# Patient Record
Sex: MALE | Race: WHITE | NOT HISPANIC OR LATINO | Employment: OTHER | URBAN - METROPOLITAN AREA
[De-identification: names, ages, dates, MRNs, and addresses within clinical notes are randomized per-mention and may not be internally consistent; named-entity substitution may affect disease eponyms.]

---

## 2017-01-23 ENCOUNTER — APPOINTMENT (OUTPATIENT)
Dept: LAB | Facility: HOSPITAL | Age: 77
End: 2017-01-23

## 2017-01-23 ENCOUNTER — APPOINTMENT (OUTPATIENT)
Dept: ONCOLOGY | Facility: CLINIC | Age: 77
End: 2017-01-23

## 2017-02-27 ENCOUNTER — LAB (OUTPATIENT)
Dept: LAB | Facility: HOSPITAL | Age: 77
End: 2017-02-27

## 2017-02-27 ENCOUNTER — OFFICE VISIT (OUTPATIENT)
Dept: ONCOLOGY | Facility: CLINIC | Age: 77
End: 2017-02-27

## 2017-02-27 VITALS
DIASTOLIC BLOOD PRESSURE: 86 MMHG | RESPIRATION RATE: 16 BRPM | WEIGHT: 209.6 LBS | BODY MASS INDEX: 26.06 KG/M2 | HEART RATE: 67 BPM | TEMPERATURE: 98 F | HEIGHT: 75 IN | OXYGEN SATURATION: 95 % | SYSTOLIC BLOOD PRESSURE: 132 MMHG

## 2017-02-27 DIAGNOSIS — D72.819 LEUKOPENIA: ICD-10-CM

## 2017-02-27 DIAGNOSIS — G62.9 NEUROPATHY: ICD-10-CM

## 2017-02-27 DIAGNOSIS — D72.818 OTHER DECREASED WHITE BLOOD CELL (WBC) COUNT: Primary | ICD-10-CM

## 2017-02-27 DIAGNOSIS — D69.6 THROMBOCYTOPENIA (HCC): ICD-10-CM

## 2017-02-27 DIAGNOSIS — D47.2 MGUS (MONOCLONAL GAMMOPATHY OF UNKNOWN SIGNIFICANCE): ICD-10-CM

## 2017-02-27 LAB
BASOPHILS # BLD AUTO: 0.03 10*3/MM3 (ref 0–0.1)
BASOPHILS NFR BLD AUTO: 0.8 % (ref 0–1.1)
DEPRECATED RDW RBC AUTO: 42.9 FL (ref 37–49)
EOSINOPHIL # BLD AUTO: 0.04 10*3/MM3 (ref 0–0.36)
EOSINOPHIL NFR BLD AUTO: 1.1 % (ref 1–5)
ERYTHROCYTE [DISTWIDTH] IN BLOOD BY AUTOMATED COUNT: 13.1 % (ref 11.7–14.5)
HCT VFR BLD AUTO: 42.1 % (ref 40–49)
HGB BLD-MCNC: 13.5 G/DL (ref 13.5–16.5)
HOLD SPECIMEN: NORMAL
HOLD SPECIMEN: NORMAL
IMM GRANULOCYTES # BLD: 0.01 10*3/MM3 (ref 0–0.03)
IMM GRANULOCYTES NFR BLD: 0.3 % (ref 0–0.5)
LYMPHOCYTES # BLD AUTO: 1.24 10*3/MM3 (ref 1–3.5)
LYMPHOCYTES NFR BLD AUTO: 32.7 % (ref 20–49)
MCH RBC QN AUTO: 28.8 PG (ref 27–33)
MCHC RBC AUTO-ENTMCNC: 32.1 G/DL (ref 32–35)
MCV RBC AUTO: 89.8 FL (ref 83–97)
MONOCYTES # BLD AUTO: 0.57 10*3/MM3 (ref 0.25–0.8)
MONOCYTES NFR BLD AUTO: 15 % (ref 4–12)
NEUTROPHILS # BLD AUTO: 1.9 10*3/MM3 (ref 1.5–7)
NEUTROPHILS NFR BLD AUTO: 50.1 % (ref 39–75)
NRBC BLD MANUAL-RTO: 0 /100 WBC (ref 0–0)
PLATELET # BLD AUTO: 151 10*3/MM3 (ref 150–375)
PMV BLD AUTO: 10.1 FL (ref 8.9–12.1)
RBC # BLD AUTO: 4.69 10*6/MM3 (ref 4.3–5.5)
WBC NRBC COR # BLD: 3.79 10*3/MM3 (ref 4–10)

## 2017-02-27 PROCEDURE — 36415 COLL VENOUS BLD VENIPUNCTURE: CPT | Performed by: INTERNAL MEDICINE

## 2017-02-27 PROCEDURE — 85025 COMPLETE CBC W/AUTO DIFF WBC: CPT | Performed by: INTERNAL MEDICINE

## 2017-02-27 PROCEDURE — 99213 OFFICE O/P EST LOW 20 MIN: CPT | Performed by: INTERNAL MEDICINE

## 2017-02-27 NOTE — PROGRESS NOTES
REASON FOR FOLLOWUP:  Minimal leukopenia with thrombocytopenia in the background of minimal IgG monoclonal gammopathy. The patient has no splenomegaly, no peripheral adenopathy, and no symptoms that suggest lupus or collagen vascular disease. Bone marrow   a  spirate documented no evidence of myeloma, lymphoma, leukemia, myelodysplasia, or any other abnormality. Bone marrow chromosomal analysis as well as flow cytometry were negative.     HISTORY OF PRESENT ILLNESS: Mr. Feliz returns today to the office in company of his wife.  He has not had any progression of neuropathy in the feet, neither into his hands. He has not developed any other sites of bone pain with exception of occasional discomfort in the left shoulder. His back per se is not painful.     He has not had any fevers, chills, sweats or pruritus. He has not had any peripheral adenopathy, abnormal bleeding, infections, respiratory, cardiovascular, gastrointestinal, or genitourinary problems, otherwise. He has not had any rashes in the skin and   he has not had any other neurological issues.  Since the previous visit and after I called him with the report of his x-ray of the hip he was seen by Dr. Burns and his hip has been replaced. This has produced a positive impact in his quality of life, resolution of the pain and probably to function and resolution of his back pain. He had no difficulties with his hip replacement.             PAST MEDICAL HISTORY:    1.;  Hypertension for which he takes medication.      2.; History of hypothyroidism for the last ten years and has undergone thyroid replacement for a long time.     3.; History of colonoscopy in February 2013 that was normal.  He has internal hemorrhoids.     4.; Component of reflux esophagitis.     5.;   History of prostate cancer.  He was treated with prostatectomy and he has had radiation treatment as well.  He has had a PSA that is undetectable for the last ten years.       6.; Cholecystectomy.    7.; Vasectomy.      8.; Decompression laminectomies at L3-4 and L4-5.      9.; Multiple sebaceous cysts removed.        The patient has no history of asthma, tuberculosis, or COPD.  No history of coronary artery disease or cardiac arrhythmia.          ONCOLOGIC/HEMATOLOGIC HISTORY: History from previous dates can be found in the separate document.        On 2015, given the development of pain in the left hip and tenderness in the greater trochanter, plain x-rays of the pelvis and left hip were performed  . His white count, hemoglobin, and platelets remain stable and the rest of his physical examination disclosed no abnormalities with the exception of tenderness in the greater trochanter on the left side. He had obvious ABSENCE of vibratory sensation in rajan  th feet. He had minimal reflexes in his lower extremities if any at all.           MEDICATIONS:  The current medication list was reviewed with the patient and updated in EMR this date per medical assistant.  Medication dosages and frequencies were confirmed to be accurate.         ALLERGIES:   No known drug allergies.         SOCIAL HISTORY:  The patient is a retired .  He lives with his second wife.  He travels a lot.  He is physically active and he has not been a smoker for more than 30 years or has drunk [employer]  l for more than 33 years.   He use to drink alcohol heavily then.          FAMILY HISTORY:  Patient’s father  with COPD at age 73.  Mother  with complications of breast cancer at age 86.  A brother had cancer of unknown primary and  as a consequence   of this at age 60.  He has no sisters.  His second wife is in good health.  He has two children in good health.  No family history of hematological disorders.         REVIEW OF SYSTEMS:   General: no fever, chills, fatigue, weight changes, or lack of appetite.  Eyes: no epiphora, xerophthalmia,conjunctivitis, pain, glaucoma, blurred vision,  "blindness, secretion, photophobia, proptosis, diplopia.  Ears: no otorrhea, tinnitus, otorrhagia, deafness, pain, vertigo.  Nose: no rhinorrhea, epistaxis, alteration in perception of odors, sinuses pressure.  Mouth: no alteration in gums or teeth,  ulcers, no difficulty with mastication or deglut ion, no odynophagia.  Neck: no masses or pain, no thyroid alterations, no pain in muscles or arteries, no carotid odynia, no crepitation.  Respiratory: no cough, sputum production, dyspnea, trepopnea, pleuritic pain, hemoptysis.  Heart: no syncope, irregularity, palpitations, angina, orthopnea, paroxysmal nocturnal dyspnea.  Vascular Venous: no tenderness,edema, palpable cords, postphlebitic syndrome, skin changes or ulcerations.  Vascular Arterial: no distal ischemia, claudication, gangrene, neuropathic ischemic pain, skin ulcers, paleness or cyanosis.  GI: no dysphagia, odynophagia, no regurgitation, no heartburn,no indigestion,no nausea,no vomiting,no hematemesis ,no melena,no jaundice,no distention, no obstipation,no enterorrhagia,no proctalgia,no anal  lesions, nochanges in bowel habits.  : no frequency, hesitancy, hematuria, discharge, pain.  Musculoskeletal: no muscle or tendon pain or inflammation,some  joint pain,no edema, functional limitation, fasciculations, mass.  Neurologic: no headache, seizures, alterations on Craneal nerves, no motor or senssory deficit, normal coordination, no alteration in memory, orientation, calculation,writting, verbal or written language.Sensory peripheral nuropathy BOTH FEET  Skin: no rashes, pruritus or localized lesions.  Psychiatric: no anxiety, depression, agitation, delusions, proper insight.         VITAL SIGNS:    Vitals:    02/27/17 1113   BP: 132/86   Pulse: 67   Resp: 16   Temp: 98 °F (36.7 °C)   TempSrc: Oral   SpO2: 95%   Weight: 209 lb 9.6 oz (95.1 kg)   Height: 75\" (190.5 cm)              PHYSICAL EXAMINATION:    GENERAL:  White male in no acute distress.     SKIN:  " No lesions in the skin on any level.     HEAD:  Normocephalic.    EYES:  Pupils equal, reactive to light and accommodation.      EARS:  Hearing intact.    NOSE:  Septum midline.  No excoriations or nasal discharge.    MOUTH:  Oral mucosa was normal.     THROAT:  Oropharynx without lesions or exudates.    NECK:  Supple with good range of motion; no thyromegaly or masses, no JVD or bruits.    LYMPHATICS:  No palpable adenopathy in the neck or axillae.     CHEST:  Lungs were clear with normal breath sounds bilaterally.     CARDIAC:  Regular with no gallops, murmurs, or rubs.    ABDOMEN:  Soft, nontender. No splenomegaly. No liver enlargement. No ascites. No masses. No pain. No tenderness in the iliac spine or pubis. He had no inguinal adenopathy.     EXTREMITIES:  oa in hands and knees , new hip great walking.    NEUROLOGICAL:  No focal neurological deficits, besides peripheral neuropathy.        LABORATORY DATA:    mm3 3.79 (L)    RBC 4.30 - 5.50 10*6/mm3 4.69   Hemoglobin 13.5 - 16.5 g/dL 13.5   Hematocrit 40.0 - 49.0 % 42.1   MCV 83.0 - 97.0 fL 89.8   MCH 27.0 - 33.0 pg 28.8   MCHC 32.0 - 35.0 g/dL 32.1   RDW 11.7 - 14.5 % 13.1   RDW-SD 37.0 - 49.0 fl 42.9   MPV 8.9 - 12.1 fL 10.1   Platelets 150 - 375 10*3/mm3 151   Neutrophil % 39.0 - 75.0 % 50.1   Lymphocyte % 20.0 - 49.0 % 32.7   Monocyte % 4.0 - 12.0 % 15.0 (H)   Eosinophil % 1.0 - 5.0 % 1.1   Basophil % 0.0 - 1.1 % 0.8   Immature Grans % 0.0 - 0.5 % 0.3   Neutrophils, Absolute 1.50 - 7.00 10*3/mm3 1.90   Lymphocytes, Absolute 1.00 - 3.50 10*3/mm3 1.24   Monocytes, Absolute 0.25 - 0.80 10*3/mm3 0.57   Eosinophils, Absolute 0.00 - 0.36 10*3/mm3 0.04   Basophils, Absolute 0.00 - 0.10 10*3/mm3 0.03   Immature Grans, Absolute 0.00 - 0.03 10*3/mm3 0.01   nRBC 0.0 - 0.0 /100 WBC 0.0   Resulting Agency   [facility]      Specimen Collected: 02/27/17 11:01 AM Last Resulted: 02/27/17 11:10 AM        KARLEE PENDING             ASSESSMENT:  This patient has had minor  leukopenia and thrombocytopenia with a minimal IgG monoclonal protein present in the blood in absence of any collagen vascular disease as far as we can   tell and in absence of any inflammatory process. 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 His bone marrow in the past was negative normal.            RECOMMENDATIONS:  I will see him back in 6 months. REPEAT CBC CMP KARLEE AT THAT TIME. We are glad to see him doing so well after his hip was replaced.  His functionality and quality of life have dramatically improved.

## 2017-08-14 ENCOUNTER — OFFICE VISIT (OUTPATIENT)
Dept: ONCOLOGY | Facility: CLINIC | Age: 77
End: 2017-08-14

## 2017-08-14 ENCOUNTER — LAB (OUTPATIENT)
Dept: LAB | Facility: HOSPITAL | Age: 77
End: 2017-08-14

## 2017-08-14 VITALS
DIASTOLIC BLOOD PRESSURE: 90 MMHG | OXYGEN SATURATION: 98 % | HEIGHT: 76 IN | TEMPERATURE: 98.2 F | BODY MASS INDEX: 25.47 KG/M2 | WEIGHT: 209.2 LBS | RESPIRATION RATE: 16 BRPM | HEART RATE: 64 BPM | SYSTOLIC BLOOD PRESSURE: 150 MMHG

## 2017-08-14 DIAGNOSIS — D69.6 THROMBOCYTOPENIA (HCC): ICD-10-CM

## 2017-08-14 DIAGNOSIS — D72.818 OTHER DECREASED WHITE BLOOD CELL (WBC) COUNT: Primary | ICD-10-CM

## 2017-08-14 DIAGNOSIS — D72.818 OTHER DECREASED WHITE BLOOD CELL (WBC) COUNT: ICD-10-CM

## 2017-08-14 DIAGNOSIS — D47.2 MGUS (MONOCLONAL GAMMOPATHY OF UNKNOWN SIGNIFICANCE): ICD-10-CM

## 2017-08-14 DIAGNOSIS — G62.9 NEUROPATHY: ICD-10-CM

## 2017-08-14 LAB
ALBUMIN SERPL-MCNC: 4.4 G/DL (ref 3.5–5.2)
ALBUMIN/GLOB SERPL: 1.3 G/DL (ref 1.1–2.4)
ALP SERPL-CCNC: 55 U/L (ref 38–116)
ALT SERPL W P-5'-P-CCNC: 16 U/L (ref 0–41)
ANION GAP SERPL CALCULATED.3IONS-SCNC: 14.9 MMOL/L
AST SERPL-CCNC: 28 U/L (ref 0–40)
BASOPHILS # BLD AUTO: 0.03 10*3/MM3 (ref 0–0.1)
BASOPHILS NFR BLD AUTO: 0.8 % (ref 0–1.1)
BILIRUB SERPL-MCNC: 0.3 MG/DL (ref 0.1–1.2)
BUN BLD-MCNC: 23 MG/DL (ref 6–20)
BUN/CREAT SERPL: 19 (ref 7.3–30)
CALCIUM SPEC-SCNC: 9.6 MG/DL (ref 8.5–10.2)
CHLORIDE SERPL-SCNC: 103 MMOL/L (ref 98–107)
CO2 SERPL-SCNC: 23.1 MMOL/L (ref 22–29)
CREAT BLD-MCNC: 1.21 MG/DL (ref 0.7–1.3)
DEPRECATED RDW RBC AUTO: 42.7 FL (ref 37–49)
EOSINOPHIL # BLD AUTO: 0.04 10*3/MM3 (ref 0–0.36)
EOSINOPHIL NFR BLD AUTO: 1.1 % (ref 1–5)
ERYTHROCYTE [DISTWIDTH] IN BLOOD BY AUTOMATED COUNT: 13.1 % (ref 11.7–14.5)
GFR SERPL CREATININE-BSD FRML MDRD: 58 ML/MIN/1.73
GLOBULIN UR ELPH-MCNC: 3.4 GM/DL (ref 1.8–3.5)
GLUCOSE BLD-MCNC: 86 MG/DL (ref 74–124)
HCT VFR BLD AUTO: 44.4 % (ref 40–49)
HGB BLD-MCNC: 14.4 G/DL (ref 13.5–16.5)
IMM GRANULOCYTES # BLD: 0.01 10*3/MM3 (ref 0–0.03)
IMM GRANULOCYTES NFR BLD: 0.3 % (ref 0–0.5)
LYMPHOCYTES # BLD AUTO: 1.39 10*3/MM3 (ref 1–3.5)
LYMPHOCYTES NFR BLD AUTO: 39.3 % (ref 20–49)
MCH RBC QN AUTO: 29.1 PG (ref 27–33)
MCHC RBC AUTO-ENTMCNC: 32.4 G/DL (ref 32–35)
MCV RBC AUTO: 89.7 FL (ref 83–97)
MONOCYTES # BLD AUTO: 0.59 10*3/MM3 (ref 0.25–0.8)
MONOCYTES NFR BLD AUTO: 16.7 % (ref 4–12)
NEUTROPHILS # BLD AUTO: 1.48 10*3/MM3 (ref 1.5–7)
NEUTROPHILS NFR BLD AUTO: 41.8 % (ref 39–75)
NRBC BLD MANUAL-RTO: 0 /100 WBC (ref 0–0)
PLATELET # BLD AUTO: 146 10*3/MM3 (ref 150–375)
PMV BLD AUTO: 10.3 FL (ref 8.9–12.1)
POTASSIUM BLD-SCNC: 4.8 MMOL/L (ref 3.5–4.7)
PROT SERPL-MCNC: 7.8 G/DL (ref 6.3–8)
RBC # BLD AUTO: 4.95 10*6/MM3 (ref 4.3–5.5)
SODIUM BLD-SCNC: 141 MMOL/L (ref 134–145)
WBC NRBC COR # BLD: 3.54 10*3/MM3 (ref 4–10)

## 2017-08-14 PROCEDURE — 36415 COLL VENOUS BLD VENIPUNCTURE: CPT | Performed by: INTERNAL MEDICINE

## 2017-08-14 PROCEDURE — 99213 OFFICE O/P EST LOW 20 MIN: CPT | Performed by: INTERNAL MEDICINE

## 2017-08-14 PROCEDURE — 85025 COMPLETE CBC W/AUTO DIFF WBC: CPT | Performed by: INTERNAL MEDICINE

## 2017-08-14 PROCEDURE — 80053 COMPREHEN METABOLIC PANEL: CPT | Performed by: INTERNAL MEDICINE

## 2017-08-14 NOTE — PROGRESS NOTES
REASON FOR FOLLOWUP:  Minimal leukopenia with thrombocytopenia in the background of minimal IgG monoclonal gammopathy. The patient has no splenomegaly, no peripheral adenopathy, and no symptoms that suggest lupus or collagen vascular disease. Bone marrow   a  spirate documented no evidence of myeloma, lymphoma, leukemia, myelodysplasia, or any other abnormality. Bone marrow chromosomal analysis as well as flow cytometry were negative.     HISTORY OF PRESENT ILLNESS: Mr. Feliz returns today to the office in company of his wife.  He has not had any progression of neuropathy in the feet, neither into his hands. He has not developed any other sites of bone pain with exception of occasional discomfort in the left shoulder. His back per se is not painful.     He has not had any fevers, chills, sweats or pruritus. He has not had any peripheral adenopathy, abnormal bleeding, infections, respiratory, cardiovascular, gastrointestinal, or genitourinary problems, otherwise. He has not had any rashes in the skin and   he has not had any other neurological issues.  Since the previous visit and after I called him with the report of his x-ray of the hip he was seen by Dr. Burns and his hip has been replaced. This has produced a positive impact in his quality of life, resolution of the pain and probably to function and resolution of his back pain. He had no difficulties with his hip replacement.             PAST MEDICAL HISTORY:    1.;  Hypertension for which he takes medication.      2.; History of hypothyroidism for the last ten years and has undergone thyroid replacement for a long time.     3.; History of colonoscopy in February 2013 that was normal.  He has internal hemorrhoids.     4.; Component of reflux esophagitis.     5.;   History of prostate cancer.  He was treated with prostatectomy and he has had radiation treatment as well.  He has had a PSA that is undetectable for the last ten years.       6.; Cholecystectomy.    7.; Vasectomy.      8.; Decompression laminectomies at L3-4 and L4-5.      9.; Multiple sebaceous cysts removed.        The patient has no history of asthma, tuberculosis, or COPD.  No history of coronary artery disease or cardiac arrhythmia.          ONCOLOGIC/HEMATOLOGIC HISTORY: History from previous dates can be found in the separate document.        On 2015, given the development of pain in the left hip and tenderness in the greater trochanter, plain x-rays of the pelvis and left hip were performed  . His white count, hemoglobin, and platelets remain stable and the rest of his physical examination disclosed no abnormalities with the exception of tenderness in the greater trochanter on the left side. He had obvious ABSENCE of vibratory sensation in rajan  th feet. He had minimal reflexes in his lower extremities if any at all.           MEDICATIONS:  The current medication list was reviewed with the patient and updated in EMR this date per medical assistant.  Medication dosages and frequencies were confirmed to be accurate.         ALLERGIES:   No known drug allergies.         SOCIAL HISTORY:  The patient is a retired .  He lives with his second wife.  He travels a lot.  He is physically active and he has not been a smoker for more than 30 years or has drunk [employer]  l for more than 33 years.   He use to drink alcohol heavily then.          FAMILY HISTORY:  Patient’s father  with COPD at age 73.  Mother  with complications of breast cancer at age 86.  A brother had cancer of unknown primary and  as a consequence   of this at age 60.  He has no sisters.  His second wife is in good health.  He has two children in good health.  No family history of hematological disorders.         REVIEW OF SYSTEMS:   General: no fever, chills, fatigue, weight changes, or lack of appetite.  Eyes: no epiphora, xerophthalmia,conjunctivitis, pain, glaucoma, blurred vision,  "blindness, secretion, photophobia, proptosis, diplopia.  Ears: no otorrhea, tinnitus, otorrhagia, deafness, pain, vertigo.  Nose: no rhinorrhea, epistaxis, alteration in perception of odors, sinuses pressure.  Mouth: no alteration in gums or teeth,  ulcers, no difficulty with mastication or deglut ion, no odynophagia.  Neck: no masses or pain, no thyroid alterations, no pain in muscles or arteries, no carotid odynia, no crepitation.  Respiratory: no cough, sputum production, dyspnea, trepopnea, pleuritic pain, hemoptysis.  Heart: no syncope, irregularity, palpitations, angina, orthopnea, paroxysmal nocturnal dyspnea.  Vascular Venous: no tenderness,edema, palpable cords, postphlebitic syndrome, skin changes or ulcerations.  Vascular Arterial: no distal ischemia, claudication, gangrene, neuropathic ischemic pain, skin ulcers, paleness or cyanosis.  GI: no dysphagia, odynophagia, no regurgitation, no heartburn,no indigestion,no nausea,no vomiting,no hematemesis ,no melena,no jaundice,no distention, no obstipation,no enterorrhagia,no proctalgia,no anal  lesions, nochanges in bowel habits.  : no frequency, hesitancy, hematuria, discharge, pain.  Musculoskeletal: no muscle or tendon pain or inflammation,some  joint pain,no edema, functional limitation, fasciculations, mass.  Neurologic: no headache, seizures, alterations on Craneal nerves, no motor or senssory deficit, normal coordination, no alteration in memory, orientation, calculation,writting, verbal or written language.Sensory peripheral nuropathy BOTH FEET  Skin: no rashes, pruritus or localized lesions.  Psychiatric: no anxiety, depression, agitation, delusions, proper insight.         VITAL SIGNS:    Vitals:    08/14/17 1139   BP: 150/90   Pulse: 64   Resp: 16   Temp: 98.2 °F (36.8 °C)   TempSrc: Oral   SpO2: 98%   Weight: 209 lb 3.2 oz (94.9 kg)   Height: 75.59\" (192 cm)              PHYSICAL EXAMINATION:    GENERAL:  White male in no acute distress.   "   SKIN:  No lesions in the skin on any level.     HEAD:  Normocephalic.    EYES:  Pupils equal, reactive to light and accommodation.      EARS:  Hearing intact.    NOSE:  Septum midline.  No excoriations or nasal discharge.    MOUTH:  Oral mucosa was normal.     THROAT:  Oropharynx without lesions or exudates.    NECK:  Supple with good range of motion; no thyromegaly or masses, no JVD or bruits.    LYMPHATICS:  No palpable adenopathy in the neck or axillae.     CHEST:  Lungs were clear with normal breath sounds bilaterally.     CARDIAC:  Regular with no gallops, murmurs, or rubs.    ABDOMEN:  Soft, nontender. No splenomegaly. No liver enlargement. No ascites. No masses. No pain. No tenderness in the iliac spine or pubis. He had no inguinal adenopathy.     EXTREMITIES:  oa in hands and knees , new hip great walking.    NEUROLOGICAL:  No focal neurological deficits, besides peripheral neuropathy.        LABORATORY DATA   Component      Latest Ref Rng & Units 2/27/2017 8/14/2017          11:01 AM 11:30 AM   WBC      4.00 - 10.00 10*3/mm3 3.79 (L) 3.54 (L)   RBC      4.30 - 5.50 10*6/mm3 4.69 4.95   Hemoglobin      13.5 - 16.5 g/dL 13.5 14.4   Hematocrit      40.0 - 49.0 % 42.1 44.4   MCV      83.0 - 97.0 fL 89.8 89.7   MCH      27.0 - 33.0 pg 28.8 29.1   MCHC      32.0 - 35.0 g/dL 32.1 32.4   RDW      11.7 - 14.5 % 13.1 13.1   RDW-SD      37.0 - 49.0 fl 42.9 42.7   MPV      8.9 - 12.1 fL 10.1 10.3   Platelets      150 - 375 10*3/mm3 151 146 (L)   Neutrophil %      39.0 - 75.0 % 50.1 41.8   Lymphocyte %      20.0 - 49.0 % 32.7 39.3   Monocyte %      4.0 - 12.0 % 15.0 (H) 16.7 (H)   Eosinophil %      1.0 - 5.0 % 1.1 1.1   Basophil %      0.0 - 1.1 % 0.8 0.8   Immature Grans %      0.0 - 0.5 % 0.3 0.3   Neutrophils, Absolute      1.50 - 7.00 10*3/mm3 1.90 1.48 (L)          ASSESSMENT:  This patient has had minor leukopenia and thrombocytopenia with a minimal IgG monoclonal protein present in the blood in absence of any  collagen vascular disease as far as we can   tell and in absence of any inflammatory process. 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 His bone marrow in the past was negative normal.            RECOMMENDATIONS:  I will see him back in 6 months. REPEAT CBC CMP KARLEE AT THAT TIME. We are glad to see him doing so well after his hip was replaced.  His functionality and quality of life have dramatically improved.

## 2017-08-15 LAB
ALBUMIN SERPL-MCNC: 3.8 G/DL (ref 2.9–4.4)
ALBUMIN/GLOB SERPL: 1.2 {RATIO} (ref 0.7–1.7)
ALPHA1 GLOB FLD ELPH-MCNC: 0.3 G/DL (ref 0–0.4)
ALPHA2 GLOB SERPL ELPH-MCNC: 0.8 G/DL (ref 0.4–1)
B-GLOBULIN SERPL ELPH-MCNC: 1.4 G/DL (ref 0.7–1.3)
GAMMA GLOB SERPL ELPH-MCNC: 0.8 G/DL (ref 0.4–1.8)
GLOBULIN SER CALC-MCNC: 3.2 G/DL (ref 2.2–3.9)
IGA SERPL-MCNC: 356 MG/DL (ref 61–437)
IGG SERPL-MCNC: 949 MG/DL (ref 700–1600)
IGM SERPL-MCNC: 23 MG/DL (ref 15–143)
INTERPRETATION SERPL IEP-IMP: ABNORMAL
KAPPA LC SERPL-MCNC: 27.4 MG/L (ref 3.3–19.4)
KAPPA LC/LAMBDA SER: 1.68 {RATIO} (ref 0.26–1.65)
LAMBDA LC FREE SERPL-MCNC: 16.3 MG/L (ref 5.7–26.3)
Lab: ABNORMAL
M-SPIKE: 0.3 G/DL
PROT SERPL-MCNC: 7 G/DL (ref 6–8.5)

## 2017-08-16 ENCOUNTER — TELEPHONE (OUTPATIENT)
Dept: ONCOLOGY | Facility: CLINIC | Age: 77
End: 2017-08-16

## 2018-01-24 ENCOUNTER — LAB (OUTPATIENT)
Dept: LAB | Facility: HOSPITAL | Age: 78
End: 2018-01-24

## 2018-01-24 ENCOUNTER — OFFICE VISIT (OUTPATIENT)
Dept: ONCOLOGY | Facility: CLINIC | Age: 78
End: 2018-01-24

## 2018-01-24 VITALS
RESPIRATION RATE: 18 BRPM | HEART RATE: 66 BPM | BODY MASS INDEX: 25.38 KG/M2 | WEIGHT: 208.4 LBS | OXYGEN SATURATION: 96 % | TEMPERATURE: 98.9 F | HEIGHT: 76 IN | DIASTOLIC BLOOD PRESSURE: 68 MMHG | SYSTOLIC BLOOD PRESSURE: 108 MMHG

## 2018-01-24 DIAGNOSIS — D47.2 MGUS (MONOCLONAL GAMMOPATHY OF UNKNOWN SIGNIFICANCE): ICD-10-CM

## 2018-01-24 DIAGNOSIS — D72.818 OTHER DECREASED WHITE BLOOD CELL (WBC) COUNT: ICD-10-CM

## 2018-01-24 DIAGNOSIS — D69.6 THROMBOCYTOPENIA (HCC): ICD-10-CM

## 2018-01-24 DIAGNOSIS — D47.2 MGUS (MONOCLONAL GAMMOPATHY OF UNKNOWN SIGNIFICANCE): Primary | ICD-10-CM

## 2018-01-24 DIAGNOSIS — G62.9 NEUROPATHY: ICD-10-CM

## 2018-01-24 LAB
ALBUMIN SERPL-MCNC: 4.4 G/DL (ref 3.5–5.2)
ALBUMIN/GLOB SERPL: 1.4 G/DL (ref 1.1–2.4)
ALP SERPL-CCNC: 56 U/L (ref 38–116)
ALT SERPL W P-5'-P-CCNC: 11 U/L (ref 0–41)
ANION GAP SERPL CALCULATED.3IONS-SCNC: 11.7 MMOL/L
AST SERPL-CCNC: 25 U/L (ref 0–40)
BASOPHILS # BLD AUTO: 0.03 10*3/MM3 (ref 0–0.1)
BASOPHILS NFR BLD AUTO: 0.8 % (ref 0–1.1)
BILIRUB SERPL-MCNC: 0.4 MG/DL (ref 0.1–1.2)
BUN BLD-MCNC: 23 MG/DL (ref 6–20)
BUN/CREAT SERPL: 18.7 (ref 7.3–30)
CALCIUM SPEC-SCNC: 9.4 MG/DL (ref 8.5–10.2)
CHLORIDE SERPL-SCNC: 105 MMOL/L (ref 98–107)
CO2 SERPL-SCNC: 25.3 MMOL/L (ref 22–29)
CREAT BLD-MCNC: 1.23 MG/DL (ref 0.7–1.3)
DEPRECATED RDW RBC AUTO: 43.8 FL (ref 37–49)
EOSINOPHIL # BLD AUTO: 0.04 10*3/MM3 (ref 0–0.36)
EOSINOPHIL NFR BLD AUTO: 1.1 % (ref 1–5)
ERYTHROCYTE [DISTWIDTH] IN BLOOD BY AUTOMATED COUNT: 13.2 % (ref 11.7–14.5)
GFR SERPL CREATININE-BSD FRML MDRD: 57 ML/MIN/1.73
GLOBULIN UR ELPH-MCNC: 3.1 GM/DL (ref 1.8–3.5)
GLUCOSE BLD-MCNC: 94 MG/DL (ref 74–124)
HCT VFR BLD AUTO: 44.4 % (ref 40–49)
HGB BLD-MCNC: 14.4 G/DL (ref 13.5–16.5)
IMM GRANULOCYTES # BLD: 0.01 10*3/MM3 (ref 0–0.03)
IMM GRANULOCYTES NFR BLD: 0.3 % (ref 0–0.5)
LYMPHOCYTES # BLD AUTO: 1.3 10*3/MM3 (ref 1–3.5)
LYMPHOCYTES NFR BLD AUTO: 34.3 % (ref 20–49)
MCH RBC QN AUTO: 29.3 PG (ref 27–33)
MCHC RBC AUTO-ENTMCNC: 32.4 G/DL (ref 32–35)
MCV RBC AUTO: 90.2 FL (ref 83–97)
MONOCYTES # BLD AUTO: 0.61 10*3/MM3 (ref 0.25–0.8)
MONOCYTES NFR BLD AUTO: 16.1 % (ref 4–12)
NEUTROPHILS # BLD AUTO: 1.8 10*3/MM3 (ref 1.5–7)
NEUTROPHILS NFR BLD AUTO: 47.4 % (ref 39–75)
NRBC BLD MANUAL-RTO: 0 /100 WBC (ref 0–0)
PLATELET # BLD AUTO: 149 10*3/MM3 (ref 150–375)
PMV BLD AUTO: 10.6 FL (ref 8.9–12.1)
POTASSIUM BLD-SCNC: 4.8 MMOL/L (ref 3.5–4.7)
PROT SERPL-MCNC: 7.5 G/DL (ref 6.3–8)
RBC # BLD AUTO: 4.92 10*6/MM3 (ref 4.3–5.5)
SODIUM BLD-SCNC: 142 MMOL/L (ref 134–145)
WBC NRBC COR # BLD: 3.79 10*3/MM3 (ref 4–10)

## 2018-01-24 PROCEDURE — 80053 COMPREHEN METABOLIC PANEL: CPT | Performed by: INTERNAL MEDICINE

## 2018-01-24 PROCEDURE — 36415 COLL VENOUS BLD VENIPUNCTURE: CPT | Performed by: INTERNAL MEDICINE

## 2018-01-24 PROCEDURE — 99213 OFFICE O/P EST LOW 20 MIN: CPT | Performed by: INTERNAL MEDICINE

## 2018-01-24 PROCEDURE — 85025 COMPLETE CBC W/AUTO DIFF WBC: CPT | Performed by: INTERNAL MEDICINE

## 2018-01-24 NOTE — PROGRESS NOTES
REASON FOR FOLLOWUP:  Minimal leukopenia with thrombocytopenia in the background of minimal IgG monoclonal gammopathy. The patient has no splenomegaly, no peripheral adenopathy, and no symptoms that suggest lupus or collagen vascular disease. Bone marrow   a  spirate documented no evidence of myeloma, lymphoma, leukemia, myelodysplasia, or any other abnormality. Bone marrow chromosomal analysis as well as flow cytometry were negative.     HISTORY OF PRESENT ILLNESS: Mr. Feliz returns today to the office in company of his wife.  He has not had any progression of neuropathy in the feet, neither into his hands. He has not developed any other sites of bone pain with exception of occasional discomfort in the left shoulder. His back per se is not painful.     He has not had any fevers, chills, sweats or pruritus. He has not had any peripheral adenopathy, abnormal bleeding, infections, respiratory, cardiovascular, gastrointestinal, or genitourinary problems, otherwise. He has not had any rashes in the skin and   he has not had any other neurological issues.           PAST MEDICAL HISTORY:    1.;  Hypertension for which he takes medication.      2.; History of hypothyroidism for the last ten years and has undergone thyroid replacement for a long time.     3.; History of colonoscopy in February 2013 that was normal.  He has internal hemorrhoids.     4.; Component of reflux esophagitis.     5.;   History of prostate cancer.  He was treated with prostatectomy and he has had radiation treatment as well.  He has had a PSA that is undetectable for the last ten years.      6.; Cholecystectomy.    7.; Vasectomy.      8.; Decompression laminectomies at L3-4 and L4-5.      9.; Multiple sebaceous cysts removed.        The patient has no history of asthma, tuberculosis, or COPD.  No history of coronary artery disease or cardiac arrhythmia.          ONCOLOGIC/HEMATOLOGIC HISTORY: History from previous dates can be  found in the separate document.        On 2015, given the development of pain in the left hip and tenderness in the greater trochanter, plain x-rays of the pelvis and left hip were performed  . His white count, hemoglobin, and platelets remain stable and the rest of his physical examination disclosed no abnormalities with the exception of tenderness in the greater trochanter on the left side. He had obvious ABSENCE of vibratory sensation in rajan  th feet. He had minimal reflexes in his lower extremities if any at all.           MEDICATIONS:  The current medication list was reviewed with the patient and updated in EMR this date per medical assistant.  Medication dosages and frequencies were confirmed to be accurate.         ALLERGIES:   No known drug allergies.         SOCIAL HISTORY:  The patient is a retired .  He lives with his second wife.  He travels a lot.  He is physically active and he has not been a smoker for more than 30 years or has drunk alcoho  l for more than 33 years.   He use to drink alcohol heavily then.          FAMILY HISTORY:  Patient’s father  with COPD at age 73.  Mother  with complications of breast cancer at age 86.  A brother had cancer of unknown primary and  as a consequence   of this at age 60.  He has no sisters.  His second wife is in good health.  He has two children in good health.  No family history of hematological disorders.         REVIEW OF SYSTEMS:   General: no fever, chills, fatigue, weight changes, or lack of appetite.  Eyes: no epiphora, xerophthalmia,conjunctivitis, pain, glaucoma, blurred vision, blindness, secretion, photophobia, proptosis, diplopia.  Ears: no otorrhea, tinnitus, otorrhagia, deafness, pain, vertigo.  Nose: no rhinorrhea, epistaxis, alteration in perception of odors, sinuses pressure.  Mouth: no alteration in gums or teeth,  ulcers, no difficulty with mastication or deglut ion, no odynophagia.  Neck: no masses or pain, no  "thyroid alterations, no pain in muscles or arteries, no carotid odynia, no crepitation.  Respiratory: no cough, sputum production, dyspnea, trepopnea, pleuritic pain, hemoptysis.  Heart: no syncope, irregularity, palpitations, angina, orthopnea, paroxysmal nocturnal dyspnea.  Vascular Venous: no tenderness,edema, palpable cords, postphlebitic syndrome, skin changes or ulcerations.  Vascular Arterial: no distal ischemia, claudication, gangrene, neuropathic ischemic pain, skin ulcers, paleness or cyanosis.  GI: no dysphagia, odynophagia, no regurgitation, no heartburn,no indigestion,no nausea,no vomiting,no hematemesis ,no melena,no jaundice,no distention, no obstipation,no enterorrhagia,no proctalgia,no anal  lesions, nochanges in bowel habits.  : no frequency, hesitancy, hematuria, discharge, pain.  Musculoskeletal: no muscle or tendon pain or inflammation,some  joint pain,no edema, functional limitation, fasciculations, mass.  Neurologic: no headache, seizures, alterations on Craneal nerves, no motor or senssory deficit, normal coordination, no alteration in memory, orientation, calculation,writting, verbal or written language.Sensory peripheral nuropathy BOTH FEET  Skin: no rashes, pruritus or localized lesions.  Psychiatric: no anxiety, depression, agitation, delusions, proper insight.         VITAL SIGNS:    Vitals:    01/24/18 1207   BP: 108/68   Pulse: 66   Resp: 18   Temp: 98.9 °F (37.2 °C)   TempSrc: Oral   SpO2: 96%   Weight: 94.5 kg (208 lb 6.4 oz)   Height: 192 cm (75.59\")              PHYSICAL EXAMINATION:    GENERAL:  White male in no acute distress.     SKIN:  No lesions in the skin on any level.     HEAD:  Normocephalic.    EYES:  Pupils equal, reactive to light and accommodation.      EARS:  Hearing intact.    NOSE:  Septum midline.  No excoriations or nasal discharge.    MOUTH:  Oral mucosa was normal.     THROAT:  Oropharynx without lesions or exudates.    NECK:  Supple with good range of motion; " no thyromegaly or masses, no JVD or bruits.    LYMPHATICS:  No palpable adenopathy in the neck or axillae.     CHEST:  Lungs were clear with normal breath sounds bilaterally.     CARDIAC:  Regular with no gallops, murmurs, or rubs.    ABDOMEN:  Soft, nontender. No splenomegaly. No liver enlargement. No ascites. No masses. No pain. No tenderness in the iliac spine or pubis. He had no inguinal adenopathy.     EXTREMITIES:  oa in hands and knees , new hip great walking.    NEUROLOGICAL:  No focal neurological deficits, besides peripheral neuropathy.        LABORATORY DATA   Component      Latest Ref Rng & Units 8/14/2017 1/24/2018          11:30 AM 11:58 AM   WBC      4.00 - 10.00 10*3/mm3 3.54 (L) 3.79 (L)   RBC      4.30 - 5.50 10*6/mm3 4.95 4.92   Hemoglobin      13.5 - 16.5 g/dL 14.4 14.4   Hematocrit      40.0 - 49.0 % 44.4 44.4   MCV      83.0 - 97.0 fL 89.7 90.2   MCH      27.0 - 33.0 pg 29.1 29.3   MCHC      32.0 - 35.0 g/dL 32.4 32.4   RDW      11.7 - 14.5 % 13.1 13.2   RDW-SD      37.0 - 49.0 fl 42.7 43.8   MPV      8.9 - 12.1 fL 10.3 10.6   Platelets      150 - 375 10*3/mm3 146 (L) 149 (L)   Neutrophil %      39.0 - 75.0 % 41.8 47.4   Lymphocyte %      20.0 - 49.0 % 39.3 34.3   Monocyte %      4.0 - 12.0 % 16.7 (H) 16.1 (H)   Eosinophil %      1.0 - 5.0 % 1.1 1.1   Basophil %      0.0 - 1.1 % 0.8 0.8   Immature Grans %      0.0 - 0.5 % 0.3 0.3   Neutrophils, Absolute      1.50 - 7.00 10*3/mm3 1.48 (L) 1.80       ASSESSMENT:  This patient has had minor leukopenia and thrombocytopenia with a minimal IgG monoclonal protein present in the blood in absence of any collagen vascular disease as far as we can   tell and in absence of any inflammatory process. 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 His bone marrow in the past was negative normal.   As far as I can tell the patient has no other new issues and his white count remains low, his hemoglobin and platelets remain excellent and he has  pending today a serum protein immunoelectrophoresis.      From the point of view of his hearing deficit I advised him to proceed with assessment for this and testing.  He needs to have a hearing aid.  He is now living in a cocoon isolated because of the lack of hearing.    Otherwise I will review him back in 6 months with a CBC, CMP and a protein electrophoresis at that time.     We briefly spoke about medicines for neuropathy.  He has taken Neurontin before with no benefit and he is now taking medicine for depression that has been of some benefit.  I advised him to remain on what he is taking right now that is called Lexapro.

## 2018-01-25 LAB
ALBUMIN SERPL-MCNC: 3.7 G/DL (ref 2.9–4.4)
ALBUMIN/GLOB SERPL: 1.1 {RATIO} (ref 0.7–1.7)
ALPHA1 GLOB FLD ELPH-MCNC: 0.2 G/DL (ref 0–0.4)
ALPHA2 GLOB SERPL ELPH-MCNC: 0.8 G/DL (ref 0.4–1)
B-GLOBULIN SERPL ELPH-MCNC: 1.4 G/DL (ref 0.7–1.3)
GAMMA GLOB SERPL ELPH-MCNC: 0.9 G/DL (ref 0.4–1.8)
GLOBULIN SER CALC-MCNC: 3.4 G/DL (ref 2.2–3.9)
IGA SERPL-MCNC: 366 MG/DL (ref 61–437)
IGG SERPL-MCNC: 951 MG/DL (ref 700–1600)
IGM SERPL-MCNC: 21 MG/DL (ref 15–143)
INTERPRETATION SERPL IEP-IMP: ABNORMAL
KAPPA LC SERPL-MCNC: 24.9 MG/L (ref 3.3–19.4)
KAPPA LC/LAMBDA SER: 1.98 {RATIO} (ref 0.26–1.65)
LAMBDA LC FREE SERPL-MCNC: 12.6 MG/L (ref 5.7–26.3)
Lab: ABNORMAL
M-SPIKE: 0.3 G/DL
PROT SERPL-MCNC: 7.1 G/DL (ref 6–8.5)

## 2018-01-26 ENCOUNTER — TELEPHONE (OUTPATIENT)
Dept: ONCOLOGY | Facility: CLINIC | Age: 78
End: 2018-01-26

## 2018-02-07 ENCOUNTER — TELEPHONE (OUTPATIENT)
Dept: ONCOLOGY | Facility: HOSPITAL | Age: 78
End: 2018-02-07

## 2018-02-07 RX ORDER — GABAPENTIN 300 MG/1
300 CAPSULE ORAL NIGHTLY
Qty: 30 CAPSULE | Refills: 1 | OUTPATIENT
Start: 2018-02-07 | End: 2018-03-09 | Stop reason: SDUPTHER

## 2018-02-07 NOTE — TELEPHONE ENCOUNTER
Pt called stating he has neuropathy in his feet and at his last appt he discussed with Dr. Olvera the option of try neurontin. Pt has decided he would like to try it. Message sent to Dr. Olvera. Await response.    MD Nelida Prather RN                     300 mg at bedtime       Called pt and let him know. Called script into [facility] pharmacy.

## 2018-03-12 RX ORDER — GABAPENTIN 300 MG/1
CAPSULE ORAL
Qty: 30 CAPSULE | Refills: 4 | OUTPATIENT
Start: 2018-03-12 | End: 2018-08-23 | Stop reason: SDUPTHER

## 2018-07-20 ENCOUNTER — LAB (OUTPATIENT)
Dept: LAB | Facility: HOSPITAL | Age: 78
End: 2018-07-20

## 2018-07-20 ENCOUNTER — OFFICE VISIT (OUTPATIENT)
Dept: ONCOLOGY | Facility: CLINIC | Age: 78
End: 2018-07-20

## 2018-07-20 VITALS
BODY MASS INDEX: 25.61 KG/M2 | RESPIRATION RATE: 16 BRPM | HEIGHT: 75 IN | WEIGHT: 206 LBS | DIASTOLIC BLOOD PRESSURE: 70 MMHG | HEART RATE: 65 BPM | SYSTOLIC BLOOD PRESSURE: 110 MMHG | OXYGEN SATURATION: 97 % | TEMPERATURE: 99.4 F

## 2018-07-20 DIAGNOSIS — G62.9 NEUROPATHY: ICD-10-CM

## 2018-07-20 DIAGNOSIS — D47.2 MGUS (MONOCLONAL GAMMOPATHY OF UNKNOWN SIGNIFICANCE): ICD-10-CM

## 2018-07-20 DIAGNOSIS — D72.818 OTHER DECREASED WHITE BLOOD CELL (WBC) COUNT: ICD-10-CM

## 2018-07-20 DIAGNOSIS — D69.6 THROMBOCYTOPENIA (HCC): ICD-10-CM

## 2018-07-20 DIAGNOSIS — D47.2 MGUS (MONOCLONAL GAMMOPATHY OF UNKNOWN SIGNIFICANCE): Primary | ICD-10-CM

## 2018-07-20 LAB
ALBUMIN SERPL-MCNC: 4.2 G/DL (ref 3.5–5.2)
ALBUMIN/GLOB SERPL: 1.3 G/DL (ref 1.1–2.4)
ALP SERPL-CCNC: 59 U/L (ref 38–116)
ALT SERPL W P-5'-P-CCNC: 14 U/L (ref 0–41)
ANION GAP SERPL CALCULATED.3IONS-SCNC: 9.2 MMOL/L
AST SERPL-CCNC: 26 U/L (ref 0–40)
BASOPHILS # BLD AUTO: 0.02 10*3/MM3 (ref 0–0.1)
BASOPHILS NFR BLD AUTO: 0.6 % (ref 0–1.1)
BILIRUB SERPL-MCNC: 0.3 MG/DL (ref 0.1–1.2)
BUN BLD-MCNC: 21 MG/DL (ref 6–20)
BUN/CREAT SERPL: 18.1 (ref 7.3–30)
CALCIUM SPEC-SCNC: 9.5 MG/DL (ref 8.5–10.2)
CHLORIDE SERPL-SCNC: 105 MMOL/L (ref 98–107)
CO2 SERPL-SCNC: 26.8 MMOL/L (ref 22–29)
CREAT BLD-MCNC: 1.16 MG/DL (ref 0.7–1.3)
DEPRECATED RDW RBC AUTO: 45.4 FL (ref 37–49)
EOSINOPHIL # BLD AUTO: 0.03 10*3/MM3 (ref 0–0.36)
EOSINOPHIL NFR BLD AUTO: 0.9 % (ref 1–5)
ERYTHROCYTE [DISTWIDTH] IN BLOOD BY AUTOMATED COUNT: 13.4 % (ref 11.7–14.5)
GFR SERPL CREATININE-BSD FRML MDRD: 61 ML/MIN/1.73
GLOBULIN UR ELPH-MCNC: 3.3 GM/DL (ref 1.8–3.5)
GLUCOSE BLD-MCNC: 91 MG/DL (ref 74–124)
HCT VFR BLD AUTO: 44.7 % (ref 40–49)
HGB BLD-MCNC: 14.3 G/DL (ref 13.5–16.5)
IMM GRANULOCYTES # BLD: 0.01 10*3/MM3 (ref 0–0.03)
IMM GRANULOCYTES NFR BLD: 0.3 % (ref 0–0.5)
LYMPHOCYTES # BLD AUTO: 1.15 10*3/MM3 (ref 1–3.5)
LYMPHOCYTES NFR BLD AUTO: 36.1 % (ref 20–49)
MCH RBC QN AUTO: 29.4 PG (ref 27–33)
MCHC RBC AUTO-ENTMCNC: 32 G/DL (ref 32–35)
MCV RBC AUTO: 91.8 FL (ref 83–97)
MONOCYTES # BLD AUTO: 0.55 10*3/MM3 (ref 0.25–0.8)
MONOCYTES NFR BLD AUTO: 17.2 % (ref 4–12)
NEUTROPHILS # BLD AUTO: 1.43 10*3/MM3 (ref 1.5–7)
NEUTROPHILS NFR BLD AUTO: 44.9 % (ref 39–75)
NRBC BLD MANUAL-RTO: 0 /100 WBC (ref 0–0)
PLATELET # BLD AUTO: 145 10*3/MM3 (ref 150–375)
PMV BLD AUTO: 10.6 FL (ref 8.9–12.1)
POTASSIUM BLD-SCNC: 4.5 MMOL/L (ref 3.5–4.7)
PROT SERPL-MCNC: 7.5 G/DL (ref 6.3–8)
RBC # BLD AUTO: 4.87 10*6/MM3 (ref 4.3–5.5)
SODIUM BLD-SCNC: 141 MMOL/L (ref 134–145)
WBC NRBC COR # BLD: 3.19 10*3/MM3 (ref 4–10)

## 2018-07-20 PROCEDURE — 85025 COMPLETE CBC W/AUTO DIFF WBC: CPT

## 2018-07-20 PROCEDURE — 80053 COMPREHEN METABOLIC PANEL: CPT

## 2018-07-20 PROCEDURE — 36415 COLL VENOUS BLD VENIPUNCTURE: CPT | Performed by: INTERNAL MEDICINE

## 2018-07-20 PROCEDURE — 99215 OFFICE O/P EST HI 40 MIN: CPT | Performed by: INTERNAL MEDICINE

## 2018-07-20 RX ORDER — HEPATITIS A VACCINE 1440 [IU]/ML
INJECTION, SUSPENSION INTRAMUSCULAR
COMMUNITY
Start: 2018-04-25 | End: 2019-07-01

## 2018-07-20 NOTE — PROGRESS NOTES
REASON FOR FOLLOWUP:  Minimal leukopenia with thrombocytopenia in the background of minimal IgG monoclonal gammopathy. The patient has no splenomegaly, no peripheral adenopathy, and no symptoms that suggest lupus or collagen vascular disease. Bone marrow   a  spirate documented no evidence of myeloma, lymphoma, leukemia, myelodysplasia, or any other abnormality. Bone marrow chromosomal analysis as well as flow cytometry were negative.     HISTORY OF PRESENT ILLNESS: This patient returns today to the office in company of his wife stating that for the last several weeks he has had sensation of chills and coldness. He has an element of fatigue. Physically he continues going to the gym and able to do anything that he pleases but he believes that maybe his neuropathy in his lower extremities is becoming more symptomatic because he does not feel his feet as good as before. He does not believe that the level of neuropathy is going above or close to the knee and he has not developed any neuropathy in his hands. He has not had any pain but he has discomfort at nighttime almost with restless legs. He has not had any dysfunction of his bladder or his bowel. He has not had any modification in his mild degree of chronic back pain. He has not had any other new issues. His appetite is very good. His weight is stable. His bowel function as stated was normal. He has not had any cardiorespiratory symptomatology. He denies any joint pain besides his chronic back pain. He denies any rashes in the skin. His hearing remains a problem and he is extremely hardheaded to get hearing aids.        PAST MEDICAL HISTORY:    1.;  Hypertension for which he takes medication.      2.; History of hypothyroidism for the last ten years and has undergone thyroid replacement for a long time.     3.; History of colonoscopy in February 2013 that was normal.  He has internal hemorrhoids.     4.; Component of reflux esophagitis.     5.;    History of prostate cancer.  He was treated with prostatectomy and he has had radiation treatment as well.  He has had a PSA that is undetectable for the last ten years.      6.; Cholecystectomy.    7.; Vasectomy.      8.; Decompression laminectomies at L3-4 and L4-5.      9.; Multiple sebaceous cysts removed.        The patient has no history of asthma, tuberculosis, or COPD.  No history of coronary artery disease or cardiac arrhythmia.          ONCOLOGIC/HEMATOLOGIC HISTORY: History from previous dates can be found in the separate document.        On 2015, given the development of pain in the left hip and tenderness in the greater trochanter, plain x-rays of the pelvis and left hip were performed  . His white count, hemoglobin, and platelets remain stable and the rest of his physical examination disclosed no abnormalities with the exception of tenderness in the greater trochanter on the left side. He had obvious ABSENCE of vibratory sensation in rajan  th feet. He had minimal reflexes in his lower extremities if any at all.           MEDICATIONS:  The current medication list was reviewed with the patient and updated in EMR this date per medical assistant.  Medication dosages and frequencies were confirmed to be accurate.         ALLERGIES:   No known drug allergies.         SOCIAL HISTORY:  The patient is a retired .  He lives with his second wife.  He travels a lot.  He is physically active and he has not been a smoker for more than 30 years or has drunk alcoho  l for more than 33 years.   He use to drink alcohol heavily then.          FAMILY HISTORY:  Patient’s father  with COPD at age 73.  Mother  with complications of breast cancer at age 86.  A brother had cancer of unknown primary and  as a consequence   of this at age 60.  He has no sisters.  His second wife is in good health.  He has two children in good health.  No family history of hematological disorders.         REVIEW OF  SYSTEMS:   General: low grade  fever, stated chills,and  fatigue, no weight changes, or lack of appetite.  Eyes: no epiphora, xerophthalmia,conjunctivitis, pain, glaucoma, blurred vision, blindness, secretion, photophobia, proptosis, diplopia.  Ears: no otorrhea, tinnitus, otorrhagia, no changes in this level of deafness, no pain, vertigo.  Nose: no rhinorrhea, epistaxis, alteration in perception of odors, sinuses pressure.  Mouth: no alteration in gums or teeth,  ulcers, no difficulty with mastication or deglut ion, no odynophagia.  Neck: no masses or pain, no thyroid alterations, no pain in muscles or arteries, no carotid odynia, no crepitation.  Respiratory: no cough, sputum production, dyspnea, trepopnea, pleuritic pain, hemoptysis.  Heart: no syncope, irregularity, palpitations, angina, orthopnea, paroxysmal nocturnal dyspnea.  Vascular Venous: no tenderness,edema, palpable cords, postphlebitic syndrome, skin changes or ulcerations.  Vascular Arterial: no distal ischemia, claudication, gangrene, neuropathic ischemic pain, skin ulcers, paleness or cyanosis.  GI: no dysphagia, odynophagia, no regurgitation, no heartburn,no indigestion,no nausea,no vomiting,no hematemesis ,no melena,no jaundice,no distention, no obstipation,no enterorrhagia,no proctalgia,no anal  lesions, nochanges in bowel habits.  : no frequency, hesitancy, hematuria, discharge, pain.  Musculoskeletal: no changes in muscle or tendon pain or inflammation, joint pain, edema, functional limitation, fasciculations, mass.  Neurologic: no headache, seizures, alterations on Craneal nerves, maybe worse senssory deficit, poor coordination, no alteration in memory, orientation, calculation,writting, verbal or written language.  Skin: no rashes, pruritus or localized lesions.  Psychiatric: no anxiety, depression, agitation, delusions, proper insight.        VITAL SIGNS:    Vitals:    07/20/18 1043   BP: 110/70   Pulse: 65   Resp: 16   Temp: 99.4 °F (37.4  "°C)   SpO2: 97%   Weight: 93.4 kg (206 lb)   Height: 190 cm (74.8\")              PHYSICAL EXAMINATION:    GENERAL:  Well-developed, well-nourished  Patient  in no acute distress.   SKIN:  Warm, dry without rashes, purpura or petechiae.  HEENT:  Pupils were equal and reactive to light and accomodation, conjunctivas non injected, no pterigion, normal extraocular movements, normal visual acuity.   Mouth mucosa was moist, no exudates in oropharynx, normal gum line, normal roof of the mouth and pillars, normal papillations of the tongue.  NECK:  Supple with good range of motion; no thyromegaly or masses, no JVD or bruits, no cervical adenopathies.No carotid arteries pain, no carotid abnormal pulsation or arterial dance.  LYMPHATICS:  No cervical, supraclavicular, axillary, epitrochlear or inguinal adenopathy.  CHEST:  Normal excursion of both serge thoraces, normal voice fremitus, no subcutaneous emphysema, normal axillas, no rashes or acanthosis nigricans. Lungs clear to percussion and auscultation, normal breath sounds bilaterally, no wheezing, crackles or ronchi, no stridor, no rubs.  CARDIAC AND VASCULAR: PMI not displaced,no thrills, normal rate and regular rhythm, without murmurs, rubs or S3 or S4 right or left sided gallops. Normal femoral, popliteal, pedis, brachial and carotid pulses.  ABDOMEN:  Soft, nontender with no organomegaly or masses, no ascites, no collateral circulation,no distention,no Cazenovia sign, no abdominal pain, no inguinal hernias,no umbilical hernias, no abdominal bruits. Normal bowel sounds.  GENITAL: Not  Performed.  EXTREMITIES  AND SPINE:  No clubbing, cyanosis or edema, no deformities or pain .lumbar spine kyphosis, no scoliosis, deformities or pain in spine, ribs or pelvic bone.  NEUROLOGICAL:  Patient was awake, alert, oriented to time, person and place,normal gait and poor coordination, marginal  Romberg; cranial nerves were normal, motor strength in upper and lower extremities was 5/5 " proximally and distally, reflexes were symmetric 1 le, toes were down going,ab normal sensory modalities to touch, pinprick, temperature discrimination, no le vibratory sensation, poor propioception, no meningeal signs with supple neck.             LABORATORY DATAAuto Differential   Order: [id number] - Part of Panel Order [id number]   Status:  Final result   Visible to patient:  No (Not Released) Dx:  Neuropathy; Thrombocytopenia (CMS/HCC...    Ref Range & Units 10:20   WBC 4.00 - 10.00 10*3/mm3 3.19     RBC 4.30 - 5.50 10*6/mm3 4.87    Hemoglobin 13.5 - 16.5 g/dL 14.3    Hematocrit 40.0 - 49.0 % 44.7    MCV 83.0 - 97.0 fL 91.8    MCH 27.0 - 33.0 pg 29.4    MCHC 32.0 - 35.0 g/dL 32.0    RDW 11.7 - 14.5 % 13.4    RDW-SD 37.0 - 49.0 fl 45.4    MPV 8.9 - 12.1 fL 10.6    Platelets 150 - 375 10*3/mm3 145     Neutrophil % 39.0 - 75.0 % 44.9    Lymphocyte % 20.0 - 49.0 % 36.1    Monocyte % 4.0 - 12.0 % 17.2     Eosinophil % 1.0 - 5.0 % 0.9           Comprehensive Metabolic Panel   Order: [id number]   Status:  Final result   Visible to patient:  No (Not Released) Dx:  Neuropathy; Thrombocytopenia (CMS/HCC...    Ref Range & Units 10:20   Glucose 74 - 124 mg/dL 91    BUN 6 - 20 mg/dL 21     Creatinine 0.70 - 1.30 mg/dL 1.16    Sodium 134 - 145 mmol/L 141    Potassium 3.5 - 4.7 mmol/L 4.5    Chloride 98 - 107 mmol/L 105    CO2 22.0 - 29.0 mmol/L 26.8    Calcium 8.5 - 10.2 mg/dL 9.5    Total Protein 6.3 - 8.0 g/dL 7.5    Albumin 3.50 - 5.20 g/dL 4.20    ALT (SGPT) 0 - 41 U/L 14    AST (SGOT) 0 - 40 U/L 26    Alkaline Phosphatase 38 - 116 U/L 59    Total Bilirubin 0.1 - 1.2 mg/dL 0.3    eGFR Non African Amer >60 mL/min/1.73 61    Globulin 1.8 - 3.5 gm/dL 3.3    A/G Ratio 1.1 - 2.4 g/dL 1.3                     ASSESSMENT:  This patient has had minor leukopenia and thrombocytopenia with a minimal IgG monoclonal protein present in the blood in absence of any collagen vascular disease as far as we can   tell and in absence of any  inflammatory process. 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 His bone marrow in the past was negative normal.  Today, the patient complains of a little bit more fatigue, occasional low-grade temperature. No adenopathies. His appetite and weight have remained the same and maybe his neuropathy is a little bit worse. His coordination is not as good as before. It caught my attention upon checking his patellar reflexes that actually the patient has almost even tendency for clonus; that is extremely unusual in somebody who has peripheral neuropathy. His Achilles reflexes are abolished. His vibratory sensation is abolished in his feet. He has neuropathy in his hands. Therefore, under these premises and waiting to review his monoclonal protein studies, I want to pursue a CT scan of the chest, abdomen and pelvis on him with no contrast to be reviewed with him in a couple of weeks. The other issue that I would like to address at this time is minimal peripheral monocytosis and I would like for him to have peripheral blood flow cytometry. This will be sent to [place] and the report will be available in a few days. If any abnormalities are found either in the monoclonal protein studies or in the flow cytometry, I do believe that the patient will require reassessment with bone marrow testing.     Given the fact that we have a new neurologist in the [facility] that specializes in peripheral neuropathy, I am going to refer him to see Dr. Garcia. Again, it caught my attention his patellar abnormalities in the reflexes that in my opinion are very peculiar and contradictory in regard to his clinical issues. Raises the question if the patient could have an element of cervical spinal stenosis. This was addressed also before by me and we never found anything but maybe we need to do retesting in this arena. Again, we will review the patient back in a couple of weeks. I discussed all these facts with the  patient and his wife and he was ready to proceed. Appointment was made for him to be seen by Neurology for reassessment of his neuropathy.

## 2018-07-24 ENCOUNTER — TELEPHONE (OUTPATIENT)
Dept: ONCOLOGY | Facility: CLINIC | Age: 78
End: 2018-07-24

## 2018-07-24 LAB — REF LAB TEST METHOD: NORMAL

## 2018-07-24 NOTE — TELEPHONE ENCOUNTER
Phone with pt: m protein same , flow shows b cell excess in peripheral blood , he needs bone marrow next week, stop aspirin as per today, he agrees

## 2018-07-25 LAB
ALBUMIN SERPL-MCNC: 4.1 G/DL (ref 2.9–4.4)
ALBUMIN/GLOB SERPL: 1.3 {RATIO} (ref 0.7–1.7)
ALPHA1 GLOB FLD ELPH-MCNC: 0.3 G/DL (ref 0–0.4)
ALPHA2 GLOB SERPL ELPH-MCNC: 0.8 G/DL (ref 0.4–1)
B-GLOBULIN SERPL ELPH-MCNC: 1.3 G/DL (ref 0.7–1.3)
GAMMA GLOB SERPL ELPH-MCNC: 1 G/DL (ref 0.4–1.8)
GLOBULIN SER CALC-MCNC: 3.3 G/DL (ref 2.2–3.9)
IGA SERPL-MCNC: 352 MG/DL (ref 61–437)
IGG SERPL-MCNC: 902 MG/DL (ref 700–1600)
IGM SERPL-MCNC: 23 MG/DL (ref 15–143)
INTERPRETATION SERPL IEP-IMP: ABNORMAL
KAPPA LC SERPL-MCNC: 25 MG/L (ref 3.3–19.4)
KAPPA LC/LAMBDA SER: 1.7 {RATIO} (ref 0.26–1.65)
LAMBDA LC FREE SERPL-MCNC: 14.7 MG/L (ref 5.7–26.3)
Lab: ABNORMAL
M-SPIKE: 0.2 G/DL
PROT SERPL-MCNC: 7.4 G/DL (ref 6–8.5)

## 2018-08-02 ENCOUNTER — HOSPITAL ENCOUNTER (OUTPATIENT)
Dept: PET IMAGING | Facility: HOSPITAL | Age: 78
Discharge: HOME | End: 2018-08-02
Attending: INTERNAL MEDICINE | Admitting: INTERNAL MEDICINE

## 2018-08-02 DIAGNOSIS — D69.6 THROMBOCYTOPENIA (HCC): ICD-10-CM

## 2018-08-02 DIAGNOSIS — G62.9 NEUROPATHY: ICD-10-CM

## 2018-08-02 DIAGNOSIS — D72.818 OTHER DECREASED WHITE BLOOD CELL (WBC) COUNT: ICD-10-CM

## 2018-08-02 DIAGNOSIS — D47.2 MGUS (MONOCLONAL GAMMOPATHY OF UNKNOWN SIGNIFICANCE): ICD-10-CM

## 2018-08-02 PROCEDURE — 71250 CT THORAX DX C-: CPT

## 2018-08-02 PROCEDURE — 0 DIATRIZOATE MEGLUMINE & SODIUM PER 1 ML: Performed by: INTERNAL MEDICINE

## 2018-08-02 PROCEDURE — 74176 CT ABD & PELVIS W/O CONTRAST: CPT

## 2018-08-02 PROCEDURE — 70490 CT SOFT TISSUE NECK W/O DYE: CPT

## 2018-08-02 RX ADMIN — DIATRIZOATE MEGLUMINE AND DIATRIZOATE SODIUM 30 ML: 660; 100 LIQUID ORAL; RECTAL at 12:40

## 2018-08-03 ENCOUNTER — APPOINTMENT (OUTPATIENT)
Dept: LAB | Facility: HOSPITAL | Age: 78
End: 2018-08-03

## 2018-08-03 ENCOUNTER — OFFICE VISIT (OUTPATIENT)
Dept: ONCOLOGY | Facility: CLINIC | Age: 78
End: 2018-08-03

## 2018-08-03 VITALS
TEMPERATURE: 97.9 F | DIASTOLIC BLOOD PRESSURE: 82 MMHG | OXYGEN SATURATION: 97 % | HEART RATE: 69 BPM | WEIGHT: 207 LBS | HEIGHT: 75 IN | SYSTOLIC BLOOD PRESSURE: 130 MMHG | BODY MASS INDEX: 25.74 KG/M2

## 2018-08-03 DIAGNOSIS — D47.2 MGUS (MONOCLONAL GAMMOPATHY OF UNKNOWN SIGNIFICANCE): Primary | ICD-10-CM

## 2018-08-03 DIAGNOSIS — G62.9 NEUROPATHY: ICD-10-CM

## 2018-08-03 DIAGNOSIS — D72.818 OTHER DECREASED WHITE BLOOD CELL (WBC) COUNT: Primary | ICD-10-CM

## 2018-08-03 DIAGNOSIS — D72.818 OTHER DECREASED WHITE BLOOD CELL (WBC) COUNT: ICD-10-CM

## 2018-08-03 DIAGNOSIS — D69.6 THROMBOCYTOPENIA (HCC): ICD-10-CM

## 2018-08-03 LAB
BASOPHILS # BLD AUTO: 0.03 10*3/MM3 (ref 0–0.1)
BASOPHILS NFR BLD AUTO: 0.7 % (ref 0–1.1)
DEPRECATED RDW RBC AUTO: 43.5 FL (ref 37–49)
EOSINOPHIL # BLD AUTO: 0.04 10*3/MM3 (ref 0–0.36)
EOSINOPHIL NFR BLD AUTO: 0.9 % (ref 1–5)
ERYTHROCYTE [DISTWIDTH] IN BLOOD BY AUTOMATED COUNT: 13.3 % (ref 11.7–14.5)
HCT VFR BLD AUTO: 44 % (ref 40–49)
HGB BLD-MCNC: 14.5 G/DL (ref 13.5–16.5)
IMM GRANULOCYTES # BLD: 0.02 10*3/MM3 (ref 0–0.03)
IMM GRANULOCYTES NFR BLD: 0.5 % (ref 0–0.5)
LYMPHOCYTES # BLD AUTO: 1.48 10*3/MM3 (ref 1–3.5)
LYMPHOCYTES NFR BLD AUTO: 33.5 % (ref 20–49)
MCH RBC QN AUTO: 29.5 PG (ref 27–33)
MCHC RBC AUTO-ENTMCNC: 33 G/DL (ref 32–35)
MCV RBC AUTO: 89.4 FL (ref 83–97)
MONOCYTES # BLD AUTO: 0.69 10*3/MM3 (ref 0.25–0.8)
MONOCYTES NFR BLD AUTO: 15.6 % (ref 4–12)
NEUTROPHILS # BLD AUTO: 2.16 10*3/MM3 (ref 1.5–7)
NEUTROPHILS NFR BLD AUTO: 48.8 % (ref 39–75)
NRBC BLD MANUAL-RTO: 0 /100 WBC (ref 0–0)
PLATELET # BLD AUTO: 160 10*3/MM3 (ref 150–375)
PMV BLD AUTO: 10.9 FL (ref 8.9–12.1)
RBC # BLD AUTO: 4.92 10*6/MM3 (ref 4.3–5.5)
WBC NRBC COR # BLD: 4.42 10*3/MM3 (ref 4–10)

## 2018-08-03 PROCEDURE — 36416 COLLJ CAPILLARY BLOOD SPEC: CPT | Performed by: INTERNAL MEDICINE

## 2018-08-03 PROCEDURE — 38220 DX BONE MARROW ASPIRATIONS: CPT | Performed by: INTERNAL MEDICINE

## 2018-08-03 PROCEDURE — 99212 OFFICE O/P EST SF 10 MIN: CPT | Performed by: INTERNAL MEDICINE

## 2018-08-03 PROCEDURE — 85025 COMPLETE CBC W/AUTO DIFF WBC: CPT | Performed by: INTERNAL MEDICINE

## 2018-08-03 RX ORDER — ASCORBIC ACID 500 MG
500 TABLET ORAL DAILY
COMMUNITY
End: 2020-07-02

## 2018-08-03 NOTE — PROGRESS NOTES
"    ============    BONE MARROW PROCEDURE      =========      LOCATION:  [facility] OFFICE     INDICATIONS:  MGUS, NEUROPATHY LEUKOPENIA THROMBOCYTOPENIA.    PROCEDURE:  After informed consent was obtained, the skin overlying the right/left buttock was prepped and draped in sterile fashion.  A \"time-out\" was called at 1\"06  p.m.    Patient identity was confirmed by:  CHALO PINEDA confirmed name and  on ID band  Correct site confirmed as:RIGHT  posterior iliac crest  Procedure confirmed as:RIGHT BONE MARROW ASPIRATION     At  1:10p.m., 1% plain Xylocaine was infiltrated into the skin, subcutaneous tissue and periosteum overlying the right/left posterior iliac spine.  A total of  10 ccs was administered. A #11 Jamshidi needle was introduced into the marrow cavity and suction was applied with a 20 cc syringe.  Aspirate material obtained.  The patient tolerated the procedure well.      Total duration of the procedure 10 minutes. The patient had no complications including no pain and no clinical bleeding.  The area was dressed in the usual fashion cleaning the area with alcohol and Betadine.  The patient was advised to take some Tylenol when he gets home 650 mg p.o. and it will be perfectly fine for him to go back on his aspirin as early as tonight. Band-aids were provided to the patient’s wife to change this tomorrow and .  The patient could resume activity as soon as tonight.     An appointment was made for him to return back to the office in a week to go over the report of this.     Material was sent for flow cytometry, chromosomes and Congo red stain.  Biopsy was extremely difficult to obtain and this was overpassed. The bones in this patient were dramatically hard that are not consistent with the diagnosis of myeloma or something of this nature.            Chalo Pineda MD       "

## 2018-08-06 ENCOUNTER — APPOINTMENT (OUTPATIENT)
Dept: ONCOLOGY | Facility: CLINIC | Age: 78
End: 2018-08-06

## 2018-08-06 ENCOUNTER — APPOINTMENT (OUTPATIENT)
Dept: LAB | Facility: HOSPITAL | Age: 78
End: 2018-08-06

## 2018-08-10 ENCOUNTER — LAB (OUTPATIENT)
Dept: LAB | Facility: HOSPITAL | Age: 78
End: 2018-08-10

## 2018-08-10 ENCOUNTER — OFFICE VISIT (OUTPATIENT)
Dept: ONCOLOGY | Facility: CLINIC | Age: 78
End: 2018-08-10

## 2018-08-10 VITALS
DIASTOLIC BLOOD PRESSURE: 70 MMHG | RESPIRATION RATE: 16 BRPM | SYSTOLIC BLOOD PRESSURE: 126 MMHG | OXYGEN SATURATION: 98 % | WEIGHT: 207 LBS | BODY MASS INDEX: 25.74 KG/M2 | HEART RATE: 68 BPM | HEIGHT: 75 IN | TEMPERATURE: 98.3 F

## 2018-08-10 DIAGNOSIS — D69.6 THROMBOCYTOPENIA (HCC): ICD-10-CM

## 2018-08-10 DIAGNOSIS — D47.2 MGUS (MONOCLONAL GAMMOPATHY OF UNKNOWN SIGNIFICANCE): ICD-10-CM

## 2018-08-10 DIAGNOSIS — G62.9 NEUROPATHY: ICD-10-CM

## 2018-08-10 DIAGNOSIS — D72.818 OTHER DECREASED WHITE BLOOD CELL (WBC) COUNT: ICD-10-CM

## 2018-08-10 DIAGNOSIS — D47.2 MGUS (MONOCLONAL GAMMOPATHY OF UNKNOWN SIGNIFICANCE): Primary | ICD-10-CM

## 2018-08-10 LAB
BASOPHILS # BLD AUTO: 0.02 10*3/MM3 (ref 0–0.1)
BASOPHILS NFR BLD AUTO: 0.4 % (ref 0–1.1)
DEPRECATED RDW RBC AUTO: 43.7 FL (ref 37–49)
EOSINOPHIL # BLD AUTO: 0.03 10*3/MM3 (ref 0–0.36)
EOSINOPHIL NFR BLD AUTO: 0.6 % (ref 1–5)
ERYTHROCYTE [DISTWIDTH] IN BLOOD BY AUTOMATED COUNT: 13.2 % (ref 11.7–14.5)
HCT VFR BLD AUTO: 42.1 % (ref 40–49)
HGB BLD-MCNC: 14 G/DL (ref 13.5–16.5)
IMM GRANULOCYTES # BLD: 0.02 10*3/MM3 (ref 0–0.03)
IMM GRANULOCYTES NFR BLD: 0.4 % (ref 0–0.5)
LYMPHOCYTES # BLD AUTO: 1.28 10*3/MM3 (ref 1–3.5)
LYMPHOCYTES NFR BLD AUTO: 27.6 % (ref 20–49)
MCH RBC QN AUTO: 29.6 PG (ref 27–33)
MCHC RBC AUTO-ENTMCNC: 33.3 G/DL (ref 32–35)
MCV RBC AUTO: 89 FL (ref 83–97)
MONOCYTES # BLD AUTO: 0.76 10*3/MM3 (ref 0.25–0.8)
MONOCYTES NFR BLD AUTO: 16.4 % (ref 4–12)
NEUTROPHILS # BLD AUTO: 2.53 10*3/MM3 (ref 1.5–7)
NEUTROPHILS NFR BLD AUTO: 54.6 % (ref 39–75)
NRBC BLD MANUAL-RTO: 0 /100 WBC (ref 0–0)
PLATELET # BLD AUTO: 147 10*3/MM3 (ref 150–375)
PMV BLD AUTO: 10.5 FL (ref 8.9–12.1)
RBC # BLD AUTO: 4.73 10*6/MM3 (ref 4.3–5.5)
WBC NRBC COR # BLD: 4.64 10*3/MM3 (ref 4–10)

## 2018-08-10 PROCEDURE — 85025 COMPLETE CBC W/AUTO DIFF WBC: CPT | Performed by: NURSE PRACTITIONER

## 2018-08-10 PROCEDURE — 36415 COLL VENOUS BLD VENIPUNCTURE: CPT | Performed by: NURSE PRACTITIONER

## 2018-08-10 PROCEDURE — 99214 OFFICE O/P EST MOD 30 MIN: CPT | Performed by: INTERNAL MEDICINE

## 2018-08-10 NOTE — PROGRESS NOTES
REASON FOR FOLLOWUP:  Minimal leukopenia with thrombocytopenia in the background of minimal IgG monoclonal gammopathy. The patient has no splenomegaly, no peripheral adenopathy, and no symptoms that suggest lupus or collagen vascular disease. Bone marrow   a  spirate documented no evidence of myeloma, lymphoma, leukemia, myelodysplasia, or any other abnormality. Bone marrow chromosomal analysis as well as flow cytometry were negative.     HISTORY OF PRESENT ILLNESS: This patient returns today to the office in company of his wife stating that for the last several weeks he has had sensation of chills and coldness. He has an element of fatigue. Physically he continues going to the gym and able to do anything that he pleases but he believes that maybe his neuropathy in his lower extremities is becoming more symptomatic because he does not feel his feet as good as before. He does not believe that the level of neuropathy is going above or close to the knee and he has not developed any neuropathy in his hands. He has not had any pain but he has discomfort at nighttime almost with restless legs. He has not had any dysfunction of his bladder or his bowel. He has not had any modification in his mild degree of chronic back pain. He has not had any other new issues. His appetite is very good. His weight is stable. His bowel function as stated was normal. He has not had any cardiorespiratory symptomatology. He denies any joint pain besides his chronic back pain. He denies any rashes in the skin. His hearing remains a problem and he is extremely hardheaded to get hearing aids.   The patient underwent bone marrow testing on 08/03/2018 and seen on 08/10/2018 for review. The good news is that the bone marrow has not documented any evidence of leukemia, lymphoma, myeloma or amyloidosis. The final analysis of the bone marrow chromosomes is still pending.  The patient is not having any other new symptomatology similar  to the one that he was experiencing and we have advised him to remain in observation.  Please review below.          PAST MEDICAL HISTORY:    1.;  Hypertension for which he takes medication.      2.; History of hypothyroidism for the last ten years and has undergone thyroid replacement for a long time.     3.; History of colonoscopy in February 2013 that was normal.  He has internal hemorrhoids.     4.; Component of reflux esophagitis.     5.;   History of prostate cancer.  He was treated with prostatectomy and he has had radiation treatment as well.  He has had a PSA that is undetectable for the last ten years.      6.; Cholecystectomy.    7.; Vasectomy.      8.; Decompression laminectomies at L3-4 and L4-5.      9.; Multiple sebaceous cysts removed.        The patient has no history of asthma, tuberculosis, or COPD.  No history of coronary artery disease or cardiac arrhythmia.          ONCOLOGIC/HEMATOLOGIC HISTORY: History from previous dates can be found in the separate document.        On 12/28/2015, given the development of pain in the left hip and tenderness in the greater trochanter, plain x-rays of the pelvis and left hip were performed  . His white count, hemoglobin, and platelets remain stable and the rest of his physical examination disclosed no abnormalities with the exception of tenderness in the greater trochanter on the left side. He had obvious ABSENCE of vibratory sensation in rajan  th feet. He had minimal reflexes in his lower extremities if any at all.           MEDICATIONS:  The current medication list was reviewed with the patient and updated in EMR this date per medical assistant.  Medication dosages and frequencies were confirmed to be accurate.         ALLERGIES:   No known drug allergies.         SOCIAL HISTORY:  The patient is a retired .  He lives with his second wife.  He travels a lot.  He is physically active and he has not been a smoker for more than 30 years or has drunk  geoo  l for more than 33 years.   He use to drink alcohol heavily then.          FAMILY HISTORY:  Patient’s father  with COPD at age 73.  Mother  with complications of breast cancer at age 86.  A brother had cancer of unknown primary and  as a consequence   of this at age 60.  He has no sisters.  His second wife is in good health.  He has two children in good health.  No family history of hematological disorders.         REVIEW OF SYSTEMS:   General: no fever, chills, fatigue, weight changes, or lack of appetite.  Eyes: no epiphora, xerophthalmia,conjunctivitis, pain, glaucoma, blurred vision, blindness, secretion, photophobia, proptosis, diplopia.  Ears: no otorrhea, tinnitus, otorrhagia, deafness, pain, vertigo.  Nose: no rhinorrhea, epistaxis, alteration in perception of odors, sinuses pressure.  Mouth: no alteration in gums or teeth,  ulcers, no difficulty with mastication or deglut ion, no odynophagia.  Neck: no masses or pain, no thyroid alterations, no pain in muscles or arteries, no carotid odynia, no crepitation.  Respiratory: no cough, sputum production, dyspnea, trepopnea, pleuritic pain, hemoptysis.  Heart: no syncope, irregularity, palpitations, angina, orthopnea, paroxysmal nocturnal dyspnea.  Vascular Venous: no tenderness,edema, palpable cords, postphlebitic syndrome, skin changes or ulcerations.  Vascular Arterial: no distal ischemia, claudication, gangrene, neuropathic ischemic pain, skin ulcers, paleness or cyanosis.  GI: no dysphagia, odynophagia, no regurgitation, no heartburn,no indigestion,no nausea,no vomiting,no hematemesis ,no melena,no jaundice,no distention, no obstipation,no enterorrhagia,no proctalgia,no anal  lesions, nochanges in bowel habits.  : no frequency, hesitancy, hematuria, discharge, pain.  Musculoskeletal: no muscle or tendon pain or inflammation, joint pain, edema, functional limitation, fasciculations, mass.  Neurologic: no headache, seizures, alterations  "on Craneal nerves, uncahnged senssory deficit, normal coordination, no alteration in memory, orientation, calculation,writting, verbal or written language.  Skin: no rashes, pruritus or localized lesions.  Psychiatric: no anxiety, depression, agitation, delusions, proper insight.      VITAL SIGNS:    Vitals:    08/10/18 1354   BP: 126/70   Pulse: 68   Resp: 16   Temp: 98.3 °F (36.8 °C)   SpO2: 98%   Weight: 93.9 kg (207 lb)   Height: 190 cm (74.8\")              PHYSICAL EXAMINATION:     GENERAL:  Well-developed, well-nourished  Patient  in no acute distress.   SKIN:  Warm, dry without rashes, purpura or petechiae.  HEENT:  Pupils were equal and reactive to light and accomodation, conjunctivas non injected, no pterigion, normal extraocular movements, normal visual acuity.   Mouth mucosa was moist, no exudates in oropharynx, normal gum line, normal roof of the mouth and pillars, normal papillations of the tongue.  NECK:  Supple with good range of motion; no thyromegaly or masses, no JVD or bruits, no cervical adenopathies.No carotid arteries pain, no carotid abnormal pulsation or arterial dance.  LYMPHATICS:  No cervical, supraclavicular, axillary, epitrochlear or inguinal adenopathy.  CHEST:  Normal excursion of both serge thoraces, normal voice fremitus, no subcutaneous emphysema, normal axillas, no rashes or acanthosis nigricans. Lungs clear to percussion and auscultation, normal breath sounds bilaterally, no wheezing, crackles or ronchi, no stridor, no rubs.  CARDIAC AND VASCULAR: normal rate and regular rhythm, without murmurs, rubs or S3 or S4 right or left sided gallops. Normal femoral, popliteal, pedis, brachial and carotid pulses.  ABDOMEN:  Soft, nontender with no organomegaly or masses, no ascites, no collateral circulation,no distention,no [place] sign, no abdominal pain, no inguinal hernias,no umbilical hernias, no abdominal bruits. Normal bowel sounds.  GENITAL: Not  Performed.  EXTREMITIES  AND SPINE:  No " clubbing, cyanosis or edema, no deformities or pain .No kyphosis, scoliosis, deformities or pain in spine, ribs or pelvic bone.  NEUROLOGICAL:  Patient was awake, alert, oriented to time, person and place neuropathy both feet                 LABORATORY DATAVisible to patient:  No (Not Released) Dx:  Neuropathy; Thrombocytopenia (CMS/HCC...    Ref Range & Units 13:44   WBC 4.00 - 10.00 10*3/mm3 4.64    RBC 4.30 - 5.50 10*6/mm3 4.73    Hemoglobin 13.5 - 16.5 g/dL 14.0    Hematocrit 40.0 - 49.0 % 42.1    MCV 83.0 - 97.0 fL 89.0    MCH 27.0 - 33.0 pg 29.6    MCHC 32.0 - 35.0 g/dL 33.3    RDW 11.7 - 14.5 % 13.2    RDW-SD 37.0 - 49.0 fl 43.7    MPV 8.9 - 12.1 fL 10.5    Platelets 150 - 375 10*3/mm3 147     Neutrophil % 39.0 - 75.0 % 54.6    Lymphocyte % 20.0 - 49.0 % 27.6    Monocyte % 4.0 - 12.0 % 16.4     Eosinophil % 1.0 - 5.0 % 0.6                ASSESSMENT:  This patient has had minor leukopenia and thrombocytopenia with a minimal IgG monoclonal protein present in the blood in absence of any collagen vascular disease as far as we can   tell and in absence of any inflammatory process. 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 His bone marrow in the past was negative normal.  Today, the patient complains of a little bit more fatigue, occasional low-grade temperature. No adenopathies. His appetite and weight have remained the same and maybe his neuropathy is a little bit worse. His coordination is not as good as before. It caught my attention upon checking his patellar reflexes that actually the patient has almost even tendency for clonus; that is extremely unusual in somebody who has peripheral neuropathy. His Achilles reflexes are abolished. His vibratory sensation is abolished in his feet. He has neuropathy in his hands.        The patient proceeded with bone marrow testing on 08/03/2018 and we reviewed the report on 08/10/2018.  The bone marrow failed to document any myeloma, lymphoma, leukemia,  myelodysplasia or amyloidosis. The bone marrow chromosomes were pending at the time of this dictation; I will guess they are normal.  In any event, given the fact of the findings in the bone marrow and given the fact that the patient otherwise has been feeling relatively well we advised him to go back into normal schedule of surveillance with a CBC, CMP and monoclonal protein studies in 23 weeks and review him back in 24 weeks.     I have not advised any other issues to the patient besides the question of that he had in regard to the beta-blocker that he has been taking for a long time for blood pressure control.  It makes him feel cold and I raised the question if he could discuss this with is primary physician in regard to modifying or getting rid of the beta-blocker and moving to lisinopril for example.  Other than that there are no other issues.     I discussed all these facts with the patient and his wife and they were ready to proceed.  They were gratified that nothing else was found.

## 2018-08-15 LAB — REF LAB TEST METHOD: NORMAL

## 2018-08-23 RX ORDER — GABAPENTIN 300 MG/1
CAPSULE ORAL
Qty: 30 CAPSULE | Refills: 3 | Status: SHIPPED | OUTPATIENT
Start: 2018-08-23 | End: 2019-09-25 | Stop reason: SDUPTHER

## 2019-01-18 ENCOUNTER — LAB (OUTPATIENT)
Dept: LAB | Facility: HOSPITAL | Age: 79
End: 2019-01-18

## 2019-01-18 DIAGNOSIS — D72.818 OTHER DECREASED WHITE BLOOD CELL (WBC) COUNT: ICD-10-CM

## 2019-01-18 DIAGNOSIS — D69.6 THROMBOCYTOPENIA (HCC): ICD-10-CM

## 2019-01-18 DIAGNOSIS — D47.2 MGUS (MONOCLONAL GAMMOPATHY OF UNKNOWN SIGNIFICANCE): ICD-10-CM

## 2019-01-18 DIAGNOSIS — G62.9 NEUROPATHY: ICD-10-CM

## 2019-01-18 LAB
ALBUMIN SERPL-MCNC: 4.2 G/DL (ref 3.5–5.2)
ALBUMIN/GLOB SERPL: 1.4 G/DL (ref 1.1–2.4)
ALP SERPL-CCNC: 61 U/L (ref 38–116)
ALT SERPL W P-5'-P-CCNC: 15 U/L (ref 0–41)
ANION GAP SERPL CALCULATED.3IONS-SCNC: 11 MMOL/L
AST SERPL-CCNC: 27 U/L (ref 0–40)
BASOPHILS # BLD AUTO: 0.01 10*3/MM3 (ref 0–0.1)
BASOPHILS NFR BLD AUTO: 0.2 % (ref 0–1.1)
BILIRUB SERPL-MCNC: 0.4 MG/DL (ref 0.1–1.2)
BUN BLD-MCNC: 16 MG/DL (ref 6–20)
BUN/CREAT SERPL: 13.6 (ref 7.3–30)
CALCIUM SPEC-SCNC: 9.6 MG/DL (ref 8.5–10.2)
CHLORIDE SERPL-SCNC: 105 MMOL/L (ref 98–107)
CO2 SERPL-SCNC: 26 MMOL/L (ref 22–29)
CREAT BLD-MCNC: 1.18 MG/DL (ref 0.7–1.3)
DEPRECATED RDW RBC AUTO: 44.6 FL (ref 37–49)
EOSINOPHIL # BLD AUTO: 0.05 10*3/MM3 (ref 0–0.36)
EOSINOPHIL NFR BLD AUTO: 1.1 % (ref 1–5)
ERYTHROCYTE [DISTWIDTH] IN BLOOD BY AUTOMATED COUNT: 13.2 % (ref 11.7–14.5)
GFR SERPL CREATININE-BSD FRML MDRD: 60 ML/MIN/1.73
GLOBULIN UR ELPH-MCNC: 3 GM/DL (ref 1.8–3.5)
GLUCOSE BLD-MCNC: 88 MG/DL (ref 74–124)
HCT VFR BLD AUTO: 44.5 % (ref 40–49)
HGB BLD-MCNC: 14.3 G/DL (ref 13.5–16.5)
IMM GRANULOCYTES # BLD AUTO: 0.01 10*3/MM3 (ref 0–0.03)
IMM GRANULOCYTES NFR BLD AUTO: 0.2 % (ref 0–0.5)
LYMPHOCYTES # BLD AUTO: 1.37 10*3/MM3 (ref 1–3.5)
LYMPHOCYTES NFR BLD AUTO: 31.4 % (ref 20–49)
MCH RBC QN AUTO: 29.5 PG (ref 27–33)
MCHC RBC AUTO-ENTMCNC: 32.1 G/DL (ref 32–35)
MCV RBC AUTO: 91.9 FL (ref 83–97)
MONOCYTES # BLD AUTO: 0.73 10*3/MM3 (ref 0.25–0.8)
MONOCYTES NFR BLD AUTO: 16.7 % (ref 4–12)
NEUTROPHILS # BLD AUTO: 2.19 10*3/MM3 (ref 1.5–7)
NEUTROPHILS NFR BLD AUTO: 50.4 % (ref 39–75)
NRBC BLD AUTO-RTO: 0 /100 WBC (ref 0–0)
PLATELET # BLD AUTO: 148 10*3/MM3 (ref 150–375)
PMV BLD AUTO: 10.4 FL (ref 8.9–12.1)
POTASSIUM BLD-SCNC: 4.8 MMOL/L (ref 3.5–4.7)
PROT SERPL-MCNC: 7.2 G/DL (ref 6.3–8)
RBC # BLD AUTO: 4.84 10*6/MM3 (ref 4.3–5.5)
SODIUM BLD-SCNC: 142 MMOL/L (ref 134–145)
WBC NRBC COR # BLD: 4.36 10*3/MM3 (ref 4–10)

## 2019-01-18 PROCEDURE — 85025 COMPLETE CBC W/AUTO DIFF WBC: CPT | Performed by: INTERNAL MEDICINE

## 2019-01-18 PROCEDURE — 36415 COLL VENOUS BLD VENIPUNCTURE: CPT | Performed by: INTERNAL MEDICINE

## 2019-01-18 PROCEDURE — 80053 COMPREHEN METABOLIC PANEL: CPT | Performed by: INTERNAL MEDICINE

## 2019-01-21 LAB
ALBUMIN SERPL-MCNC: 3.8 G/DL (ref 2.9–4.4)
ALBUMIN/GLOB SERPL: 1.3 {RATIO} (ref 0.7–1.7)
ALPHA1 GLOB FLD ELPH-MCNC: 0.2 G/DL (ref 0–0.4)
ALPHA2 GLOB SERPL ELPH-MCNC: 0.7 G/DL (ref 0.4–1)
B-GLOBULIN SERPL ELPH-MCNC: 1.3 G/DL (ref 0.7–1.3)
GAMMA GLOB SERPL ELPH-MCNC: 0.9 G/DL (ref 0.4–1.8)
GLOBULIN SER CALC-MCNC: 3.1 G/DL (ref 2.2–3.9)
IGA SERPL-MCNC: 344 MG/DL (ref 61–437)
IGG SERPL-MCNC: 902 MG/DL (ref 700–1600)
IGM SERPL-MCNC: 19 MG/DL (ref 15–143)
INTERPRETATION SERPL IEP-IMP: ABNORMAL
KAPPA LC SERPL-MCNC: 24.7 MG/L (ref 3.3–19.4)
KAPPA LC/LAMBDA SER: 1.94 {RATIO} (ref 0.26–1.65)
LAMBDA LC FREE SERPL-MCNC: 12.7 MG/L (ref 5.7–26.3)
Lab: ABNORMAL
M-SPIKE: 0.2 G/DL
PROT SERPL-MCNC: 6.9 G/DL (ref 6–8.5)

## 2019-01-25 ENCOUNTER — APPOINTMENT (OUTPATIENT)
Dept: LAB | Facility: HOSPITAL | Age: 79
End: 2019-01-25

## 2019-01-25 ENCOUNTER — OFFICE VISIT (OUTPATIENT)
Dept: ONCOLOGY | Facility: CLINIC | Age: 79
End: 2019-01-25

## 2019-01-25 VITALS
RESPIRATION RATE: 14 BRPM | WEIGHT: 209.2 LBS | DIASTOLIC BLOOD PRESSURE: 81 MMHG | TEMPERATURE: 98.2 F | HEIGHT: 75 IN | SYSTOLIC BLOOD PRESSURE: 126 MMHG | OXYGEN SATURATION: 98 % | HEART RATE: 65 BPM | BODY MASS INDEX: 26.01 KG/M2

## 2019-01-25 DIAGNOSIS — D47.2 NEUROPATHY ASSOCIATED WITH MONOCLONAL GAMMOPATHY OF UNDETERMINED SIGNIFICANCE (HCC): ICD-10-CM

## 2019-01-25 DIAGNOSIS — D69.6 THROMBOCYTOPENIA (HCC): Primary | ICD-10-CM

## 2019-01-25 DIAGNOSIS — D72.818 OTHER DECREASED WHITE BLOOD CELL (WBC) COUNT: ICD-10-CM

## 2019-01-25 DIAGNOSIS — G63 NEUROPATHY ASSOCIATED WITH MONOCLONAL GAMMOPATHY OF UNDETERMINED SIGNIFICANCE (HCC): ICD-10-CM

## 2019-01-25 DIAGNOSIS — D47.2 MGUS (MONOCLONAL GAMMOPATHY OF UNKNOWN SIGNIFICANCE): ICD-10-CM

## 2019-01-25 PROCEDURE — G0463 HOSPITAL OUTPT CLINIC VISIT: HCPCS | Performed by: INTERNAL MEDICINE

## 2019-01-25 PROCEDURE — 99214 OFFICE O/P EST MOD 30 MIN: CPT | Performed by: INTERNAL MEDICINE

## 2019-01-25 NOTE — PROGRESS NOTES
REASON FOR FOLLOWUP:  Minimal leukopenia with thrombocytopenia in the background of minimal IgG monoclonal gammopathy. The patient has no splenomegaly, no peripheral adenopathy, and no symptoms that suggest lupus or collagen vascular disease. Bone marrow   a  spirate documented no evidence of myeloma, lymphoma, leukemia, myelodysplasia, or any other abnormality. Bone marrow chromosomal analysis as well as flow cytometry were negative.     HISTORY OF PRESENT ILLNESS: This patient had returned today to the office for followup along with his wife. In 12/2018, he had an episode of fatigue, weakness, sleepiness that lasted for a few days in absence of fever.  He thought he was going to develop a cold or flu, but never had fever, runny nose, sore throat or sputum production.  No nausea, vomiting, diarrhea or rash.  He developed vertigo at that time. In any event, the symptoms improved and he is now back to his baseline.  His main problem remains sensory neuropathy in his feet.  He has not had any motor deficit.  He believes the neuropathy is worse and the Neurontin he takes does not do a thing to control symptomatology.  He has not developed any motor deficit.  He has not had any worsening neuropathy in his hands. His appetite is acceptable, his bowel function and urination are normal, he has no peripheral adenopathy.  He had skin cancer removed from the left neck area recently.  He has not had any other new problems.              PAST MEDICAL HISTORY:    1.;  Hypertension for which he takes medication.      2.; History of hypothyroidism for the last ten years and has undergone thyroid replacement for a long time.     3.; History of colonoscopy in February 2013 that was normal.  He has internal hemorrhoids.     4.; Component of reflux esophagitis.     5.;   History of prostate cancer.  He was treated with prostatectomy and he has had radiation treatment as well.  He has had a PSA that is undetectable for  the last ten years.      6.; Cholecystectomy.    7.; Vasectomy.      8.; Decompression laminectomies at L3-4 and L4-5.      9.; Multiple sebaceous cysts removed.        The patient has no history of asthma, tuberculosis, or COPD.  No history of coronary artery disease or cardiac arrhythmia.          ONCOLOGIC/HEMATOLOGIC HISTORY: History from previous dates can be found in the separate document.        On 2015, given the development of pain in the left hip and tenderness in the greater trochanter, plain x-rays of the pelvis and left hip were performed  . His white count, hemoglobin, and platelets remain stable and the rest of his physical examination disclosed no abnormalities with the exception of tenderness in the greater trochanter on the left side. He had obvious ABSENCE of vibratory sensation in rajan  th feet. He had minimal reflexes in his lower extremities if any at all.           MEDICATIONS:  The current medication list was reviewed with the patient and updated in EMR this date per medical assistant.  Medication dosages and frequencies were confirmed to be accurate.         ALLERGIES:   No known drug allergies.         SOCIAL HISTORY:  The patient is a retired .  He lives with his second wife.  He travels a lot.  He is physically active and he has not been a smoker for more than 30 years or has drunk Incujectoro  l for more than 33 years.   He use to drink alcohol heavily then.          FAMILY HISTORY:  Patient’s father  with COPD at age 73.  Mother  with complications of breast cancer at age 86.  A brother had cancer of unknown primary and  as a consequence   of this at age 60.  He has no sisters.  His second wife is in good health.  He has two children in good health.  No family history of hematological disorders.         REVIEW OF SYSTEMS:       General: no fever, no chills, no fatigue,no weight changes, no lack of appetite.  Eyes: no epiphora, xerophthalmia,conjunctivitis, pain,  glaucoma, blurred vision, blindness, secretion, photophobia, proptosis, diplopia.  Ears: no otorrhea, tinnitus, otorrhagia, deafness, pain, vertigo.  Nose: no rhinorrhea, no epistaxis, no alteration in perception of odors, no sinuses pressure.  Mouth: no alteration in gums or teeth,  No ulcers, no difficulty with mastication or deglut ion, no odynophagia.  Neck: no masses or pain, no thyroid alterations, no pain in muscles or arteries, no carotid odynia, no crepitation.  Respiratory: no cough, no sputum production,no dyspnea,no trepopnea, no pleuritic pain,no hemoptysis.  Heart: no syncope, no irregularity, no palpitations, no angina,no orthopnea,no paroxysmal nocturnal dyspnea.  Vascular Venous: no tenderness,no edema,no palpable cords,no postphlebitic syndrome, no skin changes no ulcerations.  Vascular Arterial: no distal ischemia, noclaudication, no gangrene, no neuropathic ischemic pain, no skin ulcers, no paleness no cyanosis.  GI: no dysphagia, no odynophagia, no regurgitation, no heartburn,no indigestion,no nausea,no vomiting,no hematemesis ,no melena,no jaundice,no distention, no obstipation,no enterorrhagia,no proctalgia,no anal  lesions, no changes in bowel habits.  : no frequency, no hesitancy, no hematuria, no discharge,no  pain.  Musculoskeletal: no muscle or tendon pain or inflammation,no  joint pain, no edema, no functional limitation,no fasciculations, no mass.  Neurologic: no headache, no seizures, noalterations on Craneal nerves, no motor deficit, STATED sensory deficit, normal coordination, no alteration in memory,normal orientation, calculation,normal writting, verbal and written language.  Skin: no rashes,no pruritus no localized lesions.  Psychiatric: no anxiety, no depression,no agitation, no delusions, proper insight.      VITAL SIGNS:    Vitals:    01/25/19 1255   BP: 126/81   Pulse: 65   Resp: 14   Temp: 98.2 °F (36.8 °C)   TempSrc: Oral   SpO2: 98%   Weight: 94.9 kg (209 lb 3.2 oz)  "  Height: 190 cm (74.8\")              PHYSICAL EXAMINATION:     GENERAL:  Well-developed, well-nourished  Patient  in no acute distress.   SKIN:  Warm, dry ,NO rashes,NO purpura ,NO petechiae.  HEENT:  Pupils were equal and reactive to light and accomodation, conjunctivas non injected, no pterigion, normal extraocular movements, normal visual acuity.   Mouth mucosa was moist, no exudates in oropharynx, normal gum line, normal roof of the mouth and pillars, normal papillations of the tongue.  NECK:  Supple with good range of motion; no thyromegaly or masses, no JVD or bruits, no cervical adenopathies.No carotid arteries pain, no carotid abnormal pulsation , NO arterial dance.  LYMPHATICS:  No cervical, NO supraclavicular, NO axillary,NO epitrochlear , NO inguinal adenopathy.  CHEST:  Normal excursion of both serge thoraces, normal voice fremitus, no subcutaneous emphysema, normal axillas, no rashes or acanthosis nigricans. Lungs clear to percussion and auscultation, normal breath sounds bilaterally, no wheezing,NO crackles NO ronchi, NO stridor, NO rubs.  CARDIAC AND VASCULAR:  normal rate and regular rhythm, without murmurs,NO rubs NO S3 NO S4 right or left . Normal femoral, popliteal, pedis, brachial and carotid pulses.  ABDOMEN:  Soft, nontender with no organomegaly or masses, no ascites, no collateral circulation,no distention,no [place] sign, no abdominal pain, no inguinal hernias,no umbilical hernia, no abdominal bruits. Normal bowel sounds.  GENITAL: Not  Performed.  EXTREMITIES  AND SPINE:  No clubbing, cyanosis or edema, no deformities or pain .No kyphosis, scoliosis, deformities or pain in spine, ribs or pelvic bone.  NEUROLOGICAL:  Patient was awake, alert, oriented to time, person and place.SENSORY NEUROPATHY IN FEET ABOLISHED VIBRATORY SENSATION, NORMAL STRENGTH UPPER AND LOWER EXTREMITIES PROXIMALLY AND DISTALLY, GAIT WAS NORMAL, BALANCE WAS NORMAL, ROMBERG WAS NEGATIVE                         LABORATORY " DATA  Component      Latest Ref Rng & Units 7/20/2018 1/18/2019 1/18/2019          10:20 AM 12:55 PM 12:55 PM   IgG      700 - 1600 mg/dL 902  902   IgA      61 - 437 mg/dL 352  344   IgM      15 - 143 mg/dL 23  19   Total Protein      6.3 - 8.0 g/dL 7.4 7.2 6.9   Albumin      3.50 - 5.20 g/dL 4.1 4.20 3.8   Alpha-1-Globulin      0.0 - 0.4 g/dL 0.3  0.2   Alpha-2-Globulin      0.4 - 1.0 g/dL 0.8  0.7   Beta Globulin      0.7 - 1.3 g/dL 1.3  1.3   Gamma Globulin      0.4 - 1.8 g/dL 1.0  0.9   M-Tony      Not Observed g/dL 0.2 (H)  0.2 (H)   Globulin      2.2 - 3.9 g/dL 3.3  3.1   A/G Ratio      1.1 - 2.4 g/dL 1.3 1.4 1.3   Immunofixation Reflex, Serum       Comment  Comment   Please Note       Comment  Comment   Free Light Chain, Kappa      3.3 - 19.4 mg/L 25.0 (H)  24.7 (H)   Free Lambda Light Chains      5.7 - 26.3 mg/L 14.7  12.7   Kappa/Lambda Ratio      0.26 - 1.65 1.70 (H)  1.94 (H)   HCC...    Ref Range & Units 7d ago   WBC 4.00 - 10.00 10*3/mm3 4.36    RBC 4.30 - 5.50 10*6/mm3 4.84    Hemoglobin 13.5 - 16.5 g/dL 14.3    Hematocrit 40.0 - 49.0 % 44.5    MCV 83.0 - 97.0 fL 91.9    MCH 27.0 - 33.0 pg 29.5    MCHC 32.0 - 35.0 g/dL 32.1    RDW 11.7 - 14.5 % 13.2    RDW-SD 37.0 - 49.0 fl 44.6    MPV 8.9 - 12.1 fL 10.4    Platelets 150 - 375 10*3/mm3 148 Abnormally low     Neutrophil % 39.0 - 75.0 % 50.4    Lymphocyte % 20.0 - 49.0 % 31.4    Monocyte % 4.0 - 12.0 % 16.7 Abnormally high     Eosinophil % 1.0 - 5.0 % 1.1    Basophil % 0.0 - 1.1 % 0.2    Immature Grans % 0.0 - 0.5 % 0.2    Neutrophils, Absolute 1.50 - 7.00 10*3/mm3 2.19    Lymphocytes, Absolute 1.00 - 3.50 10*3/mm3 1.37    Monocytes, Absolute 0.25 - 0.80 10*3/mm3 0.73              Bone Marrow Comprehensive (Int Onc) [[id number]] (Order [id number])   Order   Date: 8/3/2018 Department: [facility] ONCOLOGY CBC LAB Released By: Bre Mckeon Authorizing: Chalo Olvera MD   Reprint Order Requisition     BONE MARROW COMPREHENSIVE (INT ONC)  (Order #[id number]) on 8/3/18       Bone Marrow Comprehensive (Int Onc)   Order: [id number]   Status:  Final result   Visible to patient:  Yes (MyChart)   Dx:  Neuropathy; Thrombocytopenia (CMS/HCC...   Component 5mo ago   Reference Lab Report Bone Marrow Comp    Resulting Agency [facility]         Specimen Collected: 08/03/18 13:10 Last Resulted: 08/15/18 14:09        Lab Flowsheet      Order Details      View Encounter      Lab and Collection Details      Routing      Result History            Scans on Order [id number]     Scan on 8/15/2018  2:09 PM by Louise Beaulieu L: Bone Marrow CompScan on 8/15/2018  2:09 PM by Louise Beaulieu: Bone Marrow Comp         8/15/2018  2:09 PM     Component   Reference Lab Report   Bone Marrow Comp    Scans on Order [id number]     Scan on 8/15/2018  2:09 PM by Louise Beaulieu: Bone Marrow CompScan on 8/15/2018  2:09 PM by Louise Beaulieu L: Bone Marrow Comp   Lab and Collection     Bone Marrow Comprehensive (Int Onc) (Order: [id number]) - 8/3/2018   Other Results from 7/20/2018                ASSESSMENT:    This patient has had for a long time minor leukopenia and thrombocytopenia and actually the numbers pertinent to this today are perfectly fine.  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  We documented a minimal monoclonal protein a long time ago and this has not progressed biochemically with no evidence of high calcium, abnormal creatinine or any other radiological change. We even performed bone marrow testing back in the summer because the patient had more fatigue and this failed to document lymphoma, myeloma, amyloidosis, leukemia or myelofibrosis.  Chromosomal analysis was normal. This raises the question in regard to what we see regarding neuropathy is associated with this condition, MGUS, or he has a different modality.  I do believe that given the  persistency of the symptomatology and this is becoming more and more bothersome for him and not finding any benefit from Neurontin whatsoever it would be worth it for the patient to be seen by Dr. Garcia, Neurologist, who specializes in peripheral neuropathy.  To me that would be a good opportunity.  I pointed out to the patient that he probably will have neural conduction, EMG, further laboratory testing.    I want to review him back in a couple of months with a CBC and by then I would expect the blood work and all the analysis of his neuropathy would be completed.      I inquired if he is using any kind of chemicals, new medications, drinking alcohol or anything of this nature and he was using none of the above.  Therefore, I cannot blame this process on anything different than MGUS. There is no family history of neuropathy whatsoever. I discussed this with him and his wife in detail.                                    done

## 2019-01-28 DIAGNOSIS — G62.9 NEUROPATHY: Primary | ICD-10-CM

## 2019-01-28 DIAGNOSIS — D47.2 MGUS (MONOCLONAL GAMMOPATHY OF UNKNOWN SIGNIFICANCE): ICD-10-CM

## 2019-01-28 NOTE — PROGRESS NOTES
Referral placed for Dr Steve Ellison per VO Dr Olvera to evaluate neuropathy  message to appt desk to arrange

## 2019-02-04 ENCOUNTER — TELEPHONE (OUTPATIENT)
Dept: ONCOLOGY | Facility: CLINIC | Age: 79
End: 2019-02-04

## 2019-02-04 NOTE — TELEPHONE ENCOUNTER
----- Message from Marj Alegre sent at 2/1/2019 10:48 AM EST -----  Regarding: REFERRAL  Dr. Olvera -    Please see below - both Dr. Garcia and Dr. Ellison have declined seeing patient. Said he should return to Dr. Sears.    What would you like for me to do? Call Dr. Sears and set up return?  Please let me know when you get a moment.    Thank you!!    Amber    ----- Message -----  From: Tereza Horn  Sent: 2/1/2019  10:33 AM  To: Marj Alegre, they sent this referral back again  Declining apt Declined-Dr. Ellison says he agrees with Dr. Garcia, pt should return to Dr. Sears for his care. Dr. Sears is a neuromuscular specialist.

## 2019-03-22 ENCOUNTER — OFFICE VISIT (OUTPATIENT)
Dept: ONCOLOGY | Facility: CLINIC | Age: 79
End: 2019-03-22

## 2019-03-22 ENCOUNTER — LAB (OUTPATIENT)
Dept: LAB | Facility: HOSPITAL | Age: 79
End: 2019-03-22

## 2019-03-22 VITALS
TEMPERATURE: 98.4 F | BODY MASS INDEX: 25.89 KG/M2 | WEIGHT: 208.2 LBS | HEIGHT: 75 IN | DIASTOLIC BLOOD PRESSURE: 75 MMHG | HEART RATE: 66 BPM | RESPIRATION RATE: 16 BRPM | OXYGEN SATURATION: 95 % | SYSTOLIC BLOOD PRESSURE: 120 MMHG

## 2019-03-22 DIAGNOSIS — D47.2 MGUS (MONOCLONAL GAMMOPATHY OF UNKNOWN SIGNIFICANCE): ICD-10-CM

## 2019-03-22 DIAGNOSIS — D72.818 OTHER DECREASED WHITE BLOOD CELL (WBC) COUNT: ICD-10-CM

## 2019-03-22 DIAGNOSIS — D47.2 MGUS (MONOCLONAL GAMMOPATHY OF UNKNOWN SIGNIFICANCE): Primary | ICD-10-CM

## 2019-03-22 DIAGNOSIS — G62.9 NEUROPATHY: ICD-10-CM

## 2019-03-22 DIAGNOSIS — D69.6 THROMBOCYTOPENIA (HCC): ICD-10-CM

## 2019-03-22 LAB
BASOPHILS # BLD AUTO: 0.02 10*3/MM3 (ref 0–0.2)
BASOPHILS NFR BLD AUTO: 0.6 % (ref 0–1.5)
DEPRECATED RDW RBC AUTO: 43.6 FL (ref 37–54)
EOSINOPHIL # BLD AUTO: 0.04 10*3/MM3 (ref 0–0.4)
EOSINOPHIL NFR BLD AUTO: 1.3 % (ref 0.3–6.2)
ERYTHROCYTE [DISTWIDTH] IN BLOOD BY AUTOMATED COUNT: 13.2 % (ref 12.3–15.4)
HCT VFR BLD AUTO: 43 % (ref 37.5–51)
HGB BLD-MCNC: 14.3 G/DL (ref 13–17.7)
IMM GRANULOCYTES # BLD AUTO: 0.01 10*3/MM3 (ref 0–0.05)
IMM GRANULOCYTES NFR BLD AUTO: 0.3 % (ref 0–0.5)
LYMPHOCYTES # BLD AUTO: 1.27 10*3/MM3 (ref 0.7–3.1)
LYMPHOCYTES NFR BLD AUTO: 40.1 % (ref 19.6–45.3)
MCH RBC QN AUTO: 29.7 PG (ref 26.6–33)
MCHC RBC AUTO-ENTMCNC: 33.3 G/DL (ref 31.5–35.7)
MCV RBC AUTO: 89.4 FL (ref 79–97)
MONOCYTES # BLD AUTO: 0.49 10*3/MM3 (ref 0.1–0.9)
MONOCYTES NFR BLD AUTO: 15.5 % (ref 5–12)
NEUTROPHILS # BLD AUTO: 1.34 10*3/MM3 (ref 1.4–7)
NEUTROPHILS NFR BLD AUTO: 42.2 % (ref 42.7–76)
NRBC BLD AUTO-RTO: 0 /100 WBC (ref 0–0)
PLATELET # BLD AUTO: 145 10*3/MM3 (ref 140–450)
PMV BLD AUTO: 11 FL (ref 6–12)
RBC # BLD AUTO: 4.81 10*6/MM3 (ref 4.14–5.8)
WBC NRBC COR # BLD: 3.17 10*3/MM3 (ref 3.4–10.8)

## 2019-03-22 PROCEDURE — 99214 OFFICE O/P EST MOD 30 MIN: CPT | Performed by: INTERNAL MEDICINE

## 2019-03-22 PROCEDURE — 85025 COMPLETE CBC W/AUTO DIFF WBC: CPT | Performed by: INTERNAL MEDICINE

## 2019-03-22 PROCEDURE — 36415 COLL VENOUS BLD VENIPUNCTURE: CPT | Performed by: INTERNAL MEDICINE

## 2019-03-22 NOTE — PROGRESS NOTES
REASON FOR FOLLOWUP:  Minimal leukopenia with thrombocytopenia in the background of minimal IgG monoclonal gammopathy. The patient has no splenomegaly, no peripheral adenopathy, and no symptoms that suggest lupus or collagen vascular disease. Bone marrow   a  spirate documented no evidence of myeloma, lymphoma, leukemia, myelodysplasia, or any other abnormality. Bone marrow chromosomal analysis as well as flow cytometry were negative.     HISTORY OF PRESENT ILLNESS: This patient returns today to the office in company of his wife still complaining of fatigue but now we have found that he is taking probably copious amount of gabapentin to try to overcome his neuropathic pain in his lower extremities. His memory has not been as good as it used to be before and maybe part is a consequence of the above issue. Other than that he continues going to the gym 4 times a week. His appetite and weight are stable. He has no fevers, chills, no sweats, pruritus, rashes, new pains or adenopathies. He has normal bowel activity, normal urination, no cardiovascular or respiratory issues. He has been seeing a podiatrist. He has advised him to use a compounding ointment that contains gabapentin as well as ketamine and he has started to use this in the last couple of days to see what happens. He was refused by [facility] neurology to have EMG done and he really wants to have this done in some way. I agree with him completely.                 PAST MEDICAL HISTORY:    1.;  Hypertension for which he takes medication.      2.; History of hypothyroidism for the last ten years and has undergone thyroid replacement for a long time.     3.; History of colonoscopy in February 2013 that was normal.  He has internal hemorrhoids.     4.; Component of reflux esophagitis.     5.;   History of prostate cancer.  He was treated with prostatectomy and he has had radiation treatment as well.  He has had a PSA that is undetectable for the last  ten years.      6.; Cholecystectomy.    7.; Vasectomy.      8.; Decompression laminectomies at L3-4 and L4-5.      9.; Multiple sebaceous cysts removed.        The patient has no history of asthma, tuberculosis, or COPD.  No history of coronary artery disease or cardiac arrhythmia.          ONCOLOGIC/HEMATOLOGIC HISTORY: History from previous dates can be found in the separate document.        On 2015, given the development of pain in the left hip and tenderness in the greater trochanter, plain x-rays of the pelvis and left hip were performed  . His white count, hemoglobin, and platelets remain stable and the rest of his physical examination disclosed no abnormalities with the exception of tenderness in the greater trochanter on the left side. He had obvious ABSENCE of vibratory sensation in rajan  th feet. He had minimal reflexes in his lower extremities if any at all.           MEDICATIONS:  The current medication list was reviewed with the patient and updated in EMR this date per medical assistant.  Medication dosages and frequencies were confirmed to be accurate.         ALLERGIES:   No known drug allergies.         SOCIAL HISTORY:  The patient is a retired .  He lives with his second wife.  He travels a lot.  He is physically active and he has not been a smoker for more than 30 years or has drunk [employer]  l for more than 33 years.   He use to drink alcohol heavily then.          FAMILY HISTORY:  Patient’s father  with COPD at age 73.  Mother  with complications of breast cancer at age 86.  A brother had cancer of unknown primary and  as a consequence   of this at age 60.  He has no sisters.  His second wife is in good health.  He has two children in good health.  No family history of hematological disorders.         REVIEW OF SYSTEMS:         General: no fever, no chills, stated fatigue,no weight changes, no lack of appetite.  Eyes: no epiphora, xerophthalmia,conjunctivitis, pain,  glaucoma, blurred vision, blindness, secretion, photophobia, proptosis, diplopia.  Ears: no otorrhea, tinnitus, otorrhagia, deafness, pain, vertigo.  Nose: no rhinorrhea, no epistaxis, no alteration in perception of odors, no sinuses pressure.  Mouth: no alteration in gums or teeth,  No ulcers, no difficulty with mastication or deglut ion, no odynophagia.  Neck: no masses or pain, no thyroid alterations, no pain in muscles or arteries, no carotid odynia, no crepitation.  Respiratory: no cough, no sputum production,no dyspnea,no trepopnea, no pleuritic pain,no hemoptysis.  Heart: no syncope, no irregularity, no palpitations, no angina,no orthopnea,no paroxysmal nocturnal dyspnea.  Vascular Venous: no tenderness,no edema,no palpable cords,no postphlebitic syndrome, no skin changes no ulcerations.  Vascular Arterial: no distal ischemia, noclaudication, no gangrene, no neuropathic ischemic pain, no skin ulcers, no paleness no cyanosis.  GI: no dysphagia, no odynophagia, no regurgitation, no heartburn,no indigestion,no nausea,no vomiting,no hematemesis ,no melena,no jaundice,no distention, no obstipation,no enterorrhagia,no proctalgia,no anal  lesions, no changes in bowel habits.  : no frequency, no hesitancy, no hematuria, no discharge,no  pain.  Musculoskeletal: no muscle or tendon pain or inflammation,no  joint pain, no edema, no functional limitation,no fasciculations, no mass.  Neurologic: no headache, no seizures, noalterations on Craneal nerves, no motor deficit, stated sensory deficit, normal coordination, minimal alteration in memory,normal orientation, calculation,normal writting, verbal and written language.  Skin: no rashes,no pruritus no localized lesions.  Psychiatric: no anxiety, no depression,no agitation, no delusions, proper insight.        VITAL SIGNS:    Vitals:    03/22/19 1048   BP: 120/75   Pulse: 66   Resp: 16   Temp: 98.4 °F (36.9 °C)   TempSrc: Oral   SpO2: 95%   Weight: 94.4 kg (208 lb 3.2  "oz)   Height: 190 cm (74.8\")              PHYSICAL EXAMINATION:     GENERAL:  Well-developed, well-nourished  Patient  in no acute distress.   SKIN:  Warm, dry ,NO rashes,NO purpura ,NO petechiae.  HEENT:  Pupils were equal and reactive to light and accomodation, conjunctivas non injected, no pterigion, normal extraocular movements, normal visual acuity.   Mouth mucosa was moist, no exudates in oropharynx, normal gum line, normal roof of the mouth and pillars, normal papillations of the tongue.  NECK:  Supple with good range of motion; no thyromegaly or masses, no JVD or bruits, no cervical adenopathies.No carotid arteries pain, no carotid abnormal pulsation , NO arterial dance.  LYMPHATICS:  No cervical, NO supraclavicular, NO axillary,NO epitrochlear , NO inguinal adenopathy.  CHEST:  Normal excursion of both serge thoraces, normal voice fremitus, no subcutaneous emphysema, normal axillas, no rashes or acanthosis nigricans. Lungs clear to percussion and auscultation, normal breath sounds bilaterally, no wheezing,NO crackles NO ronchi, NO stridor, NO rubs.  CARDIAC AND VASCULAR:  normal rate and regular rhythm, without murmurs,NO rubs NO S3 NO S4 right or left . Normal femoral, popliteal, pedis, brachial and carotid pulses.  ABDOMEN:  Soft, nontender with no organomegaly or masses, no ascites, no collateral circulation,no distention,no Leland sign, no abdominal pain, no inguinal hernias,no umbilical hernia, no abdominal bruits. Normal bowel sounds.  GENITAL: Not  Performed.  EXTREMITIES  AND SPINE:  No clubbing, cyanosis or edema, no deformities or pain .No kyphosis, scoliosis, deformities or pain in spine, ribs or pelvic bone.  NEUROLOGICAL:  Patient was awake, alert, oriented to time, person and place.sensory neuropathy in feet                               LABORATORY DATAOrder: [id number] - Part of Panel Order [id number]   Status:  Final result   Visible to patient:  No (Not Released)   Dx:  Thrombocytopenia " (CMS/[facility]); MGUS (mon...    Ref Range & Units 10:26   WBC 3.40 - 10.80 10*3/mm3 3.17 Abnormally low     RBC 4.14 - 5.80 10*6/mm3 4.81    Hemoglobin 13.0 - 17.7 g/dL 14.3    Hematocrit 37.5 - 51.0 % 43.0    MCV 79.0 - 97.0 fL 89.4    MCH 26.6 - 33.0 pg 29.7    MCHC 31.5 - 35.7 g/dL 33.3    RDW 12.3 - 15.4 % 13.2    RDW-SD 37.0 - 54.0 fl 43.6    MPV 6.0 - 12.0 fL 11.0    Platelets 140 - 450 10*3/mm3 145    Neutrophil % 42.7 - 76.0 % 42.2 Abnormally low     Lymphocyte % 19.6 - 45.3 % 40.1    Monocyte % 5.0 - 12.0 % 15.5 Abnormally high     Eosinophil % 0.3 - 6.2 % 1.3    Basophil % 0.0 - 1.5 % 0.6    Immature Grans % 0.0 - 0.5 % 0.3    Neutrophils, Absolute 1.40 - 7.00 10*3/mm3 1.34 Abnormally low     Lymphocytes, Absolute 0.70 - 3.10 10*3/mm3 1.27    Monocytes, Absolute 0.10 - 0.90 10*3/mm3 0.49    Eosinophils, Absolute 0.00 - 0.40 10*3/mm3 0.04    Basophils, Absolute 0.00 - 0.20 10*3/mm3 0.02    Immature Grans, Absolute 0.00 - 0.05 10*3/mm3 0.01    nRBC 0.0 - 0.0 /100 WBC 0.0    Resulting Agency   [facility]         Specimen Collected: 03/22/19 10:26 Last Resulted: 03/22/19 10:42        Lab Flowsheet      Order Details      View Encounter      Lab and Collection Details      Routing      Result History                         ASSESSMENT:    This patient has had for a long time minor leukopenia and thrombocytopenia and actually the numbers pertinent to this today are perfectly fine.  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  We documented a minimal monoclonal protein a long time ago and this has not progressed biochemically with no evidence of high calcium, abnormal creatinine or any other radiological change. We even performed bone marrow testing back in the summer because the patient had more fatigue and this failed to document lymphoma, myeloma, amyloidosis, leukemia or myelofibrosis.  Chromosomal  analysis was normal. This raises the question in regard to what we see regarding neuropathy is associated with this condition, MGUS, or he has a different modality.   During the previous visit the patient was advised to be seen by [facility] to proceed with new neuroconductions and assessment. He was rejected by the team. He had his own neurologist before. Then he went to see a podiatrist who has ordered for him to use a compounding ointment that contains ketamine and gabapentin. He just started to use this yesterday. Too early to tell if this is going to beneficial.     In any event his laboratory parameters remain about the same as what they have been for the last many years. His physical exam is unrevealing besides the sensory neuropathy in his feet.    The patient wants to have EMG done and I am going to refer him to be seen by Dr. López in order to proceed with EMG and neuroconductions. I would like to review him back in a couple of months and on that occasion I am going to perform CBC, CMP, sedimentation rate and monoclonal protein studies.    In regard to gabapentin use I advised him to decrease the dose of gabapentin at bedtime to 300 and to maybe get rid of the dose at lunchtime of the gabapentin and he is going to use the ointment on his feet 3-4 times a day.    I have no other objections for him to have some CBD oil to increase sense of well-being. He is now having a cane to minimize issues in regard balance associated with his neuropathy.

## 2019-05-22 ENCOUNTER — OFFICE VISIT (OUTPATIENT)
Dept: ONCOLOGY | Facility: CLINIC | Age: 79
End: 2019-05-22

## 2019-05-22 ENCOUNTER — LAB (OUTPATIENT)
Dept: LAB | Facility: HOSPITAL | Age: 79
End: 2019-05-22

## 2019-05-22 VITALS
OXYGEN SATURATION: 95 % | HEIGHT: 75 IN | DIASTOLIC BLOOD PRESSURE: 78 MMHG | RESPIRATION RATE: 16 BRPM | WEIGHT: 209.6 LBS | HEART RATE: 72 BPM | TEMPERATURE: 98.2 F | SYSTOLIC BLOOD PRESSURE: 154 MMHG | BODY MASS INDEX: 26.06 KG/M2

## 2019-05-22 DIAGNOSIS — G62.9 NEUROPATHY: ICD-10-CM

## 2019-05-22 DIAGNOSIS — D72.818 OTHER DECREASED WHITE BLOOD CELL (WBC) COUNT: ICD-10-CM

## 2019-05-22 DIAGNOSIS — D69.6 THROMBOCYTOPENIA (HCC): ICD-10-CM

## 2019-05-22 DIAGNOSIS — D47.2 MGUS (MONOCLONAL GAMMOPATHY OF UNKNOWN SIGNIFICANCE): Primary | ICD-10-CM

## 2019-05-22 DIAGNOSIS — D47.2 MGUS (MONOCLONAL GAMMOPATHY OF UNKNOWN SIGNIFICANCE): ICD-10-CM

## 2019-05-22 LAB
ALBUMIN SERPL-MCNC: 4.4 G/DL (ref 3.5–5.2)
ALBUMIN/GLOB SERPL: 1.5 G/DL (ref 1.1–2.4)
ALP SERPL-CCNC: 61 U/L (ref 38–116)
ALT SERPL W P-5'-P-CCNC: 14 U/L (ref 0–41)
ANION GAP SERPL CALCULATED.3IONS-SCNC: 12.3 MMOL/L
AST SERPL-CCNC: 30 U/L (ref 0–40)
BASOPHILS # BLD AUTO: 0.02 10*3/MM3 (ref 0–0.2)
BASOPHILS NFR BLD AUTO: 0.5 % (ref 0–1.5)
BILIRUB SERPL-MCNC: 0.5 MG/DL (ref 0.2–1.2)
BUN BLD-MCNC: 20 MG/DL (ref 6–20)
BUN/CREAT SERPL: 16.3 (ref 7.3–30)
CALCIUM SPEC-SCNC: 9.6 MG/DL (ref 8.5–10.2)
CHLORIDE SERPL-SCNC: 104 MMOL/L (ref 98–107)
CO2 SERPL-SCNC: 24.7 MMOL/L (ref 22–29)
CREAT BLD-MCNC: 1.23 MG/DL (ref 0.7–1.3)
DEPRECATED RDW RBC AUTO: 43.6 FL (ref 37–54)
EOSINOPHIL # BLD AUTO: 0.02 10*3/MM3 (ref 0–0.4)
EOSINOPHIL NFR BLD AUTO: 0.5 % (ref 0.3–6.2)
ERYTHROCYTE [DISTWIDTH] IN BLOOD BY AUTOMATED COUNT: 13.2 % (ref 12.3–15.4)
ERYTHROCYTE [SEDIMENTATION RATE] IN BLOOD: 7 MM/HR (ref 0–20)
GFR SERPL CREATININE-BSD FRML MDRD: 57 ML/MIN/1.73
GLOBULIN UR ELPH-MCNC: 3 GM/DL (ref 1.8–3.5)
GLUCOSE BLD-MCNC: 117 MG/DL (ref 74–124)
HCT VFR BLD AUTO: 44 % (ref 37.5–51)
HGB BLD-MCNC: 14.2 G/DL (ref 13–17.7)
IMM GRANULOCYTES # BLD AUTO: 0.01 10*3/MM3 (ref 0–0.05)
IMM GRANULOCYTES NFR BLD AUTO: 0.2 % (ref 0–0.5)
LYMPHOCYTES # BLD AUTO: 1.46 10*3/MM3 (ref 0.7–3.1)
LYMPHOCYTES NFR BLD AUTO: 33.5 % (ref 19.6–45.3)
MCH RBC QN AUTO: 29.3 PG (ref 26.6–33)
MCHC RBC AUTO-ENTMCNC: 32.3 G/DL (ref 31.5–35.7)
MCV RBC AUTO: 90.9 FL (ref 79–97)
MONOCYTES # BLD AUTO: 0.6 10*3/MM3 (ref 0.1–0.9)
MONOCYTES NFR BLD AUTO: 13.8 % (ref 5–12)
NEUTROPHILS # BLD AUTO: 2.25 10*3/MM3 (ref 1.7–7)
NEUTROPHILS NFR BLD AUTO: 51.5 % (ref 42.7–76)
NRBC BLD AUTO-RTO: 0 /100 WBC (ref 0–0.2)
PLATELET # BLD AUTO: 149 10*3/MM3 (ref 140–450)
PMV BLD AUTO: 10.8 FL (ref 6–12)
POTASSIUM BLD-SCNC: 4.6 MMOL/L (ref 3.5–4.7)
PROT SERPL-MCNC: 7.4 G/DL (ref 6.3–8)
RBC # BLD AUTO: 4.84 10*6/MM3 (ref 4.14–5.8)
SODIUM BLD-SCNC: 141 MMOL/L (ref 134–145)
WBC NRBC COR # BLD: 4.36 10*3/MM3 (ref 3.4–10.8)

## 2019-05-22 PROCEDURE — 99215 OFFICE O/P EST HI 40 MIN: CPT | Performed by: INTERNAL MEDICINE

## 2019-05-22 PROCEDURE — 85025 COMPLETE CBC W/AUTO DIFF WBC: CPT

## 2019-05-22 PROCEDURE — 85652 RBC SED RATE AUTOMATED: CPT | Performed by: INTERNAL MEDICINE

## 2019-05-22 PROCEDURE — 36415 COLL VENOUS BLD VENIPUNCTURE: CPT

## 2019-05-22 PROCEDURE — 80053 COMPREHEN METABOLIC PANEL: CPT

## 2019-05-22 NOTE — PROGRESS NOTES
Orders placed for pt to start monthly IVIG 30gm per VO Dr Olvera  Message to appt desk and pre-cert to arrange

## 2019-05-22 NOTE — PROGRESS NOTES
REASON FOR FOLLOWUP:  Minimal leukopenia with thrombocytopenia in the background of minimal IgG monoclonal gammopathy. The patient has no splenomegaly, no peripheral adenopathy, and no symptoms that suggest lupus or collagen vascular disease. Bone marrow   a  spirate documented no evidence of myeloma, lymphoma, leukemia, myelodysplasia, or any other abnormality. Bone marrow chromosomal analysis as well as flow cytometry were negative.     HISTORY OF PRESENT ILLNESS: This patient returns today to the office in company of his wife stating that he continues having sensory neuropathy in his feet that is becoming more bothersome to him as the time goes by. He has been seeing his neurologist who has performed EMG and nerve conductions finding not surprising sensory neuropathy. His suggestion in regard to therapy for this includes monthly immunoglobulins at least for the next 4-6 months to see if the patient has a response. In the meantime, the patient has not had any new different symptomatology. His appetite is normal. Bowel activity and urination remain unchanged. His chronic back pain remains about the same. He has no cardiovascular or respiratory issues.                  PAST MEDICAL HISTORY:    1.;  Hypertension for which he takes medication.      2.; History of hypothyroidism for the last ten years and has undergone thyroid replacement for a long time.     3.; History of colonoscopy in February 2013 that was normal.  He has internal hemorrhoids.     4.; Component of reflux esophagitis.     5.;   History of prostate cancer.  He was treated with prostatectomy and he has had radiation treatment as well.  He has had a PSA that is undetectable for the last ten years.      6.; Cholecystectomy.    7.; Vasectomy.      8.; Decompression laminectomies at L3-4 and L4-5.      9.; Multiple sebaceous cysts removed.        The patient has no history of asthma, tuberculosis, or COPD.  No history of coronary artery  disease or cardiac arrhythmia.          ONCOLOGIC/HEMATOLOGIC HISTORY: History from previous dates can be found in the separate document.        On 2015, given the development of pain in the left hip and tenderness in the greater trochanter, plain x-rays of the pelvis and left hip were performed  . His white count, hemoglobin, and platelets remain stable and the rest of his physical examination disclosed no abnormalities with the exception of tenderness in the greater trochanter on the left side. He had obvious ABSENCE of vibratory sensation in rajan  th feet. He had minimal reflexes in his lower extremities if any at all.           MEDICATIONS:  The current medication list was reviewed with the patient and updated in EMR this date per medical assistant.  Medication dosages and frequencies were confirmed to be accurate.         ALLERGIES:   No known drug allergies.         SOCIAL HISTORY:  The patient is a retired .  He lives with his second wife.  He travels a lot.  He is physically active and he has not been a smoker for more than 30 years or has drunk alcoho  l for more than 33 years.   He use to drink alcohol heavily then.          FAMILY HISTORY:  Patient’s father  with COPD at age 73.  Mother  with complications of breast cancer at age 86.  A brother had cancer of unknown primary and  as a consequence   of this at age 60.  He has no sisters.  His second wife is in good health.  He has two children in good health.  No family history of hematological disorders.         REVIEW OF SYSTEMS:       General: no fever, no chills, no fatigue,no weight changes, no lack of appetite.  Eyes: no epiphora, xerophthalmia,conjunctivitis, pain, glaucoma, blurred vision, blindness, secretion, photophobia, proptosis, diplopia.  Ears: no otorrhea, tinnitus, otorrhagia, deafness, pain, vertigo.  Nose: no rhinorrhea, no epistaxis, no alteration in perception of odors, no sinuses pressure.  Mouth: no  "alteration in gums or teeth,  No ulcers, no difficulty with mastication or deglut ion, no odynophagia.  Neck: no masses or pain, no thyroid alterations, no pain in muscles or arteries, no carotid odynia, no crepitation.  Respiratory: no cough, no sputum production,no dyspnea,no trepopnea, no pleuritic pain,no hemoptysis.  Heart: no syncope, no irregularity, no palpitations, no angina,no orthopnea,no paroxysmal nocturnal dyspnea.  Vascular Venous: no tenderness,no edema,no palpable cords,no postphlebitic syndrome, no skin changes no ulcerations.  Vascular Arterial: no distal ischemia, noclaudication, no gangrene, no neuropathic ischemic pain, no skin ulcers, no paleness no cyanosis.  GI: no dysphagia, no odynophagia, no regurgitation, no heartburn,no indigestion,no nausea,no vomiting,no hematemesis ,no melena,no jaundice,no distention, no obstipation,no enterorrhagia,no proctalgia,no anal  lesions, no changes in bowel habits.  : no frequency, no hesitancy, no hematuria, no discharge,no  pain.  Musculoskeletal: no muscle or tendon pain or inflammation,no  joint pain, no edema, no functional limitation,no fasciculations, no mass.  Neurologic: no headache, no seizures, noalterations on Craneal nerves, no motor deficit, stated sensory deficit, normal coordination, no alteration in memory,normal orientation, calculation,normal writting, verbal and written language.  Skin: no rashes,no pruritus no localized lesions.  Psychiatric: no anxiety, no depression,no agitation, no delusions, proper insight.          VITAL SIGNS:    Vitals:    05/22/19 1357   BP: 154/78   Pulse: 72   Resp: 16   Temp: 98.2 °F (36.8 °C)   TempSrc: Oral   SpO2: 95%   Weight: 95.1 kg (209 lb 9.6 oz)   Height: 190 cm (74.8\")              PHYSICAL EXAMINATION:    GENERAL:  Well-developed, well-nourished  Patient  in no acute distress.   SKIN:  Warm, dry ,NO rashes,NO purpura ,NO petechiae.  HEENT:  Pupils were equal and reactive to light and " accomodation, conjunctivas non injected, no pterigion, normal extraocular movements, normal visual acuity.   Mouth mucosa was moist, no exudates in oropharynx, normal gum line, normal roof of the mouth and pillars, normal papillations of the tongue.  NECK:  Supple with good range of motion; no thyromegaly or masses, no JVD or bruits, no cervical adenopathies.No carotid arteries pain, no carotid abnormal pulsation , NO arterial dance.  LYMPHATICS:  No cervical, NO supraclavicular, NO axillary,NO epitrochlear , NO inguinal adenopathy.  CHEST:  Normal excursion of both serge thoraces, normal voice fremitus, no subcutaneous emphysema, normal axillas, no rashes or acanthosis nigricans. Lungs clear to percussion and auscultation, normal breath sounds bilaterally, no wheezing,NO crackles NO ronchi, NO stridor, NO rubs.  CARDIAC AND VASCULAR:  normal rate and regular rhythm, without murmurs,NO rubs NO S3 NO S4 right or left . Normal femoral, popliteal, pedis, brachial and carotid pulses.  ABDOMEN:  Soft, nontender with no organomegaly or masses, no ascites, no collateral circulation,no distention,no Merrill sign, no abdominal pain, no inguinal hernias,no umbilical hernia, no abdominal bruits. Normal bowel sounds.  GENITAL: Not  Performed.  EXTREMITIES  AND SPINE:  No clubbing, cyanosis or edema, no deformities or pain .No kyphosis, scoliosis, deformities or pain in spine, ribs or pelvic bone.  NEUROLOGICAL:  Patient was awake, alert, oriented to time, person and place.Patient has sensory neuropathy in both feet all the way down to the middle part of the leg into the toes. He has no vibratory sensation. His gait was appropriate. He had a negative Romberg and he has no reflexes in his lower extremities. His coordination was appropriate. His upper extremities were normal.                                          LABORATORY DATADifferential   Order: [id number] - Part of Panel Order [id number]   Status:  Final result   Visible to  patient:  No (Not Released) Dx:  Neuropathy; Thrombocytopenia (CMS/HCC...    Ref Range & Units 13:32   WBC 3.40 - 10.80 10*3/mm3 4.36    RBC 4.14 - 5.80 10*6/mm3 4.84    Hemoglobin 13.0 - 17.7 g/dL 14.2    Hematocrit 37.5 - 51.0 % 44.0    MCV 79.0 - 97.0 fL 90.9    MCH 26.6 - 33.0 pg 29.3    MCHC 31.5 - 35.7 g/dL 32.3    RDW 12.3 - 15.4 % 13.2    RDW-SD 37.0 - 54.0 fl 43.6    MPV 6.0 - 12.0 fL 10.8    Platelets 140 - 450 10*3/mm3 149    Neutrophil % 42.7 - 76.0 % 51.5    Lymphocyte % 19.6 - 45.3 % 33.5    Monocyte % 5.0 - 12.0 % 13.8 Abnormally high     Eosinophil % 0.3 - 6.2 % 0.5    Basophil % 0.0 - 1.5 % 0.5    Immature Grans % 0.0 - 0.5 % 0.2    Neutrophils, Absolute 1.70 - 7.00 10*3/mm3 2.25    Lymphocytes, Absolute 0.70 - 3.10 10*3/mm3 1.46    Monocytes, Absolute 0.10 - 0.90 10*3/mm3 0.60    Eosinophils, Absolute 0.00 - 0.40 10*3/mm3 0.02                          ASSESSMENT:    This patient has had for a long time minor leukopenia and thrombocytopenia and actually the numbers pertinent to this today are perfectly fine.  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  We documented a minimal monoclonal protein a long time ago and this has not progressed biochemically with no evidence of high calcium, abnormal creatinine or any other radiological change. We even performed bone marrow testing back in the summer because the patient had more fatigue and this failed to document lymphoma, myeloma, amyloidosis, leukemia or myelofibrosis.  Chromosomal analysis was normal. This raises the question in regard to what we see regarding neuropathy is associated with this condition, MGUS, or he has a different modality.   During the previous visit the patient was advised to be seen by [facility] to proceed with new neuroconductions and assessment. He was rejected by the team. He had his own neurologist  before. T  Since the previous visit, the patient has had new nerve conductions and new EMG done by his neurologist. Unequivocally, the patient has a sensory polyneuropathy in the lower extremities with a component of radiculopathy as well related to his previous back surgery. This last component is very minimal.     His neurologist suggested for the patient to receive monthly immunoglobulin to see if this makes any difference in regard to the evolution of the neuropathy. The symptoms are becoming almost incapacitating for him. Medications do not help him out in this regard to control symptomatology and I think it is time to act upon.     As stated, we have a monoclonal protein study pending today as well as a chemistry profile.     I expressed to the patient’s wife that I would for him to receive monthly immunoglobulin 30 g IV at least for the next 4 months to see if this makes any difference in regard to his sensory neuropathy.     Other than that, I will review him back in 6 weeks. Appointments were made for initiation of the immunoglobulin. Waiting to approve the medicine by the insurance company. Records from his neurologist were present in Epic and reviewed in detail including EMG and nerve conductions.

## 2019-05-23 LAB
ALBUMIN SERPL-MCNC: 3.7 G/DL (ref 2.9–4.4)
ALBUMIN/GLOB SERPL: 1.1 {RATIO} (ref 0.7–1.7)
ALPHA1 GLOB FLD ELPH-MCNC: 0.2 G/DL (ref 0–0.4)
ALPHA2 GLOB SERPL ELPH-MCNC: 0.8 G/DL (ref 0.4–1)
B-GLOBULIN SERPL ELPH-MCNC: 1.4 G/DL (ref 0.7–1.3)
GAMMA GLOB SERPL ELPH-MCNC: 0.9 G/DL (ref 0.4–1.8)
GLOBULIN SER CALC-MCNC: 3.4 G/DL (ref 2.2–3.9)
IGA SERPL-MCNC: 344 MG/DL (ref 61–437)
IGG SERPL-MCNC: 919 MG/DL (ref 700–1600)
IGM SERPL-MCNC: 20 MG/DL (ref 15–143)
INTERPRETATION SERPL IEP-IMP: ABNORMAL
KAPPA LC SERPL-MCNC: 27.2 MG/L (ref 3.3–19.4)
KAPPA LC/LAMBDA SER: 1.89 {RATIO} (ref 0.26–1.65)
LAMBDA LC FREE SERPL-MCNC: 14.4 MG/L (ref 5.7–26.3)
Lab: ABNORMAL
M-SPIKE: 0.3 G/DL
PROT SERPL-MCNC: 7.1 G/DL (ref 6–8.5)

## 2019-05-28 ENCOUNTER — TELEPHONE (OUTPATIENT)
Dept: GENERAL RADIOLOGY | Facility: HOSPITAL | Age: 79
End: 2019-05-28

## 2019-05-30 ENCOUNTER — APPOINTMENT (OUTPATIENT)
Dept: ONCOLOGY | Facility: HOSPITAL | Age: 79
End: 2019-05-30

## 2019-05-31 ENCOUNTER — DOCUMENTATION (OUTPATIENT)
Dept: ONCOLOGY | Facility: CLINIC | Age: 79
End: 2019-05-31

## 2019-06-04 ENCOUNTER — DOCUMENTATION (OUTPATIENT)
Dept: ONCOLOGY | Facility: CLINIC | Age: 79
End: 2019-06-04

## 2019-06-05 ENCOUNTER — TELEPHONE (OUTPATIENT)
Dept: ONCOLOGY | Facility: CLINIC | Age: 79
End: 2019-06-05

## 2019-06-05 NOTE — TELEPHONE ENCOUNTER
----- Message from Bree Rondon sent at 6/5/2019  9:04 AM EDT -----  Regarding: reschedule appt from tomorrow to 6-13-19  Please reschedule patient until 6-13-19.  They would like an appt around 1:00.  Please call patient's wife to confirm appt.  We are having trouble getting assistance for IVIG.  They are aware.    Thanks,  Bree

## 2019-06-06 ENCOUNTER — APPOINTMENT (OUTPATIENT)
Dept: ONCOLOGY | Facility: HOSPITAL | Age: 79
End: 2019-06-06

## 2019-06-07 ENCOUNTER — DOCUMENTATION (OUTPATIENT)
Dept: ONCOLOGY | Facility: CLINIC | Age: 79
End: 2019-06-07

## 2019-06-07 NOTE — PROGRESS NOTES
I have sent all pts information to Ashlie at [facility] (home health that can administer IVIG products). I did get authorization from Dr Olvera as all other efforts to get pt IVIG have not been unsuccessful.    *[facility] RN's cannot compliance pt  *[facility] cannot obtain the medication  *The copay through [facility] is almost $1000    Ashlie will review pts information and notify me.

## 2019-06-12 ENCOUNTER — DOCUMENTATION (OUTPATIENT)
Dept: ONCOLOGY | Facility: CLINIC | Age: 79
End: 2019-06-12

## 2019-06-12 NOTE — PROGRESS NOTES
Per Ashlie at [facility]-they are able to service pt. The  had to leave a message for pt to return their call. I did speak with pts wife late yesterday afternoon and she is aware to contact [facility].

## 2019-06-13 ENCOUNTER — APPOINTMENT (OUTPATIENT)
Dept: ONCOLOGY | Facility: HOSPITAL | Age: 79
End: 2019-06-13

## 2019-06-27 ENCOUNTER — APPOINTMENT (OUTPATIENT)
Dept: ONCOLOGY | Facility: HOSPITAL | Age: 79
End: 2019-06-27

## 2019-07-01 ENCOUNTER — TRANSCRIBE ORDERS (OUTPATIENT)
Dept: ADMINISTRATIVE | Facility: HOSPITAL | Age: 79
End: 2019-07-01

## 2019-07-01 ENCOUNTER — LAB (OUTPATIENT)
Dept: LAB | Facility: HOSPITAL | Age: 79
End: 2019-07-01

## 2019-07-01 ENCOUNTER — OFFICE VISIT (OUTPATIENT)
Dept: ONCOLOGY | Facility: CLINIC | Age: 79
End: 2019-07-01

## 2019-07-01 ENCOUNTER — APPOINTMENT (OUTPATIENT)
Dept: ONCOLOGY | Facility: CLINIC | Age: 79
End: 2019-07-01

## 2019-07-01 ENCOUNTER — APPOINTMENT (OUTPATIENT)
Dept: LAB | Facility: HOSPITAL | Age: 79
End: 2019-07-01

## 2019-07-01 VITALS
OXYGEN SATURATION: 98 % | HEART RATE: 73 BPM | WEIGHT: 204.3 LBS | TEMPERATURE: 98.7 F | BODY MASS INDEX: 25.4 KG/M2 | DIASTOLIC BLOOD PRESSURE: 87 MMHG | HEIGHT: 75 IN | SYSTOLIC BLOOD PRESSURE: 137 MMHG | RESPIRATION RATE: 12 BRPM

## 2019-07-01 DIAGNOSIS — G62.9 NEUROPATHY: ICD-10-CM

## 2019-07-01 DIAGNOSIS — D69.6 THROMBOCYTOPENIA (HCC): ICD-10-CM

## 2019-07-01 DIAGNOSIS — I70.213 ATHEROSCLEROSIS OF NATIVE ARTERY OF BOTH LOWER EXTREMITIES WITH INTERMITTENT CLAUDICATION (HCC): ICD-10-CM

## 2019-07-01 DIAGNOSIS — D47.2 MGUS (MONOCLONAL GAMMOPATHY OF UNKNOWN SIGNIFICANCE): ICD-10-CM

## 2019-07-01 DIAGNOSIS — G62.9 NEUROPATHY: Primary | ICD-10-CM

## 2019-07-01 DIAGNOSIS — M54.9 BACK PAIN, UNSPECIFIED BACK LOCATION, UNSPECIFIED BACK PAIN LATERALITY, UNSPECIFIED CHRONICITY: Primary | ICD-10-CM

## 2019-07-01 DIAGNOSIS — D69.6 THROMBOCYTOPENIA (HCC): Primary | ICD-10-CM

## 2019-07-01 DIAGNOSIS — D72.818 OTHER DECREASED WHITE BLOOD CELL (WBC) COUNT: ICD-10-CM

## 2019-07-01 DIAGNOSIS — D70.8 OTHER NEUTROPENIA (HCC): ICD-10-CM

## 2019-07-01 DIAGNOSIS — I70.90 ATHEROSCLEROSIS: ICD-10-CM

## 2019-07-01 DIAGNOSIS — M54.9 BACK PAIN, UNSPECIFIED BACK LOCATION, UNSPECIFIED BACK PAIN LATERALITY, UNSPECIFIED CHRONICITY: ICD-10-CM

## 2019-07-01 LAB
ALBUMIN SERPL-MCNC: 4.5 G/DL (ref 3.5–5.2)
ALBUMIN/GLOB SERPL: 1.5 G/DL (ref 1.1–2.4)
ALP SERPL-CCNC: 61 U/L (ref 38–116)
ALT SERPL W P-5'-P-CCNC: 16 U/L (ref 0–41)
ANION GAP SERPL CALCULATED.3IONS-SCNC: 13.7 MMOL/L (ref 5–15)
AST SERPL-CCNC: 28 U/L (ref 0–40)
BASOPHILS # BLD AUTO: 0.03 10*3/MM3 (ref 0–0.2)
BASOPHILS NFR BLD AUTO: 0.8 % (ref 0–1.5)
BILIRUB SERPL-MCNC: 0.3 MG/DL (ref 0.2–1.2)
BUN BLD-MCNC: 24 MG/DL (ref 6–20)
BUN/CREAT SERPL: 19 (ref 7.3–30)
CALCIUM SPEC-SCNC: 9.6 MG/DL (ref 8.5–10.2)
CHLORIDE SERPL-SCNC: 104 MMOL/L (ref 98–107)
CO2 SERPL-SCNC: 23.3 MMOL/L (ref 22–29)
CREAT BLD-MCNC: 1.26 MG/DL (ref 0.7–1.3)
DEPRECATED RDW RBC AUTO: 45.1 FL (ref 37–54)
EOSINOPHIL # BLD AUTO: 0.05 10*3/MM3 (ref 0–0.4)
EOSINOPHIL NFR BLD AUTO: 1.3 % (ref 0.3–6.2)
ERYTHROCYTE [DISTWIDTH] IN BLOOD BY AUTOMATED COUNT: 13.3 % (ref 12.3–15.4)
GFR SERPL CREATININE-BSD FRML MDRD: 55 ML/MIN/1.73
GLOBULIN UR ELPH-MCNC: 3.1 GM/DL (ref 1.8–3.5)
GLUCOSE BLD-MCNC: 98 MG/DL (ref 74–124)
HCT VFR BLD AUTO: 45.8 % (ref 37.5–51)
HGB BLD-MCNC: 14.7 G/DL (ref 13–17.7)
IMM GRANULOCYTES # BLD AUTO: 0.01 10*3/MM3 (ref 0–0.05)
IMM GRANULOCYTES NFR BLD AUTO: 0.3 % (ref 0–0.5)
LYMPHOCYTES # BLD AUTO: 1.57 10*3/MM3 (ref 0.7–3.1)
LYMPHOCYTES NFR BLD AUTO: 41.1 % (ref 19.6–45.3)
MCH RBC QN AUTO: 29.7 PG (ref 26.6–33)
MCHC RBC AUTO-ENTMCNC: 32.1 G/DL (ref 31.5–35.7)
MCV RBC AUTO: 92.5 FL (ref 79–97)
MONOCYTES # BLD AUTO: 0.6 10*3/MM3 (ref 0.1–0.9)
MONOCYTES NFR BLD AUTO: 15.7 % (ref 5–12)
NEUTROPHILS # BLD AUTO: 1.56 10*3/MM3 (ref 1.7–7)
NEUTROPHILS NFR BLD AUTO: 40.8 % (ref 42.7–76)
NRBC BLD AUTO-RTO: 0 /100 WBC (ref 0–0.2)
PLATELET # BLD AUTO: 136 10*3/MM3 (ref 140–450)
PMV BLD AUTO: 10.6 FL (ref 6–12)
POTASSIUM BLD-SCNC: 5 MMOL/L (ref 3.5–4.7)
PROT SERPL-MCNC: 7.6 G/DL (ref 6.3–8)
RBC # BLD AUTO: 4.95 10*6/MM3 (ref 4.14–5.8)
SODIUM BLD-SCNC: 141 MMOL/L (ref 134–145)
WBC NRBC COR # BLD: 3.82 10*3/MM3 (ref 3.4–10.8)

## 2019-07-01 PROCEDURE — 80053 COMPREHEN METABOLIC PANEL: CPT | Performed by: INTERNAL MEDICINE

## 2019-07-01 PROCEDURE — 36415 COLL VENOUS BLD VENIPUNCTURE: CPT | Performed by: INTERNAL MEDICINE

## 2019-07-01 PROCEDURE — 99215 OFFICE O/P EST HI 40 MIN: CPT | Performed by: INTERNAL MEDICINE

## 2019-07-01 PROCEDURE — 85025 COMPLETE CBC W/AUTO DIFF WBC: CPT | Performed by: INTERNAL MEDICINE

## 2019-07-01 NOTE — PROGRESS NOTES
REASON FOR FOLLOWUP:  Minimal leukopenia with thrombocytopenia in the background of minimal IgG monoclonal gammopathy. The patient has no splenomegaly, no peripheral adenopathy, and no symptoms that suggest lupus or collagen vascular disease. Bone marrow   a  spirate documented no evidence of myeloma, lymphoma, leukemia, myelodysplasia, or any other abnormality. Bone marrow chromosomal analysis as well as flow cytometry were negative.     HISTORY OF PRESENT ILLNESS: This patient returns today to the office in company of his wife stating that he continues have neuropathic symptomatology in his lower extremities, not typical of claudication though.  The neuropathy sensory is now becoming kind of motor with stumbling sometimes, but not falling fortunately.  He has continued having back pain that is the same that he has had for years, not in a different location, no specific propagation and no need for any pain medicine.  This is similar to the pain that he had before his extensive lumbar spine surgery many years ago.  The patient is not having any pain in the cervical spine or thoracic spine.  His appetite and weight remain about the same even though he has lost 4-pounds unintentionally.  His bowel activity and urination remain unchanged.  He has not had any fevers.  He complains of cold feet, but has not had any typical symptoms of claudication.  The neuropathy remains in the feet and is not going up into his legs or knees.  He has no neuropathy in his hands.                      PAST MEDICAL HISTORY:    1.;  Hypertension for which he takes medication.      2.; History of hypothyroidism for the last ten years and has undergone thyroid replacement for a long time.     3.; History of colonoscopy in February 2013 that was normal.  He has internal hemorrhoids.     4.; Component of reflux esophagitis.     5.;   History of prostate cancer.  He was treated with prostatectomy and he has had radiation  treatment as well.  He has had a PSA that is undetectable for the last ten years.      6.; Cholecystectomy.    7.; Vasectomy.      8.; Decompression laminectomies at L3-4 and L4-5.      9.; Multiple sebaceous cysts removed.        The patient has no history of asthma, tuberculosis, or COPD.  No history of coronary artery disease or cardiac arrhythmia.          ONCOLOGIC/HEMATOLOGIC HISTORY: History from previous dates can be found in the separate document.        On 2015, given the development of pain in the left hip and tenderness in the greater trochanter, plain x-rays of the pelvis and left hip were performed  . His white count, hemoglobin, and platelets remain stable and the rest of his physical examination disclosed no abnormalities with the exception of tenderness in the greater trochanter on the left side. He had obvious ABSENCE of vibratory sensation in rajan  th feet. He had minimal reflexes in his lower extremities if any at all.           MEDICATIONS:  The current medication list was reviewed with the patient and updated in EMR this date per medical assistant.  Medication dosages and frequencies were confirmed to be accurate.         ALLERGIES:   No known drug allergies.         SOCIAL HISTORY:  The patient is a retired .  He lives with his second wife.  He travels a lot.  He is physically active and he has not been a smoker for more than 30 years or has drunk allGreenupo  l for more than 33 years.   He use to drink alcohol heavily then.          FAMILY HISTORY:  Patient’s father  with COPD at age 73.  Mother  with complications of breast cancer at age 86.  A brother had cancer of unknown primary and  as a consequence   of this at age 60.  He has no sisters.  His second wife is in good health.  He has two children in good health.  No family history of hematological disorders.         REVIEW OF SYSTEMS:         General: no fever, no chills,  fatigue,no weight changes, no lack of  appetite.  Eyes: no epiphora, xerophthalmia,conjunctivitis, pain, glaucoma, blurred vision, blindness, secretion, photophobia, proptosis, diplopia.  Ears: no otorrhea, tinnitus, otorrhagia, deafness, pain, vertigo.  Nose: no rhinorrhea, no epistaxis, no alteration in perception of odors, no sinuses pressure.  Mouth: no alteration in gums or teeth,  No ulcers, no difficulty with mastication or deglut ion, no odynophagia.  Neck: no masses or pain, no thyroid alterations, no pain in muscles or arteries, no carotid odynia, no crepitation.  Respiratory: no cough, no sputum production,no dyspnea,no trepopnea, no pleuritic pain,no hemoptysis.  Heart: no syncope, no irregularity, no palpitations, no angina,no orthopnea,no paroxysmal nocturnal dyspnea.  Vascular Venous: no tenderness,no edema,no palpable cords,no postphlebitic syndrome, no skin changes no ulcerations.  Vascular Arterial: no distal ischemia, noclaudication, no gangrene, no neuropathic ischemic pain, no skin ulcers, no paleness no cyanosis.  GI: no dysphagia, no odynophagia, no regurgitation, no heartburn,no indigestion,no nausea,no vomiting,no hematemesis ,no melena,no jaundice,no distention, no obstipation,no enterorrhagia,no proctalgia,no anal  lesions, no changes in bowel habits.  : no frequency, no hesitancy, no hematuria, no discharge,no  pain.  Musculoskeletal: stated muscle or tendon pain or inflammation,no  joint pain, no edema, some functional limitation,no fasciculations, no mass.  Neurologic: no headache, no seizures, noalterations on Craneal nerves,  motor deficit, and sensory deficit, poor   coordination, no alteration in memory,normal orientation, calculation,normal writting, verbal and written language.  Skin: no rashes,no pruritus no localized lesions.  Psychiatric: no anxiety, no depression,no agitation, no delusions, proper insight.            VITAL SIGNS:    Vitals:    07/01/19 0831   BP: 137/87   Pulse: 73   Resp: 12   Temp: 98.7 °F  "(37.1 °C)   TempSrc: Oral   SpO2: 98%   Weight: 92.7 kg (204 lb 4.8 oz)   Height: 190 cm (74.8\")              PHYSICAL EXAMINATION:    GENERAL:  Well-developed, well-nourished  Patient  in no acute distress.   SKIN:  Warm, dry ,NO rashes,NO purpura ,NO petechiae.  HEENT:  Pupils were equal and reactive to light and accomodation, conjunctivas non injected, no pterigion, normal extraocular movements, normal visual acuity.   Mouth mucosa was moist, no exudates in oropharynx, normal gum line, normal roof of the mouth and pillars, normal papillations of the tongue.  NECK:  Supple with good range of motion; no thyromegaly or masses, no JVD or bruits, no cervical adenopathies.No carotid arteries pain, no carotid abnormal pulsation , NO arterial dance.  LYMPHATICS:  No cervical, NO supraclavicular, NO axillary,NO epitrochlear , NO inguinal adenopathy.  CHEST:  Normal excursion of both serge thoraces, normal voice fremitus, no subcutaneous emphysema, normal axillas, no rashes or acanthosis nigricans. Lungs clear to percussion and auscultation, normal breath sounds bilaterally, no wheezing,NO crackles NO ronchi, NO stridor, NO rubs.  CARDIAC AND VASCULAR:  normal rate and regular rhythm, without murmurs,NO rubs NO S3 NO S4 right or left . Normal femoral, popliteal, pedis, brachial and carotid pulses.  ABDOMEN:  Soft, nontender with no organomegaly or masses, no ascites, no collateral circulation,no distention,no Hanston sign, no abdominal pain, no inguinal hernias,no umbilical hernia, no abdominal bruits. Normal bowel sounds.  GENITAL: Not  Performed.no femoral pulses detected mild 1+ popliteal and pedis  EXTREMITIES  AND SPINE:  No clubbing, cyanosis or edema, no deformities or pain .No kyphosis, scoliosis, deformities or pain in spine, ribs or pelvic bone.  NEUROLOGICAL:  Patient was awake, alert, oriented to time, person and place.numbness in feet for romberg positive, no reflexes in le               "                                  LABORATORY DATACT NECK, CHEST, ABDOMEN, AND PELVIS WITHOUT IV CONTRAST.     HISTORY: 78-year-old male with monoclonal gammopathy of unknown  significance, leukopenia and thrombocytopenia. Cholecystectomy and  prostatectomy in the past.     TECHNIQUE: Radiation dose reduction techniques were utilized, including  automated exposure control and exposure modulation based on body size. 3  mm images were obtained through the neck, chest, abdomen, and pelvis  without the administration of IV contrast. Compared with previous CTs  from 04/03/2007.     FINDINGS:  NECK: There is no lymphadenopathy within the neck. Noncontrasted parotid  and submandibular glands appear unremarkable. The thyroid is either  diminutive or surgically absent. Visualized paranasal sinuses are clear.     CHEST: There are no pulmonary airspace consolidations or suspicious  nodules. There are no pleural or pericardial effusions. There is no  lymphadenopathy within the chest or at the axilla. There is dilatation  of the ascending thoracic aorta measuring 4.8 cm in AP diameter. The  caliber tapers to 3.3 cm transversely at the aortic arch and the distal  descending thoracic aorta measures 3 cm in AP diameter. There are also  prominent coronary artery calcifications.     ABDOMEN/PELVIS: Splenic size is normal and there is no lymphadenopathy.  Noncontrasted liver, pancreas, adrenals, and kidneys appear  unremarkable. There is formed stool throughout the colon. No bowel  thickening is seen and there is no significant colonic diverticulosis.  There are mild abdominal aortic atherosclerotic changes without  aneurysmal dilatation.     IMPRESSION:  1. Splenic size is normal and there is no lymphadenopathy.  2. There is 4.8 cm dilatation of the ascending thoracic aorta.     This report was finalized on 8/4/2018 11:56 AM by Dr. Layla Murray M.D.         Visible to patient:  No (Not Released)   Dx:  Neuropathy; Thrombocytopenia  (CMS/[facility]...    Ref Range & Units 08:20   WBC 3.40 - 10.80 10*3/mm3 3.82    RBC 4.14 - 5.80 10*6/mm3 4.95    Hemoglobin 13.0 - 17.7 g/dL 14.7    Hematocrit 37.5 - 51.0 % 45.8    MCV 79.0 - 97.0 fL 92.5    MCH 26.6 - 33.0 pg 29.7    MCHC 31.5 - 35.7 g/dL 32.1    RDW 12.3 - 15.4 % 13.3    RDW-SD 37.0 - 54.0 fl 45.1    MPV 6.0 - 12.0 fL 10.6    Platelets 140 - 450 10*3/mm3 136 Abnormally low     Neutrophil % 42.7 - 76.0 % 40.8 Abnormally low     Lymphocyte % 19.6 - 45.3 % 41.1    Monocyte % 5.0 - 12.0 % 15.7 Abnormally high     Eosinophil % 0.3 - 6.2 % 1.3    Basophil % 0.0 - 1.5 % 0.8    Immature Grans % 0.0 - 0.5 % 0.3    Neutrophils, Absolute 1.70 - 7.00 10*3/mm3 1.56                              ASSESSMENT:    This patient has had for a long time minor leukopenia and thrombocytopenia and actually the numbers pertinent to this today are perfectly fine.  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  We documented a minimal monoclonal protein a long time ago and this has not progressed biochemically with no evidence of high calcium, abnormal creatinine or any other radiological change. We even performed bone marrow testing back in the summer because the patient had more fatigue and this failed to document lymphoma, myeloma, amyloidosis, leukemia or myelofibrosis.  Chromosomal analysis was normal. This raises the question in regard to what we see regarding neuropathy is associated with this condition, MGUS, or he has a different modality.   During the previous visit the patient was advised to be seen by [facility] to proceed with new neuroconductions and assessment. He was rejected by the team. He had his own neurologist before. T  Since the previous visit, the patient has had new nerve conductions and new EMG done by his neurologist. 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 This last component is very minimal.     His neurologist suggested for the patient to receive monthly immunoglobulin to see if this makes any difference in regard to the evolution of the neuropathy. The symptoms are becoming almost incapacitating for him. Medications do not help him out in this regard to control symptomatology and I think it is time to act upon.       Today, the patient has more symptomatology and actually more physical findings.  The radiological analysis of the CT scan of the chest, abdomen and pelvis shows elongation of the thoracic aorta with no obvious aneurysmal formation.  The patient has normal abdominal aorta.  The spleen, liver, pancreas and kidneys disclose no abnormalities.  Pulmonary anatomy remains normal, cardiac anatomy remains normal.  What caught my attention today was the fact that the patient has no palpable femoral pulses.  The pedis and popliteal pulses are decreased to 1+, feet are cold and it makes me wonder if he has more arterial disease that is masked by the neuropathy regarding symptom of claudication.  The thoracic aorta elongation also caught my attention.  Therefore, under the present premises and with the present condition where he continues having back pain this is my advice to him:  1.  We hold off on any immune globulin administration.    2.  Proceed with MRI of the thoracic and lumbar spines.  3.  Proceed with arterial Doppler of the lower extremities with stress testing.  4.  Patient will proceed with vascular surgery consultation.   5.  Eventually patient will require cardiothoracic surgery consultation followup in the thoracic aorta.    6.  Will discuss with him the findings of radiological testing and vascular studies once they become available.    7.  Leukopenia remains about the same.   8.  Minor thrombocytopenia remains about the same. No need for further  intervention from this point of view.  We remind ourselves we have given him 2 bone marrow tests in the past with negative result for amyloidosis and also myeloma besides minimal plasma cell excess that has not changed over time.      I discussed all these facts with the patient and his wife.  I personally reviewed the CT scans in the [facility].

## 2019-07-05 ENCOUNTER — APPOINTMENT (OUTPATIENT)
Dept: CARDIOLOGY | Facility: HOSPITAL | Age: 79
End: 2019-07-05

## 2019-07-05 ENCOUNTER — HOSPITAL ENCOUNTER (OUTPATIENT)
Dept: MRI IMAGING | Facility: HOSPITAL | Age: 79
Discharge: HOME | End: 2019-07-05
Admitting: INTERNAL MEDICINE

## 2019-07-05 ENCOUNTER — HOSPITAL ENCOUNTER (OUTPATIENT)
Dept: MRI IMAGING | Facility: HOSPITAL | Age: 79
Discharge: HOME | End: 2019-07-05

## 2019-07-05 DIAGNOSIS — G62.9 NEUROPATHY: ICD-10-CM

## 2019-07-05 DIAGNOSIS — D69.6 THROMBOCYTOPENIA (HCC): ICD-10-CM

## 2019-07-05 DIAGNOSIS — M54.9 BACK PAIN, UNSPECIFIED BACK LOCATION, UNSPECIFIED BACK PAIN LATERALITY, UNSPECIFIED CHRONICITY: ICD-10-CM

## 2019-07-05 PROCEDURE — 72157 MRI CHEST SPINE W/O & W/DYE: CPT

## 2019-07-05 PROCEDURE — A9576 INJ PROHANCE MULTIPACK: HCPCS | Performed by: INTERNAL MEDICINE

## 2019-07-05 PROCEDURE — 25010000002 GADOTERIDOL PER 1 ML: Performed by: INTERNAL MEDICINE

## 2019-07-05 PROCEDURE — 72158 MRI LUMBAR SPINE W/O & W/DYE: CPT

## 2019-07-05 RX ADMIN — GADOTERIDOL 19 ML: 279.3 INJECTION, SOLUTION INTRAVENOUS at 12:15

## 2019-07-08 ENCOUNTER — TELEPHONE (OUTPATIENT)
Dept: ONCOLOGY | Facility: CLINIC | Age: 79
End: 2019-07-08

## 2019-07-08 NOTE — TELEPHONE ENCOUNTER
I had a lengthy conversation with the patient’s wife on the telephone today because the patient is hard of hearing and he is not able to listen to my conversation on the telephone. He needs to have lip reading before he is able to talk to me. Obviously, on the telephone it is impossible. In any event, I have reviewed the MRIs of his thoracic and lumbar spine that disclosed minimal degenerative disease in his bones. The vertebras are intact. There is no collapse of vertebras or osteoporosis. There are no lesions in the bones. The degree of degeneration of the disk areas are minimal if any at all and there is completely normality of the spinal cord in the thoracic and lumbar areas. Therefore, this does not explain the symptomatology pertinent to his legs.     We ordered some Doppler studies of the lower extremities given the alterations in the pulses in the lower extremities and the symptomatology pertinent to this and he was told that Medicare will not pay for this diagnosis. This is wrong. In any event, I think the thoracic-aortic aneurysm is becoming a priority and we will need to make him an appointment ASAP to be seen by Roque Macedo MD, review this issue and go from there. I would not be surprised if the patient ends up with a CT angiogram of the chest, cardiac catheterization and surgery in a few days. I asked him to follow up appointment with Dr. Gill in regard to his pulses and the diagnosis of peripheral arterial disease. They agreed.

## 2019-07-09 ENCOUNTER — TELEPHONE (OUTPATIENT)
Dept: ONCOLOGY | Facility: HOSPITAL | Age: 79
End: 2019-07-09

## 2019-07-09 DIAGNOSIS — I73.9 PERIPHERAL ARTERIAL DISEASE (HCC): ICD-10-CM

## 2019-07-09 DIAGNOSIS — R09.89 BILATERAL NON-PALPABLE FEMORAL PULSES: ICD-10-CM

## 2019-07-09 DIAGNOSIS — G62.9 NEUROPATHY: ICD-10-CM

## 2019-07-09 DIAGNOSIS — I71.20 THORACIC AORTIC ANEURYSM WITHOUT RUPTURE (HCC): Primary | ICD-10-CM

## 2019-07-09 RX ORDER — ESCITALOPRAM OXALATE 10 MG/1
10 TABLET ORAL DAILY
Qty: 30 TABLET | Refills: 2 | Status: SHIPPED | OUTPATIENT
Start: 2019-07-09 | End: 2019-08-06

## 2019-07-09 NOTE — PROGRESS NOTES
Referral placed to Dr Tahir Macedo for thoracic aortic aneurysm per Dr Olvera  Message to appt desk to arrange

## 2019-07-09 NOTE — TELEPHONE ENCOUNTER
Wife requesting  sends in lexapro 10mg daily for pt. He is getting very down about his situation and wife wants to get him on something before he gets worse. He has been on this previously and helped a lot. msg sent to .     ----- Message from Ellen Jameson sent at 7/9/2019  2:41 PM EDT -----  [phone] nunu his wife.   He believes he needs an antidepressent and his wife says she believes it would help.

## 2019-07-09 NOTE — TELEPHONE ENCOUNTER
----- Message from Chalo Olvera MD sent at 7/9/2019  4:42 PM EDT -----   OK FOR LEXAPRO  E scribed to pharmacy.

## 2019-07-10 ENCOUNTER — HOSPITAL ENCOUNTER (OUTPATIENT)
Dept: CARDIOLOGY | Facility: HOSPITAL | Age: 79
Discharge: HOME | End: 2019-07-10
Admitting: INTERNAL MEDICINE

## 2019-07-10 DIAGNOSIS — G62.9 NEUROPATHY: ICD-10-CM

## 2019-07-10 DIAGNOSIS — R09.89 BILATERAL NON-PALPABLE FEMORAL PULSES: ICD-10-CM

## 2019-07-10 DIAGNOSIS — I71.20 THORACIC AORTIC ANEURYSM WITHOUT RUPTURE (HCC): ICD-10-CM

## 2019-07-10 DIAGNOSIS — I73.9 PERIPHERAL ARTERIAL DISEASE (HCC): ICD-10-CM

## 2019-07-10 LAB
BH CV LOWER ARTERIAL LEFT ABI RATIO: 1.28
BH CV LOWER ARTERIAL LEFT DORSALIS PEDIS SYS MAX: 173 MMHG
BH CV LOWER ARTERIAL LEFT GREAT TOE SYS MAX: 169 MMHG
BH CV LOWER ARTERIAL LEFT POST TIBIAL SYS MAX: 158 MMHG
BH CV LOWER ARTERIAL LEFT TBI RATIO: 1.25
BH CV LOWER ARTERIAL RIGHT ABI RATIO: 1.27
BH CV LOWER ARTERIAL RIGHT DORSALIS PEDIS SYS MAX: 171 MMHG
BH CV LOWER ARTERIAL RIGHT GREAT TOE SYS MAX: 137 MMHG
BH CV LOWER ARTERIAL RIGHT POST TIBIAL SYS MAX: 164 MMHG
BH CV LOWER ARTERIAL RIGHT TBI RATIO: 1.01
UPPER ARTERIAL LEFT ARM BRACHIAL SYS MAX: 130 MMHG
UPPER ARTERIAL RIGHT ARM BRACHIAL SYS MAX: 135 MMHG

## 2019-07-10 PROCEDURE — 93923 UPR/LXTR ART STDY 3+ LVLS: CPT

## 2019-07-16 ENCOUNTER — OFFICE VISIT (OUTPATIENT)
Dept: CARDIAC SURGERY | Facility: CLINIC | Age: 79
End: 2019-07-16

## 2019-07-16 VITALS
WEIGHT: 208 LBS | DIASTOLIC BLOOD PRESSURE: 86 MMHG | HEART RATE: 69 BPM | SYSTOLIC BLOOD PRESSURE: 132 MMHG | TEMPERATURE: 98.2 F | OXYGEN SATURATION: 96 % | BODY MASS INDEX: 25.33 KG/M2 | RESPIRATION RATE: 20 BRPM | HEIGHT: 76 IN

## 2019-07-16 DIAGNOSIS — I71.21 ASCENDING AORTIC ANEURYSM (HCC): ICD-10-CM

## 2019-07-16 DIAGNOSIS — G62.9 NEUROPATHY: ICD-10-CM

## 2019-07-16 DIAGNOSIS — D69.6 THROMBOCYTOPENIA (HCC): ICD-10-CM

## 2019-07-16 DIAGNOSIS — I71.20 THORACIC AORTIC ANEURYSM WITHOUT RUPTURE (HCC): Primary | ICD-10-CM

## 2019-07-16 DIAGNOSIS — D47.2 MGUS (MONOCLONAL GAMMOPATHY OF UNKNOWN SIGNIFICANCE): ICD-10-CM

## 2019-07-16 PROCEDURE — 99204 OFFICE O/P NEW MOD 45 MIN: CPT | Performed by: THORACIC SURGERY (CARDIOTHORACIC VASCULAR SURGERY)

## 2019-07-17 ENCOUNTER — TELEPHONE (OUTPATIENT)
Dept: ONCOLOGY | Facility: HOSPITAL | Age: 79
End: 2019-07-17

## 2019-07-21 PROBLEM — I71.21 ASCENDING AORTIC ANEURYSM (HCC): Status: ACTIVE | Noted: 2019-07-21

## 2019-07-21 NOTE — PROGRESS NOTES
7/21/2019    Seen on 7/16/19    Chalo Olvera MD     Reason for consultation:   Evaluation of proximal thoracic aortic aneurysm    Chief Complaint   Same    History of Present Illness:       Dear Chalo Vazquez MD and Colleagues,  It was nice to see Panfilo Feliz in consultation at your request. He is a 79 y.o. male with a history of monoclonal gammopathy, hypertension, hypothyroidism, esophagitis, prostatectomy status post radiation therapy and severe neuropathy being evaluated at the moment who was found a 4.8 cm a sending aortic aneurysm in a CT scan performed in August 2018. He denies chest or upper back pain, syncope, TIA, dyspnea orthopnea or lower extremity edema.  He has discomfort and numbness below both legs which is his main complaint . His chest CT showed a 4.8 cm dilatation of the ascending aorta without dissection of ulceration.  He has no family history for aneurysms, dissections or connective tissue disorders.    Patient Active Problem List   Diagnosis   • Leukopenia   • Thrombocytopenia (CMS/HCC)   • Neuropathy   • MGUS (monoclonal gammopathy of unknown significance)   • Ascending aortic aneurysm (CMS/HCC)       Past Medical History:   Diagnosis Date   • Arthritis    • Disease of thyroid gland    • DJD (degenerative joint disease)    • History of bone marrow biopsy    • Hyperlipidemia    • Hypertension    • Hypothyroidism    • IgG monoclonal gammopathy    • Prostate cancer (CMS/HCC)    • Reflux esophagitis        Past Surgical History:   Procedure Laterality Date   • CHOLECYSTECTOMY  1988   • COLON SURGERY  1998   • COLONOSCOPY  02/2013   • ENDOSCOPY  2012    ESOPHAGOGASTRODUODENOSCOPY WITH DILATION OF SHATZKI'S RING   • LAMINECTOMY  2013    decompression L3-4, L4-5   • PROSTATECTOMY  2007   • TONSILLECTOMY AND ADENOIDECTOMY      1940s   • VASECTOMY      1970s       No Known Allergies      Current Outpatient Medications:   •  aspirin 81 MG chewable tablet, Chew 81 mg daily., Disp: , Rfl:    •  atenolol (TENORMIN) 25 MG tablet, Take 25 mg by mouth daily., Disp: , Rfl:   •  colestipol (COLESTID) 1 G tablet, Take 1 g by mouth 2 (two) times a day., Disp: , Rfl:   •  escitalopram (LEXAPRO) 10 MG tablet, Take 1 tablet by mouth Daily., Disp: 30 tablet, Rfl: 2  •  gabapentin (NEURONTIN) 300 MG capsule, TAKE ONE CAPSULE BY MOUTH EVERY NIGHT AT BEDTIME, Disp: 30 capsule, Rfl: 3  •  levothyroxine (SYNTHROID, LEVOTHROID) 112 MCG tablet, Take 112 mcg by mouth daily., Disp: , Rfl:   •  Unable to find, 1 each 1 (One) Time. Med Name: vitamin b stress combination, Disp: , Rfl:   •  vitamin C (ASCORBIC ACID) 500 MG tablet, Take 500 mg by mouth Daily., Disp: , Rfl:     Social History     Socioeconomic History   • Marital status:      Spouse name: Meghan   • Number of children: Not on file   • Years of education: College   • Highest education level: Not on file   Occupational History   • Occupation:      Employer: RETIRED     Comment: [employer]   Tobacco Use   • Smoking status: Never Smoker   • Smokeless tobacco: Never Used   Substance and Sexual Activity   • Alcohol use: No     Comment: Heavy in past, none in 33 years   • Drug use: No       Family History   Problem Relation Age of Onset   • Cancer Mother 85        Breast   • COPD Father    • Cancer Brother 59        Unknown type   • Hypertension Daughter      Review of Systems   Respiratory: Negative for chest tightness and shortness of breath.    Genitourinary: Negative for dysuria.   Neurological: Positive for numbness.   All other systems reviewed and are negative.      Physical Exam:    Vital Signs:  Weight: 94.3 kg (208 lb)   Body mass index is 25.32 kg/m².  Temp: 98.2 °F (36.8 °C)   Heart Rate: 69   BP: 132/86     Physical Exam   Constitutional: He is oriented to person, place, and time. He appears well-developed and well-nourished.   HENT:   Head: Normocephalic and atraumatic.   Nose: Nose normal.   Mouth/Throat: Oropharynx is  clear and moist.   Eyes: Conjunctivae are normal. Pupils are equal, round, and reactive to light.   Neck: Normal range of motion. Neck supple. No JVD present. No thyromegaly present.   Cardiovascular: Normal rate, regular rhythm, normal heart sounds and intact distal pulses. PMI is not displaced. Exam reveals no gallop and no friction rub.   No murmur heard.  Pulses:       Radial pulses are 2+ on the right side, and 2+ on the left side.        Dorsalis pedis pulses are 2+ on the right side, and 2+ on the left side.   Pulmonary/Chest: Effort normal and breath sounds normal. He has no rales.   Abdominal: Soft. Bowel sounds are normal. He exhibits no distension and no mass. There is no tenderness.   Musculoskeletal: Normal range of motion. He exhibits no tenderness or deformity.   Lymphadenopathy:     He has no cervical adenopathy.   Neurological: He is alert and oriented to person, place, and time. He has normal reflexes.   Skin: Skin is warm and dry. No rash noted. No erythema.   Psychiatric: He has a normal mood and affect. His behavior is normal.   No groin or neck adenopathy  Decreased sensation in both lower extremities below the knee    Relevant studies (other than HPI)        Assessment:     Problem List Items Addressed This Visit        Cardiovascular and Mediastinum    Ascending aortic aneurysm (CMS/HCC)       Nervous and Auditory    Neuropathy       Immune and Lymphatic    MGUS (monoclonal gammopathy of unknown significance)       Hematopoietic and Hemostatic    Thrombocytopenia (CMS/HCC)      Other Visit Diagnoses     Thoracic aortic aneurysm without rupture (CMS/HCC)    -  Primary    Relevant Orders    CT angiogram chest w contrast          1. 4.8 cm ascending aortic aneurysm, asymptomatic  2. Hypertension, well controlled  3. MGUS with leukopenia being followed by Dr. Olvera  4. Thrombocytopenia without bleeding    Recommendation/Plan:     I discussed with the patient the findings and the significance of  a 4.8 cm aortic dilatation.  I discussed with him also that the CT scan was 1-year-old and he needs a repeat one to address if there is enlargement.  The new CT scan does not show any changes from the prior one, then I recommend longitudinal follow-up with chest CTs and office visits yearly.  She verbalized understanding and compliance with the plan    Thank you for allowing me to participate in his care.    Regards,    Spencer Puente M.D.

## 2019-07-23 ENCOUNTER — HOSPITAL ENCOUNTER (OUTPATIENT)
Dept: CT IMAGING | Facility: HOSPITAL | Age: 79
Discharge: HOME | End: 2019-07-23
Admitting: THORACIC SURGERY (CARDIOTHORACIC VASCULAR SURGERY)

## 2019-07-23 DIAGNOSIS — I71.20 THORACIC AORTIC ANEURYSM WITHOUT RUPTURE (HCC): ICD-10-CM

## 2019-07-23 PROCEDURE — 0 IOPAMIDOL PER 1 ML: Performed by: THORACIC SURGERY (CARDIOTHORACIC VASCULAR SURGERY)

## 2019-07-23 PROCEDURE — 71275 CT ANGIOGRAPHY CHEST: CPT

## 2019-07-23 RX ADMIN — IOPAMIDOL 100 ML: 755 INJECTION, SOLUTION INTRAVENOUS at 08:44

## 2019-08-06 ENCOUNTER — OFFICE VISIT (OUTPATIENT)
Dept: ONCOLOGY | Facility: CLINIC | Age: 79
End: 2019-08-06

## 2019-08-06 ENCOUNTER — LAB (OUTPATIENT)
Dept: LAB | Facility: HOSPITAL | Age: 79
End: 2019-08-06

## 2019-08-06 VITALS
DIASTOLIC BLOOD PRESSURE: 86 MMHG | WEIGHT: 208.4 LBS | TEMPERATURE: 98.9 F | RESPIRATION RATE: 16 BRPM | SYSTOLIC BLOOD PRESSURE: 135 MMHG | HEART RATE: 71 BPM | BODY MASS INDEX: 25.91 KG/M2 | HEIGHT: 75 IN | OXYGEN SATURATION: 92 %

## 2019-08-06 DIAGNOSIS — D69.6 THROMBOCYTOPENIA (HCC): Primary | ICD-10-CM

## 2019-08-06 DIAGNOSIS — I71.21 ASCENDING AORTIC ANEURYSM (HCC): ICD-10-CM

## 2019-08-06 DIAGNOSIS — G62.9 NEUROPATHY: ICD-10-CM

## 2019-08-06 DIAGNOSIS — D47.2 MGUS (MONOCLONAL GAMMOPATHY OF UNKNOWN SIGNIFICANCE): Primary | ICD-10-CM

## 2019-08-06 DIAGNOSIS — D69.6 THROMBOCYTOPENIA (HCC): ICD-10-CM

## 2019-08-06 LAB
ALBUMIN SERPL-MCNC: 4.3 G/DL (ref 3.5–5.2)
ALBUMIN/GLOB SERPL: 1.4 G/DL (ref 1.1–2.4)
ALP SERPL-CCNC: 64 U/L (ref 38–116)
ALT SERPL W P-5'-P-CCNC: 14 U/L (ref 0–41)
ANION GAP SERPL CALCULATED.3IONS-SCNC: 10.5 MMOL/L (ref 5–15)
AST SERPL-CCNC: 24 U/L (ref 0–40)
BASOPHILS # BLD AUTO: 0.02 10*3/MM3 (ref 0–0.2)
BASOPHILS NFR BLD AUTO: 0.5 % (ref 0–1.5)
BILIRUB SERPL-MCNC: 0.4 MG/DL (ref 0.2–1.2)
BUN BLD-MCNC: 26 MG/DL (ref 6–20)
BUN/CREAT SERPL: 19.5 (ref 7.3–30)
CALCIUM SPEC-SCNC: 9.3 MG/DL (ref 8.5–10.2)
CHLORIDE SERPL-SCNC: 104 MMOL/L (ref 98–107)
CO2 SERPL-SCNC: 25.5 MMOL/L (ref 22–29)
CREAT BLD-MCNC: 1.33 MG/DL (ref 0.7–1.3)
DEPRECATED RDW RBC AUTO: 45.1 FL (ref 37–54)
EOSINOPHIL # BLD AUTO: 0.04 10*3/MM3 (ref 0–0.4)
EOSINOPHIL NFR BLD AUTO: 1.1 % (ref 0.3–6.2)
ERYTHROCYTE [DISTWIDTH] IN BLOOD BY AUTOMATED COUNT: 13.2 % (ref 12.3–15.4)
GFR SERPL CREATININE-BSD FRML MDRD: 52 ML/MIN/1.73
GLOBULIN UR ELPH-MCNC: 3.1 GM/DL (ref 1.8–3.5)
GLUCOSE BLD-MCNC: 104 MG/DL (ref 74–124)
HCT VFR BLD AUTO: 46.6 % (ref 37.5–51)
HGB BLD-MCNC: 15 G/DL (ref 13–17.7)
IMM GRANULOCYTES # BLD AUTO: 0.01 10*3/MM3 (ref 0–0.05)
IMM GRANULOCYTES NFR BLD AUTO: 0.3 % (ref 0–0.5)
LYMPHOCYTES # BLD AUTO: 1.46 10*3/MM3 (ref 0.7–3.1)
LYMPHOCYTES NFR BLD AUTO: 39.7 % (ref 19.6–45.3)
MCH RBC QN AUTO: 30.2 PG (ref 26.6–33)
MCHC RBC AUTO-ENTMCNC: 32.2 G/DL (ref 31.5–35.7)
MCV RBC AUTO: 93.8 FL (ref 79–97)
MONOCYTES # BLD AUTO: 0.64 10*3/MM3 (ref 0.1–0.9)
MONOCYTES NFR BLD AUTO: 17.4 % (ref 5–12)
NEUTROPHILS # BLD AUTO: 1.51 10*3/MM3 (ref 1.7–7)
NEUTROPHILS NFR BLD AUTO: 41 % (ref 42.7–76)
NRBC BLD AUTO-RTO: 0 /100 WBC (ref 0–0.2)
PLATELET # BLD AUTO: 149 10*3/MM3 (ref 140–450)
PMV BLD AUTO: 10.3 FL (ref 6–12)
POTASSIUM BLD-SCNC: 5 MMOL/L (ref 3.5–4.7)
PROT SERPL-MCNC: 7.4 G/DL (ref 6.3–8)
RBC # BLD AUTO: 4.97 10*6/MM3 (ref 4.14–5.8)
SODIUM BLD-SCNC: 140 MMOL/L (ref 134–145)
WBC NRBC COR # BLD: 3.68 10*3/MM3 (ref 3.4–10.8)

## 2019-08-06 PROCEDURE — 80053 COMPREHEN METABOLIC PANEL: CPT

## 2019-08-06 PROCEDURE — 85025 COMPLETE CBC W/AUTO DIFF WBC: CPT

## 2019-08-06 PROCEDURE — 36415 COLL VENOUS BLD VENIPUNCTURE: CPT

## 2019-08-06 PROCEDURE — 99215 OFFICE O/P EST HI 40 MIN: CPT | Performed by: INTERNAL MEDICINE

## 2019-08-06 RX ORDER — PREDNISONE 10 MG/1
20 TABLET ORAL DAILY
Qty: 120 TABLET | Refills: 1 | Status: SHIPPED | OUTPATIENT
Start: 2019-08-06 | End: 2019-09-25

## 2019-08-06 NOTE — PROGRESS NOTES
REASON FOR FOLLOWUP:  Minimal leukopenia with thrombocytopenia in the background of minimal IgG monoclonal gammopathy. The patient has no splenomegaly, no peripheral adenopathy, and no symptoms that suggest lupus or collagen vascular disease. Bone marrow   a  spirate documented no evidence of myeloma, lymphoma, leukemia, myelodysplasia, or any other abnormality. Bone marrow chromosomal analysis as well as flow cytometry were negative.     HISTORY OF PRESENT ILLNESS:This patient returns today to the office for followup in company of his wife. In the interim he has had Doppler studies of the lower extremities to be sure that he had no peripheral arterial disease in the background of peripheral neuropathies, sensory and motor. The patient has a monoclonal gammopathy of unknown significance. Also in the interim he has been seen by Spencer Puente MD, Cardiothoracic Surgeon in regard to his thoracic aortic aneurysm. He has been advised nothing to do about this besides periodic monitoring and decrease the amount of weightlifting that he is doing.     The patient continues having his neuropathic and motor deficits in his lower extremities and he is not as safe walking as he used to be before. He has no back pain. He has not had incontinence. His Neurontin has been increased to 300 mg 3 times a day and this makes him sleepy. The patient otherwise has not had any falls. His appetite is good, his bowel activity is sluggish, urination is ongoing with no difficulties. No cardiovascular or respiratory issues at this time. No fevers, no chills, no sweats, no adenopathies, no rashes in the skin.                         PAST MEDICAL HISTORY:    1.;  Hypertension for which he takes medication.      2.; History of hypothyroidism for the last ten years and has undergone thyroid replacement for a long time.     3.; History of colonoscopy in February 2013 that was normal.  He has internal hemorrhoids.      4.; Component of reflux esophagitis.     5.;   History of prostate cancer.  He was treated with prostatectomy and he has had radiation treatment as well.  He has had a PSA that is undetectable for the last ten years.      6.; Cholecystectomy.    7.; Vasectomy.      8.; Decompression laminectomies at L3-4 and L4-5.      9.; Multiple sebaceous cysts removed.        The patient has no history of asthma, tuberculosis, or COPD.  No history of coronary artery disease or cardiac arrhythmia.          ONCOLOGIC/HEMATOLOGIC HISTORY: History from previous dates can be found in the separate document.        On 2015, given the development of pain in the left hip and tenderness in the greater trochanter, plain x-rays of the pelvis and left hip were performed  . His white count, hemoglobin, and platelets remain stable and the rest of his physical examination disclosed no abnormalities with the exception of tenderness in the greater trochanter on the left side. He had obvious ABSENCE of vibratory sensation in rajan  th feet. He had minimal reflexes in his lower extremities if any at all.           MEDICATIONS:  The current medication list was reviewed with the patient and updated in EMR this date per medical assistant.  Medication dosages and frequencies were confirmed to be accurate.         ALLERGIES:   No known drug allergies.         SOCIAL HISTORY:  The patient is a retired .  He lives with his second wife.  He travels a lot.  He is physically active and he has not been a smoker for more than 30 years or has drunk alcoho  l for more than 33 years.   He use to drink alcohol heavily then.          FAMILY HISTORY:  Patient’s father  with COPD at age 73.  Mother  with complications of breast cancer at age 86.  A brother had cancer of unknown primary and  as a consequence   of this at age 60.  He has no sisters.  His second wife is in good health.  He has two children in good health.  No family  history of hematological disorders.         REVIEW OF SYSTEMS:       General: no fever, no chills,  fatigue,no weight changes, no lack of appetite.  Eyes: no epiphora, xerophthalmia,conjunctivitis, pain, glaucoma, blurred vision, blindness, secretion, photophobia, proptosis, diplopia.  Ears: no otorrhea, tinnitus, otorrhagia, deafness, pain, vertigo.  Nose: no rhinorrhea, no epistaxis, no alteration in perception of odors, no sinuses pressure.  Mouth: no alteration in gums or teeth,  No ulcers, no difficulty with mastication or deglut ion, no odynophagia.  Neck: no masses or pain, no thyroid alterations, no pain in muscles or arteries, no carotid odynia, no crepitation.  Respiratory: no cough, no sputum production,no dyspnea,no trepopnea, no pleuritic pain,no hemoptysis.  Heart: no syncope, no irregularity, no palpitations, no angina,no orthopnea,no paroxysmal nocturnal dyspnea.  Vascular Venous: no tenderness,no edema,no palpable cords,no postphlebitic syndrome, no skin changes no ulcerations.  Vascular Arterial: no distal ischemia, noclaudication, no gangrene, no neuropathic ischemic pain, no skin ulcers, no paleness no cyanosis.  GI: no dysphagia, no odynophagia, no regurgitation, no heartburn,no indigestion,no nausea,no vomiting,no hematemesis ,no melena,no jaundice,no distention, no obstipation,no enterorrhagia,no proctalgia,no anal  lesions,  changes in bowel habits.  : no frequency, no hesitancy, no hematuria, no discharge,no  pain.  Musculoskeletal: no muscle or tendon pain or inflammation,no  joint pain, no edema, no functional limitation,no fasciculations, no mass.  Neurologic: no headache, no seizures, noalterations on Craneal nerves,both le  motor deficit,  sensory deficit, poor coordination, no alteration in memory,normal orientation, calculation,normal writting, verbal and written language.  Skin: no rashes,no pruritus no localized lesions.  Psychiatric: no anxiety, no depression,no agitation, no  "delusions, proper insight.              VITAL SIGNS:    Vitals:    08/06/19 0846   BP: 135/86   Pulse: 71   Resp: 16   Temp: 98.9 °F (37.2 °C)   TempSrc: Oral   SpO2: 92%   Weight: 94.5 kg (208 lb 6.4 oz)   Height: 190 cm (74.8\")              PHYSICAL EXAMINATION:      GENERAL:  Well-developed, well-nourished  Patient  in no acute distress.   SKIN:  Warm, dry ,NO rashes,NO purpura ,NO petechiae.  HEENT:  Pupils were equal and reactive to light and accomodation, conjunctivas non injected, no pterigion, normal extraocular movements, normal visual acuity.   Mouth mucosa was moist, no exudates in oropharynx, normal gum line, normal roof of the mouth and pillars, normal papillations of the tongue.  NECK:  Supple with good range of motion; no thyromegaly or masses, no JVD or bruits, no cervical adenopathies.No carotid arteries pain, no carotid abnormal pulsation , NO arterial dance.  LYMPHATICS:  No cervical, NO supraclavicular, NO axillary,NO epitrochlear , NO inguinal adenopathy.  CHEST:  Normal excursion of both serge thoraces, normal voice fremitus, no subcutaneous emphysema, normal axillas, no rashes or acanthosis nigricans. Lungs clear to percussion and auscultation, normal breath sounds bilaterally, no wheezing,NO crackles NO ronchi, NO stridor, NO rubs.  CARDIAC AND VASCULAR:  normal rate and regular rhythm, without murmurs,NO rubs NO S3 NO S4 right or left . Normal femoral, popliteal, pedis, brachial and carotid pulses.  ABDOMEN:  Soft, nontender with no organomegaly or masses, no ascites, no collateral circulation,no distention,no [place] sign, no abdominal pain, no inguinal hernias,no umbilical hernia, no abdominal bruits. Normal bowel sounds.  GENITAL: Not  Performed.  EXTREMITIES  AND SPINE:  No clubbing, cyanosis or edema, no deformities or pain .No kyphosis, scoliosis, deformities or pain in spine, ribs or pelvic bone.  NEUROLOGICAL:  Patient was awake, alert, oriented to time, person and place.SENSORY AND " MOTOR NEUROPATHY FROM KNEES DOWN, POSITIVE ROMBERG               LABORATORY DATAClinical Indication     no femoral pulses/neuropathy   Dx: Thoracic aortic aneurysm without rupture (CMS/HCC) [I71.2 (ICD-10-CM)]; Neuropathy [G62.9 (ICD-10-CM)]; Bilateral non-palpable femoral pulses [R09.89 (ICD-10-CM)]; Peripheral arterial disease (CMS/HCC) [I73.9 (ICD-10-CM)]   Interpretation Summary     · Right Conclusion: The right LYRIC is normal. Normal digital pressures.  · Left Conclusion: The left LYRIC is normal. Normal digital pressures.        Differential   Order: [id number] - Part of Panel Order [id number]   Status:  Final result   Visible to patient:  No (Not Released)   Dx:  Thrombocytopenia (CMS/HCC)    Ref Range & Units 08:09   WBC 3.40 - 10.80 10*3/mm3 3.68    RBC 4.14 - 5.80 10*6/mm3 4.97    Hemoglobin 13.0 - 17.7 g/dL 15.0    Hematocrit 37.5 - 51.0 % 46.6    MCV 79.0 - 97.0 fL 93.8    MCH 26.6 - 33.0 pg 30.2    MCHC 31.5 - 35.7 g/dL 32.2    RDW 12.3 - 15.4 % 13.2    RDW-SD 37.0 - 54.0 fl 45.1    MPV 6.0 - 12.0 fL 10.3    Platelets 140 - 450 10*3/mm3 149    Neutrophil % 42.7 - 76.0 % 41.0 Abnormally low     Lymphocyte % 19.6 - 45.3 % 39.7    Monocyte % 5.0 - 12.0 % 17.4 Abnormally high     Eosinophil % 0.3 - 6.2 % 1.1    Basophil % 0.0 - 1.5 % 0.5    Immature Grans % 0.0 - 0.5 % 0.3    Neutrophils, Absolute 1.70 - 7.00 10*3/mm3 1.51 Abnormally low     Lymphocytes, Absolute 0.70 - 3.10 10*3/mm3 1.46                                    ASSESSMENT:    This patient has had for a long time minor leukopenia and thrombocytopenia and actually the numbers pertinent to this today are perfectly fine.  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  We documented a minimal monoclonal protein a long time ago and this has not progressed biochemically with no evidence of high calcium, abnormal creatinine or any other  radiological change. We even performed bone marrow testing back in the summer because the patient had more fatigue and this failed to document lymphoma, myeloma, amyloidosis, leukemia or myelofibrosis.  Chromosomal analysis was normal. This raises the question in regard to what we see regarding neuropathy is associated with this condition, MGUS, or he has a different modality.   During the previous visit the patient was advised to be seen by [facility] to proceed with new neuroconductions and assessment. He was rejected by the team. He had his own neurologist before. T  Since the previous visit, the patient has had new nerve conductions and new EMG done by his neurologist. Unequivocally, the patient has a sensory polyneuropathy in the lower extremities with a component of radiculopathy as well related to his previous back surgery. This last component is very minimal.     His neurologist suggested for the patient to receive monthly immunoglobulin to see if this makes any difference in regard to the evolution of the neuropathy. The symptoms are becoming almost incapacitating for him. Medications do not help him out in this regard to control symptomatology and I think it is time to act upon.         Since the previous visit the patient proceeded with Doppler studies of the lower extremities to be sure there was not going to be arterial occlusion and this was completely normal.     He also has been seen by Spencer Puente MD in regard to his thoracic aortic aneurysm and he has advised the patient about restrictions in weightlifting and his physical activity and followup in 1 year.     From my point of view the neuropathy in this patient is sensory and motor, inflammatory, progressive and associated with monoclonal protein. There are multiple therapies for this including immunoglobulin, Rituxan, methotrexate, prednisone, short of Imuran and some other medications as well plasmapheresis. Reviewing paper from [facility] there are multiple choices. None of the therapies are ideal but I think the simplest way to try to treat this and see what happens will be to give him a prednisone dose of 20 mg a day for the next 6 weeks to see if this makes any difference in the long run. There are papers publicized about high dose prednisone 1 g per dose periodically. I am not ready to move into that direction with this patient yet. Therefore we will proceed with prednisone 20 mg a day at breakfast time checking monoclonal protein studies today and repeating them back in 6 weeks and rechecking CBC and CMP in 6 weeks and see how he does in the long run. I asked him to carry a diary of his symptoms to see if this makes any difference in the long run.     If that is not the case we can try the methylprednisolone bolus 1 g per dose once a week for 3-4 weeks and if that does not work we will move into the immunoglobulin.     I discussed with him and his wife in detail.

## 2019-09-25 ENCOUNTER — LAB (OUTPATIENT)
Dept: LAB | Facility: HOSPITAL | Age: 79
End: 2019-09-25

## 2019-09-25 ENCOUNTER — OFFICE VISIT (OUTPATIENT)
Dept: ONCOLOGY | Facility: CLINIC | Age: 79
End: 2019-09-25

## 2019-09-25 VITALS
BODY MASS INDEX: 26.26 KG/M2 | OXYGEN SATURATION: 94 % | HEART RATE: 73 BPM | RESPIRATION RATE: 16 BRPM | SYSTOLIC BLOOD PRESSURE: 127 MMHG | TEMPERATURE: 98.2 F | WEIGHT: 211.2 LBS | DIASTOLIC BLOOD PRESSURE: 78 MMHG | HEIGHT: 75 IN

## 2019-09-25 DIAGNOSIS — D47.2 MGUS (MONOCLONAL GAMMOPATHY OF UNKNOWN SIGNIFICANCE): ICD-10-CM

## 2019-09-25 DIAGNOSIS — D47.2 MGUS (MONOCLONAL GAMMOPATHY OF UNKNOWN SIGNIFICANCE): Primary | ICD-10-CM

## 2019-09-25 DIAGNOSIS — D69.6 THROMBOCYTOPENIA (HCC): ICD-10-CM

## 2019-09-25 DIAGNOSIS — G62.9 NEUROPATHY: ICD-10-CM

## 2019-09-25 DIAGNOSIS — I71.21 ASCENDING AORTIC ANEURYSM (HCC): ICD-10-CM

## 2019-09-25 LAB
ALBUMIN SERPL-MCNC: 4.2 G/DL (ref 3.5–5.2)
ALBUMIN/GLOB SERPL: 1.5 G/DL (ref 1.1–2.4)
ALP SERPL-CCNC: 44 U/L (ref 38–116)
ALT SERPL W P-5'-P-CCNC: 14 U/L (ref 0–41)
ANION GAP SERPL CALCULATED.3IONS-SCNC: 13.7 MMOL/L (ref 5–15)
AST SERPL-CCNC: 22 U/L (ref 0–40)
BASOPHILS # BLD AUTO: 0.01 10*3/MM3 (ref 0–0.2)
BASOPHILS NFR BLD AUTO: 0.1 % (ref 0–1.5)
BILIRUB SERPL-MCNC: 0.4 MG/DL (ref 0.2–1.2)
BUN BLD-MCNC: 22 MG/DL (ref 6–20)
BUN/CREAT SERPL: 16.3 (ref 7.3–30)
CALCIUM SPEC-SCNC: 9.4 MG/DL (ref 8.5–10.2)
CHLORIDE SERPL-SCNC: 104 MMOL/L (ref 98–107)
CO2 SERPL-SCNC: 22.3 MMOL/L (ref 22–29)
CREAT BLD-MCNC: 1.35 MG/DL (ref 0.7–1.3)
DEPRECATED RDW RBC AUTO: 47.7 FL (ref 37–54)
EOSINOPHIL # BLD AUTO: 0 10*3/MM3 (ref 0–0.4)
EOSINOPHIL NFR BLD AUTO: 0 % (ref 0.3–6.2)
ERYTHROCYTE [DISTWIDTH] IN BLOOD BY AUTOMATED COUNT: 13.7 % (ref 12.3–15.4)
GFR SERPL CREATININE-BSD FRML MDRD: 51 ML/MIN/1.73
GLOBULIN UR ELPH-MCNC: 2.8 GM/DL (ref 1.8–3.5)
GLUCOSE BLD-MCNC: 103 MG/DL (ref 74–124)
HCT VFR BLD AUTO: 44.9 % (ref 37.5–51)
HGB BLD-MCNC: 14.5 G/DL (ref 13–17.7)
IMM GRANULOCYTES # BLD AUTO: 0.03 10*3/MM3 (ref 0–0.05)
IMM GRANULOCYTES NFR BLD AUTO: 0.4 % (ref 0–0.5)
LYMPHOCYTES # BLD AUTO: 0.73 10*3/MM3 (ref 0.7–3.1)
LYMPHOCYTES NFR BLD AUTO: 10.3 % (ref 19.6–45.3)
MCH RBC QN AUTO: 30.5 PG (ref 26.6–33)
MCHC RBC AUTO-ENTMCNC: 32.3 G/DL (ref 31.5–35.7)
MCV RBC AUTO: 94.3 FL (ref 79–97)
MONOCYTES # BLD AUTO: 0.27 10*3/MM3 (ref 0.1–0.9)
MONOCYTES NFR BLD AUTO: 3.8 % (ref 5–12)
NEUTROPHILS # BLD AUTO: 6.07 10*3/MM3 (ref 1.7–7)
NEUTROPHILS NFR BLD AUTO: 85.4 % (ref 42.7–76)
NRBC BLD AUTO-RTO: 0 /100 WBC (ref 0–0.2)
PLATELET # BLD AUTO: 152 10*3/MM3 (ref 140–450)
PMV BLD AUTO: 10.4 FL (ref 6–12)
POTASSIUM BLD-SCNC: 4.8 MMOL/L (ref 3.5–4.7)
PROT SERPL-MCNC: 7 G/DL (ref 6.3–8)
RBC # BLD AUTO: 4.76 10*6/MM3 (ref 4.14–5.8)
SODIUM BLD-SCNC: 140 MMOL/L (ref 134–145)
WBC NRBC COR # BLD: 7.11 10*3/MM3 (ref 3.4–10.8)

## 2019-09-25 PROCEDURE — 85025 COMPLETE CBC W/AUTO DIFF WBC: CPT

## 2019-09-25 PROCEDURE — 36415 COLL VENOUS BLD VENIPUNCTURE: CPT | Performed by: INTERNAL MEDICINE

## 2019-09-25 PROCEDURE — 80053 COMPREHEN METABOLIC PANEL: CPT

## 2019-09-25 PROCEDURE — G0463 HOSPITAL OUTPT CLINIC VISIT: HCPCS | Performed by: INTERNAL MEDICINE

## 2019-09-25 PROCEDURE — 99214 OFFICE O/P EST MOD 30 MIN: CPT | Performed by: INTERNAL MEDICINE

## 2019-09-25 RX ORDER — GABAPENTIN 300 MG/1
300 CAPSULE ORAL 2 TIMES DAILY
Qty: 60 CAPSULE | Refills: 3 | Status: SHIPPED | OUTPATIENT
Start: 2019-09-25 | End: 2020-01-22

## 2019-09-25 NOTE — PROGRESS NOTES
REASON FOR FOLLOWUP:  Minimal leukopenia with thrombocytopenia in the background of minimal IgG monoclonal gammopathy. The patient has no splenomegaly, no peripheral adenopathy, and no symptoms that suggest lupus or collagen vascular disease. Bone marrow   a  spirate documented no evidence of myeloma, lymphoma, leukemia, myelodysplasia, or any other abnormality. Bone marrow chromosomal analysis as well as flow cytometry were negative.     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 Since the previous visit, the patient was placed on a low-dose prednisone of 20 mg a day that he is accomplishing almost 5 weeks of therapy at this point. He has seen very mild improvement in the symptoms to the point that he is barely able to tell the difference. The patient's appetite has been very good though. His bowel activity and urination have remained normal and he has not had any difficulties in regard to heart issues. He has not had any further deterioration of the neuropathy but he has not had an improvement. He has not too much of strength. He has not had any falls and he has not had any ataxia. He has not had any tremor. He has not seen any benefit with the prednisone in spite of taking the medicine already for 5 weeks.                          PAST MEDICAL HISTORY:    1.;  Hypertension for which he takes medication.      2.; History of hypothyroidism for the last ten years and has undergone thyroid replacement for a long time.     3.; History of colonoscopy in February 2013 that was normal.  He has internal hemorrhoids.     4.; Component of reflux esophagitis.     5.;   History of prostate cancer.  He was treated with prostatectomy and he has had radiation treatment as well.  He has had a PSA that is undetectable for the  last ten years.      6.; Cholecystectomy.    7.; Vasectomy.      8.; Decompression laminectomies at L3-4 and L4-5.      9.; Multiple sebaceous cysts removed.        The patient has no history of asthma, tuberculosis, or COPD.  No history of coronary artery disease or cardiac arrhythmia.          ONCOLOGIC/HEMATOLOGIC HISTORY: History from previous dates can be found in the separate document.        On 2015, given the development of pain in the left hip and tenderness in the greater trochanter, plain x-rays of the pelvis and left hip were performed  . His white count, hemoglobin, and platelets remain stable and the rest of his physical examination disclosed no abnormalities with the exception of tenderness in the greater trochanter on the left side. He had obvious ABSENCE of vibratory sensation in rajan  th feet. He had minimal reflexes in his lower extremities if any at all.           MEDICATIONS:  The current medication list was reviewed with the patient and updated in EMR this date per medical assistant.  Medication dosages and frequencies were confirmed to be accurate.         ALLERGIES:   No known drug allergies.         SOCIAL HISTORY:  The patient is a retired .  He lives with his second wife.  He travels a lot.  He is physically active and he has not been a smoker for more than 30 years or has drunk Pinoccioo  l for more than 33 years.   He use to drink alcohol heavily then.          FAMILY HISTORY:  Patient’s father  with COPD at age 73.  Mother  with complications of breast cancer at age 86.  A brother had cancer of unknown primary and  as a consequence   of this at age 60.  He has no sisters.  His second wife is in good health.  He has two children in good health.  No family history of hematological disorders.         REVIEW OF SYSTEMS:       General: no fever, no chills,  fatigue,no weight changes, no lack of appetite.  Eyes: no epiphora, xerophthalmia,conjunctivitis, pain,  glaucoma, blurred vision, blindness, secretion, photophobia, proptosis, diplopia.  Ears: no otorrhea, tinnitus, otorrhagia, deafness, pain, vertigo.  Nose: no rhinorrhea, no epistaxis, no alteration in perception of odors, no sinuses pressure.  Mouth: no alteration in gums or teeth,  No ulcers, no difficulty with mastication or deglut ion, no odynophagia.  Neck: no masses or pain, no thyroid alterations, no pain in muscles or arteries, no carotid odynia, no crepitation.  Respiratory: no cough, no sputum production,no dyspnea,no trepopnea, no pleuritic pain,no hemoptysis.  Heart: no syncope, no irregularity, no palpitations, no angina,no orthopnea,no paroxysmal nocturnal dyspnea.  Vascular Venous: no tenderness,no edema,no palpable cords,no postphlebitic syndrome, no skin changes no ulcerations.  Vascular Arterial: no distal ischemia, noclaudication, no gangrene, no neuropathic ischemic pain, no skin ulcers, no paleness no cyanosis.  GI: no dysphagia, no odynophagia, no regurgitation, no heartburn,no indigestion,no nausea,no vomiting,no hematemesis ,no melena,no jaundice,no distention, no obstipation,no enterorrhagia,no proctalgia,no anal  lesions, no changes in bowel habits.  : no frequency, no hesitancy, no hematuria, no discharge,no  pain.  Musculoskeletal: no muscle or tendon pain or inflammation,no  joint pain, no edema, no functional limitation,no fasciculations, no mass.  Neurologic: no headache, no seizures, noalterations on Craneal nerves,  motor deficit,  sensory deficit,  coordination, no alteration in memory,normal orientation, calculation,normal writting, verbal and written language.  Skin: no rashes,no pruritus no localized lesions.  Psychiatric: no anxiety, no depression,no agitation, no delusions, proper insight.                VITAL SIGNS:    Vitals:    09/25/19 1443   BP: 127/78   Pulse: 73   Resp: 16   Temp: 98.2 °F (36.8 °C)   TempSrc: Oral   SpO2: 94%   Weight: 95.8 kg (211 lb 3.2 oz)   Height:  "190 cm (74.8\")              PHYSICAL EXAMINATION:      GENERAL:  Well-developed, well-nourished  Patient  in no acute distress.   SKIN:  Warm, dry ,NO rashes,NO purpura ,NO petechiae.  HEENT:  Pupils were equal and reactive to light and accomodation, conjunctivas non injected, no pterigion, normal extraocular movements, normal visual acuity.   Mouth mucosa was moist, no exudates in oropharynx, normal gum line, normal roof of the mouth and pillars, normal papillations of the tongue.  NECK:  Supple with good range of motion; no thyromegaly or masses, no JVD or bruits, no cervical adenopathies.No carotid arteries pain, no carotid abnormal pulsation , NO arterial dance.  LYMPHATICS:  No cervical, NO supraclavicular, NO axillary,NO epitrochlear , NO inguinal adenopathy.  CHEST:  Normal excursion of both serge thoraces, normal voice fremitus, no subcutaneous emphysema, normal axillas, no rashes or acanthosis nigricans. Lungs clear to percussion and auscultation, normal breath sounds bilaterally, no wheezing,NO crackles NO ronchi, NO stridor, NO rubs.  CARDIAC AND VASCULAR:  normal rate and regular rhythm, without murmurs,NO rubs NO S3 NO S4 right or left . Normal femoral, popliteal, pedis, brachial and carotid pulses.  ABDOMEN:  Soft, nontender with no organomegaly or masses, no ascites, no collateral circulation,no distention,no Merrill sign, no abdominal pain, no inguinal hernias,no umbilical hernia, no abdominal bruits. Normal bowel sounds.  GENITAL: Not  Performed.  EXTREMITIES  AND SPINE:  No clubbing, cyanosis or edema, no deformities or pain .No kyphosis, scoliosis, deformities or pain in spine, ribs or pelvic bone.  NEUROLOGICAL:  Patient was awake, alert, oriented to time, person and place.sensory deficit le and ue                     LABORATORY DATA  :06    WBC  3.40 - 10.80 10*3/mm3 7.11    RBC  4.14 - 5.80 10*6/mm3 4.76    Hemoglobin  13.0 - 17.7 g/dL 14.5    Hematocrit  37.5 - 51.0 % 44.9    MCV  79.0 - 97.0 fL " 94.3    MCH  26.6 - 33.0 pg 30.5    MCHC  31.5 - 35.7 g/dL 32.3    RDW  12.3 - 15.4 % 13.7    RDW-SD  37.0 - 54.0 fl 47.7    MPV  6.0 - 12.0 fL 10.4    Platelets  140 - 450 10*3/mm3 152    Neutrophil %  42.7 - 76.0 % 85.4 Abnormally high     Lymphocyte %  19.6 - 45.3 % 10.3 Abnormally low     Monocyte %  5.0 - 12.0 % 3.8 Abnormally low     Eosinophil %  0.3 - 6.2 % 0.0 Abnormally low     Basophil %  0.0 - 1.5 % 0.1    Immature Grans %  0.0 - 0.5 % 0.4    Neutrophils, Absolute  1.70 - 7.00 10*3/mm3 6.07    Lymphocytes, Absolute  0.70 - 3.10 10*3/mm3 0.73    Monocytes, Absolute  0.10 - 0.90 10*3/mm3 0.27    Eosinophils, Absolute  0.00 - 0.40 10*3/mm3 0.00    Basophils, Absolute  0.00 - 0.20 10*3/mm3 0.01    Immature Grans, Absolute  0.00 - 0.05 10*3/mm3 0.03    nRBC  0.0 - 0.2 /100 WBC 0.0    Resulting Agency  CBC L                       ASSESSMENT:    This patient has had for a long time minor leukopenia and thrombocytopenia and actually the numbers pertinent to this today are perfectly fine.  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  We documented a minimal monoclonal protein a long time ago and this has not progressed biochemically with no evidence of high calcium, abnormal creatinine or any other radiological change. We even performed bone marrow testing back in the summer because the patient had more fatigue and this failed to document lymphoma, myeloma, amyloidosis, leukemia or myelofibrosis.  Chromosomal analysis was normal. This raises the question in regard to what we see regarding neuropathy is associated with this condition, MGUS, or he has a different modality.   During the previous visit the patient was advised to be seen by [facility] to proceed with new neuroconductions and assessment. He was rejected by the team. He had his own neurologist before. T  Since the previous visit,  the patient has had new nerve conductions and new EMG done by his neurologist. Unequivocally, the patient has a sensory polyneuropathy in the lower extremities with a component of radiculopathy as well related to his previous back surgery. This last component is very minimal.     His neurologist suggested for the patient to receive monthly immunoglobulin to see if this makes any difference in regard to the evolution of the neuropathy. The symptoms are becoming almost incapacitating for him. Medications do not help him out in this regard to control symptomatology and I think it is time to act upon.         Since the previous visit the patient proceeded with Doppler studies of the lower extremities to be sure there was not going to be arterial occlusion and this was completely normal.     He also has been seen by Spencer Puente MD in regard to his thoracic aortic aneurysm and he has advised the patient about restrictions in weightlifting and his physical activity and followup in 1 year.       The patient's situation was further reviewed on 2019. After taking prednisone for 6 weeks, he has not seen any benefit of the medicine besides the sense of wellbeing with a good appetite. He has gained 3 or 4 pounds of weight. On the other hand, he has not seen any benefit in regard to the numbness and tingling in his lower extremities, neither his ability to walk, neither his ability to avoid walking and falling. Therefore, under the present circumstances, I advised the patient and wife the followin. I will initiate the tapering down of prednisone and by tomorrow he will start 10 mg a day for 5 days then 5 mg for 5 days and then 5 mg every other day FOR 5 DOSES.    2. The patient was advised consideration of giving him immunoglobulin that has been documented beneficial in recent paper by [facility]. We will try for him to be qualified for this medication.     I discussed other family members issues with the  patient including brothers and sisters. He raised the question if this could be passable from one member of the family to the other and my advice to him was not likely. In any event, the patient is willing to consider other therapy. As I reviewed information from [facility], IvIgG probably will be the less noxious and could be the most benefit. For that reason, he was scheduled to have this infusion done at some point in the next 3 weeks.     The patient's monoclonal protein studies were obtained. Once that the report becomes available this will be informative to the patient at that point.     I had a lengthy conversation with the patient as well as wife in regard to all these issues and they were ready to proceed.

## 2019-09-26 LAB
ALBUMIN SERPL-MCNC: 3.6 G/DL (ref 2.9–4.4)
ALBUMIN/GLOB SERPL: 1.3 {RATIO} (ref 0.7–1.7)
ALPHA1 GLOB FLD ELPH-MCNC: 0.2 G/DL (ref 0–0.4)
ALPHA2 GLOB SERPL ELPH-MCNC: 0.8 G/DL (ref 0.4–1)
B-GLOBULIN SERPL ELPH-MCNC: 1.2 G/DL (ref 0.7–1.3)
GAMMA GLOB SERPL ELPH-MCNC: 0.7 G/DL (ref 0.4–1.8)
GLOBULIN SER CALC-MCNC: 3 G/DL (ref 2.2–3.9)
IGA SERPL-MCNC: 322 MG/DL (ref 61–437)
IGG SERPL-MCNC: 920 MG/DL (ref 700–1600)
IGM SERPL-MCNC: 20 MG/DL (ref 15–143)
INTERPRETATION SERPL IEP-IMP: ABNORMAL
KAPPA LC SERPL-MCNC: 20.9 MG/L (ref 3.3–19.4)
KAPPA LC/LAMBDA SER: 1.83 {RATIO} (ref 0.26–1.65)
LAMBDA LC FREE SERPL-MCNC: 11.4 MG/L (ref 5.7–26.3)
Lab: ABNORMAL
M-SPIKE: 0.2 G/DL
PROT SERPL-MCNC: 6.6 G/DL (ref 6–8.5)

## 2019-09-27 PROBLEM — G61.81 CHRONIC INFLAMMATORY DEMYELINATING POLYNEURITIS (HCC): Status: ACTIVE | Noted: 2019-09-27

## 2019-09-30 ENCOUNTER — TELEPHONE (OUTPATIENT)
Dept: ONCOLOGY | Facility: CLINIC | Age: 79
End: 2019-09-30

## 2019-10-03 ENCOUNTER — LAB (OUTPATIENT)
Dept: LAB | Facility: HOSPITAL | Age: 79
End: 2019-10-03

## 2019-10-03 ENCOUNTER — INFUSION (OUTPATIENT)
Dept: ONCOLOGY | Facility: HOSPITAL | Age: 79
End: 2019-10-03

## 2019-10-03 VITALS
HEART RATE: 71 BPM | BODY MASS INDEX: 26.29 KG/M2 | DIASTOLIC BLOOD PRESSURE: 77 MMHG | SYSTOLIC BLOOD PRESSURE: 129 MMHG | WEIGHT: 209.2 LBS | OXYGEN SATURATION: 97 % | RESPIRATION RATE: 16 BRPM | TEMPERATURE: 98.2 F

## 2019-10-03 DIAGNOSIS — D72.818 OTHER DECREASED WHITE BLOOD CELL (WBC) COUNT: ICD-10-CM

## 2019-10-03 DIAGNOSIS — G61.81 CHRONIC INFLAMMATORY DEMYELINATING POLYNEURITIS (HCC): Primary | ICD-10-CM

## 2019-10-03 DIAGNOSIS — G62.9 NEUROPATHY: ICD-10-CM

## 2019-10-03 DIAGNOSIS — D47.2 MGUS (MONOCLONAL GAMMOPATHY OF UNKNOWN SIGNIFICANCE): ICD-10-CM

## 2019-10-03 DIAGNOSIS — G61.81 CHRONIC INFLAMMATORY DEMYELINATING POLYNEURITIS (HCC): ICD-10-CM

## 2019-10-03 DIAGNOSIS — D69.6 THROMBOCYTOPENIA (HCC): ICD-10-CM

## 2019-10-03 LAB
BASOPHILS # BLD AUTO: 0.03 10*3/MM3 (ref 0–0.2)
BASOPHILS NFR BLD AUTO: 0.5 % (ref 0–1.5)
DEPRECATED RDW RBC AUTO: 48.1 FL (ref 37–54)
EOSINOPHIL # BLD AUTO: 0.03 10*3/MM3 (ref 0–0.4)
EOSINOPHIL NFR BLD AUTO: 0.5 % (ref 0.3–6.2)
ERYTHROCYTE [DISTWIDTH] IN BLOOD BY AUTOMATED COUNT: 14 % (ref 12.3–15.4)
HCT VFR BLD AUTO: 44.6 % (ref 37.5–51)
HGB BLD-MCNC: 14.5 G/DL (ref 13–17.7)
IGA1 MFR SER: 329 MG/DL (ref 70–400)
IGG1 SER-MCNC: 792 MG/DL (ref 700–1600)
IGM SERPL-MCNC: 20 MG/DL (ref 40–230)
IMM GRANULOCYTES # BLD AUTO: 0.03 10*3/MM3 (ref 0–0.05)
IMM GRANULOCYTES NFR BLD AUTO: 0.5 % (ref 0–0.5)
LYMPHOCYTES # BLD AUTO: 1.15 10*3/MM3 (ref 0.7–3.1)
LYMPHOCYTES NFR BLD AUTO: 19.6 % (ref 19.6–45.3)
MCH RBC QN AUTO: 30.1 PG (ref 26.6–33)
MCHC RBC AUTO-ENTMCNC: 32.5 G/DL (ref 31.5–35.7)
MCV RBC AUTO: 92.7 FL (ref 79–97)
MONOCYTES # BLD AUTO: 0.77 10*3/MM3 (ref 0.1–0.9)
MONOCYTES NFR BLD AUTO: 13.1 % (ref 5–12)
NEUTROPHILS # BLD AUTO: 3.86 10*3/MM3 (ref 1.7–7)
NEUTROPHILS NFR BLD AUTO: 65.8 % (ref 42.7–76)
NRBC BLD AUTO-RTO: 0 /100 WBC (ref 0–0.2)
PLATELET # BLD AUTO: 144 10*3/MM3 (ref 140–450)
PMV BLD AUTO: 10.6 FL (ref 6–12)
RBC # BLD AUTO: 4.81 10*6/MM3 (ref 4.14–5.8)
WBC NRBC COR # BLD: 5.87 10*3/MM3 (ref 3.4–10.8)

## 2019-10-03 PROCEDURE — 82784 ASSAY IGA/IGD/IGG/IGM EACH: CPT | Performed by: INTERNAL MEDICINE

## 2019-10-03 PROCEDURE — 96365 THER/PROPH/DIAG IV INF INIT: CPT | Performed by: INTERNAL MEDICINE

## 2019-10-03 PROCEDURE — 25010000002 IMMUNE GLOBULIN (HUMAN) 10 GM/100ML SOLUTION: Performed by: INTERNAL MEDICINE

## 2019-10-03 PROCEDURE — 96366 THER/PROPH/DIAG IV INF ADDON: CPT | Performed by: INTERNAL MEDICINE

## 2019-10-03 PROCEDURE — 85025 COMPLETE CBC W/AUTO DIFF WBC: CPT | Performed by: INTERNAL MEDICINE

## 2019-10-03 PROCEDURE — 36415 COLL VENOUS BLD VENIPUNCTURE: CPT | Performed by: INTERNAL MEDICINE

## 2019-10-03 PROCEDURE — 63710000001 DIPHENHYDRAMINE PER 50 MG: Performed by: INTERNAL MEDICINE

## 2019-10-03 PROCEDURE — 25010000002 IMMUNE GLOBULIN (HUMAN) 20 GM/200ML SOLUTION: Performed by: INTERNAL MEDICINE

## 2019-10-03 RX ORDER — SODIUM CHLORIDE 9 MG/ML
250 INJECTION, SOLUTION INTRAVENOUS ONCE
Status: COMPLETED | OUTPATIENT
Start: 2019-10-03 | End: 2019-10-03

## 2019-10-03 RX ORDER — DIPHENHYDRAMINE HYDROCHLORIDE 50 MG/ML
50 INJECTION INTRAMUSCULAR; INTRAVENOUS AS NEEDED
Status: CANCELLED | OUTPATIENT
Start: 2019-10-03

## 2019-10-03 RX ORDER — ACETAMINOPHEN 325 MG/1
650 TABLET ORAL ONCE
Status: COMPLETED | OUTPATIENT
Start: 2019-10-03 | End: 2019-10-03

## 2019-10-03 RX ORDER — DIPHENHYDRAMINE HCL 25 MG
25 CAPSULE ORAL ONCE
Status: COMPLETED | OUTPATIENT
Start: 2019-10-03 | End: 2019-10-03

## 2019-10-03 RX ORDER — FAMOTIDINE 10 MG/ML
20 INJECTION, SOLUTION INTRAVENOUS AS NEEDED
Status: CANCELLED | OUTPATIENT
Start: 2019-10-03

## 2019-10-03 RX ORDER — MEPERIDINE HYDROCHLORIDE 50 MG/ML
25 INJECTION INTRAMUSCULAR; INTRAVENOUS; SUBCUTANEOUS
Status: CANCELLED | OUTPATIENT
Start: 2019-10-03 | End: 2019-10-04

## 2019-10-03 RX ADMIN — IMMUNE GLOBULIN INFUSION (HUMAN) 20 G: 100 INJECTION, SOLUTION INTRAVENOUS; SUBCUTANEOUS at 10:13

## 2019-10-03 RX ADMIN — SODIUM CHLORIDE 250 ML: 9 INJECTION, SOLUTION INTRAVENOUS at 08:55

## 2019-10-03 RX ADMIN — IMMUNE GLOBULIN INFUSION (HUMAN) 10 G: 100 INJECTION, SOLUTION INTRAVENOUS; SUBCUTANEOUS at 09:04

## 2019-10-03 RX ADMIN — DIPHENHYDRAMINE HYDROCHLORIDE 25 MG: 25 CAPSULE ORAL at 08:55

## 2019-10-03 RX ADMIN — ACETAMINOPHEN 650 MG: 325 TABLET, FILM COATED ORAL at 08:55

## 2019-11-04 DIAGNOSIS — D47.2 MGUS (MONOCLONAL GAMMOPATHY OF UNKNOWN SIGNIFICANCE): ICD-10-CM

## 2019-11-04 DIAGNOSIS — G61.81 CHRONIC INFLAMMATORY DEMYELINATING POLYNEURITIS (HCC): ICD-10-CM

## 2019-11-07 ENCOUNTER — INFUSION (OUTPATIENT)
Dept: ONCOLOGY | Facility: HOSPITAL | Age: 79
End: 2019-11-07

## 2019-11-07 ENCOUNTER — LAB (OUTPATIENT)
Dept: LAB | Facility: HOSPITAL | Age: 79
End: 2019-11-07

## 2019-11-07 VITALS
RESPIRATION RATE: 16 BRPM | SYSTOLIC BLOOD PRESSURE: 117 MMHG | TEMPERATURE: 98 F | DIASTOLIC BLOOD PRESSURE: 74 MMHG | OXYGEN SATURATION: 97 % | BODY MASS INDEX: 26.76 KG/M2 | HEART RATE: 64 BPM | WEIGHT: 213 LBS

## 2019-11-07 DIAGNOSIS — D47.2 MGUS (MONOCLONAL GAMMOPATHY OF UNKNOWN SIGNIFICANCE): ICD-10-CM

## 2019-11-07 DIAGNOSIS — G61.81 CHRONIC INFLAMMATORY DEMYELINATING POLYNEURITIS (HCC): Primary | ICD-10-CM

## 2019-11-07 DIAGNOSIS — G61.81 CHRONIC INFLAMMATORY DEMYELINATING POLYNEURITIS (HCC): ICD-10-CM

## 2019-11-07 LAB
BASOPHILS # BLD AUTO: 0.03 10*3/MM3 (ref 0–0.2)
BASOPHILS NFR BLD AUTO: 0.7 % (ref 0–1.5)
DEPRECATED RDW RBC AUTO: 45.5 FL (ref 37–54)
EOSINOPHIL # BLD AUTO: 0.06 10*3/MM3 (ref 0–0.4)
EOSINOPHIL NFR BLD AUTO: 1.5 % (ref 0.3–6.2)
ERYTHROCYTE [DISTWIDTH] IN BLOOD BY AUTOMATED COUNT: 13.4 % (ref 12.3–15.4)
HCT VFR BLD AUTO: 43.6 % (ref 37.5–51)
HGB BLD-MCNC: 14.3 G/DL (ref 13–17.7)
IGA1 MFR SER: 322 MG/DL (ref 70–400)
IGG1 SER-MCNC: 941 MG/DL (ref 700–1600)
IGM SERPL-MCNC: 18 MG/DL (ref 40–230)
IMM GRANULOCYTES # BLD AUTO: 0.05 10*3/MM3 (ref 0–0.05)
IMM GRANULOCYTES NFR BLD AUTO: 1.2 % (ref 0–0.5)
LYMPHOCYTES # BLD AUTO: 1.74 10*3/MM3 (ref 0.7–3.1)
LYMPHOCYTES NFR BLD AUTO: 42.2 % (ref 19.6–45.3)
MCH RBC QN AUTO: 30.1 PG (ref 26.6–33)
MCHC RBC AUTO-ENTMCNC: 32.8 G/DL (ref 31.5–35.7)
MCV RBC AUTO: 91.8 FL (ref 79–97)
MONOCYTES # BLD AUTO: 0.61 10*3/MM3 (ref 0.1–0.9)
MONOCYTES NFR BLD AUTO: 14.8 % (ref 5–12)
NEUTROPHILS # BLD AUTO: 1.63 10*3/MM3 (ref 1.7–7)
NEUTROPHILS NFR BLD AUTO: 39.6 % (ref 42.7–76)
NRBC BLD AUTO-RTO: 0 /100 WBC (ref 0–0.2)
PLATELET # BLD AUTO: 154 10*3/MM3 (ref 140–450)
PMV BLD AUTO: 10.7 FL (ref 6–12)
RBC # BLD AUTO: 4.75 10*6/MM3 (ref 4.14–5.8)
WBC NRBC COR # BLD: 4.12 10*3/MM3 (ref 3.4–10.8)

## 2019-11-07 PROCEDURE — 63710000001 DIPHENHYDRAMINE PER 50 MG: Performed by: INTERNAL MEDICINE

## 2019-11-07 PROCEDURE — 96366 THER/PROPH/DIAG IV INF ADDON: CPT | Performed by: INTERNAL MEDICINE

## 2019-11-07 PROCEDURE — 96365 THER/PROPH/DIAG IV INF INIT: CPT | Performed by: INTERNAL MEDICINE

## 2019-11-07 PROCEDURE — 36415 COLL VENOUS BLD VENIPUNCTURE: CPT

## 2019-11-07 PROCEDURE — 25010000002 IMMUNE GLOBULIN (HUMAN) 30 GM/300ML SOLUTION: Performed by: INTERNAL MEDICINE

## 2019-11-07 PROCEDURE — 85025 COMPLETE CBC W/AUTO DIFF WBC: CPT

## 2019-11-07 PROCEDURE — 82784 ASSAY IGA/IGD/IGG/IGM EACH: CPT | Performed by: INTERNAL MEDICINE

## 2019-11-07 RX ORDER — MEPERIDINE HYDROCHLORIDE 50 MG/ML
25 INJECTION INTRAMUSCULAR; INTRAVENOUS; SUBCUTANEOUS
Status: CANCELLED | OUTPATIENT
Start: 2019-11-07 | End: 2019-11-08

## 2019-11-07 RX ORDER — DIPHENHYDRAMINE HYDROCHLORIDE 50 MG/ML
50 INJECTION INTRAMUSCULAR; INTRAVENOUS AS NEEDED
Status: CANCELLED | OUTPATIENT
Start: 2019-11-07

## 2019-11-07 RX ORDER — FAMOTIDINE 10 MG/ML
20 INJECTION, SOLUTION INTRAVENOUS AS NEEDED
Status: CANCELLED | OUTPATIENT
Start: 2019-11-07

## 2019-11-07 RX ORDER — DIPHENHYDRAMINE HCL 25 MG
25 CAPSULE ORAL ONCE
Status: COMPLETED | OUTPATIENT
Start: 2019-11-07 | End: 2019-11-07

## 2019-11-07 RX ORDER — ACETAMINOPHEN 325 MG/1
650 TABLET ORAL ONCE
Status: COMPLETED | OUTPATIENT
Start: 2019-11-07 | End: 2019-11-07

## 2019-11-07 RX ORDER — SODIUM CHLORIDE 9 MG/ML
250 INJECTION, SOLUTION INTRAVENOUS ONCE
Status: COMPLETED | OUTPATIENT
Start: 2019-11-07 | End: 2019-11-07

## 2019-11-07 RX ADMIN — IMMUNE GLOBULIN INFUSION (HUMAN) 30 G: 100 INJECTION, SOLUTION INTRAVENOUS; SUBCUTANEOUS at 08:45

## 2019-11-07 RX ADMIN — DIPHENHYDRAMINE HYDROCHLORIDE 25 MG: 25 CAPSULE ORAL at 08:13

## 2019-11-07 RX ADMIN — ACETAMINOPHEN 650 MG: 325 TABLET ORAL at 08:13

## 2019-11-07 RX ADMIN — SODIUM CHLORIDE 250 ML: 9 INJECTION, SOLUTION INTRAVENOUS at 08:13

## 2019-11-19 ENCOUNTER — TELEPHONE (OUTPATIENT)
Dept: ONCOLOGY | Facility: CLINIC | Age: 79
End: 2019-11-19

## 2019-11-19 NOTE — TELEPHONE ENCOUNTER
PT'S WIFE CALLING. PT REC'D IVIG 10/3 AND AGAIN ON 11/7. PT REPORTS HAVING BAD REACTION AFTER THE 2ND INFUSION. HAD A REACTION AFTER THE 1ST BUT THE 2ND INFUSION REACTION WAS MUCH WORSE. WIFE STATES THAT AFTER A FEW DAYS FOLLOWING INFUSION PT HAS REMAINED IN SEVERE PAIN TO HIS L KNEE, R ANKLE AND JAW. HIS SKIN FEELS EXTREMELY TIGHT, CAN BARELY MAKE A FIST AND THE SKIN IS PEELING OFF HIS PALMS AND SOLES OF FEET. HE FEELS WORSE THAN HE DID BEFORE HE STARTED IVIG INFUSIONS AND DOESN'T KNOW HOW HE COULD REC MORE IF THIS IS THE TYPE OF REACTION HE IS GOING TO HAVE. PER DR. PINEDA PT TO START TAKING PREDNISONE 20MG Q DAY AT  AND PEPCID 20MG DAILY. PT TO SEE NP THIS WK. OK TO CANCEL APPT FOR IVIG ON 12/9. APPT DESK TO SET UP NP VS. WIFE V/U TO NEW MED ORDERS. PT HAS PREDNISONE RX AT HOME 10MG TABS AND SHE WILL HAVE HIM TAKE 2. SHE WILL CALL BACK IF RF IS NEEDED. DON'T KNOW IF DIFFERENT PREMEDS OR INFUSION RATES WOULD MAKE ANY DIFFERENCE GOING FORWARD.     APPT DESK - SET UP NP APPT THIS WK (TOWARDS END OF WK) FOR ABOVE ISSUE. CAN BE AT [facility] IF NECESSARY BUT OTHERWISE PT COMES TO [facility]. LABS NEEDED - CBC.

## 2019-11-22 ENCOUNTER — OFFICE VISIT (OUTPATIENT)
Dept: ONCOLOGY | Facility: CLINIC | Age: 79
End: 2019-11-22

## 2019-11-22 ENCOUNTER — LAB (OUTPATIENT)
Dept: LAB | Facility: HOSPITAL | Age: 79
End: 2019-11-22

## 2019-11-22 VITALS
SYSTOLIC BLOOD PRESSURE: 136 MMHG | WEIGHT: 214 LBS | RESPIRATION RATE: 16 BRPM | OXYGEN SATURATION: 96 % | HEIGHT: 75 IN | HEART RATE: 69 BPM | BODY MASS INDEX: 26.61 KG/M2 | TEMPERATURE: 98 F | DIASTOLIC BLOOD PRESSURE: 83 MMHG

## 2019-11-22 DIAGNOSIS — G61.81 CHRONIC INFLAMMATORY DEMYELINATING POLYNEURITIS (HCC): ICD-10-CM

## 2019-11-22 DIAGNOSIS — G62.9 NEUROPATHY: ICD-10-CM

## 2019-11-22 DIAGNOSIS — D47.2 MGUS (MONOCLONAL GAMMOPATHY OF UNKNOWN SIGNIFICANCE): ICD-10-CM

## 2019-11-22 DIAGNOSIS — D69.6 THROMBOCYTOPENIA (HCC): Primary | ICD-10-CM

## 2019-11-22 LAB
ALBUMIN SERPL-MCNC: 4.2 G/DL (ref 3.5–5.2)
ALBUMIN/GLOB SERPL: 1.2 G/DL (ref 1.1–2.4)
ALP SERPL-CCNC: 53 U/L (ref 38–116)
ALT SERPL W P-5'-P-CCNC: 12 U/L (ref 0–41)
ANION GAP SERPL CALCULATED.3IONS-SCNC: 11.9 MMOL/L (ref 5–15)
AST SERPL-CCNC: 20 U/L (ref 0–40)
BASOPHILS # BLD AUTO: 0.02 10*3/MM3 (ref 0–0.2)
BASOPHILS NFR BLD AUTO: 0.3 % (ref 0–1.5)
BILIRUB SERPL-MCNC: 0.4 MG/DL (ref 0.2–1.2)
BUN BLD-MCNC: 19 MG/DL (ref 6–20)
BUN/CREAT SERPL: 15.7 (ref 7.3–30)
CALCIUM SPEC-SCNC: 9.5 MG/DL (ref 8.5–10.2)
CHLORIDE SERPL-SCNC: 103 MMOL/L (ref 98–107)
CO2 SERPL-SCNC: 25.1 MMOL/L (ref 22–29)
CREAT BLD-MCNC: 1.21 MG/DL (ref 0.7–1.3)
DEPRECATED RDW RBC AUTO: 43.9 FL (ref 37–54)
EOSINOPHIL # BLD AUTO: 0.03 10*3/MM3 (ref 0–0.4)
EOSINOPHIL NFR BLD AUTO: 0.5 % (ref 0.3–6.2)
ERYTHROCYTE [DISTWIDTH] IN BLOOD BY AUTOMATED COUNT: 13.1 % (ref 12.3–15.4)
GFR SERPL CREATININE-BSD FRML MDRD: 58 ML/MIN/1.73
GLOBULIN UR ELPH-MCNC: 3.5 GM/DL (ref 1.8–3.5)
GLUCOSE BLD-MCNC: 88 MG/DL (ref 74–124)
HCT VFR BLD AUTO: 42.3 % (ref 37.5–51)
HGB BLD-MCNC: 13.8 G/DL (ref 13–17.7)
IMM GRANULOCYTES # BLD AUTO: 0.02 10*3/MM3 (ref 0–0.05)
IMM GRANULOCYTES NFR BLD AUTO: 0.3 % (ref 0–0.5)
LYMPHOCYTES # BLD AUTO: 1.44 10*3/MM3 (ref 0.7–3.1)
LYMPHOCYTES NFR BLD AUTO: 25.1 % (ref 19.6–45.3)
MCH RBC QN AUTO: 29.8 PG (ref 26.6–33)
MCHC RBC AUTO-ENTMCNC: 32.6 G/DL (ref 31.5–35.7)
MCV RBC AUTO: 91.4 FL (ref 79–97)
MONOCYTES # BLD AUTO: 0.59 10*3/MM3 (ref 0.1–0.9)
MONOCYTES NFR BLD AUTO: 10.3 % (ref 5–12)
NEUTROPHILS # BLD AUTO: 3.63 10*3/MM3 (ref 1.7–7)
NEUTROPHILS NFR BLD AUTO: 63.5 % (ref 42.7–76)
NRBC BLD AUTO-RTO: 0 /100 WBC (ref 0–0.2)
PLATELET # BLD AUTO: 187 10*3/MM3 (ref 140–450)
PMV BLD AUTO: 10.4 FL (ref 6–12)
POTASSIUM BLD-SCNC: 4.1 MMOL/L (ref 3.5–4.7)
PROT SERPL-MCNC: 7.7 G/DL (ref 6.3–8)
RBC # BLD AUTO: 4.63 10*6/MM3 (ref 4.14–5.8)
SODIUM BLD-SCNC: 140 MMOL/L (ref 134–145)
WBC NRBC COR # BLD: 5.73 10*3/MM3 (ref 3.4–10.8)

## 2019-11-22 PROCEDURE — 36415 COLL VENOUS BLD VENIPUNCTURE: CPT

## 2019-11-22 PROCEDURE — 80053 COMPREHEN METABOLIC PANEL: CPT

## 2019-11-22 PROCEDURE — 99214 OFFICE O/P EST MOD 30 MIN: CPT | Performed by: NURSE PRACTITIONER

## 2019-11-22 PROCEDURE — 85025 COMPLETE CBC W/AUTO DIFF WBC: CPT

## 2019-11-22 RX ORDER — INFLUENZA A VIRUS A/MICHIGAN/45/2015 X-275 (H1N1) ANTIGEN (FORMALDEHYDE INACTIVATED), INFLUENZA A VIRUS A/SINGAPORE/INFIMH-16-0019/2016 IVR-186 (H3N2) ANTIGEN (FORMALDEHYDE INACTIVATED), AND INFLUENZA B VIRUS B/MARYLAND/15/2016 BX-69A (A B/COLORADO/6/2017-LIKE VIRUS) ANTIGEN (FORMALDEHYDE INACTIVATED) 60; 60; 60 UG/.5ML; UG/.5ML; UG/.5ML
INJECTION, SUSPENSION INTRAMUSCULAR
Refills: 0 | COMMUNITY
Start: 2019-11-09 | End: 2020-01-13

## 2019-11-22 RX ORDER — PREDNISONE 20 MG/1
20 TABLET ORAL DAILY
COMMUNITY
End: 2020-01-13

## 2019-11-22 NOTE — PROGRESS NOTES
REASON FOR FOLLOWUP:    1.Minimal leukopenia with thrombocytopenia in the background of minimal IgG monoclonal gammopathy. The patient has no splenomegaly, no peripheral adenopathy, and no symptoms that suggest lupus or collagen vascular disease. Bone marrow   a  spirate documented no evidence of myeloma, lymphoma, leukemia, myelodysplasia, or any other abnormality. Bone marrow chromosomal analysis as well as flow cytometry were negative.   2.  Inflammatory polyneuropathy.  He initiated IVIG on 10/3/2019.  He did receive his second IVIG on 11/7/2019.  3.  Approximately 10 days after his second IVIG infusion he did experience serum sickness that included rash, generalized arthralgias, skin peeling of the palms.  Dr. Olvera prescribed 20 mg of prednisone and his symptoms have remained solved after 2 days.    HISTORY OF PRESENT ILLNESS:  The patient is here today with the above medical history for scheduled lab review and assessment.  Dr. Goldberg started him on prednisone 20 mg daily just 2 days ago after the patient called triage with reports of diffuse skin rash, arthralgias and peeling of the palms.  His symptoms have almost completely resolved after just 2 days of prednisone.  He reports feeling well with no new symptoms.  His neuropathy of the feet remains stable.  CBC is stable.  His vital signs are stable.  He is tolerating prednisone well.                PAST MEDICAL HISTORY:    1.;  Hypertension for which he takes medication.      2.; History of hypothyroidism for the last ten years and has undergone thyroid replacement for a long time.     3.; History of colonoscopy in February 2013 that was normal.  He has internal hemorrhoids.     4.; Component of reflux esophagitis.     5.;   History of prostate cancer.  He was treated with prostatectomy and he has had radiation treatment as well.  He has had a PSA that is undetectable for the last ten years.      6.; Cholecystectomy.    7.; Vasectomy.     "  8.; Decompression laminectomies at L3-4 and L4-5.      9.; Multiple sebaceous cysts removed.        The patient has no history of asthma, tuberculosis, or COPD.  No history of coronary artery disease or cardiac arrhythmia.          ONCOLOGIC/HEMATOLOGIC HISTORY: History from previous dates can be found in the separate document.        On 2015, given the development of pain in the left hip and tenderness in the greater trochanter, plain x-rays of the pelvis and left hip were performed  . His white count, hemoglobin, and platelets remain stable and the rest of his physical examination disclosed no abnormalities with the exception of tenderness in the greater trochanter on the left side. He had obvious ABSENCE of vibratory sensation in rajan  th feet. He had minimal reflexes in his lower extremities if any at all.           MEDICATIONS:  The current medication list was reviewed with the patient and updated in EMR this date per medical assistant.  Medication dosages and frequencies were confirmed to be accurate.         ALLERGIES:   No known drug allergies.         SOCIAL HISTORY:  The patient is a retired .  He lives with his second wife.  He travels a lot.  He is physically active and he has not been a smoker for more than 30 years or has drunk Brille24o  l for more than 33 years.   He use to drink alcohol heavily then.          FAMILY HISTORY:  Patient’s father  with COPD at age 73.  Mother  with complications of breast cancer at age 86.  A brother had cancer of unknown primary and  as a consequence   of this at age 60.  He has no sisters.  His second wife is in good health.  He has two children in good health.  No family history of hematological disorders.         REVIEW OF SYSTEMS:   Comprehensive system review negative except for discussed above in the HPI.               VITAL SIGNS:    Vitals:    19 1115   Height: 190 cm (74.8\")              PHYSICAL EXAMINATION:      GENERAL:  " Well-developed, well-nourished  Patient  in no acute distress. He is accompanied by his wife.   SKIN: Faint macular rash noted on the palms of the hand bilaterally.  There is no peeling noted.  No tenderness, heat or erythema noted.  HEENT:  Pupils were equal and reactive to light  NECK:  Supple with good range of motion..  CHEST: Lungs clear to auscultation bilaterally.  CARDIAC AND VASCULAR:  normal rate and regular rhythm  ABDOMEN:  Soft, nontender .one.  NEUROLOGICAL:  Patient was awake, alert, oriented to time, person and place.sensory deficit le and ue stable.                     LABORATORY DATA  :06    WBC  3.40 - 10.80 10*3/mm3 7.11    RBC  4.14 - 5.80 10*6/mm3 4.76    Hemoglobin  13.0 - 17.7 g/dL 14.5    Hematocrit  37.5 - 51.0 % 44.9    MCV  79.0 - 97.0 fL 94.3    MCH  26.6 - 33.0 pg 30.5    MCHC  31.5 - 35.7 g/dL 32.3    RDW  12.3 - 15.4 % 13.7    RDW-SD  37.0 - 54.0 fl 47.7    MPV  6.0 - 12.0 fL 10.4    Platelets  140 - 450 10*3/mm3 152    Neutrophil %  42.7 - 76.0 % 85.4 Abnormally high     Lymphocyte %  19.6 - 45.3 % 10.3 Abnormally low     Monocyte %  5.0 - 12.0 % 3.8 Abnormally low     Eosinophil %  0.3 - 6.2 % 0.0 Abnormally low     Basophil %  0.0 - 1.5 % 0.1    Immature Grans %  0.0 - 0.5 % 0.4    Neutrophils, Absolute  1.70 - 7.00 10*3/mm3 6.07    Lymphocytes, Absolute  0.70 - 3.10 10*3/mm3 0.73    Monocytes, Absolute  0.10 - 0.90 10*3/mm3 0.27    Eosinophils, Absolute  0.00 - 0.40 10*3/mm3 0.00    Basophils, Absolute  0.00 - 0.20 10*3/mm3 0.01    Immature Grans, Absolute  0.00 - 0.05 10*3/mm3 0.03    nRBC  0.0 - 0.2 /100 WBC 0.0    Resulting Agency  CBC L                       ASSESSMENT:    Inflammatory polyneuropathy.  The patient has received 2 doses of IVIG.  2.  Rash, generalized arthralgias secondary to IVIG.  The patient was started on 20 mg of prednisone with almost complete resolution in 2 days.    PLAN:  Dr Olvera assessed the patient with me today.  He has instructed the patient to  complete 10 full days of prednisone and discontinue.  He will return in January as already scheduled that time they will determine potential therapy options.  He is no longer a candidate for IVIG.    I have asked the patient to call the office with any new or worsening symptoms before his next scheduled visit.

## 2019-11-25 LAB
ALBUMIN SERPL-MCNC: 3.5 G/DL (ref 2.9–4.4)
ALBUMIN/GLOB SERPL: 1 {RATIO} (ref 0.7–1.7)
ALPHA1 GLOB FLD ELPH-MCNC: 0.3 G/DL (ref 0–0.4)
ALPHA2 GLOB SERPL ELPH-MCNC: 0.9 G/DL (ref 0.4–1)
B-GLOBULIN SERPL ELPH-MCNC: 1.3 G/DL (ref 0.7–1.3)
GAMMA GLOB SERPL ELPH-MCNC: 1.2 G/DL (ref 0.4–1.8)
GLOBULIN SER CALC-MCNC: 3.7 G/DL (ref 2.2–3.9)
IGA SERPL-MCNC: 346 MG/DL (ref 61–437)
IGG SERPL-MCNC: 1284 MG/DL (ref 700–1600)
IGM SERPL-MCNC: 20 MG/DL (ref 15–143)
INTERPRETATION SERPL IEP-IMP: ABNORMAL
KAPPA LC SERPL-MCNC: 29.1 MG/L (ref 3.3–19.4)
KAPPA LC/LAMBDA SER: 1.87 {RATIO} (ref 0.26–1.65)
LAMBDA LC FREE SERPL-MCNC: 15.6 MG/L (ref 5.7–26.3)
Lab: ABNORMAL
M-SPIKE: 0.3 G/DL
PROT SERPL-MCNC: 7.2 G/DL (ref 6–8.5)

## 2019-12-27 ENCOUNTER — APPOINTMENT (OUTPATIENT)
Dept: LAB | Facility: HOSPITAL | Age: 79
End: 2019-12-27

## 2020-01-13 ENCOUNTER — OFFICE VISIT (OUTPATIENT)
Dept: ONCOLOGY | Facility: CLINIC | Age: 80
End: 2020-01-13

## 2020-01-13 ENCOUNTER — LAB (OUTPATIENT)
Dept: LAB | Facility: HOSPITAL | Age: 80
End: 2020-01-13

## 2020-01-13 VITALS
OXYGEN SATURATION: 96 % | RESPIRATION RATE: 16 BRPM | DIASTOLIC BLOOD PRESSURE: 83 MMHG | WEIGHT: 212.5 LBS | BODY MASS INDEX: 26.42 KG/M2 | SYSTOLIC BLOOD PRESSURE: 120 MMHG | HEIGHT: 75 IN | HEART RATE: 79 BPM | TEMPERATURE: 98.6 F

## 2020-01-13 DIAGNOSIS — D47.2 MGUS (MONOCLONAL GAMMOPATHY OF UNKNOWN SIGNIFICANCE): Primary | ICD-10-CM

## 2020-01-13 DIAGNOSIS — G62.9 NEUROPATHY: ICD-10-CM

## 2020-01-13 DIAGNOSIS — G61.81 CHRONIC INFLAMMATORY DEMYELINATING POLYNEURITIS (HCC): ICD-10-CM

## 2020-01-13 LAB
ALBUMIN SERPL-MCNC: 4.2 G/DL (ref 3.5–5.2)
ALBUMIN/GLOB SERPL: 1.3 G/DL (ref 1.1–2.4)
ALP SERPL-CCNC: 58 U/L (ref 38–116)
ALT SERPL W P-5'-P-CCNC: 11 U/L (ref 0–41)
ANION GAP SERPL CALCULATED.3IONS-SCNC: 11.2 MMOL/L (ref 5–15)
AST SERPL-CCNC: 23 U/L (ref 0–40)
BASOPHILS # BLD AUTO: 0.02 10*3/MM3 (ref 0–0.2)
BASOPHILS NFR BLD AUTO: 0.5 % (ref 0–1.5)
BILIRUB SERPL-MCNC: 0.4 MG/DL (ref 0.2–1.2)
BUN BLD-MCNC: 17 MG/DL (ref 6–20)
BUN/CREAT SERPL: 14.4 (ref 7.3–30)
CALCIUM SPEC-SCNC: 9.2 MG/DL (ref 8.5–10.2)
CHLORIDE SERPL-SCNC: 105 MMOL/L (ref 98–107)
CO2 SERPL-SCNC: 25.8 MMOL/L (ref 22–29)
CREAT BLD-MCNC: 1.18 MG/DL (ref 0.7–1.3)
DEPRECATED RDW RBC AUTO: 43.6 FL (ref 37–54)
EOSINOPHIL # BLD AUTO: 0.12 10*3/MM3 (ref 0–0.4)
EOSINOPHIL NFR BLD AUTO: 3.2 % (ref 0.3–6.2)
ERYTHROCYTE [DISTWIDTH] IN BLOOD BY AUTOMATED COUNT: 12.8 % (ref 12.3–15.4)
ERYTHROCYTE [SEDIMENTATION RATE] IN BLOOD: 8 MM/HR (ref 0–20)
GFR SERPL CREATININE-BSD FRML MDRD: 60 ML/MIN/1.73
GLOBULIN UR ELPH-MCNC: 3.2 GM/DL (ref 1.8–3.5)
GLUCOSE BLD-MCNC: 100 MG/DL (ref 74–124)
HCT VFR BLD AUTO: 45 % (ref 37.5–51)
HGB BLD-MCNC: 14.5 G/DL (ref 13–17.7)
IMM GRANULOCYTES # BLD AUTO: 0.01 10*3/MM3 (ref 0–0.05)
IMM GRANULOCYTES NFR BLD AUTO: 0.3 % (ref 0–0.5)
LDH SERPL-CCNC: 176 U/L (ref 99–259)
LYMPHOCYTES # BLD AUTO: 1.42 10*3/MM3 (ref 0.7–3.1)
LYMPHOCYTES NFR BLD AUTO: 37.8 % (ref 19.6–45.3)
MCH RBC QN AUTO: 30 PG (ref 26.6–33)
MCHC RBC AUTO-ENTMCNC: 32.2 G/DL (ref 31.5–35.7)
MCV RBC AUTO: 93.2 FL (ref 79–97)
MONOCYTES # BLD AUTO: 0.6 10*3/MM3 (ref 0.1–0.9)
MONOCYTES NFR BLD AUTO: 16 % (ref 5–12)
NEUTROPHILS # BLD AUTO: 1.59 10*3/MM3 (ref 1.7–7)
NEUTROPHILS NFR BLD AUTO: 42.2 % (ref 42.7–76)
NRBC BLD AUTO-RTO: 0 /100 WBC (ref 0–0.2)
PLATELET # BLD AUTO: 142 10*3/MM3 (ref 140–450)
PMV BLD AUTO: 10.9 FL (ref 6–12)
POTASSIUM BLD-SCNC: 4.7 MMOL/L (ref 3.5–4.7)
PROT SERPL-MCNC: 7.4 G/DL (ref 6.3–8)
RBC # BLD AUTO: 4.83 10*6/MM3 (ref 4.14–5.8)
SODIUM BLD-SCNC: 142 MMOL/L (ref 134–145)
WBC NRBC COR # BLD: 3.76 10*3/MM3 (ref 3.4–10.8)

## 2020-01-13 PROCEDURE — 83615 LACTATE (LD) (LDH) ENZYME: CPT | Performed by: INTERNAL MEDICINE

## 2020-01-13 PROCEDURE — 85025 COMPLETE CBC W/AUTO DIFF WBC: CPT

## 2020-01-13 PROCEDURE — 85652 RBC SED RATE AUTOMATED: CPT | Performed by: INTERNAL MEDICINE

## 2020-01-13 PROCEDURE — 36415 COLL VENOUS BLD VENIPUNCTURE: CPT

## 2020-01-13 PROCEDURE — 99215 OFFICE O/P EST HI 40 MIN: CPT | Performed by: INTERNAL MEDICINE

## 2020-01-13 PROCEDURE — 80053 COMPREHEN METABOLIC PANEL: CPT

## 2020-01-13 RX ORDER — VALACYCLOVIR HYDROCHLORIDE 1 G/1
TABLET, FILM COATED ORAL
COMMUNITY
Start: 2020-01-08 | End: 2020-11-11

## 2020-01-13 NOTE — PROGRESS NOTES
REASON FOR FOLLOWUP:    1.Minimal leukopenia with thrombocytopenia in the background of minimal IgG monoclonal gammopathy. The patient has no splenomegaly, no peripheral adenopathy, and no symptoms that suggest lupus or collagen vascular disease. Bone marrow   a  spirate documented no evidence of myeloma, lymphoma, leukemia, myelodysplasia, or any other abnormality. Bone marrow chromosomal analysis as well as flow cytometry were negative.   2.  Inflammatory polyneuropathy.  He initiated IVIG on 10/3/2019.  He did receive his second IVIG on 11/7/2019.  3.  Approximately 10 days after his second IVIG infusion he did experience serum sickness that included rash, generalized arthralgias, skin peeling of the palms.  Dr. Olvera prescribed 20 mg of prednisone and his symptoms have remained solved after 2 days.    HISTORY OF PRESENT ILLNESS:This patient returns today to the office for followup still with a lot of symptomatology pertinent to his sensory peripheral neuropathy in his feet that has been slowly marching up into the middle aspect of both legs. He feels afraid of walking because of balance issues and he does not want to go to the gym because he does not feel the same. He has not had any falls. Neurontin through the day does not make any difference, he would like to take a larger amount of the medicine in the nighttime to see if this makes any difference. The patient's appetite is fine, his weight is stable, his bowel function is normal, he has no obvious skeletal pain. He has been given a new nutritional supplement by his podiatrist and he has asked the question if it will be okay for him to take it.     The patient denies any neuropathic symptoms in his hands. He has not had any fevers, chills, night sweats, pruritus, adenopathies, bone pain, skin lesions, joint pain.                       PAST MEDICAL HISTORY:    1.;  Hypertension for which he takes medication.      2.; History of hypothyroidism for the last  ten years and has undergone thyroid replacement for a long time.     3.; History of colonoscopy in 2013 that was normal.  He has internal hemorrhoids.     4.; Component of reflux esophagitis.     5.;   History of prostate cancer.  He was treated with prostatectomy and he has had radiation treatment as well.  He has had a PSA that is undetectable for the last ten years.      6.; Cholecystectomy.    7.; Vasectomy.      8.; Decompression laminectomies at L3-4 and L4-5.      9.; Multiple sebaceous cysts removed.        The patient has no history of asthma, tuberculosis, or COPD.  No history of coronary artery disease or cardiac arrhythmia.          ONCOLOGIC/HEMATOLOGIC HISTORY: History from previous dates can be found in the separate document.        On 2015, given the development of pain in the left hip and tenderness in the greater trochanter, plain x-rays of the pelvis and left hip were performed  . His white count, hemoglobin, and platelets remain stable and the rest of his physical examination disclosed no abnormalities with the exception of tenderness in the greater trochanter on the left side. He had obvious ABSENCE of vibratory sensation in rajan  th feet. He had minimal reflexes in his lower extremities if any at all.           MEDICATIONS:  The current medication list was reviewed with the patient and updated in EMR this date per medical assistant.  Medication dosages and frequencies were confirmed to be accurate.         ALLERGIES:   No known drug allergies.         SOCIAL HISTORY:  The patient is a retired .  He lives with his second wife.  He travels a lot.  He is physically active and he has not been a smoker for more than 30 years or has drunk alcoho  l for more than 33 years.   He use to drink alcohol heavily then.          FAMILY HISTORY:  Patient’s father  with COPD at age 73.  Mother  with complications of breast cancer at age 86.  A brother had cancer of unknown  primary and  as a consequence   of this at age 60.  He has no sisters.  His second wife is in good health.  He has two children in good health.  No family history of hematological disorders.         REVIEW OF SYSTEMS:         General: no fever, no chills, no fatigue,no weight changes, no lack of appetite.  Eyes: no epiphora, xerophthalmia,conjunctivitis, pain, glaucoma, blurred vision, blindness, secretion, photophobia, proptosis, diplopia.  Ears: no otorrhea, tinnitus, otorrhagia, deafness, pain, vertigo.DECREASED HEARING  Nose: no rhinorrhea, no epistaxis, no alteration in perception of odors, no sinuses pressure.  Mouth: no alteration in gums or teeth,  No ulcers, no difficulty with mastication or deglut ion, no odynophagia.  Neck: no masses or pain, no thyroid alterations, no pain in muscles or arteries, no carotid odynia, no crepitation.  Respiratory: no cough, no sputum production,no dyspnea,no trepopnea, no pleuritic pain,no hemoptysis.  Heart: no syncope, no irregularity, no palpitations, no angina,no orthopnea,no paroxysmal nocturnal dyspnea.  Vascular Venous: no tenderness,no edema,no palpable cords,no postphlebitic syndrome, no skin changes no ulcerations.  Vascular Arterial: no distal ischemia, noclaudication, no gangrene, no neuropathic ischemic pain, no skin ulcers, no paleness no cyanosis.  GI: no dysphagia, no odynophagia, no regurgitation, no heartburn,no indigestion,no nausea,no vomiting,no hematemesis ,no melena,no jaundice,no distention, no obstipation,no enterorrhagia,no proctalgia,no anal  lesions, no changes in bowel habits.  : no frequency, no hesitancy, no hematuria, no discharge,no  pain.  Musculoskeletal: no muscle or tendon pain or inflammation,no  joint pain, no edema, no functional limitation,no fasciculations, no mass.  Neurologic: no headache, no seizures, noalterations on Craneal nerves,  motor deficit,  sensory deficit,  coordination, no alteration in memory,normal  "orientation, calculation,normal writting, verbal and written language.  Skin: no rashes,no pruritus no localized lesions.  Psychiatric: no anxiety, no depression,no agitation, no delusions, proper insight.            VITAL SIGNS:    Vitals:    01/13/20 1032   BP: 120/83   Pulse: 79   Resp: 16   Temp: 98.6 °F (37 °C)   TempSrc: Oral   SpO2: 96%   Weight: 96.4 kg (212 lb 8 oz)   Height: 190 cm (74.8\")              PHYSICAL EXAMINATION:    GENERAL:  Well-developed, well-nourished  Patient  in no acute distress.   SKIN:  Warm, dry ,NO rashes,NO purpura ,NO petechiae.  HEENT:  Pupils were equal and reactive to light and accomodation, conjunctivas non injected, no pterigion, normal extraocular movements, normal visual acuity.   Mouth mucosa was moist, no exudates in oropharynx, normal gum line, normal roof of the mouth and pillars, normal papillations of the tongue.  NECK:  Supple with good range of motion; no thyromegaly or masses, no JVD or bruits, no cervical adenopathies.No carotid arteries pain, no carotid abnormal pulsation , NO arterial dance.  LYMPHATICS:  No cervical, NO supraclavicular, NO axillary,NO epitrochlear , NO inguinal adenopathy.  CHEST:  Normal excursion of both serge thoraces, normal voice fremitus, no subcutaneous emphysema, normal axillas, no rashes or acanthosis nigricans. Lungs clear to percussion and auscultation, normal breath sounds bilaterally, no wheezing,NO crackles NO ronchi, NO stridor, NO rubs.  CARDIAC AND VASCULAR:  normal rate and regular rhythm, without murmurs,NO rubs NO S3 NO S4 right or left . Normal femoral, popliteal, pedis, brachial and carotid pulses.  ABDOMEN:  Soft, nontender with no organomegaly or masses, no ascites, no collateral circulation,no distention,no Merrill sign, no abdominal pain, no inguinal hernias,no umbilical hernia, no abdominal bruits. Normal bowel sounds.  GENITAL: Not  Performed.  EXTREMITIES  AND SPINE:  No clubbing, cyanosis or edema, no deformities or " pain .No kyphosis, scoliosis, deformities or pain in spine, ribs or pelvic bone.  NEUROLOGICAL:  Patient was awake, alert, oriented to time, person and place.The patient has an obvious sensory deficit from the middle aspect of the legs down all the way bilaterally symmetric. He has poor ability to get around because of lack of information to his sensory nervous system. His Romberg test was mildly positive. He has to have a wider base in order to be able to get around. His gait is very slow. He has no obvious motor deficit, actually in fact he has very good strength proximally and distally in both upper and lower extremities. He had decreased reflexes in patella and no reflexes in Achilles. Normal reflexes in upper extremities.     His hearing deficit is very obvious. Communication is becoming a problem.     No other cranial nerve alteration.                                  LABORATORY DATA  :06    WBC  3.40 - 10.80 10*3/mm3 7.11    RBC  4.14 - 5.80 10*6/mm3 4.76    Hemoglobin  13.0 - 17.7 g/dL 14.5    Hematocrit  37.5 - 51.0 % 44.9    MCV  79.0 - 97.0 fL 94.3    MCH  26.6 - 33.0 pg 30.5    MCHC  31.5 - 35.7 g/dL 32.3    RDW  12.3 - 15.4 % 13.7    RDW-SD  37.0 - 54.0 fl 47.7    MPV  6.0 - 12.0 fL 10.4    Platelets  140 - 450 10*3/mm3 152    Neutrophil %  42.7 - 76.0 % 85.4 Abnormally high     Lymphocyte %  19.6 - 45.3 % 10.3 Abnormally low     Monocyte %  5.0 - 12.0 % 3.8 Abnormally low     Eosinophil %  0.3 - 6.2 % 0.0 Abnormally low     Basophil %  0.0 - 1.5 % 0.1    Immature Grans %  0.0 - 0.5 % 0.4    Neutrophils, Absolute  1.70 - 7.00 10*3/mm3 6.07    Lymphocytes, Absolute  0.70 - 3.10 10*3/mm3 0.73    Monocytes, Absolute  0.10 - 0.90 10*3/mm3 0.27    Eosinophils, Absolute  0.00 - 0.40 10*3/mm3 0.00    Basophils, Absolute  0.00 - 0.20 10*3/mm3 0.01    Immature Grans, Absolute  0.00 - 0.05 10*3/mm3 0.03    nRBC  0.0 - 0.2 /100 WBC 0.0    Resulting Agency  CBC L                       ASSESSMENT:  1. This patient  has monoclonal gammopathy of unknown significance. He has had bone marrow test on at least 2-3 different occasions not being able to document lymphoma, myeloma or anything of this nature. We are pending to review a new monoclonal protein study today. His white count, hemoglobin and platelets remain stable, actually surprises me a good platelet count, most of the time he is in the 130,000 category.   2. The patient has polyneuropathy sensitive, probably a component of motor especially in his feet very likely associated with monoclonal protein that failed to improve with prednisone and failed to improve with immunoglobulin. In fact prednisone made things worse and immunoglobulin triggered a very substantial allergic reaction. Therefore we abandoned this.     To me his neuropathy is worse, he is feeling afraid of walking and he is sitting in the house a couch potato and I think there is need for intervention. I discussed with him and his wife the following intervention:  1. He needs to have physical therapy for his lower extremities in order to help him to gain confidence but also to teach him tricks to minimize any potential for fall or teach him how to fall in case that he falls.   2. I do believe that the patient needs to use a cane.  3. The patient needs to be very careful on slick surfaces, cobblestone, grass and so forth walking. The same is applicable to steps, bathroom, be sure that there are handles to hold on in these kind of surfaces. His shoes should be tied with proper soles to minimize any slipping or fall.    The patient raised the question if a nutritional supplement that contains glucosamine, vitamin B12 and folic acid will be applicable to his problem or beneficial. I do not see a contraindication for him to take this medicine and it will be perfectly fine for him to take it. If this will be beneficial or not remains to be seen.  4. I do believe that the patient needs to come back and see me back in a  month and review his status and the benefit of physical therapy.   5. We will call him back at home with the report of his chemistry profile and his monoclonal protein studies.  6. I asked him to change his Neurontin stop the dose in the morning and take 600 mg in the evening.    I discussed with him and his wife the potentiality to change his Neurontin to Lyrica in a low dose to see if this makes any difference in the long run in regard to his neuropathic symptomatology.  7. The next option in regard to therapy according to the book and [place] [facility] proceedings will be to deliver Rituxan to this patient. We will try to work on this in the future depending on the clinical circumstances.

## 2020-01-14 LAB
ALBUMIN SERPL-MCNC: 3.6 G/DL (ref 2.9–4.4)
ALBUMIN/GLOB SERPL: 1.1 {RATIO} (ref 0.7–1.7)
ALPHA1 GLOB FLD ELPH-MCNC: 0.3 G/DL (ref 0–0.4)
ALPHA2 GLOB SERPL ELPH-MCNC: 0.8 G/DL (ref 0.4–1)
B-GLOBULIN SERPL ELPH-MCNC: 1.3 G/DL (ref 0.7–1.3)
GAMMA GLOB SERPL ELPH-MCNC: 1 G/DL (ref 0.4–1.8)
GLOBULIN SER CALC-MCNC: 3.3 G/DL (ref 2.2–3.9)
IGA SERPL-MCNC: 336 MG/DL (ref 61–437)
IGG SERPL-MCNC: 1042 MG/DL (ref 700–1600)
IGM SERPL-MCNC: 27 MG/DL (ref 15–143)
INTERPRETATION SERPL IEP-IMP: ABNORMAL
KAPPA LC SERPL-MCNC: 27.4 MG/L (ref 3.3–19.4)
KAPPA LC/LAMBDA SER: 1.81 {RATIO} (ref 0.26–1.65)
LAMBDA LC FREE SERPL-MCNC: 15.1 MG/L (ref 5.7–26.3)
Lab: ABNORMAL
M-SPIKE: 0.3 G/DL
PROT SERPL-MCNC: 6.9 G/DL (ref 6–8.5)

## 2020-01-17 ENCOUNTER — TELEPHONE (OUTPATIENT)
Dept: ONCOLOGY | Facility: CLINIC | Age: 80
End: 2020-01-17

## 2020-01-17 NOTE — TELEPHONE ENCOUNTER
I have discussed with the patient’s wife on the telephone yesterday the findings of his monoclonal proteins that have remained unchanged for the last 2 years.  His chemistry profile is normal including creatinine, glucose, calcium, liver enzymes.  The patient’s blood count remains unchanged.    I discussed with her also the fact that I have talked with the patient’s neurologist regarding his neuropathy and explained to him that we tried prednisone with no benefit and in fact with worsening symptoms, IV IgG that triggered anaphylaxis and discontinued and the next option would be potential Rituxan.  I also understood the patient’s frustration with his symptoms and I suggested going to the [facility] for assessment not only from the point of view of his monoclonal protein but also from the point of view of his neuropathy.  His wife is going to get back with me today in this regard.  My suggestion as well as the neurologist suggestion is to pursue an appointment in a secondary institution.  I will support this completely.  Again, this will be further discussed with the patient’s wife today.

## 2020-01-22 ENCOUNTER — TELEPHONE (OUTPATIENT)
Dept: ONCOLOGY | Facility: CLINIC | Age: 80
End: 2020-01-22

## 2020-01-22 DIAGNOSIS — G62.9 NEUROPATHY: Primary | ICD-10-CM

## 2020-01-22 RX ORDER — PREGABALIN 25 MG/1
24 CAPSULE ORAL 2 TIMES DAILY
Qty: 60 CAPSULE | Refills: 3 | Status: SHIPPED | OUTPATIENT
Start: 2020-01-22 | End: 2020-05-28 | Stop reason: SDUPTHER

## 2020-01-22 RX ORDER — GABAPENTIN 300 MG/1
CAPSULE ORAL
Qty: 60 CAPSULE | Refills: 2 | OUTPATIENT
Start: 2020-01-22

## 2020-01-22 NOTE — TELEPHONE ENCOUNTER
I talked with Kelin, the patient’s wife, today on the telephone.  We remind ourselves this patient is deaf and cannot listen to what is spoken on the telephone.  In any event, they have had a hard time deciding if they want to go to the [facility] or not.  They have been advised in this regard and I pointed out to them that we would be glad to make the referral as soon as they make the decision.      I advised them to modify the Neurontin, discontinue this medicine that is not having any impact in his neuropathy and move into Lyrica.  We will start a low-dose of 25 mg p.o. at bedtime for 5 days and thereafter 25 mg p.o. b.i.d.  This could be raised depending on needs.  They will notify us as soon as possible tomorrow regarding referral to make the appointment.

## 2020-01-29 ENCOUNTER — TREATMENT (OUTPATIENT)
Dept: PHYSICAL THERAPY | Facility: CLINIC | Age: 80
End: 2020-01-29

## 2020-01-29 DIAGNOSIS — G62.9 NEUROPATHY: Primary | ICD-10-CM

## 2020-01-29 PROCEDURE — 97110 THERAPEUTIC EXERCISES: CPT | Performed by: PHYSICAL THERAPIST

## 2020-01-29 PROCEDURE — 97162 PT EVAL MOD COMPLEX 30 MIN: CPT | Performed by: PHYSICAL THERAPIST

## 2020-01-29 NOTE — PROGRESS NOTES
Physical Therapy Initial Evaluation and Plan of Care    Patient: Panfilo Feliz   : 1940  Diagnosis/ICD-10 Code:  Neuropathy [G62.9]  Referring practitioner: Chalo Olvera MD  Date of Initial Visit: 2020  Today's Date: 2020  Patient seen for 1 sessions           Subjective Questionnaire: FAAM: 41% disability; self-rated function: 0%     Subjective Evaluation    History of Present Illness  Mechanism of injury: 80 y/o male w/ severe neuropathy B feet: really bad in last year; unable to exercise or perform walking program; weakness in legs and fear of falling. C/o Burning in feet (pins and needles), prickling feeling, numbness in both feet, and lower legs, coldness to feet and change in gait. No falls to date. Possible referral to [facility] due to blood d/o; abnormal neurodiagnostic evaluation (nerve conduction and EMG studies show findings consistent with predominantly sensory polyneuropathy); and complexity of other medical diagnoses (see [facility] for details).  Lumbar surgery 2013: L3-4 and L4-5 fusion; bending catches in spine. THR 2016. Prostrate CA w/ radiation therapy;   IGG treatment - serum sickness (unable to tolerate).  Abdominal Aorotic Aneursym: no lifting over 25 lbs.      Patient Occupation: retired from law enforcement   Precautions and Work Restrictions: Abdominal Aorotic Aneursym: no lifting over 25 lbs.Quality of life: fair    Pain  Current pain ratin  At best pain rating: 3  At worst pain ratin  Location: feet and back (middle)  Quality: burning, discomfort and dull ache (numbess in feet)  Alleviating factors: advil.  Exacerbated by: evening.  Progression: worsening    Social Support  Lives in: multiple-level home    Treatments  Previous treatment: physical therapy  Patient Goals  Patient goals for therapy: increased strength and decreased pain  Patient goal: improve numbness           Objective     Special Questions  Patient is experiencing disturbed sleep.     Additional  Special Questions  Discomfort from LBP      Postural Observations  Seated posture: poor  Standing posture: poor  Correction of posture: makes symptoms better    Additional Postural Observation Details  Correction with lumbar support decreases pain in Lumbar spine.    Transfers: patient with poor mechanics from sit to supine/reversing  - exhibited difficulty performing log roll to reduce stress on lumbar spine secondary to L shoulder RC deficiency and difficulty executing process.    Tenderness     Additional Tenderness Details  Flat lumbar spine with well-healed incision; arthritic changes and sensitivity to over press and palpation.  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    Neurological Testing     Sensation     Lumbar   Left   Intact: proprioception  Diminished: light touch, sharp/dull discrimination and hot/cold discrimination  Paresthesia: light touch  Hyposensation: light touch    Right   Intact: proprioception  Diminished: light touch, sharp/dull discrimination and hot/cold discrimination  Paresthesia: light touch  Hyposensation: light touch    Reflexes   Left   Patellar (L4): trace (1+)  Achilles (S1): trace (1+)    Right   Patellar (L4): trace (1+)  Achilles (S1): trace (1+)    Additional Neurological Details  Vibration sense also diminished over ankles and feet.    Active Range of Motion     Lumbar   Flexion: 50 degrees with pain  Extension: 5 degrees with pain  Left rotation: Active left lumbar rotation: mod.   Right rotation: Active right lumbar rotation: mod.     Additional Active Range of Motion Details  Pain in central spine at end-range    Strength/Myotome Testing     Additional Strength Details  L hip ER 3+/5; remainder of hip 4/5.  L knee and ankle and remainder of R LE 5/5 in major planes    Muscle Activation     Additional Muscle Activation Details  poor TA contraction.    Tests     Lumbar     Left   Positive passive SLR.     Additional  Tests Details  Mod to max decrease in flexibility at hamstrings B     Ambulation     Observational Gait   Decreased walking speed and stride length.   Left foot contact pattern: foot flat  Right foot contact pattern: foot flat  Base of support: increased    Additional Observational Gait Details  KELTON increased with slow,cautious gait pattern.         Assessment & Plan     Assessment  Impairments: abnormal gait, abnormal muscle firing, abnormal muscle tone, abnormal or restricted ROM, activity intolerance, impaired balance, impaired physical strength, lacks appropriate home exercise program, pain with function and safety issue  Assessment details: Pt presents to PT with symptoms consistent with B LE neuropathy; gait abnormalities; poor posture w/ previous lumbar fusion w/ decreased ROM and pain; complex medical history. Pt is appropriate for the skilled PT interventions to address the deficits noted above; has the potential to benefit from PT. Will be seen until patient plateaus, goals met; or is discharged per MD.    Prognosis: good  Functional Limitations: lifting, sleeping, walking, pulling, uncomfortable because of pain, sitting, standing and stooping  Goals  Plan Goals: STG's (2 - 4 weeks)  1. Tolerate 45 minutes aquatic therapy x 2 with reduction in pain and instruction in HEP for continuation of exercise program at [facility]..   2. Able to ambulate x 10 min on Aquatic TM with reduction in antalgic gait pattern.  3. Able to tolerate initial HEP w/ progression.  4. reduction in neuropathy with Anodyne therapy.    LTG's (by d/c)  1. Independent with HEP and able to manage condition independently.  2. Patient voices readiness for discharge.  3. Decrease overall pain 50% or more   4. Significant improvement on outcome measures.  5. Gait mechanics improved.  6. Neuropathy in feet/LE 25% improvement     Plan  Therapy options: will be seen for skilled physical therapy services  Planned modality interventions: electrical  stimulation/Salvadorean stimulation, cryotherapy, TENS, traction, ultrasound and hydrotherapy  Other planned modality interventions: Anodyne therapy for neuropthy  Planned therapy interventions: manual therapy, abdominal trunk stabilization, ADL retraining, neuromuscular re-education, spinal/joint mobilization, soft tissue mobilization, strengthening, stretching, therapeutic activities, postural training, flexibility, functional ROM exercises, gait training and home exercise program  Other planned therapy interventions: aquatic x 2 visits  Frequency: 2x week  Duration in visits: 10  Treatment plan discussed with: patient and family  Plan details: Wife present during examination.         History # of Personal Factors and/or Comorbidities: MODERATE (1-2)  Examination of Body System(s): # of elements: MODERATE (3)  Clinical Presentation: EVOLVING  Clinical Decision Making: MODERATE      Timed:         Manual Therapy:         mins  61667;     Therapeutic Exercise:    15     mins  14607;     Neuromuscular [place]:        mins  35761;    Therapeutic Activity:          mins  70225;     Gait Training:           mins  59291;     Ultrasound:          mins  64144;    Ionto                                   mins   53466  Self Care                            mins   42312  Aquatic                               mins 37214      Un-Timed:  Electrical Stimulation:         mins  94857 ( );  Dry Needling          mins self-pay  Traction          mins 24151  Low Eval          Mins  10637  Mod Eval     45     Mins  79855  High Eval                            Mins  37229  Re-Eval                               mins  82301        Timed Treatment:   15   mins   Total Treatment:     60   mins    PT SIGNATURE: Abisai Gentile PT   DATE TREATMENT INITIATED: 1/29/2020    Medicare Initial Certification  Certification Period: 4/28/2020  I certify that the therapy services are furnished while this patient is under my care.  The services outlined above  are required by this patient, and will be reviewed every 90 days.     PHYSICIAN: Chalo Olvera MD      DATE:     Please sign and return via fax to  .. Thank you, [facility].

## 2020-01-31 ENCOUNTER — TREATMENT (OUTPATIENT)
Dept: PHYSICAL THERAPY | Facility: CLINIC | Age: 80
End: 2020-01-31

## 2020-01-31 DIAGNOSIS — G62.9 NEUROPATHY: Primary | ICD-10-CM

## 2020-01-31 PROCEDURE — 97113 AQUATIC THERAPY/EXERCISES: CPT | Performed by: PHYSICAL THERAPIST

## 2020-01-31 NOTE — PROGRESS NOTES
Physical Therapy Daily Progress Note    VISIT#: 2    Subjective   Panfilo Tray reports: no change;states that the therapist at [facility] had difficulty using anodyne and it was discontinued after 3 visits.      Objective     See Exercise, Manual, and Modality Logs for complete treatment.     Patient Education: Aquatic exercise principles;TA contraction and protection     Assessment/Plan - difficult coordinating exercises - requires frequent verbal cues to correct form and position; decrease in back pain in pool.Coordinating with [facility] for use of anodyne therapy.      Progress per Plan of Care- Review Aquatic and issue HEP for [employer]; transition to and thereafter.            Timed:         Manual Therapy:         mins  46234;     Therapeutic Exercise:         mins  10981;     Neuromuscular London:        mins  59585;    Therapeutic Activity:          mins  05285;     Gait Training:           mins  22299;     Ultrasound:          mins  99484;    Ionto                                   mins   17812  Self Care                           mins   70591  Canalith Repos                   mins  4209  Aquatic                           45    mins 35296    Un-Timed:  Electrical Stimulation:         mins  53156 ( );  Dry Needling          mins self-pay  Traction          mins 38040  Low Eval          Mins  48518  Mod Eval          Mins  70183  High Eval                            Mins  19947  Re-Eval                               mins  95372    Timed Treatment:   45   mins   Total Treatment:     45   mins    Abisai Gentile PT

## 2020-02-05 ENCOUNTER — TREATMENT (OUTPATIENT)
Dept: PHYSICAL THERAPY | Facility: CLINIC | Age: 80
End: 2020-02-05

## 2020-02-05 DIAGNOSIS — G62.9 NEUROPATHY: Primary | ICD-10-CM

## 2020-02-05 PROCEDURE — 97026 INFRARED THERAPY: CPT | Performed by: PHYSICAL THERAPIST

## 2020-02-05 PROCEDURE — 97110 THERAPEUTIC EXERCISES: CPT | Performed by: PHYSICAL THERAPIST

## 2020-02-05 NOTE — PROGRESS NOTES
Physical Therapy Daily Progress Note    VISIT#: 3    Subjective   Panfilo Feliz reports: no change: unable to get in pool: bout of diarrhea.      Objective   Rx: supplemental to treatment note: anodyne Treatment x 15 min: preset setting with B feet on foot plate.    See Exercise, Manual, and Modality Logs for complete treatment.     Patient Education: Reviewed TA contraction    Lumbar surgery 2013: L3-4 and L4-5 fusion; bending catches in spine. THR 2016. Prostrate CA w/ radiation therapy;   IGG treatment - serum sickness (unable to tolerate).  Abdominal Aorotic Aneursym: no lifting over 25 lbs.    Assessment/Plan - Initiated anodyne/infrared therapy - no complications with interventions; focused on core strengthening; requires verbal and tactile cues for core exercises.       Progress per Plan of Care - continue Anodyne/infrared; there ex.        Timed:         Manual Therapy:         mins  96506;     Therapeutic Exercise:   30      mins  07207;     Neuromuscular London:        mins  27262;    Therapeutic Activity:          mins  64883;     Gait Training:           mins  09565;     Ultrasound:          mins  28642;    Ionto                                   mins   21570  Self Care                           mins   62361  Canalith Repos                   mins  4209  Aquatic                               mins 84975      Un-Timed:  Electrical Stimulation:         mins  15081 ( );  Dry Needling          mins self-pay  Traction          mins 64613  Low Eval          Mins  65803  Mod Eval          Mins  82356  High Eval                            Mins  31322  Re-Eval                               mins  98775  Anodyne/infrared        __15__mins 68568    Timed Treatment:   30   mins   Total Treatment:     45   mins    Abisai Gentile PT

## 2020-02-10 ENCOUNTER — TREATMENT (OUTPATIENT)
Dept: PHYSICAL THERAPY | Facility: CLINIC | Age: 80
End: 2020-02-10

## 2020-02-10 DIAGNOSIS — G62.9 NEUROPATHY: Primary | ICD-10-CM

## 2020-02-10 PROCEDURE — 97026 INFRARED THERAPY: CPT | Performed by: PHYSICAL THERAPIST

## 2020-02-10 PROCEDURE — 97110 THERAPEUTIC EXERCISES: CPT | Performed by: PHYSICAL THERAPIST

## 2020-02-10 NOTE — PROGRESS NOTES
Physical Therapy Daily Progress Note    VISIT#: 4    Subjective   Panfilo Malagongett reports:has been prescribed 2 new meds; Lyrica and Lexipro - Diarrhea. LBP better      Objective   Rx: supplemental to treatment note: anodyne Treatment x 15 min: preset setting with B feet on foot plate.    See Exercise, Manual, and Modality Logs for complete treatment.     Patient Education: Reviewed TA contraction    Lumbar surgery 2013: L3-4 and L4-5 fusion; bending catches in spine. THR 2016. Prostrate CA w/ radiation therapy; IGG treatment - serum sickness (unable to tolerate).  Abdominal Aorotic Aneursym: no lifting over 25 lbs.    Assessment/Plan - LBP improved; will focus on balance and LE strengthening on next visit - no complications with interventions - requires verbal and tactile cues for exercises.     Progress per Plan of Care - continue Anodyne/infrared; Balance and strengthening for LE's; Pool as able for instruction x 1        Timed:         Manual Therapy:         mins  99587;     Therapeutic Exercise:   30      mins  02477;     Neuromuscular London:        mins  76520;    Therapeutic Activity:          mins  59912;     Gait Training:           mins  44410;     Ultrasound:          mins  11184;    Ionto                                   mins   42510  Self Care                           mins   61033  Canalith Repos                   mins  4209  Aquatic                               mins 33548      Un-Timed:  Electrical Stimulation:         mins  83793 ( );  Dry Needling          mins self-pay  Traction          mins 51584  Low Eval          Mins  15616  Mod Eval          Mins  38633  High Eval                            Mins  59883  Re-Eval                               mins  04373  Anodyne/infrared        __15__mins 07177    Timed Treatment:   30   mins   Total Treatment:     45   mins    Abisai Gentile, PT

## 2020-02-12 ENCOUNTER — TREATMENT (OUTPATIENT)
Dept: PHYSICAL THERAPY | Facility: CLINIC | Age: 80
End: 2020-02-12

## 2020-02-12 DIAGNOSIS — G62.9 NEUROPATHY: Primary | ICD-10-CM

## 2020-02-12 PROCEDURE — 97110 THERAPEUTIC EXERCISES: CPT | Performed by: PHYSICAL THERAPIST

## 2020-02-12 PROCEDURE — 97026 INFRARED THERAPY: CPT | Performed by: PHYSICAL THERAPIST

## 2020-02-12 NOTE — PROGRESS NOTES
Physical Therapy Daily Progress Note    VISIT#: 5    Subjective   Panfilo Feliz reports: feet a little better; wife states he is walking better.      Objective   Rx: supplemental to treatment note: anodyne Treatment x 15 min: preset setting with B feet on foot plate.    See Exercise, Manual, and Modality Logs for complete treatment.     Patient Education: Reviewed TA contraction    Lumbar surgery 2013: L3-4 and L4-5 fusion; bending catches in spine. THR 2016. Prostrate CA w/ radiation therapy; IGG treatment - serum sickness (unable to tolerate).  Abdominal Aorotic Aneursym: no lifting over 25 lbs.    Assessment/Plan - Focused on balance and peripheral neuropathy: significant impairment noted with balance.    Progress per Plan of Care - continue Anodyne/infrared; Balance and strengthening for LE's; Pool as able for instruction x 1        Timed:         Manual Therapy:         mins  38506;     Therapeutic Exercise:   30      mins  81466;     Neuromuscular [place]:        mins  05918;    Therapeutic Activity:          mins  67736;     Gait Training:           mins  08818;     Ultrasound:          mins  16057;    Ionto                                   mins   09098  Self Care                           mins   69902  Canalith Repos                   mins  4209  Aquatic                               mins 29611      Un-Timed:  Electrical Stimulation:         mins  20899 ( );  Dry Needling          mins self-pay  Traction          mins 75109  Low Eval          Mins  69445  Mod Eval          Mins  06635  High Eval                            Mins  54245  Re-Eval                               mins  07289  Anodyne/infrared        __15__mins 33392    Timed Treatment:   30   mins   Total Treatment:     45   mins    Abisai Gentile PT

## 2020-02-18 ENCOUNTER — LAB (OUTPATIENT)
Dept: LAB | Facility: HOSPITAL | Age: 80
End: 2020-02-18

## 2020-02-18 ENCOUNTER — OFFICE VISIT (OUTPATIENT)
Dept: ONCOLOGY | Facility: CLINIC | Age: 80
End: 2020-02-18

## 2020-02-18 VITALS
DIASTOLIC BLOOD PRESSURE: 78 MMHG | TEMPERATURE: 99.4 F | OXYGEN SATURATION: 95 % | SYSTOLIC BLOOD PRESSURE: 124 MMHG | RESPIRATION RATE: 16 BRPM | WEIGHT: 208.3 LBS | HEART RATE: 66 BPM | HEIGHT: 76 IN | BODY MASS INDEX: 25.36 KG/M2

## 2020-02-18 DIAGNOSIS — D70.8 OTHER NEUTROPENIA (HCC): ICD-10-CM

## 2020-02-18 DIAGNOSIS — D47.2 MGUS (MONOCLONAL GAMMOPATHY OF UNKNOWN SIGNIFICANCE): ICD-10-CM

## 2020-02-18 DIAGNOSIS — G61.81 CHRONIC INFLAMMATORY DEMYELINATING POLYNEURITIS (HCC): ICD-10-CM

## 2020-02-18 DIAGNOSIS — D69.6 THROMBOCYTOPENIA (HCC): Primary | ICD-10-CM

## 2020-02-18 DIAGNOSIS — G62.9 NEUROPATHY: ICD-10-CM

## 2020-02-18 PROBLEM — I71.21 ASCENDING AORTIC ANEURYSM: Status: RESOLVED | Noted: 2019-07-21 | Resolved: 2020-02-18

## 2020-02-18 LAB
BASOPHILS # BLD AUTO: 0.02 10*3/MM3 (ref 0–0.2)
BASOPHILS NFR BLD AUTO: 0.5 % (ref 0–1.5)
DEPRECATED RDW RBC AUTO: 43.8 FL (ref 37–54)
EOSINOPHIL # BLD AUTO: 0.02 10*3/MM3 (ref 0–0.4)
EOSINOPHIL NFR BLD AUTO: 0.5 % (ref 0.3–6.2)
ERYTHROCYTE [DISTWIDTH] IN BLOOD BY AUTOMATED COUNT: 13.3 % (ref 12.3–15.4)
HCT VFR BLD AUTO: 44.7 % (ref 37.5–51)
HGB BLD-MCNC: 14.9 G/DL (ref 13–17.7)
IMM GRANULOCYTES # BLD AUTO: 0.02 10*3/MM3 (ref 0–0.05)
IMM GRANULOCYTES NFR BLD AUTO: 0.5 % (ref 0–0.5)
LYMPHOCYTES # BLD AUTO: 1.28 10*3/MM3 (ref 0.7–3.1)
LYMPHOCYTES NFR BLD AUTO: 31.1 % (ref 19.6–45.3)
MCH RBC QN AUTO: 29.8 PG (ref 26.6–33)
MCHC RBC AUTO-ENTMCNC: 33.3 G/DL (ref 31.5–35.7)
MCV RBC AUTO: 89.4 FL (ref 79–97)
MONOCYTES # BLD AUTO: 0.62 10*3/MM3 (ref 0.1–0.9)
MONOCYTES NFR BLD AUTO: 15 % (ref 5–12)
NEUTROPHILS # BLD AUTO: 2.16 10*3/MM3 (ref 1.7–7)
NEUTROPHILS NFR BLD AUTO: 52.4 % (ref 42.7–76)
NRBC BLD AUTO-RTO: 0 /100 WBC (ref 0–0.2)
PLATELET # BLD AUTO: 129 10*3/MM3 (ref 140–450)
PMV BLD AUTO: 10.9 FL (ref 6–12)
RBC # BLD AUTO: 5 10*6/MM3 (ref 4.14–5.8)
WBC NRBC COR # BLD: 4.12 10*3/MM3 (ref 3.4–10.8)

## 2020-02-18 PROCEDURE — 36415 COLL VENOUS BLD VENIPUNCTURE: CPT

## 2020-02-18 PROCEDURE — 99214 OFFICE O/P EST MOD 30 MIN: CPT | Performed by: INTERNAL MEDICINE

## 2020-02-18 PROCEDURE — 85025 COMPLETE CBC W/AUTO DIFF WBC: CPT

## 2020-02-18 RX ORDER — PROMETHAZINE HYDROCHLORIDE 12.5 MG/1
12.5 TABLET ORAL EVERY 8 HOURS PRN
Qty: 60 TABLET | Refills: 1 | Status: SHIPPED | OUTPATIENT
Start: 2020-02-18 | End: 2020-07-02

## 2020-02-18 RX ORDER — ESCITALOPRAM OXALATE 10 MG/1
TABLET ORAL
COMMUNITY
Start: 2020-02-06 | End: 2020-04-16

## 2020-02-18 NOTE — PROGRESS NOTES
REASON FOR FOLLOWUP:    1.Minimal leukopenia with thrombocytopenia in the background of minimal IgG monoclonal gammopathy. The patient has no splenomegaly, no peripheral adenopathy, and no symptoms that suggest lupus or collagen vascular disease. Bone marrow   a  spirate documented no evidence of myeloma, lymphoma, leukemia, myelodysplasia, or any other abnormality. Bone marrow chromosomal analysis as well as flow cytometry were negative.   2.  Inflammatory polyneuropathy.  He initiated IVIG on 10/3/2019.  He did receive his second IVIG on 11/7/2019.allergic reaction to it stopping infusion all togethere  3.  Approximately 10 days after his second IVIG infusion he did experience serum sickness that included rash, generalized arthralgias, skin peeling of the palms.  Dr. Olvera prescribed 20 mg of prednisone and his symptoms have resolved after 2 days.    HISTORY OF PRESENT ILLNESS:This patient returns today to the office for followup. He is here today in company of his wife after he has been undergoing physical therapy to try to strengthen his lower extremities and improve balance in the background of sensory peripheral neuropathy associated with MGUS. The patient also was initiated on Lyrica to see if this had a positive impact on neuropathy. So far he has not noticed any difference and he thinks that the medicine is making him nauseated. He also was started on Lexapro and this medicine has made a big time difference in regard to his grumpiness and depression. He feels much better in this regard, he is more talkative, more involved and actually feeling better from this point of view. The patient's appetite is not good because of the nausea, his weight has come down some. He has no heartburn, no indigestion, no bloating sensation. His bowel activity is normal, no passage of blood in the stools, no jaundice. Urination is normal. He has not had any cough, sputum production. He has not had any fever or infections. He  believes that the physical therapy also as stated has made a big difference in regard improvement.                   Past Medical History:   Diagnosis Date   • Arthritis    • Basal cell carcinoma (BCC) of face    • Disease of thyroid gland    • DJD (degenerative joint disease)    • History of bone marrow biopsy    • Hyperlipidemia    • Hypertension    • Hypothyroidism    • IgG monoclonal gammopathy    • Prostate cancer (CMS/HCC)    • Reflux esophagitis      Social History     Socioeconomic History   • Marital status:      Spouse name: Meghan   • Number of children: Not on file   • Years of education: College   • Highest education level: Not on file   Occupational History   • Occupation:      Employer: RETIRED     Comment: [employer]   Tobacco Use   • Smoking status: Never Smoker   • Smokeless tobacco: Never Used   Substance and Sexual Activity   • Alcohol use: No     Comment: Heavy in past, none in 33 years   • Drug use: No     Family History   Problem Relation Age of Onset   • Cancer Mother 85        Breast   • COPD Father    • Cancer Brother 59        Unknown type   • Hypertension Daughter              ONCOLOGIC/HEMATOLOGIC HISTORY: History from previous dates can be found in the separate document.                     REVIEW OF SYSTEMS:       General: no fever, no chills,  fatigue,no weight changes, no lack of appetite.  Eyes: no epiphora, xerophthalmia,conjunctivitis, pain, glaucoma, blurred vision, blindness, secretion, photophobia, proptosis, diplopia.  Ears: no otorrhea, tinnitus, otorrhagia, deafness, pain, vertigo.  Nose: no rhinorrhea, no epistaxis, no alteration in perception of odors, no sinuses pressure.  Mouth: no alteration in gums or teeth,  No ulcers, no difficulty with mastication or deglut ion, no odynophagia.  Neck: no masses or pain, no thyroid alterations, no pain in muscles or arteries, no carotid odynia, no crepitation.  Respiratory: no cough, no sputum  "production,no dyspnea,no trepopnea, no pleuritic pain,no hemoptysis.  Heart: no syncope, no irregularity, no palpitations, no angina,no orthopnea,no paroxysmal nocturnal dyspnea.  Vascular Venous: no tenderness,no edema,no palpable cords,no postphlebitic syndrome, no skin changes no ulcerations.  Vascular Arterial: no distal ischemia, noclaudication, no gangrene, no neuropathic ischemic pain, no skin ulcers, no paleness no cyanosis.  GI: no dysphagia, no odynophagia, no regurgitation, no heartburn,no indigestion,no nausea,no vomiting,no hematemesis ,no melena,no jaundice,no distention, no obstipation,no enterorrhagia,no proctalgia,no anal  lesions, no changes in bowel habits.  : no frequency, no hesitancy, no hematuria, no discharge,no  pain.  Musculoskeletal: no muscle or tendon pain or inflammation,no  joint pain, no edema, no functional limitation,no fasciculations, no mass.  Neurologic: no headache, no seizures, noalterations on Craneal nerves, no motor deficit, le sensory deficit, poor coordination, no alteration in memory,normal orientation, calculation,normal writting, verbal and written language.  Skin: no rashes,no pruritus no localized lesions.  Psychiatric: no anxiety, better depression,no agitation, no delusions, proper insight.                    Vitals:    02/18/20 1019   BP: 124/78   Pulse: 66   Resp: 16   Temp: 99.4 °F (37.4 °C)   TempSrc: Oral   SpO2: 95%   Weight: 94.5 kg (208 lb 4.8 oz)   Height: 192 cm (75.59\")              PHYSICAL EXAMINATION:    GENERAL:  Well-developed, well-nourished  Patient  in no acute distress.   SKIN:  Warm, dry ,NO rashes,NO purpura ,NO petechiae.  HEENT:  Pupils were equal and reactive to light and accomodation, conjunctivas non injected, no pterigion, normal extraocular movements, normal visual acuity.   Mouth mucosa was moist, no exudates in oropharynx, normal gum line, normal roof of the mouth and pillars, normal papillations of the tongue.  NECK:  Supple with " good range of motion; no thyromegaly or masses, no JVD or bruits, no cervical adenopathies.No carotid arteries pain, no carotid abnormal pulsation , NO arterial dance.  LYMPHATICS:  No cervical, NO supraclavicular, NO axillary,NO epitrochlear , NO inguinal adenopathy.  CHEST:  Normal excursion of both serge thoraces, normal voice fremitus, no subcutaneous emphysema, normal axillas, no rashes or acanthosis nigricans. Lungs clear to percussion and auscultation, normal breath sounds bilaterally, no wheezing,NO crackles NO ronchi, NO stridor, NO rubs.  CARDIAC AND VASCULAR:  normal rate and regular rhythm, without murmurs,NO rubs NO S3 NO S4 right or left . Normal femoral, popliteal, pedis, brachial and carotid pulses.  ABDOMEN:  Soft, nontender with no organomegaly or masses, no ascites, no collateral circulation,no distention,no Laura sign, no abdominal pain, no inguinal hernias,no umbilical hernia, no abdominal bruits. Normal bowel sounds.  GENITAL: Not  Performed.  EXTREMITIES  AND SPINE:  No clubbing, cyanosis or edema, no deformities or pain .No kyphosis, scoliosis, deformities or pain in spine, ribs or pelvic bone.  NEUROLOGICAL:  Patient was awake, alert, oriented to time, person and place.He has obvious sensory neuropathy in his feet, his strength is 5 over 5, very dramatic proximally and distally after his physical therapy. His balance is much better, his Romberg test is negative. He was able to walk on tippy toes and heels with no difficulty. He had tandem gait with no alterations whatsoever.                                          LABORATORY DATA           View Full Report  Component  Ref Range & Units 1mo ago   Sed Rate  0 - 20 mm/hr 8                Lactate Dehydrogenase   Order: [id number]   Collected:  1/13/2020 12:47   Specimen Information: Blood        View Full Report  Component  Ref Range & Units 1mo ago   LDH  99 - 259 U/L 176            04    WBC  3.40 - 10.80 10*3/mm3 4.12    RBC  4.14 - 5.80  10*6/mm3 5.00    Hemoglobin  13.0 - 17.7 g/dL 14.9    Hematocrit  37.5 - 51.0 % 44.7    MCV  79.0 - 97.0 fL 89.4    MCH  26.6 - 33.0 pg 29.8    MCHC  31.5 - 35.7 g/dL 33.3    RDW  12.3 - 15.4 % 13.3    RDW-SD  37.0 - 54.0 fl 43.8    MPV  6.0 - 12.0 fL 10.9    Platelets  140 - 450 10*3/mm3 129Low     Neutrophil %  42.7 - 76.0 % 52.4    Lymphocyte %  19.6 - 45.3 % 31.1    Monocyte %  5.0 - 12.0 % 15.0High     Eosinophil %  0.3 - 6.2 % 0.5    Basophil %  0.0 - 1.5 % 0.5    Immature Grans %  0.0 - 0.5 % 0.5    Neutrophils, Absolute  1.70 - 7.00 10*3/mm3 2.16    Lymphocytes, Absolute  0.70 - 3.10 10*3/mm3 1.28    Monocytes, Absolute  0.10 - 0.90 10*3/mm3 0.62    Eosinophils, Absolute  0.00 - 0.40 10*3/mm3 0.02    Basophils, Absolute  0.00 - 0.20 10*3/mm3 0.                   ASSESSMENT:  This patient has monoclonal gammopathy of unknown significance and he has undergone bone marrow testing of this on 2 different occasions not being able to document myeloma, lymphoma, myelodysplasia, leukemia or myelofibrosis. Chromosomal analysis has been normal. The patient has not had any significant difference in his monoclonal protein quantification.    The patient has associated with this sensory peripheral neuropathy with balance issues and painful numbness in his feet. We have tried Neurontin in the past with no benefit, now we are trying Lyrica and it seems to be that this is not going to be any benefit to him neither.    We have also tried prednisone with no benefit as per indication by his Neurologist and we have tried immunoglobulin with no benefit and actually this medicine triggered serum sickness in him that required short course of prednisone therapy.    The nausea that he is experiencing is very likely related to the Lyrica more than the Lexapro and I advised him to drop the Lyrica through the day and keep only the one at night. If he continues nausea he will need to drop the Lyrica at night in a week from now. I  instructed his wife to followup these instructions.     I encouraged him to remain on his Lexapro. I think this is making a big difference in regard his depression.    I encouraged him to remain on his physical therapy, the strength in his legs is tremendous, his balance is much better and it will be okay for him to go back soon to the gym in 2-3 more weeks when all of the flu season is over.    As I discussed with him on the telephone before and his wife again it will be fine for him to have an appointment to be seen at the neurology department peripheral neuropathy [facility] and at the same time he can see the hematology team Marcio Carter MD, in regards to MGUS.     He wants to postpone this for a while but he will let us know when he is ready to do this at some point.     I would like to review him back in 2 months with a CBC, CMP and monoclonal protein analysis.     A recent sedimentation rate was 6 and an LDH was completely normal therefore I cannot state that the MGUS is acting on him. His chemistry profile has remained normal with normal creatinine, calcium as well.    Discussed with him and his wife in detail.    Finally I sent some phenergan tablets 12.5 mg to take on prn basis for nausea. He could take 1 at bedtime if necessary to minimize nausea in the morning.

## 2020-02-19 ENCOUNTER — TREATMENT (OUTPATIENT)
Dept: PHYSICAL THERAPY | Facility: CLINIC | Age: 80
End: 2020-02-19

## 2020-02-19 ENCOUNTER — TELEPHONE (OUTPATIENT)
Dept: ONCOLOGY | Facility: CLINIC | Age: 80
End: 2020-02-19

## 2020-02-19 DIAGNOSIS — G62.9 NEUROPATHY: Primary | ICD-10-CM

## 2020-02-19 PROCEDURE — 97110 THERAPEUTIC EXERCISES: CPT | Performed by: PHYSICAL THERAPIST

## 2020-02-19 PROCEDURE — 97026 INFRARED THERAPY: CPT | Performed by: PHYSICAL THERAPIST

## 2020-02-19 NOTE — PROGRESS NOTES
Physical Therapy Daily Progress Note    VISIT#: 6    Subjective   Panfilo Tray reports:Physical Therapy Daily Progress Note    VISIT#: 6    Subjective   Panfilo Tray reports: small changes, but is consistent with HEP      Objective   Rx: supplemental to treatment note: anodyne Treatment x 15 min: preset setting with B feet on foot plate.    See Exercise, Manual, and Modality Logs for complete treatment.     Patient Education:reviewed balance precautions during exercise.    Lumbar surgery 2013: L3-4 and L4-5 fusion; bending catches in spine. THR 2016. Prostrate CA w/ radiation therapy; IGG treatment - serum sickness (unable to tolerate).  Abdominal Aorotic Aneursym: no lifting over 25 lbs.    Assessment/Plan - advanced balance exercises and continued light thrapy.    Progress per Plan of Care - continue Anodyne/infrared; Balance and strengthening for LE's; Pool as able for instruction x 1        Timed:         Manual Therapy:         mins  11607;     Therapeutic Exercise:   30      mins  47077;     Neuromuscular [place]:        mins  73480;    Therapeutic Activity:          mins  92496;     Gait Training:           mins  30038;     Ultrasound:          mins  50604;    Ionto                                   mins   33973  Self Care                           mins   29664  Canalith Repos                   mins  4209  Aquatic                               mins 06267      Un-Timed:  Electrical Stimulation:         mins  12091 ( );  Dry Needling          mins self-pay  Traction          mins 12671  Low Eval          Mins  00964  Mod Eval          Mins  08340  High Eval                            Mins  13059  Re-Eval                               mins  31076  Anodyne/infrared        __15__mins 85569    Timed Treatment:   30   mins   Total Treatment:     45   mins    Abisai Gentile PT    Objective   Rx: supplemental to treatment note: anodyne Treatment x 15 min: preset setting with B feet on foot plate.    See Exercise,  Manual, and Modality Logs for complete treatment.     Patient Education: Reviewed TA contraction    Lumbar surgery 2013: L3-4 and L4-5 fusion; bending catches in spine. THR 2016. Prostrate CA w/ radiation therapy; IGG treatment - serum sickness (unable to tolerate).  Abdominal Aorotic Aneursym: no lifting over 25 lbs.    Assessment/Plan - Focused on balance and peripheral neuropathy: significant impairment noted with balance.    Progress per Plan of Care - continue Anodyne/infrared; Balance and strengthening for LE's; Pool as able for instruction x 1        Timed:         Manual Therapy:         mins  99210;     Therapeutic Exercise:   30      mins  77973;     Neuromuscular [place]:        mins  86783;    Therapeutic Activity:          mins  57801;     Gait Training:           mins  78920;     Ultrasound:          mins  79860;    Ionto                                   mins   93569  Self Care                           mins   34386  Canalith Repos                   mins  4209  Aquatic                               mins 13728      Un-Timed:  Electrical Stimulation:         mins  29677 ( );  Dry Needling          mins self-pay  Traction          mins 27676  Low Eval          Mins  45561  Mod Eval          Mins  74754  High Eval                            Mins  27561  Re-Eval                               mins  47258  Anodyne/infrared        __15__mins 81381    Timed Treatment:   30   mins   Total Treatment:     45   mins    Abisai Gentile PT

## 2020-02-21 ENCOUNTER — TREATMENT (OUTPATIENT)
Dept: PHYSICAL THERAPY | Facility: CLINIC | Age: 80
End: 2020-02-21

## 2020-02-21 DIAGNOSIS — G62.9 NEUROPATHY: Primary | ICD-10-CM

## 2020-02-21 PROCEDURE — 97113 AQUATIC THERAPY/EXERCISES: CPT | Performed by: PHYSICAL THERAPIST

## 2020-02-21 NOTE — PROGRESS NOTES
Discharge Summary  Discharge Summary from Physical Therapy Report    Patient: Panfilo Feliz   : 1940  Diagnosis/ICD-10 Code:  Neuropathy [G62.9]  Referring practitioner: Chalo Olvera MD      Dates  PT visit: 20 to 20  Number of Visits: 7     Discharge Status of Patient: See progress Note dated 20    Goals: Partially Met    Discharge Plan: Continue with current home exercise program as instructed    Comments - f/u with MD as needed.    Date of Discharge 20        Abisai Gentile, PT  Physical Therapist

## 2020-02-21 NOTE — PROGRESS NOTES
Physical Therapy Daily Progress Note    VISIT#: 7    Subjective   Panfilo Tray reports: No overall change in neuropathy: temporary benefit with light therapy; will continue HEP at [facility] to manage back pain and improve balance. Requested D/C.   OUTCOME MEASURE: unchanged per patient: did not retest.     Objective       Postural Observations  Seated posture: good  Standing posture: good  Correction of posture: makes symptoms better    Additional Postural Observation Details  Correction with lumbar support decreases pain in Lumbar spine.        Tenderness     Additional Tenderness Details  Flat lumbar spine with well-healed incision; arthritic changes and sensitivity to over press and palpation.  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    Neurological Testing     Sensation     Lumbar   Left   Intact: proprioception  Diminished: light touch, sharp/dull discrimination and hot/cold discrimination  Paresthesia: light touch  Hyposensation: light touch    Right   Intact: proprioception  Diminished: light touch, sharp/dull discrimination and hot/cold discrimination  Paresthesia: light touch  Hyposensation: light touch    Reflexes   Left   Patellar (L4): trace (1+)  Achilles (S1): trace (1+)    Right   Patellar (L4): trace (1+)  Achilles (S1): trace (1+)    Additional Neurological Details  Vibration sense also diminished over ankles and feet.    Active Range of Motion     Lumbar   Flexion: 55 degrees with pain  Extension: 5 degrees with pain  Left rotation: Active left lumbar rotation: mod.   Right rotation: Active right lumbar rotation: mod.     Strength/Myotome Testing     Additional Strength Details  N/a this visit    Tests     Additional Tests Details  Mod to max decrease in flexibility at hamstrings B     Ambulation     Observational Gait   Decreased walking speed and stride length.   Left foot contact pattern: foot flat  Right foot contact pattern: foot  flat  Base of support: increased    Additional Observational Gait Details  KELTON increased with slow,cautious gait pattern.       See Exercise, Manual, and Modality Logs for complete treatment.     Patient Education: Updated and reviewed aquatic HEP; precautions with balance and exercise progression.     Assessment & Plan     Assessment  Assessment details: Patient has not shown consistent improvement: I have given him a series of pool exercises, focusing on strengthening; reducing Lumbar pain and improving balance. Temporary relief with light therapy. Goals not completely met - see below.    Goals  Plan Goals: Goals  Plan Goals: STG's (2 - 4 weeks)  1. Tolerate 45 minutes aquatic therapy x 2 with reduction in pain and instruction in HEP for continuation of exercise program at [facility]..  MET  2. Able to ambulate x 10 min on Aquatic TM with reduction in antalgic gait pattern. MET  3. Able to tolerate initial HEP w/ progression. MET  4. reduction in neuropathy with Anodyne therapy. NOT MET    LTG's (by d/c)  1. Independent with HEP and able to manage condition independently.MET  2. Patient voices readiness for discharge. MET  3. Decrease overall pain 50% or more NOT MET  4. Significant improvement on outcome measures.NOT MET  5. Gait mechanics improved.  6. Neuropathy in feet/LE 25% improvement             Other - discharge.            Timed:         Manual Therapy:         mins  48571;     Therapeutic Exercise:         mins  79314;     Neuromuscular [place]:       mins  86573;    Therapeutic Activity:          mins  38919;     Gait Training:           mins  51781;     Ultrasound:          mins  47324;    Ionto                                   mins   98434  Self Care                            mins   24753  Canalith Repos                   mins  4209  Aquatic                            45   mins 01158  Anodyne/infrared        __15__mins 96243      Un-Timed:  Electrical Stimulation:         mins  14932 ( );  Dry  Needling          mins self-pay  Traction          mins 28024  Low Eval          Mins  98179  Mod Eval          Mins  90732  High Eval                            Mins  10443  Re-Eval                               mins  61761    Timed Treatment: 60    mins   Total Treatment:     60   mins    Abisai Gentile, PT

## 2020-04-01 ENCOUNTER — TELEPHONE (OUTPATIENT)
Dept: ONCOLOGY | Facility: CLINIC | Age: 80
End: 2020-04-01

## 2020-04-01 NOTE — TELEPHONE ENCOUNTER
Patient has an appointment on 04/16/20 and wants to cancel appointment. Patient will call back to reschedule      Patient phone number [phone]

## 2020-04-16 RX ORDER — ESCITALOPRAM OXALATE 10 MG/1
TABLET ORAL
Qty: 30 TABLET | Refills: 1 | Status: SHIPPED | OUTPATIENT
Start: 2020-04-16 | End: 2020-06-11

## 2020-05-28 DIAGNOSIS — D47.2 MGUS (MONOCLONAL GAMMOPATHY OF UNKNOWN SIGNIFICANCE): Primary | ICD-10-CM

## 2020-05-28 RX ORDER — PREGABALIN 25 MG/1
25 CAPSULE ORAL 2 TIMES DAILY
Qty: 60 CAPSULE | Refills: 1 | OUTPATIENT
Start: 2020-05-28

## 2020-05-28 RX ORDER — PREGABALIN 25 MG/1
25 CAPSULE ORAL 2 TIMES DAILY
Qty: 60 CAPSULE | Refills: 3 | Status: SHIPPED | OUTPATIENT
Start: 2020-05-28 | End: 2020-07-02

## 2020-06-11 RX ORDER — ESCITALOPRAM OXALATE 10 MG/1
TABLET ORAL
Qty: 90 TABLET | Refills: 0 | Status: SHIPPED | OUTPATIENT
Start: 2020-06-11 | End: 2020-09-08

## 2020-06-17 ENCOUNTER — TELEPHONE (OUTPATIENT)
Dept: ONCOLOGY | Facility: CLINIC | Age: 80
End: 2020-06-17

## 2020-06-18 ENCOUNTER — APPOINTMENT (OUTPATIENT)
Dept: LAB | Facility: HOSPITAL | Age: 80
End: 2020-06-18

## 2020-07-01 ENCOUNTER — TELEPHONE (OUTPATIENT)
Dept: ONCOLOGY | Facility: CLINIC | Age: 80
End: 2020-07-01

## 2020-07-01 NOTE — TELEPHONE ENCOUNTER
Patient needs to reschedule his appt from 06/18/20 with dr. Olvera. Patient is having nausea      Call patient back at [phone]

## 2020-07-02 ENCOUNTER — LAB (OUTPATIENT)
Dept: LAB | Facility: HOSPITAL | Age: 80
End: 2020-07-02

## 2020-07-02 ENCOUNTER — OFFICE VISIT (OUTPATIENT)
Dept: ONCOLOGY | Facility: CLINIC | Age: 80
End: 2020-07-02

## 2020-07-02 VITALS
HEART RATE: 80 BPM | HEIGHT: 75 IN | DIASTOLIC BLOOD PRESSURE: 77 MMHG | RESPIRATION RATE: 18 BRPM | SYSTOLIC BLOOD PRESSURE: 177 MMHG | TEMPERATURE: 98.4 F | WEIGHT: 201 LBS | OXYGEN SATURATION: 95 % | BODY MASS INDEX: 24.99 KG/M2

## 2020-07-02 DIAGNOSIS — D69.6 THROMBOCYTOPENIA (HCC): ICD-10-CM

## 2020-07-02 DIAGNOSIS — G62.9 NEUROPATHY: ICD-10-CM

## 2020-07-02 DIAGNOSIS — D47.2 MGUS (MONOCLONAL GAMMOPATHY OF UNKNOWN SIGNIFICANCE): Primary | ICD-10-CM

## 2020-07-02 DIAGNOSIS — D47.2 MGUS (MONOCLONAL GAMMOPATHY OF UNKNOWN SIGNIFICANCE): ICD-10-CM

## 2020-07-02 DIAGNOSIS — R94.6 ABNORMAL RESULTS OF THYROID FUNCTION STUDIES: ICD-10-CM

## 2020-07-02 DIAGNOSIS — D70.8 OTHER NEUTROPENIA (HCC): ICD-10-CM

## 2020-07-02 DIAGNOSIS — G61.81 CHRONIC INFLAMMATORY DEMYELINATING POLYNEURITIS (HCC): ICD-10-CM

## 2020-07-02 LAB
ALBUMIN SERPL-MCNC: 4.3 G/DL (ref 3.5–5.2)
ALBUMIN/GLOB SERPL: 1.4 G/DL (ref 1.1–2.4)
ALP SERPL-CCNC: 60 U/L (ref 38–116)
ALT SERPL W P-5'-P-CCNC: 11 U/L (ref 0–41)
ANION GAP SERPL CALCULATED.3IONS-SCNC: 10.4 MMOL/L (ref 5–15)
AST SERPL-CCNC: 22 U/L (ref 0–40)
BASOPHILS # BLD AUTO: 0.02 10*3/MM3 (ref 0–0.2)
BASOPHILS NFR BLD AUTO: 0.4 % (ref 0–1.5)
BILIRUB SERPL-MCNC: 0.4 MG/DL (ref 0.2–1.2)
BUN SERPL-MCNC: 22 MG/DL (ref 6–20)
BUN/CREAT SERPL: 16.9 (ref 7.3–30)
CALCIUM SPEC-SCNC: 9.5 MG/DL (ref 8.5–10.2)
CHLORIDE SERPL-SCNC: 104 MMOL/L (ref 98–107)
CO2 SERPL-SCNC: 23.6 MMOL/L (ref 22–29)
CREAT SERPL-MCNC: 1.3 MG/DL (ref 0.7–1.3)
DEPRECATED RDW RBC AUTO: 44.9 FL (ref 37–54)
EOSINOPHIL # BLD AUTO: 0.03 10*3/MM3 (ref 0–0.4)
EOSINOPHIL NFR BLD AUTO: 0.6 % (ref 0.3–6.2)
ERYTHROCYTE [DISTWIDTH] IN BLOOD BY AUTOMATED COUNT: 13.3 % (ref 12.3–15.4)
GFR SERPL CREATININE-BSD FRML MDRD: 53 ML/MIN/1.73
GLOBULIN UR ELPH-MCNC: 3.1 GM/DL (ref 1.8–3.5)
GLUCOSE SERPL-MCNC: 98 MG/DL (ref 74–124)
HCT VFR BLD AUTO: 46.3 % (ref 37.5–51)
HGB BLD-MCNC: 14.9 G/DL (ref 13–17.7)
IMM GRANULOCYTES # BLD AUTO: 0.01 10*3/MM3 (ref 0–0.05)
IMM GRANULOCYTES NFR BLD AUTO: 0.2 % (ref 0–0.5)
LYMPHOCYTES # BLD AUTO: 1.33 10*3/MM3 (ref 0.7–3.1)
LYMPHOCYTES NFR BLD AUTO: 26.9 % (ref 19.6–45.3)
MCH RBC QN AUTO: 29.3 PG (ref 26.6–33)
MCHC RBC AUTO-ENTMCNC: 32.2 G/DL (ref 31.5–35.7)
MCV RBC AUTO: 91 FL (ref 79–97)
MONOCYTES # BLD AUTO: 0.56 10*3/MM3 (ref 0.1–0.9)
MONOCYTES NFR BLD AUTO: 11.3 % (ref 5–12)
NEUTROPHILS NFR BLD AUTO: 3 10*3/MM3 (ref 1.7–7)
NEUTROPHILS NFR BLD AUTO: 60.6 % (ref 42.7–76)
NRBC BLD AUTO-RTO: 0 /100 WBC (ref 0–0.2)
PLATELET # BLD AUTO: 174 10*3/MM3 (ref 140–450)
PMV BLD AUTO: 10.7 FL (ref 6–12)
POTASSIUM SERPL-SCNC: 4.5 MMOL/L (ref 3.5–4.7)
PROT SERPL-MCNC: 7.4 G/DL (ref 6.3–8)
RBC # BLD AUTO: 5.09 10*6/MM3 (ref 4.14–5.8)
SODIUM SERPL-SCNC: 138 MMOL/L (ref 134–145)
TSH SERPL DL<=0.05 MIU/L-ACNC: 0.81 UIU/ML (ref 0.27–4.2)
WBC # BLD AUTO: 4.95 10*3/MM3 (ref 3.4–10.8)

## 2020-07-02 PROCEDURE — 85025 COMPLETE CBC W/AUTO DIFF WBC: CPT

## 2020-07-02 PROCEDURE — 36415 COLL VENOUS BLD VENIPUNCTURE: CPT

## 2020-07-02 PROCEDURE — 84443 ASSAY THYROID STIM HORMONE: CPT | Performed by: INTERNAL MEDICINE

## 2020-07-02 PROCEDURE — 99214 OFFICE O/P EST MOD 30 MIN: CPT | Performed by: INTERNAL MEDICINE

## 2020-07-02 PROCEDURE — 80053 COMPREHEN METABOLIC PANEL: CPT

## 2020-07-02 RX ORDER — ONDANSETRON 4 MG/1
4 TABLET, FILM COATED ORAL EVERY 12 HOURS PRN
Qty: 30 TABLET | Refills: 3 | Status: SHIPPED | OUTPATIENT
Start: 2020-07-02 | End: 2021-07-15

## 2020-07-02 NOTE — PROGRESS NOTES
REASON FOR FOLLOWUP:    1.Minimal leukopenia with thrombocytopenia in the background of minimal IgG monoclonal gammopathy. The patient has no splenomegaly, no peripheral adenopathy, and no symptoms that suggest lupus or collagen vascular disease. Bone marrow   a  spirate documented no evidence of myeloma, lymphoma, leukemia, myelodysplasia, or any other abnormality. Bone marrow chromosomal analysis as well as flow cytometry were negative.   2.  Inflammatory polyneuropathy.  He initiated IVIG on 10/3/2019.  He did receive his second IVIG on 11/7/2019.allergic reaction to it stopping infusion all togethere  3.  Approximately 10 days after his second IVIG infusion he did experience serum sickness that included rash, generalized arthralgias, skin peeling of the palms.  Dr. Olvera prescribed 20 mg of prednisone and his symptoms have resolved after 2 days.    HISTORY OF PRESENT ILLNESS:PATIENT WAS CALLED THE DAY BEFORE BY THE OFFICE TO ASK FOR SYMPTOMS THAT COULD BE CONSISTENT WITH CORONAVIRUS INFECTION, AND BEING NEGATIVE WAS SCHEDULED TO BE SEEN IN THE OFFICE TODAY. SIMILAR QUESTIONING TODAY INCLUDING, CHILLS, FEVER, NEW COUGH, SHORTNESS OF BREATH, DIARRHEA,DIFFUSE BODY ACHES  AND CHANGES IN SMELL OR TASTE WERE NEGATIVE.DURING THE VISIT WITH THE PATIENT TODAY , PATIENT HAD FACE MASK, I HAD PROPPER PROTECTIVE EQUIPMENT, AND I DID HAND HYGIENE WITH SOAP AND WATER BEFORE AND AFTER THE VISIT.    This patient returns today to the office in company of his wife. He is here today stating that his neuropathy in lower extremities is about the same. In upper extremities it seems to be that it is advancing and he wakes up with burning sensation in his hands. He has no motor deficit in upper extremities. His balance is appropriate and got better after physical therapy. He has not had any falls. He has no urinary or bowel issues and no incontinence. His hearing remains a problem. He has developed trigger finger in the left hand. A  steroid injection was given with no benefit. He is going be seen by neurologist in regard neuroconductions for upper extremities. The patient otherwise has a poor appetite. He has nausea in the morning, phenergan is not useful and he does not believe that Lyrica is doing anything in regard his neuropathic issues. He remains on Lexapro for depression. His depression remains about the same. He is not having any suicidal ideation, he is not crying, he is sleeping well.                 Past Medical History:   Diagnosis Date   • Arthritis    • Basal cell carcinoma (BCC) of face    • Disease of thyroid gland    • DJD (degenerative joint disease)    • History of bone marrow biopsy    • Hyperlipidemia    • Hypertension    • Hypothyroidism    • IgG monoclonal gammopathy    • Prostate cancer (CMS/HCC)    • Reflux esophagitis      Social History     Socioeconomic History   • Marital status:      Spouse name: Meghan   • Number of children: Not on file   • Years of education: College   • Highest education level: Not on file   Occupational History   • Occupation:      Employer: RETIRED     Comment: [employer]   Tobacco Use   • Smoking status: Never Smoker   • Smokeless tobacco: Never Used   Substance and Sexual Activity   • Alcohol use: No     Comment: Heavy in past, none in 33 years   • Drug use: No     Family History   Problem Relation Age of Onset   • Cancer Mother 85        Breast   • COPD Father    • Cancer Brother 59        Unknown type   • Hypertension Daughter              ONCOLOGIC/HEMATOLOGIC HISTORY: History from previous dates can be found in the separate document.                     REVIEW OF SYSTEMS:         General: no fever, no chills,  fatigue,no weight changes,  lack of appetite.  Eyes: no epiphora, xerophthalmia,conjunctivitis, pain, glaucoma, blurred vision, blindness, secretion, photophobia, proptosis, diplopia.  Ears: no otorrhea, tinnitus, otorrhagia, deafness, pain,  "vertigo.  Nose: no rhinorrhea, no epistaxis, no alteration in perception of odors, no sinuses pressure.  Mouth: no alteration in gums or teeth,  No ulcers, no difficulty with mastication or deglut ion, no odynophagia.  Neck: no masses or pain, no thyroid alterations, no pain in muscles or arteries, no carotid odynia, no crepitation.  Respiratory: no cough, no sputum production,no dyspnea,no trepopnea, no pleuritic pain,no hemoptysis.  Heart: no syncope, no irregularity, no palpitations, no angina,no orthopnea,no paroxysmal nocturnal dyspnea.  Vascular Venous: no tenderness,no edema,no palpable cords,no postphlebitic syndrome, no skin changes no ulcerations.  Vascular Arterial: no distal ischemia, noclaudication, no gangrene, no neuropathic ischemic pain, no skin ulcers, no paleness no cyanosis.  GI: no dysphagia, no odynophagia, no regurgitation, no heartburn,no indigestion, nausea, vomiting,no hematemesis ,no melena,no jaundice,no distention, no obstipation,no enterorrhagia,no proctalgia,no anal  lesions, no changes in bowel habits.  : no frequency, no hesitancy, no hematuria, no discharge,no  pain.  Musculoskeletal: no muscle or tendon pain or inflammation,no  joint pain, no edema, SEVERE functional limitation,no fasciculations, no mass.  Neurologic: no headache, no seizures, STATED alterations on Craneal nerves, LE motor deficit, AND sensory deficit, POOR coordination, no alteration in memory,normal orientation, calculation,normal writting, verbal and written language.  Skin: no rashes,no pruritus no localized lesions.  Psychiatric: no anxiety,  depression,no agitation, no delusions, proper insight.                  Vitals:    07/02/20 1302   BP: 177/77   Pulse: 80   Resp: 18   Temp: 98.4 °F (36.9 °C)   TempSrc: Oral   SpO2: 95%   Weight: 91.2 kg (201 lb)   Height: 190 cm (74.8\")              PHYSICAL EXAMINATION:    Patient screened NEGAATIVE for depression based on a PHQ-9 score of 3 on 7/2/2020.        This " patient's ACP documentation is up to date, and there's nothing further left to document.    GENERAL ASPECT: IN GOOD HEALTH WALKING THROUGH TE OFFICE NO DIFFICULTIES, NO PAIN.PROPER NUTRITION.WELL KEPT PHYSICALLY.  EYES : NOT JAUNDICED, PUPILS WERE EQUAL.  HEART: REGULAR NO MURMURS , NO GALLOPS, NO RUBS.  ABDOMEN: NOT DISTENDED, NO PAIN, NO LIVER OR SPLEEN ENLARGEMENT, NO ASCITES, NO COLLATERAL CIRCULATION.  EXTREMITIES: NO EDEMA, NO PAIN, NO CLUBBING, NO DEFORMITIES.  NEUROLOGIC: NORMAL CONVERSATION, MEMORY , SPEECH .He is very hard of hearing. His eyes disclose no alterations in movement, pupils were equal and reactive. His motor strength in upper extremities was 5 over 5 proximally and distally, lower extremities was 5 over 5. His Romberg test was negative. Finger to nose shows minimal dysmetria bilaterally.     He had no reflexes at any level.           SKIN: NO LESIONS.                                     [facility]   Order: [id number] - Part of Panel Order [id number]   Status:  Final result   Visible to patient:  No (Not Released)   Dx:  Chronic inflammatory demyelinating po...   Specimen Information: Blood        Component  Ref Range & Units 12:41   WBC  3.40 - 10.80 10*3/mm3 4.95    RBC  4.14 - 5.80 10*6/mm3 5.09    Hemoglobin  13.0 - 17.7 g/dL 14.9    Hematocrit  37.5 - 51.0 % 46.3    MCV  79.0 - 97.0 fL 91.0    MCH  26.6 - 33.0 pg 29.3    MCHC  31.5 - 35.7 g/dL 32.2    RDW  12.3 - 15.4 % 13.3    RDW-SD  37.0 - 54.0 fl 44.9    MPV  6.0 - 12.0 fL 10.7    Platelets  140 - 450 10*3/mm3 174    Neutrophil %  42.7 - 76.0 % 60.6    Lymphocyte %  19.6 - 45.3 % 26.9    Monocyte %  5.0 - 12.0 % 11.3    Eosinophil %  0.3 - 6.2 % 0.6    Basophil %  0.0 - 1.5 % 0.4    Immature Grans %  0.0 - 0.5 % 0.2    Neutrophils, Absolute  1.70 - 7.00 10*3/mm3 3.00    Lymphocytes, Absolute  0.70 - 3.10 10*3/mm3 1.33    Monocytes, Absolute  0.10 - 0.90 10*3/mm3 0.56    Eosinophils, Absolute  0.00 - 0.40  10*3/mm3 0.03                        ASSESSMENT:  This patient has monoclonal gammopathy of unknown significance and he has undergone bone marrow testing of this on 2 different occasions not being able to document myeloma, lymphoma, myelodysplasia, leukemia or myelofibrosis. Chromosomal analysis has been normal. The patient has not had any significant difference in his monoclonal protein quantification.    The patient has associated with this sensory peripheral neuropathy with balance issues and painful numbness in his feet. We have tried Neurontin in the past with no benefit, now we are trying Lyrica and it seems to be that this is not going to be any benefit to him neither.    We have also tried prednisone with no benefit as per indication by his Neurologist and we have tried immunoglobulin with no benefit and actually this medicine triggered serum sickness in him that required short course of prednisone therapy.    Upon review on 07/02/2020 I am worried that the Lyrica is not helping him as stated above and this medicine will be discontinued in a step down fashion taking only 1 tablet a day for a week and thereafter no tablets at all. The nausea will be treated instead of phenergan that knocks him down with Zofran twice a day.     The patient will proceed with neuroconductions by a local neurologist and I asked them to add neuroconductions in the lower extremities. I asked the patient to come back in a month to be reviewed. I already insinuated to the patient the need to proceed with an appointment at [facility]. They can drive up there in a couple of days being evaluated and drive back home. I think right now there is a need for him to have proper assessment of his neuropathic issues and decide how to proceed at some point. As I mentioned before we never have been able to prove lymphoma, leukemia, myeloma or anything related to the bone marrow on him and I would not be surprised if at some point we have to  repeat this or let the [facility] to do this procedure.    This was discussed with the patient and wife in detail. The hearing deficit in the patient is a limiting factor in regard communication.     The patient was advised to remain on his Lexapro for depression 10 mg a day, he does not believe that he needs to change this dosing.

## 2020-07-06 LAB
ALBUMIN SERPL-MCNC: 3.8 G/DL (ref 2.9–4.4)
ALBUMIN/GLOB SERPL: 1.3 {RATIO} (ref 0.7–1.7)
ALPHA1 GLOB FLD ELPH-MCNC: 0.2 G/DL (ref 0–0.4)
ALPHA2 GLOB SERPL ELPH-MCNC: 0.7 G/DL (ref 0.4–1)
B-GLOBULIN SERPL ELPH-MCNC: 1.2 G/DL (ref 0.7–1.3)
GAMMA GLOB SERPL ELPH-MCNC: 0.9 G/DL (ref 0.4–1.8)
GLOBULIN SER CALC-MCNC: 3.1 G/DL (ref 2.2–3.9)
IGA SERPL-MCNC: 358 MG/DL (ref 61–437)
IGG SERPL-MCNC: 1023 MG/DL (ref 603–1613)
IGM SERPL-MCNC: 20 MG/DL (ref 15–143)
INTERPRETATION SERPL IEP-IMP: ABNORMAL
KAPPA LC SERPL-MCNC: 30.2 MG/L (ref 3.3–19.4)
KAPPA LC/LAMBDA SER: 2.48 {RATIO} (ref 0.26–1.65)
LAMBDA LC FREE SERPL-MCNC: 12.2 MG/L (ref 5.7–26.3)
Lab: ABNORMAL
M-SPIKE: 0.2 G/DL
PROT SERPL-MCNC: 6.9 G/DL (ref 6–8.5)

## 2020-07-08 ENCOUNTER — TELEPHONE (OUTPATIENT)
Dept: ONCOLOGY | Facility: CLINIC | Age: 80
End: 2020-07-08

## 2020-07-08 DIAGNOSIS — G61.81 CHRONIC INFLAMMATORY DEMYELINATING POLYNEURITIS (HCC): ICD-10-CM

## 2020-07-08 DIAGNOSIS — G62.9 NEUROPATHY: Primary | ICD-10-CM

## 2020-07-08 NOTE — TELEPHONE ENCOUNTER
RICARDO PT'S WIFE CALLED TO REQUEST AN ORDER FOR DR SIRISHA MIDDLETON TO SCHEDULE AN EMG.  THEY PREVIOUSLY WENT TO ANOTHER DOCTOR BUT THEY PREFER DR MIDDLETON.    DR MIDDLETON PH- [phone]    RICARDO (PT'S WIFE)  PH- [phone]

## 2020-07-08 NOTE — TELEPHONE ENCOUNTER
S/W PT. WIFE RICARDO.  STATES THEY DID SEE DR. TAMEZ AND HE WOULD ONLY DO THE EMG ON HIS LEFT ARM WHICH SHOWED MILD NERVE COMPRESSION.  STATES HE WOULD NEED A ORDER FOR A EMG OF THE LEGS.  WIFE EXPRESSED SHE DID NOT LIKE HOW THE VISIT WENT SO SHE WOULD LIKE TO GO SEE DR. MIDDLETON WHO THEY HAVE SEEN IN THE PAST.  WOULD LIKE DR. PINEDA TO PLACE A REFERRAL FOR PT. TO SEE DR. MIDDLETON.  ALL D/W DR. PINEDA, WILL MAKE A REFERRAL FOR PT. TO SEE DR. MIDDLETON TO HAVE A EMG AND A NEURO CONDUCTION OF BOTH LEGS.  WILL SEND THIS TO THE INFORMATICS NURSE TO PLACE ORDER.

## 2020-07-09 ENCOUNTER — TELEPHONE (OUTPATIENT)
Dept: ONCOLOGY | Facility: CLINIC | Age: 80
End: 2020-07-09

## 2020-07-09 NOTE — TELEPHONE ENCOUNTER
I called the patient's wife yesterday stating that his monoclonal protein studies show further rise in his spike. The number is very minimal but I think in the background of sensory motor neuropathy and no benefit of any treatment that has been given to him so far I think it is time to move into assessment at [facility]. They are going to decide about the logistics of this and they will get back with me as soon as they can. Otherwise I have nothing else to add to them at this point. I begged the patient's wife to proceed with discussion with the patient that has been very hesitant to go anywhere under the present pandemia. I pointed out to them that I think it is time for them to drive through this process otherwise I have no other way to try to help him to make a definitive diagnosis and establish a plan of care that is going to resonate in a positive outcome in regard to his symptoms.     She promised me that she will get back with me today or tomorrow once that they have a personal discussion about these facts.

## 2020-07-10 ENCOUNTER — TELEPHONE (OUTPATIENT)
Dept: ONCOLOGY | Facility: HOSPITAL | Age: 80
End: 2020-07-10

## 2020-07-10 NOTE — TELEPHONE ENCOUNTER
Spoke with pt's wife.  They are ready for referral to [facility], but have concerns about traveling and they do need to set up care for pets in their home.  Message sent to Dr. Olvera to let him know this info as well.

## 2020-07-10 NOTE — TELEPHONE ENCOUNTER
Left VM for Kelin to call us when ready for appt at [place] with dr foley myeloma team and neurology peripheral neuropathy team.

## 2020-07-15 ENCOUNTER — TELEPHONE (OUTPATIENT)
Dept: ONCOLOGY | Facility: CLINIC | Age: 80
End: 2020-07-15

## 2020-07-15 NOTE — TELEPHONE ENCOUNTER
PT wife Kelin call in to speak with Fadia about Rodriguez. Please give her a call back @[phone]

## 2020-07-21 ENCOUNTER — TELEPHONE (OUTPATIENT)
Dept: ONCOLOGY | Facility: CLINIC | Age: 80
End: 2020-07-21

## 2020-07-21 ENCOUNTER — HOSPITAL ENCOUNTER (OUTPATIENT)
Dept: CT IMAGING | Facility: HOSPITAL | Age: 80
Discharge: HOME | End: 2020-07-21
Admitting: THORACIC SURGERY (CARDIOTHORACIC VASCULAR SURGERY)

## 2020-07-21 DIAGNOSIS — I71.20 THORACIC AORTIC ANEURYSM WITHOUT RUPTURE (HCC): ICD-10-CM

## 2020-07-21 PROCEDURE — 71250 CT THORAX DX C-: CPT

## 2020-07-21 NOTE — TELEPHONE ENCOUNTER
----- Message from Chalo Olvera MD sent at 7/21/2020 12:18 PM EDT -----  CALL PT'S WIFE CT CHEST SHOWS NO CHANGES IN THE ANEURISM SIZE  GOOD THING NOTHING TO ADD FROM MY SIDE

## 2020-07-30 ENCOUNTER — APPOINTMENT (OUTPATIENT)
Dept: LAB | Facility: HOSPITAL | Age: 80
End: 2020-07-30

## 2020-08-03 ENCOUNTER — TELEPHONE (OUTPATIENT)
Dept: ONCOLOGY | Facility: CLINIC | Age: 80
End: 2020-08-03

## 2020-08-03 NOTE — TELEPHONE ENCOUNTER
Patients wife Kelin called for Fadia in scheduling it is in regards to [facility] appointments.  Best call back number [phone]

## 2020-08-07 ENCOUNTER — APPOINTMENT (OUTPATIENT)
Dept: NEUROLOGY | Facility: HOSPITAL | Age: 80
End: 2020-08-07

## 2020-08-10 ENCOUNTER — TELEPHONE (OUTPATIENT)
Dept: ONCOLOGY | Facility: CLINIC | Age: 80
End: 2020-08-10

## 2020-08-10 NOTE — TELEPHONE ENCOUNTER
Kelin, patient's wife, calling.    Nerve conduction study was scheduled for 8/7 at [facility].  When they got to the department to check in, they were told that they do not do those there anymore.    Can this be rescheduled either at [facility] or through Dr. López?    [phone] Kelin's cell

## 2020-08-26 ENCOUNTER — TELEPHONE (OUTPATIENT)
Dept: ONCOLOGY | Facility: CLINIC | Age: 80
End: 2020-08-26

## 2020-08-26 NOTE — TELEPHONE ENCOUNTER
Caller: Milagro from [facility]    Best call back number: [phone]    What form or medical record are you requesting: Any/all hemtaology records, labs, office notes since pt's been under 's care      Who is requesting this form or medical record from you: [facility]    How would you like to receive the form or medical records (pick-up, mail, fax): Fax    If fax, what is the fax number: [phone]    Timeframe paperwork needed: ASAP    Additional notes: Patient has a video visit with his Dr @ [facility] today, they're hoping to have these records asap

## 2020-08-27 NOTE — TELEPHONE ENCOUNTER
Discussed with Dr Olvera. Ok to be off lexapro. Also he talked to [facility] doctor. She verbalized understanding.

## 2020-09-04 ENCOUNTER — LAB (OUTPATIENT)
Dept: LAB | Facility: HOSPITAL | Age: 80
End: 2020-09-04

## 2020-09-04 ENCOUNTER — OFFICE VISIT (OUTPATIENT)
Dept: ONCOLOGY | Facility: CLINIC | Age: 80
End: 2020-09-04

## 2020-09-04 VITALS
OXYGEN SATURATION: 95 % | RESPIRATION RATE: 20 BRPM | TEMPERATURE: 97.8 F | HEIGHT: 75 IN | WEIGHT: 203.4 LBS | DIASTOLIC BLOOD PRESSURE: 83 MMHG | BODY MASS INDEX: 25.29 KG/M2 | HEART RATE: 82 BPM | SYSTOLIC BLOOD PRESSURE: 132 MMHG

## 2020-09-04 DIAGNOSIS — D70.8 OTHER NEUTROPENIA (HCC): ICD-10-CM

## 2020-09-04 DIAGNOSIS — G61.81 CHRONIC INFLAMMATORY DEMYELINATING POLYNEURITIS (HCC): ICD-10-CM

## 2020-09-04 DIAGNOSIS — R11.0 NAUSEA IN ADULT PATIENT: ICD-10-CM

## 2020-09-04 DIAGNOSIS — D69.6 THROMBOCYTOPENIA (HCC): ICD-10-CM

## 2020-09-04 DIAGNOSIS — G62.9 NEUROPATHY: ICD-10-CM

## 2020-09-04 DIAGNOSIS — D47.2 MGUS (MONOCLONAL GAMMOPATHY OF UNKNOWN SIGNIFICANCE): Primary | ICD-10-CM

## 2020-09-04 DIAGNOSIS — D47.2 MGUS (MONOCLONAL GAMMOPATHY OF UNKNOWN SIGNIFICANCE): ICD-10-CM

## 2020-09-04 LAB
BASOPHILS # BLD AUTO: 0.02 10*3/MM3 (ref 0–0.2)
BASOPHILS NFR BLD AUTO: 0.5 % (ref 0–1.5)
DEPRECATED RDW RBC AUTO: 44.9 FL (ref 37–54)
EOSINOPHIL # BLD AUTO: 0.05 10*3/MM3 (ref 0–0.4)
EOSINOPHIL NFR BLD AUTO: 1.2 % (ref 0.3–6.2)
ERYTHROCYTE [DISTWIDTH] IN BLOOD BY AUTOMATED COUNT: 13.4 % (ref 12.3–15.4)
HCT VFR BLD AUTO: 46.5 % (ref 37.5–51)
HGB BLD-MCNC: 15.1 G/DL (ref 13–17.7)
IMM GRANULOCYTES # BLD AUTO: 0.02 10*3/MM3 (ref 0–0.05)
IMM GRANULOCYTES NFR BLD AUTO: 0.5 % (ref 0–0.5)
LYMPHOCYTES # BLD AUTO: 1.84 10*3/MM3 (ref 0.7–3.1)
LYMPHOCYTES NFR BLD AUTO: 43.1 % (ref 19.6–45.3)
MCH RBC QN AUTO: 29.8 PG (ref 26.6–33)
MCHC RBC AUTO-ENTMCNC: 32.5 G/DL (ref 31.5–35.7)
MCV RBC AUTO: 91.7 FL (ref 79–97)
MONOCYTES # BLD AUTO: 0.57 10*3/MM3 (ref 0.1–0.9)
MONOCYTES NFR BLD AUTO: 13.3 % (ref 5–12)
NEUTROPHILS NFR BLD AUTO: 1.77 10*3/MM3 (ref 1.7–7)
NEUTROPHILS NFR BLD AUTO: 41.4 % (ref 42.7–76)
NRBC BLD AUTO-RTO: 0 /100 WBC (ref 0–0.2)
PLATELET # BLD AUTO: 161 10*3/MM3 (ref 140–450)
PMV BLD AUTO: 10.7 FL (ref 6–12)
RBC # BLD AUTO: 5.07 10*6/MM3 (ref 4.14–5.8)
WBC # BLD AUTO: 4.27 10*3/MM3 (ref 3.4–10.8)

## 2020-09-04 PROCEDURE — 99215 OFFICE O/P EST HI 40 MIN: CPT | Performed by: INTERNAL MEDICINE

## 2020-09-04 PROCEDURE — 85025 COMPLETE CBC W/AUTO DIFF WBC: CPT

## 2020-09-04 PROCEDURE — 36415 COLL VENOUS BLD VENIPUNCTURE: CPT

## 2020-09-04 NOTE — PROGRESS NOTES
REASON FOR FOLLOWUP:    1.Minimal leukopenia with thrombocytopenia in the background of minimal IgG monoclonal gammopathy. The patient has no splenomegaly, no peripheral adenopathy, and no symptoms that suggest lupus or collagen vascular disease. Bone marrow   a  spirate documented no evidence of myeloma, lymphoma, leukemia, myelodysplasia, or any other abnormality. Bone marrow chromosomal analysis as well as flow cytometry were negative.   2.  Inflammatory polyneuropathy.  He initiated IVIG on 10/3/2019.  He did receive his second IVIG on 11/7/2019.allergic reaction to it stopping infusion all togethere  3.  Approximately 10 days after his second IVIG infusion he did experience serum sickness that included rash, generalized arthralgias, skin peeling of the palms.  Dr. Olvera prescribed 20 mg of prednisone and his symptoms have resolved after 2 days.    HISTORY OF PRESENT ILLNESS:PATIENT WAS CALLED THE DAY BEFORE BY THE OFFICE TO ASK FOR SYMPTOMS THAT COULD BE CONSISTENT WITH CORONAVIRUS INFECTION, AND BEING NEGATIVE WAS SCHEDULED TO BE SEEN IN THE OFFICE TODAY. SIMILAR QUESTIONING TODAY INCLUDING, CHILLS, FEVER, NEW COUGH, SHORTNESS OF BREATH, DIARRHEA,DIFFUSE BODY ACHES  AND CHANGES IN SMELL OR TASTE WERE NEGATIVE.THE PATIENT DENIED ANY CONTACT WITH PERSONS WHO WERE POSITIVE FOR COVID, AND PATIENT IS NOT IN CATEGORY OF HIGH RISK BEHAVIOR TO ACQUIRE COVID.    DURING THE VISIT WITH THE PATIENT TODAY , PATIENT HAD FACE MASK, MY MEDICAL ASSISTANT AND I  HAD PROPPER PROTECTIVE EQUIPMENT, AND I DID HAND HYGIENE WITH SOAP AND WATER BEFORE AND AFTER THE VISIT.  This patient returns today to the office in company of his wife complaining that his neuropathic symptomatology in lower extremities is up to the knees and now in his hands is up to the elbows. This is bilateral symmetric process. He feels very unsteady in his gait. He has not had any falls. He has not developed any motor deficit. He also is experiencing nausea  especially when he first wakes up in the morning that makes him ill through the rest of the day. He has not experienced any vomiting. Food seems to be does benefit him and he is taking a low dose Phenergan of 12.5 mg a day that gives him minor relief. He is not having any vomiting. He has not had any vertigo but he has been seen by ENT and they are going to do further testing in regard to hearing that has been an issue for a long time. The patient denies any fever, chills, adenopathy, rashes, cough, shortness of breath or new sites of bone pain. He has not had any joint pain. He has no urinary symptomatology. He is controlling the sphincters perfectly fine. His hearing deficit remains an ongoing process and it is getting worse by the day giving him social isolation.    The patient also has had visit by video medicine with the [facility] and I had a discussion of his case with his attending physician in that facility, please review below.             Past Medical History:   Diagnosis Date   • Arthritis    • Basal cell carcinoma (BCC) of face    • Disease of thyroid gland    • DJD (degenerative joint disease)    • History of bone marrow biopsy    • Hyperlipidemia    • Hypertension    • Hypothyroidism    • IgG monoclonal gammopathy    • Prostate cancer (CMS/HCC)    • Reflux esophagitis      Social History     Socioeconomic History   • Marital status:      Spouse name: Meghan   • Number of children: Not on file   • Years of education: College   • Highest education level: Not on file   Occupational History   • Occupation:      Employer: RETIRED     Comment: [employer]   Tobacco Use   • Smoking status: Never Smoker   • Smokeless tobacco: Never Used   Substance and Sexual Activity   • Alcohol use: No     Comment: Heavy in past, none in 33 years   • Drug use: No     Family History   Problem Relation Age of Onset   • Cancer Mother 85        Breast   • COPD Father    • Cancer Brother 59         Unknown type   • Hypertension Daughter          Past Surgical History:   Procedure Laterality Date   • CHOLECYSTECTOMY  1988   • COLON SURGERY  1998   • COLONOSCOPY  02/2013   • ENDOSCOPY  2012    ESOPHAGOGASTRODUODENOSCOPY WITH DILATION OF SHATZKI'S RING   • LAMINECTOMY  2013    decompression L3-4, L4-5   • PROSTATECTOMY  2007   • SKIN BIOPSY     • TONSILLECTOMY AND ADENOIDECTOMY      1940s   • VASECTOMY      1970s     Current Outpatient Medications on File Prior to Visit   Medication Sig Dispense Refill   • aspirin 81 MG chewable tablet Chew 81 mg daily.     • atenolol (TENORMIN) 25 MG tablet Take 25 mg by mouth daily.     • colestipol (COLESTID) 1 G tablet Take 1 g by mouth 2 (two) times a day.     • escitalopram (LEXAPRO) 10 MG tablet TAKE ONE TABLET BY MOUTH DAILY 90 tablet 0   • esomeprazole (RA ESOMEPRAZOLE MAGNESIUM) 20 MG capsule Take 20 mg by mouth Every Morning Before Breakfast.     • Ibuprofen (ADVIL PO) Take  by mouth As Needed.     • levothyroxine (SYNTHROID, LEVOTHROID) 112 MCG tablet Take 112 mcg by mouth daily.     • Menthol, Topical Analgesic, 4 % gel Apply  topically.     • ondansetron (ZOFRAN) 4 MG tablet Take 1 tablet by mouth Every 12 (Twelve) Hours As Needed for Nausea or Vomiting. 30 tablet 3   • valACYclovir (VALTREX) 1000 MG tablet        No current facility-administered medications on file prior to visit.    No Known Allergies      ONCOLOGIC/HEMATOLOGIC HISTORY: History from previous dates can be found in the separate document.                     REVIEW OF SYSTEMS:   General: no fever, no chills, severe fatigue,negative weight changes,  lack of appetite.  Eyes: no epiphora, xerophthalmia,conjunctivitis, pain, glaucoma, blurred vision, blindness, secretion, photophobia, proptosis, diplopia.  Ears: no otorrhea, tinnitus, otorrhagia, stated deafness,no pain,or  vertigo.  Nose: no rhinorrhea, no epistaxis, no alteration in perception of odors, no sinuses pressure.  Mouth: no alteration in  "gums or teeth,  No ulcers, no difficulty with mastication or deglut ion, no odynophagia.  Neck: no masses or pain, no thyroid alterations, no pain in muscles or arteries, no carotid odynia, no crepitation.  Respiratory: no cough, no sputum production,no dyspnea,no trepopnea, no pleuritic pain,no hemoptysis.  Heart: no syncope, no irregularity, no palpitations, no angina,no orthopnea,no paroxysmal nocturnal dyspnea.  Vascular Venous: no tenderness,no edema,no palpable cords,no postphlebitic syndrome, no skin changes no ulcerations.  Vascular Arterial: no distal ischemia, noclaudication, no gangrene, no neuropathic ischemic pain, no skin ulcers, no paleness no cyanosis.  GI: no dysphagia, no odynophagia, no regurgitation, no heartburn,no indigestion,continuous nausea,no vomiting,no hematemesis ,no melena,no jaundice,no distention, no obstipation,no enterorrhagia,no proctalgia,no anal  lesions, no changes in bowel habits.  : no frequency, no hesitancy, no hematuria, no discharge,no  pain.  Musculoskeletal: no muscle or tendon pain or inflammation,no  joint pain, no edema, severe functional limitation,no fasciculations, no mass.  Neurologic: no headache, no seizures, noalterations on Craneal nerves, no motor deficit, severe upper and lower extremities sensory deficit, coordination, no alteration in memory,normal orientation, calculation,normal writting, verbal and written language.  Skin: no rashes,no pruritus no localized lesions.  Psychiatric: anxiety,  depression,no agitation, no delusions, proper insight.insomnia              Vitals:    09/04/20 0918   BP: 132/83   Pulse: 82   Resp: 20   Temp: 97.8 °F (36.6 °C)   TempSrc: Temporal   SpO2: 95%   Weight: 92.3 kg (203 lb 6.4 oz)   Height: 190 cm (74.8\")                      PHYSICAL EXAMINATION:  I HAVE PERSONALLY REVIEWED THE HISTORY OF THE PRESENT ILLNESS, PAST MEDICAL HISTORY, FAMILY HISTORY, SOCIAL HISTORY, ALLERGIES, MEDICATIONS STATED ABOVE IN THE OFFICE NOTE " FROM TODAY.        GENERAL:  Well-developed, well-nourished  Patient  in no acute distress.   SKIN:  Warm, dry ,NO rashes,NO purpura ,NO petechiae.  HEENT:  Pupils were equal and reactive to light and accomodation, conjunctivas non injected, no pterigion, normal extraocular movements, normal visual acuity. Decreased fei bilaterally  NECK:  Supple with good range of motion; no thyromegaly or masses, no JVD or bruits, no cervical adenopathies.No carotid arteries pain, no carotid abnormal pulsation , NO arterial dance.  LYMPHATICS:  No cervical, NO supraclavicular, NO axillary,NO epitrochlear , NO inguinal adenopathy.  CARDIAC   normal rate and regular rhythm, without murmur,NO rubs NO S3 NO S4 right or left . Normal femoral, popliteal, pedis, brachial and carotid pulses.  VASCULAR ARTERIAL: normal carotids,brachial,radial,femoral,popliteal, pedis pulses , no bruits.no paleness or cyanosis, no pain, no edema, no numbness, no gangrene.  VASCULAR VENOUS: no cyanosis, collateral circulation, varicosities, edema, palpable cords, pain, erythema.  ABDOMEN:  Soft, nontender with no hepatomegaly, no splenomegaly,no masses, no ascites, no collateral circulation,no distention,no Merrill sign, no abdominal pain, no inguinal hernias,no umbilical hernia, no abdominal bruits. Normal bowel sounds.  GENITAL: Not  Performed.  EXTREMITIES  AND SPINE:  No clubbing, cyanosis or edema, no deformities or pain .No kyphosis, scoliosis, deformities or pain in spine, ribs or pelvic bone.  NEUROLOGICAL:  Patient was awake, alert, oriented to time, person and place.CRANIAL NERVES: PUPILS ARE EQUAL AND REACTIVE TO  LIGHT AND ACOMODATION, EXTRAOCULAR MOVEMENTS WERE NORMAL, NO DIPLOPIA OR NYSTAGMUS.  TASTE AND TEMPERATURE DISCRIMINATION IN MOUTH WAS NORMAL. FACE WAS SYMMETRIC, NORMAL SYMMETRIC SENSORY MODALITIES FOR 3 BRANCHES OF TRIGEMINAL NERVE.NORMAL GESTICULATION OF THE FACE MUSCULATURE. NORMAL ELEVATION OF UPPER LIDS. NORMAL VISION.  MOTOR  EXAM: SYMMETRIC PROXIMAL AND DISTAL STRENGTH IN UPPER AND LOWER EXTREMITIES. STANDING FROM CHAIR ONCE NO HESITATION.  LONG TRACK:TOES DOWN GOING, NO BABINSKI  REFLEXES: BICIPITAL, TRICIPITAL 2 +, PATELLAR AND ACHILLES SYMMETRIC AND 0.  MUSCLE TONE: NO RIGIDITY , NO SPASTICITY, NO COGWHEEL.  SENSORY MODALITIES: TOUCH, THERMAL, POINT DISCRIMINATION abolished in hands and feet , VIBRATORY SENSATION NORMAL BILATERALLY.  BALANCE AND COORDINATION: ROMBERG TEST NEGATIVE, FINGER TO NOSE SYMMETRIC WITH BOTH UPPER EXTREMITIES.  ABNORMAL MOVEMENTS: NO TREMOR, NO SEIZURES, NO TICS, NO FASCICULATIONS.  MENINGES: NO KERNING, NO BRUDZINSKI, SOFT SUPPLE NECK.                                 LABORATORY DATA  Component      Latest Ref Rng & Units 2/18/2020 7/2/2020 9/4/2020          10:04 AM 12:41 PM  9:00 AM   WBC      3.40 - 10.80 10*3/mm3 4.12 4.95 4.27   RBC      4.14 - 5.80 10*6/mm3 5.00 5.09 5.07   Hemoglobin      13.0 - 17.7 g/dL 14.9 14.9 15.1   Hematocrit      37.5 - 51.0 % 44.7 46.3 46.5   MCV      79.0 - 97.0 fL 89.4 91.0 91.7   MCH      26.6 - 33.0 pg 29.8 29.3 29.8   MCHC      31.5 - 35.7 g/dL 33.3 32.2 32.5   RDW      12.3 - 15.4 % 13.3 13.3 13.4   RDW-SD      37.0 - 54.0 fl 43.8 44.9 44.9   MPV      6.0 - 12.0 fL 10.9 10.7 10.7   Platelets      140 - 450 10*3/mm3 129 (L) 174 161   Neutrophil Rel %      42.7 - 76.0 % 52.4 60.6 41.4 (L)   Lymphocyte Rel %      19.6 - 45.3 % 31.1 26.9 43.1   Monocyte Rel %      5.0 - 12.0 % 15.0 (H) 11.3 13.3 (H)   Eosinophil Rel %      0.3 - 6.2 % 0.5 0.6 1.2   Basophil Rel %      0.0 - 1.5 % 0.5 0.4 0.5   Immature Granulocyte Rel %      0.0 - 0.5 % 0.5 0.2 0.5   Neutrophils Absolute      1.70 - 7.00 10*3/mm3 2.16 3.00 1.77   Lymphocytes Absolute      0.70 - 3.10 10*3/mm3 1.28 1.33 1.84   Monocytes Absolute      0.10 - 0.90 10*3/mm3 0.62 0.56 0.57   Eosinophils Absolute      0.00 - 0.40 10*3/mm3 0.02 0.03 0.05   Basophils Absolute      0.00 - 0.20 10*3/mm3 0.02 0.02 0.02   Immature  Grans, Absolute      0.00 - 0.05 10*3/mm3 0.02 0.01 0.02   nRBC      0.0 - 0.2 /100 WBC 0.0 0.0 0.0                     ASSESSMENT:  This patient has monoclonal gammopathy of unknown significance and he has undergone bone marrow testing of this on 2 different occasions not being able to document myeloma, lymphoma, myelodysplasia, leukemia or myelofibrosis. Chromosomal analysis has been normal. The patient has not had any significant difference in his monoclonal protein quantification.    The patient has associated with this sensory peripheral neuropathy with balance issues and painful numbness in his feet. We have tried Neurontin in the past with no benefit, now we are trying Lyrica and it seems to be that this is not going to be any benefit to him neither.    We have also tried prednisone with no benefit as per indication by his Neurologist and we have tried immunoglobulin with no benefit and actually this medicine triggered serum sickness in him that required short course of prednisone therapy.    I discussed with his attending physician through video medicine from [facility] last week the fact that we still have no diagnosis in this patient in regard his sensory neuropathy and his minimal monoclonal protein. He is going to be seen by neurology in that institution also by video medicine. The patient continues having progressing symptoms now in his upper extremities up to his elbows in regard to sensory changes and in his lower extremities up to his knees. He has imbalance and he has a negative Romberg test. Actually his bicipital and tricipital reflexes are very active 2-3. His patellar and Achilles are abolished. Vibratory sensation is normal throughout. The nausea is another symptom that is very bothersome and his insomnia is bothersome. I do not think we have any diagnosis on him at this time and I would like to proceed as follows:  1. We will wait to see what the [facility] physicians have to say in regard  to his overall picture.   2. I would like for him to proceed with an MRI scan of the brain, cervical and thoracic spine to see if there is anything that is making all of these symptoms from the neurological system to occur and the nausea to occur as well as his deafness. Maybe all of these things are connected. I would not be surprised if we need to pursue a lumbar puncture.     The patient already has had neuroconductions locally, the report of this is not available.     3. Finally his white count, hemoglobin and platelets remain about the same than have been before. We will measure a chemistry profile and monoclonal protein analysis upon his return in 2-3 weeks.     I discussed all of these facts with the patient and his wife. I encouraged them to follow up with ENT and I asked him to have an audiogram to distinguish if his hearing deficit is defect due to otosclerosis or is defect in the acoustic nerve bilaterally.     In regard to his nausea he will raise his dose of Phenergan from 12.5 to 25 mg oral nightly and given the fact that food gives him some relief of the nausea I encouraged them to have some snack in the room that he can take at 4 or 5-o'clock in the morning to see if this settles him down in some way.    I DISCUSSED WITH PATIENT IN DETAIL FORMS TO DECREASE CHANCES OF CORONAVIRUS INFECTION INCLUDING ISOLATION, PROPER HAND HIGIENE, AVOID PUBLIC PLACES  WITH CROWDS, FOLLOW  CDC RECOMENDATIONS, AND KEEP PERSONAL AND SOCIAL RESPONSIBILITY, WARE A MASK IN PUBLIC PLACES.  PATIENT IS AWARE THIS INFECTION COULD HAVE SEVERE CONSEQUENCES TO PERSONAL HEALTH AND FAMILY RAMIFICATIONS OF THIS.

## 2020-09-08 RX ORDER — ESCITALOPRAM OXALATE 10 MG/1
TABLET ORAL
Qty: 90 TABLET | Refills: 0 | Status: SHIPPED | OUTPATIENT
Start: 2020-09-08 | End: 2020-10-14

## 2020-09-12 ENCOUNTER — HOSPITAL ENCOUNTER (OUTPATIENT)
Dept: MRI IMAGING | Facility: HOSPITAL | Age: 80
Discharge: HOME | End: 2020-09-12

## 2020-09-12 DIAGNOSIS — G61.81 CHRONIC INFLAMMATORY DEMYELINATING POLYNEURITIS (HCC): ICD-10-CM

## 2020-09-12 DIAGNOSIS — D47.2 MGUS (MONOCLONAL GAMMOPATHY OF UNKNOWN SIGNIFICANCE): ICD-10-CM

## 2020-09-12 DIAGNOSIS — D69.6 THROMBOCYTOPENIA (HCC): ICD-10-CM

## 2020-09-12 DIAGNOSIS — D70.8 OTHER NEUTROPENIA (HCC): ICD-10-CM

## 2020-09-12 LAB — CREAT BLDA-MCNC: 1.2 MG/DL (ref 0.6–1.3)

## 2020-09-12 PROCEDURE — 72157 MRI CHEST SPINE W/O & W/DYE: CPT

## 2020-09-12 PROCEDURE — 82565 ASSAY OF CREATININE: CPT

## 2020-09-12 PROCEDURE — 70553 MRI BRAIN STEM W/O & W/DYE: CPT

## 2020-09-12 PROCEDURE — A9579 GAD-BASE MR CONTRAST NOS,1ML: HCPCS | Performed by: INTERNAL MEDICINE

## 2020-09-12 PROCEDURE — 25010000002 GADOTERIDOL PER 1 ML: Performed by: INTERNAL MEDICINE

## 2020-09-12 PROCEDURE — 72156 MRI NECK SPINE W/O & W/DYE: CPT

## 2020-09-12 RX ADMIN — GADOTERIDOL 20 ML: 279.3 INJECTION, SOLUTION INTRAVENOUS at 10:37

## 2020-09-15 ENCOUNTER — OFFICE VISIT (OUTPATIENT)
Dept: GASTROENTEROLOGY | Facility: CLINIC | Age: 80
End: 2020-09-15

## 2020-09-15 VITALS — TEMPERATURE: 97.5 F | BODY MASS INDEX: 25.4 KG/M2 | HEIGHT: 75 IN | WEIGHT: 204.3 LBS

## 2020-09-15 DIAGNOSIS — R11.0 NAUSEA: ICD-10-CM

## 2020-09-15 DIAGNOSIS — K59.01 SLOW TRANSIT CONSTIPATION: Primary | ICD-10-CM

## 2020-09-15 PROCEDURE — 99204 OFFICE O/P NEW MOD 45 MIN: CPT | Performed by: INTERNAL MEDICINE

## 2020-09-15 RX ORDER — PROMETHAZINE HYDROCHLORIDE 12.5 MG/1
TABLET ORAL
COMMUNITY
Start: 2020-09-05 | End: 2020-12-28

## 2020-09-15 NOTE — PROGRESS NOTES
Chief Complaint   Patient presents with   • Nausea   • Constipation   • Gas       Panfilo Feliz is a 80 y.o. male who presents with nausea constipation bloating and gas    80-year-old gentleman who is suffering from chronic nausea, intermittent constipation bloating and gas.  He is nauseous every morning at 6 AM when he wakes up and this lasts until about 10 AM.  He is tried Zofran and Phenergan with no relief.  He is tried eating meals with no relief.  He has no vomiting.  No weight loss.  The nausea seems to be associated with constipation.  When he has a bowel movement the nausea is usually goes away.  He is taking Dulcolax every evening to induce a bowel movement in the morning.  His last EGD was 2013 was reportedly normal.  His last colonoscopy was 2013 reportedly normal and he is adamant about not having another one at this time      Past Medical History:   Diagnosis Date   • Arthritis    • Basal cell carcinoma (BCC) of face    • Disease of thyroid gland    • DJD (degenerative joint disease)    • History of bone marrow biopsy    • Hyperlipidemia    • Hypertension    • Hypothyroidism    • IgG monoclonal gammopathy    • Prostate cancer (CMS/HCC)    • Reflux esophagitis        Past Surgical History:   Procedure Laterality Date   • CHOLECYSTECTOMY  1988   • COLON SURGERY  1998   • COLONOSCOPY  02/2013   • ENDOSCOPY  2012    ESOPHAGOGASTRODUODENOSCOPY WITH DILATION OF SHATZKI'S RING   • LAMINECTOMY  2013    decompression L3-4, L4-5   • PROSTATECTOMY  2007   • SKIN BIOPSY     • TONSILLECTOMY AND ADENOIDECTOMY      1940s   • VASECTOMY      1970s         Current Outpatient Medications:   •  aspirin 81 MG chewable tablet, Chew 81 mg daily., Disp: , Rfl:   •  atenolol (TENORMIN) 25 MG tablet, Take 25 mg by mouth daily., Disp: , Rfl:   •  colestipol (COLESTID) 1 G tablet, Take 1 g by mouth 2 (two) times a day., Disp: , Rfl:   •  escitalopram (LEXAPRO) 10 MG tablet, TAKE ONE TABLET BY MOUTH DAILY, Disp: 90 tablet, Rfl:  0  •  Ibuprofen (ADVIL PO), Take  by mouth As Needed., Disp: , Rfl:   •  levothyroxine (SYNTHROID, LEVOTHROID) 112 MCG tablet, Take 112 mcg by mouth daily., Disp: , Rfl:   •  Menthol, Topical Analgesic, 4 % gel, Apply  topically., Disp: , Rfl:   •  ondansetron (ZOFRAN) 4 MG tablet, Take 1 tablet by mouth Every 12 (Twelve) Hours As Needed for Nausea or Vomiting., Disp: 30 tablet, Rfl: 3  •  promethazine (PHENERGAN) 12.5 MG tablet, , Disp: , Rfl:   •  esomeprazole (RA ESOMEPRAZOLE MAGNESIUM) 20 MG capsule, Take 20 mg by mouth Every Morning Before Breakfast., Disp: , Rfl:   •  valACYclovir (VALTREX) 1000 MG tablet, , Disp: , Rfl:     No Known Allergies    Social History     Socioeconomic History   • Marital status:      Spouse name: Meghan   • Number of children: Not on file   • Years of education: College   • Highest education level: Not on file   Occupational History   • Occupation:      Employer: RETIRED     Comment: [employer]   Tobacco Use   • Smoking status: Never Smoker   • Smokeless tobacco: Never Used   Substance and Sexual Activity   • Alcohol use: No     Comment: Heavy in past, none in 33 years   • Drug use: No       Family History   Problem Relation Age of Onset   • Cancer Mother 85        Breast   • COPD Father    • Cancer Brother 59        Unknown type   • Hypertension Daughter        Review of Systems   Gastrointestinal: Positive for abdominal distention, constipation and nausea.   All other systems reviewed and are negative.      Vitals:    09/15/20 1221   Temp: 97.5 °F (36.4 °C)       Physical Exam  Vitals signs and nursing note reviewed.   Constitutional:       Appearance: He is well-developed.   HENT:      Head: Normocephalic and atraumatic.   Eyes:      Conjunctiva/sclera: Conjunctivae normal.   Neck:      Musculoskeletal: Normal range of motion.      Trachea: No tracheal deviation.   Cardiovascular:      Rate and Rhythm: Normal rate and regular rhythm.   Pulmonary:       Effort: Pulmonary effort is normal. No respiratory distress.      Breath sounds: Normal breath sounds.   Abdominal:      General: Bowel sounds are normal. There is no distension.      Palpations: Abdomen is soft. There is no mass.      Tenderness: There is no abdominal tenderness. There is no guarding or rebound.   Musculoskeletal: Normal range of motion.   Skin:     General: Skin is warm and dry.   Neurological:      Mental Status: He is alert and oriented to person, place, and time.   Psychiatric:         Judgment: Judgment normal.         Problem list    Nausea  Constipation  Bloating and gas      Assessment/Plan    I believe the nausea may be tied to constipation, so I will increase Dulcolax to 10 mg p.o. twice daily  He will use FD guard as needed in the morning for recurring indigestion and nausea  He will continue Zofran and Phenergan as needed  We will perform EGD to rule out peptic ulcer disease, esophagitis H. pylori infection    He should begin a probiotic for the bloating and gas  He may need treatment for suspected small bowel bacterial overgrowth with Xifaxan in the future

## 2020-09-21 ENCOUNTER — TELEPHONE (OUTPATIENT)
Dept: ONCOLOGY | Facility: CLINIC | Age: 80
End: 2020-09-21

## 2020-09-21 ENCOUNTER — APPOINTMENT (OUTPATIENT)
Dept: LAB | Facility: HOSPITAL | Age: 80
End: 2020-09-21

## 2020-09-21 NOTE — TELEPHONE ENCOUNTER
Ina today's appt  Pt went to dentist Wed the 16th.  The dentist called them this morning and the hygienist tested positive for Covid.

## 2020-09-25 ENCOUNTER — TELEPHONE (OUTPATIENT)
Dept: ONCOLOGY | Facility: CLINIC | Age: 80
End: 2020-09-25

## 2020-09-25 NOTE — TELEPHONE ENCOUNTER
Called pt's wife and LVM informing her that I received this message. Will pass along information to Dr. Olvera and our manager.

## 2020-09-25 NOTE — TELEPHONE ENCOUNTER
PATIENT'S WIFE RICARDO (ON [facility]) CALLED TO GIVE THE RESULTS OF THE PATIENT'S COVID TEST, HE WAS NEGATIVE.    CALL BACK #[phone]

## 2020-09-30 ENCOUNTER — OFFICE VISIT (OUTPATIENT)
Dept: ONCOLOGY | Facility: CLINIC | Age: 80
End: 2020-09-30

## 2020-09-30 ENCOUNTER — LAB (OUTPATIENT)
Dept: LAB | Facility: HOSPITAL | Age: 80
End: 2020-09-30

## 2020-09-30 VITALS
HEART RATE: 72 BPM | WEIGHT: 203.8 LBS | OXYGEN SATURATION: 97 % | TEMPERATURE: 97.9 F | SYSTOLIC BLOOD PRESSURE: 127 MMHG | BODY MASS INDEX: 25.34 KG/M2 | RESPIRATION RATE: 19 BRPM | HEIGHT: 75 IN | DIASTOLIC BLOOD PRESSURE: 76 MMHG

## 2020-09-30 DIAGNOSIS — D47.2 MGUS (MONOCLONAL GAMMOPATHY OF UNKNOWN SIGNIFICANCE): ICD-10-CM

## 2020-09-30 DIAGNOSIS — G61.81 CHRONIC INFLAMMATORY DEMYELINATING POLYNEURITIS (HCC): ICD-10-CM

## 2020-09-30 DIAGNOSIS — D69.6 THROMBOCYTOPENIA (HCC): ICD-10-CM

## 2020-09-30 DIAGNOSIS — D70.8 OTHER NEUTROPENIA (HCC): ICD-10-CM

## 2020-09-30 DIAGNOSIS — M48.02 CERVICAL SPINAL STENOSIS: Primary | ICD-10-CM

## 2020-09-30 LAB
ALBUMIN SERPL-MCNC: 4.2 G/DL (ref 3.5–5.2)
ALBUMIN/GLOB SERPL: 1.4 G/DL (ref 1.1–2.4)
ALP SERPL-CCNC: 62 U/L (ref 38–116)
ALT SERPL W P-5'-P-CCNC: 10 U/L (ref 0–41)
ANION GAP SERPL CALCULATED.3IONS-SCNC: 10.6 MMOL/L (ref 5–15)
AST SERPL-CCNC: 21 U/L (ref 0–40)
BASOPHILS # BLD AUTO: 0.01 10*3/MM3 (ref 0–0.2)
BASOPHILS NFR BLD AUTO: 0.2 % (ref 0–1.5)
BILIRUB SERPL-MCNC: 0.3 MG/DL (ref 0.2–1.2)
BUN SERPL-MCNC: 17 MG/DL (ref 6–20)
BUN/CREAT SERPL: 14.8 (ref 7.3–30)
CALCIUM SPEC-SCNC: 9.3 MG/DL (ref 8.5–10.2)
CHLORIDE SERPL-SCNC: 105 MMOL/L (ref 98–107)
CO2 SERPL-SCNC: 24.4 MMOL/L (ref 22–29)
CREAT SERPL-MCNC: 1.15 MG/DL (ref 0.7–1.3)
DEPRECATED RDW RBC AUTO: 46 FL (ref 37–54)
EOSINOPHIL # BLD AUTO: 0.04 10*3/MM3 (ref 0–0.4)
EOSINOPHIL NFR BLD AUTO: 1 % (ref 0.3–6.2)
ERYTHROCYTE [DISTWIDTH] IN BLOOD BY AUTOMATED COUNT: 13.6 % (ref 12.3–15.4)
GFR SERPL CREATININE-BSD FRML MDRD: 61 ML/MIN/1.73
GLOBULIN UR ELPH-MCNC: 3.1 GM/DL (ref 1.8–3.5)
GLUCOSE SERPL-MCNC: 163 MG/DL (ref 74–124)
HCT VFR BLD AUTO: 45.9 % (ref 37.5–51)
HGB BLD-MCNC: 14.6 G/DL (ref 13–17.7)
IMM GRANULOCYTES # BLD AUTO: 0.01 10*3/MM3 (ref 0–0.05)
IMM GRANULOCYTES NFR BLD AUTO: 0.2 % (ref 0–0.5)
LYMPHOCYTES # BLD AUTO: 1.63 10*3/MM3 (ref 0.7–3.1)
LYMPHOCYTES NFR BLD AUTO: 40.5 % (ref 19.6–45.3)
MCH RBC QN AUTO: 29.4 PG (ref 26.6–33)
MCHC RBC AUTO-ENTMCNC: 31.8 G/DL (ref 31.5–35.7)
MCV RBC AUTO: 92.5 FL (ref 79–97)
MONOCYTES # BLD AUTO: 0.54 10*3/MM3 (ref 0.1–0.9)
MONOCYTES NFR BLD AUTO: 13.4 % (ref 5–12)
NEUTROPHILS NFR BLD AUTO: 1.79 10*3/MM3 (ref 1.7–7)
NEUTROPHILS NFR BLD AUTO: 44.7 % (ref 42.7–76)
NRBC BLD AUTO-RTO: 0 /100 WBC (ref 0–0.2)
PLATELET # BLD AUTO: 156 10*3/MM3 (ref 140–450)
PMV BLD AUTO: 10.6 FL (ref 6–12)
POTASSIUM SERPL-SCNC: 4.5 MMOL/L (ref 3.5–4.7)
PROT SERPL-MCNC: 7.3 G/DL (ref 6.3–8)
RBC # BLD AUTO: 4.96 10*6/MM3 (ref 4.14–5.8)
SODIUM SERPL-SCNC: 140 MMOL/L (ref 134–145)
WBC # BLD AUTO: 4.02 10*3/MM3 (ref 3.4–10.8)

## 2020-09-30 PROCEDURE — 80053 COMPREHEN METABOLIC PANEL: CPT

## 2020-09-30 PROCEDURE — 99214 OFFICE O/P EST MOD 30 MIN: CPT | Performed by: INTERNAL MEDICINE

## 2020-09-30 PROCEDURE — 85025 COMPLETE CBC W/AUTO DIFF WBC: CPT

## 2020-09-30 PROCEDURE — 36415 COLL VENOUS BLD VENIPUNCTURE: CPT

## 2020-10-01 ENCOUNTER — HOSPITAL ENCOUNTER (OUTPATIENT)
Dept: INFUSION THERAPY | Facility: HOSPITAL | Age: 80
Discharge: HOME | End: 2020-10-01
Admitting: PSYCHIATRY & NEUROLOGY

## 2020-10-01 ENCOUNTER — TELEPHONE (OUTPATIENT)
Dept: NEUROSURGERY | Facility: CLINIC | Age: 80
End: 2020-10-01

## 2020-10-01 DIAGNOSIS — G61.81 CHRONIC INFLAMMATORY DEMYELINATING POLYNEURITIS (HCC): ICD-10-CM

## 2020-10-01 DIAGNOSIS — G62.9 NEUROPATHY: ICD-10-CM

## 2020-10-01 PROCEDURE — 95886 MUSC TEST DONE W/N TEST COMP: CPT

## 2020-10-01 PROCEDURE — 95911 NRV CNDJ TEST 9-10 STUDIES: CPT | Performed by: PSYCHIATRY & NEUROLOGY

## 2020-10-01 PROCEDURE — 95911 NRV CNDJ TEST 9-10 STUDIES: CPT

## 2020-10-01 PROCEDURE — 95886 MUSC TEST DONE W/N TEST COMP: CPT | Performed by: PSYCHIATRY & NEUROLOGY

## 2020-10-01 NOTE — TELEPHONE ENCOUNTER
Caller: ANGEL @ DR. PINEDA    Relationship to patient: ONCOLOGIST    Best call back number: [phone]    Chief complaint:     Type of visit: NEW PATIENT    Requested date: ASAP     If rescheduling, when is the original appointment: 01/27/20 10:00    Additional notes:CERVICAL SPINE STENOSIS AND HE WILL MOST LIKELY NEED SURGICAL INTERVENTION. DR PINEDA SAID IT NEEDS TO BE DONE ASAP AND THAT HE SPOKE TO DR HINDS ABOUT THIS. HE WANTS TO KNOW IF THIS PT APPT CAN BE MOVED UP.

## 2020-10-01 NOTE — TELEPHONE ENCOUNTER
----- Message from Kyrie England MD sent at 10/1/2020 11:00 AM EDT -----  Try next week or week after that.  ----- Message -----  From: Amaya Lott MA  Sent: 10/1/2020   8:54 AM EDT  To: Kyrie England MD    How soon do you want to see ?  ----- Message -----  From: Kyrie England MD  Sent: 9/30/2020   7:19 PM EDT  To: Chalo Olvera MD, Amaya Lott MA    Okay, thanks.  We will take care of it.  Amaya can you talk to me about this.  ----- Message -----  From: Chalo Olvera MD  Sent: 9/30/2020  11:29 AM EDT  To: Kyrie England MD    I AM REFERRING THIS PT TO YOU LOOK AT MRI OF CERVICAL SPINE I THINK HE HAS SYMPTOMATIC SPINAL STENOSIS YOU DID SURGERY FOR THIS TO HI WIFE RETIRED NURSE FROM [facility], S

## 2020-10-01 NOTE — PROCEDURES
EMG and Nerve Conduction Studies    I.      Instrument used: Neuromax 1002  II.     Please see data sheets for tabular summary of NCS and details on methods, temperatures and lab standards.   III.    EMG muscles tested for upper extremity studies include the deltoid, biceps, triceps, pronator teres, extensor digitorum communis, first dorsal interosseous and abductor pollicis brevis.    IV.   EMG muscles tested for lower extremity studies include the vastus lateralis, tibialis anterior, peroneus longus, medial gastrocnemius and extensor digitorum brevis.    V.    Additional muscles tested as needed.  Paraspinal muscles tested as needed.   VI.   Please see data sheets for tabular summary of EMG findings.   VII. The complete report includes the data sheets.      Indication: Peripheral neuropathy.     ?  CIDP  History: 80-year-old male with 5-year history of progressive peripheral neuropathy in the legs.  Within the last couple of weeks he has noticed some sensory symptoms in the arms and hands.  His balance is off.  He has previous history of a lumbar disc operation.  There is question of CIDP.      Ht: 190 cm  Wt: 92.4 kg  HbA1C: No results found for: HGBA1C  TSH:   Lab Results   Component Value Date    TSH 0.808 07/02/2020       Technical summary:  Nerve conduction studies were obtained in the right leg with 1 comparison on the left.  Additional nerve conductions were done in the right arm.  The feet were warmed prior to study but I could not maintain temperatures above 32 °C and temperature correction was used where indicated.  Skin temperature was at least 32 °C measured on the right palm.  Needle examination was obtained on selected muscles in both legs.    Results:  1.  Prolonged right median sensory latency at 3.9 ms with low amplitude of 13.3 µV.  2.  Prolonged right ulnar sensory latency at 3.9 ms with low amplitude of 7.3 µV.  3.  Normal right median motor study including conduction velocities in the forearm  and above the elbow as well as distal latency and amplitudes including palm amplitude which did not demonstrate significant evidence of motor conduction block at any level.  Normal F-wave.  4.  Normal right ulnar motor study including conduction velocities below the elbow, in the short segment across the elbow and above the elbow.  Normal distal latency and normal amplitudes without significant evidence of motor conduction block at any level.  5.  Prolonged right sural sensory latency with temperature correction at 4.2 ms with low amplitude of 3 µV.  6.  Prolonged right superficial peroneal sensory latency at 4.4 ms with low amplitude of 2.7 µV.  7.  Slow right peroneal motor velocity below the knee at 31.9 m/s with normal velocity in the short segment across the fibular head.  Mildly prolonged latency of 6.4 ms with very low amplitude of 0.517 mV from ankle stimulation and no evidence of significant conduction block proximally.  Slow left peroneal motor velocity below the knee at 36.8 m/s with normal velocity in the short segment across the fibular head.  Normal distal latency with very low amplitude of 0.283 mV and no evidence of significant conduction block proximally.  8.  Slow right tibial motor velocity at 34.6 m/s with normal distal latency and low amplitude of 1.4 mV from ankle stimulation.  9.  Needle examination of selected muscles in both legs showed positive sharp waves in the left tibialis anterior and peroneus longus with an increased number of large motor units increased firing rate and reduced interference pattern.  The left extensor digitorum brevis showed normal insertional activities but very few motor units were seen with very rapid firing rate and reduced interference pattern.  Both medial gastrocnemius muscle showed positive sharp waves and increased number of large motor units increased firing rate and reduced interference pattern.  The right extensor digitorum brevis showed a few positive  sharp waves but again very few motor units with very rapid firing rates and reduced interference pattern.  The vastus lateralis muscles showed normal insertional activities with a mild increased number of large motor units on the left but not on the right.  Firing rates look normal and recruitment was full or only questionably reduced.  Lumbar paraspinals were not studied due to previous surgery.    Impression:  Complex abnormal study.  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  The very low motor amplitudes in the legs with needle exam changes in muscles below the knees can go along with superimposed L5 and S1 radiculopathies versus simply greater evidence of the axonal component of polyneuropathy.  There is some question of left vastus lateralis involvement and so an L4 radiculopathy cannot be entirely excluded.  Typical features of CIDP such as severely prolonged latencies, severely slowed velocities and motor conduction block were not seen.  Clinical correlation is suggested.    Rigoberto Garcia M.D.              Dictated utilizing Dragon dictation.

## 2020-10-01 NOTE — TELEPHONE ENCOUNTER
Spoke with patient's wife and informed her that as per Dr STANLEY he can work him in 10.12.20 at 12 pm

## 2020-10-05 ENCOUNTER — TRANSCRIBE ORDERS (OUTPATIENT)
Dept: SURGERY | Facility: CLINIC | Age: 80
End: 2020-10-05

## 2020-10-05 DIAGNOSIS — Z01.818 OTHER SPECIFIED PRE-OPERATIVE EXAMINATION: Primary | ICD-10-CM

## 2020-10-05 NOTE — PROGRESS NOTES
Subjective   History of Present Illness: Panfilo Feliz is a 80 y.o. male is being seen for consultation today at the request of Chalo Olvera MD for neck pain.  Patient had MR Brain, thoracic and cervical spine at [facility] 9.12.20    Patient is seeing Dr Olvera for leukopenia with thrombocytopenia.    Patient states that he is having spinal pain today.  Patient states that he does have bilateral leg and feet pain. The pain is of a burning sensation.    Patient is currently not taking anything for pain.    The patient is here with his wife.  He is hard of hearing.  I did a lumbar decompression on him 7 years ago for sciatica from spinal stenosis.  He is done well with that.  He has been treated by Dr. Olvera more recently.  He has been having some burning pain in his feet in his calves with numbness and tingling.  This is starting to affect his hands and his forearms.  He has mild neck pain.  He does not really have any sciatic pain or radicular pain in the arms.  The concern was about his neck because he had an MRI that was read out as showing severe stenosis.  I looked at this MRI and I do not think it severe at all.  I think it is somewhat modest moderate at most but I do not think this patient is myelopathic or or that the stenosis is described as the reason for his symptoms in his legs.  From a clinical standpoint that sounds more like a peripheral neuropathy and the EMG and nerve conduction study did seem to support that diagnosis.  I explained to the patient and his wife that fortunately he does not need any neck surgery.  He needs to be reevaluated by neurology regarding the source of his polyneuropathy.  He is already on Lyrica for other problems.  We will have him see Dr. Garcia in follow-up on an as-needed basis here.  Again he does not need any cervical decompressive surgery.        Back Pain  The problem occurs intermittently. The quality of the pain is described as aching and burning. The pain is at a  "severity of 3/10. The symptoms are aggravated by standing. Stiffness is present at night (mid afternoon). Associated symptoms include leg pain, numbness, tingling and weakness. Pertinent negatives include no bladder incontinence, bowel incontinence, fever or headaches. He has tried muscle relaxant, home exercises, heat, ice and NSAIDs for the symptoms. The treatment provided mild relief.   Leg Pain   The pain is present in the left leg, right leg, right foot and left foot. The quality of the pain is described as burning and shooting. The pain is at a severity of 4/10. The pain is moderate. The pain has been fluctuating since onset. Associated symptoms include muscle weakness, numbness and tingling. He has tried acetaminophen and NSAIDs for the symptoms. The treatment provided mild relief.       The following portions of the patient's history were reviewed and updated as appropriate: allergies, current medications, past family history, past medical history, past social history, past surgical history and problem list.    Review of Systems   Constitutional: Negative for chills and fever.   HENT: Negative for congestion.    Gastrointestinal: Negative for bowel incontinence.   Genitourinary: Negative for bladder incontinence.   Musculoskeletal: Positive for back pain, gait problem, neck pain and neck stiffness. Negative for joint swelling.        Neck stiffness at night, pain eases in the morning.   Neurological: Positive for tingling, weakness and numbness. Negative for headaches.           Objective     Vitals:    10/12/20 1142   BP: 137/85   Cuff Size: Adult   Pulse: 74   Temp: 98.5 °F (36.9 °C)   Weight: 92.1 kg (203 lb)   Height: 190.5 cm (75\")     Body mass index is 25.37 kg/m².      Physical Exam  Constitutional:       Appearance: He is well-developed.   HENT:      Head: Normocephalic and atraumatic.   Eyes:      Extraocular Movements: EOM normal.      Conjunctiva/sclera: Conjunctivae normal.      Pupils: Pupils " are equal, round, and reactive to light.      Funduscopic exam:     Right eye: No papilledema.         Left eye: No papilledema.   Neck:      Vascular: No carotid bruit.   Neurological:      Mental Status: He is oriented to person, place, and time.      Coordination: Finger-Nose-Finger Test and Heel to Shin Test normal.      Gait: Gait is intact.      Deep Tendon Reflexes:      Reflex Scores:       Tricep reflexes are 2+ on the right side and 2+ on the left side.       Bicep reflexes are 2+ on the right side and 2+ on the left side.       Brachioradialis reflexes are 2+ on the right side and 2+ on the left side.       Patellar reflexes are 2+ on the right side and 2+ on the left side.       Achilles reflexes are 2+ on the right side and 2+ on the left side.  Psychiatric:         Speech: Speech normal.       Neurologic Exam     Mental Status   Oriented to person, place, and time.   Registration of memory: Good recent and remote memory.   Attention: normal. Concentration: normal.   Speech: speech is normal   Level of consciousness: alert  Knowledge: consistent with education.     Cranial Nerves     CN II   Visual fields full to confrontation.   Visual acuity: normal    CN III, IV, VI   Pupils are equal, round, and reactive to light.  Extraocular motions are normal.     CN V   Facial sensation intact.   Right corneal reflex: normal  Left corneal reflex: normal    CN VII   Facial expression full, symmetric.   Right facial weakness: none  Left facial weakness: none    CN VIII   Hearing: intact    CN IX, X   Palate: symmetric    CN XI   Right sternocleidomastoid strength: normal  Left sternocleidomastoid strength: normal    CN XII   Tongue: not atrophic  Tongue deviation: none    Motor Exam   Muscle bulk: normal  Right arm tone: normal  Left arm tone: normal  Right leg tone: normal  Left leg tone: normal    Strength   Strength 5/5 except as noted.     Sensory Exam   Light touch normal.     Gait, Coordination, and Reflexes      Gait  Gait: normal    Coordination   Finger to nose coordination: normal  Heel to shin coordination: normal    Reflexes   Right brachioradialis: 2+  Left brachioradialis: 2+  Right biceps: 2+  Left biceps: 2+  Right triceps: 2+  Left triceps: 2+  Right patellar: 2+  Left patellar: 2+  Right achilles: 2+  Left achilles: 2+  Right : 2+  Left : 2+          Assessment/Plan   Independent Review of Radiographic Studies:      I personally reviewed the images from the following studies.    I reviewed the MRI of the brain, cervical spine, and thoracic spine done on 9/12/2020.  The report indicates that there is severe spinal stenosis at C4-C5.  I disagree with this.  I think it is rather mild actually without much significant cord impingement.  There is some mild degenerative disc disease in the thoracic spine and some small vessel disease in the brain.  Other than the degree of the severity of the stenosis in the neck I agree with the report.        Medical Decision Making:      Again, I think his symptoms are really more manifestation of the peripheral neuropathy, perhaps CIDP.  I do not think it is from his neck.  He needs to be reevaluated by Dr. Garcia who did his EMG and nerve conduction study so we will make that referral and keep it open-ended.      Panfilo was seen today for back pain.    Diagnoses and all orders for this visit:    CIDP (chronic inflammatory demyelinating polyneuropathy) (CMS/[facility])  -     Ambulatory Referral to Neurology    Degeneration of intervertebral disc at C5-C6 level    Chronic neck pain      Return if symptoms worsen or fail to improve.

## 2020-10-06 ENCOUNTER — TELEPHONE (OUTPATIENT)
Dept: ONCOLOGY | Facility: CLINIC | Age: 80
End: 2020-10-06

## 2020-10-06 NOTE — TELEPHONE ENCOUNTER
----- Message from Chalo Olvera MD sent at 10/5/2020  2:34 PM EDT -----  Regarding: FW: Dr England's office did not give patient an appt until 1/27/21  CHECK AGAIN I DISCUSSED THE CASE WITH THE DOCTOR  ----- Message -----  From: Natasha Tapia  Sent: 10/1/2020   8:45 AM EDT  To: Chalo Olvera MD  Subject: Dr England's office did not give patient #    Hi Dr Olvera, Dr Martins's office did not give Mr Feliz an appt until 1/27/21.  I called the office and spoke w/ Ashlie.  She is sending a message to Dr Martins, but I wanted to give you a head's up.  Thank you Pat

## 2020-10-07 ENCOUNTER — LAB (OUTPATIENT)
Dept: LAB | Facility: HOSPITAL | Age: 80
End: 2020-10-07

## 2020-10-07 DIAGNOSIS — Z01.818 OTHER SPECIFIED PRE-OPERATIVE EXAMINATION: ICD-10-CM

## 2020-10-07 PROCEDURE — C9803 HOPD COVID-19 SPEC COLLECT: HCPCS

## 2020-10-07 PROCEDURE — U0004 COV-19 TEST NON-CDC HGH THRU: HCPCS

## 2020-10-08 LAB — SARS-COV-2 RNA RESP QL NAA+PROBE: NOT DETECTED

## 2020-10-09 ENCOUNTER — ANESTHESIA EVENT (OUTPATIENT)
Dept: GASTROENTEROLOGY | Facility: HOSPITAL | Age: 80
End: 2020-10-09

## 2020-10-09 ENCOUNTER — HOSPITAL ENCOUNTER (OUTPATIENT)
Facility: HOSPITAL | Age: 80
Setting detail: HOSPITAL OUTPATIENT SURGERY
Discharge: HOME | End: 2020-10-09
Attending: INTERNAL MEDICINE | Admitting: INTERNAL MEDICINE

## 2020-10-09 ENCOUNTER — ANESTHESIA (OUTPATIENT)
Dept: GASTROENTEROLOGY | Facility: HOSPITAL | Age: 80
End: 2020-10-09

## 2020-10-09 VITALS
DIASTOLIC BLOOD PRESSURE: 80 MMHG | OXYGEN SATURATION: 95 % | HEART RATE: 71 BPM | HEIGHT: 75 IN | RESPIRATION RATE: 17 BRPM | BODY MASS INDEX: 25.24 KG/M2 | TEMPERATURE: 98 F | WEIGHT: 203 LBS | SYSTOLIC BLOOD PRESSURE: 120 MMHG

## 2020-10-09 DIAGNOSIS — K59.01 SLOW TRANSIT CONSTIPATION: ICD-10-CM

## 2020-10-09 DIAGNOSIS — R11.0 NAUSEA: ICD-10-CM

## 2020-10-09 PROCEDURE — 43239 EGD BIOPSY SINGLE/MULTIPLE: CPT | Performed by: INTERNAL MEDICINE

## 2020-10-09 PROCEDURE — 88305 TISSUE EXAM BY PATHOLOGIST: CPT | Performed by: INTERNAL MEDICINE

## 2020-10-09 PROCEDURE — 25010000002 PROPOFOL 10 MG/ML EMULSION: Performed by: NURSE ANESTHETIST, CERTIFIED REGISTERED

## 2020-10-09 RX ORDER — PROPOFOL 10 MG/ML
VIAL (ML) INTRAVENOUS CONTINUOUS PRN
Status: DISCONTINUED | OUTPATIENT
Start: 2020-10-09 | End: 2020-10-09 | Stop reason: SURG

## 2020-10-09 RX ORDER — LIDOCAINE HYDROCHLORIDE 20 MG/ML
INJECTION, SOLUTION INFILTRATION; PERINEURAL AS NEEDED
Status: DISCONTINUED | OUTPATIENT
Start: 2020-10-09 | End: 2020-10-09 | Stop reason: SURG

## 2020-10-09 RX ORDER — PANTOPRAZOLE SODIUM 40 MG/1
40 TABLET, DELAYED RELEASE ORAL 2 TIMES DAILY
Qty: 60 TABLET | Refills: 12 | Status: SHIPPED | OUTPATIENT
Start: 2020-10-09 | End: 2021-10-23

## 2020-10-09 RX ORDER — GLYCOPYRROLATE 0.2 MG/ML
INJECTION INTRAMUSCULAR; INTRAVENOUS AS NEEDED
Status: DISCONTINUED | OUTPATIENT
Start: 2020-10-09 | End: 2020-10-09 | Stop reason: SURG

## 2020-10-09 RX ORDER — SODIUM CHLORIDE, SODIUM LACTATE, POTASSIUM CHLORIDE, CALCIUM CHLORIDE 600; 310; 30; 20 MG/100ML; MG/100ML; MG/100ML; MG/100ML
1000 INJECTION, SOLUTION INTRAVENOUS CONTINUOUS
Status: DISCONTINUED | OUTPATIENT
Start: 2020-10-09 | End: 2020-10-09 | Stop reason: HOSPADM

## 2020-10-09 RX ORDER — PROPOFOL 10 MG/ML
VIAL (ML) INTRAVENOUS AS NEEDED
Status: DISCONTINUED | OUTPATIENT
Start: 2020-10-09 | End: 2020-10-09 | Stop reason: SURG

## 2020-10-09 RX ADMIN — PROPOFOL 50 MG: 10 INJECTION, EMULSION INTRAVENOUS at 12:18

## 2020-10-09 RX ADMIN — PROPOFOL 50 MG: 10 INJECTION, EMULSION INTRAVENOUS at 12:20

## 2020-10-09 RX ADMIN — PROPOFOL 200 MCG/KG/MIN: 10 INJECTION, EMULSION INTRAVENOUS at 12:18

## 2020-10-09 RX ADMIN — SODIUM CHLORIDE, POTASSIUM CHLORIDE, SODIUM LACTATE AND CALCIUM CHLORIDE 1000 ML: 600; 310; 30; 20 INJECTION, SOLUTION INTRAVENOUS at 11:33

## 2020-10-09 RX ADMIN — GLYCOPYRROLATE 0.2 MG: 0.2 INJECTION INTRAMUSCULAR; INTRAVENOUS at 12:15

## 2020-10-09 RX ADMIN — LIDOCAINE HYDROCHLORIDE 60 MG: 20 INJECTION, SOLUTION INFILTRATION; PERINEURAL at 12:17

## 2020-10-09 NOTE — DISCHARGE INSTRUCTIONS
For the next 24 hours patient needs to be with a responsible adult.    For 24 hours DO NOT drive, operate machinery, appliances, drink alcohol, make important decisions or sign legal documents.    Start with a light or bland diet if you are feeling sick to your stomach otherwise advance to regular diet as tolerated.    Follow recommendations on procedure report if provided by your doctor.    Call Dr Perez for problems [phone]    Problems may include but not limited to: large amounts of bleeding, trouble breathing, repeated vomiting, severe unrelieved pain, fever or chills.

## 2020-10-09 NOTE — ANESTHESIA POSTPROCEDURE EVALUATION
"Patient: Panfilo Feliz    Procedure Summary     Date: 10/09/20 Room / Location: [facility] /  [facility]    Anesthesia Start: 1213 Anesthesia Stop: 1225    Procedure: ESOPHAGOGASTRODUODENOSCOPY with cold bx (N/A Esophagus) Diagnosis:       Slow transit constipation      Nausea      (Slow transit constipation [K59.01])      (Nausea [R11.0])    Surgeon: Jake Perez MD Provider: Tatyana Garcia MD    Anesthesia Type: MAC ASA Status: 3          Anesthesia Type: MAC    Vitals  Vitals Value Taken Time   BP 97/64 10/09/20 1227   Temp     Pulse 68 10/09/20 1227   Resp 13 10/09/20 1227   SpO2 92 % 10/09/20 1227           Post Anesthesia Care and Evaluation    Patient location during evaluation: PACU  Patient participation: complete - patient participated  Level of consciousness: awake and alert  Pain management: adequate  Airway patency: patent  Anesthetic complications: No anesthetic complications    Cardiovascular status: acceptable  Respiratory status: acceptable  Hydration status: acceptable    Comments: BP 97/64 (BP Location: Left arm, Patient Position: Lying)   Pulse 68   Temp 36.7 °C (98 °F) (Oral)   Resp 13   Ht 190.5 cm (75\")   Wt 92.1 kg (203 lb)   SpO2 92%   BMI 25.37 kg/m²         "

## 2020-10-09 NOTE — ANESTHESIA PREPROCEDURE EVALUATION
Anesthesia Evaluation     history of anesthetic complications: PONV prolonged sedation               Airway   Mallampati: I  TM distance: >3 FB  Neck ROM: full  Dental - normal exam     Pulmonary    (-) shortness of breath, recent URI, sleep apnea  Cardiovascular     (+) hypertension, dysrhythmias (hx of irregular heart beat), hyperlipidemia,   (-) angina      Neuro/Psych  (-) dizziness/light headedness, syncope    ROS Comment: Polyneuritis, affecting feet  GI/Hepatic/Renal/Endo    (+)  GERD,    (-) liver disease, no renal disease, diabetes    Musculoskeletal     Abdominal    Substance History      OB/GYN          Other   blood dyscrasia thrombocytopenia,                     Anesthesia Plan    ASA 3     MAC     intravenous induction     Anesthetic plan, all risks, benefits, and alternatives have been provided, discussed and informed consent has been obtained with: patient.

## 2020-10-12 ENCOUNTER — OFFICE VISIT (OUTPATIENT)
Dept: NEUROSURGERY | Facility: CLINIC | Age: 80
End: 2020-10-12

## 2020-10-12 VITALS
HEIGHT: 75 IN | DIASTOLIC BLOOD PRESSURE: 85 MMHG | WEIGHT: 203 LBS | TEMPERATURE: 98.5 F | SYSTOLIC BLOOD PRESSURE: 137 MMHG | BODY MASS INDEX: 25.24 KG/M2 | HEART RATE: 74 BPM

## 2020-10-12 DIAGNOSIS — G61.81 CIDP (CHRONIC INFLAMMATORY DEMYELINATING POLYNEUROPATHY) (HCC): Primary | ICD-10-CM

## 2020-10-12 DIAGNOSIS — G89.29 CHRONIC NECK PAIN: ICD-10-CM

## 2020-10-12 DIAGNOSIS — M54.2 CHRONIC NECK PAIN: ICD-10-CM

## 2020-10-12 DIAGNOSIS — M50.322 DEGENERATION OF INTERVERTEBRAL DISC AT C5-C6 LEVEL: ICD-10-CM

## 2020-10-12 LAB
LAB AP CASE REPORT: NORMAL
PATH REPORT.FINAL DX SPEC: NORMAL
PATH REPORT.GROSS SPEC: NORMAL

## 2020-10-12 PROCEDURE — 99204 OFFICE O/P NEW MOD 45 MIN: CPT | Performed by: NEUROLOGICAL SURGERY

## 2020-10-13 ENCOUNTER — TELEPHONE (OUTPATIENT)
Dept: GASTROENTEROLOGY | Facility: CLINIC | Age: 80
End: 2020-10-13

## 2020-10-13 NOTE — TELEPHONE ENCOUNTER
----- Message from Jake Perez MD sent at 10/13/2020 11:26 AM EDT -----  No H. pylori  Continue twice daily PPI  Office visit 3 months

## 2020-10-13 NOTE — TELEPHONE ENCOUNTER
Call to spouse, Kelin (see hipaa).  Advise per Dr Perez note.  Verb understanding.    Kelin will call back in November to arrange Jan, 2021 appt.

## 2020-10-14 RX ORDER — ESCITALOPRAM OXALATE 10 MG/1
TABLET ORAL
Qty: 90 TABLET | Refills: 0 | Status: SHIPPED | OUTPATIENT
Start: 2020-10-14 | End: 2021-02-22

## 2020-10-29 ENCOUNTER — APPOINTMENT (OUTPATIENT)
Dept: INFUSION THERAPY | Facility: HOSPITAL | Age: 80
End: 2020-10-29

## 2020-11-06 ENCOUNTER — TELEPHONE (OUTPATIENT)
Dept: GASTROENTEROLOGY | Facility: CLINIC | Age: 80
End: 2020-11-06

## 2020-11-06 NOTE — TELEPHONE ENCOUNTER
----- Message from Lorena Fox sent at 11/5/2020  5:35 PM EST -----  Contact: [phone]  Patient has questions regarding medication.  Please call.

## 2020-11-06 NOTE — TELEPHONE ENCOUNTER
Patient called, spoke with spouse Kelin. She states the patient continues to have nausea and esophogeal pain, especially at night. She questions if the patient could take Sucralfate? Advised as per verbal order of TIMOTHY Bennett. Patient may take Sucralfate 1 Gm tablet 4 x times a day. Advised will call the medication into the patient's pharmacy.

## 2020-11-11 ENCOUNTER — OFFICE VISIT (OUTPATIENT)
Dept: ONCOLOGY | Facility: CLINIC | Age: 80
End: 2020-11-11

## 2020-11-11 ENCOUNTER — LAB (OUTPATIENT)
Dept: LAB | Facility: HOSPITAL | Age: 80
End: 2020-11-11

## 2020-11-11 VITALS
TEMPERATURE: 97.7 F | RESPIRATION RATE: 20 BRPM | SYSTOLIC BLOOD PRESSURE: 114 MMHG | OXYGEN SATURATION: 96 % | WEIGHT: 209 LBS | BODY MASS INDEX: 25.99 KG/M2 | DIASTOLIC BLOOD PRESSURE: 74 MMHG | HEART RATE: 70 BPM | HEIGHT: 75 IN

## 2020-11-11 DIAGNOSIS — D47.2 MGUS (MONOCLONAL GAMMOPATHY OF UNKNOWN SIGNIFICANCE): ICD-10-CM

## 2020-11-11 DIAGNOSIS — D69.6 THROMBOCYTOPENIA (HCC): Primary | ICD-10-CM

## 2020-11-11 DIAGNOSIS — M48.02 CERVICAL SPINAL STENOSIS: ICD-10-CM

## 2020-11-11 DIAGNOSIS — G62.9 NEUROPATHY: ICD-10-CM

## 2020-11-11 LAB
ALBUMIN SERPL-MCNC: 4.2 G/DL (ref 3.5–5.2)
ALBUMIN/GLOB SERPL: 1.4 G/DL (ref 1.1–2.4)
ALP SERPL-CCNC: 64 U/L (ref 38–116)
ALT SERPL W P-5'-P-CCNC: 11 U/L (ref 0–41)
ANION GAP SERPL CALCULATED.3IONS-SCNC: 6.9 MMOL/L (ref 5–15)
AST SERPL-CCNC: 23 U/L (ref 0–40)
BASOPHILS # BLD AUTO: 0.02 10*3/MM3 (ref 0–0.2)
BASOPHILS NFR BLD AUTO: 0.5 % (ref 0–1.5)
BILIRUB SERPL-MCNC: 0.5 MG/DL (ref 0.2–1.2)
BUN SERPL-MCNC: 19 MG/DL (ref 6–20)
BUN/CREAT SERPL: 15.1 (ref 7.3–30)
CALCIUM SPEC-SCNC: 9.2 MG/DL (ref 8.5–10.2)
CHLORIDE SERPL-SCNC: 105 MMOL/L (ref 98–107)
CO2 SERPL-SCNC: 26.1 MMOL/L (ref 22–29)
CREAT SERPL-MCNC: 1.26 MG/DL (ref 0.7–1.3)
DEPRECATED RDW RBC AUTO: 44.6 FL (ref 37–54)
EOSINOPHIL # BLD AUTO: 0.04 10*3/MM3 (ref 0–0.4)
EOSINOPHIL NFR BLD AUTO: 0.9 % (ref 0.3–6.2)
ERYTHROCYTE [DISTWIDTH] IN BLOOD BY AUTOMATED COUNT: 13.2 % (ref 12.3–15.4)
GFR SERPL CREATININE-BSD FRML MDRD: 55 ML/MIN/1.73
GLOBULIN UR ELPH-MCNC: 2.9 GM/DL (ref 1.8–3.5)
GLUCOSE SERPL-MCNC: 115 MG/DL (ref 74–124)
HCT VFR BLD AUTO: 44.7 % (ref 37.5–51)
HGB BLD-MCNC: 14.1 G/DL (ref 13–17.7)
IMM GRANULOCYTES # BLD AUTO: 0.03 10*3/MM3 (ref 0–0.05)
IMM GRANULOCYTES NFR BLD AUTO: 0.7 % (ref 0–0.5)
LYMPHOCYTES # BLD AUTO: 1.66 10*3/MM3 (ref 0.7–3.1)
LYMPHOCYTES NFR BLD AUTO: 37.6 % (ref 19.6–45.3)
MCH RBC QN AUTO: 29.1 PG (ref 26.6–33)
MCHC RBC AUTO-ENTMCNC: 31.5 G/DL (ref 31.5–35.7)
MCV RBC AUTO: 92.2 FL (ref 79–97)
MONOCYTES # BLD AUTO: 0.54 10*3/MM3 (ref 0.1–0.9)
MONOCYTES NFR BLD AUTO: 12.2 % (ref 5–12)
NEUTROPHILS NFR BLD AUTO: 2.12 10*3/MM3 (ref 1.7–7)
NEUTROPHILS NFR BLD AUTO: 48.1 % (ref 42.7–76)
NRBC BLD AUTO-RTO: 0 /100 WBC (ref 0–0.2)
PLATELET # BLD AUTO: 159 10*3/MM3 (ref 140–450)
PMV BLD AUTO: 10.8 FL (ref 6–12)
POTASSIUM SERPL-SCNC: 4.2 MMOL/L (ref 3.5–4.7)
PROT SERPL-MCNC: 7.1 G/DL (ref 6.3–8)
RBC # BLD AUTO: 4.85 10*6/MM3 (ref 4.14–5.8)
SODIUM SERPL-SCNC: 138 MMOL/L (ref 134–145)
WBC # BLD AUTO: 4.41 10*3/MM3 (ref 3.4–10.8)

## 2020-11-11 PROCEDURE — 36415 COLL VENOUS BLD VENIPUNCTURE: CPT

## 2020-11-11 PROCEDURE — 99215 OFFICE O/P EST HI 40 MIN: CPT | Performed by: INTERNAL MEDICINE

## 2020-11-11 PROCEDURE — 85025 COMPLETE CBC W/AUTO DIFF WBC: CPT

## 2020-11-11 PROCEDURE — 80053 COMPREHEN METABOLIC PANEL: CPT

## 2020-11-11 RX ORDER — SUCRALFATE 1 G/1
TABLET ORAL
COMMUNITY
Start: 2020-11-06 | End: 2020-11-11

## 2020-11-11 RX ORDER — PREGABALIN 50 MG/1
50 CAPSULE ORAL 2 TIMES DAILY
COMMUNITY
Start: 2020-10-20 | End: 2021-03-30 | Stop reason: DRUGHIGH

## 2020-12-28 RX ORDER — PROMETHAZINE HYDROCHLORIDE 12.5 MG/1
TABLET ORAL
Qty: 60 TABLET | Refills: 0 | Status: SHIPPED | OUTPATIENT
Start: 2020-12-28 | End: 2021-06-14

## 2021-01-25 ENCOUNTER — OFFICE VISIT (OUTPATIENT)
Dept: GASTROENTEROLOGY | Facility: CLINIC | Age: 81
End: 2021-01-25

## 2021-01-25 VITALS — BODY MASS INDEX: 26.28 KG/M2 | WEIGHT: 211.4 LBS | HEIGHT: 75 IN

## 2021-01-25 DIAGNOSIS — R10.13 DYSPEPSIA: ICD-10-CM

## 2021-01-25 DIAGNOSIS — K29.00 ACUTE SUPERFICIAL GASTRITIS WITHOUT HEMORRHAGE: ICD-10-CM

## 2021-01-25 DIAGNOSIS — K26.9 MULTIPLE DUODENAL ULCERS: ICD-10-CM

## 2021-01-25 DIAGNOSIS — R13.19 ESOPHAGEAL DYSPHAGIA: Primary | ICD-10-CM

## 2021-01-25 DIAGNOSIS — K59.01 SLOW TRANSIT CONSTIPATION: ICD-10-CM

## 2021-01-25 PROCEDURE — 99214 OFFICE O/P EST MOD 30 MIN: CPT | Performed by: NURSE PRACTITIONER

## 2021-01-25 RX ORDER — AMITRIPTYLINE HYDROCHLORIDE 25 MG/1
25 TABLET, FILM COATED ORAL NIGHTLY
Qty: 90 TABLET | Refills: 3 | Status: SHIPPED | OUTPATIENT
Start: 2021-01-25 | End: 2021-03-10

## 2021-01-25 RX ORDER — AMITRIPTYLINE HYDROCHLORIDE 25 MG/1
25 TABLET, FILM COATED ORAL NIGHTLY
COMMUNITY
End: 2021-01-25 | Stop reason: SDUPTHER

## 2021-01-25 RX ORDER — SUCRALFATE 1 G/1
1 TABLET ORAL 2 TIMES DAILY
COMMUNITY
End: 2021-03-30

## 2021-01-25 RX ORDER — BISACODYL 5 MG/1
100 TABLET, DELAYED RELEASE ORAL DAILY PRN
COMMUNITY
End: 2021-07-15

## 2021-01-25 NOTE — PROGRESS NOTES
Chief Complaint   Patient presents with   • Constipation   • EGD follow up     HPI    Panfilo Feliz is a  80 y.o. male here for a follow up visit for nausea, constipation, gas and bloating.  This patient follows with Dr. Perez, new to me.  I reviewed endoscopic evaluation dated 10/9/2020 as follows:    EGD was 4 cm hiatal hernia, acute erosive gastritis, nonbleeding duodenal ulcers.  Protonix p.o. twice daily x3 months.  Pathology benign.    Today he does feel improved.  He has had reduction in nausea but still complains of esophageal dysphagia, intermittent belching, and acid reflux.  He is currently on Protonix p.o. twice daily and Carafate p.o. twice daily.  He is avoiding aspirin and NSAIDs.  He denies abdominal pain.  No vomiting.  No weight loss.  He takes promethazine as needed but has needed it less lately.    He still struggle with constipation.  Bowels move on average every 4 days.  He is tried high-fiber diet.  He was given a prescription for Elavil 25 mg at bedtime by Dr. Perez and he feels like this has helped the most.  He is also on colestipol for lipid management.  He has a history of hypothyroidism on Synthroid.    Last colonoscopy was 2013 and reportedly normal and patient has made an informed decision and declines follow-up colonoscopies in the future.  Past Medical History:   Diagnosis Date   • Arthritis    • Basal cell carcinoma (BCC) of face    • Disease of thyroid gland    • DJD (degenerative joint disease)    • History of bone marrow biopsy    • Hyperlipidemia    • Hypertension    • Hypothyroidism    • IgG monoclonal gammopathy    • Prostate cancer (CMS/HCC)    • Reflux esophagitis        Past Surgical History:   Procedure Laterality Date   • CHOLECYSTECTOMY  1988   • COLON SURGERY  1998   • COLONOSCOPY  02/2013   • ENDOSCOPY  2012    ESOPHAGOGASTRODUODENOSCOPY WITH DILATION OF SHATZKI'S RING   • ENDOSCOPY N/A 10/9/2020    Procedure: ESOPHAGOGASTRODUODENOSCOPY with cold bx;  Surgeon:  Jake Perez MD;  Location: [facility] ENDOSCOPY;  Service: Gastroenterology;  Laterality: N/A;  pre - nausea  post - duodenal ulcer, gastrits, gastric ulcer, hiatal hernia   • LAMINECTOMY  2013    decompression L3-4, L4-5   • PROSTATECTOMY  2007   • SKIN BIOPSY     • TONSILLECTOMY AND ADENOIDECTOMY      1940s   • VASECTOMY      1970s       Scheduled Meds:  Outpatient Encounter Medications as of 1/25/2021   Medication Sig Dispense Refill   • amitriptyline (ELAVIL) 25 MG tablet Take 25 mg by mouth Every Night.     • atenolol (TENORMIN) 25 MG tablet Take 25 mg by mouth daily.     • bisacodyl (DULCOLAX) 5 MG EC tablet Take 100 mg by mouth Daily As Needed for Constipation.     • colestipol (COLESTID) 1 G tablet Take 1 g by mouth Daily.     • escitalopram (LEXAPRO) 10 MG tablet TAKE ONE TABLET BY MOUTH DAILY 90 tablet 0   • levothyroxine (SYNTHROID, LEVOTHROID) 112 MCG tablet Take 112 mcg by mouth daily.     • Menthol, Topical Analgesic, 4 % gel Apply  topically.     • ondansetron (ZOFRAN) 4 MG tablet Take 1 tablet by mouth Every 12 (Twelve) Hours As Needed for Nausea or Vomiting. 30 tablet 3   • pantoprazole (PROTONIX) 40 MG EC tablet Take 1 tablet by mouth 2 (Two) Times a Day. 60 tablet 12   • pregabalin (LYRICA) 50 MG capsule      • sucralfate (CARAFATE) 1 g tablet Take 1 g by mouth 2 (two) times a day.     • Magnesium Hydroxide (DULCOLAX PO) Take 100 mg by mouth Daily.     • promethazine (PHENERGAN) 12.5 MG tablet TAKE ONE TABLET BY MOUTH EVERY 8 HOURS AS NEEDED FOR NAUSEA OR VOMITING 60 tablet 0     No facility-administered encounter medications on file as of 1/25/2021.        Continuous Infusions:No current facility-administered medications for this visit.       PRN Meds:.    No Known Allergies    Social History     Socioeconomic History   • Marital status:      Spouse name: Meghan   • Number of children: Not on file   • Years of education: College   • Highest education level: Not on file   Occupational  History   • Occupation:      Employer: RETIRED     Comment: [employer]   Tobacco Use   • Smoking status: Never Smoker   • Smokeless tobacco: Never Used   Substance and Sexual Activity   • Alcohol use: No     Comment: Heavy in past, none in 33 years   • Drug use: No       Family History   Problem Relation Age of Onset   • Cancer Mother 85        Breast   • COPD Father    • Cancer Brother 59        Unknown type   • Hypertension Daughter        Review of Systems   Constitutional: Negative for activity change, appetite change, fatigue, fever and unexpected weight change.   HENT: Positive for trouble swallowing.    Respiratory: Negative for apnea, cough, choking, chest tightness, shortness of breath and wheezing.    Cardiovascular: Negative for chest pain, palpitations and leg swelling.   Gastrointestinal: Positive for constipation and nausea. Negative for abdominal distention, abdominal pain, anal bleeding, blood in stool, diarrhea, rectal pain and vomiting.       There were no vitals filed for this visit.    Physical Exam  Constitutional:       Appearance: He is well-developed.   Cardiovascular:      Rate and Rhythm: Normal rate and regular rhythm.      Heart sounds: Normal heart sounds.   Pulmonary:      Effort: Pulmonary effort is normal. No respiratory distress.      Breath sounds: Normal breath sounds. No wheezing.   Abdominal:      General: Bowel sounds are normal. There is no distension.      Palpations: Abdomen is soft. There is no mass.      Tenderness: There is no abdominal tenderness. There is no guarding.      Hernia: No hernia is present.   Musculoskeletal: Normal range of motion.   Skin:     General: Skin is warm and dry.   Neurological:      Mental Status: He is alert and oriented to person, place, and time.   Psychiatric:         Behavior: Behavior normal.       Assessment    Esophageal dysphagia  Dyspepsia  Nausea  Erosive gastritis  Nonbleeding duodenal  ulcers  Constipation    Plan    Arrange esophagram for further evaluation of dyspeptic symptoms and esophageal dysphagia despite PPI and Carafate regimen.  Reviewed recent EGD findings with the patient as well as pathology all questions were answered.  Continue Protonix twice daily.  Continue Carafate twice daily.  I instruct the patient to call his prescribing provider regarding colestipol.  I think his bowel pattern will improve if he is able to come off this medication.  Continue Elavil, refill provided to patient.  TSH today.  Follow-up 4 weeks.

## 2021-01-29 ENCOUNTER — TRANSCRIBE ORDERS (OUTPATIENT)
Dept: ADMINISTRATIVE | Facility: HOSPITAL | Age: 81
End: 2021-01-29

## 2021-01-29 DIAGNOSIS — Z01.818 OTHER SPECIFIED PRE-OPERATIVE EXAMINATION: Primary | ICD-10-CM

## 2021-02-03 ENCOUNTER — OFFICE VISIT (OUTPATIENT)
Dept: ONCOLOGY | Facility: CLINIC | Age: 81
End: 2021-02-03

## 2021-02-03 ENCOUNTER — LAB (OUTPATIENT)
Dept: LAB | Facility: HOSPITAL | Age: 81
End: 2021-02-03

## 2021-02-03 VITALS
TEMPERATURE: 98 F | WEIGHT: 207.9 LBS | HEIGHT: 75 IN | OXYGEN SATURATION: 97 % | DIASTOLIC BLOOD PRESSURE: 90 MMHG | SYSTOLIC BLOOD PRESSURE: 146 MMHG | RESPIRATION RATE: 16 BRPM | BODY MASS INDEX: 25.85 KG/M2 | HEART RATE: 85 BPM

## 2021-02-03 DIAGNOSIS — G62.9 NEUROPATHY: ICD-10-CM

## 2021-02-03 DIAGNOSIS — D69.6 THROMBOCYTOPENIA (HCC): ICD-10-CM

## 2021-02-03 DIAGNOSIS — D47.2 MGUS (MONOCLONAL GAMMOPATHY OF UNKNOWN SIGNIFICANCE): ICD-10-CM

## 2021-02-03 DIAGNOSIS — D69.6 THROMBOCYTOPENIA (HCC): Primary | ICD-10-CM

## 2021-02-03 DIAGNOSIS — Z01.818 OTHER SPECIFIED PRE-OPERATIVE EXAMINATION: ICD-10-CM

## 2021-02-03 DIAGNOSIS — G61.81 CHRONIC INFLAMMATORY DEMYELINATING POLYNEURITIS (HCC): ICD-10-CM

## 2021-02-03 DIAGNOSIS — R11.0 NAUSEA IN ADULT PATIENT: ICD-10-CM

## 2021-02-03 LAB
ALBUMIN SERPL-MCNC: 4.1 G/DL (ref 3.5–5.2)
ALBUMIN/GLOB SERPL: 1.3 G/DL (ref 1.1–2.4)
ALP SERPL-CCNC: 74 U/L (ref 38–116)
ALT SERPL W P-5'-P-CCNC: 9 U/L (ref 0–41)
ANION GAP SERPL CALCULATED.3IONS-SCNC: 9.7 MMOL/L (ref 5–15)
AST SERPL-CCNC: 21 U/L (ref 0–40)
BASOPHILS # BLD AUTO: 0.02 10*3/MM3 (ref 0–0.2)
BASOPHILS NFR BLD AUTO: 0.6 % (ref 0–1.5)
BILIRUB SERPL-MCNC: 0.4 MG/DL (ref 0.2–1.2)
BUN SERPL-MCNC: 18 MG/DL (ref 6–20)
BUN/CREAT SERPL: 13.2 (ref 7.3–30)
CALCIUM SPEC-SCNC: 9.6 MG/DL (ref 8.5–10.2)
CHLORIDE SERPL-SCNC: 104 MMOL/L (ref 98–107)
CO2 SERPL-SCNC: 25.3 MMOL/L (ref 22–29)
CREAT SERPL-MCNC: 1.36 MG/DL (ref 0.7–1.3)
DEPRECATED RDW RBC AUTO: 42.5 FL (ref 37–54)
EOSINOPHIL # BLD AUTO: 0.04 10*3/MM3 (ref 0–0.4)
EOSINOPHIL NFR BLD AUTO: 1.1 % (ref 0.3–6.2)
ERYTHROCYTE [DISTWIDTH] IN BLOOD BY AUTOMATED COUNT: 12.9 % (ref 12.3–15.4)
GFR SERPL CREATININE-BSD FRML MDRD: 51 ML/MIN/1.73
GLOBULIN UR ELPH-MCNC: 3.2 GM/DL (ref 1.8–3.5)
GLUCOSE SERPL-MCNC: 155 MG/DL (ref 74–124)
HCT VFR BLD AUTO: 45.7 % (ref 37.5–51)
HGB BLD-MCNC: 14.6 G/DL (ref 13–17.7)
IMM GRANULOCYTES # BLD AUTO: 0.01 10*3/MM3 (ref 0–0.05)
IMM GRANULOCYTES NFR BLD AUTO: 0.3 % (ref 0–0.5)
LYMPHOCYTES # BLD AUTO: 1.4 10*3/MM3 (ref 0.7–3.1)
LYMPHOCYTES NFR BLD AUTO: 40.1 % (ref 19.6–45.3)
MCH RBC QN AUTO: 28.7 PG (ref 26.6–33)
MCHC RBC AUTO-ENTMCNC: 31.9 G/DL (ref 31.5–35.7)
MCV RBC AUTO: 90 FL (ref 79–97)
MONOCYTES # BLD AUTO: 0.45 10*3/MM3 (ref 0.1–0.9)
MONOCYTES NFR BLD AUTO: 12.9 % (ref 5–12)
NEUTROPHILS NFR BLD AUTO: 1.57 10*3/MM3 (ref 1.7–7)
NEUTROPHILS NFR BLD AUTO: 45 % (ref 42.7–76)
NRBC BLD AUTO-RTO: 0 /100 WBC (ref 0–0.2)
PLATELET # BLD AUTO: 137 10*3/MM3 (ref 140–450)
PMV BLD AUTO: 10.6 FL (ref 6–12)
POTASSIUM SERPL-SCNC: 4.6 MMOL/L (ref 3.5–4.7)
PROT SERPL-MCNC: 7.3 G/DL (ref 6.3–8)
RBC # BLD AUTO: 5.08 10*6/MM3 (ref 4.14–5.8)
SODIUM SERPL-SCNC: 139 MMOL/L (ref 134–145)
WBC # BLD AUTO: 3.49 10*3/MM3 (ref 3.4–10.8)

## 2021-02-03 PROCEDURE — 80053 COMPREHEN METABOLIC PANEL: CPT

## 2021-02-03 PROCEDURE — U0004 COV-19 TEST NON-CDC HGH THRU: HCPCS

## 2021-02-03 PROCEDURE — 85025 COMPLETE CBC W/AUTO DIFF WBC: CPT

## 2021-02-03 PROCEDURE — 36415 COLL VENOUS BLD VENIPUNCTURE: CPT

## 2021-02-03 PROCEDURE — 99214 OFFICE O/P EST MOD 30 MIN: CPT | Performed by: INTERNAL MEDICINE

## 2021-02-03 PROCEDURE — C9803 HOPD COVID-19 SPEC COLLECT: HCPCS

## 2021-02-03 NOTE — PROGRESS NOTES
REASON FOR FOLLOWUP:    1.Minimal leukopenia with thrombocytopenia in the background of minimal IgG monoclonal gammopathy. The patient has no splenomegaly, no peripheral adenopathy, and no symptoms that suggest lupus or collagen vascular disease. Bone marrow   a  spirate documented no evidence of myeloma, lymphoma, leukemia, myelodysplasia, or any other abnormality. Bone marrow chromosomal analysis as well as flow cytometry were negative.   2.  Inflammatory polyneuropathy.  He initiated IVIG on 10/3/2019.  He did receive his second IVIG on 11/7/2019.allergic reaction to it stopping infusion all togethere  3.  Approximately 10 days after his second IVIG infusion he did experience serum sickness that included rash, generalized arthralgias, skin peeling of the palms.  Dr. Olvera prescribed 20 mg of prednisone and his symptoms have resolved after 2 days.      REASON FOR VISIT: PATIENT WAS CALLED THE DAY BEFORE BY THE OFFICE TO ASK FOR SYMPTOMS THAT COULD BE CONSISTENT WITH CORONAVIRUS INFECTION, AND BEING NEGATIVE WAS SCHEDULED TO BE SEEN IN THE OFFICE TODAY. SIMILAR QUESTIONING TODAY INCLUDING, CHILLS, FEVER, NEW COUGH, SHORTNESS OF BREATH, DIARRHEA,DIFFUSE BODY ACHES  AND CHANGES IN SMELL OR TASTE WERE NEGATIVE.THE PATIENT DENIED ANY CONTACT WITH PERSONS WHO WERE POSITIVE FOR COVID, AND PATIENT IS NOT IN CATEGORY OF HIGH RISK BEHAVIOR TO ACQUIRE COVID.    DURING THE VISIT WITH THE PATIENT TODAY , PATIENT HAD FACE MASK, MY MEDICAL ASSISTANT AND I  HAD PROPPER PROTECTIVE EQUIPMENT, AND I DID HAND HYGIENE WITH SOAP AND WATER BEFORE AND AFTER THE VISIT.  TESTED FOR COVID RECENTLY NEGATIVE.    This patient returns today to the office in company of his wife stating that he has had a dramatic improvement in his nausea in the morning with the use of the medicines that he is taking for his peptic ulcer disease. He also has had significant improvement in his neuropathy in his hands and feet taking the Lyrica. He is taking the  Lyrica twice a day and this medicine has not produced any obvious side effects on him. He has a bitter taste in his mouth after he eats and he is going to have an esophagogram by gastroenterology. The patient is not having any fever, chills or infection. He already has had the 1st dose of his COVID vaccination with no issues or difficulties. Energy level is better, appetite is better, he feels more functional and he feels more happy. He remains on Lexapro.                 Past Medical History:   Diagnosis Date   • Arthritis    • Basal cell carcinoma (BCC) of face    • Disease of thyroid gland    • DJD (degenerative joint disease)    • History of bone marrow biopsy    • Hyperlipidemia    • Hypertension    • Hypothyroidism    • IgG monoclonal gammopathy    • Prostate cancer (CMS/HCC)    • Reflux esophagitis      Past Surgical History:   Procedure Laterality Date   • CHOLECYSTECTOMY  1988   • COLON SURGERY  1998   • COLONOSCOPY  02/2013   • ENDOSCOPY  2012    ESOPHAGOGASTRODUODENOSCOPY WITH DILATION OF SHATZKI'S RING   • ENDOSCOPY N/A 10/9/2020    Procedure: ESOPHAGOGASTRODUODENOSCOPY with cold bx;  Surgeon: Jake Perez MD;  Location: [facility] ENDOSCOPY;  Service: Gastroenterology;  Laterality: N/A;  pre - nausea  post - duodenal ulcer, gastrits, gastric ulcer, hiatal hernia   • LAMINECTOMY  2013    decompression L3-4, L4-5   • PROSTATECTOMY  2007   • SKIN BIOPSY     • TONSILLECTOMY AND ADENOIDECTOMY      1940s   • VASECTOMY      1970s     Social History     Socioeconomic History   • Marital status:      Spouse name: Meghan   • Number of children: Not on file   • Years of education: College   • Highest education level: Not on file   Occupational History   • Occupation:      Employer: RETIRED     Comment: [employer]   Tobacco Use   • Smoking status: Never Smoker   • Smokeless tobacco: Never Used   Substance and Sexual Activity   • Alcohol use: No     Comment: Heavy in past, none in 33  "years   • Drug use: No   • Sexual activity: Defer     Family History   Problem Relation Age of Onset   • Cancer Mother 85        Breast   • COPD Father    • Cancer Brother 59        Unknown type   • Hypertension Daughter            Current Outpatient Medications on File Prior to Visit   Medication Sig Dispense Refill   • amitriptyline (ELAVIL) 25 MG tablet Take 1 tablet by mouth Every Night. 90 tablet 3   • atenolol (TENORMIN) 25 MG tablet Take 25 mg by mouth daily.     • bisacodyl (DULCOLAX) 5 MG EC tablet Take 100 mg by mouth Daily As Needed for Constipation.     • colestipol (COLESTID) 1 G tablet Take 1 g by mouth Daily.     • escitalopram (LEXAPRO) 10 MG tablet TAKE ONE TABLET BY MOUTH DAILY 90 tablet 0   • levothyroxine (SYNTHROID, LEVOTHROID) 112 MCG tablet Take 112 mcg by mouth daily.     • Magnesium Hydroxide (DULCOLAX PO) Take 100 mg by mouth Daily.     • Menthol, Topical Analgesic, 4 % gel Apply  topically.     • ondansetron (ZOFRAN) 4 MG tablet Take 1 tablet by mouth Every 12 (Twelve) Hours As Needed for Nausea or Vomiting. 30 tablet 3   • pantoprazole (PROTONIX) 40 MG EC tablet Take 1 tablet by mouth 2 (Two) Times a Day. 60 tablet 12   • pregabalin (LYRICA) 50 MG capsule      • promethazine (PHENERGAN) 12.5 MG tablet TAKE ONE TABLET BY MOUTH EVERY 8 HOURS AS NEEDED FOR NAUSEA OR VOMITING 60 tablet 0   • sucralfate (CARAFATE) 1 g tablet Take 1 g by mouth 2 (two) times a day.       No current facility-administered medications on file prior to visit.    No Known Allergies      ONCOLOGIC/HEMATOLOGIC HISTORY: History from previous dates can be found in the separate document.                     Vitals:    02/03/21 0834   BP: 146/90   Pulse: 85   Resp: 16   Temp: 98 °F (36.7 °C)   TempSrc: Temporal   SpO2: 97%   Weight: 94.3 kg (207 lb 14.4 oz)   Height: 190.5 cm (75\")                    PHYSICAL EXAMINATION:        I HAVE PERSONALLY REVIEWED THE HISTORY OF THE PRESENT ILLNESS, PAST MEDICAL HISTORY, FAMILY " HISTORY, SOCIAL HISTORY, ALLERGIES, MEDICATIONS STATED ABOVE IN THE OFFICE NOTE FROM TODAY.        GENERAL:  Well-developed, well-nourished  Patient  in no acute distress.   SKIN:  Warm, dry ,NO rashes,NO purpura ,NO petechiae.  HEENT:  Pupils were equal and reactive to light and accomodation, conjunctivae noninjected, no pterygium, normal extraocular movements, normal visual acuity.   NECK:  Supple with good range of motion; no thyromegaly or masses, no JVD or bruits, no cervical adenopathies.No carotid artery pain, no carotid abnormal pulsation , NO arterial dance.  LYMPHATICS:  No cervical, NO supraclavicular, NO axillary,NO epitrochlear , NO inguinal adenopathy.  CARDIAC   normal rate and regular rhythm, without murmur,NO rubs NO S3 NO S4 right or left . Normal femoral, popliteal, pedis, brachial and carotid pulses.  VASCULAR ARTERIAL: normal carotids,brachial,radial,femoral,popliteal, pedis pulses , no bruits.no paleness or cyanosis, no pain, no edema, no numbness, no gangrene.  VASCULAR VENOUS: no cyanosis, collateral circulation, varicosities, edema, palpable cords, pain, erythema.  ABDOMEN:  Soft, nontender with no hepatomegaly, no splenomegaly,no masses, no ascites, no collateral circulation,no distention,no [place] sign, no abdominal pain, no inguinal hernias,no umbilical hernia, no abdominal bruits. Normal bowel sounds.  GENITAL: Not  Performed.  EXTREMITIES  AND SPINE:  No clubbing, cyanosis or edema, no deformities or pain .No kyphosis, scoliosis, deformities or pain in spine, ribs or pelvic bone.  NEUROLOGICAL:  Patient was awake, alert, oriented to time, person and place.saensory neuropathy in feet                                   LABORATORY DATAOrder: [id number] - Part of Panel Order [id number]  Status:  Final result   Visible to patient:  No (not released)   Dx:  Neuropathy; Thrombocytopenia (CMS/HCC...  Specimen Information: Blood        Component   Ref Range & Units 08:49   WBC   3.40 - 10.80  10*3/mm3 3.49    RBC   4.14 - 5.80 10*6/mm3 5.08    Hemoglobin   13.0 - 17.7 g/dL 14.6    Hematocrit   37.5 - 51.0 % 45.7    MCV   79.0 - 97.0 fL 90.0    MCH   26.6 - 33.0 pg 28.7    MCHC   31.5 - 35.7 g/dL 31.9    RDW   12.3 - 15.4 % 12.9    RDW-SD   37.0 - 54.0 fl 42.5    MPV   6.0 - 12.0 fL 10.6    Platelets   140 - 450 10*3/mm3 137Low     Neutrophil %   42.7 - 76.0 % 45.0    Lymphocyte %   19.6 - 45.3 % 40.1    Monocyte %   5.0 - 12.0 % 12.9High     Eosinophil %   0.3 - 6.2 % 1.1    Basophil %   0.0 - 1.5 % 0.6    Immature Grans %   0.0 - 0.5 % 0.3    Neutrophils, Absolute   1.70 - 7.00 10*3/mm3 1.57Low                                             ASSESSMENT:  This patient has monoclonal gammopathy of unknown significance and he has undergone bone marrow testing of this on 2 different occasions not being able to document myeloma, lymphoma, myelodysplasia, leukemia or myelofibrosis. Chromosomal analysis has been normal. The patient has not had any significant difference in his monoclonal protein quantification.    The patient has associated with this sensory peripheral neuropathy with balance issues and painful numbness in his feet. We have tried Neurontin in the past with no benefit, now we are trying Lyrica and it seems to be that this is not going to be any benefit to him neither.    We have also tried prednisone with no benefit as per indication by his Neurologist and we have tried immunoglobulin with no benefit and actually this medicine triggered serum sickness in him that required short course of prednisone therapy.    I discussed with his attending physician through video medicine from [facility] , the fact that we still have no diagnosis in this patient in regard his sensory neuropathy and his minimal monoclonal protein. He is going to be seen by neurology in that institution also by video medicine. The patient continues having progressing symptoms now in his upper extremities up to his elbows in regard  to sensory changes and in his lower extremities up to his knees. He has imbalance and he has a negative Romberg test. Actually his bicipital and tricipital reflexes are very active 2-3. His patellar and Achilles are abolished. Vibratory sensation is normal throughout. The nausea is another symptom that is very bothersome and his insomnia is bothersome. I do not think we have any diagnosis on him at this time and I would like to proceed as follows:  1. We will wait to see what the [facility] physicians have to say in regard to his overall picture.   2. I would like for him to proceed with an MRI scan of the brain, cervical and thoracic spine to see if there is anything that is making all of these symptoms from the neurological system to occur and the nausea to occur as well as his deafness. Maybe all of these things are connected. I would not be surprised if we need to pursue a lumbar puncture.             Since the previous visit the patient underwent an MRI scan of the brain that shows no major alterations, radiologist calls minimal vascular alterations. Most importantly the MRI of the cervical spine documents significant spinal stenosis at the level of C5. I reviewed the pictures of this in the PAC system [facility] with the patient and his wife. The MRI of the thoracic spine shows degenerative changes. Most importantly none of the areas on MRI in the cranium, cervical, thoracic spine disclose any lesions that could be consistent with myeloma, lymphoma or tumoral lesions of any nature. All of the changes are related to degenerative changes. 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     The patient was further reviewed along with his wife on 11/11/2020. We have a lot of issues to discuss today as follows:  1. His chronic nausea is better. He has had an upper GI endoscopy, he had the location of a Schatzki's ring and also he had biopsies of the stomach that documented gastritis, H.  Pylori negative. The patient is now on PPI. His nausea is better. He is eating better and he is not taking any aspirin or antiinflammatory medications, or alcohol at all to reduce gastritis. I point out to him that a lot of that his stress that he carries through all the time probably has something to do with this, I point out that his stomach is a reflection of his soul.     2. His second issue to discuss is his neuropathy. He has been seen by Dr. Darrick Garcia. EMG was performed. Abnormalities documented in the EMG and the interpretation per Dr. Garcia believing that this does not represent a typical feature of inflammatory neuropathy.       The patient was further reviewed on 02/03/2021. His neuropathy is dramatically better on Lyrica twice a day and his nausea in the morning is dramatically better with the use of PPI and Carafate per Jake Perez MD. He is going to proceed with an esophagogram in a day. He has no heartburn but he has a bitter taste in the mouth from time to time after he eats. He has no vomiting. His bowel activity is normal, urination is normal. His depression is better. He is not taking any aspirin or antiinflammatory medication.     To my eyes the patient looks substantially better. His white count remains on the low side without any changes. The hemoglobin, the platelet count remain appropriate at this time. He has no palpable cervical adenopathy, no bone pain, no hepatosplenomegaly.    This is the first time in almost 2 years that the patient has improvement in symptoms related to nausea and also related to neuropathy. The medicines that he is taking are appropriate and I pointed out to him to stay away from aspirin and antiinflammatory medications. He only can take Tylenol for pain.     He is trying to do physical activity and I asked him to do as much as he can even though it is still cold. I asked him to remain active and I asked him to return to see me back in 6 months with a CBC, CMP and  an KARLEE.     I discussed all of these facts with the patient and his wife present in the room. She is very inquisitive in regard the use of Lyrica and the use of the proton pump inhibitor and Carafate for his peptic ulcer disease.    Otherwise I have no other recommendations for the patient at this time.         I DISCUSSED WITH PATIENT IN DETAIL FORMS TO DECREASE CHANCES OF CORONAVIRUS INFECTION INCLUDING ISOLATION, PROPER HAND HIGIENE, AVOID PUBLIC PLACES  WITH CROWDS, FOLLOW  CDC RECOMENDATIONS, AND KEEP PERSONAL AND SOCIAL RESPONSIBILITY, WARE A MASK IN PUBLIC PLACES.  PATIENT IS AWARE THIS INFECTION COULD HAVE SEVERE CONSEQUENCES TO PERSONAL HEALTH AND FAMILY RAMIFICATIONS OF THIS.

## 2021-02-04 LAB
ALBUMIN SERPL ELPH-MCNC: 3.7 G/DL (ref 2.9–4.4)
ALBUMIN/GLOB SERPL: 1.1 {RATIO} (ref 0.7–1.7)
ALPHA1 GLOB SERPL ELPH-MCNC: 0.3 G/DL (ref 0–0.4)
ALPHA2 GLOB SERPL ELPH-MCNC: 0.8 G/DL (ref 0.4–1)
B-GLOBULIN SERPL ELPH-MCNC: 1.4 G/DL (ref 0.7–1.3)
GAMMA GLOB SERPL ELPH-MCNC: 0.9 G/DL (ref 0.4–1.8)
GLOBULIN SER-MCNC: 3.4 G/DL (ref 2.2–3.9)
IGA SERPL-MCNC: 390 MG/DL (ref 61–437)
IGG SERPL-MCNC: 1033 MG/DL (ref 603–1613)
IGM SERPL-MCNC: 21 MG/DL (ref 15–143)
INTERPRETATION SERPL IEP-IMP: ABNORMAL
KAPPA LC FREE SER-MCNC: 33 MG/L (ref 3.3–19.4)
KAPPA LC FREE/LAMBDA FREE SER: 1.93 {RATIO} (ref 0.26–1.65)
LABORATORY COMMENT REPORT: ABNORMAL
LAMBDA LC FREE SERPL-MCNC: 17.1 MG/L (ref 5.7–26.3)
M PROTEIN SERPL ELPH-MCNC: 0.2 G/DL
PROT SERPL-MCNC: 7.1 G/DL (ref 6–8.5)
SARS-COV-2 RNA RESP QL NAA+PROBE: NOT DETECTED

## 2021-02-05 ENCOUNTER — TELEPHONE (OUTPATIENT)
Dept: ONCOLOGY | Facility: CLINIC | Age: 81
End: 2021-02-05

## 2021-02-05 ENCOUNTER — APPOINTMENT (OUTPATIENT)
Dept: GENERAL RADIOLOGY | Facility: HOSPITAL | Age: 81
End: 2021-02-05

## 2021-02-05 ENCOUNTER — HOSPITAL ENCOUNTER (OUTPATIENT)
Dept: GENERAL RADIOLOGY | Facility: HOSPITAL | Age: 81
Discharge: HOME | End: 2021-02-05
Admitting: NURSE PRACTITIONER

## 2021-02-05 DIAGNOSIS — R13.19 ESOPHAGEAL DYSPHAGIA: ICD-10-CM

## 2021-02-05 DIAGNOSIS — R10.13 DYSPEPSIA: ICD-10-CM

## 2021-02-05 PROCEDURE — 63710000001 BARIUM SULFATE 700 MG TABLET: Performed by: NURSE PRACTITIONER

## 2021-02-05 PROCEDURE — A9270 NON-COVERED ITEM OR SERVICE: HCPCS | Performed by: NURSE PRACTITIONER

## 2021-02-05 PROCEDURE — 74221 X-RAY XM ESOPHAGUS 2CNTRST: CPT

## 2021-02-05 PROCEDURE — 63710000001 BARIUM SULFATE 98 % RECONSTITUTED SUSPENSION: Performed by: NURSE PRACTITIONER

## 2021-02-05 PROCEDURE — 63710000001 BARIUM SULFATE 96 % RECONSTITUTED SUSPENSION: Performed by: NURSE PRACTITIONER

## 2021-02-05 RX ADMIN — BARIUM SULFATE 183 ML: 960 POWDER, FOR SUSPENSION ORAL at 08:15

## 2021-02-05 RX ADMIN — BARIUM SULFATE 135 ML: 980 POWDER, FOR SUSPENSION ORAL at 08:15

## 2021-02-05 RX ADMIN — BARIUM SULFATE 700 MG: 700 TABLET ORAL at 08:15

## 2021-02-05 NOTE — PROGRESS NOTES
Abisai had an EGD in October 2020 with hiatal hernia, erosive gastritis, nonbleeding duodenal ulcers.  I saw him in the office recently and he was feeling better but still having issues with mild nausea, dysphagia, and belching.    He is on Protonix p.o. twice daily and Carafate p.o. twice daily.    Looks like evidence of mild dysmotility and known hiatal hernia.Your thoughts?

## 2021-02-05 NOTE — TELEPHONE ENCOUNTER
Spoke with patient to let him know that per Dr. Olvera his labs were stable including his m protein. He was happy to hear this. No further questions.

## 2021-02-08 DIAGNOSIS — R11.0 NAUSEA: ICD-10-CM

## 2021-02-08 DIAGNOSIS — R10.13 DYSPEPSIA: Primary | ICD-10-CM

## 2021-02-08 DIAGNOSIS — R68.81 EARLY SATIETY: ICD-10-CM

## 2021-02-22 ENCOUNTER — HOSPITAL ENCOUNTER (OUTPATIENT)
Dept: NUCLEAR MEDICINE | Facility: HOSPITAL | Age: 81
Discharge: HOME | End: 2021-02-22

## 2021-02-22 DIAGNOSIS — R10.13 DYSPEPSIA: ICD-10-CM

## 2021-02-22 DIAGNOSIS — R11.0 NAUSEA: ICD-10-CM

## 2021-02-22 DIAGNOSIS — R68.81 EARLY SATIETY: ICD-10-CM

## 2021-02-22 PROCEDURE — 78264 GASTRIC EMPTYING IMG STUDY: CPT

## 2021-02-22 PROCEDURE — 0 TECHNETIUM SULFUR COLLOID: Performed by: NURSE PRACTITIONER

## 2021-02-22 PROCEDURE — A9541 TC99M SULFUR COLLOID: HCPCS | Performed by: NURSE PRACTITIONER

## 2021-02-22 RX ORDER — ESCITALOPRAM OXALATE 10 MG/1
TABLET ORAL
Qty: 90 TABLET | Refills: 0 | Status: SHIPPED | OUTPATIENT
Start: 2021-02-22 | End: 2021-06-01 | Stop reason: SDUPTHER

## 2021-02-22 RX ADMIN — TECHNETIUM TC 99M SULFUR COLLOID 1 DOSE: KIT at 08:00

## 2021-02-22 NOTE — PROGRESS NOTES
Please inform the patient that his gastric emptying study does show a delay or gastroparesis.  Recommend strict gastroparesis diet.  Please mail a copy of the handout to his home and get him in with a dietitian at [facility] or [facility] for further guidance.  Follow-up back in the office 4 weeks.

## 2021-02-26 ENCOUNTER — TELEPHONE (OUTPATIENT)
Dept: GASTROENTEROLOGY | Facility: CLINIC | Age: 81
End: 2021-02-26

## 2021-03-10 ENCOUNTER — OFFICE VISIT (OUTPATIENT)
Dept: GASTROENTEROLOGY | Facility: CLINIC | Age: 81
End: 2021-03-10

## 2021-03-10 VITALS — WEIGHT: 205 LBS | HEIGHT: 75 IN | BODY MASS INDEX: 25.49 KG/M2

## 2021-03-10 DIAGNOSIS — K31.84 GASTROPARESIS: Primary | ICD-10-CM

## 2021-03-10 DIAGNOSIS — K21.9 GASTROESOPHAGEAL REFLUX DISEASE, UNSPECIFIED WHETHER ESOPHAGITIS PRESENT: ICD-10-CM

## 2021-03-10 DIAGNOSIS — K59.01 SLOW TRANSIT CONSTIPATION: ICD-10-CM

## 2021-03-10 PROCEDURE — 99443 PR PHYS/QHP TELEPHONE EVALUATION 21-30 MIN: CPT | Performed by: NURSE PRACTITIONER

## 2021-03-10 RX ORDER — AMITRIPTYLINE HYDROCHLORIDE 10 MG/1
10 TABLET, FILM COATED ORAL NIGHTLY
Qty: 14 TABLET | Refills: 0 | Status: SHIPPED | OUTPATIENT
Start: 2021-03-10 | End: 2021-03-24

## 2021-03-10 NOTE — PROGRESS NOTES
Chief Complaint   Patient presents with   • Gastroparesis     HPI    You have chosen to receive care through a telephone visit. Do you consent to use a telephone visit for your medical care today? YES    Panfilo Feliz is a  80 y.o. male here for a follow up visit for esophageal dysphagia and dyspepsia.  This patient follows with Dr. Perez, new to me.  He has a history of erosive gastritis, duodenal ulcers, and hiatal hernia seen on EGD in October 2020.  Subsequent esophagram showed evidence of a moderate hiatal hernia.  Recommendations were to complete gastric emptying study.    Reviewed gastric emptying study dated 2/22/2021:    FINDINGS:  At 30 minutes, 34% had emptied (normal less than 30%).  At 1 hour, 44% had emptied (normal 10-70%).  At 2 hours, 69% had emptied (normal greater than 40%).   At 4 hours, 85% had emptied (normal greater than 90%).    On visit today he reports improvement in dyspeptic symptoms and esophageal dysphagia with gastroparesis diet.  He has not seen a dietitian but his wife is a registered nurse and they have done a great deal of research online.  He continues on Protonix 40 mg p.o. twice daily and Carafate twice daily.  He currently denies nausea, abdominal pain, or vomiting. He is wanting to taper off of Elavil.     He stopped taking Colestid approximately 3 weeks ago and has had improvement in baseline constipation.  He denies rectal bleeding.    Last colonoscopy was 2013 and reportedly normal and patient has made an informed decision and declines follow-up colonoscopies in the future.  Past Medical History:   Diagnosis Date   • Arthritis    • Basal cell carcinoma (BCC) of face    • Disease of thyroid gland    • DJD (degenerative joint disease)    • History of bone marrow biopsy    • Hyperlipidemia    • Hypertension    • Hypothyroidism    • IgG monoclonal gammopathy    • Prostate cancer (CMS/HCC)    • Reflux esophagitis        Past Surgical History:   Procedure Laterality Date   •  CHOLECYSTECTOMY  1988   • COLON SURGERY  1998   • COLONOSCOPY  02/2013   • ENDOSCOPY  2012    ESOPHAGOGASTRODUODENOSCOPY WITH DILATION OF SHATZKI'S RING   • ENDOSCOPY N/A 10/9/2020    Procedure: ESOPHAGOGASTRODUODENOSCOPY with cold bx;  Surgeon: Jake Perez MD;  Location: [facility] ENDOSCOPY;  Service: Gastroenterology;  Laterality: N/A;  pre - nausea  post - duodenal ulcer, gastrits, gastric ulcer, hiatal hernia   • LAMINECTOMY  2013    decompression L3-4, L4-5   • PROSTATECTOMY  2007   • SKIN BIOPSY     • TONSILLECTOMY AND ADENOIDECTOMY      1940s   • VASECTOMY      1970s       Scheduled Meds:  Outpatient Encounter Medications as of 3/10/2021   Medication Sig Dispense Refill   • atenolol (TENORMIN) 25 MG tablet Take 25 mg by mouth daily.     • bisacodyl (DULCOLAX) 5 MG EC tablet Take 100 mg by mouth Daily As Needed for Constipation.     • escitalopram (LEXAPRO) 10 MG tablet TAKE ONE TABLET BY MOUTH DAILY 90 tablet 0   • levothyroxine (SYNTHROID, LEVOTHROID) 112 MCG tablet Take 112 mcg by mouth daily.     • Magnesium Hydroxide (DULCOLAX PO) Take 100 mg by mouth Daily.     • pantoprazole (PROTONIX) 40 MG EC tablet Take 1 tablet by mouth 2 (Two) Times a Day. 60 tablet 12   • pregabalin (LYRICA) 50 MG capsule Take 50 mg by mouth 2 (Two) Times a Day.     • promethazine (PHENERGAN) 12.5 MG tablet TAKE ONE TABLET BY MOUTH EVERY 8 HOURS AS NEEDED FOR NAUSEA OR VOMITING 60 tablet 0   • sucralfate (CARAFATE) 1 g tablet Take 1 g by mouth 2 (two) times a day.     • [DISCONTINUED] amitriptyline (ELAVIL) 25 MG tablet Take 1 tablet by mouth Every Night. 90 tablet 3   • amitriptyline (ELAVIL) 10 MG tablet Take 1 tablet by mouth Every Night for 14 days. 14 tablet 0   • colestipol (COLESTID) 1 G tablet Take 1 g by mouth Daily.     • Menthol, Topical Analgesic, 4 % gel Apply  topically.     • ondansetron (ZOFRAN) 4 MG tablet Take 1 tablet by mouth Every 12 (Twelve) Hours As Needed for Nausea or Vomiting. 30 tablet 3     No  facility-administered encounter medications on file as of 3/10/2021.       Continuous Infusions:No current facility-administered medications for this visit.      PRN Meds:.    No Known Allergies    Social History     Socioeconomic History   • Marital status:      Spouse name: Meghan   • Number of children: Not on file   • Years of education: College   • Highest education level: Not on file   Tobacco Use   • Smoking status: Never Smoker   • Smokeless tobacco: Never Used   Vaping Use   • Vaping Use: Never used   Substance and Sexual Activity   • Alcohol use: No     Comment: Heavy in past, none in 33 years   • Drug use: No   • Sexual activity: Defer       Family History   Problem Relation Age of Onset   • Cancer Mother 85        Breast   • COPD Father    • Cancer Brother 59        Unknown type   • Hypertension Daughter        Review of Systems   Constitutional: Negative for activity change, appetite change, fatigue, fever and unexpected weight change.   HENT: Positive for trouble swallowing.    Respiratory: Negative for apnea, cough, choking, chest tightness, shortness of breath and wheezing.    Cardiovascular: Negative for chest pain, palpitations and leg swelling.   Gastrointestinal: Negative for abdominal distention, abdominal pain, anal bleeding, blood in stool, constipation, diarrhea, nausea, rectal pain and vomiting.        + heartburn       There were no vitals filed for this visit.    Physical Exam  Constitutional:       Appearance: He is well-developed.   Neurological:      Mental Status: He is alert and oriented to person, place, and time.   Psychiatric:         Mood and Affect: Mood normal.         Behavior: Behavior normal.       Assessment    Gastroparesis  Esophageal dysphagia, improving  GERD  Hiatal hernia  Constipation    Plan    Pleasant 80-year-old male seen today in follow-up recently found to have gastroparesis after he completed gastric emptying study.  He has had improvement in esophageal  dysphagia and dyspepsia with gastroparesis diet.  He has also had improvement in constipation coming off of Colestid.  Reviewed gastroparesis diet as well.  He has done his own research at home and would like to hold off seeing a dietitian.  We discussed stopping Carafate to see if his bowel pattern improves as well.  He is also interested in tapering off of Elavil.  I sent him a 2-week prescription of 10 mg a day decreasing from 25 mg a day.  He can stop medication after 2 weeks.    Continue twice daily dosing Protonix.  Strict gastroparesis diet.  Avoid NSAIDs due to his personal history of ulcers.    Follow-up 3 months.    (Telehealth visit/phone call 25 minutes duration.)

## 2021-03-29 ENCOUNTER — TELEPHONE (OUTPATIENT)
Dept: GASTROENTEROLOGY | Facility: CLINIC | Age: 81
End: 2021-03-29

## 2021-03-29 NOTE — TELEPHONE ENCOUNTER
Returned phone call to patient's spouse. She states the patient has completely weaned himself off of Amitriptyline. She states his symptoms(belching, nausea and dysphagia) returned and would like to go back on his Amitriptyline.   Advised spouse patient may go back on his medication and will send an update to TIMOTHY Bennett. Advised if Sabrina disagrees will call back. She verb understanding.

## 2021-03-29 NOTE — TELEPHONE ENCOUNTER
----- Message from Marj Mendoza sent at 3/29/2021 12:31 PM EDT -----  Regarding: amitriptyline (ELAVIL) 10 MG  Contact: [phone]  PT would like to get back on amitriptyline (ELAVIL) 10 MG  medication please advise     CYNDI VALENTINE [facility] [place] - [address] - [phone]  - [phone] FX   [address] [place] [address]   Phone:  [phone]  Fax:  [phone]

## 2021-03-29 NOTE — TELEPHONE ENCOUNTER
he should get back on the amitriptyline and see how he does.  Have her call us back next week with an update.

## 2021-03-30 ENCOUNTER — OFFICE VISIT (OUTPATIENT)
Dept: NEUROLOGY | Facility: CLINIC | Age: 81
End: 2021-03-30

## 2021-03-30 ENCOUNTER — LAB (OUTPATIENT)
Dept: LAB | Facility: HOSPITAL | Age: 81
End: 2021-03-30

## 2021-03-30 VITALS
SYSTOLIC BLOOD PRESSURE: 110 MMHG | DIASTOLIC BLOOD PRESSURE: 68 MMHG | HEIGHT: 75 IN | HEART RATE: 77 BPM | BODY MASS INDEX: 25.94 KG/M2 | OXYGEN SATURATION: 96 % | WEIGHT: 208.6 LBS

## 2021-03-30 DIAGNOSIS — G63 NEUROPATHY ASSOCIATED WITH MGUS (HCC): Primary | ICD-10-CM

## 2021-03-30 DIAGNOSIS — R53.83 OTHER FATIGUE: ICD-10-CM

## 2021-03-30 DIAGNOSIS — D47.2 NEUROPATHY ASSOCIATED WITH MGUS (HCC): ICD-10-CM

## 2021-03-30 DIAGNOSIS — D47.2 NEUROPATHY ASSOCIATED WITH MGUS (HCC): Primary | ICD-10-CM

## 2021-03-30 DIAGNOSIS — R73.9 HYPERGLYCEMIA, UNSPECIFIED: ICD-10-CM

## 2021-03-30 DIAGNOSIS — G63 NEUROPATHY ASSOCIATED WITH MGUS (HCC): ICD-10-CM

## 2021-03-30 PROBLEM — M51.369 DDD (DEGENERATIVE DISC DISEASE), LUMBAR: Status: ACTIVE | Noted: 2020-10-12

## 2021-03-30 PROBLEM — M79.609 EXTREMITY PAIN: Status: ACTIVE | Noted: 2021-03-30

## 2021-03-30 PROBLEM — M48.061 LUMBAR CANAL STENOSIS: Status: ACTIVE | Noted: 2021-03-30

## 2021-03-30 PROBLEM — M54.50 LBP (LOW BACK PAIN): Status: ACTIVE | Noted: 2021-03-30

## 2021-03-30 PROBLEM — M51.36 DDD (DEGENERATIVE DISC DISEASE), LUMBAR: Status: ACTIVE | Noted: 2020-10-12

## 2021-03-30 LAB
ERYTHROCYTE [SEDIMENTATION RATE] IN BLOOD: 9 MM/HR (ref 0–20)
FOLATE SERPL-MCNC: 7.75 NG/ML (ref 4.78–24.2)
HBA1C MFR BLD: 5.52 % (ref 4.8–5.6)
RPR SER QL: NORMAL
T4 FREE SERPL-MCNC: 1.49 NG/DL (ref 0.93–1.7)
TSH SERPL DL<=0.05 MIU/L-ACNC: 0.34 UIU/ML (ref 0.27–4.2)
VIT B12 BLD-MCNC: 285 PG/ML (ref 211–946)

## 2021-03-30 PROCEDURE — 83036 HEMOGLOBIN GLYCOSYLATED A1C: CPT | Performed by: PSYCHIATRY & NEUROLOGY

## 2021-03-30 PROCEDURE — 83516 IMMUNOASSAY NONANTIBODY: CPT

## 2021-03-30 PROCEDURE — 83655 ASSAY OF LEAD: CPT | Performed by: PSYCHIATRY & NEUROLOGY

## 2021-03-30 PROCEDURE — 36415 COLL VENOUS BLD VENIPUNCTURE: CPT | Performed by: PSYCHIATRY & NEUROLOGY

## 2021-03-30 PROCEDURE — 86255 FLUORESCENT ANTIBODY SCREEN: CPT

## 2021-03-30 PROCEDURE — 86592 SYPHILIS TEST NON-TREP QUAL: CPT | Performed by: PSYCHIATRY & NEUROLOGY

## 2021-03-30 PROCEDURE — 82175 ASSAY OF ARSENIC: CPT | Performed by: PSYCHIATRY & NEUROLOGY

## 2021-03-30 PROCEDURE — 82525 ASSAY OF COPPER: CPT

## 2021-03-30 PROCEDURE — 82607 VITAMIN B-12: CPT | Performed by: PSYCHIATRY & NEUROLOGY

## 2021-03-30 PROCEDURE — 82746 ASSAY OF FOLIC ACID SERUM: CPT | Performed by: PSYCHIATRY & NEUROLOGY

## 2021-03-30 PROCEDURE — 82595 ASSAY OF CRYOGLOBULIN: CPT

## 2021-03-30 PROCEDURE — 84443 ASSAY THYROID STIM HORMONE: CPT | Performed by: PSYCHIATRY & NEUROLOGY

## 2021-03-30 PROCEDURE — 99214 OFFICE O/P EST MOD 30 MIN: CPT | Performed by: PSYCHIATRY & NEUROLOGY

## 2021-03-30 PROCEDURE — 83825 ASSAY OF MERCURY: CPT | Performed by: PSYCHIATRY & NEUROLOGY

## 2021-03-30 PROCEDURE — 84439 ASSAY OF FREE THYROXINE: CPT | Performed by: PSYCHIATRY & NEUROLOGY

## 2021-03-30 PROCEDURE — 85652 RBC SED RATE AUTOMATED: CPT | Performed by: PSYCHIATRY & NEUROLOGY

## 2021-03-30 RX ORDER — PREGABALIN 75 MG/1
CAPSULE ORAL
COMMUNITY
Start: 2021-03-12 | End: 2021-04-28 | Stop reason: DRUGHIGH

## 2021-03-30 RX ORDER — AMITRIPTYLINE HYDROCHLORIDE 25 MG/1
25 TABLET, FILM COATED ORAL NIGHTLY
COMMUNITY
End: 2021-06-14

## 2021-03-30 NOTE — PROGRESS NOTES
CC: Peripheral neuropathy    HPI:  Panfilo Feliz is a  80 y.o.  right-handed white male who was sent for neurological consultation by Dr. Olvera regarding peripheral neuropathy.  The patient's history begins about 5 years ago with numbness in the feet.  It has progressed to his feet being very numb in the mornings but after he has been up and around for a while this is improved.  He also has some burning pain in the feet as well as in the arms.  This also seems a bit better as time goes on in the daytime.  He is on Lyrica 75 mg after failing gabapentin.  He has history of prostate cancer and had a prostatectomy.  He also has history of lumbar decompression at L3/4 and L4/5 in 2013.  He has had several elevated blood sugars but no hemoglobin A1c was identified.  He denies knowledge of history of diabetes.  The patient's wife was with him today and she confirmed the patient's history.  He is rather hard of hearing and so it was useful for her to assist.    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  It has remained low since it was identified 8/2016.  He has had 2 bone marrow biopsies which were negative for multiple myeloma and also at least one was stained with Congo red and was negative for amyloid deposition.  He had an EMG done by Dr. 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  The data sheets demonstrated a slow right peroneal motor velocity of 36.7 m/s with prolonged latency of 7 ms and a very low amplitude of 0.25 mV.  On the left but conduction velocity was normal with normal distal latency but very low amplitude of 0.26 mV.  The right tibial motor velocity was slow at 38 m/s with normal distal latency but low amplitude of 1.74 mV.  The left tibial motor velocity was normal with normal distal latency but low amplitude of 1.82 mV.  All sensory studies in both feet showed no responses.    The patient was  treated with 2 doses of IVIG immunoglobulin but he had a reaction causing skin peeling and it was stopped.  The patient does not recognize any particular improvement with the 2 doses of immunoglobulin    Patient was sent for neurologic and hematologic opinion at [facility].  He saw Dr. Elías Naylor and then an electronic neurologic consultation with Fadia Whalen.  Their opinion was that the neuropathy was not likely to be related to the MGUS.    The patient has degenerative disc disease of the spine.  He had previous lumbar surgery as noted.        Past Medical History:   Diagnosis Date   • Arthritis    • Basal cell carcinoma (BCC) of face    • Difficulty walking    • Disease of thyroid gland    • DJD (degenerative joint disease)    • History of bone marrow biopsy    • Hyperlipidemia    • Hypertension    • Hypothyroidism    • IgG monoclonal gammopathy    • Prostate cancer (CMS/HCC)    • Reflux esophagitis          Past Surgical History:   Procedure Laterality Date   • CHOLECYSTECTOMY  1988   • COLON SURGERY  1998   • COLONOSCOPY  02/2013   • ENDOSCOPY  2012    ESOPHAGOGASTRODUODENOSCOPY WITH DILATION OF SHATZKI'S RING   • ENDOSCOPY N/A 10/9/2020    Procedure: ESOPHAGOGASTRODUODENOSCOPY with cold bx;  Surgeon: Jake Perez MD;  Location: [facility];  Service: Gastroenterology;  Laterality: N/A;  pre - nausea  post - duodenal ulcer, gastrits, gastric ulcer, hiatal hernia   • LAMINECTOMY  2013    decompression L3-4, L4-5   • PROSTATECTOMY  2007   • SKIN BIOPSY     • TONSILLECTOMY AND ADENOIDECTOMY      1940s   • VASECTOMY      1970s           Current Outpatient Medications:   •  amitriptyline (ELAVIL) 25 MG tablet, Take 25 mg by mouth Every Night., Disp: , Rfl:   •  atenolol (TENORMIN) 25 MG tablet, Take 25 mg by mouth daily., Disp: , Rfl:   •  bisacodyl (DULCOLAX) 5 MG EC tablet, Take 100 mg by mouth Daily As Needed for Constipation., Disp: , Rfl:   •  escitalopram  (LEXAPRO) 10 MG tablet, TAKE ONE TABLET BY MOUTH DAILY, Disp: 90 tablet, Rfl: 0  •  levothyroxine (SYNTHROID, LEVOTHROID) 112 MCG tablet, Take 112 mcg by mouth daily., Disp: , Rfl:   •  pantoprazole (PROTONIX) 40 MG EC tablet, Take 1 tablet by mouth 2 (Two) Times a Day., Disp: 60 tablet, Rfl: 12  •  pregabalin (LYRICA) 75 MG capsule, , Disp: , Rfl:   •  Magnesium Hydroxide (DULCOLAX PO), Take 100 mg by mouth Daily., Disp: , Rfl:   •  Menthol, Topical Analgesic, 4 % gel, Apply  topically., Disp: , Rfl:   •  ondansetron (ZOFRAN) 4 MG tablet, Take 1 tablet by mouth Every 12 (Twelve) Hours As Needed for Nausea or Vomiting., Disp: 30 tablet, Rfl: 3  •  promethazine (PHENERGAN) 12.5 MG tablet, TAKE ONE TABLET BY MOUTH EVERY 8 HOURS AS NEEDED FOR NAUSEA OR VOMITING, Disp: 60 tablet, Rfl: 0      Family History   Problem Relation Age of Onset   • Cancer Mother 85        Breast   • COPD Father    • Cancer Brother 59        Unknown type   • Hypertension Daughter          Social History     Socioeconomic History   • Marital status:      Spouse name: Meghan   • Number of children: Not on file   • Years of education: College   • Highest education level: Not on file   Tobacco Use   • Smoking status: Never Smoker   • Smokeless tobacco: Never Used   Vaping Use   • Vaping Use: Never used   Substance and Sexual Activity   • Alcohol use: No     Comment: Heavy in past, none in 33 years   • Drug use: No   • Sexual activity: Defer         No Known Allergies      Pain Scale: Pain is generally about a 3/10 on a day-to-day basis.        ROS:  Review of Systems   Constitutional: Positive for activity change, appetite change and fatigue.   HENT: Positive for hearing loss, mouth sores, postnasal drip, tinnitus and trouble swallowing.    Eyes: Negative for pain, redness and itching.   Respiratory: Negative for cough and choking.    Cardiovascular: Negative for chest pain and leg swelling.   Gastrointestinal: Positive for anal bleeding and  "constipation. Negative for nausea and vomiting.   Endocrine: Positive for cold intolerance. Negative for heat intolerance.   Musculoskeletal: Positive for back pain, gait problem and myalgias.   Allergic/Immunologic: Positive for immunocompromised state. Negative for environmental allergies and food allergies.   Neurological: Positive for dizziness, weakness, light-headedness and numbness. Negative for tremors, seizures, syncope, facial asymmetry, speech difficulty and headaches.   Hematological: Bruises/bleeds easily.   Psychiatric/Behavioral: Positive for decreased concentration and dysphoric mood. Negative for agitation, behavioral problems, confusion, hallucinations, self-injury, sleep disturbance and suicidal ideas. The patient is not nervous/anxious and is not hyperactive.          I have reviewed and agree with the above ROS completed by the medical assistant.      Physical Exam:  Vitals:    03/30/21 1455   BP: 110/68   Pulse: 77   SpO2: 96%   Weight: 94.6 kg (208 lb 9.6 oz)   Height: 190.5 cm (75\")     Orthostatic BP:    Body mass index is 26.07 kg/m².    Physical Exam  General: Well-developed white male no acute distress  HEENT: Normocephalic no evidence of trauma.  Discs flat.  No AV nicking.  Throat negative.  Neck: Supple.  No thyromegaly.  No cervical bruits.  Radial pulses were strong and simultaneous.  Heart: Regular rate and rhythm without murmurs  Extremities: No pedal edema.  Radial pulses were strong and simultaneous.      Neurological Exam:   Mental Status: Awake, alert, oriented to person, place and time.  Conversant without evidence of an affective disorder, thought disorder, delusions or hallucinations.  Attention span and concentration are normal.  HCF: No aphasia, apraxia or dysarthria.  Recent and remote memory intact.  Knowledge  of recent events intact.  CN: I:   II: Visual fields full without left inattention   III, IV, VI: Eye movements intact without nystagmus or ptosis.  Pupils equal  " round and reactive to light.   V,VII: Light touch and pinprick intact all 3 divisions of V.  Facial muscles symmetrical.   VIII: Hearing intact to finger rub   IX,X: Soft palate elevates symmetrically   XI: Sternomastoid and trapezius are strong.   XII: Tongue midline without atrophy or fasciculations  Motor: Incomplete relaxation.  Notable atrophy in both feet   Power testing: Mild weakness of abductor pollicis brevis muscles bilaterally with otherwise normal power in the upper extremities.  In the lower extremities there is weakness of toe flexion and extension with all other muscles tested being normal.  Reflexes: Upper extremities: +1 diffusely        Lower extremities: +1 knee jerks absent ankle jerks        Toe signs: Downgoing bilaterally  Sensory: Light touch: Reduced in the feet and lower legs somewhat reduced in all the fingers        Pinprick: Sharp in all the fingers.  Sharp in the legs and feet        Vibration: Reduced at the ankles        Position: Intact at the great toes    Cerebellar: Finger-to-nose: Intact           Rapid movement: Intact           Heel-to-shin: Intact  Gait and Station: Casual walk is mildly broad-based.  He can raise up on toes and heels.  He has trouble with tandem walking.  No Romberg no drift    Results:      Lab Results   Component Value Date    GLUCOSE 155 (H) 02/03/2021    BUN 18 02/03/2021    CREATININE 1.36 (H) 02/03/2021    EGFRIFNONA 51 (L) 02/03/2021    BCR 13.2 02/03/2021    CO2 25.3 02/03/2021    CALCIUM 9.6 02/03/2021    PROTENTOTREF 7.1 02/03/2021    ALBUMIN 4.10 02/03/2021    ALBUMIN 3.7 02/03/2021    LABIL2 1.1 02/03/2021    AST 21 02/03/2021    ALT 9 02/03/2021       Lab Results   Component Value Date    WBC 3.49 02/03/2021    HGB 14.6 02/03/2021    HCT 45.7 02/03/2021    MCV 90.0 02/03/2021     (L) 02/03/2021         .No results found for: RPR      Lab Results   Component Value Date    TSH 0.808 07/02/2020         No results found for:  [id number]      No results found for: FOLATE      Lab Results   Component Value Date    HGBA1C 5.52 03/30/2021         Lab Results   Component Value Date    GLUCOSE 155 (H) 02/03/2021    BUN 18 02/03/2021    CREATININE 1.36 (H) 02/03/2021    EGFRIFNONA 51 (L) 02/03/2021    BCR 13.2 02/03/2021    K 4.6 02/03/2021    CO2 25.3 02/03/2021    CALCIUM 9.6 02/03/2021    PROTENTOTREF 7.1 02/03/2021    ALBUMIN 4.10 02/03/2021    ALBUMIN 3.7 02/03/2021    LABIL2 1.1 02/03/2021    AST 21 02/03/2021    ALT 9 02/03/2021         Lab Results   Component Value Date    WBC 3.49 02/03/2021    HGB 14.6 02/03/2021    HCT 45.7 02/03/2021    MCV 90.0 02/03/2021     (L) 02/03/2021             Assessment:  1.  Neuropathy associated with MGUS-there are several characteristics that are atypical for CIDP being the origin.  First the speed of progression is to slow.  His exam demonstrates only a mild abnormalities in strength and sensation plus most of his muscle stretch reflexes are present.  Electrographically the nerves are only mildly slow and did not show clear evidence of conduction block.  These clinical and electrographic findings argue against CIDP.              Plan:  1.  I had a rather lengthy discussion with the patient and his wife regarding how one arrives at the diagnosis of CIDP with best certainty.  Since his clinical course and electrodiagnostic study are not suggestive of CIDP I do not feel it is likely that immune therapy is needed.  2.  Screen for treatable causes of neuropathy including sed rate, RPR, B12 and folate, thyroid functions, SPE/IEP, cryoglobulins, heavy metal screen  3.  Follow-up with SARATH Ha in about 1 month        Time: 45 minutes                  Dictated utilizing Dragon dictation.

## 2021-03-31 ENCOUNTER — TELEPHONE (OUTPATIENT)
Dept: NEUROLOGY | Facility: CLINIC | Age: 81
End: 2021-03-31

## 2021-03-31 NOTE — TELEPHONE ENCOUNTER
----- Message from Rigoberto Garcia MD sent at 3/30/2021  5:56 PM EDT -----  Vitamin B12 is at the lower end of the normal range below 300.  He should be on oral B12 supplement and it should be repeat  after time passes to ensure it is being absorbed properly through the GI tractDamesha, please let him know he should be taking 1 mg (1000 mcg) of vitamin B12 orally dailyGNS

## 2021-03-31 NOTE — TELEPHONE ENCOUNTER
Spoke with patient's wife made her aware that patient's B12 level is at the lower end of normal. Also told her that he would need to start taking a B12 supplement 1mg daily.

## 2021-04-02 LAB
ARSENIC BLD-MCNC: 7 UG/L (ref 2–23)
LEAD BLDV-MCNC: <1 UG/DL (ref 0–4)
MERCURY BLD-MCNC: <1 UG/L (ref 0–14.9)

## 2021-04-03 LAB — COPPER SERPL-MCNC: 138 UG/DL (ref 69–132)

## 2021-04-05 LAB — CRYOGLOB SER QL 1D COLD INC: NORMAL

## 2021-04-06 LAB — REF LAB TEST METHOD: NORMAL

## 2021-04-28 ENCOUNTER — OFFICE VISIT (OUTPATIENT)
Dept: NEUROLOGY | Facility: CLINIC | Age: 81
End: 2021-04-28

## 2021-04-28 VITALS
WEIGHT: 208 LBS | OXYGEN SATURATION: 98 % | SYSTOLIC BLOOD PRESSURE: 110 MMHG | HEART RATE: 74 BPM | HEIGHT: 75 IN | DIASTOLIC BLOOD PRESSURE: 72 MMHG | BODY MASS INDEX: 25.86 KG/M2

## 2021-04-28 DIAGNOSIS — G63 NEUROPATHY ASSOCIATED WITH MGUS (HCC): Primary | ICD-10-CM

## 2021-04-28 DIAGNOSIS — D47.2 NEUROPATHY ASSOCIATED WITH MGUS (HCC): Primary | ICD-10-CM

## 2021-04-28 PROCEDURE — 99214 OFFICE O/P EST MOD 30 MIN: CPT | Performed by: PHYSICIAN ASSISTANT

## 2021-04-28 RX ORDER — PREGABALIN 100 MG/1
100 CAPSULE ORAL 3 TIMES DAILY
Qty: 90 CAPSULE | Refills: 2 | Status: SHIPPED | OUTPATIENT
Start: 2021-04-28 | End: 2021-08-05

## 2021-04-28 NOTE — PROGRESS NOTES
CC: Follow-up polyneuropathy    HPI:  Panfilo Feliz is a  80 y.o.  right-handed male who I am seeing in follow-up today for peripheral neuropathy.  Patient is accompanied by his wife.  Past medical history is significant for osteoarthritis/degenerative joint disease, degenerative disc disease of his lumbar spine status post decompression surgery in 2013, history of prostate cancer status post prostatectomy in 2007, hypertension, hyperlipidemia, hypothyroidism and GERD/peptic ulcer disease.  Patient was last seen in our office about a month ago on 3/30/2021 per Dr. Garcia, a summary of his condition is taken from previous note with additions and modifications as needed:     Patient was initially sent for neurological consultation by Dr. Olvera regarding peripheral neuropathy.  The patient's history begins about 5 years ago with numbness in the feet.  It has progressed to his feet being very numb in the mornings but after he has been up and around for a while this is improved.  He also has some burning pain in the feet as well as in the arms.  This also seems a bit better as time goes on in the daytime.  Patient does also have chronic mild back pain but no significant radiation into his legs.  Of note he had a lumbar decompression at L3/4 and L4/5 in 2013.  His hematological work-up has included protein electrophoresis with immunofixation which did show an IgG kappa MGUS at low concentration of 0.2-0.3 g/dL.  It has remained low since it was identified 8/2016.  He has had 2 bone marrow biopsies which were negative for multiple myeloma and also at least one was stained with Congo red and was negative for amyloid deposition.  He had an EMG done by Dr. 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  The data sheets demonstrated a slow right peroneal motor velocity of 36.7 m/s with prolonged latency of 7 ms and a very low amplitude of 0.25 mV.  On the left but  conduction velocity was normal with normal distal latency but very low amplitude of 0.26 mV.  The right tibial motor velocity was slow at 38 m/s with normal distal latency but low amplitude of 1.74 mV.  The left tibial motor velocity was normal with normal distal latency but low amplitude of 1.82 mV.  All sensory studies in both feet showed no responses.    More recently patient had some additional laboratories done to look for treatable causes of neuropathy.  This was discussed at todays visit: studies show slightly elevated copper level of 138, all other labs including thyroid, sed rate, RPR, hemoglobin A1c, heavy metals, cryoglobulins were within normal limits.  Motor and sensory neuropathy panel also within normal limits.  Patient's B12 and folate levels were also noted to be within normal limits but at the lower end of the spectrum - values were 285 and 7.75 respectively.  As such he was instructed to start on some supplemental B12 vitamins which she has been taking.    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  The patient does not recognize any particular improvement with the 2 doses of immunoglobulin. Patient was then sent for neurologic and hematologic opinion at [facility].  He saw Dr. Elías Naylor and then an electronic neurologic consultation with Fadia Whalen. Their opinion was that the neuropathy was not likely to be related to the MGUS.  Dr. Garcia who saw the patient last also suspected that since his clinical course (speed of progression is too slow),  his exam demonstrated only a mild abnormalities in strength and sensation plus most of his muscle stretch reflexes were present, and electrographically the nerves  are only mildly slow and did not show clear evidence of conduction block - this is not supportive of CIDP.     Since his appointment a month ago, patient does report symptoms do seem slightly worse.  Particularly reports he is now having fairly significant pain even  during the day whereas previously pain was mostly in the late evenings and first thing in the morning.  He still reports improvement on the Lyrica twice daily -has been taking this for some time now, though even with the Lyrica on board he still has mild pain -at present 4/10 but at worst 7 or 8/10.          Past Medical History:   Diagnosis Date   • Arthritis    • Basal cell carcinoma (BCC) of face    • Difficulty walking    • Disease of thyroid gland    • DJD (degenerative joint disease)    • History of bone marrow biopsy    • Hyperlipidemia    • Hypertension    • Hypothyroidism    • IgG monoclonal gammopathy    • Prostate cancer (CMS/HCC)    • Reflux esophagitis          Past Surgical History:   Procedure Laterality Date   • CHOLECYSTECTOMY  1988   • COLON SURGERY  1998   • COLONOSCOPY  02/2013   • ENDOSCOPY  2012    ESOPHAGOGASTRODUODENOSCOPY WITH DILATION OF SHATZKI'S RING   • ENDOSCOPY N/A 10/9/2020    Procedure: ESOPHAGOGASTRODUODENOSCOPY with cold bx;  Surgeon: Jake Perez MD;  Location: [facility] ENDOSCOPY;  Service: Gastroenterology;  Laterality: N/A;  pre - nausea  post - duodenal ulcer, gastrits, gastric ulcer, hiatal hernia   • LAMINECTOMY  2013    decompression L3-4, L4-5   • PROSTATECTOMY  2007   • SKIN BIOPSY     • TONSILLECTOMY AND ADENOIDECTOMY      1940s   • VASECTOMY      1970s           Current Outpatient Medications:   •  amitriptyline (ELAVIL) 25 MG tablet, Take 25 mg by mouth Every Night., Disp: , Rfl:   •  atenolol (TENORMIN) 25 MG tablet, Take 25 mg by mouth daily., Disp: , Rfl:   •  bisacodyl (DULCOLAX) 5 MG EC tablet, Take 100 mg by mouth Daily As Needed for Constipation., Disp: , Rfl:   •  escitalopram (LEXAPRO) 10 MG  tablet, TAKE ONE TABLET BY MOUTH DAILY, Disp: 90 tablet, Rfl: 0  •  levothyroxine (SYNTHROID, LEVOTHROID) 112 MCG tablet, Take 112 mcg by mouth daily., Disp: , Rfl:   •  pantoprazole (PROTONIX) 40 MG EC tablet, Take 1 tablet by mouth 2 (Two) Times a Day., Disp: 60 tablet, Rfl: 12  •  pregabalin (LYRICA) 75 MG capsule, , Disp: , Rfl:   •  Magnesium Hydroxide (DULCOLAX PO), Take 100 mg by mouth Daily., Disp: , Rfl:   •  Menthol, Topical Analgesic, 4 % gel, Apply  topically., Disp: , Rfl:   •  ondansetron (ZOFRAN) 4 MG tablet, Take 1 tablet by mouth Every 12 (Twelve) Hours As Needed for Nausea or Vomiting., Disp: 30 tablet, Rfl: 3  •  promethazine (PHENERGAN) 12.5 MG tablet, TAKE ONE TABLET BY MOUTH EVERY 8 HOURS AS NEEDED FOR NAUSEA OR VOMITING, Disp: 60 tablet, Rfl: 0      Family History   Problem Relation Age of Onset   • Cancer Mother 85        Breast   • COPD Father    • Cancer Brother 59        Unknown type   • Hypertension Daughter          Social History     Socioeconomic History   • Marital status:      Spouse name: Meghan   • Number of children: Not on file   • Years of education: College   • Highest education level: Not on file   Tobacco Use   • Smoking status: Never Smoker   • Smokeless tobacco: Never Used   Vaping Use   • Vaping Use: Never used   Substance and Sexual Activity   • Alcohol use: No     Comment: Heavy in past, none in 33 years   • Drug use: No   • Sexual activity: Defer         No Known Allergies      Pain Scale: 4/10        ROS:  Review of Systems   Constitutional: Positive for fatigue. Negative for chills and fever.   HENT: Positive for hearing loss, tinnitus and trouble swallowing.    Eyes: Negative for photophobia, redness and visual disturbance.   Respiratory: Negative for cough, shortness of breath and wheezing.    Cardiovascular: Negative for chest pain, palpitations and leg swelling.   Gastrointestinal: Negative for constipation, diarrhea, nausea and vomiting.   Endocrine:  "Negative for cold intolerance, heat intolerance and polydipsia.   Genitourinary: Negative for decreased urine volume, difficulty urinating and urgency.   Musculoskeletal: Positive for back pain, gait problem, neck pain and neck stiffness.   Skin: Negative for color change, rash and wound.   Allergic/Immunologic: Negative for environmental allergies, food allergies and immunocompromised state.   Neurological: Positive for dizziness, weakness and numbness. Negative for tremors, seizures, syncope, facial asymmetry, speech difficulty, light-headedness and headaches.   Hematological: Negative for adenopathy. Bruises/bleeds easily.   Psychiatric/Behavioral: Positive for sleep disturbance. Negative for confusion and decreased concentration. The patient is nervous/anxious.        I have reviewed the above ROS put in by medical assistant and am in agreement    Physical Exam:  Vitals:    04/28/21 1257   BP: 110/72   Pulse: 74   SpO2: 98%   Weight: 94.3 kg (208 lb)   Height: 190.5 cm (75\")         Body mass index is 26 kg/m².    Physical Exam  General: Well-developed white male no acute distress  HEENT: Normocephalic, atraumatic  Neck: Supple.  No cervical bruits.    Heart: Regular rate and rhythm without murmurs. Radial pulses were strong and simultaneous.  Extremities: No pedal edema.  Pedal pulses were strong and simultaneous.      Neurological Exam:   Mental Status: Awake, alert, oriented to person, place and time.  Conversant without evidence of an affective disorder, thought disorder, delusions or hallucinations.  Attention span and concentration are normal.  HCF: No aphasia, apraxia or dysarthria.  Recent and remote memory intact.  Knowledge  of recent events intact.  CN:      I:              II: Visual fields full without left inattention              III, IV, VI: Eye movements intact without nystagmus or ptosis.  Pupils equal  round and reactive to light.              V,VII: Light touch and pinprick intact all 3 " divisions of V.  Facial muscles symmetrical.              VIII: Hearing intact to finger rub              IX,X: Soft palate elevates symmetrically              XI: Sternomastoid and trapezius are strong.              XII: Tongue midline without atrophy or fasciculations  Motor: Incomplete relaxation.  Notable atrophy in both feet              Power testing: Mild weakness of abductor pollicis brevis muscles bilaterally with otherwise normal power in the upper extremities.  In the lower extremities there is weakness of toe flexion and extension with all other muscles tested being normal.  Reflexes: Upper extremities: +1 diffusely                   Lower extremities: +1 knee jerks absent ankle jerks                   Toe signs: Downgoing bilaterally  Sensory: Light touch: Reduced in the feet and lower legs                   Pinprick:  Intact in the legs and feet                   Vibration: Reduced at the ankles                   Position: Intact at the great toes     Cerebellar: Finger-to-nose: Intact                      Rapid movement: Intact                      Heel-to-shin: Intact  Gait and Station: Casual walk is mildly broad-based.  He can raise up on toes and heels.  He has trouble with tandem walking.  No Romberg no drift  Results:      Lab Results   Component Value Date    GLUCOSE 155 (H) 02/03/2021    BUN 18 02/03/2021    CREATININE 1.36 (H) 02/03/2021    EGFRIFNONA 51 (L) 02/03/2021    BCR 13.2 02/03/2021    CO2 25.3 02/03/2021    CALCIUM 9.6 02/03/2021    PROTENTOTREF 7.1 02/03/2021    ALBUMIN 4.10 02/03/2021    ALBUMIN 3.7 02/03/2021    LABIL2 1.1 02/03/2021    AST 21 02/03/2021    ALT 9 02/03/2021       Lab Results   Component Value Date    WBC 3.49 02/03/2021    HGB 14.6 02/03/2021    HCT 45.7 02/03/2021    MCV 90.0 02/03/2021     (L) 02/03/2021         .  Lab Results   Component Value Date    RPR Non-Reactive 03/30/2021         Lab Results   Component Value Date    TSH 0.341 03/30/2021          Lab Results   Component Value Date    GWKVQZKI21 285 03/30/2021         Lab Results   Component Value Date    FOLATE 7.75 03/30/2021         Lab Results   Component Value Date    HGBA1C 5.52 03/30/2021         Lab Results   Component Value Date    GLUCOSE 155 (H) 02/03/2021    BUN 18 02/03/2021    CREATININE 1.36 (H) 02/03/2021    EGFRIFNONA 51 (L) 02/03/2021    BCR 13.2 02/03/2021    K 4.6 02/03/2021    CO2 25.3 02/03/2021    CALCIUM 9.6 02/03/2021    PROTENTOTREF 7.1 02/03/2021    ALBUMIN 4.10 02/03/2021    ALBUMIN 3.7 02/03/2021    LABIL2 1.1 02/03/2021    AST 21 02/03/2021    ALT 9 02/03/2021         Lab Results   Component Value Date    WBC 3.49 02/03/2021    HGB 14.6 02/03/2021    HCT 45.7 02/03/2021    MCV 90.0 02/03/2021     (L) 02/03/2021             Assessment:   1.  Peripheral neuropathy, possibly associated with MGUS or idiopathic in etiology      Plan:  1.  Discussed with patient today that unfortunately despite very extensive investigation no specific cause for his peripheral neuropathy has been identified.  We discussed results of most recent labs today, again I told him that his vitamin B12 level was at the lower end of normal and he should continue B12 supplementation.  We will recheck this in 3 months to make sure it has improved.  -For now given his worsening symptoms during the day I think it is reasonable to advance his Lyrica, he is currently taking 75 mg twice daily will uptitrate to 100 mg 3 times daily.  Again risk and benefits of medicine were discussed namely sedation and dizziness.  Patient to call me with any concerns.  -Additionally will write prescription for topical compounded analgesic per Rx alternatives, patient states he has had this before and it did seem to help.   2.  Patient will follow up with me in 3 months or sooner should need arise      Time 30 minutes          Dictated utilizing Dragon dictation.

## 2021-06-01 RX ORDER — ESCITALOPRAM OXALATE 10 MG/1
10 TABLET ORAL DAILY
Qty: 90 TABLET | Refills: 0 | Status: SHIPPED | OUTPATIENT
Start: 2021-06-01 | End: 2021-06-14

## 2021-06-14 ENCOUNTER — OFFICE VISIT (OUTPATIENT)
Dept: GASTROENTEROLOGY | Facility: CLINIC | Age: 81
End: 2021-06-14

## 2021-06-14 VITALS
BODY MASS INDEX: 25.86 KG/M2 | HEIGHT: 75 IN | SYSTOLIC BLOOD PRESSURE: 122 MMHG | DIASTOLIC BLOOD PRESSURE: 82 MMHG | WEIGHT: 208 LBS

## 2021-06-14 DIAGNOSIS — K31.84 GASTROPARESIS: Primary | ICD-10-CM

## 2021-06-14 DIAGNOSIS — K59.09 OTHER CONSTIPATION: ICD-10-CM

## 2021-06-14 DIAGNOSIS — K21.9 GASTROESOPHAGEAL REFLUX DISEASE, UNSPECIFIED WHETHER ESOPHAGITIS PRESENT: ICD-10-CM

## 2021-06-14 PROCEDURE — 99213 OFFICE O/P EST LOW 20 MIN: CPT | Performed by: NURSE PRACTITIONER

## 2021-06-14 RX ORDER — AMITRIPTYLINE HYDROCHLORIDE 10 MG/1
10 TABLET, FILM COATED ORAL NIGHTLY
Qty: 90 TABLET | Refills: 3 | Status: SHIPPED | OUTPATIENT
Start: 2021-06-14 | End: 2022-06-13

## 2021-06-14 NOTE — PROGRESS NOTES
Chief Complaint   Patient presents with   • GI Problem     GASTROPARESIS     HPI    Panfilo Feliz is a  80 y.o. male here for a follow up visit for gastroparesis.    This patient follows myself and Dr. Perez.    He has a past GI medical history of erosive gastritis, duodenal ulcers, and hiatal hernia.    He remains compliant with gastroparesis diet.  He denies abdominal pain, nausea, vomiting, dysphagia, odynophagia today.  He would like to continue low-dose Elavil and needs a refill.  We discussed trying him on once daily PPI therapy and he is agreeable.    He takes stool softeners daily for constipation.  Bowels move on average 3 times a week.  No abdominal pain, rectal pain, or rectal bleeding.    Last colonoscopy was 2013 and reportedly normal and patient has made an informed decision and declines follow-up colonoscopies in the future.  Past Medical History:   Diagnosis Date   • Arthritis    • Basal cell carcinoma (BCC) of face    • Difficulty walking    • Disease of thyroid gland    • DJD (degenerative joint disease)    • History of bone marrow biopsy    • Hyperlipidemia    • Hypertension    • Hypothyroidism    • IgG monoclonal gammopathy    • Prostate cancer (CMS/HCC)    • Reflux esophagitis        Past Surgical History:   Procedure Laterality Date   • CHOLECYSTECTOMY  1988   • COLON SURGERY  1998   • COLONOSCOPY  02/2013   • ENDOSCOPY  2012    ESOPHAGOGASTRODUODENOSCOPY WITH DILATION OF SHATZKI'S RING   • ENDOSCOPY N/A 10/9/2020    Procedure: ESOPHAGOGASTRODUODENOSCOPY with cold bx;  Surgeon: Jake Perez MD;  Location: [facility] ENDOSCOPY;  Service: Gastroenterology;  Laterality: N/A;  pre - nausea  post - duodenal ulcer, gastrits, gastric ulcer, hiatal hernia   • LAMINECTOMY  2013    decompression L3-4, L4-5   • PROSTATECTOMY  2007   • SKIN BIOPSY     • TONSILLECTOMY AND ADENOIDECTOMY      1940s   • VASECTOMY      1970s       Scheduled Meds:  Outpatient Encounter Medications as of 6/14/2021   Medication  Sig Dispense Refill   • bisacodyl (DULCOLAX) 5 MG EC tablet Take 100 mg by mouth Daily As Needed for Constipation.     • levothyroxine (SYNTHROID, LEVOTHROID) 112 MCG tablet Take 112 mcg by mouth daily.     • Magnesium Hydroxide (DULCOLAX PO) Take 100 mg by mouth Daily.     • Menthol, Topical Analgesic, 4 % gel Apply  topically.     • ondansetron (ZOFRAN) 4 MG tablet Take 1 tablet by mouth Every 12 (Twelve) Hours As Needed for Nausea or Vomiting. 30 tablet 3   • pantoprazole (PROTONIX) 40 MG EC tablet Take 1 tablet by mouth 2 (Two) Times a Day. 60 tablet 12   • pregabalin (Lyrica) 100 MG capsule Take 1 capsule by mouth 3 (Three) Times a Day. 90 capsule 2   • promethazine (PHENERGAN) 12.5 MG tablet TAKE ONE TABLET BY MOUTH EVERY 8 HOURS AS NEEDED FOR NAUSEA OR VOMITING 60 tablet 0   • [DISCONTINUED] amitriptyline (ELAVIL) 25 MG tablet Take 25 mg by mouth Every Night.     • [DISCONTINUED] atenolol (TENORMIN) 25 MG tablet Take 25 mg by mouth daily.     • [DISCONTINUED] escitalopram (LEXAPRO) 10 MG tablet Take 1 tablet by mouth Daily. 90 tablet 0   • amitriptyline (ELAVIL) 10 MG tablet Take 1 tablet by mouth Every Night. 90 tablet 3     No facility-administered encounter medications on file as of 6/14/2021.       Continuous Infusions:No current facility-administered medications for this visit.      PRN Meds:.    No Known Allergies    Social History     Socioeconomic History   • Marital status:      Spouse name: Meghan   • Number of children: Not on file   • Years of education: College   • Highest education level: Not on file   Tobacco Use   • Smoking status: Never Smoker   • Smokeless tobacco: Never Used   Vaping Use   • Vaping Use: Never used   Substance and Sexual Activity   • Alcohol use: No     Comment: Heavy in past, none in 33 years   • Drug use: No   • Sexual activity: Defer       Family History   Problem Relation Age of Onset   • Cancer Mother 85        Breast   • COPD Father    • Cancer Brother 59         Unknown type   • Hypertension Daughter        Review of Systems   Constitutional: Negative for activity change, appetite change, fatigue, fever and unexpected weight change.   HENT: Negative for trouble swallowing.    Respiratory: Negative for apnea, cough, choking, chest tightness, shortness of breath and wheezing.    Cardiovascular: Negative for chest pain, palpitations and leg swelling.   Gastrointestinal: Negative for abdominal distention, abdominal pain, anal bleeding, blood in stool, constipation, diarrhea, nausea, rectal pain and vomiting.       Vitals:    06/14/21 1126   BP: 122/82       Physical Exam  Constitutional:       Appearance: He is well-developed.   Abdominal:      General: Bowel sounds are normal. There is no distension.      Palpations: Abdomen is soft. There is no mass.      Tenderness: There is no abdominal tenderness. There is no guarding.      Hernia: No hernia is present.   Musculoskeletal:         General: Normal range of motion.   Skin:     General: Skin is warm and dry.      Capillary Refill: Capillary refill takes less than 2 seconds.   Neurological:      Mental Status: He is alert and oriented to person, place, and time.   Psychiatric:         Behavior: Behavior normal.     Assessment    Gastroparesis  GERD  Constipation    Plan    Continue gastroparesis diet.  No indication for prokinetic therapy or Azithromycin at this time.  Antireflux dietary precautions reinforced.  We discussed trying him on once a day PPI therapy by slowly coming off of his evening dose.  If he does not tolerate this consider H2 blocker instead taken nightly.  Continue low-dose Elavil, refill provided.  Continue stool softeners to manage mild constipation.  Follow-up 6 months.

## 2021-06-15 ENCOUNTER — TELEPHONE (OUTPATIENT)
Dept: GASTROENTEROLOGY | Facility: CLINIC | Age: 81
End: 2021-06-15

## 2021-06-15 RX ORDER — ATENOLOL 25 MG/1
25 TABLET ORAL DAILY
COMMUNITY
End: 2021-07-15

## 2021-06-15 RX ORDER — ASPIRIN 81 MG/1
81 TABLET, CHEWABLE ORAL DAILY
COMMUNITY
End: 2021-07-15

## 2021-06-15 RX ORDER — PROMETHAZINE HYDROCHLORIDE 12.5 MG/1
12.5 TABLET ORAL EVERY 6 HOURS PRN
COMMUNITY
End: 2022-11-14

## 2021-06-15 RX ORDER — LANOLIN ALCOHOL/MO/W.PET/CERES
1000 CREAM (GRAM) TOPICAL DAILY
COMMUNITY
End: 2022-02-09 | Stop reason: SDDI

## 2021-06-15 RX ORDER — ESCITALOPRAM OXALATE 10 MG/1
10 TABLET ORAL DAILY
COMMUNITY
End: 2021-08-04 | Stop reason: DRUGHIGH

## 2021-06-15 RX ORDER — ATENOLOL 25 MG/1
25 TABLET ORAL DAILY
COMMUNITY

## 2021-06-15 NOTE — TELEPHONE ENCOUNTER
Called pt and spoke with wife, (ok per HUSSEIN).  She states her meds were not reviewed at her OV yesterday.  I reviewed them with her and put back on the ones that were missing.      Pt's is also asking if it ok to for pt resume ASA 81mg. Advised will send a msg to Nicol LOPEZ.    Msg sent to JOHN Camarena.

## 2021-06-15 NOTE — TELEPHONE ENCOUNTER
----- Message from Marj Ware Rep sent at 6/15/2021 12:52 PM EDT -----  Regarding: questions  Contact: [phone]  Pt ( nunu) is calling with questions

## 2021-06-17 NOTE — TELEPHONE ENCOUNTER
Called pt and spoke with pts wife (ok per HUSSEIN) and advised of Nicol LOPEZ's note.  She verbalized understanding.

## 2021-06-17 NOTE — TELEPHONE ENCOUNTER
Nicol Tillman, Rekha Lord, RN  Caller: Unspecified (2 days ago,  2:34 PM)  Okay to resume baby aspirin but I would take with food.

## 2021-06-24 ENCOUNTER — HOSPITAL ENCOUNTER (OUTPATIENT)
Dept: CT IMAGING | Facility: HOSPITAL | Age: 81
Discharge: HOME | End: 2021-06-24
Admitting: THORACIC SURGERY (CARDIOTHORACIC VASCULAR SURGERY)

## 2021-06-24 DIAGNOSIS — I71.20 THORACIC AORTIC ANEURYSM WITHOUT RUPTURE (HCC): ICD-10-CM

## 2021-06-24 PROCEDURE — 71250 CT THORAX DX C-: CPT

## 2021-07-15 ENCOUNTER — OFFICE VISIT (OUTPATIENT)
Dept: CARDIAC SURGERY | Facility: CLINIC | Age: 81
End: 2021-07-15

## 2021-07-15 VITALS
SYSTOLIC BLOOD PRESSURE: 106 MMHG | HEIGHT: 76 IN | WEIGHT: 207.5 LBS | HEART RATE: 74 BPM | DIASTOLIC BLOOD PRESSURE: 74 MMHG | RESPIRATION RATE: 20 BRPM | TEMPERATURE: 98.4 F | OXYGEN SATURATION: 97 % | BODY MASS INDEX: 25.27 KG/M2

## 2021-07-15 DIAGNOSIS — G63 NEUROPATHY ASSOCIATED WITH MGUS (HCC): ICD-10-CM

## 2021-07-15 DIAGNOSIS — D47.2 NEUROPATHY ASSOCIATED WITH MGUS (HCC): ICD-10-CM

## 2021-07-15 DIAGNOSIS — I71.21 ASCENDING AORTIC ANEURYSM (HCC): ICD-10-CM

## 2021-07-15 DIAGNOSIS — D47.2 MONOCLONAL GAMMOPATHY OF UNKNOWN SIGNIFICANCE (MGUS): ICD-10-CM

## 2021-07-15 DIAGNOSIS — R01.1 MURMUR: ICD-10-CM

## 2021-07-15 DIAGNOSIS — D69.6 THROMBOCYTOPENIA (HCC): ICD-10-CM

## 2021-07-15 DIAGNOSIS — I10 ESSENTIAL HYPERTENSION: Primary | ICD-10-CM

## 2021-07-15 PROCEDURE — 99214 OFFICE O/P EST MOD 30 MIN: CPT | Performed by: THORACIC SURGERY (CARDIOTHORACIC VASCULAR SURGERY)

## 2021-07-15 RX ORDER — IBUPROFEN 200 MG
200 TABLET ORAL AS NEEDED
COMMUNITY

## 2021-07-19 PROBLEM — I71.21 ASCENDING AORTIC ANEURYSM (HCC): Status: ACTIVE | Noted: 2021-07-19

## 2021-07-19 NOTE — PROGRESS NOTES
7/19/2021    Seen on 7/15/2021    Chief Complaint:     Follow-up evaluation of ascending aortic aneurysm      History of Present Illness:       Dear Stephan and Colleagues,  It was nice to see Panfilo Feliz in follow up evaluation for his proximal aortic dilatation. He is a 81 y.o. male with a history of multiple medical problems including monoclonal gammopathy treated with chemotherapy resulting in leukopenia, thrombocytopenia, severe neuropathy, hypertension and unknown 4.8 cm ascending aortic aneurysm with who I saw in 2019 and now returns to follow-up. He denies any aneurysm related symptoms in the interval. His last chest CT showed ascending aorta at 5.1 cm without dissection or ulceration.  His main symptoms are related to burning and pain in his feet due to the neuropathy.  He has no family history of aneurysms, dissections or connective tissue disorders.    Patient Active Problem List   Diagnosis   • Leukopenia   • Thrombocytopenia (CMS/HCC)   • Monoclonal gammopathy of unknown significance (MGUS)   • Neuropathy associated with MGUS (CMS/HCC)   • Nausea in adult patient   • Slow transit constipation   • Nausea   • DDD (degenerative disc disease), lumbar   • Chronic neck pain   • DJD (degenerative joint disease)   • Extremity pain   • HTN (hypertension)   • Hyperlipidemia   • Hypothyroid   • LBP (low back pain)   • Lumbar canal stenosis   • Prostate cancer (CMS/HCC)   • Ascending aortic aneurysm (CMS/HCC)       Past Medical History:   Diagnosis Date   • Arthritis    • Basal cell carcinoma (BCC) of face    • Difficulty walking    • Disease of thyroid gland    • DJD (degenerative joint disease)    • History of bone marrow biopsy    • Hyperlipidemia    • Hypertension    • Hypothyroidism    • IgG monoclonal gammopathy    • Prostate cancer (CMS/HCC)    • Reflux esophagitis        Past Surgical History:   Procedure Laterality Date   • CHOLECYSTECTOMY  1988   • COLON SURGERY  1998   • COLONOSCOPY  02/2013   •  ENDOSCOPY  2012    ESOPHAGOGASTRODUODENOSCOPY WITH DILATION OF SHATZKI'S RING   • ENDOSCOPY N/A 10/9/2020    Procedure: ESOPHAGOGASTRODUODENOSCOPY with cold bx;  Surgeon: Jake Perez MD;  Location: [facility] ENDOSCOPY;  Service: Gastroenterology;  Laterality: N/A;  pre - nausea  post - duodenal ulcer, gastrits, gastric ulcer, hiatal hernia   • LAMINECTOMY  2013    decompression L3-4, L4-5   • PROSTATECTOMY  2007   • SKIN BIOPSY     • TONSILLECTOMY AND ADENOIDECTOMY      1940s   • VASECTOMY      1970s       No Known Allergies      Current Outpatient Medications:   •  amitriptyline (ELAVIL) 10 MG tablet, Take 1 tablet by mouth Every Night., Disp: 90 tablet, Rfl: 3  •  atenolol (TENORMIN) 25 MG tablet, Take 25 mg by mouth Daily., Disp: , Rfl:   •  escitalopram (LEXAPRO) 10 MG tablet, Take 10 mg by mouth Daily., Disp: , Rfl:   •  ibuprofen (ADVIL,MOTRIN) 200 MG tablet, Take 200 mg by mouth As Needed for Mild Pain ., Disp: , Rfl:   •  levothyroxine (SYNTHROID, LEVOTHROID) 112 MCG tablet, Take 112 mcg by mouth daily., Disp: , Rfl:   •  Magnesium Hydroxide (DULCOLAX PO), Take 300 mg by mouth Daily., Disp: , Rfl:   •  Menthol, Topical Analgesic, (BIOFREEZE EX), Apply  topically As Needed., Disp: , Rfl:   •  pantoprazole (PROTONIX) 40 MG EC tablet, Take 1 tablet by mouth 2 (Two) Times a Day. (Patient taking differently: Take 40 mg by mouth Daily.), Disp: 60 tablet, Rfl: 12  •  pregabalin (Lyrica) 100 MG capsule, Take 1 capsule by mouth 3 (Three) Times a Day. (Patient taking differently: Take 100 mg by mouth 2 (Two) Times a Day.), Disp: 90 capsule, Rfl: 2  •  promethazine (PHENERGAN) 12.5 MG tablet, Take 12.5 mg by mouth Every 6 (Six) Hours As Needed., Disp: , Rfl:   •  vitamin B-12 (CYANOCOBALAMIN) 1000 MCG tablet, Take 1,000 mcg by mouth Daily., Disp: , Rfl:     Social History     Socioeconomic History   • Marital status:      Spouse name: Meghan   • Number of children: Not on file   • Years of education: College    • Highest education level: Not on file   Tobacco Use   • Smoking status: Never Smoker   • Smokeless tobacco: Never Used   Vaping Use   • Vaping Use: Never used   Substance and Sexual Activity   • Alcohol use: No     Comment: Heavy in past, none in 33 years   • Drug use: No   • Sexual activity: Defer       Family History   Problem Relation Age of Onset   • Cancer Mother 85        Breast   • COPD Father    • Cancer Brother 59        Unknown type   • Hypertension Daughter      Review of Systems  Burning and pain in his feet, otherwise noncontributory    Physical Exam:    Vital Signs:  Weight: 94.1 kg (207 lb 8 oz)   Body mass index is 25.26 kg/m².  Temp: 98.4 °F (36.9 °C)   Heart Rate: 74   BP: 106/74     Constitutional:       Appearance: Well-developed.   Eyes:      Conjunctiva/sclera: Conjunctivae normal.      Pupils: Pupils are equal, round, and reactive to light.   HENT:      Head: Normocephalic and atraumatic.      Nose: Nose normal.   Neck:      Thyroid: No thyromegaly.      Vascular: No JVD.      Lymphadenopathy: No cervical adenopathy.   Pulmonary:      Effort: Pulmonary effort is normal.      Breath sounds: Normal breath sounds. No rales.   Cardiovascular:      PMI at left midclavicular line. Normal rate. Regular rhythm.      Murmurs: There is no murmur.      No gallop. No click. No rub.   Pulses:     Intact distal pulses. No decreased pulses.   Edema:     Peripheral edema absent.   Abdominal:      General: Bowel sounds are normal. There is no distension.      Palpations: Abdomen is soft. There is no abdominal mass.      Tenderness: There is no abdominal tenderness.   Musculoskeletal: Normal range of motion.         General: No tenderness or deformity.      Cervical back: Normal range of motion and neck supple. Skin:     General: Skin is warm and dry.      Findings: No erythema or rash.   Neurological:      Mental Status: Alert and oriented to person, place, and time.      Deep Tendon Reflexes: Reflexes are  normal and symmetric.   Psychiatric:         Behavior: Behavior normal.          Assessment:     Problems Addressed this Visit        Cardiac and Vasculature    HTN (hypertension) - Primary    Ascending aortic aneurysm (CMS/HCC)       Coag and Thromboembolic    Thrombocytopenia (CMS/HCC)       Hematology and Neoplasia    Monoclonal gammopathy of unknown significance (MGUS)    Relevant Medications    ibuprofen (ADVIL,MOTRIN) 200 MG tablet       Neuro    Neuropathy associated with MGUS (CMS/HCC)    Relevant Medications    ibuprofen (ADVIL,MOTRIN) 200 MG tablet      Diagnoses       Codes Comments    Essential hypertension    -  Primary ICD-10-CM: I10  ICD-9-CM: 401.9     Thrombocytopenia (CMS/HCC)     ICD-10-CM: D69.6  ICD-9-CM: 287.5     Monoclonal gammopathy of unknown significance (MGUS)     ICD-10-CM: D47.2  ICD-9-CM: 273.1     Ascending aortic aneurysm (CMS/HCC)     ICD-10-CM: I71.2  ICD-9-CM: 441.2     Neuropathy associated with MGUS (CMS/HCC)     ICD-10-CM: D47.2, G63  ICD-9-CM: 273.1, 357.4           Assessment /recommendation/Plan:     1. Ascending aortic aneurysm with 3 mm expansion over 3 years.  There is no dissection or ulceration.  I recommend a follow-up chest CTA scan in 6 months to address stability.  Also he will need a 2D echocardiogram to rule out further valve involvement.  I discussed with the patient if chest pain develops, syncope or faintness or other symptoms he should consult urgently to the emergency room for further evaluation and treatment  2. Hypertension, well controlled on single regimen with atenolol.  I discussed with the patient importance of maintaining low the DP DT to prevent aneurysmal dilatation    Thank you for allowing me to participate in his care.    Regards,    Spencer Puente M.D.  I spent approximately 30 minutes in reviewing the records, examining the patient, reviewing and interpreting the CT scan and comparing it to the previous one, discussing the findings and  options and the plan of longitudinal follow-up of 6 months.  Is a documented in the electronic record

## 2021-08-04 ENCOUNTER — OFFICE VISIT (OUTPATIENT)
Dept: ONCOLOGY | Facility: CLINIC | Age: 81
End: 2021-08-04

## 2021-08-04 ENCOUNTER — LAB (OUTPATIENT)
Dept: LAB | Facility: HOSPITAL | Age: 81
End: 2021-08-04

## 2021-08-04 VITALS
HEIGHT: 75 IN | HEART RATE: 82 BPM | SYSTOLIC BLOOD PRESSURE: 125 MMHG | BODY MASS INDEX: 25.09 KG/M2 | WEIGHT: 201.8 LBS | TEMPERATURE: 98.2 F | RESPIRATION RATE: 16 BRPM | DIASTOLIC BLOOD PRESSURE: 85 MMHG

## 2021-08-04 DIAGNOSIS — R11.0 NAUSEA IN ADULT PATIENT: ICD-10-CM

## 2021-08-04 DIAGNOSIS — G63 NEUROPATHY ASSOCIATED WITH MGUS (HCC): Primary | ICD-10-CM

## 2021-08-04 DIAGNOSIS — G62.9 NEUROPATHY: ICD-10-CM

## 2021-08-04 DIAGNOSIS — G61.81 CHRONIC INFLAMMATORY DEMYELINATING POLYNEURITIS (HCC): ICD-10-CM

## 2021-08-04 DIAGNOSIS — D69.6 THROMBOCYTOPENIA (HCC): ICD-10-CM

## 2021-08-04 DIAGNOSIS — D47.2 NEUROPATHY ASSOCIATED WITH MGUS (HCC): Primary | ICD-10-CM

## 2021-08-04 DIAGNOSIS — D47.2 MONOCLONAL GAMMOPATHY OF UNKNOWN SIGNIFICANCE (MGUS): ICD-10-CM

## 2021-08-04 DIAGNOSIS — D47.2 MGUS (MONOCLONAL GAMMOPATHY OF UNKNOWN SIGNIFICANCE): ICD-10-CM

## 2021-08-04 LAB
ALBUMIN SERPL-MCNC: 4.3 G/DL (ref 3.5–5.2)
ALBUMIN/GLOB SERPL: 1.5 G/DL (ref 1.1–2.4)
ALP SERPL-CCNC: 64 U/L (ref 38–116)
ALT SERPL W P-5'-P-CCNC: 10 U/L (ref 0–41)
ANION GAP SERPL CALCULATED.3IONS-SCNC: 10 MMOL/L (ref 5–15)
AST SERPL-CCNC: 25 U/L (ref 0–40)
BASOPHILS # BLD AUTO: 0.02 10*3/MM3 (ref 0–0.2)
BASOPHILS NFR BLD AUTO: 0.6 % (ref 0–1.5)
BILIRUB SERPL-MCNC: 0.5 MG/DL (ref 0.2–1.2)
BUN SERPL-MCNC: 14 MG/DL (ref 6–20)
BUN/CREAT SERPL: 10.8 (ref 7.3–30)
CALCIUM SPEC-SCNC: 9.4 MG/DL (ref 8.5–10.2)
CHLORIDE SERPL-SCNC: 103 MMOL/L (ref 98–107)
CO2 SERPL-SCNC: 26 MMOL/L (ref 22–29)
CREAT SERPL-MCNC: 1.3 MG/DL (ref 0.7–1.3)
DEPRECATED RDW RBC AUTO: 43.8 FL (ref 37–54)
EOSINOPHIL # BLD AUTO: 0.04 10*3/MM3 (ref 0–0.4)
EOSINOPHIL NFR BLD AUTO: 1.2 % (ref 0.3–6.2)
ERYTHROCYTE [DISTWIDTH] IN BLOOD BY AUTOMATED COUNT: 13.5 % (ref 12.3–15.4)
GFR SERPL CREATININE-BSD FRML MDRD: 53 ML/MIN/1.73
GLOBULIN UR ELPH-MCNC: 2.8 GM/DL (ref 1.8–3.5)
GLUCOSE SERPL-MCNC: 99 MG/DL (ref 74–124)
HCT VFR BLD AUTO: 44.4 % (ref 37.5–51)
HGB BLD-MCNC: 14.2 G/DL (ref 13–17.7)
IMM GRANULOCYTES # BLD AUTO: 0.01 10*3/MM3 (ref 0–0.05)
IMM GRANULOCYTES NFR BLD AUTO: 0.3 % (ref 0–0.5)
LYMPHOCYTES # BLD AUTO: 1.41 10*3/MM3 (ref 0.7–3.1)
LYMPHOCYTES NFR BLD AUTO: 40.9 % (ref 19.6–45.3)
MCH RBC QN AUTO: 28.6 PG (ref 26.6–33)
MCHC RBC AUTO-ENTMCNC: 32 G/DL (ref 31.5–35.7)
MCV RBC AUTO: 89.3 FL (ref 79–97)
MONOCYTES # BLD AUTO: 0.46 10*3/MM3 (ref 0.1–0.9)
MONOCYTES NFR BLD AUTO: 13.3 % (ref 5–12)
NEUTROPHILS NFR BLD AUTO: 1.51 10*3/MM3 (ref 1.7–7)
NEUTROPHILS NFR BLD AUTO: 43.7 % (ref 42.7–76)
NRBC BLD AUTO-RTO: 0 /100 WBC (ref 0–0.2)
PLATELET # BLD AUTO: 143 10*3/MM3 (ref 140–450)
PMV BLD AUTO: 10.4 FL (ref 6–12)
POTASSIUM SERPL-SCNC: 4.7 MMOL/L (ref 3.5–4.7)
PROT SERPL-MCNC: 7.1 G/DL (ref 6.3–8)
RBC # BLD AUTO: 4.97 10*6/MM3 (ref 4.14–5.8)
SODIUM SERPL-SCNC: 139 MMOL/L (ref 134–145)
WBC # BLD AUTO: 3.45 10*3/MM3 (ref 3.4–10.8)

## 2021-08-04 PROCEDURE — 99214 OFFICE O/P EST MOD 30 MIN: CPT | Performed by: INTERNAL MEDICINE

## 2021-08-04 PROCEDURE — 80053 COMPREHEN METABOLIC PANEL: CPT

## 2021-08-04 PROCEDURE — 36415 COLL VENOUS BLD VENIPUNCTURE: CPT

## 2021-08-04 PROCEDURE — 85025 COMPLETE CBC W/AUTO DIFF WBC: CPT

## 2021-08-04 RX ORDER — ESCITALOPRAM OXALATE 20 MG/1
20 TABLET ORAL DAILY
Qty: 30 TABLET | Refills: 6 | Status: SHIPPED | OUTPATIENT
Start: 2021-08-04 | End: 2022-02-17

## 2021-08-04 NOTE — PROGRESS NOTES
REASON FOR FOLLOWUP:    1.Minimal leukopenia with thrombocytopenia in the background of minimal IgG monoclonal gammopathy. The patient has no splenomegaly, no peripheral adenopathy, and no symptoms that suggest lupus or collagen vascular disease. Bone marrow   a  spirate documented no evidence of myeloma, lymphoma, leukemia, myelodysplasia, or any other abnormality. Bone marrow chromosomal analysis as well as flow cytometry were negative.   2.  Inflammatory polyneuropathy.  He initiated IVIG on 10/3/2019.  He did receive his second IVIG on 11/7/2019.allergic reaction to it stopping infusion all togethere  3.  Approximately 10 days after his second IVIG infusion he did experience serum sickness that included rash, generalized arthralgias, skin peeling of the palms.  Dr. Olvera prescribed 20 mg of prednisone and his symptoms have resolved after 2 days.      REASON FOR VISIT:     DURING THE VISIT WITH THE PATIENT TODAY , PATIENT HAD FACE MASK, MY MEDICAL ASSISTANT AND I  HAD PROPPER PROTECTIVE EQUIPMENT, AND I DID HAND HYGIENE WITH SOAP AND WATER BEFORE AND AFTER THE VISIT.  TESTED FOR COVID RECENTLY NEGATIVE.    This patient returns today to the office for followup of his above diagnosis.  In the interim he has continued Lexapro that is beneficial for depression but he still feels that he could use a little bit more of this medicine especially now knowing that Lyrica has not had any positive impact on his symptoms associated with his sensory neuropathy.  He is going to ask his physician to discontinue the Lyrica as per visit tomorrow.  That will provide some room for Lexapro to increase.  Overall the patient has a good appetite.  He has lost weight.  He is eating a lot of vegetables and salads more than junk food.   He is able to walk ½ hour every single day and he feels better than before.   He has had resolution of his nausea.   No vomiting.  He has no need for Phenergan use.  His bowel activity is normal.   Urination is normal.  His sensory neuropathy in his feet is no different than before.  It is not better and it is no worse.  It is just the same issue mostly sensory with no obvious motor deficit.  He has not had any neuropathy in his hands.  He has not had any bone pain or joint pain.  His chronic back pain remains about the same sporadically here and there not requiring any other medicine.  He continues taking an occasional Aleve.  He remains on blood pressure medicine.  He has had a CT scan of the chest and Dr. Puente wants me to review this with him and his wife.                  Past Medical History:   Diagnosis Date   • Arthritis    • Basal cell carcinoma (BCC) of face    • Difficulty walking    • Disease of thyroid gland    • DJD (degenerative joint disease)    • History of bone marrow biopsy    • Hyperlipidemia    • Hypertension    • Hypothyroidism    • IgG monoclonal gammopathy    • Prostate cancer (CMS/HCC)    • Reflux esophagitis      Past Surgical History:   Procedure Laterality Date   • CHOLECYSTECTOMY  1988   • COLON SURGERY  1998   • COLONOSCOPY  02/2013   • ENDOSCOPY  2012    ESOPHAGOGASTRODUODENOSCOPY WITH DILATION OF SHATZKI'S RING   • ENDOSCOPY N/A 10/9/2020    Procedure: ESOPHAGOGASTRODUODENOSCOPY with cold bx;  Surgeon: Jake Perez MD;  Location: [facility] ENDOSCOPY;  Service: Gastroenterology;  Laterality: N/A;  pre - nausea  post - duodenal ulcer, gastrits, gastric ulcer, hiatal hernia   • LAMINECTOMY  2013    decompression L3-4, L4-5   • PROSTATECTOMY  2007   • SKIN BIOPSY     • TONSILLECTOMY AND ADENOIDECTOMY      1940s   • VASECTOMY      1970s     Social History     Socioeconomic History   • Marital status:      Spouse name: Meghan   • Number of children: Not on file   • Years of education: College   • Highest education level: Not on file   Tobacco Use   • Smoking status: Never Smoker   • Smokeless tobacco: Never Used   Vaping Use   • Vaping Use: Never used   Substance and Sexual  "Activity   • Alcohol use: No     Comment: Heavy in past, none in 33 years   • Drug use: No   • Sexual activity: Defer     Family History   Problem Relation Age of Onset   • Cancer Mother 85        Breast   • COPD Father    • Cancer Brother 59        Unknown type   • Hypertension Daughter            Current Outpatient Medications on File Prior to Visit   Medication Sig Dispense Refill   • amitriptyline (ELAVIL) 10 MG tablet Take 1 tablet by mouth Every Night. 90 tablet 3   • atenolol (TENORMIN) 25 MG tablet Take 25 mg by mouth Daily.     • escitalopram (LEXAPRO) 10 MG tablet Take 10 mg by mouth Daily.     • ibuprofen (ADVIL,MOTRIN) 200 MG tablet Take 200 mg by mouth As Needed for Mild Pain .     • levothyroxine (SYNTHROID, LEVOTHROID) 112 MCG tablet Take 112 mcg by mouth daily.     • Magnesium Hydroxide (DULCOLAX PO) Take 300 mg by mouth Daily.     • Menthol, Topical Analgesic, (BIOFREEZE EX) Apply  topically As Needed.     • pantoprazole (PROTONIX) 40 MG EC tablet Take 1 tablet by mouth 2 (Two) Times a Day. (Patient taking differently: Take 40 mg by mouth Daily.) 60 tablet 12   • pregabalin (Lyrica) 100 MG capsule Take 1 capsule by mouth 3 (Three) Times a Day. (Patient taking differently: Take 100 mg by mouth 2 (Two) Times a Day.) 90 capsule 2   • promethazine (PHENERGAN) 12.5 MG tablet Take 12.5 mg by mouth Every 6 (Six) Hours As Needed.     • vitamin B-12 (CYANOCOBALAMIN) 1000 MCG tablet Take 1,000 mcg by mouth Daily.       No current facility-administered medications on file prior to visit.   No Known Allergies      ONCOLOGIC/HEMATOLOGIC HISTORY: History from previous dates can be found in the separate document.                     Vitals:    08/04/21 0941   BP: 125/85   Pulse: 82   Resp: 16   Temp: 98.2 °F (36.8 °C)   Weight: 91.5 kg (201 lb 12.8 oz)   Height: 190.5 cm (75\")                    PHYSICAL EXAMINATION:      I HAVE PERSONALLY REVIEWED THE HISTORY OF THE PRESENT ILLNESS, PAST MEDICAL HISTORY, FAMILY " HISTORY, SOCIAL HISTORY, ALLERGIES, MEDICATIONS STATED ABOVE IN THE  NOTE FROM TODAY.        GENERAL:  Well-developed, well-nourished  Patient  in no acute distress.   SKIN:  Warm, dry ,NO rashes,NO purpura ,NO petechiae.  HEENT:  Pupils were equal and reactive to light and accomodation, conjunctivae noninjected, no pterygium, normal extraocular movements, normal visual acuity.   NECK:  Supple with good range of motion; no thyromegaly , no other masses, no JVD or bruits, no cervical adenopathies.No carotid artery pain, no carotid abnormal pulsation , NO arterial dance.  LYMPHATICS:  No cervical, NO supraclavicular, NO axillary,NO epitrochlear , NO inguinal adenopathy.  CARDIAC   normal rate and regular rhythm, without murmur,NO rubs NO S3 NO S4 right or left .  LUNGS: normal breath sounds bilateral, no wheezing, rhonchi, crackles or rubs.  VASCULAR VENOUS: no cyanosis, collateral circulation, varicosities, edema, palpable cords, pain, erythema.  ABDOMEN:  Soft, nontender with no hepatomegaly, no splenomegaly,no masses, no ascites, no collateral circulation,no distention,no Merrill sign.  EXTREMITIES  AND SPINE:  No clubbing, cyanosis or edema, no deformities , no pain .No kyphosis, scoliosis, no other deformities, no pain in spine, no pain in ribs , no pain inpelvic bone.  NEUROLOGICAL:  Patient was awake, alert, oriented to time, person and place.SENSORY NEUROPATHY IN BOTH FEET                                 LABORATORY DATAContains abnormal data CBC Auto Differential  Order: [id number] - Part of Panel Order [id number]  Status:  Final result   Visible to patient:  No (scheduled for 8/4/2021  9:37 AM)   Dx:  Chronic inflammatory demyelinating po...  Specimen Information: Blood         0 Result Notes  Component   Ref Range & Units 09:31   WBC   3.40 - 10.80 10*3/mm3 3.45    RBC   4.14 - 5.80 10*6/mm3 4.97    Hemoglobin   13.0 - 17.7 g/dL 14.2    Hematocrit   37.5 - 51.0 % 44.4    MCV   79.0 - 97.0 fL 89.3    MCH   26.6  - 33.0 pg 28.6    MCHC   31.5 - 35.7 g/dL 32.0    RDW   12.3 - 15.4 % 13.5    RDW-SD   37.0 - 54.0 fl 43.8    MPV   6.0 - 12.0 fL 10.4    Platelets   140 - 450 10*3/mm3 143    Neutrophil %   42.7 - 76.0 % 43.7    Lymphocyte %   19.6 - 45.3 % 40.9    Monocyte %   5.0 - 12.0 % 13.3High     Eosinophil %   0.3 - 6.2 % 1.2    Basophil %   0.0 - 1.5 % 0.6    Immature Grans %   0.0 - 0.5 % 0.3    Neutrophils, Absolute   1.70 - 7.00 10*3/mm3 1.51Low     Lymphocytes, Absolute   0.70 - 3.10 10*3/mm3 1.41    Monocytes, Absolute   0.10 - 0.90 10*3/mm3 0.46    Eosinophils, Absolute   0.00 - 0.40 10*3/mm3 0.04    Basophils, Absolute   0.00 - 0.20 10*3/mm3 0.02    Immature Grans, Absolute   0.00 - 0.05 10*3/mm3 0.01    nRBC   0.0 - 0.2 /100 WBC 0.0    Resulting Agency  CBC LAB                                                 ASSESSMENT:  This patient has monoclonal gammopathy of unknown significance and he has undergone bone marrow testing of this on 2 different occasions not being able to document myeloma, lymphoma, myelodysplasia, leukemia or myelofibrosis. Chromosomal analysis has been normal. The patient has not had any significant difference in his monoclonal protein quantification.    The patient has associated with this sensory peripheral neuropathy with balance issues and painful numbness in his feet. We have tried Neurontin in the past with no benefit, now we are trying Lyrica and it seems to be that this is not going to be any benefit to him neither.    We have also tried prednisone with no benefit as per indication by his Neurologist and we have tried immunoglobulin with no benefit and actually this medicine triggered serum sickness in him that required short course of prednisone therapy.    I discussed with his attending physician through video medicine from [facility] , the fact that we still have no diagnosis in this patient in regard his sensory neuropathy and his minimal monoclonal protein. He is going to be seen  by neurology in that institution also by video medicine. The patient continues having progressing symptoms now in his upper extremities up to his elbows in regard to sensory changes and in his lower extremities up to his knees. He has imbalance and he has a negative Romberg test. Actually his bicipital and tricipital reflexes are very active 2-3. His patellar and Achilles are abolished. Vibratory sensation is normal throughout. The nausea is another symptom that is very bothersome and his insomnia is bothersome. I do not think we have any diagnosis on him at this time and I would like to proceed as follows:  1. We will wait to see what the [facility] physicians have to say in regard to his overall picture.   2. I would like for him to proceed with an MRI scan of the brain, cervical and thoracic spine to see if there is anything that is making all of these symptoms from the neurological system to occur and the nausea to occur as well as his deafness. Maybe all of these things are connected. I would not be surprised if we need to pursue a lumbar puncture.             Since the previous visit the patient underwent an MRI scan of the brain that shows no major alterations, radiologist calls minimal vascular alterations. Most importantly the MRI of the cervical spine documents significant spinal stenosis at the level of C5. I reviewed the pictures of this in the PAC system [facility] with the patient and his wife. The MRI of the thoracic spine shows degenerative changes. Most importantly none of the areas on MRI in the cranium, cervical, thoracic spine disclose any lesions that could be consistent with myeloma, lymphoma or tumoral lesions of any nature. All of the changes are related to degenerative changes. 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     The patient was further reviewed along with his wife on 11/11/2020. We have a lot of issues to discuss today as follows:  1. His chronic  nausea is better. He has had an upper GI endoscopy, he had the location of a Schatzki's ring and also he had biopsies of the stomach that documented gastritis, H. Pylori negative. The patient is now on PPI. His nausea is better. He is eating better and he is not taking any aspirin or antiinflammatory medications, or alcohol at all to reduce gastritis. I point out to him that a lot of that his stress that he carries through all the time probably has something to do with this, I point out that his stomach is a reflection of his soul.     2. His second issue to discuss is his neuropathy. He has been seen by Dr. Darrick Garcia. EMG was performed. Abnormalities documented in the EMG and the interpretation per Dr. Garcia believing that this does not represent a typical feature of inflammatory neuropathy.       The patient was further reviewed on 02/03/2021. His neuropathy is dramatically better on Lyrica twice a day and his nausea in the morning is dramatically better with the use of PPI and Carafate per Jake Perez MD. He is going to proceed with an esophagogram in a day. He has no heartburn but he has a bitter taste in the mouth from time to time after he eats. He has no vomiting. His bowel activity is normal, urination is normal. His depression is better. He is not taking any aspirin or antiinflammatory medication.     To my eyes the patient looks substantially better. His white count remains on the low side without any changes. The hemoglobin, the platelet count remain appropriate at this time. He has no palpable cervical adenopathy, no bone pain, no hepatosplenomegaly.    This is the first time in almost 2 years that the patient has improvement in symptoms related to nausea and also related to neuropathy. The medicines that he is taking are appropriate and I pointed out to him to stay away from aspirin and antiinflammatory medications. He only can take Tylenol for pain.   The patient was further reviewed on 08/04/2021.  He wanted me to review the CT scan that had been ordered by Dr. Puente recently. I reviewed this in the PACS system [facility]. His ascending thoracic aortic aneurysm remains the same, 5 cm in diameter. There is no cardiomegaly. There is heavy calcification in the coronary arteries. Lung anatomy remains normal with no infiltrates, effusions and no mediastinal or hilar adenopathy. Liver and spleen remain normal, pancreas and kidneys remain normal in size. There are obvious degenerative changes in the spine. There are no lytic lesions.     His white count remains stable/low with leukopenia, hemoglobin is stable, platelet count is stable and normal. His chemistry profile is pending. His monoclonal protein studies are pending today and to compare with previous visit.    The patient remains depressed and he believes that he could raise the dose of Lexapro at this point and he will talk with his primary physician about stopping Lyrica altogether because he has not found any benefit from the medicine. Given the circumstances, I think it will be fine to modify the Lexapro dose to the next level and a new prescription has been sent today. With the next dose it will be 20 mg a day.    The patient otherwise will return to see us in 6 months with a CBC, CMP and monoclonal protein studies.     Because his neurologist recently has done some blood testing and the B12 level was still into the normal limits but on the low side, I am going to run a B12 level today. He will be informed about this result when it becomes available.    I discussed all the issues with the patient and his wife. I personally reviewed the CT scan and the above is my interpretation.     ·

## 2021-08-05 ENCOUNTER — OFFICE VISIT (OUTPATIENT)
Dept: NEUROLOGY | Facility: CLINIC | Age: 81
End: 2021-08-05

## 2021-08-05 VITALS
SYSTOLIC BLOOD PRESSURE: 138 MMHG | OXYGEN SATURATION: 99 % | WEIGHT: 201 LBS | HEART RATE: 80 BPM | BODY MASS INDEX: 24.99 KG/M2 | DIASTOLIC BLOOD PRESSURE: 88 MMHG | HEIGHT: 75 IN

## 2021-08-05 DIAGNOSIS — M54.12 CERVICAL RADICULOPATHY: Primary | ICD-10-CM

## 2021-08-05 DIAGNOSIS — D47.2 NEUROPATHY ASSOCIATED WITH MGUS (HCC): ICD-10-CM

## 2021-08-05 DIAGNOSIS — G63 NEUROPATHY ASSOCIATED WITH MGUS (HCC): ICD-10-CM

## 2021-08-05 LAB
ALBUMIN SERPL ELPH-MCNC: 3.8 G/DL (ref 2.9–4.4)
ALBUMIN/GLOB SERPL: 1.2 {RATIO} (ref 0.7–1.7)
ALPHA1 GLOB SERPL ELPH-MCNC: 0.2 G/DL (ref 0–0.4)
ALPHA2 GLOB SERPL ELPH-MCNC: 0.7 G/DL (ref 0.4–1)
B-GLOBULIN SERPL ELPH-MCNC: 1.3 G/DL (ref 0.7–1.3)
GAMMA GLOB SERPL ELPH-MCNC: 0.9 G/DL (ref 0.4–1.8)
GLOBULIN SER-MCNC: 3.2 G/DL (ref 2.2–3.9)
IGA SERPL-MCNC: 359 MG/DL (ref 61–437)
IGG SERPL-MCNC: 1051 MG/DL (ref 603–1613)
IGM SERPL-MCNC: 26 MG/DL (ref 15–143)
INTERPRETATION SERPL IEP-IMP: ABNORMAL
KAPPA LC FREE SER-MCNC: 35.4 MG/L (ref 3.3–19.4)
KAPPA LC FREE/LAMBDA FREE SER: 1.88 {RATIO} (ref 0.26–1.65)
LABORATORY COMMENT REPORT: ABNORMAL
LAMBDA LC FREE SERPL-MCNC: 18.8 MG/L (ref 5.7–26.3)
M PROTEIN SERPL ELPH-MCNC: 0.2 G/DL
PROT SERPL-MCNC: 7 G/DL (ref 6–8.5)

## 2021-08-05 PROCEDURE — 99215 OFFICE O/P EST HI 40 MIN: CPT | Performed by: PHYSICIAN ASSISTANT

## 2021-08-05 RX ORDER — PREGABALIN 75 MG/1
75 CAPSULE ORAL 2 TIMES DAILY
Qty: 90 CAPSULE | Refills: 0 | Status: SHIPPED | OUTPATIENT
Start: 2021-08-05 | End: 2021-09-13

## 2021-08-05 NOTE — PROGRESS NOTES
CC: Follow-up sensory peripheral neuropathy    HPI:  Panfilo Feliz is a  81 y.o.  right-handed male who I am seeing in follow-up today for peripheral neuropathy.  Patient is accompanied by his wife. Past medical history is significant for osteoarthritis/degenerative joint disease, degenerative disc disease of his lumbar spine status post decompression surgery in 2013, multilevel degenerative changes of cervical spine with spinal stenosis, ascending aortic aneurysm (currently being monitored, history of prostate cancer status post prostatectomy in 2007, hypertension, hyperlipidemia, hypothyroidism and GERD/peptic ulcer disease.  Patient was last seen in our office on 4/28/2021, a summary of his condition is taken from previous note with additions and modifications as needed:      Patient was initially sent for neurological consultation by Dr. Olvera regarding peripheral neuropathy.  The patient's history begins about 5 years ago with numbness in the feet.  It has progressed to his feet being very numb in the mornings but after he has been up and around for a while this is improved.  He also has some burning pain in the feet as well as in the arms.  This also seems a bit better as time goes on in the daytime.  Patient does also have chronic mild back pain but no significant radiation into his legs.  Of note he had a lumbar decompression at L3/4 and L4/5 in 2013.  His hematological work-up has included protein electrophoresis with immunofixation which did show an IgG kappa MGUS at low concentration of 0.2-0.3 g/dL.  It has remained low since it was identified 8/2016.  He has had 2 bone marrow biopsies which were negative for multiple myeloma and also at least one was stained with Congo red and was negative for amyloid deposition.  He had an EMG done by Dr. 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  The data sheets demonstrated a slow right peroneal motor  velocity of 36.7 m/s with prolonged latency of 7 ms and a very low amplitude of 0.25 mV.  On the left but conduction velocity was normal with normal distal latency but very low amplitude of 0.26 mV.  The right tibial motor velocity was slow at 38 m/s with normal distal latency but low amplitude of 1.74 mV.  The left tibial motor velocity was normal with normal distal latency but low amplitude of 1.82 mV.  All sensory studies in both feet showed no responses.     On 3/30/2021 patient had some additional laboratories done to look for treatable causes of neuropathy: studies show slightly elevated copper level of 138, all other labs including thyroid, sed rate, RPR, hemoglobin A1c, heavy metals, cryoglobulins were within normal limits.  Motor and sensory neuropathy panel also within normal limits.  Patient's B12 and folate levels were also noted to be within normal limits but at the lower end of the spectrum - values were 285 and 7.75 respectively.  As such he was instructed to start on some supplemental B12 vitamins which she has been taking.     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  The patient does not recognize any particular improvement with the 2 doses of immunoglobulin. Patient was then sent for neurologic and hematologic opinion at [facility].  He saw Dr. Elías Naylor and then an electronic neurologic consultation with Fadia Whalen. Their opinion was that the neuropathy was not likely to be related to the MGUS.  Dr. Garcia who previously saw the patient suspected that since his clinical course (speed of progression is too slow),  his exam demonstrated only a mild abnormalities in strength and sensation plus most of his  muscle stretch reflexes were present, and electrographically the nerves are only mildly slow and did not show clear evidence of conduction block - this is not supportive of CIDP.    Interim history  Patient reports he continues to have uncomfortable numbness and tingling as well as pins-and-needles-like pain in his feet throughout the day especially with walking.  He still tries to walk at least 30 minutes a day and states the numbness is becoming more more bothersome and affecting him doing this.  He also reports that regularly around 5 AM he wakes up with his upper arms burning.  He said it is mostly in his biceps and triceps.  It lasts for quite a while but usually he is able to fall back to sleep.  He is currently taking the Lyrica 100 mg twice daily, this dose was escalated from 75 mg twice daily.  He states that he has noticed some worsening sedation on this higher dose and is interested in coming back down.  He suspects that helps with pain but honestly he is unsure to what degree there is benefit from the Lyrica.  Additionally he takes the amitriptyline 10 mg at bedtime, also is taking Lexapro which was recently increased per his hematologist.  He admits that most definitely not being able to do the things he previously did in terms of his goal activity and exercise has gotten him down and he has had more depression symptoms of late.  Not only does his neuropathy limit him but he also has a ascending aortic aneurysm, which measured 5.1 cm without dissection or ulceration per CT on 6/24/2021, patient is monitored closely per his vascular doctor and will have another scan in 6 months.  He is told surgical intervention may be warranted in the near future if he elects to do this..       Past Medical History:   Diagnosis Date   • Arthritis    • Basal cell carcinoma (BCC) of face    • Difficulty walking    • Disease of thyroid gland    • DJD (degenerative joint disease)    • History of bone marrow biopsy    •  Hyperlipidemia    • Hypertension    • Hypothyroidism    • IgG monoclonal gammopathy    • Prostate cancer (CMS/HCC)    • Reflux esophagitis          Past Surgical History:   Procedure Laterality Date   • CHOLECYSTECTOMY  1988   • COLON SURGERY  1998   • COLONOSCOPY  02/2013   • ENDOSCOPY  2012    ESOPHAGOGASTRODUODENOSCOPY WITH DILATION OF SHATZKI'S RING   • ENDOSCOPY N/A 10/9/2020    Procedure: ESOPHAGOGASTRODUODENOSCOPY with cold bx;  Surgeon: Jake Perez MD;  Location[facility];  Service: Gastroenterology;  Laterality: N/A;  pre - nausea  post - duodenal ulcer, gastrits, gastric ulcer, hiatal hernia   • LAMINECTOMY  2013    decompression L3-4, L4-5   • PROSTATECTOMY  2007   • SKIN BIOPSY     • TONSILLECTOMY AND ADENOIDECTOMY      1940s   • VASECTOMY      1970s           Current Outpatient Medications:   •  atenolol (TENORMIN) 25 MG tablet, Take 25 mg by mouth Daily., Disp: , Rfl:   •  escitalopram (LEXAPRO) 20 MG tablet, Take 1 tablet by mouth Daily., Disp: 30 tablet, Rfl: 6  •  levothyroxine (SYNTHROID, LEVOTHROID) 112 MCG tablet, Take 112 mcg by mouth daily., Disp: , Rfl:   •  Magnesium Hydroxide (DULCOLAX PO), Take 300 mg by mouth Daily., Disp: , Rfl:   •  pantoprazole (PROTONIX) 40 MG EC tablet, Take 1 tablet by mouth 2 (Two) Times a Day. (Patient taking differently: Take 40 mg by mouth Daily.), Disp: 60 tablet, Rfl: 12  •  pregabalin (LYRICA) 75 MG capsule, Take 1 capsule by mouth 2 (Two) Times a Day for 90 days., Disp: 90 capsule, Rfl: 0  •  vitamin B-12 (CYANOCOBALAMIN) 1000 MCG tablet, Take 1,000 mcg by mouth Daily., Disp: , Rfl:   •  amitriptyline (ELAVIL) 10 MG tablet, Take 1 tablet by mouth Every Night., Disp: 90 tablet, Rfl: 3  •  ibuprofen (ADVIL,MOTRIN) 200 MG tablet, Take 200 mg by mouth As Needed for Mild Pain ., Disp: , Rfl:   •  Menthol, Topical Analgesic, (BIOFREEZE EX), Apply  topically As Needed., Disp: , Rfl:   •  promethazine (PHENERGAN) 12.5 MG tablet, Take 12.5 mg by mouth  "Every 6 (Six) Hours As Needed., Disp: , Rfl:       Family History   Problem Relation Age of Onset   • Cancer Mother 85        Breast   • COPD Father    • Cancer Brother 59        Unknown type   • Hypertension Daughter          Social History     Socioeconomic History   • Marital status:      Spouse name: Meghan   • Number of children: Not on file   • Years of education: College   • Highest education level: Not on file   Tobacco Use   • Smoking status: Never Smoker   • Smokeless tobacco: Never Used   Vaping Use   • Vaping Use: Never used   Substance and Sexual Activity   • Alcohol use: No     Comment: Heavy in past, none in 33 years   • Drug use: No   • Sexual activity: Defer         No Known Allergies    Pain Scale: 4/10    ROS:  Review of Systems   Constitutional: Positive for activity change, appetite change and fatigue.   Allergic/Immunologic: Negative for environmental allergies and food allergies.   Neurological: Positive for light-headedness and numbness. Negative for dizziness, tremors, seizures, syncope, facial asymmetry, speech difficulty, weakness and headaches.   Psychiatric/Behavioral: Negative for agitation, behavioral problems, confusion, decreased concentration, dysphoric mood, hallucinations, self-injury, sleep disturbance and suicidal ideas. The patient is nervous/anxious. The patient is not hyperactive.      I have reviewed the above ROS put in by the medical assistant and am in agreement.     Physical Exam:  Vitals:    08/05/21 1359   BP: 138/88   Pulse: 80   SpO2: 99%   Weight: 91.2 kg (201 lb)   Height: 190.5 cm (75\")       Body mass index is 25.12 kg/m².    Physical Exam  General: Well-developed white male no acute distress  HEENT: Normocephalic, atraumatic  Neck: Supple.  No cervical bruits.    Heart: Regular rate and rhythm without murmurs. Radial pulses were strong and simultaneous.  Extremities: No pedal edema.       Neurological Exam:   Mental Status: Awake, alert, oriented to " person, place and time.  Conversant without evidence of an affective disorder, thought disorder, delusions or hallucinations.  Attention span and concentration are normal.  HCF: No aphasia, apraxia or dysarthria.  Recent and remote memory intact.  Knowledge  of recent events intact.  CN:      I:              II: Visual fields full without left inattention              III, IV, VI: Eye movements intact without nystagmus or ptosis.  Pupils equal  round and reactive to light.              V,VII: Light touch and pinprick intact all 3 divisions of V.  Facial muscles symmetrical.              VIII: Hearing intact to finger rub              IX,X: Soft palate elevates symmetrically              XI: Sternomastoid and trapezius are strong.              XII: Tongue midline without atrophy or fasciculations  Motor: Incomplete relaxation.  Notable atrophy in both feet              Power testing: Mild weakness of abductor pollicis brevis muscles bilaterally with otherwise normal power in the upper extremities.  In the lower extremities there is weakness of toe flexion and extension with all other muscles tested being normal.  Reflexes: Upper extremities: +1 diffusely, patient has a hard time relaxing                   Lower extremities: +1 knee jerks absent ankle jerks                   Toe signs: Downgoing bilaterally  Sensory: Light touch: Reduced in the feet and lower legs                   Pinprick:  Intact in the legs and feet     Cerebellar: Finger-to-nose: Intact                      Rapid movement: Intact    Gait and Station: Casual walk is mildly broad-based.  He can raise up on toes and heels.  He has trouble with tandem walking.  No Romberg no drift    Results:      Lab Results   Component Value Date    GLUCOSE 99 08/04/2021    BUN 14 08/04/2021    CREATININE 1.30 08/04/2021    EGFRIFNONA 53 (L) 08/04/2021    BCR 10.8 08/04/2021    CO2 26.0 08/04/2021    CALCIUM 9.4 08/04/2021    PROTENTOTREF 7.0 08/04/2021    ALBUMIN  4.30 08/04/2021    ALBUMIN 3.8 08/04/2021    LABIL2 1.2 08/04/2021    AST 25 08/04/2021    ALT 10 08/04/2021       Lab Results   Component Value Date    WBC 3.45 08/04/2021    HGB 14.2 08/04/2021    HCT 44.4 08/04/2021    MCV 89.3 08/04/2021     08/04/2021         .  Lab Results   Component Value Date    RPR Non-Reactive 03/30/2021         Lab Results   Component Value Date    TSH 0.341 03/30/2021         Lab Results   Component Value Date    [id number] 285 03/30/2021         Lab Results   Component Value Date    FOLATE 7.75 03/30/2021         Lab Results   Component Value Date    HGBA1C 5.52 03/30/2021         Lab Results   Component Value Date    GLUCOSE 99 08/04/2021    BUN 14 08/04/2021    CREATININE 1.30 08/04/2021    EGFRIFNONA 53 (L) 08/04/2021    BCR 10.8 08/04/2021    K 4.7 08/04/2021    CO2 26.0 08/04/2021    CALCIUM 9.4 08/04/2021    PROTENTOTREF 7.0 08/04/2021    ALBUMIN 4.30 08/04/2021    ALBUMIN 3.8 08/04/2021    LABIL2 1.2 08/04/2021    AST 25 08/04/2021    ALT 10 08/04/2021         Lab Results   Component Value Date    WBC 3.45 08/04/2021    HGB 14.2 08/04/2021    HCT 44.4 08/04/2021    MCV 89.3 08/04/2021     08/04/2021             Assessment:   1.  Peripheral neuropathy, possibly associated with MGUS or idiopathic in etiology, distal and sensory-predominant  2.  Cervical spondylosis with radiculopathy      Plan:  1.  Patient symptoms continue to persist however physical exam shows hardly any motor deficit and very minimal still, his course has been very slowly progressive.  At this point a very comprehensive work-up has already been done and is not really yielded a formal diagnosis, I discussed that perhaps [facility] testing for genetic mutations may reveal something.  Patient does not have any significant family history of neuropathy however given his extremely atypical course suspect it could be in fact hereditary.  Patient states he will consider but does not want to get into it  today.    -In terms of treatment, he reports initially he was thinking the Lyrica was helpful but now is unsure, he does note that being on the 100 mg dose  twice a day is too sedating, as such we will cut it back to 75 mg twice a day advised him that if he still finds he is too tired even at this dose he may cut back to 75 mg once a day and see how pain is affected.  - His Lexapro has been increased which given his worsening depression I think is appropriate.  He does continue to take amitriptyline 10 mg at night as well.  We did discuss other alternative antidepressants that help both with mood and pain like: Cymbalta or Effexor.  However for now patient would like to see how the Lexapro increase turns out.  -He continues to take oral B12 supplementation, advised him that he still needs to have a repeat B12 level drawn, will see if level has improved any, if not would consider injections for him.  2.  Suspect that symptoms in his upper extremities could be coming from not only his peripheral neuropathy but some cervical spine radiculopathy as well.  Patient has a history of spondylosis throughout his cervical spine with spinal stenosis most prominent at C4-5 where there is severe spinal canal stenosis and foraminal stenosis.  I advised him that we could rescan his neck as this is not been done in several years, however he is resistant to this idea stating that he would not be interested in surgery even if it did show something surgical.  We will continue to monitor for now.  Follow-up in 6 months or sooner should need arise      Time 40 minutes                            Dictated utilizing Dragon dictation.

## 2021-08-06 ENCOUNTER — TELEPHONE (OUTPATIENT)
Dept: ONCOLOGY | Facility: CLINIC | Age: 81
End: 2021-08-06

## 2021-08-06 NOTE — TELEPHONE ENCOUNTER
Attempted to contact wife to tell her patients m protein was stable. No answer left vm with my direct line.

## 2021-08-06 NOTE — TELEPHONE ENCOUNTER
Wife called me back. I let her know of his stable m protein. She was very happy to hear. No questions.

## 2021-08-09 RX ORDER — SUCRALFATE 1 G/1
TABLET ORAL
Qty: 120 TABLET | Refills: 3 | Status: SHIPPED | OUTPATIENT
Start: 2021-08-09 | End: 2022-01-03

## 2021-08-16 ENCOUNTER — LAB (OUTPATIENT)
Dept: LAB | Facility: HOSPITAL | Age: 81
End: 2021-08-16

## 2021-08-16 DIAGNOSIS — D47.2 NEUROPATHY ASSOCIATED WITH MGUS (HCC): ICD-10-CM

## 2021-08-16 DIAGNOSIS — G63 NEUROPATHY ASSOCIATED WITH MGUS (HCC): ICD-10-CM

## 2021-08-16 DIAGNOSIS — D47.2 MONOCLONAL GAMMOPATHY OF UNKNOWN SIGNIFICANCE (MGUS): ICD-10-CM

## 2021-08-16 LAB
ALBUMIN SERPL-MCNC: 4.2 G/DL (ref 3.5–5.2)
ALBUMIN/GLOB SERPL: 1.4 G/DL
ALP SERPL-CCNC: 62 U/L (ref 39–117)
ALT SERPL W P-5'-P-CCNC: 8 U/L (ref 1–41)
ANION GAP SERPL CALCULATED.3IONS-SCNC: 7.9 MMOL/L (ref 5–15)
AST SERPL-CCNC: 20 U/L (ref 1–40)
BASOPHILS # BLD AUTO: 0.02 10*3/MM3 (ref 0–0.2)
BASOPHILS NFR BLD AUTO: 0.5 % (ref 0–1.5)
BILIRUB SERPL-MCNC: 0.4 MG/DL (ref 0–1.2)
BUN SERPL-MCNC: 17 MG/DL (ref 8–23)
BUN/CREAT SERPL: 13.6 (ref 7–25)
CALCIUM SPEC-SCNC: 8.9 MG/DL (ref 8.6–10.5)
CHLORIDE SERPL-SCNC: 104 MMOL/L (ref 98–107)
CO2 SERPL-SCNC: 27.1 MMOL/L (ref 22–29)
CREAT SERPL-MCNC: 1.25 MG/DL (ref 0.76–1.27)
DEPRECATED RDW RBC AUTO: 40.1 FL (ref 37–54)
EOSINOPHIL # BLD AUTO: 0.03 10*3/MM3 (ref 0–0.4)
EOSINOPHIL NFR BLD AUTO: 0.7 % (ref 0.3–6.2)
ERYTHROCYTE [DISTWIDTH] IN BLOOD BY AUTOMATED COUNT: 12.9 % (ref 12.3–15.4)
FOLATE SERPL-MCNC: 6.81 NG/ML (ref 4.78–24.2)
GFR SERPL CREATININE-BSD FRML MDRD: 56 ML/MIN/1.73
GLOBULIN UR ELPH-MCNC: 3.1 GM/DL
GLUCOSE SERPL-MCNC: 83 MG/DL (ref 65–99)
HCT VFR BLD AUTO: 43.5 % (ref 37.5–51)
HGB BLD-MCNC: 14.3 G/DL (ref 13–17.7)
IMM GRANULOCYTES # BLD AUTO: 0.01 10*3/MM3 (ref 0–0.05)
IMM GRANULOCYTES NFR BLD AUTO: 0.2 % (ref 0–0.5)
LYMPHOCYTES # BLD AUTO: 1.56 10*3/MM3 (ref 0.7–3.1)
LYMPHOCYTES NFR BLD AUTO: 36.3 % (ref 19.6–45.3)
MCH RBC QN AUTO: 28.5 PG (ref 26.6–33)
MCHC RBC AUTO-ENTMCNC: 32.9 G/DL (ref 31.5–35.7)
MCV RBC AUTO: 86.7 FL (ref 79–97)
MONOCYTES # BLD AUTO: 0.62 10*3/MM3 (ref 0.1–0.9)
MONOCYTES NFR BLD AUTO: 14.4 % (ref 5–12)
NEUTROPHILS NFR BLD AUTO: 2.06 10*3/MM3 (ref 1.7–7)
NEUTROPHILS NFR BLD AUTO: 47.9 % (ref 42.7–76)
NRBC BLD AUTO-RTO: 0 /100 WBC (ref 0–0.2)
PLATELET # BLD AUTO: 152 10*3/MM3 (ref 140–450)
PMV BLD AUTO: 12.1 FL (ref 6–12)
POTASSIUM SERPL-SCNC: 4.6 MMOL/L (ref 3.5–5.2)
PROT SERPL-MCNC: 7.3 G/DL (ref 6–8.5)
RBC # BLD AUTO: 5.02 10*6/MM3 (ref 4.14–5.8)
SODIUM SERPL-SCNC: 139 MMOL/L (ref 136–145)
VIT B12 BLD-MCNC: 320 PG/ML (ref 211–946)
VIT B12 BLD-MCNC: 326 PG/ML (ref 211–946)
WBC # BLD AUTO: 4.3 10*3/MM3 (ref 3.4–10.8)

## 2021-08-16 PROCEDURE — 36415 COLL VENOUS BLD VENIPUNCTURE: CPT | Performed by: PHYSICIAN ASSISTANT

## 2021-08-16 PROCEDURE — 85025 COMPLETE CBC W/AUTO DIFF WBC: CPT

## 2021-08-16 PROCEDURE — 83883 ASSAY NEPHELOMETRY NOT SPEC: CPT

## 2021-08-16 PROCEDURE — 82746 ASSAY OF FOLIC ACID SERUM: CPT | Performed by: PHYSICIAN ASSISTANT

## 2021-08-16 PROCEDURE — 84155 ASSAY OF PROTEIN SERUM: CPT

## 2021-08-16 PROCEDURE — 80053 COMPREHEN METABOLIC PANEL: CPT

## 2021-08-16 PROCEDURE — 82784 ASSAY IGA/IGD/IGG/IGM EACH: CPT

## 2021-08-16 PROCEDURE — 86334 IMMUNOFIX E-PHORESIS SERUM: CPT

## 2021-08-16 PROCEDURE — 82607 VITAMIN B-12: CPT | Performed by: PHYSICIAN ASSISTANT

## 2021-08-16 PROCEDURE — 84165 PROTEIN E-PHORESIS SERUM: CPT

## 2021-08-16 PROCEDURE — 82607 VITAMIN B-12: CPT | Performed by: INTERNAL MEDICINE

## 2021-08-17 ENCOUNTER — TELEPHONE (OUTPATIENT)
Dept: NEUROLOGY | Facility: CLINIC | Age: 81
End: 2021-08-17

## 2021-08-17 LAB
ALBUMIN SERPL ELPH-MCNC: 3.9 G/DL (ref 2.9–4.4)
ALBUMIN/GLOB SERPL: 1.4 {RATIO} (ref 0.7–1.7)
ALPHA1 GLOB SERPL ELPH-MCNC: 0.2 G/DL (ref 0–0.4)
ALPHA2 GLOB SERPL ELPH-MCNC: 0.7 G/DL (ref 0.4–1)
B-GLOBULIN SERPL ELPH-MCNC: 1.2 G/DL (ref 0.7–1.3)
GAMMA GLOB SERPL ELPH-MCNC: 0.8 G/DL (ref 0.4–1.8)
GLOBULIN SER-MCNC: 3 G/DL (ref 2.2–3.9)
IGA SERPL-MCNC: 350 MG/DL (ref 61–437)
IGG SERPL-MCNC: 1058 MG/DL (ref 603–1613)
IGM SERPL-MCNC: 26 MG/DL (ref 15–143)
INTERPRETATION SERPL IEP-IMP: ABNORMAL
KAPPA LC FREE SER-MCNC: 35.4 MG/L (ref 3.3–19.4)
KAPPA LC FREE/LAMBDA FREE SER: 2 {RATIO} (ref 0.26–1.65)
LABORATORY COMMENT REPORT: ABNORMAL
LAMBDA LC FREE SERPL-MCNC: 17.7 MG/L (ref 5.7–26.3)
M PROTEIN SERPL ELPH-MCNC: 0.2 G/DL
PROT SERPL-MCNC: 6.9 G/DL (ref 6–8.5)

## 2021-08-17 NOTE — TELEPHONE ENCOUNTER
Spoke with pt's wife she stated they can administer the B12 shot at home if you could send prescription over to the pharmacy.

## 2021-08-17 NOTE — TELEPHONE ENCOUNTER
----- Message from Maryana Alba PA-C sent at 8/17/2021 11:06 AM EDT -----  Janet Domínguez, can you call patient and let him know that his B12 level is still at the lower end of normal. It was barely come up since he had started taking the vitamins.     So, for him I would recommend trying the B12 shots. His wife is a nurse and she can administer them if that's convenient or they can come here. I'd recommend once monthly. Let me know and I can send in Rx. THANKS

## 2021-08-18 ENCOUNTER — TELEPHONE (OUTPATIENT)
Dept: NEUROLOGY | Facility: CLINIC | Age: 81
End: 2021-08-18

## 2021-08-18 NOTE — TELEPHONE ENCOUNTER
.    Pharmacy Name:  [facility]     Pharmacy representative name:  QUITA     Pharmacy representative phone number: [phone]    What medication are you calling in regards to: VITAMIN B 12 INJECTION     What question does the pharmacy have:  STATES TO DISPENSE  WITH KIT   DID YOU MEAN TO DISPENSE WITH  [facility] ? ALSO DO YOU WANT THIS IM OR SUB Q ?    Who is the provider that prescribed the medication:  SAGAR GARNER    Additional notes:  PLEASE ADVISE

## 2021-09-10 DIAGNOSIS — G63 NEUROPATHY ASSOCIATED WITH MGUS (HCC): ICD-10-CM

## 2021-09-10 DIAGNOSIS — D47.2 NEUROPATHY ASSOCIATED WITH MGUS (HCC): ICD-10-CM

## 2021-09-13 RX ORDER — PREGABALIN 75 MG/1
75 CAPSULE ORAL 2 TIMES DAILY
Qty: 60 CAPSULE | Refills: 2 | Status: SHIPPED | OUTPATIENT
Start: 2021-09-13 | End: 2021-12-14

## 2021-10-23 RX ORDER — PANTOPRAZOLE SODIUM 40 MG/1
TABLET, DELAYED RELEASE ORAL
Qty: 60 TABLET | Refills: 12 | Status: SHIPPED | OUTPATIENT
Start: 2021-10-23 | End: 2022-11-14 | Stop reason: SDUPTHER

## 2021-12-13 DIAGNOSIS — G63 NEUROPATHY ASSOCIATED WITH MGUS (HCC): ICD-10-CM

## 2021-12-13 DIAGNOSIS — D47.2 NEUROPATHY ASSOCIATED WITH MGUS (HCC): ICD-10-CM

## 2021-12-14 RX ORDER — PREGABALIN 75 MG/1
CAPSULE ORAL
Qty: 60 CAPSULE | Refills: 0 | Status: SHIPPED | OUTPATIENT
Start: 2021-12-14 | End: 2022-01-14

## 2022-01-03 ENCOUNTER — OFFICE VISIT (OUTPATIENT)
Dept: GASTROENTEROLOGY | Facility: CLINIC | Age: 82
End: 2022-01-03

## 2022-01-03 VITALS — WEIGHT: 191.5 LBS | TEMPERATURE: 98 F | HEIGHT: 76 IN | BODY MASS INDEX: 23.32 KG/M2

## 2022-01-03 DIAGNOSIS — R13.19 OTHER DYSPHAGIA: Primary | ICD-10-CM

## 2022-01-03 PROCEDURE — 99214 OFFICE O/P EST MOD 30 MIN: CPT | Performed by: INTERNAL MEDICINE

## 2022-01-03 NOTE — PROGRESS NOTES
Chief Complaint   Patient presents with   • Difficulty Swallowing   • Nausea     Subjective     HPI  Panfilo Feliz is a 81 y.o. male who presents today for follow up.  Continues with issues with dysphagia  EGD 1.5 years ago showed a 4 cm hiatal hernia but no obvious source for dysphagia  He really describes more of a transfer dysphagia to me in the office today as opposed with transport dysphagia  He has lost 15 pounds but he is exercising  He has had no vomiting  He has never been evaluated by a speech therapist or had a videofluoroscopic swallow study  His constipation is well managed with over-the-counter Dulcolax with 1 bowel movement a day  He has no heartburn or reflux or regurgitation on Protonix daily      Objective   Vitals:    01/03/22 1259   Temp: 98 °F (36.7 °C)       Physical Exam  Vitals reviewed.   Constitutional:       Appearance: He is well-developed.   HENT:      Head: Normocephalic and atraumatic.   Abdominal:      General: Bowel sounds are normal. There is no distension.      Palpations: Abdomen is soft. There is no mass.      Tenderness: There is no abdominal tenderness.      Hernia: No hernia is present.   Skin:     General: Skin is warm and dry.   Neurological:      Mental Status: He is alert and oriented to person, place, and time.   Psychiatric:         Behavior: Behavior normal.         Thought Content: Thought content normal.         Judgment: Judgment normal.       The following data was reviewed by: Jake Perez MD on 01/03/2022:  Common labs    Common Labsle 3/30/21 8/4/21 8/4/21 8/4/21 8/16/21 8/16/21 8/16/21     0931 0931 0931 1447 1447 1447   Glucose   99   83    BUN   14   17    Creatinine   1.30   1.25    eGFR Non African Am   53 (A)   56 (A)    Sodium   139   139    Potassium   4.7   4.6    Chloride   103   104    Calcium   9.4   8.9    Total Protein    7.0   6.9   Albumin   4.30 3.8  4.20 3.9   Total Bilirubin   0.5   0.4    Alkaline Phosphatase   64   62    AST (SGOT)   25    20    ALT (SGPT)   10   8    WBC  3.45   4.30     Hemoglobin  14.2   14.3     Hematocrit  44.4   43.5     Platelets  143   152     Hemoglobin A1C 5.52         (A) Abnormal value            CMP    CMP 2/3/21 2/3/21 8/4/21 8/4/21 8/16/21 8/16/21    0849 0849 0931 0931 1447 1447   Glucose 155 (A)  99  83    BUN 18  14  17    Creatinine 1.36 (A)  1.30  1.25    eGFR Non African Am 51 (A)  53 (A)  56 (A)    Sodium 139  139  139    Potassium 4.6  4.7  4.6    Chloride 104  103  104    Calcium 9.6  9.4  8.9    Total Protein  7.1  7.0  6.9   Albumin 4.10 3.7 4.30 3.8 4.20 3.9   Globulin  3.4  3.2  3.0   Total Bilirubin 0.4  0.5  0.4    Alkaline Phosphatase 74  64  62    AST (SGOT) 21  25  20    ALT (SGPT) 9  10  8    (A) Abnormal value            CBC    CBC 2/3/21 8/4/21 8/16/21   WBC 3.49 3.45 4.30   RBC 5.08 4.97 5.02   Hemoglobin 14.6 14.2 14.3   Hematocrit 45.7 44.4 43.5   MCV 90.0 89.3 86.7   MCH 28.7 28.6 28.5   MCHC 31.9 32.0 32.9   RDW 12.9 13.5 12.9   Platelets 137 (A) 143 152   (A) Abnormal value            CBC w/diff    CBC w/Diff 2/3/21 8/4/21 8/16/21   WBC 3.49 3.45 4.30   RBC 5.08 4.97 5.02   Hemoglobin 14.6 14.2 14.3   Hematocrit 45.7 44.4 43.5   MCV 90.0 89.3 86.7   MCH 28.7 28.6 28.5   MCHC 31.9 32.0 32.9   RDW 12.9 13.5 12.9   Platelets 137 (A) 143 152   Neutrophil Rel % 45.0 43.7 47.9   Immature Granulocyte Rel % 0.3 0.3 0.2   Lymphocyte Rel % 40.1 40.9 36.3   Monocyte Rel % 12.9 (A) 13.3 (A) 14.4 (A)   Eosinophil Rel % 1.1 1.2 0.7   Basophil Rel % 0.6 0.6 0.5   (A) Abnormal value                TSH    TSH 3/30/21   TSH 0.341           Electrolytes    Electrolytes 2/3/21 8/4/21 8/16/21   Sodium 139 139 139   Potassium 4.6 4.7 4.6   Chloride 104 103 104   Calcium 9.6 9.4 8.9           Renal Profile    Renal Profile 2/3/21 8/4/21 8/16/21   BUN 18 14 17   Creatinine 1.36 (A) 1.30 1.25   eGFR Non African Am 51 (A) 53 (A) 56 (A)   (A) Abnormal value            BMP    BMP 2/3/21 8/4/21 8/16/21   BUN 18 14 17    Creatinine 1.36 (A) 1.30 1.25   Sodium 139 139 139   Potassium 4.6 4.7 4.6   Chloride 104 103 104   CO2 25.3 26.0 27.1   Calcium 9.6 9.4 8.9   (A) Abnormal value            Data reviewed: GI studies EGD 2020 October with duodenal ulcerations and a hiatal hernia     Assessment/Plan   Assessment:     Likely transfer dysphagia  Oral pharyngeal dysphagia  GERD  Constipation  History of peptic ulcer disease      Plan:   Schedule esophagram, if that is within normal limits I will refer him to speech therapy with a videofluoroscopic swallow study to be performed  Continue PPI daily  Continue Dulcolax as needed for constipation  Based on his age and comorbidities we have deferred colonoscopy for screening at this time    I spent 30 minutes caring for Panfilo on this date of service. This time includes time spent by me in the following activities:preparing for the visit, reviewing tests, obtaining and/or reviewing a separately obtained history, performing a medically appropriate examination and/or evaluation , counseling and educating the patient/family/caregiver, ordering medications, tests, or procedures, referring and communicating with other health care professionals , documenting information in the medical record, independently interpreting results and communicating that information with the patient/family/caregiver and care coordination    Jake Perez MD  [facility]  [address] [place] [address]  Office: [phone]

## 2022-01-12 DIAGNOSIS — D47.2 NEUROPATHY ASSOCIATED WITH MGUS: ICD-10-CM

## 2022-01-12 DIAGNOSIS — G63 NEUROPATHY ASSOCIATED WITH MGUS: ICD-10-CM

## 2022-01-14 ENCOUNTER — HOSPITAL ENCOUNTER (OUTPATIENT)
Dept: GENERAL RADIOLOGY | Facility: HOSPITAL | Age: 82
Discharge: HOME | End: 2022-01-14
Admitting: INTERNAL MEDICINE

## 2022-01-14 PROCEDURE — 74220 X-RAY XM ESOPHAGUS 1CNTRST: CPT

## 2022-01-14 PROCEDURE — 63710000001 BARIUM SULFATE 96 % RECONSTITUTED SUSPENSION: Performed by: INTERNAL MEDICINE

## 2022-01-14 PROCEDURE — A9270 NON-COVERED ITEM OR SERVICE: HCPCS | Performed by: INTERNAL MEDICINE

## 2022-01-14 RX ORDER — PREGABALIN 75 MG/1
75 CAPSULE ORAL 2 TIMES DAILY
Qty: 60 CAPSULE | Refills: 0 | Status: SHIPPED | OUTPATIENT
Start: 2022-01-14 | End: 2022-02-14 | Stop reason: SDUPTHER

## 2022-01-14 RX ADMIN — BARIUM SULFATE 183 ML: 960 POWDER, FOR SUSPENSION ORAL at 08:16

## 2022-01-17 NOTE — PROGRESS NOTES
Mild nonspecific dysmotility likely from acid refluxContinue PPIOrder videofluoroscopic swallow study and refer to speech therapy thank you

## 2022-01-19 ENCOUNTER — TELEPHONE (OUTPATIENT)
Dept: GASTROENTEROLOGY | Facility: CLINIC | Age: 82
End: 2022-01-19

## 2022-01-19 NOTE — TELEPHONE ENCOUNTER
----- Message from Jake Perez MD sent at 1/17/2022  2:10 PM EST -----  Mild nonspecific dysmotility likely from acid refluxContinue PPIOrder videofluoroscopic swallow study and refer to speech therapy thank you

## 2022-01-19 NOTE — TELEPHONE ENCOUNTER
Patient called. Advised as per Dr. Perez's note.   He states he does not want a VFSS done at this time. He states he will call back if he would like to move forward with it. Update to Dr. Perez.

## 2022-01-31 ENCOUNTER — HOSPITAL ENCOUNTER (OUTPATIENT)
Dept: CARDIOLOGY | Facility: HOSPITAL | Age: 82
Discharge: HOME | End: 2022-01-31
Admitting: THORACIC SURGERY (CARDIOTHORACIC VASCULAR SURGERY)

## 2022-01-31 VITALS
SYSTOLIC BLOOD PRESSURE: 147 MMHG | WEIGHT: 191 LBS | DIASTOLIC BLOOD PRESSURE: 89 MMHG | HEIGHT: 76 IN | BODY MASS INDEX: 23.26 KG/M2

## 2022-01-31 DIAGNOSIS — I71.21 ASCENDING AORTIC ANEURYSM: ICD-10-CM

## 2022-01-31 DIAGNOSIS — R01.1 MURMUR: ICD-10-CM

## 2022-01-31 DIAGNOSIS — I10 ESSENTIAL HYPERTENSION: ICD-10-CM

## 2022-01-31 LAB
BH CV ECHO MEAS - ACS: 2.1 CM
BH CV ECHO MEAS - AI DEC SLOPE: 263.6 CM/SEC^2
BH CV ECHO MEAS - AI DEC TIME: 1.9 SEC
BH CV ECHO MEAS - AI MAX PG: 98.2 MMHG
BH CV ECHO MEAS - AI MAX VEL: 495.5 CM/SEC
BH CV ECHO MEAS - AI P1/2T: 550.6 MSEC
BH CV ECHO MEAS - AO MAX PG (FULL): 4.1 MMHG
BH CV ECHO MEAS - AO MAX PG: 9.8 MMHG
BH CV ECHO MEAS - AO MEAN PG (FULL): 2 MMHG
BH CV ECHO MEAS - AO MEAN PG: 5 MMHG
BH CV ECHO MEAS - AO ROOT AREA (BSA CORRECTED): 2
BH CV ECHO MEAS - AO ROOT AREA: 14.3 CM^2
BH CV ECHO MEAS - AO ROOT DIAM: 4.3 CM
BH CV ECHO MEAS - AO V2 MAX: 156.4 CM/SEC
BH CV ECHO MEAS - AO V2 MEAN: 104.2 CM/SEC
BH CV ECHO MEAS - AO V2 VTI: 30.9 CM
BH CV ECHO MEAS - AORTIC HR: 67.7 BPM
BH CV ECHO MEAS - AORTIC R-R: 0.89 SEC
BH CV ECHO MEAS - ASC AORTA: 4.7 CM
BH CV ECHO MEAS - AVA(I,A): 3.1 CM^2
BH CV ECHO MEAS - AVA(I,D): 3.1 CM^2
BH CV ECHO MEAS - AVA(V,A): 3.1 CM^2
BH CV ECHO MEAS - AVA(V,D): 3.1 CM^2
BH CV ECHO MEAS - BSA(HAYCOCK): 2.1 M^2
BH CV ECHO MEAS - BSA: 2.2 M^2
BH CV ECHO MEAS - BZI_BMI: 23.1 KILOGRAMS/M^2
BH CV ECHO MEAS - BZI_METRIC_HEIGHT: 193 CM
BH CV ECHO MEAS - BZI_METRIC_WEIGHT: 86.2 KG
BH CV ECHO MEAS - CI(AO): 13.8 L/MIN/M^2
BH CV ECHO MEAS - CI(LVOT): 3 L/MIN/M^2
BH CV ECHO MEAS - CO(AO): 29.9 L/MIN
BH CV ECHO MEAS - CO(LVOT): 6.6 L/MIN
BH CV ECHO MEAS - EDV(CUBED): 112.7 ML
BH CV ECHO MEAS - EDV(MOD-SP2): 99.5 ML
BH CV ECHO MEAS - EDV(MOD-SP4): 100.9 ML
BH CV ECHO MEAS - EDV(TEICH): 109.1 ML
BH CV ECHO MEAS - EF(CUBED): 60.1 %
BH CV ECHO MEAS - EF(MOD-BP): 57 %
BH CV ECHO MEAS - EF(MOD-SP2): 52.1 %
BH CV ECHO MEAS - EF(MOD-SP4): 61.3 %
BH CV ECHO MEAS - EF(TEICH): 51.5 %
BH CV ECHO MEAS - ESV(CUBED): 45 ML
BH CV ECHO MEAS - ESV(MOD-SP2): 47.7 ML
BH CV ECHO MEAS - ESV(MOD-SP4): 39 ML
BH CV ECHO MEAS - ESV(TEICH): 52.9 ML
BH CV ECHO MEAS - FS: 26.4 %
BH CV ECHO MEAS - IVS/LVPW: 1
BH CV ECHO MEAS - IVSD: 1.1 CM
BH CV ECHO MEAS - LA DIMENSION(2D): 3.7 CM
BH CV ECHO MEAS - LV DIASTOLIC VOL/BSA (35-75): 46.5 ML/M^2
BH CV ECHO MEAS - LV MASS(C)D: 199.5 GRAMS
BH CV ECHO MEAS - LV MASS(C)DI: 92 GRAMS/M^2
BH CV ECHO MEAS - LV MAX PG: 5.7 MMHG
BH CV ECHO MEAS - LV MEAN PG: 3 MMHG
BH CV ECHO MEAS - LV SYSTOLIC VOL/BSA (12-30): 18 ML/M^2
BH CV ECHO MEAS - LV V1 MAX: 119.4 CM/SEC
BH CV ECHO MEAS - LV V1 MEAN: 81.8 CM/SEC
BH CV ECHO MEAS - LV V1 VTI: 23.6 CM
BH CV ECHO MEAS - LVIDD: 4.8 CM
BH CV ECHO MEAS - LVIDS: 3.6 CM
BH CV ECHO MEAS - LVOT AREA: 4.1 CM^2
BH CV ECHO MEAS - LVOT DIAM: 2.3 CM
BH CV ECHO MEAS - LVPWD: 1.1 CM
BH CV ECHO MEAS - MR MAX PG: 105.7 MMHG
BH CV ECHO MEAS - MR MAX VEL: 512.8 CM/SEC
BH CV ECHO MEAS - MV A MAX VEL: 69.1 CM/SEC
BH CV ECHO MEAS - MV DEC SLOPE: 182 CM/SEC^2
BH CV ECHO MEAS - MV DEC TIME: 0.25 SEC
BH CV ECHO MEAS - MV E MAX VEL: 44.8 CM/SEC
BH CV ECHO MEAS - MV E/A: 0.65
BH CV ECHO MEAS - MV MAX PG: 2.8 MMHG
BH CV ECHO MEAS - MV MEAN PG: 1.3 MMHG
BH CV ECHO MEAS - MV V2 MAX: 83.5 CM/SEC
BH CV ECHO MEAS - MV V2 MEAN: 53.8 CM/SEC
BH CV ECHO MEAS - MV V2 VTI: 24.3 CM
BH CV ECHO MEAS - MVA(VTI): 4 CM^2
BH CV ECHO MEAS - PA MAX PG (FULL): 2.7 MMHG
BH CV ECHO MEAS - PA MAX PG: 3.5 MMHG
BH CV ECHO MEAS - PA MEAN PG (FULL): 1.7 MMHG
BH CV ECHO MEAS - PA MEAN PG: 2.2 MMHG
BH CV ECHO MEAS - PA V2 MAX: 93.5 CM/SEC
BH CV ECHO MEAS - PA V2 MEAN: 70.6 CM/SEC
BH CV ECHO MEAS - PA V2 VTI: 15.3 CM
BH CV ECHO MEAS - PI MAX PG: 5.4 MMHG
BH CV ECHO MEAS - PI MAX VEL: 116.2 CM/SEC
BH CV ECHO MEAS - PULM A REVS DUR: 0.11 SEC
BH CV ECHO MEAS - PULM A REVS VEL: 24.2 CM/SEC
BH CV ECHO MEAS - PULM DIAS VEL: 34 CM/SEC
BH CV ECHO MEAS - PULM S/D: 1.9
BH CV ECHO MEAS - PULM SYS VEL: 63.1 CM/SEC
BH CV ECHO MEAS - RAP SYSTOLE: 3 MMHG
BH CV ECHO MEAS - RV MAX PG: 0.84 MMHG
BH CV ECHO MEAS - RV MEAN PG: 0.5 MMHG
BH CV ECHO MEAS - RV V1 MAX: 45.8 CM/SEC
BH CV ECHO MEAS - RV V1 MEAN: 33.5 CM/SEC
BH CV ECHO MEAS - RV V1 VTI: 8.6 CM
BH CV ECHO MEAS - RVDD: 3.6 CM
BH CV ECHO MEAS - RVSP: 23.5 MMHG
BH CV ECHO MEAS - SI(AO): 203.6 ML/M^2
BH CV ECHO MEAS - SI(CUBED): 31.2 ML/M^2
BH CV ECHO MEAS - SI(LVOT): 44.7 ML/M^2
BH CV ECHO MEAS - SI(MOD-SP2): 23.9 ML/M^2
BH CV ECHO MEAS - SI(MOD-SP4): 28.5 ML/M^2
BH CV ECHO MEAS - SI(TEICH): 25.9 ML/M^2
BH CV ECHO MEAS - SV(AO): 441.3 ML
BH CV ECHO MEAS - SV(CUBED): 67.7 ML
BH CV ECHO MEAS - SV(LVOT): 96.9 ML
BH CV ECHO MEAS - SV(MOD-SP2): 51.9 ML
BH CV ECHO MEAS - SV(MOD-SP4): 61.9 ML
BH CV ECHO MEAS - SV(TEICH): 56.2 ML
BH CV ECHO MEAS - TR MAX VEL: 225.5 CM/SEC
LV EF 2D ECHO EST: 55 %

## 2022-01-31 PROCEDURE — 93306 TTE W/DOPPLER COMPLETE: CPT

## 2022-01-31 PROCEDURE — 93306 TTE W/DOPPLER COMPLETE: CPT | Performed by: INTERNAL MEDICINE

## 2022-02-05 ENCOUNTER — HOSPITAL ENCOUNTER (OUTPATIENT)
Dept: CT IMAGING | Facility: HOSPITAL | Age: 82
Discharge: HOME | End: 2022-02-05
Admitting: THORACIC SURGERY (CARDIOTHORACIC VASCULAR SURGERY)

## 2022-02-05 DIAGNOSIS — I10 ESSENTIAL HYPERTENSION: ICD-10-CM

## 2022-02-05 DIAGNOSIS — R01.1 MURMUR: ICD-10-CM

## 2022-02-05 DIAGNOSIS — I71.21 ASCENDING AORTIC ANEURYSM: ICD-10-CM

## 2022-02-05 LAB — CREAT BLDA-MCNC: 1.3 MG/DL (ref 0.6–1.3)

## 2022-02-05 PROCEDURE — 0 IOPAMIDOL PER 1 ML: Performed by: THORACIC SURGERY (CARDIOTHORACIC VASCULAR SURGERY)

## 2022-02-05 PROCEDURE — 71275 CT ANGIOGRAPHY CHEST: CPT

## 2022-02-05 PROCEDURE — 82565 ASSAY OF CREATININE: CPT

## 2022-02-05 RX ADMIN — IOPAMIDOL 100 ML: 755 INJECTION, SOLUTION INTRAVENOUS at 08:34

## 2022-02-09 ENCOUNTER — LAB (OUTPATIENT)
Dept: LAB | Facility: HOSPITAL | Age: 82
End: 2022-02-09

## 2022-02-09 ENCOUNTER — OFFICE VISIT (OUTPATIENT)
Dept: ONCOLOGY | Facility: CLINIC | Age: 82
End: 2022-02-09

## 2022-02-09 VITALS
WEIGHT: 189.3 LBS | RESPIRATION RATE: 16 BRPM | HEART RATE: 73 BPM | SYSTOLIC BLOOD PRESSURE: 142 MMHG | DIASTOLIC BLOOD PRESSURE: 86 MMHG | HEIGHT: 76 IN | TEMPERATURE: 97.5 F | BODY MASS INDEX: 23.05 KG/M2 | OXYGEN SATURATION: 98 %

## 2022-02-09 DIAGNOSIS — R11.0 NAUSEA IN ADULT PATIENT: ICD-10-CM

## 2022-02-09 DIAGNOSIS — G63 NEUROPATHY ASSOCIATED WITH MGUS: ICD-10-CM

## 2022-02-09 DIAGNOSIS — D69.6 THROMBOCYTOPENIA: ICD-10-CM

## 2022-02-09 DIAGNOSIS — D47.2 NEUROPATHY ASSOCIATED WITH MGUS: ICD-10-CM

## 2022-02-09 DIAGNOSIS — D70.8 OTHER NEUTROPENIA: ICD-10-CM

## 2022-02-09 DIAGNOSIS — D47.2 NEUROPATHY ASSOCIATED WITH MGUS: Primary | ICD-10-CM

## 2022-02-09 DIAGNOSIS — D47.2 MONOCLONAL GAMMOPATHY OF UNKNOWN SIGNIFICANCE (MGUS): ICD-10-CM

## 2022-02-09 DIAGNOSIS — G63 NEUROPATHY ASSOCIATED WITH MGUS: Primary | ICD-10-CM

## 2022-02-09 LAB
ALBUMIN SERPL-MCNC: 4.1 G/DL (ref 3.5–5.2)
ALBUMIN/GLOB SERPL: 1.3 G/DL (ref 1.1–2.4)
ALP SERPL-CCNC: 57 U/L (ref 38–116)
ALT SERPL W P-5'-P-CCNC: 10 U/L (ref 0–41)
ANION GAP SERPL CALCULATED.3IONS-SCNC: 9.6 MMOL/L (ref 5–15)
AST SERPL-CCNC: 22 U/L (ref 0–40)
BASOPHILS # BLD AUTO: 0.02 10*3/MM3 (ref 0–0.2)
BASOPHILS NFR BLD AUTO: 0.7 % (ref 0–1.5)
BILIRUB SERPL-MCNC: 0.3 MG/DL (ref 0.2–1.2)
BUN SERPL-MCNC: 21 MG/DL (ref 6–20)
BUN/CREAT SERPL: 17.5 (ref 7.3–30)
CALCIUM SPEC-SCNC: 9.1 MG/DL (ref 8.5–10.2)
CHLORIDE SERPL-SCNC: 104 MMOL/L (ref 98–107)
CO2 SERPL-SCNC: 26.4 MMOL/L (ref 22–29)
CREAT SERPL-MCNC: 1.2 MG/DL (ref 0.7–1.3)
DEPRECATED RDW RBC AUTO: 46.5 FL (ref 37–54)
EOSINOPHIL # BLD AUTO: 0.03 10*3/MM3 (ref 0–0.4)
EOSINOPHIL NFR BLD AUTO: 1 % (ref 0.3–6.2)
ERYTHROCYTE [DISTWIDTH] IN BLOOD BY AUTOMATED COUNT: 13.7 % (ref 12.3–15.4)
GFR SERPL CREATININE-BSD FRML MDRD: 58 ML/MIN/1.73
GLOBULIN UR ELPH-MCNC: 3.2 GM/DL (ref 1.8–3.5)
GLUCOSE SERPL-MCNC: 83 MG/DL (ref 74–124)
HCT VFR BLD AUTO: 42 % (ref 37.5–51)
HGB BLD-MCNC: 13.4 G/DL (ref 13–17.7)
IMM GRANULOCYTES # BLD AUTO: 0 10*3/MM3 (ref 0–0.05)
IMM GRANULOCYTES NFR BLD AUTO: 0 % (ref 0–0.5)
LYMPHOCYTES # BLD AUTO: 1.18 10*3/MM3 (ref 0.7–3.1)
LYMPHOCYTES NFR BLD AUTO: 39.7 % (ref 19.6–45.3)
MCH RBC QN AUTO: 29.4 PG (ref 26.6–33)
MCHC RBC AUTO-ENTMCNC: 31.9 G/DL (ref 31.5–35.7)
MCV RBC AUTO: 92.1 FL (ref 79–97)
MONOCYTES # BLD AUTO: 0.47 10*3/MM3 (ref 0.1–0.9)
MONOCYTES NFR BLD AUTO: 15.8 % (ref 5–12)
NEUTROPHILS NFR BLD AUTO: 1.27 10*3/MM3 (ref 1.7–7)
NEUTROPHILS NFR BLD AUTO: 42.8 % (ref 42.7–76)
NRBC BLD AUTO-RTO: 0 /100 WBC (ref 0–0.2)
PLATELET # BLD AUTO: 142 10*3/MM3 (ref 140–450)
PMV BLD AUTO: 10.7 FL (ref 6–12)
POTASSIUM SERPL-SCNC: 4.5 MMOL/L (ref 3.5–4.7)
PROT SERPL-MCNC: 7.3 G/DL (ref 6.3–8)
RBC # BLD AUTO: 4.56 10*6/MM3 (ref 4.14–5.8)
SODIUM SERPL-SCNC: 140 MMOL/L (ref 134–145)
WBC NRBC COR # BLD: 2.97 10*3/MM3 (ref 3.4–10.8)

## 2022-02-09 PROCEDURE — 85025 COMPLETE CBC W/AUTO DIFF WBC: CPT

## 2022-02-09 PROCEDURE — 80053 COMPREHEN METABOLIC PANEL: CPT

## 2022-02-09 PROCEDURE — 36415 COLL VENOUS BLD VENIPUNCTURE: CPT

## 2022-02-09 PROCEDURE — 99214 OFFICE O/P EST MOD 30 MIN: CPT | Performed by: INTERNAL MEDICINE

## 2022-02-09 RX ORDER — MAGNESIUM 200 MG
TABLET ORAL
COMMUNITY
End: 2022-02-10

## 2022-02-09 NOTE — PROGRESS NOTES
REASON FOR FOLLOWUP:    1.Minimal leukopenia with thrombocytopenia in the background of minimal IgG monoclonal gammopathy. The patient has no splenomegaly, no peripheral adenopathy, and no symptoms that suggest lupus or collagen vascular disease. Bone marrow   a  spirate documented no evidence of myeloma, lymphoma, leukemia, myelodysplasia, or any other abnormality. Bone marrow chromosomal analysis as well as flow cytometry were negative.   2.  Inflammatory polyneuropathy.  He initiated IVIG on 10/3/2019.  He did receive his second IVIG on 11/7/2019.allergic reaction to it stopping infusion all togethere  3.  Approximately 10 days after his second IVIG infusion he did experience serum sickness that included rash, generalized arthralgias, skin peeling of the palms.  Dr. Olvera prescribed 20 mg of prednisone and his symptoms have resolved after 2 days.      REASON FOR VISIT:     DURING THE VISIT WITH THE PATIENT TODAY , PATIENT HAD FACE MASK, MY MEDICAL ASSISTANT AND I  HAD PROPPER PROTECTIVE EQUIPMENT, AND I DID HAND HYGIENE WITH SOAP AND WATER BEFORE AND AFTER THE VISIT.    This patient returns today to the office in company of his wife stating that he continues doing relatively well from the point of view of his multiple illnesses. He has had a CT scan of the chest to review his aortic aneurysm and he will be seen by Spencer Puente MD, in the next week or so. His sensory neuropathy in his feet is still ongoing and that never is going to change. Lyrica gives him some mild benefit. His appetite is acceptable, his weight has come down because he is now walking at least 1 mild 5 times a week and I think he is burning more calories than before. He still has difficulty swallowing and choking sensation. Esophagogram has recently been done. The patient is not going to change his habits of diet neither his speed to eat. He has good bowel activity, good urination. His back pain is very minimal at this point. He is  walking and he feels better mentally given these circumstances. He is less depressed.                     Past Medical History:   Diagnosis Date   • AAA (abdominal aortic aneurysm) (HCC)    • Alcoholism (HCC)     None since 1991   • Arthritis    • Basal cell carcinoma (BCC) of face    • Cholelithiasis 1990’s    Cholecystectomy   • Colon polyp    • Coronary artery disease    • Difficulty walking    • Disease of thyroid gland    • DJD (degenerative joint disease)    • GERD (gastroesophageal reflux disease)    • GI (gastrointestinal bleed)    • Hernia     Hiatal   • History of bone marrow biopsy    • Hyperlipidemia    • Hypertension    • Hypothyroidism    • IgG monoclonal gammopathy    • Prostate cancer (HCC)    • Reflux esophagitis    • Ulcer      Past Surgical History:   Procedure Laterality Date   • CHOLECYSTECTOMY  1988   • COLON SURGERY  1998   • COLONOSCOPY  02/2013   • ENDOSCOPY  2012    ESOPHAGOGASTRODUODENOSCOPY WITH DILATION OF SHATZKI'S RING   • ENDOSCOPY N/A 10/9/2020    Procedure: ESOPHAGOGASTRODUODENOSCOPY with cold bx;  Surgeon: Jake Perez MD;  Location: [facility] ENDOSCOPY;  Service: Gastroenterology;  Laterality: N/A;  pre - nausea  post - duodenal ulcer, gastrits, gastric ulcer, hiatal hernia   • LAMINECTOMY  2013    decompression L3-4, L4-5   • PROSTATECTOMY  2007   • SKIN BIOPSY     • TONSILLECTOMY AND ADENOIDECTOMY      1940s   • UPPER GASTROINTESTINAL ENDOSCOPY     • VASECTOMY      1970s     Social History     Socioeconomic History   • Marital status:      Spouse name: Meghan   • Years of education: College   Tobacco Use   • Smoking status: Never Smoker   • Smokeless tobacco: Never Used   Vaping Use   • Vaping Use: Never used   Substance and Sexual Activity   • Alcohol use: No     Comment: Heavy in past, none in 33 years   • Drug use: Never   • Sexual activity: Not Currently     Partners: Female     Family History   Problem Relation Age of Onset   • Cancer Mother 85        Breast   •  "COPD Father    • Cancer Brother 59        Unknown type   • Hypertension Daughter            Current Outpatient Medications on File Prior to Visit   Medication Sig Dispense Refill   • amitriptyline (ELAVIL) 10 MG tablet Take 1 tablet by mouth Every Night. 90 tablet 3   • atenolol (TENORMIN) 25 MG tablet Take 25 mg by mouth Daily.     • Cyanocobalamin (Vitamin B-12) 1000 MCG sublingual tablet Place  under the tongue.     • escitalopram (LEXAPRO) 20 MG tablet Take 1 tablet by mouth Daily. 30 tablet 6   • ibuprofen (ADVIL,MOTRIN) 200 MG tablet Take 200 mg by mouth As Needed for Mild Pain .     • levothyroxine (SYNTHROID, LEVOTHROID) 112 MCG tablet Take 112 mcg by mouth daily.     • Magnesium Hydroxide (DULCOLAX PO) Take 300 mg by mouth Daily.     • Menthol, Topical Analgesic, (BIOFREEZE EX) Apply  topically As Needed.     • pantoprazole (PROTONIX) 40 MG EC tablet TAKE ONE TABLET BY MOUTH TWICE A DAY 60 tablet 12   • pregabalin (LYRICA) 75 MG capsule Take 1 capsule by mouth 2 (Two) Times a Day for 30 days. 60 capsule 0   • promethazine (PHENERGAN) 12.5 MG tablet Take 12.5 mg by mouth Every 6 (Six) Hours As Needed.     • [DISCONTINUED] vitamin B-12 (CYANOCOBALAMIN) 1000 MCG tablet Take 1,000 mcg by mouth Daily.       No current facility-administered medications on file prior to visit.   No Known Allergies      ONCOLOGIC/HEMATOLOGIC HISTORY: History from previous dates can be found in the separate document.                     Vitals:    02/09/22 1248   BP: 142/86   Pulse: 73   Resp: 16   Temp: 97.5 °F (36.4 °C)   TempSrc: Temporal   SpO2: 98%   Weight: 85.9 kg (189 lb 4.8 oz)   Height: 193 cm (75.98\")                    PHYSICAL EXAMINATION:      I HAVE PERSONALLY REVIEWED THE HISTORY OF THE PRESENT ILLNESS, PAST MEDICAL HISTORY, FAMILY HISTORY, SOCIAL HISTORY, ALLERGIES, MEDICATIONS STATED ABOVE IN THE  NOTE FROM TODAY.        GENERAL:  Well-developed, well-nourished  Patient  in no acute distress.   SKIN:  Warm, dry ,NO " rashes,NO purpura ,NO petechiae.  HEENT:  Pupils were equal and reactive to light and accomodation, conjunctivae noninjected, no pterygium, normal extraocular movements, normal visual acuity. Very hard of hearing  NECK:  Supple with good range of motion; no thyromegaly , no other masses, no JVD or bruits, no cervical adenopathies.No carotid artery pain, no carotid abnormal pulsation , NO arterial dance.  LYMPHATICS:  No cervical, NO supraclavicular, NO axillary,NO epitrochlear , NO inguinal adenopathy.  CARDIAC   normal rate and regular rhythm, without murmur,NO rubs NO S3 NO S4 right or left .  LUNGS: normal breath sounds bilateral, no wheezing, rhonchi, crackles or rubs.  VASCULAR VENOUS: no cyanosis, collateral circulation, varicosities, edema, palpable cords, pain, erythema.  ABDOMEN:  Soft, nontender with no hepatomegaly, no splenomegaly,no masses, no ascites, no collateral circulation,no distention,no Merrill sign.  EXTREMITIES  AND SPINE:  No clubbing, cyanosis or edema, no deformities , no pain .l spine kyphosis, no scoliosis, no other deformities, no pain in spine, no pain in ribs , no pain inpelvic bone.  NEUROLOGICAL:  Patient was awake, alert, oriented to time, person and place.sensory neuropathy in both feet                                  LABORATORY DATA  /5/2022 8:09 AM     PROCEDURE:  CT ANGIOGRAM CHEST-     INDICATIONS:   81-year-old male with a known descending thoracic aortic aneurysm. He  presents for follow-up surveillance.     COMPARISON:   Most recent prior CT study of the chest, 06/24/2021, as well as a prior  CTA study, 07/23/2019.     TECHNIQUE:  Contiguous axial imaging was obtained from the thoracic inlet through  the upper abdomen following the intravenous administration of 100 mL  Isovue 370. Reconstructed coronal and sagittal images were also  obtained. In addition, a 3 D volume rendered image was obtained after  post processing. Automated exposure control and iterative  reconstruction  methods were used.     FINDINGS:     NON-VASCULAR FINDINGS: Both lungs are expanded with scattered  pleuri-parenchymal scarring bilaterally. Degenerative changes are  present throughout those portions of the spine included on the study.  The gallbladder is surgically absent. Arterial phase images through the  liver and spleen appear grossly unremarkable. The pancreas and adrenal  glands appear grossly unremarkable. That portion the right kidney  including the study is grossly unremarkable. The left kidney again  demonstrates multiple low-attenuation lesions most likely evident and  representing simple cysts. CT evaluation unopacified bowel is limited  although no gross abnormalities are evident within the upper abdomen  region.     VASCULAR FINDINGS: The thoracic aorta remains patent. It is largest in  its mid ascending portion measuring approximately 4.9 cm in greatest AP  dimension on axial images. The aortic root measures approximately 4.5 cm  in greatest axial dimension, compared to 4.0 cm at the sinotubular  junction, 3.1 cm in the mid arch region, 3.4 cm in the proximal  descending thoracic region, 3.0 cm in the mid descending thoracic aortic  region and 2.7 cm at the diaphragmatic hiatus. The visualized renal  arteries remain patent as do the great vessels including the proximal  vertebral arteries, of which the left side is clearly dominant. Both  common carotid arteries and the proximal portions are tortuous but  widely patent. The same is true both subclavian arteries. The celiac  trunk and superior mesenteric artery are also both widely patent.        IMPRESSION:     1. Stable ascending thoracic aortic aneurysm at 4.9 cm maximum axial  dimension on axial images.  2. Numerous additional findings, including both vascular and  nonvascular, as described above in great detail.     Electronically Signed By-Antonina Villalta MD On:2/5/2022 12:52 PM  This report was finalized on [id number] by   Antonina Villalta MD.      Contains abnormal data CBC Auto Differential  Order: [id number] - Part of Panel Order [id number]   Status: Final result     Visible to patient: No (scheduled for 2/10/2022  2:46 AM)     Dx: Neuropathy associated with MGUS (HCC)    Specimen Information: Blood         0 Result Notes    Component   Ref Range & Units 12:36   (2/9/22) 5 mo ago   (8/16/21) 6 mo ago   (8/4/21) 1 yr ago   (2/3/21) 1 yr ago   (11/11/20) 1 yr ago   (9/30/20) 1 yr ago   (9/4/20)   WBC   3.40 - 10.80 10*3/mm3 2.97 Low   4.30  3.45  3.49  4.41  4.02  4.27    RBC   4.14 - 5.80 10*6/mm3 4.56  5.02  4.97  5.08  4.85  4.96  5.07    Hemoglobin   13.0 - 17.7 g/dL 13.4  14.3  14.2  14.6  14.1  14.6  15.1    Hematocrit   37.5 - 51.0 % 42.0  43.5  44.4  45.7  44.7  45.9  46.5    MCV   79.0 - 97.0 fL 92.1  86.7  89.3  90.0  92.2  92.5  91.7    MCH   26.6 - 33.0 pg 29.4  28.5  28.6  28.7  29.1  29.4  29.8    MCHC   31.5 - 35.7 g/dL 31.9  32.9  32.0  31.9  31.5  31.8  32.5    RDW   12.3 - 15.4 % 13.7  12.9  13.5  12.9  13.2  13.6  13.4    RDW-SD   37.0 - 54.0 fl 46.5  40.1  43.8  42.5  44.6  46.0  44.9    MPV   6.0 - 12.0 fL 10.7  12.1 High   10.4  10.6  10.8  10.6  10.7    Platelets   140 - 450 10*3/mm3 142  152  143  137 Low   159  156  161    Neutrophil %   42.7 - 76.0 % 42.8  47.9  43.7  45.0  48.1  44.7  41.4 Low     Lymphocyte %   19.6 - 45.3 % 39.7  36.3  40.9  40.1  37.6  40.5  43.1    Monocyte %   5.0 - 12.0 % 15.8 High   14.4 High   13.3 High   12.9 High   12.2 High   13.4 High   13.3 High     Eosinophil %   0.3 - 6.2 % 1.0  0.7  1.2  1.1  0.9  1.0  1.2    Basophil %   0.0 - 1.5 % 0.7  0.5  0.6  0.6  0.5  0.2  0.5    Immature Grans %   0.0 - 0.5 % 0.0  0.2  0.3  0.3  0.7 High   0.2  0.5    Neutrophils, Absolute   1.70 - 7.00 10*3/mm3 1.27 Low   2.06  1.51 Low   1.57 Low   2.12  1.79  1.77    Lymphocytes, Absolute   0.70 - 3.10 10*3/mm3 1.18  1.56  1.41  1.40  1.66  1.63  1.84    Monocytes, Absolute   0.10 - 0.90 10*3/mm3 0.47   0.62  0.46  0.45  0.54  0.54  0.57    Eosinophils, Absolute   0.00 - 0.40 10*3/mm3 0.03  0.03  0.04  0.04  0.04  0.04  0.05    Basophils, Absolute   0.00 - 0.20 10*3/mm3 0.02  0.02  0.02  0.02  0.02  0.01  0.02    Immature Grans, Absolute   0.00 - 0.05 10*3/mm3 0.00  0.01  0.01  0.01  0.03  0.01  0.02    nRBC   0.0 - 0.2 /100 WBC 0.0  0.0  0.0  0.0  0.0  0.0  0.0    Resulting Agency  CBC LAB  [facility]  CBC LAB  [facility] LAB  CBC LAB  CBC LAB  CBC [facility]                                  ASSESSMENT:  This patient has monoclonal gammopathy of unknown significance and he has undergone bone marrow testing of this on 2 different occasions not being able to document myeloma, lymphoma, myelodysplasia, leukemia or myelofibrosis. Chromosomal analysis has been normal. The patient has not had any significant difference in his monoclonal protein quantification.    The patient has associated with this sensory peripheral neuropathy with balance issues and painful numbness in his feet. We have tried Neurontin in the past with no benefit, now we are trying Lyrica and it seems to be that this is not going to be any benefit to him neither.    We have also tried prednisone with no benefit as per indication by his Neurologist and we have tried immunoglobulin with no benefit and actually this medicine triggered serum sickness in him that required short course of prednisone therapy.    I discussed with his attending physician through video medicine from [facility] , the fact that we still have no diagnosis in this patient in regard his sensory neuropathy and his minimal monoclonal protein. He is going to be seen by neurology in that institution also by video medicine. The patient continues having progressing symptoms now in his upper extremities up to his elbows in regard to sensory changes and in his lower extremities up to his knees. He has imbalance and he has a negative Romberg test. Actually his bicipital and tricipital  reflexes are very active 2-3. His patellar and Achilles are abolished. Vibratory sensation is normal throughout. The nausea is another symptom that is very bothersome and his insomnia is bothersome. I do not think we have any diagnosis on him at this time and I would like to proceed as follows:  1. We will wait to see what the [facility] physicians have to say in regard to his overall picture.   2. I would like for him to proceed with an MRI scan of the brain, cervical and thoracic spine to see if there is anything that is making all of these symptoms from the neurological system to occur and the nausea to occur as well as his deafness. Maybe all of these things are connected. I would not be surprised if we need to pursue a lumbar puncture.             Since the previous visit the patient underwent an MRI scan of the brain that shows no major alterations, radiologist calls minimal vascular alterations. Most importantly the MRI of the cervical spine documents significant spinal stenosis at the level of C5. I reviewed the pictures of this in the PAC system [facility] with the patient and his wife. The MRI of the thoracic spine shows degenerative changes. Most importantly none of the areas on MRI in the cranium, cervical, thoracic spine disclose any lesions that could be consistent with myeloma, lymphoma or tumoral lesions of any nature. All of the changes are related to degenerative changes. 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     The patient was further reviewed along with his wife on 11/11/2020. We have a lot of issues to discuss today as follows:  1. His chronic nausea is better. He has had an upper GI endoscopy, he had the location of a Schatzki's ring and also he had biopsies of the stomach that documented gastritis, H. Pylori negative. The patient is now on PPI. His nausea is better. He is eating better and he is not taking any aspirin or antiinflammatory medications, or  alcohol at all to reduce gastritis. I point out to him that a lot of that his stress that he carries through all the time probably has something to do with this, I point out that his stomach is a reflection of his soul.     2. His second issue to discuss is his neuropathy. He has been seen by Dr. Darrick Garcia. EMG was performed. Abnormalities documented in the EMG and the interpretation per Dr. Garcia believing that this does not represent a typical feature of inflammatory neuropathy.       The patient was further reviewed on 02/03/2021. His neuropathy is dramatically better on Lyrica twice a day and his nausea in the morning is dramatically better with the use of PPI and Carafate per Jake Perez MD. He is going to proceed with an esophagogram in a day. He has no heartburn but he has a bitter taste in the mouth from time to time after he eats. He has no vomiting. His bowel activity is normal, urination is normal. His depression is better. He is not taking any aspirin or antiinflammatory medication.     To my eyes the patient looks substantially better. His white count remains on the low side without any changes. The hemoglobin, the platelet count remain appropriate at this time. He has no palpable cervical adenopathy, no bone pain, no hepatosplenomegaly.    This is the first time in almost 2 years that the patient has improvement in symptoms related to nausea and also related to neuropathy. The medicines that he is taking are appropriate and I pointed out to him to stay away from aspirin and antiinflammatory medications. He only can take Tylenol for pain.   The patient was further reviewed on 08/04/2021. He wanted me to review the CT scan that had been ordered by Dr. Puente recently. I reviewed this in the PACS system [facility]. His ascending thoracic aortic aneurysm remains the same, 5 cm in diameter. There is no cardiomegaly. There is heavy calcification in the coronary arteries. Lung anatomy  remains normal with no infiltrates, effusions and no mediastinal or hilar adenopathy. Liver and spleen remain normal, pancreas and kidneys remain normal in size. There are obvious degenerative changes in the spine. There are no lytic lesions.     His white count remains stable/low with leukopenia, hemoglobin is stable, platelet count is stable and normal. His chemistry profile is pending. His monoclonal protein studies are pending today and to compare with previous visit.    The patient remains depressed and he believes that he could raise the dose of Lexapro at this point and he will talk with his primary physician about stopping Lyrica altogether because he has not found any benefit from the medicine. Given the circumstances, I think it will be fine to modify the Lexapro dose to the next level and a new prescription has been sent today. With the next dose it will be 20 mg a day.    The patient otherwise will return to see us in 6 months with a CBC, CMP and monoclonal protein studies.     Because his neurologist recently has done some blood testing and the B12 level was still into the normal limits but on the low side, I am going to run a B12 level today. He will be informed about this result when it becomes available.    I discussed all the issues with the patient and his wife. I personally reviewed the CT scan and the above is my interpretation.   The patient was further reviewed on 02/09/2022. From the point of view of his leukopenia and minor thrombocytopenia associated with monoclonal protein he has no symptoms pertinent to this. The only symptom pertinent to his monoclonal protein is his sensory peripheral neuropathy, this never has changed no matter what we did for him in the past including prednisone and immunoglobulin infusions. He has learned to live with this, he is using Lyrica with some benefit and he also remains on B12 supplementation and amitriptyline. We advised him to remain on these medicines for  the time being. Monoclonal protein studies will be called to him.    In regard to his thoracic aortic aneurysm he asked me to review this. The CT scan is very obvious to me in regard that in my opinion the aneurysm has not changed in size or configuration. He will see Spencer Puente MD, next week. He has no chest pain, discomfort, palpitations or shortness of breath. I advised him to take the best advice by Dr. Puente in the upcoming visit in a few days. He has no pulmonary infiltrates, no pleural effusions. He has changes consistent with emphysema.     In regard to his difficulty swallowing the patient has had an esophagogram that shows alteration in the peristaltic activity of the esophagus without any obstructive lesions or any achalasia. I suggested for him to use Altoids peppermint candy before meals to see if this makes any difference in the opening of the esophagus before he eats. Otherwise he is never going to change his habits of his eating his food very quickly. I asked him to avoid the use of straws because the speed of the liquids through a straw is very fast and sometimes that can lead into choking.     In regard to his depression the patient is better with the medicine that he is taking including the Lexapro, his dose is correct I think 20 mg a day and I think the amitriptyline that he takes also helps in some way this symptom.     I would like to review him back in 6 months with a CBC, CMP and serum protein immunoelectrophoresis.     Otherwise no other intervention from my point of view.       ·

## 2022-02-10 ENCOUNTER — OFFICE VISIT (OUTPATIENT)
Dept: NEUROLOGY | Facility: CLINIC | Age: 82
End: 2022-02-10

## 2022-02-10 VITALS
DIASTOLIC BLOOD PRESSURE: 72 MMHG | SYSTOLIC BLOOD PRESSURE: 110 MMHG | HEIGHT: 76 IN | HEART RATE: 66 BPM | BODY MASS INDEX: 22.77 KG/M2 | OXYGEN SATURATION: 98 % | WEIGHT: 187 LBS

## 2022-02-10 DIAGNOSIS — M54.12 CERVICAL RADICULOPATHY: ICD-10-CM

## 2022-02-10 DIAGNOSIS — R13.12 OROPHARYNGEAL DYSPHAGIA: ICD-10-CM

## 2022-02-10 DIAGNOSIS — G63 NEUROPATHY ASSOCIATED WITH MGUS: Primary | ICD-10-CM

## 2022-02-10 DIAGNOSIS — D47.2 NEUROPATHY ASSOCIATED WITH MGUS: Primary | ICD-10-CM

## 2022-02-10 LAB
ALBUMIN SERPL ELPH-MCNC: 3.7 G/DL (ref 2.9–4.4)
ALBUMIN/GLOB SERPL: 1.2 {RATIO} (ref 0.7–1.7)
ALPHA1 GLOB SERPL ELPH-MCNC: 0.2 G/DL (ref 0–0.4)
ALPHA2 GLOB SERPL ELPH-MCNC: 0.8 G/DL (ref 0.4–1)
B-GLOBULIN SERPL ELPH-MCNC: 1.2 G/DL (ref 0.7–1.3)
GAMMA GLOB SERPL ELPH-MCNC: 1.1 G/DL (ref 0.4–1.8)
GLOBULIN SER-MCNC: 3.3 G/DL (ref 2.2–3.9)
IGA SERPL-MCNC: 382 MG/DL (ref 61–437)
IGG SERPL-MCNC: 1036 MG/DL (ref 603–1613)
IGM SERPL-MCNC: 27 MG/DL (ref 15–143)
INTERPRETATION SERPL IEP-IMP: ABNORMAL
KAPPA LC FREE SER-MCNC: 30.2 MG/L (ref 3.3–19.4)
KAPPA LC FREE/LAMBDA FREE SER: 2.03 {RATIO} (ref 0.26–1.65)
LABORATORY COMMENT REPORT: ABNORMAL
LAMBDA LC FREE SERPL-MCNC: 14.9 MG/L (ref 5.7–26.3)
M PROTEIN SERPL ELPH-MCNC: 0.2 G/DL
PROT SERPL-MCNC: 7 G/DL (ref 6–8.5)

## 2022-02-10 PROCEDURE — 99214 OFFICE O/P EST MOD 30 MIN: CPT | Performed by: PHYSICIAN ASSISTANT

## 2022-02-10 NOTE — PROGRESS NOTES
CC: Follow-up: Peripheral neuropathy    HPI:  Panfilo Feliz is a  81 y.o.  right-handed male right-handed male who I am seeing in follow-up today for peripheral neuropathy.  Patient is accompanied by his wife. Past medical history is significant for osteoarthritis/degenerative joint disease, degenerative disc disease of his lumbar spine status post decompression surgery in 2013, multilevel degenerative changes of cervical spine with spinal stenosis, ascending aortic aneurysm (currently being monitored), history of prostate cancer status post prostatectomy in 2007, hypertension, hyperlipidemia, hypothyroidism and GERD/peptic ulcer disease.  Patient was last seen in our office on 8/5/2021, a summary of his condition is taken from previous note with additions and modifications as needed:      Patient was initially sent for neurological consultation by Dr. Olvera regarding peripheral neuropathy.  The patient's history begins about 6 years ago with numbness in the feet.  It has progressed to his feet being very numb in the mornings but after he has been up and around for a while this is improved.  He also has some burning pain in the feet as well as in the arms.  This also seems a bit better as time goes on in the daytime.  Patient does also have chronic mild back pain but no significant radiation into his legs.  Of note he had a lumbar decompression at L3/4 and L4/5 in 2013.  His hematological work-up has included protein electrophoresis with immunofixation which did show an IgG kappa MGUS at low concentration of 0.2-0.3 g/dL.  It has remained low since it was identified 8/2016.  He has had 2 bone marrow biopsies which were negative for multiple myeloma and also at least one was stained with Congo red and was negative for amyloid deposition.  He had an EMG done by Dr. 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  The data sheets demonstrated a slow right  peroneal motor velocity of 36.7 m/s with prolonged latency of 7 ms and a very low amplitude of 0.25 mV.  On the left but conduction velocity was normal with normal distal latency but very low amplitude of 0.26 mV.  The right tibial motor velocity was slow at 38 m/s with normal distal latency but low amplitude of 1.74 mV.  The left tibial motor velocity was normal with normal distal latency but low amplitude of 1.82 mV.  All sensory studies in both feet showed no responses.     On 3/30/2021 patient had some additional laboratories done to look for treatable causes of neuropathy: studies show slightly elevated copper level of 138, all other labs including thyroid, sed rate, RPR, hemoglobin A1c, heavy metals, cryoglobulins were within normal limits.  Motor and sensory neuropathy panel also within normal limits.  Patient's B12 and folate levels were also noted to be within normal limits but at the lower end of the spectrum - values were 285 and 7.75 respectively.  As such he was instructed to start on some supplemental B12 vitamins which he has been taking.     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  The patient does not recognize any particular improvement with the 2 doses of immunoglobulin. Patient was then sent for neurologic and hematologic opinion at [facility].  He saw Dr. Elías Naylor and then an electronic neurologic consultation with Fadia Whalen. Their opinion was that the neuropathy was not likely to be related to the MGUS.  Dr. Garcia who previously saw the patient suspected that since his clinical course (speed of progression is too slow),  his exam demonstrated only a mild abnormalities in strength and sensation  plus most of his muscle stretch reflexes were present, and electrographically the nerves are only mildly slow and did not show clear evidence of conduction block - this is not supportive of CIDP.     Interim history  Patient reports today, as he has improved from last time I saw him 6 months ago.  He still reports that his painful symptoms continue to be more problematic in the morning - in terms of foot pain, low back pain and overall stiffness.  Previously he reported that he was having a lot of numbness and tingling and pins-and-needles type sensation with walking however this has subsided and patient states he has been walking and exercising more.  He does get the gym most days of the week.  He still reports trouble with sleep, states that regularly around 5 AM he wakes up with his upper arms burning.  He said it is mostly in his biceps and triceps.  It lasts for quite a while but usually he is able to fall back to sleep.  He still denies much neck pain    In terms of treatment he has been taking Lyrica usually 150 mg once daily.  He states he really cannot tell if it is helping anymore.  He continues to report some daytime fatigue and does still nap once or twice throughout the day.  Additionally he takes the amitriptyline 10 mg at bedtime, also is taking Lexapro 20 mg.  I also started him on B12 injections every month months ago, patient states again he has noticed no real change in his neuropathy symptoms or overall energy level. Not only does his neuropathy limit him but he also has a ascending aortic aneurysm, which measured 4.9 cm without dissection or ulceration per CT on 2/5/2022,  it has been stable but patient is monitored closely per his vascular doctor.  He is told surgical intervention may be warranted in the near future if he elects to do this.  Finally patient has been having some issues with difficulty swallowing, he has been referred to GI for this and had a swallow study recently.  He did show  hiatal hernia as well as mild nonspecific esophageal dysmotility.  Likely patient will be referred to speech therapy.  His wife comments that his eating patterns are poor, he eats extremely fast.        Past Medical History:   Diagnosis Date   • AAA (abdominal aortic aneurysm) (HCC)    • Alcoholism (HCC)     None since 1991   • Arthritis    • Basal cell carcinoma (BCC) of face    • Cholelithiasis 1990’s    Cholecystectomy   • Colon polyp    • Coronary artery disease    • Difficulty walking    • Disease of thyroid gland    • DJD (degenerative joint disease)    • GERD (gastroesophageal reflux disease)    • GI (gastrointestinal bleed)    • Hernia     Hiatal   • History of bone marrow biopsy    • Hyperlipidemia    • Hypertension    • Hypothyroidism    • IgG monoclonal gammopathy    • Prostate cancer (HCC)    • Reflux esophagitis    • Ulcer          Past Surgical History:   Procedure Laterality Date   • CHOLECYSTECTOMY  1988   • COLON SURGERY  1998   • COLONOSCOPY  02/2013   • ENDOSCOPY  2012    ESOPHAGOGASTRODUODENOSCOPY WITH DILATION OF SHATZKI'S RING   • ENDOSCOPY N/A 10/9/2020    Procedure: ESOPHAGOGASTRODUODENOSCOPY with cold bx;  Surgeon: Jake Perez MD;  Location: [facility] ENDOSCOPY;  Service: Gastroenterology;  Laterality: N/A;  pre - nausea  post - duodenal ulcer, gastrits, gastric ulcer, hiatal hernia   • LAMINECTOMY  2013    decompression L3-4, L4-5   • PROSTATECTOMY  2007   • SKIN BIOPSY     • TONSILLECTOMY AND ADENOIDECTOMY      1940s   • UPPER GASTROINTESTINAL ENDOSCOPY     • VASECTOMY      1970s           Current Outpatient Medications:   •  amitriptyline (ELAVIL) 10 MG tablet, Take 1 tablet by mouth Every Night., Disp: 90 tablet, Rfl: 3  •  atenolol (TENORMIN) 25 MG tablet, Take 25 mg by mouth Daily., Disp: , Rfl:   •  Cyanocobalamin 1000 MCG/ML kit, , Disp: , Rfl:   •  escitalopram (LEXAPRO) 20 MG tablet, Take 1 tablet by mouth Daily., Disp: 30 tablet, Rfl: 6  •  ibuprofen (ADVIL,MOTRIN) 200 MG  tablet, Take 200 mg by mouth As Needed for Mild Pain ., Disp: , Rfl:   •  levothyroxine (SYNTHROID, LEVOTHROID) 112 MCG tablet, Take 112 mcg by mouth daily., Disp: , Rfl:   •  Magnesium Hydroxide (DULCOLAX PO), Take 300 mg by mouth Daily., Disp: , Rfl:   •  Menthol, Topical Analgesic, (BIOFREEZE EX), Apply  topically As Needed., Disp: , Rfl:   •  pantoprazole (PROTONIX) 40 MG EC tablet, TAKE ONE TABLET BY MOUTH TWICE A DAY, Disp: 60 tablet, Rfl: 12  •  pregabalin (LYRICA) 75 MG capsule, Take 1 capsule by mouth 2 (Two) Times a Day for 30 days., Disp: 60 capsule, Rfl: 0  •  promethazine (PHENERGAN) 12.5 MG tablet, Take 12.5 mg by mouth Every 6 (Six) Hours As Needed., Disp: , Rfl:       Family History   Problem Relation Age of Onset   • Cancer Mother 85        Breast   • COPD Father    • Cancer Brother 59        Unknown type   • Hypertension Daughter          Social History     Socioeconomic History   • Marital status:      Spouse name: Meghan   • Years of education: College   Tobacco Use   • Smoking status: Never Smoker   • Smokeless tobacco: Never Used   Vaping Use   • Vaping Use: Never used   Substance and Sexual Activity   • Alcohol use: No     Comment: Heavy in past, none in 33 years   • Drug use: Never   • Sexual activity: Not Currently     Partners: Female         No Known Allergies      ROS:  Review of Systems   Constitutional: Negative for activity change, appetite change and fatigue.   Eyes: Negative for pain, redness and itching.   Respiratory: Positive for choking. Negative for cough and shortness of breath.    Allergic/Immunologic: Negative for environmental allergies and food allergies.   Neurological: Positive for dizziness, light-headedness and numbness. Negative for tremors, seizures, syncope, facial asymmetry, speech difficulty, weakness and headaches.   Psychiatric/Behavioral: Positive for sleep disturbance. Negative for agitation, behavioral problems, confusion, decreased concentration,  "dysphoric mood, hallucinations, self-injury and suicidal ideas. The patient is nervous/anxious. The patient is not hyperactive.      I have reviewed the above ROS put in by the medical assistant and am in agreement.     Physical Exam:  Vitals:    02/10/22 1311   BP: 110/72   Pulse: 66   SpO2: 98%   Weight: 84.8 kg (187 lb)   Height: 193 cm (75.98\")       Body mass index is 22.77 kg/m².    Physical Exam  General: Tall slender white male no acute distress  HEENT: Normocephalic, atraumatic  Neck: Supple.  No cervical bruits.    Heart: Regular rate and rhythm without murmurs. Radial pulses were strong and simultaneous.  Extremities: No pedal edema.       Neurological Exam:   Mental Status: Awake, alert, oriented to person, place and time.  Conversant without evidence of an affective disorder, thought disorder, delusions or hallucinations.  Attention span and concentration are normal.  HCF: No aphasia, apraxia or dysarthria.  Recent and remote memory intact.  Knowledge  of recent events intact.  CN:      I:              II: Visual fields full without left inattention              III, IV, VI: Eye movements intact without nystagmus or ptosis.  Pupils equal  round and reactive to light.              V,VII: Light touch and pinprick intact all 3 divisions of V.  Facial muscles symmetrical.              VIII: Hearing intact to finger rub              IX,X: Soft palate elevates symmetrically              XI: Sternomastoid and trapezius are strong.              XII: Tongue midline without atrophy or fasciculations  Motor: Incomplete relaxation.  Notable atrophy in both feet              Power testing: Mild weakness of abductor pollicis brevis muscles bilaterally with otherwise normal power in the upper extremities.  In the lower extremities there is weakness of toe flexion and extension with all other muscles tested being normal.  Reflexes: Upper extremities: +1 diffusely, patient has a hard time " relaxing                   Lower extremities: +1 knee jerks absent ankle jerks                   Toe signs:   Sensory: Light touch:                    Pinprick:      Cerebellar: Finger-to-nose: Intact                      Rapid movement: Intact     Gait and Station: Patient comes to stand without any difficulty.  Casual walk is mildly broad-based, but otherwise normal.  Patient can walk on his toes and heels.  He has trouble with tandem walking.  Some minimal sway with Romberg, no drift    Results:      Lab Results   Component Value Date    GLUCOSE 83 02/09/2022    BUN 21 (H) 02/09/2022    CREATININE 1.20 02/09/2022    EGFRIFNONA 58 (L) 02/09/2022    BCR 17.5 02/09/2022    CO2 26.4 02/09/2022    CALCIUM 9.1 02/09/2022    PROTENTOTREF 7.0 02/09/2022    ALBUMIN 4.10 02/09/2022    ALBUMIN 3.7 02/09/2022    LABIL2 1.2 02/09/2022    AST 22 02/09/2022    ALT 10 02/09/2022       Lab Results   Component Value Date    WBC 2.97 (L) 02/09/2022    HGB 13.4 02/09/2022    HCT 42.0 02/09/2022    MCV 92.1 02/09/2022     02/09/2022         .  Lab Results   Component Value Date    RPR Non-Reactive 03/30/2021         Lab Results   Component Value Date    TSH 0.341 03/30/2021         Lab Results   Component Value Date    USRRXZSK85 326 08/16/2021    YPJOQVEF45 320 08/16/2021         Lab Results   Component Value Date    FOLATE 6.81 08/16/2021         Lab Results   Component Value Date    HGBA1C 5.52 03/30/2021         Lab Results   Component Value Date    GLUCOSE 83 02/09/2022    BUN 21 (H) 02/09/2022    CREATININE 1.20 02/09/2022    EGFRIFNONA 58 (L) 02/09/2022    BCR 17.5 02/09/2022    K 4.5 02/09/2022    CO2 26.4 02/09/2022    CALCIUM 9.1 02/09/2022    PROTENTOTREF 7.0 02/09/2022    ALBUMIN 4.10 02/09/2022    ALBUMIN 3.7 02/09/2022    LABIL2 1.2 02/09/2022    AST 22 02/09/2022    ALT 10 02/09/2022         Lab Results   Component Value Date    WBC 2.97 (L) 02/09/2022    HGB 13.4 02/09/2022    HCT 42.0 02/09/2022    MCV 92.1  02/09/2022     02/09/2022             Assessment:   1.  Peripheral neuropathy, possibly associated with MGUS or idiopathic in etiology, distal and sensory-predominant  2.  Cervical spondylosis with radiculopathy  3.  Dysphagia, currently being worked up by GI - noted that patient is down 14 pounds since I saw him 6 months ago    Plan:  1.  There really is no progression seen per his physical exam today in terms of his neuropathy.  In my opinion patient looks extremely well for his age. It is still unclear whether or not the Lyrica is helping.  It sounds like he is only taking 150 once daily advised him to hold the drug for a week and see if he notices any difference in terms of pain.  If there truly is no difference in go ahead and discontinue Lyrica.  - He is currently getting B12 injections every month.  I will recheck a level and see if this should continue or if he can go back to just p.o. supplementation  - States that overall he feels better since he has started exercising again, he does something every day, at least some walking and states he manages to get to the 4-for 5 times a week and does some strength training.     2.  He still complains of painful symptoms especially in the morning.  Also still has a lot of pain in his upper arms which sometimes will wake him up in the very early hours of the morning.  We have tried him on multiple different analgesics and nothing is really been very helpful for long.  Given the history of the degenerative joint disease/degenerative disc I advised him we could send him to pain management to see if they could offer him anything.  Patient states for now he would like to hold off.    3.  In terms of his dysphagia, patient states he really has not made much modifications to his diet.  It looks like the plan is to refer him to speech therapy and see if they could make any difference.  For now he remains on Protonix every day.  We discussed the weight loss, patient is  assured that this is because of the exercising he is doing now.  Will certainly continue to monitor.    Follow-up in 6 months or sooner should need arise      Time 30 minutes          Dictated utilizing Dragon dictation.

## 2022-02-11 ENCOUNTER — TELEPHONE (OUTPATIENT)
Dept: ONCOLOGY | Facility: CLINIC | Age: 82
End: 2022-02-11

## 2022-02-11 NOTE — TELEPHONE ENCOUNTER
----- Message from Chalo Olvera MD sent at 2/10/2022  6:13 PM EST -----  CALL HIM HIS M PROTEIN IS STABLE NO NEED TO CHANGE ANYTHING

## 2022-02-14 ENCOUNTER — TELEPHONE (OUTPATIENT)
Dept: NEUROLOGY | Facility: CLINIC | Age: 82
End: 2022-02-14

## 2022-02-14 DIAGNOSIS — D47.2 NEUROPATHY ASSOCIATED WITH MGUS: ICD-10-CM

## 2022-02-14 DIAGNOSIS — G63 NEUROPATHY ASSOCIATED WITH MGUS: ICD-10-CM

## 2022-02-14 RX ORDER — PREGABALIN 75 MG/1
75 CAPSULE ORAL 2 TIMES DAILY
Qty: 60 CAPSULE | Refills: 0 | Status: SHIPPED | OUTPATIENT
Start: 2022-02-14 | End: 2022-03-14

## 2022-02-14 NOTE — TELEPHONE ENCOUNTER
VANESSA SILVA    WIFE    [phone]    CALLED TO INFORM THAT PATIENT HAD TRIED TO DISCONTINUE LYRICA O SEE IF IT WAS NECESSARY FOR HIM TO CONTINUE TO TAKE. AFTER ONLY FEW DAYS, PATIENT'S NEUROPATHY IN HIS FEET AND LEGS HAS WORSENED. HE WILL NEED A REFILL ON THE LYRICS AND WOULD LIKE TO START BACK ON LYRICA 75MG BID. WILL NEED TO GO TO [facility] IN [place]. PLEASE ADVISE

## 2022-02-17 RX ORDER — ESCITALOPRAM OXALATE 20 MG/1
TABLET ORAL
Qty: 30 TABLET | Refills: 6 | Status: SHIPPED | OUTPATIENT
Start: 2022-02-17 | End: 2022-09-12

## 2022-03-03 ENCOUNTER — OFFICE VISIT (OUTPATIENT)
Dept: CARDIAC SURGERY | Facility: CLINIC | Age: 82
End: 2022-03-03

## 2022-03-03 VITALS
HEIGHT: 76 IN | DIASTOLIC BLOOD PRESSURE: 73 MMHG | WEIGHT: 190 LBS | RESPIRATION RATE: 20 BRPM | OXYGEN SATURATION: 95 % | BODY MASS INDEX: 23.14 KG/M2 | SYSTOLIC BLOOD PRESSURE: 111 MMHG

## 2022-03-03 DIAGNOSIS — I35.1 NONRHEUMATIC AORTIC VALVE INSUFFICIENCY: ICD-10-CM

## 2022-03-03 DIAGNOSIS — I10 PRIMARY HYPERTENSION: Primary | ICD-10-CM

## 2022-03-03 DIAGNOSIS — D47.2 MONOCLONAL GAMMOPATHY OF UNKNOWN SIGNIFICANCE (MGUS): ICD-10-CM

## 2022-03-03 DIAGNOSIS — C61 PROSTATE CANCER: ICD-10-CM

## 2022-03-03 DIAGNOSIS — I71.21 ASCENDING AORTIC ANEURYSM: ICD-10-CM

## 2022-03-03 DIAGNOSIS — D69.6 THROMBOCYTOPENIA: ICD-10-CM

## 2022-03-03 DIAGNOSIS — E78.5 HYPERLIPIDEMIA, UNSPECIFIED HYPERLIPIDEMIA TYPE: ICD-10-CM

## 2022-03-03 PROCEDURE — 99214 OFFICE O/P EST MOD 30 MIN: CPT | Performed by: THORACIC SURGERY (CARDIOTHORACIC VASCULAR SURGERY)

## 2022-03-06 PROBLEM — I35.1 NONRHEUMATIC AORTIC VALVE INSUFFICIENCY: Status: ACTIVE | Noted: 2022-03-06

## 2022-03-06 NOTE — PROGRESS NOTES
3/6/2022    Seen on 3/3/2022    Chief Complaint:     Evaluation of thoracic aortic aneurysm    History of Present Illness:       Dear Stephan and Colleagues,  It was nice to see Panfilo Feliz in follow up evaluation for his thoracic aortic aneurysm. He is a 81 y.o. male with a history of multiple medical problems leukopenia, thrombocytopenia, monoclonality, constipation, neuropathy and heart murmur who has asymptomatic aneurysm and follow him up in our clinic. He denies any new symptoms in the interval. His last chest CT showed stable diameter of the aneurysm, in my measurements 5 cm, without dissection or ulceration.  The aortic arch and descending thoracic aorta have diameters around 3 cm.  He had a 2D echocardiogram that showed normal ejection fraction and moderate aortic valve regurgitation and an aortic valve that is seemingly trileaflet.  There is no family history of aneurysms, dissections or chronic tissue disorder    Patient Active Problem List   Diagnosis   • Leukopenia   • Thrombocytopenia (HCC)   • Monoclonal gammopathy of unknown significance (MGUS)   • Neuropathy associated with MGUS (HCC)   • Nausea in adult patient   • Slow transit constipation   • Nausea   • DDD (degenerative disc disease), lumbar   • Chronic neck pain   • DJD (degenerative joint disease)   • Extremity pain   • HTN (hypertension)   • Hyperlipidemia   • Hypothyroid   • LBP (low back pain)   • Lumbar canal stenosis   • Prostate cancer (HCC)   • Ascending aortic aneurysm (HCC)   • Nonrheumatic aortic valve insufficiency       Past Medical History:   Diagnosis Date   • AAA (abdominal aortic aneurysm) (HCC)    • Alcoholism (HCC)     None since 1991   • Arthritis    • Basal cell carcinoma (BCC) of face    • Cholelithiasis 1990’s    Cholecystectomy   • Colon polyp    • Coronary artery disease    • Difficulty walking    • Disease of thyroid gland    • DJD (degenerative joint disease)    • GERD (gastroesophageal reflux disease)    • GI  (gastrointestinal bleed)    • Hernia     Hiatal   • History of bone marrow biopsy    • Hyperlipidemia    • Hypertension    • Hypothyroidism    • IgG monoclonal gammopathy    • Prostate cancer (HCC)    • Reflux esophagitis    • Ulcer        Past Surgical History:   Procedure Laterality Date   • CHOLECYSTECTOMY  1988   • COLON SURGERY  1998   • COLONOSCOPY  02/2013   • ENDOSCOPY  2012    ESOPHAGOGASTRODUODENOSCOPY WITH DILATION OF SHATZKI'S RING   • ENDOSCOPY N/A 10/9/2020    Procedure: ESOPHAGOGASTRODUODENOSCOPY with cold bx;  Surgeon: Jake Perez MD;  Location: [facility] ENDOSCOPY;  Service: Gastroenterology;  Laterality: N/A;  pre - nausea  post - duodenal ulcer, gastrits, gastric ulcer, hiatal hernia   • LAMINECTOMY  2013    decompression L3-4, L4-5   • PROSTATECTOMY  2007   • SKIN BIOPSY     • TONSILLECTOMY AND ADENOIDECTOMY      1940s   • UPPER GASTROINTESTINAL ENDOSCOPY     • VASECTOMY      1970s       No Known Allergies      Current Outpatient Medications:   •  amitriptyline (ELAVIL) 10 MG tablet, Take 1 tablet by mouth Every Night., Disp: 90 tablet, Rfl: 3  •  atenolol (TENORMIN) 25 MG tablet, Take 25 mg by mouth Daily., Disp: , Rfl:   •  escitalopram (LEXAPRO) 20 MG tablet, TAKE ONE TABLET BY MOUTH DAILY, Disp: 30 tablet, Rfl: 6  •  ibuprofen (ADVIL,MOTRIN) 200 MG tablet, Take 200 mg by mouth As Needed for Mild Pain ., Disp: , Rfl:   •  levothyroxine (SYNTHROID, LEVOTHROID) 112 MCG tablet, Take 112 mcg by mouth daily., Disp: , Rfl:   •  Magnesium Hydroxide (DULCOLAX PO), Take 300 mg by mouth Daily., Disp: , Rfl:   •  Menthol, Topical Analgesic, (BIOFREEZE EX), Apply  topically As Needed., Disp: , Rfl:   •  pantoprazole (PROTONIX) 40 MG EC tablet, TAKE ONE TABLET BY MOUTH TWICE A DAY, Disp: 60 tablet, Rfl: 12  •  pregabalin (LYRICA) 75 MG capsule, Take 1 capsule by mouth 2 (Two) Times a Day for 30 days., Disp: 60 capsule, Rfl: 0  •  promethazine (PHENERGAN) 12.5 MG tablet, Take 12.5 mg by mouth Every 6  (Six) Hours As Needed., Disp: , Rfl:     Social History     Socioeconomic History   • Marital status:      Spouse name: Meghan   • Years of education: College   Tobacco Use   • Smoking status: Never Smoker   • Smokeless tobacco: Never Used   Vaping Use   • Vaping Use: Never used   Substance and Sexual Activity   • Alcohol use: No     Comment: Heavy in past, none in 33 years   • Drug use: Never   • Sexual activity: Not Currently     Partners: Female       Family History   Problem Relation Age of Onset   • Cancer Mother 85        Breast   • COPD Father    • Cancer Brother 59        Unknown type   • Hypertension Daughter      Review of Systems   Constitutional: Negative for fatigue.   Respiratory: Negative for chest tightness and shortness of breath.    Cardiovascular: Negative for chest pain, palpitations and leg swelling.   Neurological: Negative for weakness.   All other systems reviewed and are negative.      Physical Exam:    Vital Signs:  Weight: 86.2 kg (190 lb)   Body mass index is 23.13 kg/m².          BP: 111/73     Constitutional:       Appearance: Well-developed.   Eyes:      Conjunctiva/sclera: Conjunctivae normal.      Pupils: Pupils are equal, round, and reactive to light.   HENT:      Head: Normocephalic and atraumatic.      Nose: Nose normal.   Neck:      Thyroid: No thyromegaly.      Vascular: No JVD.      Lymphadenopathy: No cervical adenopathy.   Pulmonary:      Effort: Pulmonary effort is normal.      Breath sounds: Normal breath sounds. No rales.   Cardiovascular:      PMI at left midclavicular line. Normal rate. Regular rhythm.      Murmurs: There is a grade 2/4 high frequency blowing decrescendo, early diastolic murmur at the URSB, radiating to the apex.      No gallop.   Pulses:     Intact distal pulses. No decreased pulses.   Edema:     Peripheral edema absent.   Abdominal:      General: Bowel sounds are normal. There is no distension.      Palpations: Abdomen is soft. There is no  abdominal mass.      Tenderness: There is no abdominal tenderness.   Musculoskeletal: Normal range of motion.         General: No tenderness or deformity.      Cervical back: Normal range of motion and neck supple. Skin:     General: Skin is warm and dry.      Findings: No erythema or rash.   Neurological:      Mental Status: Alert and oriented to person, place, and time.      Deep Tendon Reflexes: Reflexes are normal and symmetric.   Psychiatric:         Behavior: Behavior normal.          Assessment:     Problems Addressed this Visit        Cardiac and Vasculature    HTN (hypertension) - Primary    Hyperlipidemia    Ascending aortic aneurysm (HCC)    Nonrheumatic aortic valve insufficiency       Coag and Thromboembolic    Thrombocytopenia (HCC)       Hematology and Neoplasia    Monoclonal gammopathy of unknown significance (MGUS)    Prostate cancer (HCC)      Diagnoses       Codes Comments    Primary hypertension    -  Primary ICD-10-CM: I10  ICD-9-CM: 401.9     Hyperlipidemia, unspecified hyperlipidemia type     ICD-10-CM: E78.5  ICD-9-CM: 272.4     Ascending aortic aneurysm (HCC)     ICD-10-CM: I71.2  ICD-9-CM: 441.2     Thrombocytopenia (HCC)     ICD-10-CM: D69.6  ICD-9-CM: 287.5     Monoclonal gammopathy of unknown significance (MGUS)     ICD-10-CM: D47.2  ICD-9-CM: 273.1     Prostate cancer (HCC)     ICD-10-CM: C61  ICD-9-CM: 185     Nonrheumatic aortic valve insufficiency     ICD-10-CM: I35.1  ICD-9-CM: 424.1           Assessment/recommendation:     Stable ascending aortic aneurysm without dissection or ulceration.  He has moderate aortic insufficiency but no ventricular dilatation.  I discussed the patient natural history of the aneurysm in the proximal location and explained him that if the diameter increases or the amount of aortic insufficiency increases, then I would recommend proximal aortic replacement.  He understand the surgery is performed between 5 and 5.5 cm depending on other factors.  I  explained him the importance of aggressive blood pressure control to maintain low the DP DT and prevent aneurysmal expansion.  He will need to have longitudinal follow-up and a chest CT scan and echocardiogram 1 year and follow-up with me in our thoracic aortic surgical clinic.  Also would like him to establish care with a cardiology colleague for further evaluation of his aortic valve and.    Thank you for allowing me to participate in his care.    Regards,    Spencer Puente M.D.  I spent over 30 minutes in reviewing the records, examining the patient, reviewing and interpreting myself the chest CTA and echocardiogram discussing the findings and options with the patient, discussed the natural history of the aneurysm disease in the chest, counseling him about low salt diet and blood pressure management and documenting in the electronic record

## 2022-03-12 DIAGNOSIS — G63 NEUROPATHY ASSOCIATED WITH MGUS: ICD-10-CM

## 2022-03-12 DIAGNOSIS — D47.2 NEUROPATHY ASSOCIATED WITH MGUS: ICD-10-CM

## 2022-03-14 RX ORDER — PREGABALIN 75 MG/1
CAPSULE ORAL
Qty: 60 CAPSULE | Refills: 2 | Status: SHIPPED | OUTPATIENT
Start: 2022-03-14 | End: 2022-06-13

## 2022-05-20 ENCOUNTER — PATIENT ROUNDING (BHMG ONLY) (OUTPATIENT)
Dept: CARDIOLOGY | Facility: CLINIC | Age: 82
End: 2022-05-20

## 2022-05-20 ENCOUNTER — OFFICE VISIT (OUTPATIENT)
Dept: CARDIOLOGY | Facility: CLINIC | Age: 82
End: 2022-05-20

## 2022-05-20 VITALS
BODY MASS INDEX: 23.26 KG/M2 | HEART RATE: 70 BPM | SYSTOLIC BLOOD PRESSURE: 119 MMHG | HEIGHT: 76 IN | DIASTOLIC BLOOD PRESSURE: 78 MMHG | RESPIRATION RATE: 18 BRPM | WEIGHT: 191 LBS | OXYGEN SATURATION: 96 %

## 2022-05-20 DIAGNOSIS — R94.31 ABNORMAL ELECTROCARDIOGRAM (ECG) (EKG): ICD-10-CM

## 2022-05-20 DIAGNOSIS — I35.1 NONRHEUMATIC AORTIC VALVE INSUFFICIENCY: ICD-10-CM

## 2022-05-20 DIAGNOSIS — E78.2 MIXED HYPERLIPIDEMIA: ICD-10-CM

## 2022-05-20 DIAGNOSIS — I10 PRIMARY HYPERTENSION: ICD-10-CM

## 2022-05-20 DIAGNOSIS — I10 ESSENTIAL HYPERTENSION: Primary | ICD-10-CM

## 2022-05-20 DIAGNOSIS — D69.6 THROMBOCYTOPENIA: ICD-10-CM

## 2022-05-20 DIAGNOSIS — I71.21 ASCENDING AORTIC ANEURYSM: ICD-10-CM

## 2022-05-20 PROCEDURE — 93000 ELECTROCARDIOGRAM COMPLETE: CPT | Performed by: INTERNAL MEDICINE

## 2022-05-20 PROCEDURE — 99214 OFFICE O/P EST MOD 30 MIN: CPT | Performed by: INTERNAL MEDICINE

## 2022-05-20 NOTE — PROGRESS NOTES
May 20, 2022    Hello, may I speak with Panfilo Feliz?    My name is Terry    I am  with [facility] CARDIOLOGY [address] [place] [address].    Before we get started may I verify your date of birth? 1940    I am calling to officially welcome you to our practice and ask about your recent visit. Is this a good time to talk? Yes    Tell me about your visit with us. What things went well? Visit was  fine.  I liked my nurse Honey because she spoke where I could here as I have a hearing problem.  The office is very nice.  Really enjoyed coming there.     We're always looking for ways to make our patients' experiences even better. Do you have recommendations on ways we may improve?   No    Overall were you satisfied with your first visit to our practice?  Yes I was     I appreciate you taking the time to speak with me today. Is there anything else I can do for you? No    Thank you, and have a great day.

## 2022-05-20 NOTE — PROGRESS NOTES
"Cardiology Office Visit      Encounter Date:  05/20/2022    Patient ID:   Panfilo Feliz is a 81 y.o. male.    Reason For Followup:  Ascending aortic aneurysm  Aortic regurgitation    Brief Clinical History:  Dear Stephan Torres MD    I had the pleasure of seeing Panfilo Feliz today. As you are well aware, this is a 81 y.o. male past medical history that is significant for history of hypertension ascending aortic aneurysm moderate aortic regurgitation here for follow-up for further evaluation and treatment options    Interval History:  Denies any symptoms of chest pain shortness of breath dizziness or syncope  Denies any orthopnea no weight gain    Assessment & Plan    Impressions:  Ascending aortic aneurysm 4.9 cm  Moderate aortic regurgitation  Normal LV systolic function normal LV size  Peripheral neuropathy  Thrombocytopenia  Monoclonal gammopathy    Recommendations:  The lipids are elevated consider statin therapy/patient had some side effects with statins in the past  Continue current medical therapy with atenolol 25 mg p.o. once a day  Plain treadmill stress test for functional status assessment with valvular heart disease  Close monitoring of ascending aortic aneurysm with a repeat echocardiogram in 6 months  Prior imaging studies including echocardiogram findings and CT chest findings reviewed and discussed with patient  Size of the ascending aortic aneurysm is stable in the last 3 years  Diagnosis and treatment options reviewed and discussed with patient and family  Labs with primary care physician office  Follow-up in office in 6 months with an echocardiogram    Objective:    Vitals:  Vitals:    05/20/22 0906   BP: 119/78   BP Location: Left arm   Patient Position: Sitting   Cuff Size: Large Adult   Pulse: 70   Resp: 18   SpO2: 96%   Weight: 86.6 kg (191 lb)   Height: 193 cm (76\")       Physical Exam:    General: Alert, cooperative, no distress, appears stated age  Head:  Normocephalic, atraumatic, " mucous membranes moist  Eyes:  Conjunctiva/corneas clear, EOM's intact     Neck:  Supple,  no adenopathy;      Lungs: Clear to auscultation bilaterally, no wheezes rhonchi rales are noted  Chest wall: No tenderness  Heart::  Regular rate and rhythm, S1 and S2 normal, no murmur, rub or gallop  Abdomen: Soft, non-tender, nondistended bowel sounds active  Extremities: No cyanosis, clubbing, or edema  Pulses: 2+ and symmetric all extremities  Skin:  No rashes or lesions  Neuro/psych: A&O x3. CN II through XII are grossly intact with appropriate affect      Allergies:  No Known Allergies    Medication Review:     Current Outpatient Medications:   •  amitriptyline (ELAVIL) 10 MG tablet, Take 1 tablet by mouth Every Night., Disp: 90 tablet, Rfl: 3  •  atenolol (TENORMIN) 25 MG tablet, Take 25 mg by mouth Daily., Disp: , Rfl:   •  escitalopram (LEXAPRO) 20 MG tablet, TAKE ONE TABLET BY MOUTH DAILY, Disp: 30 tablet, Rfl: 6  •  ibuprofen (ADVIL,MOTRIN) 200 MG tablet, Take 200 mg by mouth As Needed for Mild Pain ., Disp: , Rfl:   •  levothyroxine (SYNTHROID, LEVOTHROID) 112 MCG tablet, Take 112 mcg by mouth daily., Disp: , Rfl:   •  Magnesium Hydroxide (DULCOLAX PO), Take 300 mg by mouth Daily., Disp: , Rfl:   •  Menthol, Topical Analgesic, (BIOFREEZE EX), Apply  topically As Needed., Disp: , Rfl:   •  pantoprazole (PROTONIX) 40 MG EC tablet, TAKE ONE TABLET BY MOUTH TWICE A DAY, Disp: 60 tablet, Rfl: 12  •  promethazine (PHENERGAN) 12.5 MG tablet, Take 12.5 mg by mouth Every 6 (Six) Hours As Needed., Disp: , Rfl:   •  pregabalin (LYRICA) 75 MG capsule, TAKE ONE CAPSULE BY MOUTH TWICE A DAY, Disp: 60 capsule, Rfl: 2    Family History:  Family History   Problem Relation Age of Onset   • Cancer Mother 85        Breast   • COPD Father    • Cancer Brother 59        Unknown type   • Hypertension Daughter        Past Medical History:  Past Medical History:   Diagnosis Date   • AAA (abdominal aortic aneurysm) (HCC)    • Abnormal ECG  1980’s   • Alcoholism (HCC)     None since 1991   • Aneurysm (HCC) 2019   • Arthritis    • Basal cell carcinoma (BCC) of face    • Cholelithiasis 1990’s    Cholecystectomy   • Colon polyp    • Coronary artery disease    • Difficulty walking    • Disease of thyroid gland    • DJD (degenerative joint disease)    • GERD (gastroesophageal reflux disease)    • GI (gastrointestinal bleed)    • Hernia     Hiatal   • History of bone marrow biopsy    • Hyperlipidemia    • Hypertension    • Hypothyroidism    • IgG monoclonal gammopathy    • Prostate cancer (HCC)    • Reflux esophagitis    • Ulcer        Past surgical History:  Past Surgical History:   Procedure Laterality Date   • CHOLECYSTECTOMY  1988   • COLON SURGERY  1998   • COLONOSCOPY  02/2013   • ENDOSCOPY  2012    ESOPHAGOGASTRODUODENOSCOPY WITH DILATION OF SHATZKI'S RING   • ENDOSCOPY N/A 10/09/2020    Procedure: ESOPHAGOGASTRODUODENOSCOPY with cold bx;  Surgeon: Jake Perez MD;  Location: [facility] ENDOSCOPY;  Service: Gastroenterology;  Laterality: N/A;  pre - nausea  post - duodenal ulcer, gastrits, gastric ulcer, hiatal hernia   • LAMINECTOMY  2013    decompression L3-4, L4-5   • PROSTATECTOMY  2007   • SKIN BIOPSY     • TONSILLECTOMY AND ADENOIDECTOMY      1940s   • UPPER GASTROINTESTINAL ENDOSCOPY     • VASECTOMY      1970s       Social History:  Social History     Socioeconomic History   • Marital status:      Spouse name: Meghan   • Years of education: College   Tobacco Use   • Smoking status: Never Smoker   • Smokeless tobacco: Never Used   Vaping Use   • Vaping Use: Never used   Substance and Sexual Activity   • Alcohol use: No     Comment: Heavy in past, none in 33 years   • Drug use: Never   • Sexual activity: Not Currently     Partners: Female     Birth control/protection: None       Review of Systems:  The following systems were reviewed as they relate to the cardiovascular system: Constitutional, Eyes, ENT, Cardiovascular, Respiratory,  Gastrointestinal, Integumentary, Neurological, Psychiatric, Hematologic, Endocrine, Musculoskeletal, and Genitourinary. The pertinent cardiovascular findings are reported above with all other cardiovascular points within those systems being negative.    Diagnostic Study Review:     Current Electrocardiogram:    ECG 12 Lead    Date/Time: 5/20/2022 1:32 PM  Performed by: Julio C Sanchez MD  Authorized by: Julio C Sanchez MD   Comparison: compared with previous ECG   Similar to previous ECG  Rhythm: sinus rhythm  Rate: normal  BPM: 70  Conduction: right bundle branch block and 1st degree AV block  QRS axis: normal  Other findings: non-specific ST-T wave changes    Clinical impression: abnormal EKG              NOTE: The following portions of the patient's history were reviewed and updated this visit as appropriate: allergies, current medications, past family history, past medical history, past social history, past surgical history and problem list.

## 2022-06-10 DIAGNOSIS — D47.2 NEUROPATHY ASSOCIATED WITH MGUS: ICD-10-CM

## 2022-06-10 DIAGNOSIS — G63 NEUROPATHY ASSOCIATED WITH MGUS: ICD-10-CM

## 2022-06-13 RX ORDER — AMITRIPTYLINE HYDROCHLORIDE 10 MG/1
TABLET, FILM COATED ORAL
Qty: 90 TABLET | Refills: 3 | Status: SHIPPED | OUTPATIENT
Start: 2022-06-13

## 2022-06-13 RX ORDER — PREGABALIN 75 MG/1
CAPSULE ORAL
Qty: 60 CAPSULE | Refills: 2 | Status: SHIPPED | OUTPATIENT
Start: 2022-06-13 | End: 2022-08-09 | Stop reason: SDUPTHER

## 2022-06-22 ENCOUNTER — APPOINTMENT (OUTPATIENT)
Dept: CARDIOLOGY | Facility: HOSPITAL | Age: 82
End: 2022-06-22

## 2022-06-23 ENCOUNTER — APPOINTMENT (OUTPATIENT)
Dept: CARDIOLOGY | Facility: HOSPITAL | Age: 82
End: 2022-06-23

## 2022-07-07 ENCOUNTER — OFFICE VISIT (OUTPATIENT)
Dept: GASTROENTEROLOGY | Facility: CLINIC | Age: 82
End: 2022-07-07

## 2022-07-07 ENCOUNTER — HOSPITAL ENCOUNTER (OUTPATIENT)
Dept: CARDIOLOGY | Facility: HOSPITAL | Age: 82
Discharge: HOME | End: 2022-07-07
Admitting: INTERNAL MEDICINE

## 2022-07-07 ENCOUNTER — TELEPHONE (OUTPATIENT)
Dept: GASTROENTEROLOGY | Facility: CLINIC | Age: 82
End: 2022-07-07

## 2022-07-07 VITALS
TEMPERATURE: 97.1 F | HEART RATE: 67 BPM | DIASTOLIC BLOOD PRESSURE: 76 MMHG | HEIGHT: 76 IN | BODY MASS INDEX: 22.67 KG/M2 | SYSTOLIC BLOOD PRESSURE: 117 MMHG | WEIGHT: 186.2 LBS

## 2022-07-07 DIAGNOSIS — R13.19 OTHER DYSPHAGIA: Primary | ICD-10-CM

## 2022-07-07 DIAGNOSIS — I35.1 NONRHEUMATIC AORTIC VALVE INSUFFICIENCY: ICD-10-CM

## 2022-07-07 DIAGNOSIS — I10 ESSENTIAL HYPERTENSION: ICD-10-CM

## 2022-07-07 DIAGNOSIS — K21.9 GASTROESOPHAGEAL REFLUX DISEASE, UNSPECIFIED WHETHER ESOPHAGITIS PRESENT: ICD-10-CM

## 2022-07-07 DIAGNOSIS — R94.31 ABNORMAL ELECTROCARDIOGRAM (ECG) (EKG): ICD-10-CM

## 2022-07-07 DIAGNOSIS — I71.21 ASCENDING AORTIC ANEURYSM: ICD-10-CM

## 2022-07-07 DIAGNOSIS — I71.21 ASCENDING AORTIC ANEURYSM: Primary | ICD-10-CM

## 2022-07-07 PROCEDURE — 93018 CV STRESS TEST I&R ONLY: CPT | Performed by: INTERNAL MEDICINE

## 2022-07-07 PROCEDURE — 93017 CV STRESS TEST TRACING ONLY: CPT

## 2022-07-07 PROCEDURE — 93016 CV STRESS TEST SUPVJ ONLY: CPT | Performed by: INTERNAL MEDICINE

## 2022-07-07 PROCEDURE — 99214 OFFICE O/P EST MOD 30 MIN: CPT | Performed by: INTERNAL MEDICINE

## 2022-07-07 NOTE — TELEPHONE ENCOUNTER
HAWA patient via telephone for. Scheduled 10 /4/22 with arrival time of 12:00  . Prep paperwork mailed to verified address on file. Patient advised arrival time may change based on [facility] guidelines. HAWA Dailey

## 2022-07-07 NOTE — PROGRESS NOTES
Chief Complaint   Patient presents with   • Difficulty Swallowing     Subjective     HPI  Panfilo Feliz is a 82 y.o. male who presents today for follow up.  Continue dysphagia to solids and liquids  Esophagram showed dysmotility  EGD 2 years ago showed hiatal hernia 4 cm  There is 5 pounds of weight loss  No vomiting    Objective   Vitals:    07/07/22 1040   BP: 117/76   Pulse: 67   Temp: 97.1 °F (36.2 °C)       Physical Exam  Vitals reviewed.   Constitutional:       Appearance: He is well-developed.   HENT:      Head: Normocephalic and atraumatic.   Abdominal:      General: Bowel sounds are normal. There is no distension.      Palpations: Abdomen is soft. There is no mass.      Tenderness: There is no abdominal tenderness.      Hernia: No hernia is present.   Skin:     General: Skin is warm and dry.   Neurological:      Mental Status: He is alert and oriented to person, place, and time.   Psychiatric:         Behavior: Behavior normal.         Thought Content: Thought content normal.         Judgment: Judgment normal.       The following data was reviewed by: Jake Perez MD on 07/07/2022:  Common labs    Common Labsle 8/16/21 8/16/21 8/16/21 2/5/22 2/9/22 2/9/22 2/9/22    1447 1447 1447  1236 1236 1236   Glucose  83    83    BUN  17    21 (A)    Creatinine  1.25  1.30  1.20    eGFR Non African Am  56 (A)    58 (A)    Sodium  139    140    Potassium  4.6    4.5    Chloride  104    104    Calcium  8.9    9.1    Total Protein   6.9    7.0   Albumin  4.20 3.9   4.10 3.7   Total Bilirubin  0.4    0.3    Alkaline Phosphatase  62    57    AST (SGOT)  20    22    ALT (SGPT)  8    10    WBC 4.30    2.97 (A)     Hemoglobin 14.3    13.4     Hematocrit 43.5    42.0     Platelets 152    142     (A) Abnormal value       Comments are available for some flowsheets but are not being displayed.           CMP    CMP 8/16/21 8/16/21 2/5/22 2/9/22 2/9/22    1447 1447  1236 1236   Glucose 83   83    BUN 17   21 (A)    Creatinine  1.25  1.30 1.20    eGFR Non African Am 56 (A)   58 (A)    Sodium 139   140    Potassium 4.6   4.5    Chloride 104   104    Calcium 8.9   9.1    Total Protein  6.9   7.0   Albumin 4.20 3.9  4.10 3.7   Globulin  3.0   3.3   Total Bilirubin 0.4   0.3    Alkaline Phosphatase 62   57    AST (SGOT) 20   22    ALT (SGPT) 8   10    (A) Abnormal value       Comments are available for some flowsheets but are not being displayed.           CBC    CBC 8/4/21 8/16/21 2/9/22   WBC 3.45 4.30 2.97 (A)   RBC 4.97 5.02 4.56   Hemoglobin 14.2 14.3 13.4   Hematocrit 44.4 43.5 42.0   MCV 89.3 86.7 92.1   MCH 28.6 28.5 29.4   MCHC 32.0 32.9 31.9   RDW 13.5 12.9 13.7   Platelets 143 152 142   (A) Abnormal value            CBC w/diff    CBC w/Diff 8/4/21 8/16/21 2/9/22   WBC 3.45 4.30 2.97 (A)   RBC 4.97 5.02 4.56   Hemoglobin 14.2 14.3 13.4   Hematocrit 44.4 43.5 42.0   MCV 89.3 86.7 92.1   MCH 28.6 28.5 29.4   MCHC 32.0 32.9 31.9   RDW 13.5 12.9 13.7   Platelets 143 152 142   Neutrophil Rel % 43.7 47.9 42.8   Immature Granulocyte Rel % 0.3 0.2 0.0   Lymphocyte Rel % 40.9 36.3 39.7   Monocyte Rel % 13.3 (A) 14.4 (A) 15.8 (A)   Eosinophil Rel % 1.2 0.7 1.0   Basophil Rel % 0.6 0.5 0.7   (A) Abnormal value                    Electrolytes    Electrolytes 8/4/21 8/16/21 2/9/22   Sodium 139 139 140   Potassium 4.7 4.6 4.5   Chloride 103 104 104   Calcium 9.4 8.9 9.1           Data reviewed: Radiologic studies Esophagram and EGD reports reviewed     Assessment & Plan   Assessment:     GERD  Dysphagia  Suspected dysmotility  Hiatal hernia  Weight loss    Plan:   Due to weight loss, hiatal hernia and worsening dysphagia plan for EGD  Continue PPI  GERD lifestyle modifications discussed    I spent 30 minutes caring for Panfilo on this date of service. This time includes time spent by me in the following activities:preparing for the visit, reviewing tests, obtaining and/or reviewing a separately obtained history, performing a medically appropriate  examination and/or evaluation , counseling and educating the patient/family/caregiver, ordering medications, tests, or procedures, referring and communicating with other health care professionals , documenting information in the medical record, independently interpreting results and communicating that information with the patient/family/caregiver and care coordination    Jake Perez MD  [facility]  [address] [place] [address]  Office: [phone]

## 2022-07-12 LAB
BH CV STRESS BP STAGE 1: NORMAL
BH CV STRESS BP STAGE 2: NORMAL
BH CV STRESS DURATION MIN STAGE 1: 3
BH CV STRESS DURATION MIN STAGE 2: 0
BH CV STRESS DURATION SEC STAGE 1: 0
BH CV STRESS DURATION SEC STAGE 2: 8
BH CV STRESS GRADE STAGE 1: 10
BH CV STRESS GRADE STAGE 2: 12
BH CV STRESS HR STAGE 1: 96
BH CV STRESS HR STAGE 2: 98
BH CV STRESS METS STAGE 1: 4.6
BH CV STRESS METS STAGE 2: 4.8
BH CV STRESS PROTOCOL 1: NORMAL
BH CV STRESS RECOVERY BP: NORMAL MMHG
BH CV STRESS RECOVERY HR: 70 BPM
BH CV STRESS SPEED STAGE 1: 1.7
BH CV STRESS SPEED STAGE 2: 2.5
BH CV STRESS STAGE 1: 1
BH CV STRESS STAGE 2: 2
MAXIMAL PREDICTED HEART RATE: 139 BPM
PERCENT MAX PREDICTED HR: 69.78 %
STRESS BASELINE BP: NORMAL MMHG
STRESS BASELINE HR: 85 BPM
STRESS PERCENT HR: 82 %
STRESS POST ESTIMATED WORKLOAD: 4.8 METS
STRESS POST EXERCISE DUR MIN: 3 MIN
STRESS POST EXERCISE DUR SEC: 8 SEC
STRESS POST PEAK BP: NORMAL MMHG
STRESS POST PEAK HR: 97 BPM
STRESS TARGET HR: 118 BPM

## 2022-07-15 ENCOUNTER — TELEPHONE (OUTPATIENT)
Dept: CARDIOLOGY | Facility: CLINIC | Age: 82
End: 2022-07-15

## 2022-07-15 NOTE — TELEPHONE ENCOUNTER
Julio C Sanchez MD Melissa, MA    We need to wait on the JOSE until he gets his EGD   I did talk to one of the family members that works in the hospital yesterday   Patient is aware that the preference is to have the EGD first and then JOSE after the EGD procedure   If he does have any worsening cardiac symptoms we can ask GI to see if they can do the EGD procedure sooner than October             Previous Messages       ----- Message -----   From:  ESAU Méndez   Sent: 7/14/2022   3:59 PM EDT   To: Julio C Sanchez MD   Subject: JOSE                                               Patient wife has called and states the patient is scheduled for sx in October to have his throat stretched. She wants to know if he should go ahead with the JOSE or wait till after the throat is stretched.     Please advise

## 2022-07-29 ENCOUNTER — HOSPITAL ENCOUNTER (OUTPATIENT)
Dept: CARDIOLOGY | Facility: HOSPITAL | Age: 82
End: 2022-07-29

## 2022-08-09 ENCOUNTER — INFUSION (OUTPATIENT)
Dept: ONCOLOGY | Facility: HOSPITAL | Age: 82
End: 2022-08-09

## 2022-08-09 ENCOUNTER — LAB (OUTPATIENT)
Dept: LAB | Facility: HOSPITAL | Age: 82
End: 2022-08-09

## 2022-08-09 ENCOUNTER — OFFICE VISIT (OUTPATIENT)
Dept: NEUROLOGY | Facility: CLINIC | Age: 82
End: 2022-08-09

## 2022-08-09 ENCOUNTER — OFFICE VISIT (OUTPATIENT)
Dept: ONCOLOGY | Facility: CLINIC | Age: 82
End: 2022-08-09

## 2022-08-09 VITALS
HEIGHT: 76 IN | HEART RATE: 80 BPM | WEIGHT: 186 LBS | SYSTOLIC BLOOD PRESSURE: 112 MMHG | DIASTOLIC BLOOD PRESSURE: 76 MMHG | BODY MASS INDEX: 22.65 KG/M2

## 2022-08-09 VITALS
HEART RATE: 72 BPM | DIASTOLIC BLOOD PRESSURE: 72 MMHG | BODY MASS INDEX: 22.71 KG/M2 | HEIGHT: 76 IN | RESPIRATION RATE: 18 BRPM | OXYGEN SATURATION: 96 % | TEMPERATURE: 97.3 F | SYSTOLIC BLOOD PRESSURE: 115 MMHG | WEIGHT: 186.5 LBS

## 2022-08-09 DIAGNOSIS — D70.8 OTHER NEUTROPENIA: ICD-10-CM

## 2022-08-09 DIAGNOSIS — R11.0 NAUSEA: ICD-10-CM

## 2022-08-09 DIAGNOSIS — G63 NEUROPATHY ASSOCIATED WITH MGUS: ICD-10-CM

## 2022-08-09 DIAGNOSIS — D47.2 MONOCLONAL GAMMOPATHY OF UNKNOWN SIGNIFICANCE (MGUS): ICD-10-CM

## 2022-08-09 DIAGNOSIS — D47.2 NEUROPATHY ASSOCIATED WITH MGUS: ICD-10-CM

## 2022-08-09 DIAGNOSIS — D69.6 THROMBOCYTOPENIA: ICD-10-CM

## 2022-08-09 DIAGNOSIS — D70.8 OTHER NEUTROPENIA: Primary | ICD-10-CM

## 2022-08-09 DIAGNOSIS — R13.12 OROPHARYNGEAL DYSPHAGIA: Primary | ICD-10-CM

## 2022-08-09 DIAGNOSIS — R11.0 NAUSEA IN ADULT PATIENT: ICD-10-CM

## 2022-08-09 LAB
ALBUMIN SERPL-MCNC: 4.3 G/DL (ref 3.5–5.2)
ALBUMIN/GLOB SERPL: 1.5 G/DL (ref 1.1–2.4)
ALP SERPL-CCNC: 57 U/L (ref 38–116)
ALT SERPL W P-5'-P-CCNC: 11 U/L (ref 0–41)
ANION GAP SERPL CALCULATED.3IONS-SCNC: 12.2 MMOL/L (ref 5–15)
AST SERPL-CCNC: 25 U/L (ref 0–40)
BASOPHILS # BLD AUTO: 0.03 10*3/MM3 (ref 0–0.2)
BASOPHILS NFR BLD AUTO: 0.8 % (ref 0–1.5)
BILIRUB SERPL-MCNC: 0.3 MG/DL (ref 0.2–1.2)
BUN SERPL-MCNC: 32 MG/DL (ref 6–20)
BUN/CREAT SERPL: 24.1 (ref 7.3–30)
CALCIUM SPEC-SCNC: 9.5 MG/DL (ref 8.5–10.2)
CHLORIDE SERPL-SCNC: 104 MMOL/L (ref 98–107)
CO2 SERPL-SCNC: 21.8 MMOL/L (ref 22–29)
CREAT SERPL-MCNC: 1.33 MG/DL (ref 0.7–1.3)
DEPRECATED RDW RBC AUTO: 45.1 FL (ref 37–54)
EGFRCR SERPLBLD CKD-EPI 2021: 53.7 ML/MIN/1.73
EOSINOPHIL # BLD AUTO: 0.03 10*3/MM3 (ref 0–0.4)
EOSINOPHIL NFR BLD AUTO: 0.8 % (ref 0.3–6.2)
ERYTHROCYTE [DISTWIDTH] IN BLOOD BY AUTOMATED COUNT: 13.6 % (ref 12.3–15.4)
GLOBULIN UR ELPH-MCNC: 2.9 GM/DL (ref 1.8–3.5)
GLUCOSE SERPL-MCNC: 104 MG/DL (ref 74–124)
HCT VFR BLD AUTO: 40.6 % (ref 37.5–51)
HGB BLD-MCNC: 13.3 G/DL (ref 13–17.7)
IMM GRANULOCYTES # BLD AUTO: 0.01 10*3/MM3 (ref 0–0.05)
IMM GRANULOCYTES NFR BLD AUTO: 0.3 % (ref 0–0.5)
LYMPHOCYTES # BLD AUTO: 1.33 10*3/MM3 (ref 0.7–3.1)
LYMPHOCYTES NFR BLD AUTO: 34.5 % (ref 19.6–45.3)
MCH RBC QN AUTO: 29.4 PG (ref 26.6–33)
MCHC RBC AUTO-ENTMCNC: 32.8 G/DL (ref 31.5–35.7)
MCV RBC AUTO: 89.8 FL (ref 79–97)
MONOCYTES # BLD AUTO: 0.55 10*3/MM3 (ref 0.1–0.9)
MONOCYTES NFR BLD AUTO: 14.3 % (ref 5–12)
NEUTROPHILS NFR BLD AUTO: 1.9 10*3/MM3 (ref 1.7–7)
NEUTROPHILS NFR BLD AUTO: 49.3 % (ref 42.7–76)
NRBC BLD AUTO-RTO: 0 /100 WBC (ref 0–0.2)
PLATELET # BLD AUTO: 141 10*3/MM3 (ref 140–450)
PMV BLD AUTO: 11 FL (ref 6–12)
POTASSIUM SERPL-SCNC: 4.3 MMOL/L (ref 3.5–4.7)
PROT SERPL-MCNC: 7.2 G/DL (ref 6.3–8)
RBC # BLD AUTO: 4.52 10*6/MM3 (ref 4.14–5.8)
SODIUM SERPL-SCNC: 138 MMOL/L (ref 134–145)
VIT B12 BLD-MCNC: 463 PG/ML (ref 211–946)
WBC NRBC COR # BLD: 3.85 10*3/MM3 (ref 3.4–10.8)

## 2022-08-09 PROCEDURE — 82607 VITAMIN B-12: CPT | Performed by: INTERNAL MEDICINE

## 2022-08-09 PROCEDURE — 99214 OFFICE O/P EST MOD 30 MIN: CPT | Performed by: INTERNAL MEDICINE

## 2022-08-09 PROCEDURE — 25010000002 CYANOCOBALAMIN PER 1000 MCG: Performed by: INTERNAL MEDICINE

## 2022-08-09 PROCEDURE — 36415 COLL VENOUS BLD VENIPUNCTURE: CPT

## 2022-08-09 PROCEDURE — 96372 THER/PROPH/DIAG INJ SC/IM: CPT

## 2022-08-09 PROCEDURE — 99214 OFFICE O/P EST MOD 30 MIN: CPT | Performed by: PHYSICIAN ASSISTANT

## 2022-08-09 PROCEDURE — 80053 COMPREHEN METABOLIC PANEL: CPT

## 2022-08-09 PROCEDURE — 85025 COMPLETE CBC W/AUTO DIFF WBC: CPT

## 2022-08-09 RX ORDER — PREGABALIN 75 MG/1
75 CAPSULE ORAL 2 TIMES DAILY
Qty: 180 CAPSULE | Refills: 1 | Status: SHIPPED | OUTPATIENT
Start: 2022-08-09 | End: 2023-01-13

## 2022-08-09 RX ORDER — CYANOCOBALAMIN 1000 UG/ML
1000 INJECTION, SOLUTION INTRAMUSCULAR; SUBCUTANEOUS ONCE
Status: COMPLETED | OUTPATIENT
Start: 2022-08-09 | End: 2022-08-09

## 2022-08-09 RX ADMIN — CYANOCOBALAMIN 1000 MCG: 1000 INJECTION, SOLUTION INTRAMUSCULAR at 15:12

## 2022-08-09 NOTE — PROGRESS NOTES
REASON FOR FOLLOWUP:    1.Minimal leukopenia with thrombocytopenia in the background of minimal IgG monoclonal gammopathy. The patient has no splenomegaly, no peripheral adenopathy, and no symptoms that suggest lupus or collagen vascular disease. Bone marrow   a  spirate documented no evidence of myeloma, lymphoma, leukemia, myelodysplasia, or any other abnormality. Bone marrow chromosomal analysis as well as flow cytometry were negative.   2.  Inflammatory polyneuropathy.  He initiated IVIG on 10/3/2019.  He did receive his second IVIG on 11/7/2019.allergic reaction to it stopping infusion all togethere  3.  Approximately 10 days after his second IVIG infusion he did experience serum sickness that included rash, generalized arthralgias, skin peeling of the palms.  Dr. Olvera prescribed 20 mg of prednisone and his symptoms have resolved after 2 days.      REASON FOR VISIT:                 Past Medical History:   Diagnosis Date   • AAA (abdominal aortic aneurysm) (HCC)    • Abnormal ECG 1980’s   • Alcoholism (HCC)     None since 1991   • Aneurysm (HCC) 2019   • Arthritis    • Basal cell carcinoma (BCC) of face    • Cholelithiasis 1990’s    Cholecystectomy   • Colon polyp    • Coronary artery disease    • Difficulty walking    • Disease of thyroid gland    • DJD (degenerative joint disease)    • GERD (gastroesophageal reflux disease)    • GI (gastrointestinal bleed)    • Hernia     Hiatal   • History of bone marrow biopsy    • Hyperlipidemia    • Hypertension    • Hypothyroidism    • IgG monoclonal gammopathy    • Prostate cancer (HCC)    • Reflux esophagitis    • Ulcer      Past Surgical History:   Procedure Laterality Date   • CHOLECYSTECTOMY  1988   • COLON SURGERY  1998   • COLONOSCOPY  02/2013   • ENDOSCOPY  2012    ESOPHAGOGASTRODUODENOSCOPY WITH DILATION OF SHATZKI'S RING   • ENDOSCOPY N/A 10/09/2020    Procedure: ESOPHAGOGASTRODUODENOSCOPY with cold bx;  Surgeon: Jake Perez MD;  Location: [facility]  ENDOSCOPY;  Service: Gastroenterology;  Laterality: N/A;  pre - nausea  post - duodenal ulcer, gastrits, gastric ulcer, hiatal hernia   • LAMINECTOMY  2013    decompression L3-4, L4-5   • PROSTATECTOMY  2007   • SKIN BIOPSY     • TONSILLECTOMY AND ADENOIDECTOMY      1940s   • UPPER GASTROINTESTINAL ENDOSCOPY     • VASECTOMY      1970s     Social History     Socioeconomic History   • Marital status:      Spouse name: Meghan   • Years of education: College   Tobacco Use   • Smoking status: Never Smoker   • Smokeless tobacco: Never Used   Vaping Use   • Vaping Use: Never used   Substance and Sexual Activity   • Alcohol use: No     Comment: Heavy in past, none in 33 years   • Drug use: Never   • Sexual activity: Not Currently     Partners: Female     Birth control/protection: None     Family History   Problem Relation Age of Onset   • Cancer Mother 85        Breast   • COPD Father    • Cancer Brother 59        Unknown type   • Hypertension Daughter            Current Outpatient Medications on File Prior to Visit   Medication Sig Dispense Refill   • amitriptyline (ELAVIL) 10 MG tablet TAKE ONE TABLET BY MOUTH ONCE NIGHTLY 90 tablet 3   • atenolol (TENORMIN) 25 MG tablet Take 25 mg by mouth Daily.     • escitalopram (LEXAPRO) 20 MG tablet TAKE ONE TABLET BY MOUTH DAILY 30 tablet 6   • ibuprofen (ADVIL,MOTRIN) 200 MG tablet Take 200 mg by mouth As Needed for Mild Pain .     • levothyroxine (SYNTHROID, LEVOTHROID) 112 MCG tablet Take 112 mcg by mouth daily.     • Magnesium Hydroxide (DULCOLAX PO) Take 300 mg by mouth Daily.     • Menthol, Topical Analgesic, (BIOFREEZE EX) Apply  topically As Needed.     • pantoprazole (PROTONIX) 40 MG EC tablet TAKE ONE TABLET BY MOUTH TWICE A DAY 60 tablet 12   • promethazine (PHENERGAN) 12.5 MG tablet Take 12.5 mg by mouth Every 6 (Six) Hours As Needed.     • [DISCONTINUED] pregabalin (LYRICA) 75 MG capsule TAKE ONE CAPSULE BY MOUTH TWICE A DAY 60 capsule 2     No current  "facility-administered medications on file prior to visit.   No Known Allergies      ONCOLOGIC/HEMATOLOGIC HISTORY: History from previous dates can be found in the separate document.                     Vitals:    08/09/22 1442   BP: 115/72   Pulse: 72   Resp: 18   Temp: 97.3 °F (36.3 °C)   TempSrc: Temporal   SpO2: 96%   Weight: 84.6 kg (186 lb 8 oz)   Height: 193 cm (75.98\")                    PHYSICAL EXAMINATION:          GENERAL:                                 LABORATORY DATAContains abnormal data CBC Auto Differential  Order: [id number] - Part of Panel Order [id number]  Status:  Final result   Visible to patient:  No (scheduled for 8/4/2021  9:37 AM)   Dx:  Chronic inflammatory demyelinating po...  Specimen Information: Blood         0 Result Notes  Component   Ref Range & Units 09:31   WBC   3.40 - 10.80 10*3/mm3 3.45    RBC   4.14 - 5.80 10*6/mm3 4.97    Hemoglobin   13.0 - 17.7 g/dL 14.2    Hematocrit   37.5 - 51.0 % 44.4    MCV   79.0 - 97.0 fL 89.3    MCH   26.6 - 33.0 pg 28.6    MCHC   31.5 - 35.7 g/dL 32.0    RDW   12.3 - 15.4 % 13.5    RDW-SD   37.0 - 54.0 fl 43.8    MPV   6.0 - 12.0 fL 10.4    Platelets   140 - 450 10*3/mm3 143    Neutrophil %   42.7 - 76.0 % 43.7    Lymphocyte %   19.6 - 45.3 % 40.9    Monocyte %   5.0 - 12.0 % 13.3High     Eosinophil %   0.3 - 6.2 % 1.2    Basophil %   0.0 - 1.5 % 0.6    Immature Grans %   0.0 - 0.5 % 0.3    Neutrophils, Absolute   1.70 - 7.00 10*3/mm3 1.51Low     Lymphocytes, Absolute   0.70 - 3.10 10*3/mm3 1.41    Monocytes, Absolute   0.10 - 0.90 10*3/mm3 0.46    Eosinophils, Absolute   0.00 - 0.40 10*3/mm3 0.04    Basophils, Absolute   0.00 - 0.20 10*3/mm3 0.02    Immature Grans, Absolute   0.00 - 0.05 10*3/mm3 0.01    nRBC   0.0 - 0.2 /100 WBC 0.0    Resulting Agency  CBC LAB                                                 ASSESSMENT:  This patient has monoclonal gammopathy of unknown significance and he has undergone bone marrow testing of this on 2 different " occasions not being able to document myeloma, lymphoma, myelodysplasia, leukemia or myelofibrosis. Chromosomal analysis has been normal. The patient has not had any significant difference in his monoclonal protein quantification.    The patient has associated with this sensory peripheral neuropathy with balance issues and painful numbness in his feet. We have tried Neurontin in the past with no benefit, now we are trying Lyrica and it seems to be that this is not going to be any benefit to him neither.    We have also tried prednisone with no benefit as per indication by his Neurologist and we have tried immunoglobulin with no benefit and actually this medicine triggered serum sickness in him that required short course of prednisone therapy.    I discussed with his attending physician through video medicine from [facility] , the fact that we still have no diagnosis in this patient in regard his sensory neuropathy and his minimal monoclonal protein. He is going to be seen by neurology in that institution also by video medicine. The patient continues having progressing symptoms now in his upper extremities up to his elbows in regard to sensory changes and in his lower extremities up to his knees. He has imbalance and he has a negative Romberg test. Actually his bicipital and tricipital reflexes are very active 2-3. His patellar and Achilles are abolished. Vibratory sensation is normal throughout. The nausea is another symptom that is very bothersome and his insomnia is bothersome. I do not think we have any diagnosis on him at this time and I would like to proceed as follows:  1. We will wait to see what the [facility] physicians have to say in regard to his overall picture.   2. I would like for him to proceed with an MRI scan of the brain, cervical and thoracic spine to see if there is anything that is making all of these symptoms from the neurological system to occur and the nausea to occur as well as his  deafness. Maybe all of these things are connected. I would not be surprised if we need to pursue a lumbar puncture.             Since the previous visit the patient underwent an MRI scan of the brain that shows no major alterations, radiologist calls minimal vascular alterations. Most importantly the MRI of the cervical spine documents significant spinal stenosis at the level of C5. I reviewed the pictures of this in the PAC system [facility] with the patient and his wife. The MRI of the thoracic spine shows degenerative changes. Most importantly none of the areas on MRI in the cranium, cervical, thoracic spine disclose any lesions that could be consistent with myeloma, lymphoma or tumoral lesions of any nature. All of the changes are related to degenerative changes. 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     The patient was further reviewed along with his wife on 11/11/2020. We have a lot of issues to discuss today as follows:  1. His chronic nausea is better. He has had an upper GI endoscopy, he had the location of a Schatzki's ring and also he had biopsies of the stomach that documented gastritis, H. Pylori negative. The patient is now on PPI. His nausea is better. He is eating better and he is not taking any aspirin or antiinflammatory medications, or alcohol at all to reduce gastritis. I point out to him that a lot of that his stress that he carries through all the time probably has something to do with this, I point out that his stomach is a reflection of his soul.     2. His second issue to discuss is his neuropathy. He has been seen by Dr. Darrick Garcia. EMG was performed. Abnormalities documented in the EMG and the interpretation per Dr. Garcia believing that this does not represent a typical feature of inflammatory neuropathy.       The patient was further reviewed on 02/03/2021. His neuropathy is dramatically better on Lyrica twice a day and his nausea in the morning is  dramatically better with the use of PPI and Carafate per Jake Perez MD. He is going to proceed with an esophagogram in a day. He has no heartburn but he has a bitter taste in the mouth from time to time after he eats. He has no vomiting. His bowel activity is normal, urination is normal. His depression is better. He is not taking any aspirin or antiinflammatory medication.     To my eyes the patient looks substantially better. His white count remains on the low side without any changes. The hemoglobin, the platelet count remain appropriate at this time. He has no palpable cervical adenopathy, no bone pain, no hepatosplenomegaly.    This is the first time in almost 2 years that the patient has improvement in symptoms related to nausea and also related to neuropathy. The medicines that he is taking are appropriate and I pointed out to him to stay away from aspirin and antiinflammatory medications. He only can take Tylenol for pain.   The patient was further reviewed on 08/04/2021. He wanted me to review the CT scan that had been ordered by Dr. Puente recently. I reviewed this in the PACS system [facility]. His ascending thoracic aortic aneurysm remains the same, 5 cm in diameter. There is no cardiomegaly. There is heavy calcification in the coronary arteries. Lung anatomy remains normal with no infiltrates, effusions and no mediastinal or hilar adenopathy. Liver and spleen remain normal, pancreas and kidneys remain normal in size. There are obvious degenerative changes in the spine. There are no lytic lesions.     His white count remains stable/low with leukopenia, hemoglobin is stable, platelet count is stable and normal. His chemistry profile is pending. His monoclonal protein studies are pending today and to compare with previous visit.    The patient remains depressed and he believes that he could raise the dose of Lexapro at this point and he will talk with his primary physician about stopping  Lyrica altogether because he has not found any benefit from the medicine. Given the circumstances, I think it will be fine to modify the Lexapro dose to the next level and a new prescription has been sent today. With the next dose it will be 20 mg a day.    The patient otherwise will return to see us in 6 months with a CBC, CMP and monoclonal protein studies.     Because his neurologist recently has done some blood testing and the B12 level was still into the normal limits but on the low side, I am going to run a B12 level today. He will be informed about this result when it becomes available.    I discussed all the issues with the patient and his wife. I personally reviewed the CT scan and the above is my interpretation.     ·

## 2022-08-09 NOTE — PROGRESS NOTES
REASON FOR FOLLOWUP:    1.Minimal leukopenia with thrombocytopenia in the background of minimal IgG monoclonal gammopathy. The patient has no splenomegaly, no peripheral adenopathy, and no symptoms that suggest lupus or collagen vascular disease. Bone marrow   a  spirate documented no evidence of myeloma, lymphoma, leukemia, myelodysplasia, or any other abnormality. Bone marrow chromosomal analysis as well as flow cytometry were negative.   2.  Inflammatory polyneuropathy.  He initiated IVIG on 10/3/2019.  He did receive his second IVIG on 11/7/2019.allergic reaction to it stopping infusion all togethere  3.  Approximately 10 days after his second IVIG infusion he did experience serum sickness that included rash, generalized arthralgias, skin peeling of the palms.  Dr. Olvera prescribed 20 mg of prednisone and his symptoms have resolved after 2 days.      REASON FOR VISIT:     DURING THE VISIT WITH THE PATIENT TODAY , PATIENT HAD FACE MASK, MY MEDICAL ASSISTANT AND I  HAD PROPPER PROTECTIVE EQUIPMENT, AND I DID HAND HYGIENE WITH SOAP AND WATER BEFORE AND AFTER THE VISIT.    On 08/09/2022, I had the opportunity to see this 82-year-old white male who has history of neuropathy associated with paraprotein who also has history of depression and history of chronic GI symptoms including esophageal stricture. The patient is here today stating that he continues going to the gym at least 3 or 4 times a week. He has been extremely careful in regard to the avoidance of COVID infection. He remains using his mask. He has been vaccinated for COVID completely. His appetite and weight remain relatively stable. His bowel activity only happens twice a week, no passage of blood. This is nothing new for him. Urination is normal in volume. Also we watch him in regard to diabetes mellitus.     He has been seen by Neurology and he has been found to have a low B12 level. They would like for us to review this number and even consider  replacement therapy. The patient eats meat, chicken and eggs.                    Past Medical History:   Diagnosis Date   • AAA (abdominal aortic aneurysm) (HCC)    • Abnormal ECG 1980’s   • Alcoholism (HCC)     None since 1991   • Aneurysm (HCC) 2019   • Arthritis    • Basal cell carcinoma (BCC) of face    • Cholelithiasis 1990’s    Cholecystectomy   • Colon polyp    • Coronary artery disease    • Difficulty walking    • Disease of thyroid gland    • DJD (degenerative joint disease)    • GERD (gastroesophageal reflux disease)    • GI (gastrointestinal bleed)    • Hernia     Hiatal   • History of bone marrow biopsy    • Hyperlipidemia    • Hypertension    • Hypothyroidism    • IgG monoclonal gammopathy    • Prostate cancer (HCC)    • Reflux esophagitis    • Ulcer      Past Surgical History:   Procedure Laterality Date   • CHOLECYSTECTOMY  1988   • COLON SURGERY  1998   • COLONOSCOPY  02/2013   • ENDOSCOPY  2012    ESOPHAGOGASTRODUODENOSCOPY WITH DILATION OF SHATZKI'S RING   • ENDOSCOPY N/A 10/09/2020    Procedure: ESOPHAGOGASTRODUODENOSCOPY with cold bx;  Surgeon: Jake Perez MD;  Location: [facility] ENDOSCOPY;  Service: Gastroenterology;  Laterality: N/A;  pre - nausea  post - duodenal ulcer, gastrits, gastric ulcer, hiatal hernia   • LAMINECTOMY  2013    decompression L3-4, L4-5   • PROSTATECTOMY  2007   • SKIN BIOPSY     • TONSILLECTOMY AND ADENOIDECTOMY      1940s   • UPPER GASTROINTESTINAL ENDOSCOPY     • VASECTOMY      1970s     Social History     Socioeconomic History   • Marital status:      Spouse name: Meghan   • Years of education: College   Tobacco Use   • Smoking status: Never Smoker   • Smokeless tobacco: Never Used   Vaping Use   • Vaping Use: Never used   Substance and Sexual Activity   • Alcohol use: No     Comment: Heavy in past, none in 33 years   • Drug use: Never   • Sexual activity: Not Currently     Partners: Female     Birth control/protection: None     Family History   Problem  Relation Age of Onset   • Cancer Mother 85        Breast   • COPD Father    • Cancer Brother 59        Unknown type   • Hypertension Daughter            Current Outpatient Medications on File Prior to Visit   Medication Sig Dispense Refill   • amitriptyline (ELAVIL) 10 MG tablet TAKE ONE TABLET BY MOUTH ONCE NIGHTLY 90 tablet 3   • atenolol (TENORMIN) 25 MG tablet Take 25 mg by mouth Daily.     • escitalopram (LEXAPRO) 20 MG tablet TAKE ONE TABLET BY MOUTH DAILY 30 tablet 6   • ibuprofen (ADVIL,MOTRIN) 200 MG tablet Take 200 mg by mouth As Needed for Mild Pain .     • levothyroxine (SYNTHROID, LEVOTHROID) 112 MCG tablet Take 112 mcg by mouth daily.     • Magnesium Hydroxide (DULCOLAX PO) Take 300 mg by mouth Daily.     • Menthol, Topical Analgesic, (BIOFREEZE EX) Apply  topically As Needed.     • pantoprazole (PROTONIX) 40 MG EC tablet TAKE ONE TABLET BY MOUTH TWICE A DAY 60 tablet 12   • promethazine (PHENERGAN) 12.5 MG tablet Take 12.5 mg by mouth Every 6 (Six) Hours As Needed.     • [DISCONTINUED] pregabalin (LYRICA) 75 MG capsule TAKE ONE CAPSULE BY MOUTH TWICE A DAY 60 capsule 2     No current facility-administered medications on file prior to visit.   No Known Allergies      ONCOLOGIC/HEMATOLOGIC HISTORY: History from previous dates can be found in the separate document.                     Vitals:    08/09/22 1442   Temp: 97.3 °F (36.3 °C)   TempSrc: Temporal   Weight: 84.6 kg (186 lb 8 oz)                    PHYSICAL EXAMINATION:            GENERAL:  Well-developed, well-nourished  Patient  in no acute distress.   SKIN:  Warm, dry ,NO purpura ,no rash.  HEENT:  Pupils were equal and reactive to light and accomodation, conjunctivae noninjected, normal extraocular movements, normal visual acuity.   NECK:  Supple with good range of motion; no thyromegaly , no JVD or bruits,.No carotid artery pain, no carotid abnormal pulsation   LYMPHATICS:  No cervical, NO supraclavicular, NO axillary, NO inguinal  adenopathies.  CARDIAC   normal rate , regular rhythm, without murmur,NO rubs NO S3 NO S4   LUNGS: normal breath sounds bilateral, no wheezing, NO rhonchi, NO crackles ,NO rubs.  VASCULAR VENOUS: no cyanosis, NO collateral circulation, NO varicosities, NO edema, NO palpable cords, NO pain,NO erythema, NO pigmentation of the skin.  ABDOMEN:  Soft, NO pain,no hepatomegaly, no splenomegaly,no masses, no ascites, no collateral circulation,no distention.  EXTREMITIES  AND SPINE:  No clubbing, no cyanosis ,no deformities , no pain .No kyphosis,  no pain in spine, no pain in ribs , no pain in pelvic bone.  NEUROLOGICAL:  Patient was awake, alert, oriented to time, person and place.SENSORY NEUROPATHY IN FEET                                  LABORATORY DATA    Lab Results   Component Value Date    WBC 3.85 08/09/2022    HGB 13.3 08/09/2022    HCT 40.6 08/09/2022    MCV 89.8 08/09/2022     08/09/2022                 ASSESSMENT:  This patient has monoclonal gammopathy of unknown significance and he has undergone bone marrow testing of this on 2 different occasions not being able to document myeloma, lymphoma, myelodysplasia, leukemia or myelofibrosis. Chromosomal analysis has been normal. The patient has not had any significant difference in his monoclonal protein quantification.    The patient has associated with this sensory peripheral neuropathy with balance issues and painful numbness in his feet. We have tried Neurontin in the past with no benefit, now we are trying Lyrica and it seems to be that this is not going to be any benefit to him neither.    We have also tried prednisone with no benefit as per indication by his Neurologist and we have tried immunoglobulin with no benefit and actually this medicine triggered serum sickness in him that required short course of prednisone therapy.    I discussed with his attending physician through video medicine from [facility] , the fact that we still have no diagnosis in  this patient in regard his sensory neuropathy and his minimal monoclonal protein. He is going to be seen by neurology in that institution also by video medicine. The patient continues having progressing symptoms now in his upper extremities up to his elbows in regard to sensory changes and in his lower extremities up to his knees. He has imbalance and he has a negative Romberg test. Actually his bicipital and tricipital reflexes are very active 2-3. His patellar and Achilles are abolished. Vibratory sensation is normal throughout. The nausea is another symptom that is very bothersome and his insomnia is bothersome. I do not think we have any diagnosis on him at this time and I would like to proceed as follows:  1. We will wait to see what the [facility] physicians have to say in regard to his overall picture.   2. I would like for him to proceed with an MRI scan of the brain, cervical and thoracic spine to see if there is anything that is making all of these symptoms from the neurological system to occur and the nausea to occur as well as his deafness. Maybe all of these things are connected. I would not be surprised if we need to pursue a lumbar puncture.             Since the previous visit the patient underwent an MRI scan of the brain that shows no major alterations, radiologist calls minimal vascular alterations. Most importantly the MRI of the cervical spine documents significant spinal stenosis at the level of C5. I reviewed the pictures of this in the PAC system [facility] with the patient and his wife. The MRI of the thoracic spine shows degenerative changes. Most importantly none of the areas on MRI in the cranium, cervical, thoracic spine disclose any lesions that could be consistent with myeloma, lymphoma or tumoral lesions of any nature. All of the changes are related to degenerative changes. 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     The patient was further reviewed  along with his wife on 11/11/2020. We have a lot of issues to discuss today as follows:  1. His chronic nausea is better. He has had an upper GI endoscopy, he had the location of a Schatzki's ring and also he had biopsies of the stomach that documented gastritis, H. Pylori negative. The patient is now on PPI. His nausea is better. He is eating better and he is not taking any aspirin or antiinflammatory medications, or alcohol at all to reduce gastritis. I point out to him that a lot of that his stress that he carries through all the time probably has something to do with this, I point out that his stomach is a reflection of his soul.   The patient was reviewed on 08/09/2022 from the point of view of his monoclonal gammopathy. As far as I can tell this does not look it has progressed. It has not produced more neuropathy and it has not produced any new sites of bone pain. The patient has had at least 2 bone marrow tests in the past. We are pending to review his monoclonal protein study today. He will be called at home with the report of this once that this becomes available. We will watch him in absence of any other intervention. We will review him back in 6 months with repeat CBC, CMP and TSH.    ·   2. His second issue to discuss is his neuropathy. He has been seen by Dr. Darrick Garcia. EMG was performed. Abnormalities documented in the EMG and the interpretation per Dr. Garcia believing that this does not represent a typical feature of inflammatory neuropathy.       The patient was further reviewed on 02/03/2021. His neuropathy is dramatically better on Lyrica twice a day and his nausea in the morning is dramatically better with the use of PPI and Carafate per Jake Perez MD. He is going to proceed with an esophagogram in a day. He has no heartburn but he has a bitter taste in the mouth from time to time after he eats. He has no vomiting. His bowel activity is normal, urination is normal. His depression is better.  He is not taking any aspirin or antiinflammatory medication.     To my eyes the patient looks substantially better. His white count remains on the low side without any changes. The hemoglobin, the platelet count remain appropriate at this time. He has no palpable cervical adenopathy, no bone pain, no hepatosplenomegaly.    This is the first time in almost 2 years that the patient has improvement in symptoms related to nausea and also related to neuropathy. The medicines that he is taking are appropriate and I pointed out to him to stay away from aspirin and antiinflammatory medications. He only can take Tylenol for pain.   The patient was further reviewed on 08/04/2021. He wanted me to review the CT scan that had been ordered by Dr. Puente recently. I reviewed this in the PACS system [facility]. His ascending thoracic aortic aneurysm remains the same, 5 cm in diameter. There is no cardiomegaly. There is heavy calcification in the coronary arteries. Lung anatomy remains normal with no infiltrates, effusions and no mediastinal or hilar adenopathy. Liver and spleen remain normal, pancreas and kidneys remain normal in size. There are obvious degenerative changes in the spine. There are no lytic lesions.   On 08/09/2022, his neuropathy today is not any worse than what it was before. It has not ascended further; it is not located in his upper extremities and it has been documented that his B12 level is borderline low. We will recheck this number today and share this with Neurology as well I will proceed checking red blood cells, folate. The patient will be replaced today with B12 at 1000 mcg IM and he will receive the same dose once a week for a total of 4 weeks. Thereafter he will receive B12 injections every 3 months by us.    ·   His white count remains stable/low with leukopenia, hemoglobin is stable, platelet count is stable and normal. His chemistry profile is pending. His monoclonal protein studies  are pending today and to compare with previous visit.    The patient remains depressed and he believes that he could raise the dose of Lexapro at this point and he will talk with his primary physician about stopping Lyrica altogether because he has not found any benefit from the medicine. Given the circumstances, I think it will be fine to modify the Lexapro dose to the next level and a new prescription has been sent today. With the next dose it will be 20 mg a day.    The patient otherwise will return to see us in 6 months with a CBC, CMP and monoclonal protein studies.     Because his neurologist recently has done some blood testing and the B12 level was still into the normal limits but on the low side, I am going to run a B12 level today. He will be informed about this result when it becomes available.    I discussed all the issues with the patient and his wife. I personally reviewed the CT scan and the above is my interpretation.   3.The patient was further reviewed on 02/09/2022. From the point of view of his leukopenia and minor thrombocytopenia associated with monoclonal protein he has no symptoms pertinent to this. The only symptom pertinent to his monoclonal protein is his sensory peripheral neuropathy, this never has changed no matter what we did for him in the past including prednisone and immunoglobulin infusions. He has learned to live with this, he is using Lyrica with some benefit and he also remains on B12 supplementation and amitriptyline. We advised him to remain on these medicines for the time being. Monoclonal protein studies will be called to him.  On 08/09/2022, his platelet count remains above previous levels. The level is normal. He has not had any clinical bleeding and we advised him to remain in observation from this point of view.    ·     4.In regard to his difficulty swallowing the patient has had an esophagogram that shows alteration in the peristaltic activity of the esophagus without  any obstructive lesions or any achalasia. I suggested for him to use Altoids peppermint candy before meals to see if this makes any difference in the opening of the esophagus before he eats. Otherwise he is never going to change his habits of his eating his food very quickly. I asked him to avoid the use of straws because the speed of the liquids through a straw is very fast and sometimes that can lead into choking.   On 08/09/2022 the patient believes that eventually he is going to require an upper GI endoscopy and stretching of the esophagus. He already has appointment to be seen by gastroenterologist. I encouraged him to have a lot more liquids that contain protein like for example milk, ice cream. He can have milk shakes or things of that nature.    ·   5.In regard to his depression the patient is better with the medicine that he is taking including the Lexapro, his dose is correct I think 20 mg a day and I think the amitriptyline that he takes also helps in some way this symptom.       On 08/09/2022, the patient's depression is better on Lexapro. I encouraged him to remain on this medicine. I think this has made a big difference on him as well.    On 08/09/2022 overall the patient is stable, better. He will be called at home with the report of his B12 and folic acid level and we will monitor the overall status again in 6 months with a CBC, CMP.     We will send prescription to his pharmacy in regard to B12 administration. The doses will include B12 shot 1000 mcg once a week for 4 weeks starting next week. He has received a B12 injection today in the office and thereafter the patient will require a B12 injection at 1000 mcg every 3 months for the rest of his life.        ·

## 2022-08-09 NOTE — PROGRESS NOTES
CC: Follow-up peripheral neuropathy    HPI:  Panfilo Feliz is a  82 y.o.  right-handed male who I am seeing in follow-up today for peripheral neuropathy.  Patient is again accompanied by his wife who adds to his history.  They are super nice people.  Past medical history is significant for osteoarthritis/degenerative joint disease, degenerative disc disease of his lumbar spine status post decompression surgery in 2013, multilevel degenerative changes of cervical spine with spinal stenosis, ascending aortic aneurysm (currently being monitored), history of prostate cancer status post prostatectomy in 2007, hypertension, hyperlipidemia, hypothyroidism and GERD/peptic ulcer disease.  Patient was last seen in our office on  2/10/2022, a summary of his condition is taken from previous note with additions and modifications as needed:      Patient was initially sent for neurological consultation by Dr. Olvera regarding peripheral neuropathy.  The patient's history begins about 6 years ago with numbness in the feet.  It has progressed to his feet being very numb in the mornings but after he has been up and around for a while this is improved.  He also has some burning pain in the feet as well as in the arms.  This also seems a bit better as time goes on in the daytime.  Patient does also have chronic mild back pain but no significant radiation into his legs.  Of note he had a lumbar decompression at L3/4 and L4/5 in 2013.  His hematological work-up has included protein electrophoresis with immunofixation which did show an IgG kappa MGUS at low concentration of 0.2-0.3 g/dL.  It has remained low since it was identified 8/2016.  He has had 2 bone marrow biopsies which were negative for multiple myeloma and also at least one was stained with [place] red and was negative for amyloid deposition.  He had an EMG done by Dr. 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  The  data sheets demonstrated a slow right peroneal motor velocity of 36.7 m/s with prolonged latency of 7 ms and a very low amplitude of 0.25 mV.  On the left but conduction velocity was normal with normal distal latency but very low amplitude of 0.26 mV.  The right tibial motor velocity was slow at 38 m/s with normal distal latency but low amplitude of 1.74 mV.  The left tibial motor velocity was normal with normal distal latency but low amplitude of 1.82 mV.  All sensory studies in both feet showed no responses.     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  The patient does not recognize any particular improvement with the 2 doses of immunoglobulin. Patient was then sent for neurologic and hematologic opinion at [facility].  He saw Dr. Elías Naylor and then an electronic neurologic consultation with Fadia Whalen. Their opinion was that the neuropathy was not likely to be related to the MGUS.  Dr. Garcia who previously saw the patient suspected that since his clinical course (speed of progression is too slow),  his exam demonstrated only a mild abnormalities in strength and sensation plus most of his muscle stretch reflexes were present, and electrographically the nerves are only mildly slow and did not show clear evidence of conduction block - this is not supportive of CIDP.    On 3/30/2021 patient had some additional laboratories done to look for treatable causes of neuropathy: studies show slightly elevated copper level of 138, all other labs including thyroid, sed rate, RPR, hemoglobin A1c, heavy metals, cryoglobulins were within normal limits.  Motor and sensory neuropathy panel also within normal limits.  Patient's B12 and  folate levels were also noted to be within normal limits but at the lower end of the spectrum - values were 285 and 7.75 respectively.  As such he was instructed to start on some supplemental B12 vitamins which he has been taking.    In terms of treatment he has been taking Lyrica -previously I prescribed him 150 mg twice daily however he could not tolerate this secondary to sedation and so now he takes 75 mg twice daily.    He does see some minimal improvement with the Lyrica.  Additionally he takes the amitriptyline 10 mg at bedtime, also is taking Lexapro 20 mg.      Interim history  Since have seen him last patient reports symptoms are more or less stable.  He denies any progression in terms of numbness and tingling.  Also reports no new weakness.  He continues to ambulate quite well independently without any assistive device.  There have been no recent falls.  He still works out regularly for 5 times a week.  Patient was previously very athletic and he remains in excellent shape.  He continues to endorse some excessive daytime fatigue, this is multifactorial with contributions from the drugs he is on certainly but also cardiovascular issues.  He has a ascending aortic aneurysm, which measured 5 cm without dissection or ulceration per CT on 6/24/2022,  it has been stable but patient is monitored closely per his vascular doctor.      Additionally for over 6 months now he has been dealing with some swallowing difficulty, he has been referred to GI for this and had a swallow study which showed some nonspecific esophageal dysmotility. He is treated with Protonix at present and there is plans for a the next month.  He states since he has seen me last he has not seen much progression with this, he is more careful about what he eats and reports there is not really been any choking episodes.  He denies any trouble with his speech, also denies any dizziness or visual disturbance.  No jaw fatigue.          Past Medical  History:   Diagnosis Date   • AAA (abdominal aortic aneurysm) (HCC)    • Abnormal ECG 1980’s   • Alcoholism (HCC)     None since 1991   • Aneurysm (HCC) 2019   • Arthritis    • Basal cell carcinoma (BCC) of face    • Cholelithiasis 1990’s    Cholecystectomy   • Colon polyp    • Coronary artery disease    • Difficulty walking    • Disease of thyroid gland    • DJD (degenerative joint disease)    • GERD (gastroesophageal reflux disease)    • GI (gastrointestinal bleed)    • Hernia     Hiatal   • History of bone marrow biopsy    • Hyperlipidemia    • Hypertension    • Hypothyroidism    • IgG monoclonal gammopathy    • Prostate cancer (HCC)    • Reflux esophagitis    • Ulcer          Past Surgical History:   Procedure Laterality Date   • CHOLECYSTECTOMY  1988   • COLON SURGERY  1998   • COLONOSCOPY  02/2013   • ENDOSCOPY  2012    ESOPHAGOGASTRODUODENOSCOPY WITH DILATION OF SHATZKI'S RING   • ENDOSCOPY N/A 10/09/2020    Procedure: ESOPHAGOGASTRODUODENOSCOPY with cold bx;  Surgeon: Jake Perez MD;  Location: [facility] ENDOSCOPY;  Service: Gastroenterology;  Laterality: N/A;  pre - nausea  post - duodenal ulcer, gastrits, gastric ulcer, hiatal hernia   • LAMINECTOMY  2013    decompression L3-4, L4-5   • PROSTATECTOMY  2007   • SKIN BIOPSY     • TONSILLECTOMY AND ADENOIDECTOMY      1940s   • UPPER GASTROINTESTINAL ENDOSCOPY     • VASECTOMY      1970s           Current Outpatient Medications:   •  amitriptyline (ELAVIL) 10 MG tablet, TAKE ONE TABLET BY MOUTH ONCE NIGHTLY, Disp: 90 tablet, Rfl: 3  •  atenolol (TENORMIN) 25 MG tablet, Take 25 mg by mouth Daily., Disp: , Rfl:   •  escitalopram (LEXAPRO) 20 MG tablet, TAKE ONE TABLET BY MOUTH DAILY, Disp: 30 tablet, Rfl: 6  •  ibuprofen (ADVIL,MOTRIN) 200 MG tablet, Take 200 mg by mouth As Needed for Mild Pain ., Disp: , Rfl:   •  levothyroxine (SYNTHROID, LEVOTHROID) 112 MCG tablet, Take 112 mcg by mouth daily., Disp: , Rfl:   •  Magnesium Hydroxide (DULCOLAX PO), Take 300 mg  "by mouth Daily., Disp: , Rfl:   •  Menthol, Topical Analgesic, (BIOFREEZE EX), Apply  topically As Needed., Disp: , Rfl:   •  pantoprazole (PROTONIX) 40 MG EC tablet, TAKE ONE TABLET BY MOUTH TWICE A DAY, Disp: 60 tablet, Rfl: 12  •  promethazine (PHENERGAN) 12.5 MG tablet, Take 12.5 mg by mouth Every 6 (Six) Hours As Needed., Disp: , Rfl:   •  pregabalin (LYRICA) 75 MG capsule, TAKE ONE CAPSULE BY MOUTH TWICE A DAY, Disp: 60 capsule, Rfl: 2      Family History   Problem Relation Age of Onset   • Cancer Mother 85        Breast   • COPD Father    • Cancer Brother 59        Unknown type   • Hypertension Daughter          Social History     Socioeconomic History   • Marital status:      Spouse name: Meghan   • Years of education: College   Tobacco Use   • Smoking status: Never Smoker   • Smokeless tobacco: Never Used   Vaping Use   • Vaping Use: Never used   Substance and Sexual Activity   • Alcohol use: No     Comment: Heavy in past, none in 33 years   • Drug use: Never   • Sexual activity: Not Currently     Partners: Female     Birth control/protection: None         No Known Allergies          ROS:  Review of Systems   Neurological: Positive for numbness. Negative for dizziness, seizures, syncope, facial asymmetry, light-headedness and headaches.   Psychiatric/Behavioral: Negative for behavioral problems, confusion, decreased concentration, dysphoric mood and hallucinations.     I have reviewed the above ROS put in by the medical assistant and am in agreement.     Physical Exam:  Vitals:    08/09/22 1305   BP: 112/76   BP Location: Left arm   Patient Position: Sitting   Cuff Size: Adult   Pulse: 80   Weight: 84.4 kg (186 lb)   Height: 193 cm (76\")       Body mass index is 22.64 kg/m².    Physical Exam  General: Tall slender white male no acute distress  HEENT: Normocephalic, atraumatic  Neck: Supple.  No cervical bruits.    Heart: Regular rate and rhythm without murmurs. Radial pulses were strong and " simultaneous.  Extremities: No pedal edema.       Neurological Exam:   Mental Status: Awake, alert, oriented to person, place and time.  Conversant without evidence of an affective disorder, thought disorder, delusions or hallucinations.  Attention span and concentration are normal.  HCF: No aphasia, apraxia or dysarthria.  Recent and remote memory intact.  Knowledge  of recent events intact.  CN:      I:              II: Visual fields full without left inattention              III, IV, VI: Eye movements intact without nystagmus or ptosis.  Pupils equal  round and reactive to light.              V,VII: Light touch and pinprick intact all 3 divisions of V.  Facial muscles symmetrical.              VIII: Hearing is diminished bilaterally              IX,X: Soft palate elevates symmetrically              XI: Sternomastoid and trapezius are strong.              XII: Tongue midline without atrophy or fasciculations  Motor: Patient has a very hard time relaxing fully.  Notable atrophy in both feet              Power testing: Mild weakness of abductor pollicis brevis muscles bilaterally with otherwise normal power in the upper extremities.  In the lower extremities there is weakness of toe flexion and extension with all other muscles tested being normal.  Reflexes: Upper extremities: +1 diffusely                    Lower extremities: +1 knee jerks absent ankle jerks  Sensory: Light touch:                    Pinprick:   Cerebellar: Finger-to-nose: Intact                      Rapid movement: Intact  Gait and Station: Patient comes to stand without any difficulty.    Casual walk is broad-based but otherwise looks quite normal stable.  He is able to get up on his toes and heels effectively.  There is impairment with tandem walk.  Negative Romberg and negative drift     Results:      Lab Results   Component Value Date    GLUCOSE 83 02/09/2022    BUN 21 (H) 02/09/2022    CREATININE 1.20 02/09/2022    EGFRIFNONA 58 (L) 02/09/2022     BCR 17.5 02/09/2022    CO2 26.4 02/09/2022    CALCIUM 9.1 02/09/2022    PROTENTOTREF 7.0 02/09/2022    ALBUMIN 4.10 02/09/2022    ALBUMIN 3.7 02/09/2022    LABIL2 1.2 02/09/2022    AST 22 02/09/2022    ALT 10 02/09/2022       Lab Results   Component Value Date    WBC 2.97 (L) 02/09/2022    HGB 13.4 02/09/2022    HCT 42.0 02/09/2022    MCV 92.1 02/09/2022     02/09/2022         .  Lab Results   Component Value Date    RPR Non-Reactive 03/30/2021         Lab Results   Component Value Date    TSH 0.341 03/30/2021         Lab Results   Component Value Date    GFXGMPLO60 326 08/16/2021    ABHACZYM17 320 08/16/2021         Lab Results   Component Value Date    FOLATE 6.81 08/16/2021         Lab Results   Component Value Date    HGBA1C 5.52 03/30/2021         Lab Results   Component Value Date    GLUCOSE 83 02/09/2022    BUN 21 (H) 02/09/2022    CREATININE 1.20 02/09/2022    EGFRIFNONA 58 (L) 02/09/2022    BCR 17.5 02/09/2022    K 4.5 02/09/2022    CO2 26.4 02/09/2022    CALCIUM 9.1 02/09/2022    PROTENTOTREF 7.0 02/09/2022    ALBUMIN 4.10 02/09/2022    ALBUMIN 3.7 02/09/2022    LABIL2 1.2 02/09/2022    AST 22 02/09/2022    ALT 10 02/09/2022         Lab Results   Component Value Date    WBC 2.97 (L) 02/09/2022    HGB 13.4 02/09/2022    HCT 42.0 02/09/2022    MCV 92.1 02/09/2022     02/09/2022             Assessment:   1.  Peripheral neuropathy, distal sensory predominant phenotype, etiology is probably idiopathic and he does have + MGUS; symptoms remain stable  2.  Dysphagia, currently being worked up by GI; weight is stable      Plan:  1.  Continue Lyrica 75 mg twice daily.  Patient asks if he should continue his B12 supplementation, adequate this is a bad idea.  He is due for another B12 level, we will see what this is.  If it is still below 100 could resume B12 injections instead of taking pills    2.  Continue follow-up with GI regarding his dysphagia, I do not suspect this is neurological.  He has an  EGD scheduled for October.    We will plan on following up in 6 months or sooner should need arise    I spent 30 minutes face-to-face with patient/family today, 20 minutes of that time was counseling patient on disease course and plan of care and answering any questions that they had.       Dictated utilizing Dragon dictation.

## 2022-08-10 ENCOUNTER — TELEPHONE (OUTPATIENT)
Dept: ONCOLOGY | Facility: CLINIC | Age: 82
End: 2022-08-10

## 2022-08-10 LAB
ALBUMIN SERPL ELPH-MCNC: 3.9 G/DL (ref 2.9–4.4)
ALBUMIN/GLOB SERPL: 1.4 {RATIO} (ref 0.7–1.7)
ALPHA1 GLOB SERPL ELPH-MCNC: 0.2 G/DL (ref 0–0.4)
ALPHA2 GLOB SERPL ELPH-MCNC: 0.7 G/DL (ref 0.4–1)
B-GLOBULIN SERPL ELPH-MCNC: 1.1 G/DL (ref 0.7–1.3)
FOLATE BLD-MCNC: 293 NG/ML
FOLATE RBC-MCNC: 740 NG/ML
GAMMA GLOB SERPL ELPH-MCNC: 0.9 G/DL (ref 0.4–1.8)
GLOBULIN SER-MCNC: 2.9 G/DL (ref 2.2–3.9)
HCT VFR BLD AUTO: 39.6 % (ref 37.5–51)
IGA SERPL-MCNC: 356 MG/DL (ref 61–437)
IGG SERPL-MCNC: 1039 MG/DL (ref 603–1613)
IGM SERPL-MCNC: 22 MG/DL (ref 15–143)
INTERPRETATION SERPL IEP-IMP: ABNORMAL
KAPPA LC FREE SER-MCNC: 33.7 MG/L (ref 3.3–19.4)
KAPPA LC FREE/LAMBDA FREE SER: 2.13 {RATIO} (ref 0.26–1.65)
LABORATORY COMMENT REPORT: ABNORMAL
LAMBDA LC FREE SERPL-MCNC: 15.8 MG/L (ref 5.7–26.3)
M PROTEIN SERPL ELPH-MCNC: 0.2 G/DL
PROT SERPL-MCNC: 6.8 G/DL (ref 6–8.5)

## 2022-08-10 RX ORDER — SYRINGE, DISPOSABLE, 3 ML
8 SYRINGE, EMPTY DISPOSABLE MISCELLANEOUS WEEKLY
Qty: 8 EACH | Refills: 0 | Status: SHIPPED | OUTPATIENT
Start: 2022-08-10 | End: 2023-01-24

## 2022-08-10 RX ORDER — CYANOCOBALAMIN 1000 UG/ML
1000 INJECTION, SOLUTION INTRAMUSCULAR; SUBCUTANEOUS WEEKLY
Status: SHIPPED | OUTPATIENT
Start: 2022-08-10 | End: 2022-10-05

## 2022-08-10 NOTE — TELEPHONE ENCOUNTER
----- Message from Chalo Olvera MD sent at 8/9/2022  3:03 PM EDT -----  Send prescription wife is a nurse she will give b12 shots 1000 mcg im or sq once a week x 4 week then once every 3 months, prescription for needles and syringes

## 2022-08-11 ENCOUNTER — TELEPHONE (OUTPATIENT)
Dept: ONCOLOGY | Facility: CLINIC | Age: 82
End: 2022-08-11

## 2022-08-11 NOTE — TELEPHONE ENCOUNTER
----- Message from Chalo Olvera MD sent at 8/11/2022  7:57 AM EDT -----  Call his wife he is deaf on the phone : his m protein is stable great no changes in care

## 2022-08-11 NOTE — TELEPHONE ENCOUNTER
Called to relay message below to pt's spouse. No answer. Left detailed voicemail with direct line at [phone]. Gretchen Quintero RN

## 2022-09-12 RX ORDER — ESCITALOPRAM OXALATE 20 MG/1
TABLET ORAL
Qty: 30 TABLET | Refills: 6 | Status: SHIPPED | OUTPATIENT
Start: 2022-09-12

## 2022-10-03 RX ORDER — CYANOCOBALAMIN 1000 UG/ML
1000 INJECTION, SOLUTION INTRAMUSCULAR; SUBCUTANEOUS
COMMUNITY
End: 2022-11-14

## 2022-10-04 ENCOUNTER — ANESTHESIA EVENT (OUTPATIENT)
Dept: GASTROENTEROLOGY | Facility: HOSPITAL | Age: 82
End: 2022-10-04

## 2022-10-04 ENCOUNTER — ANESTHESIA (OUTPATIENT)
Dept: GASTROENTEROLOGY | Facility: HOSPITAL | Age: 82
End: 2022-10-04

## 2022-10-04 ENCOUNTER — HOSPITAL ENCOUNTER (OUTPATIENT)
Facility: HOSPITAL | Age: 82
Setting detail: HOSPITAL OUTPATIENT SURGERY
Discharge: HOME | End: 2022-10-04
Attending: INTERNAL MEDICINE | Admitting: INTERNAL MEDICINE

## 2022-10-04 VITALS
WEIGHT: 186 LBS | OXYGEN SATURATION: 97 % | SYSTOLIC BLOOD PRESSURE: 104 MMHG | RESPIRATION RATE: 16 BRPM | BODY MASS INDEX: 22.65 KG/M2 | DIASTOLIC BLOOD PRESSURE: 79 MMHG | HEIGHT: 76 IN | HEART RATE: 65 BPM

## 2022-10-04 DIAGNOSIS — K21.9 GASTROESOPHAGEAL REFLUX DISEASE, UNSPECIFIED WHETHER ESOPHAGITIS PRESENT: ICD-10-CM

## 2022-10-04 DIAGNOSIS — R13.19 OTHER DYSPHAGIA: ICD-10-CM

## 2022-10-04 PROCEDURE — 0 LIDOCAINE 1 % SOLUTION: Performed by: NURSE ANESTHETIST, CERTIFIED REGISTERED

## 2022-10-04 PROCEDURE — 43450 DILATE ESOPHAGUS 1/MULT PASS: CPT | Performed by: INTERNAL MEDICINE

## 2022-10-04 PROCEDURE — 43239 EGD BIOPSY SINGLE/MULTIPLE: CPT | Performed by: INTERNAL MEDICINE

## 2022-10-04 PROCEDURE — S0260 H&P FOR SURGERY: HCPCS | Performed by: INTERNAL MEDICINE

## 2022-10-04 PROCEDURE — 25010000002 ONDANSETRON PER 1 MG: Performed by: ANESTHESIOLOGY

## 2022-10-04 PROCEDURE — 88305 TISSUE EXAM BY PATHOLOGIST: CPT | Performed by: INTERNAL MEDICINE

## 2022-10-04 PROCEDURE — 25010000002 PROPOFOL 10 MG/ML EMULSION: Performed by: NURSE ANESTHETIST, CERTIFIED REGISTERED

## 2022-10-04 RX ORDER — PROPOFOL 10 MG/ML
VIAL (ML) INTRAVENOUS AS NEEDED
Status: DISCONTINUED | OUTPATIENT
Start: 2022-10-04 | End: 2022-10-04 | Stop reason: SURG

## 2022-10-04 RX ORDER — SODIUM CHLORIDE 0.9 % (FLUSH) 0.9 %
10 SYRINGE (ML) INJECTION AS NEEDED
Status: DISCONTINUED | OUTPATIENT
Start: 2022-10-04 | End: 2022-10-04 | Stop reason: HOSPADM

## 2022-10-04 RX ORDER — SODIUM CHLORIDE, SODIUM LACTATE, POTASSIUM CHLORIDE, CALCIUM CHLORIDE 600; 310; 30; 20 MG/100ML; MG/100ML; MG/100ML; MG/100ML
1000 INJECTION, SOLUTION INTRAVENOUS CONTINUOUS
Status: DISCONTINUED | OUTPATIENT
Start: 2022-10-04 | End: 2022-10-04 | Stop reason: HOSPADM

## 2022-10-04 RX ORDER — ONDANSETRON 2 MG/ML
4 INJECTION INTRAMUSCULAR; INTRAVENOUS EVERY 6 HOURS PRN
Status: DISCONTINUED | OUTPATIENT
Start: 2022-10-04 | End: 2022-10-04 | Stop reason: HOSPADM

## 2022-10-04 RX ORDER — LIDOCAINE HYDROCHLORIDE 10 MG/ML
INJECTION, SOLUTION INFILTRATION; PERINEURAL AS NEEDED
Status: DISCONTINUED | OUTPATIENT
Start: 2022-10-04 | End: 2022-10-04 | Stop reason: SURG

## 2022-10-04 RX ADMIN — PROPOFOL 50 MG: 10 INJECTION, EMULSION INTRAVENOUS at 11:47

## 2022-10-04 RX ADMIN — LIDOCAINE HYDROCHLORIDE 50 MG: 10 INJECTION, SOLUTION INFILTRATION; PERINEURAL at 11:44

## 2022-10-04 RX ADMIN — PROPOFOL 100 MG: 10 INJECTION, EMULSION INTRAVENOUS at 11:44

## 2022-10-04 RX ADMIN — SODIUM CHLORIDE, POTASSIUM CHLORIDE, SODIUM LACTATE AND CALCIUM CHLORIDE 1000 ML: 600; 310; 30; 20 INJECTION, SOLUTION INTRAVENOUS at 11:37

## 2022-10-04 RX ADMIN — PROPOFOL 250 MCG/KG/MIN: 10 INJECTION, EMULSION INTRAVENOUS at 11:44

## 2022-10-04 RX ADMIN — ONDANSETRON 4 MG: 2 INJECTION INTRAMUSCULAR; INTRAVENOUS at 11:37

## 2022-10-04 NOTE — H&P
[facility]  Pre Procedure History & Physical    Chief Complaint:   Time for my egd     Subjective     HPI:   82 y.o. male presenting to endoscopy for egd     Past Medical History:   Past Medical History:   Diagnosis Date   • AAA (abdominal aortic aneurysm)    • Abnormal ECG 1980’s   • Alcoholism (HCC)     None since 1991   • Aneurysm (HCC) 2019    aortic arch   • Arthritis    • Basal cell carcinoma (BCC) of face    • Cholelithiasis 1990’s    Cholecystectomy   • Colon polyp    • Colon polyps    • Coronary artery disease    • Difficulty walking    • Disease of thyroid gland    • DJD (degenerative joint disease)    • Gastritis    • Gastroparesis    • GERD (gastroesophageal reflux disease)    • GI (gastrointestinal bleed)    • Hernia     Hiatal   • History of bone marrow biopsy    • [place] (hard of hearing)    • Hyperlipidemia    • Hypertension    • Hypothyroidism    • IgG monoclonal gammopathy    • Multiple duodenal ulcers    • Neuropathy    • PONV (postoperative nausea and vomiting)    • Prostate cancer (HCC)    • Reflux esophagitis    • Ulcer    • WBC decreased        Family History:  Family History   Problem Relation Age of Onset   • Cancer Mother 85        Breast   • COPD Father    • Cancer Brother 59        Unknown type   • Hypertension Daughter        Social History:   reports that he has never smoked. He has never used smokeless tobacco. He reports that he does not drink alcohol and does not use drugs.    Medications:   Medications Prior to Admission   Medication Sig Dispense Refill Last Dose   • amitriptyline (ELAVIL) 10 MG tablet TAKE ONE TABLET BY MOUTH ONCE NIGHTLY 90 tablet 3 10/3/2022 at Unknown time   • atenolol (TENORMIN) 25 MG tablet Take 25 mg by mouth Daily.   10/4/2022 at Unknown time   • cyanocobalamin 1000 MCG/ML injection Inject 1,000 mcg into the appropriate muscle as directed by prescriber Every 30 (Thirty) Days.   10/1/2022   • escitalopram (LEXAPRO) 20 MG tablet TAKE ONE  "TABLET BY MOUTH DAILY 30 tablet 6 10/3/2022 at Unknown time   • levothyroxine (SYNTHROID, LEVOTHROID) 112 MCG tablet Take 112 mcg by mouth daily.   10/3/2022 at Unknown time   • pantoprazole (PROTONIX) 40 MG EC tablet TAKE ONE TABLET BY MOUTH TWICE A DAY 60 tablet 12 10/3/2022 at Unknown time   • pregabalin (LYRICA) 75 MG capsule Take 1 capsule by mouth 2 (Two) Times a Day for 90 days. 180 capsule 1 10/3/2022 at Unknown time   • promethazine (PHENERGAN) 12.5 MG tablet Take 12.5 mg by mouth Every 6 (Six) Hours As Needed.      • ibuprofen (ADVIL,MOTRIN) 200 MG tablet Take 200 mg by mouth As Needed for Mild Pain .   More than a month at Unknown time   • Magnesium Hydroxide (DULCOLAX PO) Take 300 mg by mouth Daily.   More than a month at Unknown time   • Menthol, Topical Analgesic, (BIOFREEZE EX) Apply  topically As Needed.   More than a month at Unknown time   • Needle, Disp, 25G X 1-1/2\" misc 8 each 1 (One) Time Per Week. Weekly x 4 then decrease to every three months. 8 each 0 Unknown at Unknown time   • Syringe, Disposable, (Syringe 2-3 ML) 3 ML misc 8 each 1 (One) Time Per Week. Weekly x 4 then decrease to every three months. 8 each 0 Unknown at Unknown time       Allergies:  Patient has no known allergies.    ROS:    Pertinent items are noted in HPI     Objective     Blood pressure 119/73, pulse 66, resp. rate 12, height 193 cm (76\"), weight 84.4 kg (186 lb), SpO2 100 %.    Physical Exam   Constitutional: Pt is oriented to person, place, and time and well-developed, well-nourished, and in no distress.   Abdominal: Soft.   Psychiatric: Mood, memory, affect and judgment normal.     Assessment & Plan     Diagnosis:  dysphagia    Anticipated Surgical Procedure:  egd     The risks, benefits, and alternatives of this procedure have been discussed with the patient or the responsible party- the patient understands and agrees to proceed.                                                                "

## 2022-10-04 NOTE — ANESTHESIA PREPROCEDURE EVALUATION
Anesthesia Evaluation     Patient summary reviewed and Nursing notes reviewed   history of anesthetic complications: PONV  NPO Solid Status: > 8 hours  NPO Liquid Status: > 8 hours           Airway   Mallampati: II  Dental      Pulmonary - negative pulmonary ROS and normal exam   Cardiovascular - normal exam    (+) hypertension less than 2 medications, CAD, hyperlipidemia,       Neuro/Psych- negative ROS  GI/Hepatic/Renal/Endo    (+)  GERD, PUD, GI bleeding , thyroid problem hypothyroidism    Musculoskeletal     (+) neck pain,   Abdominal    Substance History      OB/GYN          Other   arthritis,    history of cancer remission                    Anesthesia Plan    ASA 3     MAC     intravenous induction     Anesthetic plan, risks, benefits, and alternatives have been provided, discussed and informed consent has been obtained with: patient.        CODE STATUS:

## 2022-10-04 NOTE — ANESTHESIA POSTPROCEDURE EVALUATION
"Patient: Panfilo Feliz    Procedure Summary     Date: 10/04/22 Room / Location:  [facility]    Anesthesia Start: 1141 Anesthesia Stop: 1158    Procedure: ESOPHAGOGASTRODUODENOSCOPY with biopsies with esophageal dilatation with 56 cui (N/A Esophagus) Diagnosis:       Other dysphagia      Gastroesophageal reflux disease, unspecified whether esophagitis present      (Other dysphagia [R13.19])      (Gastroesophageal reflux disease, unspecified whether esophagitis present [K21.9])    Surgeons: Jake Perez MD Provider: Kayode Carmen MD    Anesthesia Type: MAC ASA Status: 3          Anesthesia Type: MAC    Vitals  Vitals Value Taken Time   /79 10/04/22 1216   Temp     Pulse 65 10/04/22 1216   Resp 16 10/04/22 1216   SpO2 97 % 10/04/22 1216           Post Anesthesia Care and Evaluation    Patient location during evaluation: bedside  Patient participation: complete - patient participated  Level of consciousness: awake and alert  Pain management: adequate    Airway patency: patent  Anesthetic complications: No anesthetic complications    Cardiovascular status: acceptable  Respiratory status: acceptable  Hydration status: acceptable    Comments: /79 (BP Location: Left arm, Patient Position: Sitting)   Pulse 65   Resp 16   Ht 193 cm (76\")   Wt 84.4 kg (186 lb)   SpO2 97%   BMI 22.64 kg/m²       "

## 2022-10-04 NOTE — DISCHARGE INSTRUCTIONS
For the next 24 hours patient needs to be with a responsible adult.    For 24 hours DO NOT drive, operate machinery, appliances, drink alcohol, make important decisions or sign legal documents.    Start with a light or bland diet if you are feeling sick to your stomach otherwise advance to regular diet as tolerated.    Follow recommendations on procedure report if provided by your doctor.    Call Dr Perez for problems [phone]    Problems may include but not limited to: large amounts of bleeding, trouble breathing, repeated vomiting, severe unrelieved pain, fever or chills.

## 2022-10-05 LAB
LAB AP CASE REPORT: NORMAL
LAB AP CLINICAL INFORMATION: NORMAL
PATH REPORT.FINAL DX SPEC: NORMAL
PATH REPORT.GROSS SPEC: NORMAL

## 2022-10-10 ENCOUNTER — TELEPHONE (OUTPATIENT)
Dept: GASTROENTEROLOGY | Facility: CLINIC | Age: 82
End: 2022-10-10

## 2022-10-10 NOTE — TELEPHONE ENCOUNTER
----- Message from Jake Perez MD sent at 10/7/2022 10:09 AM EDT -----  Pathology benign  Continue PPI  Office visit 6 weeks

## 2022-11-14 ENCOUNTER — OFFICE VISIT (OUTPATIENT)
Dept: GASTROENTEROLOGY | Facility: CLINIC | Age: 82
End: 2022-11-14

## 2022-11-14 VITALS
DIASTOLIC BLOOD PRESSURE: 79 MMHG | BODY MASS INDEX: 22.53 KG/M2 | HEART RATE: 73 BPM | TEMPERATURE: 97.2 F | WEIGHT: 185 LBS | SYSTOLIC BLOOD PRESSURE: 124 MMHG | OXYGEN SATURATION: 98 % | HEIGHT: 76 IN

## 2022-11-14 DIAGNOSIS — R13.19 OTHER DYSPHAGIA: Primary | ICD-10-CM

## 2022-11-14 PROCEDURE — 99214 OFFICE O/P EST MOD 30 MIN: CPT | Performed by: INTERNAL MEDICINE

## 2022-11-14 RX ORDER — PANTOPRAZOLE SODIUM 40 MG/1
40 TABLET, DELAYED RELEASE ORAL 2 TIMES DAILY
Qty: 60 TABLET | Refills: 12 | Status: SHIPPED | OUTPATIENT
Start: 2022-11-14

## 2022-11-14 RX ORDER — DOCUSATE SODIUM 100 MG/1
300 CAPSULE, LIQUID FILLED ORAL 2 TIMES DAILY
COMMUNITY

## 2022-11-14 NOTE — PROGRESS NOTES
Chief Complaint   Patient presents with   • EGD f/up     Subjective     HPI  Panfilo Feliz is a 82 y.o. male who presents today for follow up.  GERD and dysmotility  Dysphagia resolved but resolved after the last dilation  Continues on twice daily PPI without any symptoms of reflux      Objective   Vitals:    11/14/22 1457   BP: 124/79   Pulse: 73   Temp: 97.2 °F (36.2 °C)   SpO2: 98%       Physical Exam  Vitals reviewed.   Constitutional:       Appearance: He is well-developed.   HENT:      Head: Normocephalic and atraumatic.   Neurological:      Mental Status: He is alert and oriented to person, place, and time.   Psychiatric:         Behavior: Behavior normal.         Thought Content: Thought content normal.         Judgment: Judgment normal.       The following data was reviewed by: Jake Perez MD on 11/14/2022:  Common labs    Common Labs 2/5/22 2/9/22 2/9/22 2/9/22 8/9/22 8/9/22 8/9/22 8/9/22     1236 1236 1236 1432 1432 1432 1507   Glucose   83   104     BUN   21 (A)   32 (A)     Creatinine 1.30  1.20   1.33 (A)     eGFR Non  Am   58 (A)        Sodium   140   138     Potassium   4.5   4.3     Chloride   104   104     Calcium   9.1   9.5     Total Protein    7.0   6.8    Albumin   4.10 3.7  4.30 3.9    Total Bilirubin   0.3   0.3     Alkaline Phosphatase   57   57     AST (SGOT)   22   25     ALT (SGPT)   10   11     WBC  2.97 (A)   3.85      Hemoglobin  13.4   13.3      Hematocrit  42.0   40.6   39.6   Platelets  142   141      (A) Abnormal value       Comments are available for some flowsheets but are not being displayed.           CMP    CMP 2/5/22 2/9/22 2/9/22 8/9/22 8/9/22     1236 1236 1432 1432   Glucose  83  104    BUN  21 (A)  32 (A)    Creatinine 1.30 1.20  1.33 (A)    eGFR Non African Am  58 (A)      Sodium  140  138    Potassium  4.5  4.3    Chloride  104  104    Calcium  9.1  9.5    Total Protein   7.0  6.8   Albumin  4.10 3.7 4.30 3.9   Globulin   3.3  2.9   Total Bilirubin  0.3   0.3    Alkaline Phosphatase  57  57    AST (SGOT)  22  25    ALT (SGPT)  10  11    (A) Abnormal value       Comments are available for some flowsheets but are not being displayed.           CBC w/diff    CBC w/Diff 2/9/22 8/9/22 8/9/22     1432 1507   WBC 2.97 (A) 3.85    RBC 4.56 4.52    Hemoglobin 13.4 13.3    Hematocrit 42.0 40.6 39.6   MCV 92.1 89.8    MCH 29.4 29.4    MCHC 31.9 32.8    RDW 13.7 13.6    Platelets 142 141    Neutrophil Rel % 42.8 49.3    Immature Granulocyte Rel % 0.0 0.3    Lymphocyte Rel % 39.7 34.5    Monocyte Rel % 15.8 (A) 14.3 (A)    Eosinophil Rel % 1.0 0.8    Basophil Rel % 0.7 0.8    (A) Abnormal value                    EGD performed this year reviewed, biopsies all benign, no evidence of EOE or Day's esophagus    Assessment & Plan   Assessment:     Esophageal dysmotility, esophageal spasms, GERD      Plan:   Essentially symptom-free at this time  Continue twice daily PPI  Office visit 6 months    I spent 30 minutes caring for Panfilo on this date of service. This time includes time spent by me in the following activities:preparing for the visit, reviewing tests, obtaining and/or reviewing a separately obtained history, performing a medically appropriate examination and/or evaluation , counseling and educating the patient/family/caregiver, ordering medications, tests, or procedures, referring and communicating with other health care professionals , documenting information in the medical record, independently interpreting results and communicating that information with the patient/family/caregiver and care coordination    Jake Perez MD  [facility]  [address] [place] [address]  Office: [phone]

## 2023-01-10 DIAGNOSIS — Z01.818 PREOP TESTING: Primary | ICD-10-CM

## 2023-01-11 ENCOUNTER — LAB (OUTPATIENT)
Dept: LAB | Facility: HOSPITAL | Age: 83
End: 2023-01-11
Payer: MEDICARE

## 2023-01-11 DIAGNOSIS — Z01.818 PREOP TESTING: ICD-10-CM

## 2023-01-11 LAB — SARS-COV-2 ORF1AB RESP QL NAA+PROBE: NOT DETECTED

## 2023-01-11 PROCEDURE — U0004 COV-19 TEST NON-CDC HGH THRU: HCPCS

## 2023-01-11 PROCEDURE — C9803 HOPD COVID-19 SPEC COLLECT: HCPCS

## 2023-01-11 PROCEDURE — U0005 INFEC AGEN DETEC AMPLI PROBE: HCPCS

## 2023-01-13 ENCOUNTER — ANESTHESIA (OUTPATIENT)
Dept: CARDIOLOGY | Facility: HOSPITAL | Age: 83
End: 2023-01-13
Payer: MEDICARE

## 2023-01-13 ENCOUNTER — ANESTHESIA EVENT (OUTPATIENT)
Dept: CARDIOLOGY | Facility: HOSPITAL | Age: 83
End: 2023-01-13
Payer: MEDICARE

## 2023-01-13 ENCOUNTER — HOSPITAL ENCOUNTER (OUTPATIENT)
Dept: CARDIOLOGY | Facility: HOSPITAL | Age: 83
Discharge: HOME | End: 2023-01-13
Payer: MEDICARE

## 2023-01-13 VITALS
WEIGHT: 186.07 LBS | SYSTOLIC BLOOD PRESSURE: 92 MMHG | BODY MASS INDEX: 23.14 KG/M2 | TEMPERATURE: 97.8 F | HEART RATE: 63 BPM | RESPIRATION RATE: 15 BRPM | HEIGHT: 75 IN | DIASTOLIC BLOOD PRESSURE: 66 MMHG | OXYGEN SATURATION: 98 %

## 2023-01-13 DIAGNOSIS — I35.1 NONRHEUMATIC AORTIC VALVE INSUFFICIENCY: ICD-10-CM

## 2023-01-13 DIAGNOSIS — I71.21 ASCENDING AORTIC ANEURYSM: ICD-10-CM

## 2023-01-13 DIAGNOSIS — R94.31 ABNORMAL ELECTROCARDIOGRAM (ECG) (EKG): ICD-10-CM

## 2023-01-13 PROCEDURE — 93312 ECHO TRANSESOPHAGEAL: CPT | Performed by: INTERNAL MEDICINE

## 2023-01-13 PROCEDURE — 93320 DOPPLER ECHO COMPLETE: CPT

## 2023-01-13 PROCEDURE — 93325 DOPPLER ECHO COLOR FLOW MAPG: CPT

## 2023-01-13 PROCEDURE — 25010000002 PROPOFOL 200 MG/20ML EMULSION

## 2023-01-13 PROCEDURE — 93325 DOPPLER ECHO COLOR FLOW MAPG: CPT | Performed by: INTERNAL MEDICINE

## 2023-01-13 PROCEDURE — 93312 ECHO TRANSESOPHAGEAL: CPT

## 2023-01-13 RX ORDER — SODIUM CHLORIDE 9 MG/ML
INJECTION, SOLUTION INTRAVENOUS CONTINUOUS PRN
Status: DISCONTINUED | OUTPATIENT
Start: 2023-01-13 | End: 2023-01-13 | Stop reason: SURG

## 2023-01-13 RX ORDER — PROPOFOL 10 MG/ML
INJECTION, EMULSION INTRAVENOUS AS NEEDED
Status: DISCONTINUED | OUTPATIENT
Start: 2023-01-13 | End: 2023-01-13 | Stop reason: SURG

## 2023-01-13 RX ORDER — MELATONIN
1000 2 TIMES DAILY
COMMUNITY

## 2023-01-13 RX ADMIN — PROPOFOL 20 MG: 10 INJECTION, EMULSION INTRAVENOUS at 08:58

## 2023-01-13 RX ADMIN — PROPOFOL 50 MG: 10 INJECTION, EMULSION INTRAVENOUS at 08:52

## 2023-01-13 RX ADMIN — PROPOFOL 50 MG: 10 INJECTION, EMULSION INTRAVENOUS at 08:41

## 2023-01-13 RX ADMIN — PROPOFOL 100 MG: 10 INJECTION, EMULSION INTRAVENOUS at 08:39

## 2023-01-13 RX ADMIN — SODIUM CHLORIDE: 0.9 INJECTION, SOLUTION INTRAVENOUS at 08:29

## 2023-01-13 RX ADMIN — PROPOFOL 50 MG: 10 INJECTION, EMULSION INTRAVENOUS at 08:42

## 2023-01-13 NOTE — DISCHARGE INSTR - ACTIVITY
JOSE DC Instructions    The medication, which was used to put the patient to sleep, will be acting in your body for the next twenty-four (24) hours, so you might feel a little sleepy; this feeling will slowly wear off.  Because the medicine is still in your system for the next twenty-four (24) hours, the adult patient SHOULD NOT:    Drive a car, operate machinery, or power tools  Drink any alcoholic drinks (not even beer)  Make any important decisions such as to sign important papers  (It may be better to start with liquids such as soft drinks, then soups, and graduate up to solid foods)    We strongly suggest that a responsible adult be with the patient the rest of the day.    Any problems with:    EXCESSIVE MUCOUS  SPITTING UP BLOOD  SORE THROAT AT MORE THAN 72 HOURS    NOTIFY DR. Houston OR CALL THE [facility] [phone].

## 2023-01-13 NOTE — ANESTHESIA POSTPROCEDURE EVALUATION
Patient: Panfilo Feliz    Procedure Summary     Date: 01/13/23 Room / Location: [facility]    Anesthesia Start: 0829 Anesthesia Stop: 0906    Procedure: ADULT TRANSESOPHAGEAL ECHO (JOSE) W/ CONT IF NECESSARY PER PROTOCOL Diagnosis:       Ascending aortic aneurysm      Abnormal electrocardiogram (ECG) (EKG)      Nonrheumatic aortic valve insufficiency      (Valvular Disease)      (Aortic Valve Assessment)    Scheduled Providers: Dilan Houston DO Provider: Michael Sweeney MD    Anesthesia Type: MAC ASA Status: 3          Anesthesia Type: MAC    Vitals  Vitals Value Taken Time   BP 92/66 01/13/23 1001   Temp     Pulse 69 01/13/23 1018   Resp 15 01/13/23 0905   SpO2 99 % 01/13/23 1014   Vitals shown include unvalidated device data.        Post Anesthesia Care and Evaluation    Patient location during evaluation: PACU  Patient participation: complete - patient participated  Level of consciousness: awake  Pain scale: See nurse's notes for pain score.  Pain management: adequate    Airway patency: patent  Anesthetic complications: No anesthetic complications  PONV Status: none  Cardiovascular status: acceptable  Respiratory status: acceptable  Hydration status: acceptable    Comments: Patient seen and examined postoperatively; vital signs stable; SpO2 greater than or equal to 90%; cardiopulmonary status stable; nausea/vomiting adequately controlled; pain adequately controlled; no apparent anesthesia complications; patient discharged from anesthesia care when discharge criteria were met

## 2023-01-13 NOTE — ANESTHESIA PREPROCEDURE EVALUATION
Anesthesia Evaluation     Patient summary reviewed and Nursing notes reviewed   history of anesthetic complications: PONV  NPO Solid Status: > 8 hours  NPO Liquid Status: > 8 hours           Airway   Mallampati: II  TM distance: >3 FB  Neck ROM: full  No difficulty expected  Dental - normal exam     Pulmonary    Cardiovascular     (+) hypertension, CAD, hyperlipidemia,       Neuro/Psych  GI/Hepatic/Renal/Endo    (+)  GERD, PUD, GI bleeding , thyroid problem     Musculoskeletal     (+) neck pain,   Abdominal    Substance History      OB/GYN          Other   arthritis,    history of cancer    ROS/Med Hx Other: ? Severe dilation of the aortic root is present. Severe dilation of the ascending aorta is present.  ? Ascending aortic aneurysm 4.7 cm  ? Left ventricular wall thickness is consistent with mild concentric hypertrophy.  ? Estimated left ventricular EF = 55% Estimated left ventricular EF was in agreement with the calculated left ventricular EF. Left ventricular systolic function is normal.  ? Moderate aortic valve regurgitation is present.  ? Estimated right ventricular systolic pressure from tricuspid regurgitation is normal (<35 mmHg).  ? Left ventricular diastolic function is consistent with (grade I) impaired relaxation.                    Anesthesia Plan    ASA 3     MAC     intravenous induction     Anesthetic plan, risks, benefits, and alternatives have been provided, discussed and informed consent has been obtained with: patient.    Plan discussed with CAA and CRNA.        CODE STATUS:

## 2023-01-20 LAB
ASCENDING AORTA: 4.7 CM
BH CV ECHO SHUNT ASSESSMENT PERFORMED (HIDDEN SCRIPTING): 1
MAXIMAL PREDICTED HEART RATE: 139 BPM
STRESS TARGET HR: 118 BPM

## 2023-01-24 ENCOUNTER — OFFICE VISIT (OUTPATIENT)
Dept: ONCOLOGY | Facility: CLINIC | Age: 83
End: 2023-01-24
Payer: MEDICARE

## 2023-01-24 ENCOUNTER — LAB (OUTPATIENT)
Dept: LAB | Facility: HOSPITAL | Age: 83
End: 2023-01-24
Payer: MEDICARE

## 2023-01-24 VITALS
OXYGEN SATURATION: 96 % | TEMPERATURE: 98 F | RESPIRATION RATE: 18 BRPM | DIASTOLIC BLOOD PRESSURE: 63 MMHG | BODY MASS INDEX: 22.97 KG/M2 | HEIGHT: 75 IN | WEIGHT: 184.7 LBS | HEART RATE: 66 BPM | SYSTOLIC BLOOD PRESSURE: 99 MMHG

## 2023-01-24 DIAGNOSIS — D47.2 MONOCLONAL GAMMOPATHY OF UNKNOWN SIGNIFICANCE (MGUS): ICD-10-CM

## 2023-01-24 DIAGNOSIS — D47.2 NEUROPATHY ASSOCIATED WITH MGUS: ICD-10-CM

## 2023-01-24 DIAGNOSIS — G63 NEUROPATHY ASSOCIATED WITH MGUS: ICD-10-CM

## 2023-01-24 DIAGNOSIS — D69.6 THROMBOCYTOPENIA: ICD-10-CM

## 2023-01-24 DIAGNOSIS — R13.19 OTHER DYSPHAGIA: ICD-10-CM

## 2023-01-24 DIAGNOSIS — D47.2 MONOCLONAL GAMMOPATHY OF UNKNOWN SIGNIFICANCE (MGUS): Primary | ICD-10-CM

## 2023-01-24 DIAGNOSIS — D70.8 OTHER NEUTROPENIA: ICD-10-CM

## 2023-01-24 DIAGNOSIS — R11.0 NAUSEA: ICD-10-CM

## 2023-01-24 LAB
ALBUMIN SERPL-MCNC: 4.2 G/DL (ref 3.5–5.2)
ALBUMIN/GLOB SERPL: 1.4 G/DL (ref 1.1–2.4)
ALP SERPL-CCNC: 55 U/L (ref 38–116)
ALT SERPL W P-5'-P-CCNC: 9 U/L (ref 0–41)
ANION GAP SERPL CALCULATED.3IONS-SCNC: 8.9 MMOL/L (ref 5–15)
AST SERPL-CCNC: 22 U/L (ref 0–40)
BASOPHILS # BLD AUTO: 0.02 10*3/MM3 (ref 0–0.2)
BASOPHILS NFR BLD AUTO: 0.6 % (ref 0–1.5)
BILIRUB SERPL-MCNC: 0.4 MG/DL (ref 0.2–1.2)
BUN SERPL-MCNC: 26 MG/DL (ref 6–20)
BUN/CREAT SERPL: 20.2 (ref 7.3–30)
CALCIUM SPEC-SCNC: 9.6 MG/DL (ref 8.5–10.2)
CHLORIDE SERPL-SCNC: 104 MMOL/L (ref 98–107)
CO2 SERPL-SCNC: 26.1 MMOL/L (ref 22–29)
CREAT SERPL-MCNC: 1.29 MG/DL (ref 0.7–1.3)
DEPRECATED RDW RBC AUTO: 43.8 FL (ref 37–54)
EGFRCR SERPLBLD CKD-EPI 2021: 55.7 ML/MIN/1.73
EOSINOPHIL # BLD AUTO: 0.03 10*3/MM3 (ref 0–0.4)
EOSINOPHIL NFR BLD AUTO: 0.9 % (ref 0.3–6.2)
ERYTHROCYTE [DISTWIDTH] IN BLOOD BY AUTOMATED COUNT: 13.4 % (ref 12.3–15.4)
GLOBULIN UR ELPH-MCNC: 3 GM/DL (ref 1.8–3.5)
GLUCOSE SERPL-MCNC: 96 MG/DL (ref 74–124)
HCT VFR BLD AUTO: 41.2 % (ref 37.5–51)
HGB BLD-MCNC: 13.4 G/DL (ref 13–17.7)
IMM GRANULOCYTES # BLD AUTO: 0.01 10*3/MM3 (ref 0–0.05)
IMM GRANULOCYTES NFR BLD AUTO: 0.3 % (ref 0–0.5)
LYMPHOCYTES # BLD AUTO: 1.17 10*3/MM3 (ref 0.7–3.1)
LYMPHOCYTES NFR BLD AUTO: 33.4 % (ref 19.6–45.3)
MCH RBC QN AUTO: 29.3 PG (ref 26.6–33)
MCHC RBC AUTO-ENTMCNC: 32.5 G/DL (ref 31.5–35.7)
MCV RBC AUTO: 90 FL (ref 79–97)
MONOCYTES # BLD AUTO: 0.53 10*3/MM3 (ref 0.1–0.9)
MONOCYTES NFR BLD AUTO: 15.1 % (ref 5–12)
NEUTROPHILS NFR BLD AUTO: 1.74 10*3/MM3 (ref 1.7–7)
NEUTROPHILS NFR BLD AUTO: 49.7 % (ref 42.7–76)
NRBC BLD AUTO-RTO: 0 /100 WBC (ref 0–0.2)
PLATELET # BLD AUTO: 148 10*3/MM3 (ref 140–450)
PMV BLD AUTO: 10.5 FL (ref 6–12)
POTASSIUM SERPL-SCNC: 4.9 MMOL/L (ref 3.5–4.7)
PROT SERPL-MCNC: 7.2 G/DL (ref 6.3–8)
RBC # BLD AUTO: 4.58 10*6/MM3 (ref 4.14–5.8)
SODIUM SERPL-SCNC: 139 MMOL/L (ref 134–145)
VIT B12 BLD-MCNC: 542 PG/ML (ref 211–946)
WBC NRBC COR # BLD: 3.5 10*3/MM3 (ref 3.4–10.8)

## 2023-01-24 PROCEDURE — 85025 COMPLETE CBC W/AUTO DIFF WBC: CPT

## 2023-01-24 PROCEDURE — 36415 COLL VENOUS BLD VENIPUNCTURE: CPT

## 2023-01-24 PROCEDURE — 80053 COMPREHEN METABOLIC PANEL: CPT

## 2023-01-24 PROCEDURE — 99214 OFFICE O/P EST MOD 30 MIN: CPT | Performed by: NURSE PRACTITIONER

## 2023-01-24 PROCEDURE — 82607 VITAMIN B-12: CPT | Performed by: INTERNAL MEDICINE

## 2023-01-24 NOTE — PROGRESS NOTES
REASON FOR FOLLOWUP:    1.Minimal leukopenia with thrombocytopenia in the background of minimal IgG monoclonal gammopathy. The patient has no splenomegaly, no peripheral adenopathy, and no symptoms that suggest lupus or collagen vascular disease. Bone marrow   a  spirate documented no evidence of myeloma, lymphoma, leukemia, myelodysplasia, or any other abnormality. Bone marrow chromosomal analysis as well as flow cytometry were negative.   2.  Inflammatory polyneuropathy.  He initiated IVIG on 10/3/2019.  He did receive his second IVIG on 11/7/2019.allergic reaction to it stopping infusion all togethere  3.  Approximately 10 days after his second IVIG infusion he did experience serum sickness that included rash, generalized arthralgias, skin peeling of the palms.  Dr. Olvera prescribed 20 mg of prednisone and his symptoms have resolved after 2 days.      HISTORY OF PRESENT ILLNESS:   Patient is a 82-year-old male with the above mentioned history here today for lab review and evaluation.  He feels like he is doing well since his last follow up.  He did have an EGD with dilatation in October with Dr. Perez.  He also recently was a JOSE for continued surveillance of aneurysm.  He reports that his neuropathy is stable.  Is primarily in his feet.  He is still quite active and states that he walks 2 miles regularly.  He also goes to the gym about 4 days/week.  He states that it is sometimes painful especially in the morning when he first gets up and starts moving around.    He denies issues with fevers, chills, night sweats.  He denies any new complaints of pain.  He continues on oral B12, and is no longer doing injections.    Past Medical History:   Diagnosis Date   • AAA (abdominal aortic aneurysm)    • Abnormal ECG 1980’s   • Alcoholism (HCC)     None since 1991   • Aneurysm (HCC) 2019    aortic arch   • Arthritis    • Basal cell carcinoma (BCC) of face    • Cholelithiasis 1990’s    Cholecystectomy   • Colon polyp     • Colon polyps    • Coronary artery disease    • Difficulty walking    • Disease of thyroid gland    • DJD (degenerative joint disease)    • Gastritis    • Gastroparesis    • GERD (gastroesophageal reflux disease)    • GI (gastrointestinal bleed)    • Hernia     Hiatal   • History of bone marrow biopsy    • [place] (hard of hearing)    • Hyperlipidemia    • Hypertension    • Hypothyroidism    • IgG monoclonal gammopathy    • Multiple duodenal ulcers    • Neuropathy    • PONV (postoperative nausea and vomiting)    • Prostate cancer (HCC)    • Reflux esophagitis    • Ulcer    • WBC decreased      Past Surgical History:   Procedure Laterality Date   • CHOLECYSTECTOMY  1988   • COLON SURGERY  1998   • COLONOSCOPY  02/2013   • ENDOSCOPY  2012    ESOPHAGOGASTRODUODENOSCOPY WITH DILATION OF SHATZKI'S RING   • ENDOSCOPY N/A 10/09/2020    Procedure: ESOPHAGOGASTRODUODENOSCOPY with cold bx;  Surgeon: Jake Perez MD;  Location: [facility] ENDOSCOPY;  Service: Gastroenterology;  Laterality: N/A;  pre - nausea  post - duodenal ulcer, gastrits, gastric ulcer, hiatal hernia   • ENDOSCOPY N/A 10/04/2022    Procedure: ESOPHAGOGASTRODUODENOSCOPY with biopsies with esophageal dilatation with 56 cui;  Surgeon: Jake Perez MD;  Location: [facility] ENDOSCOPY;  Service: Gastroenterology;  Laterality: N/A;  pre-dysphagia  post-normal    • ENDOSCOPY  10/04/2022    Chris BUTTERFIELD   • EYE SURGERY Bilateral     cataracts   • LAMINECTOMY  2013    decompression L3-4, L4-5   • PROSTATECTOMY  2007   • SKIN BIOPSY     • TONSILLECTOMY AND ADENOIDECTOMY      1940s   • UPPER GASTROINTESTINAL ENDOSCOPY     • VASECTOMY      1970s     Social History     Socioeconomic History   • Marital status:      Spouse name: Meghan   • Years of education: College   Tobacco Use   • Smoking status: Never   • Smokeless tobacco: Never   Vaping Use   • Vaping Use: Never used   Substance and Sexual Activity   • Alcohol use: No     Comment: Heavy in past, none in 33  "years   • Drug use: Never   • Sexual activity: Not Currently     Partners: Female     Birth control/protection: None     Family History   Problem Relation Age of Onset   • Cancer Mother 85        Breast   • COPD Father    • Cancer Brother 59        Unknown type   • Hypertension Daughter            Current Outpatient Medications on File Prior to Visit   Medication Sig Dispense Refill   • amitriptyline (ELAVIL) 10 MG tablet TAKE ONE TABLET BY MOUTH ONCE NIGHTLY 90 tablet 3   • atenolol (TENORMIN) 25 MG tablet Take 25 mg by mouth Daily.     • cholecalciferol (VITAMIN D3) 25 MCG (1000 UT) tablet Take 1,000 Units by mouth 2 (Two) Times a Day. 25 mcg BID     • docusate sodium (COLACE) 100 MG capsule Take 3 capsules by mouth 2 (Two) Times a Day.     • escitalopram (LEXAPRO) 20 MG tablet TAKE ONE TABLET BY MOUTH DAILY 30 tablet 6   • ibuprofen (ADVIL,MOTRIN) 200 MG tablet Take 200 mg by mouth As Needed for Mild Pain .     • levothyroxine (SYNTHROID, LEVOTHROID) 112 MCG tablet Take 112 mcg by mouth daily.     • Magnesium Hydroxide (DULCOLAX PO) Take 400 mg by mouth Daily.     • Menthol, Topical Analgesic, (BIOFREEZE EX) Apply  topically As Needed.     • pantoprazole (PROTONIX) 40 MG EC tablet Take 1 tablet by mouth 2 (Two) Times a Day. 60 tablet 12   • vitamin B-12 (CYANOCOBALAMIN) 250 MCG tablet Take 1 tablet by mouth Daily. 30 tablet 4   • pregabalin (LYRICA) 75 MG capsule Take 1 capsule by mouth 2 (Two) Times a Day for 90 days. 180 capsule 1     No current facility-administered medications on file prior to visit.   No Known Allergies      ONCOLOGIC/HEMATOLOGIC HISTORY: History from previous dates can be found in the separate document.         Vitals:    01/24/23 1322   BP: 99/63   Pulse: 66   Resp: 18   Temp: 98 °F (36.7 °C)   TempSrc: Temporal   SpO2: 96%   Weight: 83.8 kg (184 lb 11.2 oz)   Height: 190.5 cm (75\")     Physical Exam  Vitals and nursing note reviewed.   Constitutional:       Appearance: Normal appearance. " He is well-developed.   HENT:      Head: Normocephalic and atraumatic.      Nose: Nose normal.   Eyes:      Pupils: Pupils are equal, round, and reactive to light.   Cardiovascular:      Rate and Rhythm: Normal rate and regular rhythm.      Heart sounds: Normal heart sounds.   Pulmonary:      Effort: Pulmonary effort is normal. No respiratory distress.      Breath sounds: Normal breath sounds. No wheezing, rhonchi or rales.   Abdominal:      General: Bowel sounds are normal. There is no distension.      Palpations: Abdomen is soft.      Tenderness: There is no abdominal tenderness.   Musculoskeletal:         General: Normal range of motion.      Cervical back: Normal range of motion and neck supple.   Lymphadenopathy:      Cervical: No cervical adenopathy.      Right cervical: No superficial cervical adenopathy.     Left cervical: No superficial cervical adenopathy.      Upper Body:      Right upper body: No axillary adenopathy.      Left upper body: No axillary adenopathy.   Skin:     General: Skin is warm and dry.   Neurological:      Mental Status: He is alert and oriented to person, place, and time.   Psychiatric:         Behavior: Behavior normal.       Lab Results   Component Value Date    WBC 3.50 01/24/2023    HGB 13.4 01/24/2023    HCT 41.0 01/24/2023    HCT 41.2 01/24/2023    MCV 90.0 01/24/2023     01/24/2023                 ASSESSMENT:      1. MGUS:   · undergone bone marrow testing of this on 2 different occasions not being able to document myeloma, lymphoma, myelodysplasia, leukemia or myelofibrosis. Chromosomal analysis has been normal. T  · he patient has not had any significant difference in his monoclonal protein quantification.  · The patient has associated with this sensory peripheral neuropathy with balance issues and painful numbness in his feet. We have tried Neurontin in the past with no benefit, now we are trying Lyrica and it seems to be that this is not going to be any benefit to  him neither.  · We have also tried prednisone with no benefit as per indication by his Neurologist and we have tried immunoglobulin with no benefit and actually this medicine triggered serum sickness in him that required short course of prednisone therapy.  · I discussed with his attending physician through video medicine from [facility] , the fact that we still have no diagnosis in this patient in regard his sensory neuropathy and his minimal monoclonal protein. He is going to be seen by neurology in that institution also by video medicine. The patient continues having progressing symptoms now in his upper extremities up to his elbows in regard to sensory changes and in his lower extremities up to his knees. He has imbalance and he has a negative Romberg test. Actually his bicipital and tricipital reflexes are very active 2-3. His patellar and Achilles are abolished. Vibratory sensation is normal throughout. The nausea is another symptom that is very bothersome and his insomnia is bothersome. I do not think we have any diagnosis on him at this time and I would like to proceed as follows:  · proceed with an MRI scan of the brain, cervical and thoracic spine to see if there is anything that is making all of these symptoms from the neurological system to occur and the nausea to occur as well as his deafness. Maybe all of these things are connected. I would not be surprised if we need to pursue a lumbar puncture.   · MRI scan of the brain that shows no major alterations, radiologist calls minimal vascular alterations. Most importantly the MRI of the cervical spine documents significant spinal stenosis at the level of C5. I reviewed the pictures of this in the PAC system [facility] with the patient and his wife. The MRI of the thoracic spine shows degenerative changes. Most importantly none of the areas on MRI in the cranium, cervical, thoracic spine disclose any lesions that could be consistent with  myeloma, lymphoma or tumoral lesions of any nature. All of the changes are related to degenerative changes. 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   The patient was reviewed on 08/09/2022 from the point of view of his monoclonal gammopathy. As far as I can tell this does not look it has progressed. It has not produced more neuropathy and it has not produced any new sites of bone pain. The patient has had at least 2 bone marrow tests in the past. We are pending to review his monoclonal protein study today. He will be called at home with the report of this once that this becomes available. We will watch him in absence of any other intervention. We will review him back in 6 months with repeat CBC, CMP and TSH.  Patient seen 1/25/2023 overall doing well.  WBC 3.50, hemoglobin 13.4, platelet count 148,000.  SPEP, KARLEE, FLC pending for today.  Patient denies any new complaints of pain.  His neuropathy is stable.      2. Neuropathy. He has been seen by Dr. Darrick Garcia. EMG was performed. Abnormalities documented in the EMG and the interpretation per Dr. Garcia believing that this does not represent a typical feature of inflammatory neuropathy.   · 02/03/2021 neuropathy is dramatically better on Lyrica twice a day  · 08/09/2022 neuropathy stable.  It has been documented that his B12 level is borderline low. We will recheck this number today and share this with Neurology as well I will proceed checking red blood cells, folate. The patient will be replaced today with B12 at 1000 mcg IM and he will receive the same dose once a week for a total of 4 weeks. Thereafter he will receive B12 injections every 3 months by us.  · 1/24/2023 patient reporting neuropathy stable.  He is now on oral B12.      3.  Thoracic aortic aneurysm: Being followed by cardiology.  The patient was further reviewed on 08/04/2021. He wanted me to review the CT scan that had been ordered by Dr. Puente recently. I reviewed this in the PACS  system [facility]. His ascending thoracic aortic aneurysm remains the same, 5 cm in diameter. There is no cardiomegaly. There is heavy calcification in the coronary arteries. Lung anatomy remains normal with no infiltrates, effusions and no mediastinal or hilar adenopathy. Liver and spleen remain normal, pancreas and kidneys remain normal in size. There are obvious degenerative changes in the spine. There are no lytic lesions.   · Patient had a JOSE on 1/13/2023 by Dr. Michael Sweeney      4.  Dysphagia:   regard to his difficulty swallowing the patient has had an esophagogram that shows alteration in the peristaltic activity of the esophagus without any obstructive lesions or any achalasia.  · 11/11/2020 His chronic nausea is better. He has had an upper GI endoscopy, he had the location of a Schatzki's ring and also he had biopsies of the stomach that documented gastritis, H. Pylori negative. The patient is now on PPI. His nausea is better. He is eating better and he is not taking any aspirin or antiinflammatory medications, or alcohol at all to reduce gastritis. I point out to him that a lot of that his stress that he carries through all the time probably has something to do with this, I point out that his stomach is a reflection of his soul.   · Followed by gastroenterology, Dr. Perez.   I suggested for him to use Altoids peppermint candy before meals to see if this makes any difference in the opening of the esophagus before he eats. Otherwise he is never going to change his habits of his eating his food very quickly. I asked him to avoid the use of straws because the speed of the liquids through a straw is very fast and sometimes that can lead into choking.   On 08/09/2022 the patient believes that eventually he is going to require an upper GI endoscopy and stretching of the esophagus. He already has appointment to be seen by gastroenterologist. I encouraged him to have a lot more liquids that contain  protein like for example milk, ice cream. He can have milk shakes or things of that nature.  Patient did undergo an EGD with dilatation by Dr. Perez  on 10/4/2022      5.  Depression the patient is better with the medicine that he is taking including the Lexapro, his dose is correct I think 20 mg a day and I think the amitriptyline that he takes also helps in some way this symptom.   On 08/09/2022, the patient's depression is better on Lexapro. I encouraged him to remain on this medicine. I think this has made a big difference on him as well.    PLAN:  1. Continue oral B12.  2. Continue Lexapro 20 mg.  3. Follow-up in 6 months with Dr. Olvera with repeat CBC, CMP, SPEP, FLC, KARLEE, B12, folate RBC.  4. Call/ return sooner should he develop any new concerns or problems.      Fadia Krause, APRN  01/24/2023

## 2023-01-25 LAB
ALBUMIN SERPL ELPH-MCNC: 3.8 G/DL (ref 2.9–4.4)
ALBUMIN/GLOB SERPL: 1.2 {RATIO} (ref 0.7–1.7)
ALPHA1 GLOB SERPL ELPH-MCNC: 0.2 G/DL (ref 0–0.4)
ALPHA2 GLOB SERPL ELPH-MCNC: 0.7 G/DL (ref 0.4–1)
B-GLOBULIN SERPL ELPH-MCNC: 1.3 G/DL (ref 0.7–1.3)
FOLATE BLD-MCNC: 282 NG/ML
FOLATE RBC-MCNC: 688 NG/ML
GAMMA GLOB SERPL ELPH-MCNC: 0.9 G/DL (ref 0.4–1.8)
GLOBULIN SER-MCNC: 3.2 G/DL (ref 2.2–3.9)
HCT VFR BLD AUTO: 41 % (ref 37.5–51)
IGA SERPL-MCNC: 375 MG/DL (ref 61–437)
IGG SERPL-MCNC: 1101 MG/DL (ref 603–1613)
IGM SERPL-MCNC: 22 MG/DL (ref 15–143)
INTERPRETATION SERPL IEP-IMP: ABNORMAL
KAPPA LC FREE SER-MCNC: 41.3 MG/L (ref 3.3–19.4)
KAPPA LC FREE/LAMBDA FREE SER: 2.24 {RATIO} (ref 0.26–1.65)
LABORATORY COMMENT REPORT: ABNORMAL
LAMBDA LC FREE SERPL-MCNC: 18.4 MG/L (ref 5.7–26.3)
M PROTEIN SERPL ELPH-MCNC: 0.3 G/DL
PROT SERPL-MCNC: 7 G/DL (ref 6–8.5)

## 2023-01-26 ENCOUNTER — OFFICE VISIT (OUTPATIENT)
Dept: CARDIOLOGY | Facility: CLINIC | Age: 83
End: 2023-01-26
Payer: MEDICARE

## 2023-01-26 VITALS
HEIGHT: 75 IN | DIASTOLIC BLOOD PRESSURE: 81 MMHG | RESPIRATION RATE: 18 BRPM | WEIGHT: 185 LBS | SYSTOLIC BLOOD PRESSURE: 127 MMHG | HEART RATE: 64 BPM | OXYGEN SATURATION: 97 % | BODY MASS INDEX: 23 KG/M2

## 2023-01-26 DIAGNOSIS — I35.1 NONRHEUMATIC AORTIC VALVE INSUFFICIENCY: ICD-10-CM

## 2023-01-26 DIAGNOSIS — I71.21 ANEURYSM OF ASCENDING AORTA WITHOUT RUPTURE: ICD-10-CM

## 2023-01-26 DIAGNOSIS — I10 PRIMARY HYPERTENSION: Primary | ICD-10-CM

## 2023-01-26 DIAGNOSIS — E78.2 MIXED HYPERLIPIDEMIA: ICD-10-CM

## 2023-01-26 PROCEDURE — 93000 ELECTROCARDIOGRAM COMPLETE: CPT | Performed by: INTERNAL MEDICINE

## 2023-01-26 PROCEDURE — 99214 OFFICE O/P EST MOD 30 MIN: CPT | Performed by: INTERNAL MEDICINE

## 2023-01-26 NOTE — PROGRESS NOTES
Cardiology Office Visit      Encounter Date:  01/26/2023    Patient ID:   Panfilo Feliz is a 82 y.o. male.    Reason For Followup:  Ascending aortic aneurysm  Aortic regurgitation    Brief Clinical History:  Dear Stephan Torres MD    I had the pleasure of seeing Panfilo Feliz today. As you are well aware, this is a 82 y.o. male past medical history that is significant for history of hypertension ascending aortic aneurysm moderate aortic regurgitation here for follow-up for further evaluation and treatment options    Interval History:  Denies any symptoms of chest pain shortness of breath dizziness or syncope  Denies any orthopnea no weight gain    Assessment & Plan    Impressions:  Ascending aortic aneurysm 4.9 cm  Moderate aortic regurgitation  Normal LV systolic function normal LV size  Peripheral neuropathy  Thrombocytopenia  Monoclonal gammopathy  Moderate aortic regurgitation  Stable ascending aortic aneurysm at 4.7 to 4.9 cm  Hyperlipidemia    Recommendations:    Obtain a copy of the lipids from primary care physician office  Goal LDL less than 70  If the lipids are abnormal consider nonstatin medication like Zetia  Continue current medical therapy with atenolol 25 mg p.o. once a day  Plain treadmill stress test for functional status assessment with valvular heart disease  Close monitoring of ascending aortic aneurysm with a repeat echocardiogram in 6 months  Prior imaging studies including echocardiogram findings and CT chest findings reviewed and discussed with patient  Size of the ascending aortic aneurysm is stable in the last 3 years  Results of the recent JOSE reviewed and discussed with patient and family  Diagnosis and treatment options reviewed and discussed with patient and family  Labs with primary care physician office  Follow-up in office in 6 months     Objective:    Vitals:  Vitals:    01/26/23 1111   BP: 127/81   BP Location: Left arm   Patient Position: Sitting   Cuff Size: Large Adult  "  Pulse: 64   Resp: 18   SpO2: 97%   Weight: 83.9 kg (185 lb)   Height: 190.5 cm (75\")       Physical Exam:    General: Alert, cooperative, no distress, appears stated age  Head:  Normocephalic, atraumatic, mucous membranes moist  Eyes:  Conjunctiva/corneas clear, EOM's intact     Neck:  Supple,  no adenopathy;      Lungs: Clear to auscultation bilaterally, no wheezes rhonchi rales are noted  Chest wall: No tenderness  Heart::  Regular rate and rhythm, S1 and S2 normal, no murmur, rub or gallop  Abdomen: Soft, non-tender, nondistended bowel sounds active  Extremities: No cyanosis, clubbing, or edema  Pulses: 2+ and symmetric all extremities  Skin:  No rashes or lesions  Neuro/psych: A&O x3. CN II through XII are grossly intact with appropriate affect      Allergies:  Allergies   Allergen Reactions   • Statins Myalgia       Medication Review:     Current Outpatient Medications:   •  amitriptyline (ELAVIL) 10 MG tablet, TAKE ONE TABLET BY MOUTH ONCE NIGHTLY, Disp: 90 tablet, Rfl: 3  •  atenolol (TENORMIN) 25 MG tablet, Take 25 mg by mouth Daily., Disp: , Rfl:   •  cholecalciferol (VITAMIN D3) 25 MCG (1000 UT) tablet, Take 1,000 Units by mouth 2 (Two) Times a Day. 25 mcg BID, Disp: , Rfl:   •  docusate sodium (COLACE) 100 MG capsule, Take 3 capsules by mouth 2 (Two) Times a Day., Disp: , Rfl:   •  escitalopram (LEXAPRO) 20 MG tablet, TAKE ONE TABLET BY MOUTH DAILY, Disp: 30 tablet, Rfl: 6  •  ibuprofen (ADVIL,MOTRIN) 200 MG tablet, Take 200 mg by mouth As Needed for Mild Pain ., Disp: , Rfl:   •  levothyroxine (SYNTHROID, LEVOTHROID) 112 MCG tablet, Take 112 mcg by mouth daily., Disp: , Rfl:   •  Magnesium Hydroxide (DULCOLAX PO), Take 400 mg by mouth Daily., Disp: , Rfl:   •  Menthol, Topical Analgesic, (BIOFREEZE EX), Apply  topically As Needed., Disp: , Rfl:   •  pantoprazole (PROTONIX) 40 MG EC tablet, Take 1 tablet by mouth 2 (Two) Times a Day., Disp: 60 tablet, Rfl: 12  •  vitamin B-12 (CYANOCOBALAMIN) 250 MCG " tablet, Take 1 tablet by mouth Daily., Disp: 30 tablet, Rfl: 4  •  pregabalin (LYRICA) 75 MG capsule, Take 1 capsule by mouth 2 (Two) Times a Day for 90 days., Disp: 180 capsule, Rfl: 1    Family History:  Family History   Problem Relation Age of Onset   • Cancer Mother 85        Breast   • COPD Father    • Cancer Brother 59        Unknown type   • Hypertension Daughter        Past Medical History:  Past Medical History:   Diagnosis Date   • AAA (abdominal aortic aneurysm)    • Abnormal ECG 1980’s   • Alcoholism (HCC)     None since 1991   • Aneurysm (HCC) 2019    aortic arch   • Arthritis    • Basal cell carcinoma (BCC) of face    • Cholelithiasis 1990’s    Cholecystectomy   • Colon polyp    • Colon polyps    • Coronary artery disease    • Difficulty walking    • Disease of thyroid gland    • DJD (degenerative joint disease)    • Gastritis    • Gastroparesis    • GERD (gastroesophageal reflux disease)    • GI (gastrointestinal bleed)    • Hernia     Hiatal   • History of bone marrow biopsy    • [place] (hard of hearing)    • Hyperlipidemia    • Hypertension    • Hypothyroidism    • IgG monoclonal gammopathy    • Multiple duodenal ulcers    • Neuropathy    • PONV (postoperative nausea and vomiting)    • Prostate cancer (HCC)    • Reflux esophagitis    • Ulcer    • WBC decreased        Past surgical History:  Past Surgical History:   Procedure Laterality Date   • CHOLECYSTECTOMY  1988   • COLON SURGERY  1998   • COLONOSCOPY  02/2013   • ENDOSCOPY  2012    ESOPHAGOGASTRODUODENOSCOPY WITH DILATION OF SHATZKI'S RING   • ENDOSCOPY N/A 10/09/2020    Procedure: ESOPHAGOGASTRODUODENOSCOPY with cold bx;  Surgeon: Jake Perez MD;  Location: [facility];  Service: Gastroenterology;  Laterality: N/A;  pre - nausea  post - duodenal ulcer, gastrits, gastric ulcer, hiatal hernia   • ENDOSCOPY N/A 10/04/2022    Procedure: ESOPHAGOGASTRODUODENOSCOPY with biopsies with esophageal dilatation with 56 cui;  Surgeon: Chris  Jake TERESA MD;  Location: [facility] ENDOSCOPY;  Service: Gastroenterology;  Laterality: N/A;  pre-dysphagia  post-normal    • ENDOSCOPY  10/04/2022    Chris BUTTERFIELD   • EYE SURGERY Bilateral     cataracts   • LAMINECTOMY  2013    decompression L3-4, L4-5   • PROSTATECTOMY  2007   • SKIN BIOPSY     • TONSILLECTOMY AND ADENOIDECTOMY      1940s   • UPPER GASTROINTESTINAL ENDOSCOPY     • VASECTOMY      1970s       Social History:  Social History     Socioeconomic History   • Marital status:      Spouse name: Meghan   • Years of education: College   Tobacco Use   • Smoking status: Never   • Smokeless tobacco: Never   Vaping Use   • Vaping Use: Never used   Substance and Sexual Activity   • Alcohol use: No     Comment: Heavy in past, none in 33 years   • Drug use: Never   • Sexual activity: Not Currently     Partners: Female     Birth control/protection: None       Review of Systems:  The following systems were reviewed as they relate to the cardiovascular system: Constitutional, Eyes, ENT, Cardiovascular, Respiratory, Gastrointestinal, Integumentary, Neurological, Psychiatric, Hematologic, Endocrine, Musculoskeletal, and Genitourinary. The pertinent cardiovascular findings are reported above with all other cardiovascular points within those systems being negative.    Diagnostic Study Review:     Current Electrocardiogram:    ECG 12 Lead    Date/Time: 1/26/2023 12:27 PM  Performed by: Julio C Sanchez MD  Authorized by: Julio C Sanchez MD   Comparison: compared with previous ECG   Similar to previous ECG  Rhythm: sinus rhythm  Rate: normal  BPM: 64  Conduction: incomplete right bundle branch block  QRS axis: normal  Other findings: non-specific ST-T wave changes    Clinical impression: abnormal EKG              NOTE: The following portions of the patient's history were reviewed and updated this visit as appropriate: allergies, current medications, past family history, past medical history, past social  history, past surgical history and problem list.

## 2023-02-02 ENCOUNTER — HOSPITAL ENCOUNTER (OUTPATIENT)
Dept: CARDIOLOGY | Facility: HOSPITAL | Age: 83
Discharge: HOME | End: 2023-02-02
Payer: MEDICARE

## 2023-02-02 DIAGNOSIS — I10 ESSENTIAL HYPERTENSION: ICD-10-CM

## 2023-02-02 DIAGNOSIS — I71.21 ASCENDING AORTIC ANEURYSM: ICD-10-CM

## 2023-02-03 DIAGNOSIS — I71.21 ANEURYSM OF ASCENDING AORTA WITHOUT RUPTURE: Primary | ICD-10-CM

## 2023-02-03 DIAGNOSIS — R01.1 MURMUR: ICD-10-CM

## 2023-02-03 DIAGNOSIS — I10 PRIMARY HYPERTENSION: ICD-10-CM

## 2023-02-28 ENCOUNTER — OFFICE VISIT (OUTPATIENT)
Dept: NEUROLOGY | Facility: CLINIC | Age: 83
End: 2023-02-28
Payer: MEDICARE

## 2023-02-28 VITALS
SYSTOLIC BLOOD PRESSURE: 104 MMHG | HEIGHT: 75 IN | WEIGHT: 185.4 LBS | HEART RATE: 73 BPM | OXYGEN SATURATION: 97 % | DIASTOLIC BLOOD PRESSURE: 60 MMHG | BODY MASS INDEX: 23.05 KG/M2

## 2023-02-28 DIAGNOSIS — G63 NEUROPATHY ASSOCIATED WITH MGUS: Primary | ICD-10-CM

## 2023-02-28 DIAGNOSIS — E53.8 B12 DEFICIENCY: ICD-10-CM

## 2023-02-28 DIAGNOSIS — D47.2 NEUROPATHY ASSOCIATED WITH MGUS: Primary | ICD-10-CM

## 2023-02-28 DIAGNOSIS — R13.12 OROPHARYNGEAL DYSPHAGIA: ICD-10-CM

## 2023-02-28 PROCEDURE — 99215 OFFICE O/P EST HI 40 MIN: CPT | Performed by: PHYSICIAN ASSISTANT

## 2023-02-28 RX ORDER — DULOXETIN HYDROCHLORIDE 30 MG/1
CAPSULE, DELAYED RELEASE ORAL
COMMUNITY
Start: 2023-02-06 | End: 2023-02-28

## 2023-02-28 RX ORDER — DULOXETIN HYDROCHLORIDE 60 MG/1
60 CAPSULE, DELAYED RELEASE ORAL DAILY
Qty: 30 CAPSULE | Refills: 2 | Status: SHIPPED | OUTPATIENT
Start: 2023-02-28 | End: 2024-02-28

## 2023-02-28 RX ORDER — LEVOTHYROXINE SODIUM 0.1 MG/1
TABLET ORAL
COMMUNITY
Start: 2023-02-06

## 2023-02-28 NOTE — PROGRESS NOTES
CC: f/u    HPI:  Panfilo Feliz is a  82 y.o.  right-handed male who is here for follow-up.  He has longstanding history of neuropathy.  Other medical history includes MGUS with leukopenia and thrombocytopenia, lumbar stenosis status postlaminectomy in 2013, hypertension, hyperlipidemia, hypothyroidism, prostate cancer, aortic aneurysm under surveillance, dysphagia.  He was last seen by my colleague in August.  Reviewed prior documentation and made edits as appropriate:     He was referred by Dr. Goldberg with symptoms beginning 6 years previous noted as numbness in feet.  He has history of low back pain and decompressive surgery in 2013 of L3/4 and L4/5.  IgG kappa of low concentration.  He has had 2 bone marrow biopsies negative for multiple myeloma and at least 1 staining with Congo red that was negative for amyloid.  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  He underwent IVIG therapy in 2019 but after just 2 doses he had allergic reaction and after 10 days experienced serum sickness with rash, generalized arthralgias, skin peeling at the palms.  He was given prednisone by his hematologist and this resolved.   Apparently symptoms did not improve with IVIG or steroids.  Evaluation at [facility] in 2020 deemed etiology related to MGUS or amyloidosis unlikely.  It looks he was supposed to follow-up in the peripheral nerve clinic.  In follow-up with Dr. Garcia in 2021 inflammatory etiology thought unlikely first because progression was slow and so immunotherapy was not retried.  He was treated for low B12 with B12 levels in the 300s.    Wife and patient tell me they believe symptoms began rather acutely 10 to 12 years ago- affected him significantly all at once.  Since time of evaluation symptoms have been slowly progressive.  Sounds like he has tried Neurontin with no real help.  He endorses numbness and burning  "in feet.  For over a year he has been on Lyrica 75 twice daily.  Nighttime symptoms are the worst.  Taking 100 twice a day was too sedating.  He takes his Lyrica about 7:30.  He says that its pretty difficult to get to sleep and when he wakes up in the morning he has bothersome symptoms.  Wife says he frequently moves his legs.  They show me paper blood work resulting earlier this month and B12 is over 500.  He sees GI for chronic symptoms of dysphagia.   EGD with dilation since last visit .  I do not see procedural note.  The GI symptoms apparently have been ongoing for about 5 years.  He has a new diagnosis of gastroparesis.  His PCP started him on Cymbalta 30 which she has been taking about 3 weeks now.  He is also on Elavil prescribed by his GI doctor.    Social history: Retired , very active, goes to the gym daily, exercise routine his weights.      Family history:      Pain Scale:        ROS:  Review of Systems   Constitutional: Positive for fatigue. Negative for unexpected weight change.   HENT: Positive for hearing loss. Negative for ear pain and tinnitus.    Eyes: Negative for photophobia, pain and visual disturbance.   Respiratory: Negative for chest tightness, shortness of breath and wheezing.    Cardiovascular: Negative for chest pain and palpitations.   Musculoskeletal: Positive for arthralgias (great toe rt foot feels compression) and gait problem (balance).   Neurological: Positive for weakness (feet) and numbness (feet).   Psychiatric/Behavioral: Negative for confusion and decreased concentration.         Reviewed ROS conducted by MA and stephie        Physical Exam:  Vitals:    02/28/23 1311   BP: 104/60   Pulse: 73   SpO2: 97%   Weight: 84.1 kg (185 lb 6.4 oz)   Height: 190.5 cm (75\")      Orthostatic BP:    Body mass index is 23.17 kg/m².        General: pt well appearing, no distress  HEENT: Normocephalic, conjunctiva normal, external canals normal, no nasal discharge, moist mucous " membranes  Neck: No lymphadenopathy, thyroid not enlarged, no JVD  CV: Regular rate and rhythm, no murmurs negative no bruits auscultated at neck, equal pulses  Pulmonary: Normal respiratory effort, clear to auscultation bilaterally  Extremities: no edema, bruising, or skin lesions  Pysch: good eye contact, cooperative, full affect, euthymic mood, good attention, good insight  Mental: alert, conversant, AOx3, provides history, 3/3 recall.  Language fluent, names, repeats.  CN: CN II-XII intact, PERRL, EOMi, no gaze palsy or nystagmus, intact facial sensation, no facial assymetry, intact hearing, symmetric palate elevation, tongue midline, good SCM strength  Motor: no abnormal movements, no pronator drift, normal  bulk and tone, 4+/4 b/l interossei, and L ABD, otherwise 5/5 strength  Sensory: normal sensation to crude touch, temperature, and vibration throughout  Reflexes: +1 throughout, neg babinski  Coordination: no ataxia on finger-nose, heel-shin  Gait: easily rises from chair, slightly wide based, good arm swing and turn        Results:      Lab Results   Component Value Date    GLUCOSE 96 01/24/2023    BUN 26 (H) 01/24/2023    CREATININE 1.29 01/24/2023    EGFRIFNONA 58 (L) 02/09/2022    BCR 20.2 01/24/2023    CO2 26.1 01/24/2023    CALCIUM 9.6 01/24/2023    PROTENTOTREF 7.0 01/24/2023    ALBUMIN 4.2 01/24/2023    ALBUMIN 3.8 01/24/2023    LABIL2 1.2 01/24/2023    AST 22 01/24/2023    ALT 9 01/24/2023       Lab Results   Component Value Date    WBC 3.50 01/24/2023    HGB 13.4 01/24/2023    HCT 41.0 01/24/2023    HCT 41.2 01/24/2023    MCV 90.0 01/24/2023     01/24/2023         .  Lab Results   Component Value Date    RPR Non-Reactive 03/30/2021         Lab Results   Component Value Date    TSH 0.341 03/30/2021         Lab Results   Component Value Date    [id number] 542 01/24/2023         Lab Results   Component Value Date    FOLATE 6.81 08/16/2021         Lab Results   Component Value Date    HGBA1C  5.52 03/30/2021         Lab Results   Component Value Date    GLUCOSE 96 01/24/2023    BUN 26 (H) 01/24/2023    CREATININE 1.29 01/24/2023    EGFRIFNONA 58 (L) 02/09/2022    BCR 20.2 01/24/2023    K 4.9 (H) 01/24/2023    CO2 26.1 01/24/2023    CALCIUM 9.6 01/24/2023    PROTENTOTREF 7.0 01/24/2023    ALBUMIN 4.2 01/24/2023    ALBUMIN 3.8 01/24/2023    LABIL2 1.2 01/24/2023    AST 22 01/24/2023    ALT 9 01/24/2023         Diagnosis:  1.  Polyneuropathy  2.  MGUS  3.  Vitamin B12 deficiency  4.  Dysphagia        Impression: 82-year-old male with past medical history of hypertension, hyperlipidemia, hypothyroidism, aortic aneurysm, chronic dysphagia, long history of polyneuropathy, lumbar spinal stenosis.  He is here for follow-up regarding #1.  He has breakthrough symptoms at night.  There is some description that raises thought for restless leg.  We will continue to monitor.  He is on Lyrica 75 twice daily.  He was just given Cymbalta by his PCP.  He is on Elavil-it may be nice to see if he can come off of this so I can be more liberal with his SSRI/SNRI dosing.  He will check with his gastroenterologist    Plan:  1.  Cymbalta 60 megs daily  2.  Continue Lyrica 75 twice daily but there is room to increase  3.  Encouraged continued physical activity with introduction to balance training like carli chi    Follow-up in 6 months    I spent at least 40 minutes interviewing, examining, and counseling patient.  I independently reviewed documentation, laboratory and diagnostic findings, external documentation where applicable, and formulated treatment plan which was discussed with the patient.

## 2023-03-15 DIAGNOSIS — G63 NEUROPATHY ASSOCIATED WITH MGUS: ICD-10-CM

## 2023-03-15 DIAGNOSIS — D47.2 NEUROPATHY ASSOCIATED WITH MGUS: ICD-10-CM

## 2023-03-15 RX ORDER — PREGABALIN 75 MG/1
75 CAPSULE ORAL 2 TIMES DAILY
Qty: 180 CAPSULE | Refills: 1 | Status: CANCELLED | OUTPATIENT
Start: 2023-03-15 | End: 2023-06-13

## 2023-03-15 NOTE — TELEPHONE ENCOUNTER
PT'S WIFE (RICARDO [phone]) CALLED TO MAKE SURE [facility] CONTACTED FOR HIS LYRICA RX. SHE SAID THE PT IS OUT OF HIS LYRICA.        PLEASE REVIEW AND ADVISE   Never

## 2023-03-17 NOTE — TELEPHONE ENCOUNTER
Caller: RICARDO  Relationship to Patient: WIFE  Phone Number: [phone]    Reason for Call: PT'S WIFE CALLED TO CHECK ON MEDICATION REFILL, PT IS COMPLETELY OUT OF MEDICATION SINCE 3-15-23. CALLED CLINICAL STAFF AND THEY ADVISED THE OFFICE MGR WILL TRY TO GET A HOLD OF PROVIDER TO GET THE MEDICATION SENT IN. PT'S WIFE STATED UNDERSTANDING.

## 2023-04-12 DIAGNOSIS — D47.2 NEUROPATHY ASSOCIATED WITH MGUS: ICD-10-CM

## 2023-04-12 DIAGNOSIS — G63 NEUROPATHY ASSOCIATED WITH MGUS: ICD-10-CM

## 2023-04-12 RX ORDER — ESCITALOPRAM OXALATE 20 MG/1
TABLET ORAL
Qty: 30 TABLET | Refills: 6 | Status: SHIPPED | OUTPATIENT
Start: 2023-04-12

## 2023-04-17 NOTE — TELEPHONE ENCOUNTER
Caller: RICARDO  Relationship: WIFE  Best call back number: [phone]    Requested Prescriptions:   Requested Prescriptions     Pending Prescriptions Disp Refills   • pregabalin (LYRICA) 75 MG capsule 180 capsule 1     Sig: Take 1 capsule by mouth 2 (Two) Times a Day for 90 days.        Pharmacy where request should be sent:      Last office visit with prescribing clinician: 2/28/2023   Last telemedicine visit with prescribing clinician: 9/12/2023   Next office visit with prescribing clinician: 9/12/2023     Additional details provided by patient: PT IS CURRENTLY OUT OF MEDICATION.    Does the patient have less than a 3 day supply:  [x] Yes  [] No    Would you like a call back once the refill request has been completed: [] Yes [x] No    If the office needs to give you a call back, can they leave a voicemail: [] Yes [x] No    Marj Cabrera Rep   04/17/23 15:39 EDT

## 2023-04-18 RX ORDER — PREGABALIN 75 MG/1
75 CAPSULE ORAL 2 TIMES DAILY
Qty: 60 CAPSULE | Refills: 0 | Status: SHIPPED | OUTPATIENT
Start: 2023-04-18 | End: 2023-05-18

## 2023-04-20 NOTE — TELEPHONE ENCOUNTER
Provider: CASH  Caller: BOSSMAN   Pharmacy: [facility]  Phone Number:[phone]  Reason for Call: [facility] TELEPHONED TO REQUEST ELECTRONIC SCRIPT FOR PREGABALIN (LYRICA) 75 MG NEEDS TO BE FAXED [phone]    PLEASE RESEND RX OR CALL TO ADVISE    THANK YOU

## 2023-05-04 DIAGNOSIS — I71.21 ANEURYSM OF ASCENDING AORTA WITHOUT RUPTURE: Primary | ICD-10-CM

## 2023-05-10 RX ORDER — DULOXETIN HYDROCHLORIDE 60 MG/1
60 CAPSULE, DELAYED RELEASE ORAL DAILY
Qty: 30 CAPSULE | Refills: 2 | Status: SHIPPED | OUTPATIENT
Start: 2023-05-10 | End: 2023-06-09

## 2023-06-02 ENCOUNTER — HOSPITAL ENCOUNTER (OUTPATIENT)
Dept: CT IMAGING | Facility: HOSPITAL | Age: 83
Discharge: HOME | End: 2023-06-02

## 2023-06-02 DIAGNOSIS — I71.21 ANEURYSM OF ASCENDING AORTA WITHOUT RUPTURE: ICD-10-CM

## 2023-06-02 LAB
CREAT BLDA-MCNC: 1.5 MG/DL (ref 0.6–1.3)
EGFRCR SERPLBLD CKD-EPI 2021: 46.5 ML/MIN/1.73

## 2023-06-02 PROCEDURE — 25510000001 IOPAMIDOL PER 1 ML: Performed by: THORACIC SURGERY (CARDIOTHORACIC VASCULAR SURGERY)

## 2023-06-02 PROCEDURE — 71275 CT ANGIOGRAPHY CHEST: CPT

## 2023-06-02 PROCEDURE — 82565 ASSAY OF CREATININE: CPT

## 2023-06-02 RX ADMIN — IOPAMIDOL 100 ML: 755 INJECTION, SOLUTION INTRAVENOUS at 14:20

## 2023-06-06 ENCOUNTER — TELEPHONE (OUTPATIENT)
Dept: CARDIOLOGY | Facility: CLINIC | Age: 83
End: 2023-06-06
Payer: MEDICARE

## 2023-06-06 NOTE — TELEPHONE ENCOUNTER
Family notified and verbalized understanding    ------------------------------    Julio C Sanchez MD Melissa, MA    Looks ok  I tried to call him  Slight increase in size from 4.9 to 5.2  Keep appointment with surgery  I can call him again          Previous Messages       ----- Message -----  From:    ESAU Méndez  Sent: 6/6/2023  11:09 AM EDT  To: Julio C Sanchez MD  Subject: CT scan                                          Patient asking if you could look over the CT Angiogram scan    Please advise

## 2023-07-14 NOTE — TELEPHONE ENCOUNTER
----- Message from Lauren Thacker RN sent at 5/28/2019  2:46 PM EDT -----  Patient is on the schedule for 05/30/2019 and we will need to move him out to the end of next week please. Emmanuelle is working on patient assistance or free drug for this patient.   Thank You  
No indicators present

## 2023-07-27 ENCOUNTER — OFFICE VISIT (OUTPATIENT)
Dept: CARDIAC SURGERY | Facility: CLINIC | Age: 83
End: 2023-07-27
Payer: MEDICARE

## 2023-07-27 VITALS
WEIGHT: 183.6 LBS | BODY MASS INDEX: 22.36 KG/M2 | HEART RATE: 80 BPM | HEIGHT: 76 IN | DIASTOLIC BLOOD PRESSURE: 78 MMHG | SYSTOLIC BLOOD PRESSURE: 107 MMHG | OXYGEN SATURATION: 98 % | RESPIRATION RATE: 20 BRPM | TEMPERATURE: 97.7 F

## 2023-07-27 DIAGNOSIS — E78.2 MIXED HYPERLIPIDEMIA: ICD-10-CM

## 2023-07-27 DIAGNOSIS — I71.21 ANEURYSM OF ASCENDING AORTA WITHOUT RUPTURE: Primary | ICD-10-CM

## 2023-07-27 DIAGNOSIS — I10 PRIMARY HYPERTENSION: ICD-10-CM

## 2023-07-27 DIAGNOSIS — I35.1 NONRHEUMATIC AORTIC VALVE INSUFFICIENCY: ICD-10-CM

## 2023-07-27 DIAGNOSIS — K21.00 GASTROESOPHAGEAL REFLUX DISEASE WITH ESOPHAGITIS WITHOUT HEMORRHAGE: ICD-10-CM

## 2023-07-27 DIAGNOSIS — D69.6 THROMBOCYTOPENIA: ICD-10-CM

## 2023-07-27 PROCEDURE — 99214 OFFICE O/P EST MOD 30 MIN: CPT | Performed by: THORACIC SURGERY (CARDIOTHORACIC VASCULAR SURGERY)

## 2023-07-27 PROCEDURE — 1159F MED LIST DOCD IN RCRD: CPT | Performed by: THORACIC SURGERY (CARDIOTHORACIC VASCULAR SURGERY)

## 2023-07-27 PROCEDURE — 3078F DIAST BP <80 MM HG: CPT | Performed by: THORACIC SURGERY (CARDIOTHORACIC VASCULAR SURGERY)

## 2023-07-27 PROCEDURE — 1160F RVW MEDS BY RX/DR IN RCRD: CPT | Performed by: THORACIC SURGERY (CARDIOTHORACIC VASCULAR SURGERY)

## 2023-07-27 PROCEDURE — 3074F SYST BP LT 130 MM HG: CPT | Performed by: THORACIC SURGERY (CARDIOTHORACIC VASCULAR SURGERY)

## 2023-07-27 NOTE — LETTER
July 29, 2023     JAKOB Garcia  [address] [facility] [place] [address]    Patient: Panfilo Feliz   YOB: 1940   Date of Visit: 7/27/2023     Dear JAKOB Garcia:       Thank you for referring Panfilo Feliz to me for evaluation. Below are the relevant portions of my assessment and plan of care.    If you have questions, please do not hesitate to call me. I look forward to following Panfilo along with you.         Sincerely,        Spencer Puente MD        CC: MD Julio C Ibrahim MD Pagni, MD Spencer  07/29/23 0912  Sign when Signing Visit  7/29/2023    Seen on 7/27/2023    Chief Complaint:     Follow up evaluation thoracic aortic aneurysm    History of Present Illness:       Dear Pen and Colleagues,  It was nice to see Panfilo Feliz in follow up evaluation for his ascending aortic. He is a 83 y.o. male with hypertension, hyperlipidemia, GERD, prostatic cancer, leukopenia, monoclonal gammopathy and thrombocytopenia who I saw him last in March 2022 and had a 4.9 cm ascending aortic aneurysm.  He denies any new symptoms in the interval. His recent chest CT showed the ascending aorta 5.2 cm without dissection or ulceration.  The echocardiogram showed close to moderate aortic regurgitation. Patient has no history of aneurysms, dissections or connective tissue disorders.  He is here for follow-up    Patient Active Problem List   Diagnosis   • Leukopenia   • Thrombocytopenia   • Monoclonal gammopathy of unknown significance (MGUS)   • Neuropathy associated with MGUS   • Nausea in adult patient   • Slow transit constipation   • Nausea   • DDD (degenerative disc disease), lumbar   • Chronic neck pain   • DJD (degenerative joint disease)   • Extremity pain   • HTN (hypertension)   • Hyperlipidemia   • Hypothyroid   • LBP (low back pain)   • Lumbar canal stenosis   • Prostate cancer   • Ascending aortic aneurysm   • Nonrheumatic aortic valve insufficiency   • Other  dysphagia   • Gastroesophageal reflux disease       Past Medical History:   Diagnosis Date   • AAA (abdominal aortic aneurysm)    • Abnormal ECG 1980’s   • Alcoholism     None since 1991   • Aneurysm 2019    aortic arch   • Arthritis    • Basal cell carcinoma (BCC) of face    • Cholelithiasis 1990’s    Cholecystectomy   • Colon polyp    • Colon polyps    • Coronary artery disease    • Difficulty walking    • Disease of thyroid gland    • DJD (degenerative joint disease)    • Gastritis    • Gastroparesis    • GERD (gastroesophageal reflux disease)    • GI (gastrointestinal bleed)    • Hernia     Hiatal   • History of bone marrow biopsy    • [place] (hard of hearing)    • Hyperlipidemia    • Hypertension    • Hypothyroidism    • IgG monoclonal gammopathy    • Multiple duodenal ulcers    • Neuropathy    • PONV (postoperative nausea and vomiting)    • Prostate cancer    • Reflux esophagitis    • Ulcer    • WBC decreased        Past Surgical History:   Procedure Laterality Date   • CHOLECYSTECTOMY  1988   • COLON SURGERY  1998   • COLONOSCOPY  02/2013   • ENDOSCOPY  2012    ESOPHAGOGASTRODUODENOSCOPY WITH DILATION OF SHATZKI'S RING   • ENDOSCOPY N/A 10/09/2020    Procedure: ESOPHAGOGASTRODUODENOSCOPY with cold bx;  Surgeon: Jake Perez MD;  Location: [facility] ENDOSCOPY;  Service: Gastroenterology;  Laterality: N/A;  pre - nausea  post - duodenal ulcer, gastrits, gastric ulcer, hiatal hernia   • ENDOSCOPY N/A 10/04/2022    Procedure: ESOPHAGOGASTRODUODENOSCOPY with biopsies with esophageal dilatation with 56 cui;  Surgeon: Jake Perez MD;  Location: [facility] ENDOSCOPY;  Service: Gastroenterology;  Laterality: N/A;  pre-dysphagia  post-normal    • ENDOSCOPY  10/04/2022    [facility]   • EYE SURGERY Bilateral     cataracts   • LAMINECTOMY  2013    decompression L3-4, L4-5   • PROSTATECTOMY  2007   • SKIN BIOPSY     • TONSILLECTOMY AND ADENOIDECTOMY      1940s   • UPPER GASTROINTESTINAL ENDOSCOPY     • VASECTOMY       1970s       Allergies   Allergen Reactions   • Statins Myalgia         Current Outpatient Medications:   •  atenolol (TENORMIN) 25 MG tablet, Take 1 tablet by mouth Daily., Disp: , Rfl:   •  cholecalciferol (VITAMIN D3) 25 MCG (1000 UT) tablet, Take 1 tablet by mouth 2 (Two) Times a Day. 25 mcg BID, Disp: , Rfl:   •  docusate sodium (COLACE) 100 MG capsule, Take 3 capsules by mouth 2 (Two) Times a Day., Disp: , Rfl:   •  escitalopram (LEXAPRO) 20 MG tablet, TAKE ONE TABLET BY MOUTH DAILY, Disp: 30 tablet, Rfl: 6  •  ibuprofen (ADVIL,MOTRIN) 200 MG tablet, Take 1 tablet by mouth As Needed for Mild Pain., Disp: , Rfl:   •  levothyroxine (SYNTHROID, LEVOTHROID) 100 MCG tablet, , Disp: , Rfl:   •  levothyroxine (SYNTHROID, LEVOTHROID) 112 MCG tablet, Take 1 tablet by mouth Daily., Disp: , Rfl:   •  Magnesium Hydroxide (DULCOLAX PO), Take 400 mg by mouth Daily., Disp: , Rfl:   •  Menthol, Topical Analgesic, (BIOFREEZE EX), Apply  topically As Needed., Disp: , Rfl:   •  pantoprazole (PROTONIX) 40 MG EC tablet, Take 1 tablet by mouth 2 (Two) Times a Day., Disp: 60 tablet, Rfl: 12  •  vitamin B-12 (CYANOCOBALAMIN) 250 MCG tablet, TAKE ONE TABLET BY MOUTH DAILY, Disp: 30 tablet, Rfl: 4  •  DULoxetine (Cymbalta) 60 MG capsule, Take 1 capsule by mouth Daily for 30 days., Disp: 30 capsule, Rfl: 2  •  pregabalin (LYRICA) 75 MG capsule, Take 1 capsule by mouth 2 (Two) Times a Day for 30 days., Disp: 60 capsule, Rfl: 0    Social History     Socioeconomic History   • Marital status:      Spouse name: Meghan   • Years of education: College   Tobacco Use   • Smoking status: Never   • Smokeless tobacco: Never   Vaping Use   • Vaping Use: Never used   Substance and Sexual Activity   • Alcohol use: No     Comment: Heavy in past, none in 33 years   • Drug use: Never   • Sexual activity: Not Currently     Partners: Female     Birth control/protection: None       Family History   Problem Relation Age of Onset   • Cancer Mother 85         Breast   • COPD Father    • Cancer Brother 59        Unknown type   • Hypertension Daughter      Review of Systems  As HPI, otherwise noncontributory    Physical Exam:    Vital Signs:  Weight: 83.3 kg (183 lb 9.6 oz)   Body mass index is 22.35 kg/m².  Temp: 97.7 °F (36.5 °C)   Heart Rate: 80   BP: 107/78     Constitutional:       Appearance: Well-developed.   Eyes:      Conjunctiva/sclera: Conjunctivae normal.      Pupils: Pupils are equal, round, and reactive to light.   HENT:      Head: Normocephalic and atraumatic.      Nose: Nose normal.   Neck:      Thyroid: No thyromegaly.      Vascular: No JVD.      Lymphadenopathy: No cervical adenopathy.   Pulmonary:      Effort: Pulmonary effort is normal.      Breath sounds: Normal breath sounds. No rales.   Cardiovascular:      Normal rate. Regular rhythm.      Murmurs: There is a grade 1/4 high frequency blowing decrescendo, early diastolic murmur at the URSB, radiating to the apex.      No gallop.    Pulses:     Intact distal pulses. No decreased pulses.   Edema:     Peripheral edema absent.   Abdominal:      General: Bowel sounds are normal. There is no distension.      Palpations: Abdomen is soft. There is no abdominal mass.      Tenderness: There is no abdominal tenderness.   Musculoskeletal: Normal range of motion.         General: No tenderness or deformity.      Cervical back: Normal range of motion and neck supple. Skin:     General: Skin is warm and dry.      Findings: No erythema or rash.   Neurological:      Mental Status: Alert and oriented to person, place, and time.      Deep Tendon Reflexes: Reflexes are normal and symmetric.   Psychiatric:         Behavior: Behavior normal.        Assessment:     Problems Addressed this Visit          Cardiac and Vasculature    HTN (hypertension)    Hyperlipidemia    Ascending aortic aneurysm - Primary    Nonrheumatic aortic valve insufficiency       Coag and Thromboembolic    Thrombocytopenia       Gastrointestinal  Abdominal     Gastroesophageal reflux disease     Diagnoses         Codes Comments    Aneurysm of ascending aorta without rupture    -  Primary ICD-10-CM: I71.21  ICD-9-CM: 441.2     Nonrheumatic aortic valve insufficiency     ICD-10-CM: I35.1  ICD-9-CM: 424.1     Primary hypertension     ICD-10-CM: I10  ICD-9-CM: 401.9     Mixed hyperlipidemia     ICD-10-CM: E78.2  ICD-9-CM: 272.2     Thrombocytopenia     ICD-10-CM: D69.6  ICD-9-CM: 287.5     Gastroesophageal reflux disease with esophagitis without hemorrhage     ICD-10-CM: K21.00  ICD-9-CM: 530.81, 530.10           Assessment/recommendation:     Ascending aortic aneurysm with interval increase in size 3 mm.  He still asymptomatic.  I discussed with him the option of early surgery to prevent further enlargement and complication versus watchful follow-up.  I recommend ascending aortic aneurysm repair with an interposition graft and possible aortic valve replacement versus AV conduit.  I quoted an operative mortality of less than 3% risk complications less than 10%.  I discussed the risk, benefits and terms of surgery and he wishes to proceed.  He will need a cardiac cath before surgery to address coronary vasculature.  Hypertension, well controlled continue same regimen  Thrombocytopenia, stable.  He will need platelet transfusion perioperatively    Thank you for allowing me to participate in his care.    Regards,    Spencer Puente M.D.    I spent over 35 minutes before, during and after the office visit in reviewing the records, examining the patient, reviewing and interpreting myself the echocardiogram and CT scan of the chest, discussing the findings and options with the patient and family, discussing the plan of follow-up, discussing in detail the procedure risk and benefits, and spent time in documenting in the electronic record

## 2023-07-28 DIAGNOSIS — I35.1 NONRHEUMATIC AORTIC VALVE INSUFFICIENCY: Primary | ICD-10-CM

## 2023-07-28 DIAGNOSIS — I71.21 ANEURYSM OF ASCENDING AORTA WITHOUT RUPTURE: ICD-10-CM

## 2023-07-29 NOTE — PROGRESS NOTES
7/29/2023    Seen on 7/27/2023    Chief Complaint:     Follow up evaluation thoracic aortic aneurysm    History of Present Illness:       Dear Pen and Colleagues,  It was nice to see Panfilo Feliz in follow up evaluation for his ascending aortic. He is a 83 y.o. male with hypertension, hyperlipidemia, GERD, prostatic cancer, leukopenia, monoclonal gammopathy and thrombocytopenia who I saw him last in March 2022 and had a 4.9 cm ascending aortic aneurysm.  He denies any new symptoms in the interval. His recent chest CT showed the ascending aorta 5.2 cm without dissection or ulceration.  The echocardiogram showed close to moderate aortic regurgitation. Patient has no history of aneurysms, dissections or connective tissue disorders.  He is here for follow-up    Patient Active Problem List   Diagnosis    Leukopenia    Thrombocytopenia    Monoclonal gammopathy of unknown significance (MGUS)    Neuropathy associated with MGUS    Nausea in adult patient    Slow transit constipation    Nausea    DDD (degenerative disc disease), lumbar    Chronic neck pain    DJD (degenerative joint disease)    Extremity pain    HTN (hypertension)    Hyperlipidemia    Hypothyroid    LBP (low back pain)    Lumbar canal stenosis    Prostate cancer    Ascending aortic aneurysm    Nonrheumatic aortic valve insufficiency    Other dysphagia    Gastroesophageal reflux disease       Past Medical History:   Diagnosis Date    AAA (abdominal aortic aneurysm)     Abnormal ECG 1980’s    Alcoholism     None since 1991    Aneurysm 2019    aortic arch    Arthritis     Basal cell carcinoma (BCC) of face     Cholelithiasis 1990’s    Cholecystectomy    Colon polyp     Colon polyps     Coronary artery disease     Difficulty walking     Disease of thyroid gland     DJD (degenerative joint disease)     Gastritis     Gastroparesis     GERD (gastroesophageal reflux disease)     GI (gastrointestinal bleed)     Hernia     Hiatal    History of bone marrow biopsy      [place] (hard of hearing)     Hyperlipidemia     Hypertension     Hypothyroidism     IgG monoclonal gammopathy     Multiple duodenal ulcers     Neuropathy     PONV (postoperative nausea and vomiting)     Prostate cancer     Reflux esophagitis     Ulcer     WBC decreased        Past Surgical History:   Procedure Laterality Date    CHOLECYSTECTOMY  1988    COLON SURGERY  1998    COLONOSCOPY  02/2013    ENDOSCOPY  2012    ESOPHAGOGASTRODUODENOSCOPY WITH DILATION OF SHATZKI'S RING    ENDOSCOPY N/A 10/09/2020    Procedure: ESOPHAGOGASTRODUODENOSCOPY with cold bx;  Surgeon: Jake Perez MD;  Location:  [facility];  Service: Gastroenterology;  Laterality: N/A;  pre - nausea  post - duodenal ulcer, gastrits, gastric ulcer, hiatal hernia    ENDOSCOPY N/A 10/04/2022    Procedure: ESOPHAGOGASTRODUODENOSCOPY with biopsies with esophageal dilatation with 56 cui;  Surgeon: Jake Perez MD;  Location:  [facility];  Service: Gastroenterology;  Laterality: N/A;  pre-dysphagia  post-normal     ENDOSCOPY  10/04/2022    Chris [facility]TERFIELD    EYE SURGERY Bilateral     cataracts    LAMINECTOMY  2013    decompression L3-4, L4-5    PROSTATECTOMY  2007    SKIN BIOPSY      TONSILLECTOMY AND ADENOIDECTOMY      1940s    UPPER GASTROINTESTINAL ENDOSCOPY      VASECTOMY      1970s       Allergies   Allergen Reactions    Statins Myalgia         Current Outpatient Medications:     atenolol (TENORMIN) 25 MG tablet, Take 1 tablet by mouth Daily., Disp: , Rfl:     cholecalciferol (VITAMIN D3) 25 MCG (1000 UT) tablet, Take 1 tablet by mouth 2 (Two) Times a Day. 25 mcg BID, Disp: , Rfl:     docusate sodium (COLACE) 100 MG capsule, Take 3 capsules by mouth 2 (Two) Times a Day., Disp: , Rfl:     escitalopram (LEXAPRO) 20 MG tablet, TAKE ONE TABLET BY MOUTH DAILY, Disp: 30 tablet, Rfl: 6    ibuprofen (ADVIL,MOTRIN) 200 MG tablet, Take 1 tablet by mouth As Needed for Mild Pain., Disp: , Rfl:     levothyroxine (SYNTHROID, LEVOTHROID) 100 MCG tablet,  , Disp: , Rfl:     levothyroxine (SYNTHROID, LEVOTHROID) 112 MCG tablet, Take 1 tablet by mouth Daily., Disp: , Rfl:     Magnesium Hydroxide (DULCOLAX PO), Take 400 mg by mouth Daily., Disp: , Rfl:     Menthol, Topical Analgesic, (BIOFREEZE EX), Apply  topically As Needed., Disp: , Rfl:     pantoprazole (PROTONIX) 40 MG EC tablet, Take 1 tablet by mouth 2 (Two) Times a Day., Disp: 60 tablet, Rfl: 12    vitamin B-12 (CYANOCOBALAMIN) 250 MCG tablet, TAKE ONE TABLET BY MOUTH DAILY, Disp: 30 tablet, Rfl: 4    DULoxetine (Cymbalta) 60 MG capsule, Take 1 capsule by mouth Daily for 30 days., Disp: 30 capsule, Rfl: 2    pregabalin (LYRICA) 75 MG capsule, Take 1 capsule by mouth 2 (Two) Times a Day for 30 days., Disp: 60 capsule, Rfl: 0    Social History     Socioeconomic History    Marital status:      Spouse name: Meghan    Years of education: College   Tobacco Use    Smoking status: Never    Smokeless tobacco: Never   Vaping Use    Vaping Use: Never used   Substance and Sexual Activity    Alcohol use: No     Comment: Heavy in past, none in 33 years    Drug use: Never    Sexual activity: Not Currently     Partners: Female     Birth control/protection: None       Family History   Problem Relation Age of Onset    Cancer Mother 85        Breast    COPD Father     Cancer Brother 59        Unknown type    Hypertension Daughter      Review of Systems  As HPI, otherwise noncontributory    Physical Exam:    Vital Signs:  Weight: 83.3 kg (183 lb 9.6 oz)   Body mass index is 22.35 kg/m².  Temp: 97.7 °F (36.5 °C)   Heart Rate: 80   BP: 107/78     Constitutional:       Appearance: Well-developed.   Eyes:      Conjunctiva/sclera: Conjunctivae normal.      Pupils: Pupils are equal, round, and reactive to light.   HENT:      Head: Normocephalic and atraumatic.      Nose: Nose normal.   Neck:      Thyroid: No thyromegaly.      Vascular: No JVD.      Lymphadenopathy: No cervical adenopathy.   Pulmonary:      Effort: Pulmonary effort  is normal.      Breath sounds: Normal breath sounds. No rales.   Cardiovascular:      Normal rate. Regular rhythm.      Murmurs: There is a grade 1/4 high frequency blowing decrescendo, early diastolic murmur at the URSB, radiating to the apex.      No gallop.    Pulses:     Intact distal pulses. No decreased pulses.   Edema:     Peripheral edema absent.   Abdominal:      General: Bowel sounds are normal. There is no distension.      Palpations: Abdomen is soft. There is no abdominal mass.      Tenderness: There is no abdominal tenderness.   Musculoskeletal: Normal range of motion.         General: No tenderness or deformity.      Cervical back: Normal range of motion and neck supple. Skin:     General: Skin is warm and dry.      Findings: No erythema or rash.   Neurological:      Mental Status: Alert and oriented to person, place, and time.      Deep Tendon Reflexes: Reflexes are normal and symmetric.   Psychiatric:         Behavior: Behavior normal.        Assessment:     Problems Addressed this Visit          Cardiac and Vasculature    HTN (hypertension)    Hyperlipidemia    Ascending aortic aneurysm - Primary    Nonrheumatic aortic valve insufficiency       Coag and Thromboembolic    Thrombocytopenia       Gastrointestinal Abdominal     Gastroesophageal reflux disease     Diagnoses         Codes Comments    Aneurysm of ascending aorta without rupture    -  Primary ICD-10-CM: I71.21  ICD-9-CM: 441.2     Nonrheumatic aortic valve insufficiency     ICD-10-CM: I35.1  ICD-9-CM: 424.1     Primary hypertension     ICD-10-CM: I10  ICD-9-CM: 401.9     Mixed hyperlipidemia     ICD-10-CM: E78.2  ICD-9-CM: 272.2     Thrombocytopenia     ICD-10-CM: D69.6  ICD-9-CM: 287.5     Gastroesophageal reflux disease with esophagitis without hemorrhage     ICD-10-CM: K21.00  ICD-9-CM: 530.81, 530.10           Assessment/recommendation:     Ascending aortic aneurysm with interval increase in size 3 mm.  He still asymptomatic.  I  discussed with him the option of early surgery to prevent further enlargement and complication versus watchful follow-up.  I recommend ascending aortic aneurysm repair with an interposition graft and possible aortic valve replacement versus AV conduit.  I quoted an operative mortality of less than 3% risk complications less than 10%.  I discussed the risk, benefits and terms of surgery and he wishes to proceed.  He will need a cardiac cath before surgery to address coronary vasculature.  Hypertension, well controlled continue same regimen  Thrombocytopenia, stable.  He will need platelet transfusion perioperatively    Thank you for allowing me to participate in his care.    Regards,    Spencer Puente M.D.    I spent over 35 minutes before, during and after the office visit in reviewing the records, examining the patient, reviewing and interpreting myself the echocardiogram and CT scan of the chest, discussing the findings and options with the patient and family, discussing the plan of follow-up, discussing in detail the procedure risk and benefits, and spent time in documenting in the electronic record

## 2023-07-31 ENCOUNTER — TELEPHONE (OUTPATIENT)
Dept: CARDIAC SURGERY | Facility: CLINIC | Age: 83
End: 2023-07-31
Payer: MEDICARE

## 2023-08-14 ENCOUNTER — TELEPHONE (OUTPATIENT)
Dept: CARDIAC SURGERY | Facility: CLINIC | Age: 83
End: 2023-08-14
Payer: MEDICARE

## 2023-08-14 NOTE — TELEPHONE ENCOUNTER
Spoke with , He states cath has not been set up yet as he is still deciding on a dentist. He states he is in no hurry for surgery and has not yet made a decision if he wants to pursue surgery. Advised I would let  and surgery scheduler know. He will call the office once he has made a decision regarding surgery.

## 2023-11-27 RX ORDER — PANTOPRAZOLE SODIUM 40 MG/1
40 TABLET, DELAYED RELEASE ORAL 2 TIMES DAILY
Qty: 180 TABLET | OUTPATIENT
Start: 2023-11-27

## 2023-12-01 RX ORDER — ESCITALOPRAM OXALATE 20 MG/1
TABLET ORAL
Qty: 30 TABLET | Refills: 6 | Status: SHIPPED | OUTPATIENT
Start: 2023-12-01

## 2023-12-18 NOTE — TELEPHONE ENCOUNTER
Caller: Tray,Kathy    Relationship: Emergency Contact    Best call back number: [phone]    Requested Prescriptions:   Requested Prescriptions     Pending Prescriptions Disp Refills    pantoprazole (PROTONIX) 40 MG EC tablet 60 tablet 12     Sig: Take 1 tablet by mouth 2 (Two) Times a Day.        Pharmacy where request should be sent:  [facility] [id number] [address] [facility] - [phone] [address] [phone]  [phone]     Last office visit with prescribing clinician: 11/14/2022   Last telemedicine visit with prescribing clinician: Visit date not found   Next office visit with prescribing clinician: Visit date not found     Additional details provided by patient: PATIENT IS SCHEDULED FOR HIS YEARLY 01/17/24    Does the patient have less than a 3 day supply:  [x] Yes  [] No      Marj Wilson Rep   12/18/23 15:59 EST

## 2023-12-19 RX ORDER — PANTOPRAZOLE SODIUM 40 MG/1
40 TABLET, DELAYED RELEASE ORAL 2 TIMES DAILY
Qty: 60 TABLET | Refills: 1 | Status: SHIPPED | OUTPATIENT
Start: 2023-12-19

## 2024-01-16 ENCOUNTER — LAB (OUTPATIENT)
Dept: LAB | Facility: HOSPITAL | Age: 84
End: 2024-01-16
Payer: MEDICARE

## 2024-01-16 ENCOUNTER — OFFICE VISIT (OUTPATIENT)
Dept: ONCOLOGY | Facility: CLINIC | Age: 84
End: 2024-01-16
Payer: MEDICARE

## 2024-01-16 VITALS
HEIGHT: 76 IN | SYSTOLIC BLOOD PRESSURE: 113 MMHG | DIASTOLIC BLOOD PRESSURE: 72 MMHG | OXYGEN SATURATION: 95 % | TEMPERATURE: 97.3 F | BODY MASS INDEX: 22.15 KG/M2 | HEART RATE: 73 BPM | WEIGHT: 181.9 LBS

## 2024-01-16 DIAGNOSIS — G63 NEUROPATHY ASSOCIATED WITH MGUS: ICD-10-CM

## 2024-01-16 DIAGNOSIS — D47.2 NEUROPATHY ASSOCIATED WITH MGUS: ICD-10-CM

## 2024-01-16 DIAGNOSIS — D70.8 OTHER NEUTROPENIA: ICD-10-CM

## 2024-01-16 DIAGNOSIS — D69.6 THROMBOCYTOPENIA: ICD-10-CM

## 2024-01-16 DIAGNOSIS — D47.2 MONOCLONAL GAMMOPATHY OF UNKNOWN SIGNIFICANCE (MGUS): Primary | ICD-10-CM

## 2024-01-16 LAB
ALBUMIN SERPL-MCNC: 4.1 G/DL (ref 3.5–5.2)
ALBUMIN/GLOB SERPL: 1.4 G/DL
ALP SERPL-CCNC: 58 U/L (ref 39–117)
ALT SERPL W P-5'-P-CCNC: <5 U/L (ref 1–41)
ANION GAP SERPL CALCULATED.3IONS-SCNC: 7.9 MMOL/L (ref 5–15)
AST SERPL-CCNC: 25 U/L (ref 1–40)
BASOPHILS # BLD AUTO: 0.02 10*3/MM3 (ref 0–0.2)
BASOPHILS NFR BLD AUTO: 0.5 % (ref 0–1.5)
BILIRUB SERPL-MCNC: 0.4 MG/DL (ref 0–1.2)
BUN SERPL-MCNC: 25 MG/DL (ref 8–23)
BUN/CREAT SERPL: 20.8 (ref 7–25)
CALCIUM SPEC-SCNC: 9.6 MG/DL (ref 8.6–10.5)
CHLORIDE SERPL-SCNC: 105 MMOL/L (ref 98–107)
CO2 SERPL-SCNC: 28.1 MMOL/L (ref 22–29)
CREAT SERPL-MCNC: 1.2 MG/DL (ref 0.76–1.27)
DEPRECATED RDW RBC AUTO: 46.7 FL (ref 37–54)
EGFRCR SERPLBLD CKD-EPI 2021: 60.4 ML/MIN/1.73
EOSINOPHIL # BLD AUTO: 0.03 10*3/MM3 (ref 0–0.4)
EOSINOPHIL NFR BLD AUTO: 0.8 % (ref 0.3–6.2)
ERYTHROCYTE [DISTWIDTH] IN BLOOD BY AUTOMATED COUNT: 13.8 % (ref 12.3–15.4)
GLOBULIN UR ELPH-MCNC: 2.9 GM/DL
GLUCOSE SERPL-MCNC: 89 MG/DL (ref 65–99)
HCT VFR BLD AUTO: 43.2 % (ref 37.5–51)
HGB BLD-MCNC: 13.8 G/DL (ref 13–17.7)
IMM GRANULOCYTES # BLD AUTO: 0.01 10*3/MM3 (ref 0–0.05)
IMM GRANULOCYTES NFR BLD AUTO: 0.3 % (ref 0–0.5)
LYMPHOCYTES # BLD AUTO: 1.5 10*3/MM3 (ref 0.7–3.1)
LYMPHOCYTES NFR BLD AUTO: 40.3 % (ref 19.6–45.3)
MCH RBC QN AUTO: 29.3 PG (ref 26.6–33)
MCHC RBC AUTO-ENTMCNC: 31.9 G/DL (ref 31.5–35.7)
MCV RBC AUTO: 91.7 FL (ref 79–97)
MONOCYTES # BLD AUTO: 0.48 10*3/MM3 (ref 0.1–0.9)
MONOCYTES NFR BLD AUTO: 12.9 % (ref 5–12)
NEUTROPHILS NFR BLD AUTO: 1.68 10*3/MM3 (ref 1.7–7)
NEUTROPHILS NFR BLD AUTO: 45.2 % (ref 42.7–76)
NRBC BLD AUTO-RTO: 0 /100 WBC (ref 0–0.2)
PLATELET # BLD AUTO: 155 10*3/MM3 (ref 140–450)
PMV BLD AUTO: 11.2 FL (ref 6–12)
POTASSIUM SERPL-SCNC: 5.3 MMOL/L (ref 3.5–5.2)
PROT SERPL-MCNC: 7 G/DL (ref 6–8.5)
RBC # BLD AUTO: 4.71 10*6/MM3 (ref 4.14–5.8)
SODIUM SERPL-SCNC: 141 MMOL/L (ref 136–145)
WBC NRBC COR # BLD AUTO: 3.72 10*3/MM3 (ref 3.4–10.8)

## 2024-01-16 PROCEDURE — 36415 COLL VENOUS BLD VENIPUNCTURE: CPT

## 2024-01-16 PROCEDURE — 80053 COMPREHEN METABOLIC PANEL: CPT

## 2024-01-16 PROCEDURE — 85025 COMPLETE CBC W/AUTO DIFF WBC: CPT

## 2024-01-16 NOTE — PROGRESS NOTES
REASON FOR FOLLOWUP:    1.Minimal leukopenia with thrombocytopenia in the background of minimal IgG monoclonal gammopathy. The patient has no splenomegaly, no peripheral adenopathy, and no symptoms that suggest lupus or collagen vascular disease. Bone marrow   a  spirate documented no evidence of myeloma, lymphoma, leukemia, myelodysplasia, or any other abnormality. Bone marrow chromosomal analysis as well as flow cytometry were negative.   2.  Inflammatory polyneuropathy.  He initiated IVIG on 10/3/2019.  He did receive his second IVIG on 11/7/2019.allergic reaction to it stopping infusion all togethere  3.  Approximately 10 days after his second IVIG infusion he did experience serum sickness that included rash, generalized arthralgias, skin peeling of the palms.  Dr. Olvera prescribed 20 mg of prednisone and his symptoms have resolved after 2 days.      HISTORY OF PRESENT ILLNESS:   On 01/16/2024 this 83-year-old male who has history of monoclonal gammopathy of unknown significance returns to the office. He has had chronic leukopenia and variable degree of thrombocytopenia through the years. He has had bone marrow test done on 2 different occasions never concluding any multiple myeloma diagnosis. In the interim he continues going to the gym at least 5 days a week, he remains active and he has still some element of difficulty swallowing. He has been seen by, Spencer Puente MD, in regard to his thoracic aortic aneurysm and a cardiac catheterization seems to be has been indicated. He is not experiencing any chest pain, dyspnea, cough, sputum production or hemoptysis. Appetite and weight remain stable, bowel activity and urination remain unchanged. Chronic back pain unchanged. Hearing deficit remains ongoing but his ex-wife gave him hearing aids as gift for [employer]. The patient has not been wearing them.          Past Medical History:   Diagnosis Date    AAA (abdominal aortic aneurysm)     Abnormal ECG 1980’s     Alcoholism     None since 1991    Aneurysm 2019    aortic arch    Arthritis     Basal cell carcinoma (BCC) of face     Cholelithiasis 1990’s    Cholecystectomy    Colon polyp     Colon polyps     Coronary artery disease     Difficulty walking     Disease of thyroid gland     DJD (degenerative joint disease)     Gastritis     Gastroparesis     GERD (gastroesophageal reflux disease)     GI (gastrointestinal bleed)     Hernia     Hiatal    History of bone marrow biopsy     [place] (hard of hearing)     Hyperlipidemia     Hypertension     Hypothyroidism     IgG monoclonal gammopathy     Multiple duodenal ulcers     Neuropathy     PONV (postoperative nausea and vomiting)     Prostate cancer     Reflux esophagitis     Ulcer     WBC decreased      Past Surgical History:   Procedure Laterality Date    CHOLECYSTECTOMY  1988    COLON SURGERY  1998    COLONOSCOPY  02/2013    ENDOSCOPY  2012    ESOPHAGOGASTRODUODENOSCOPY WITH DILATION OF SHATZKI'S RING    ENDOSCOPY N/A 10/09/2020    Procedure: ESOPHAGOGASTRODUODENOSCOPY with cold bx;  Surgeon: Jake Perez MD;  Location:  [facility];  Service: Gastroenterology;  Laterality: N/A;  pre - nausea  post - duodenal ulcer, gastrits, gastric ulcer, hiatal hernia    ENDOSCOPY N/A 10/04/2022    Procedure: ESOPHAGOGASTRODUODENOSCOPY with biopsies with esophageal dilatation with 56 cui;  Surgeon: Jake Perez MD;  Location[facility];  Service: Gastroenterology;  Laterality: N/A;  pre-dysphagia  post-normal     ENDOSCOPY  10/04/2022    [facility]    EYE SURGERY Bilateral     cataracts    LAMINECTOMY  2013    decompression L3-4, L4-5    PROSTATECTOMY  2007    SKIN BIOPSY      TONSILLECTOMY AND ADENOIDECTOMY      1940s    UPPER GASTROINTESTINAL ENDOSCOPY      VASECTOMY      1970s     Social History     Socioeconomic History    Marital status:      Spouse name: Meghan    Years of education: College   Tobacco Use    Smoking status: Never    Smokeless tobacco: Never    Vaping Use    Vaping Use: Never used   Substance and Sexual Activity    Alcohol use: No     Comment: Heavy in past, none in 33 years    Drug use: Never    Sexual activity: Not Currently     Partners: Female     Birth control/protection: None     Family History   Problem Relation Age of Onset    Cancer Mother 85        Breast    COPD Father     Cancer Brother 59        Unknown type    Hypertension Daughter            Current Outpatient Medications on File Prior to Visit   Medication Sig Dispense Refill    atenolol (TENORMIN) 25 MG tablet Take 1 tablet by mouth Daily.      cholecalciferol (VITAMIN D3) 25 MCG (1000 UT) tablet Take 1 tablet by mouth 2 (Two) Times a Day. 25 mcg BID      docusate sodium (COLACE) 100 MG capsule Take 3 capsules by mouth 2 (Two) Times a Day.      escitalopram (LEXAPRO) 20 MG tablet TAKE ONE TABLET BY MOUTH DAILY 30 tablet 6    ibuprofen (ADVIL,MOTRIN) 200 MG tablet Take 1 tablet by mouth As Needed for Mild Pain.      levothyroxine (SYNTHROID, LEVOTHROID) 100 MCG tablet       levothyroxine (SYNTHROID, LEVOTHROID) 112 MCG tablet Take 1 tablet by mouth Daily.      Magnesium Hydroxide (DULCOLAX PO) Take 400 mg by mouth Daily.      Menthol, Topical Analgesic, (BIOFREEZE EX) Apply  topically As Needed.      pantoprazole (PROTONIX) 40 MG EC tablet Take 1 tablet by mouth 2 (Two) Times a Day. 60 tablet 1    vitamin B-12 (CYANOCOBALAMIN) 250 MCG tablet TAKE ONE TABLET BY MOUTH DAILY 30 tablet 4    DULoxetine (Cymbalta) 60 MG capsule Take 1 capsule by mouth Daily for 30 days. 30 capsule 2    pregabalin (LYRICA) 75 MG capsule Take 1 capsule by mouth 2 (Two) Times a Day for 30 days. 60 capsule 0     No current facility-administered medications on file prior to visit.     Allergies   Allergen Reactions    Statins Myalgia         ONCOLOGIC/HEMATOLOGIC HISTORY: History from previous dates can be found in the separate document.         Vitals:    01/16/24 1301   BP: 113/72   Pulse: 73   Temp: 97.3 °F (36.3  "°C)   TempSrc: Temporal   SpO2: 95%   Weight: 82.5 kg (181 lb 14.4 oz)   Height: 193 cm (75.98\")         Exam :         GENERAL:  Well-developed, Patient  in no acute distress.   SKIN:  Warm, dry ,NO purpura ,no rash.  HEENT:  Pupils were equal and reactive to light and accomodation, conjunctivae noninjected,  normal visual acuity. Severe hearing deficit.      NECK:  Supple with good range of motion; no thyromegaly , no JVD or bruits,.No carotid artery pain, no carotid abnormal pulsation   LYMPHATICS:  No cervical, NO supraclavicular, NO axillary, NO inguinal adenopathies.  CARDIAC   normal rate , regular rhythm, with grade 1 systolic aortic murmur,NO rubs NO S3 NO S4   LUNGS: normal breath sounds bilateral, no wheezing, NO rhonchi, NO crackles ,NO rubs.  VASCULAR VENOUS: no cyanosis, NO collateral circulation, NO varicosities, NO edema, NO palpable cords, NO pain,NO erythema, NO pigmentation of the skin.  ABDOMEN:  Soft, NO pain,no hepatomegaly, no splenomegaly,no masses, no ascites, no collateral circulation,no distention.  EXTREMITIES  AND SPINE:  No clubbing, no cyanosis ,no deformities , no pain .l spine kyphosis,  no pain in spine, no pain in ribs , no pain in pelvic bone.  NEUROLOGICAL:  Patient was awake, alert, oriented to time, person and place.            Lab Results   Component Value Date    WBC 3.72 01/16/2024    HGB 13.8 01/16/2024    HCT 43.2 01/16/2024    MCV 91.7 01/16/2024     01/16/2024                 ASSESSMENT:      1. MGUS:   undergone bone marrow testing of this on 2 different occasions not being able to document myeloma, lymphoma, myelodysplasia, leukemia or myelofibrosis. Chromosomal analysis has been normal. T  he patient has not had any significant difference in his monoclonal protein quantification.  The patient has associated with this sensory peripheral neuropathy with balance issues and painful numbness in his feet. We have tried Neurontin in the past with no benefit, now we " are trying Lyrica and it seems to be that this is not going to be any benefit to him neither.  We have also tried prednisone with no benefit as per indication by his Neurologist and we have tried immunoglobulin with no benefit and actually this medicine triggered serum sickness in him that required short course of prednisone therapy.  I discussed with his attending physician through video medicine from [facility] , the fact that we still have no diagnosis in this patient in regard his sensory neuropathy and his minimal monoclonal protein. He is going to be seen by neurology in that institution also by video medicine. The patient continues having progressing symptoms now in his upper extremities up to his elbows in regard to sensory changes and in his lower extremities up to his knees. He has imbalance and he has a negative Romberg test. Actually his bicipital and tricipital reflexes are very active 2-3. His patellar and Achilles are abolished. Vibratory sensation is normal throughout. The nausea is another symptom that is very bothersome and his insomnia is bothersome. I do not think we have any diagnosis on him at this time and I would like to proceed as follows:  proceed with an MRI scan of the brain, cervical and thoracic spine to see if there is anything that is making all of these symptoms from the neurological system to occur and the nausea to occur as well as his deafness. Maybe all of these things are connected. I would not be surprised if we need to pursue a lumbar puncture.   MRI scan of the brain that shows no major alterations, radiologist calls minimal vascular alterations. Most importantly the MRI of the cervical spine documents significant spinal stenosis at the level of C5. I reviewed the pictures of this in the PAC system [facility] with the patient and his wife. The MRI of the thoracic spine shows degenerative changes. Most importantly none of the areas on MRI in the cranium,  cervical, thoracic spine disclose any lesions that could be consistent with myeloma, lymphoma or tumoral lesions of any nature. All of the changes are related to degenerative changes. 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   The patient was reviewed on 08/09/2022 from the point of view of his monoclonal gammopathy. As far as I can tell this does not look it has progressed. It has not produced more neuropathy and it has not produced any new sites of bone pain. The patient has had at least 2 bone marrow tests in the past. We are pending to review his monoclonal protein study today. He will be called at home with the report of this once that this becomes available. We will watch him in absence of any other intervention. We will review him back in 6 months with repeat CBC, CMP and TSH.  Patient seen 1/25/2023 overall doing well.  WBC 3.50, hemoglobin 13.4, platelet count 148,000.  SPEP, KARLEE, FLC pending for today.  Patient denies any new complaints of pain.  His neuropathy is stable.  On 07/11/2023 his sensory neuropathy in his feet is no better or no worse than before. He has no obvious motor deficit. He is walking relatively well. He has not had any fall and he has not had any other symptoms of monoclonal protein issues including no infection, no bleeding, no bone pain. In fact his white count, hemoglobin and platelets remain stable. His monoclonal protein studies are pending. Given this fact he will remain in observation from my point of view. He will return to see us back in 6 months with repeat CBC, CMP, and serum protein immunoelectrophoresis.    On 01/16/2024 he has no symptoms or signs of myeloma at this time neither lymphoma. His clinical examination is no different today of what it has been in the last 10 years or so. His minimal degree of kyphosis in the lumbar spine is very much unchanged. His white count, hemoglobin and platelets are stable, his monoclonal proteins studies and CMP are  pending and they will be reported to him as soon as they become available.         2. Neuropathy. He has been seen by Dr. Darrick Garcia. EMG was performed. Abnormalities documented in the EMG and the interpretation per Dr. Garcia believing that this does not represent a typical feature of inflammatory neuropathy.   02/03/2021 neuropathy is dramatically better on Lyrica twice a day  08/09/2022 neuropathy stable.  It has been documented that his B12 level is borderline low. We will recheck this number today and share this with Neurology as well I will proceed checking red blood cells, folate. The patient will be replaced today with B12 at 1000 mcg IM and he will receive the same dose once a week for a total of 4 weeks. Thereafter he will receive B12 injections every 3 months by us.  1/24/2023 patient reporting neuropathy stable.  He is now on oral B12.    On 07/11/2023 he has been seen by neurologist in the past. We have even given him a steroid here in the office developing serum sickness with this and we are not willing to entertain any other therapy for him given these circumstances about his neuropathic symptomatology.    On 01/16/2024 his neuropathy in his feet is no different today of what it has been before.       3.  Thoracic aortic aneurysm: Being followed by cardiology.  The patient was further reviewed on 08/04/2021. He wanted me to review the CT scan that had been ordered by Dr. Puente recently. I reviewed this in the PACS system [facility]. His ascending thoracic aortic aneurysm remains the same, 5 cm in diameter. There is no cardiomegaly. There is heavy calcification in the coronary arteries. Lung anatomy remains normal with no infiltrates, effusions and no mediastinal or hilar adenopathy. Liver and spleen remain normal, pancreas and kidneys remain normal in size. There are obvious degenerative changes in the spine. There are no lytic lesions.   Patient had a JOSE on 1/13/2023 by Dr. Rodriguez  Patel  His thoracic aortic aneurysm remains quiet radiologically and clinically. Recent radiological assessment disclosed no major changes in the sites of this. He will follow up with Cardiology in a few days.    On 01/16/2024, Spencer Puente MD, wants to perform cardiac catheterization in order to evaluate aortic valve and how things are going in regard to the aneurysm. The patient is not too much in regard to moving into this to happen at this time. He is not having any chest pain. He has no shortness of breath.        4.  Dysphagia:   regard to his difficulty swallowing the patient has had an esophagogram that shows alteration in the peristaltic activity of the esophagus without any obstructive lesions or any achalasia.  11/11/2020 His chronic nausea is better. He has had an upper GI endoscopy, he had the location of a Schatzki's ring and also he had biopsies of the stomach that documented gastritis, H. Pylori negative. The patient is now on PPI. His nausea is better. He is eating better and he is not taking any aspirin or antiinflammatory medications, or alcohol at all to reduce gastritis. I point out to him that a lot of that his stress that he carries through all the time probably has something to do with this, I point out that his stomach is a reflection of his soul.   Followed by gastroenterology, Dr. Perez.   I suggested for him to use Altoids peppermint candy before meals to see if this makes any difference in the opening of the esophagus before he eats. Otherwise he is never going to change his habits of his eating his food very quickly. I asked him to avoid the use of straws because the speed of the liquids through a straw is very fast and sometimes that can lead into choking.   On 08/09/2022 the patient believes that eventually he is going to require an upper GI endoscopy and stretching of the esophagus. He already has appointment to be seen by gastroenterologist. I encouraged him to have a lot more  liquids that contain protein like for example milk, ice cream. He can have milk shakes or things of that nature.  Patient did undergo an EGD with dilatation by Dr. Perez  on 10/4/2022    On 07/11/2023 he has not had any dysphagia. He has not required any other dilatation of the esophagus. His nutrition seems to be appropriate at this time, swallowing mechanism remains intact.    On 01/16/2024 in regard to his dysphagia that obeys to motor alteration in the esophagus, this remains unchanged. There is no aspiration or choking or aspiration pneumonia. His lung auscultation remains normal. His nutrition and weight remain unchanged.       5.  Depression the patient is better with the medicine that he is taking including the Lexapro, his dose is correct I think 20 mg a day and I think the amitriptyline that he takes also helps in some way this symptom.   On 08/09/2022, the patient's depression is better on Lexapro. I encouraged him to remain on this medicine. I think this has made a big difference on him as well.  On 07/11/2023 the patient has discontinued amitriptyline because it was making him dizzy. He has not required the resumption of the medicine for any reason. He remains in any event on Lexapro, Lyrica, and Celexa. These medicines make him to feel better and he will remain on them for the time being.    On 01/16/2024 depression under good control with medication.      PLAN:  On 01/16/2024 the patient will be called at home with the report of his monoclonal protein studies. Otherwise we will review him back in 6 months with repeat CBC, CMP and serum protein immunoelectrophoresis.       Chalo Olvera MD  01/16/2024

## 2024-01-17 LAB
ALBUMIN SERPL ELPH-MCNC: 3.7 G/DL (ref 2.9–4.4)
ALBUMIN/GLOB SERPL: 1.2 {RATIO} (ref 0.7–1.7)
ALPHA1 GLOB SERPL ELPH-MCNC: 0.2 G/DL (ref 0–0.4)
ALPHA2 GLOB SERPL ELPH-MCNC: 0.7 G/DL (ref 0.4–1)
B-GLOBULIN SERPL ELPH-MCNC: 1.2 G/DL (ref 0.7–1.3)
GAMMA GLOB SERPL ELPH-MCNC: 0.9 G/DL (ref 0.4–1.8)
GLOBULIN SER-MCNC: 3.1 G/DL (ref 2.2–3.9)
IGA SERPL-MCNC: 364 MG/DL (ref 61–437)
IGG SERPL-MCNC: 1097 MG/DL (ref 603–1613)
IGM SERPL-MCNC: 21 MG/DL (ref 15–143)
INTERPRETATION SERPL IEP-IMP: ABNORMAL
KAPPA LC FREE SER-MCNC: 43 MG/L (ref 3.3–19.4)
KAPPA LC FREE/LAMBDA FREE SER: 2.29 {RATIO} (ref 0.26–1.65)
LABORATORY COMMENT REPORT: ABNORMAL
LAMBDA LC FREE SERPL-MCNC: 18.8 MG/L (ref 5.7–26.3)
M PROTEIN SERPL ELPH-MCNC: 0.3 G/DL
PROT SERPL-MCNC: 6.8 G/DL (ref 6–8.5)

## 2024-01-18 ENCOUNTER — TELEPHONE (OUTPATIENT)
Dept: ONCOLOGY | Facility: CLINIC | Age: 84
End: 2024-01-18
Payer: MEDICARE

## 2024-01-18 NOTE — TELEPHONE ENCOUNTER
Called and relayed message. Left call back number should they have any f/u questions. [phone]. Gretchen Quintero RN

## 2024-01-18 NOTE — TELEPHONE ENCOUNTER
----- Message from Chalo Olvera MD sent at 1/17/2024  5:01 PM EST -----  Call his wife he is deaf His m protein without changes  great

## 2024-01-25 DIAGNOSIS — I71.21 ANEURYSM OF ASCENDING AORTA WITHOUT RUPTURE: Primary | ICD-10-CM

## 2024-01-26 ENCOUNTER — OFFICE VISIT (OUTPATIENT)
Dept: CARDIOLOGY | Facility: CLINIC | Age: 84
End: 2024-01-26
Payer: MEDICARE

## 2024-01-26 VITALS
DIASTOLIC BLOOD PRESSURE: 72 MMHG | SYSTOLIC BLOOD PRESSURE: 111 MMHG | BODY MASS INDEX: 22.77 KG/M2 | OXYGEN SATURATION: 98 % | WEIGHT: 187 LBS | HEIGHT: 76 IN | HEART RATE: 76 BPM

## 2024-01-26 DIAGNOSIS — E78.2 MIXED HYPERLIPIDEMIA: ICD-10-CM

## 2024-01-26 DIAGNOSIS — I10 PRIMARY HYPERTENSION: ICD-10-CM

## 2024-01-26 DIAGNOSIS — I35.1 NONRHEUMATIC AORTIC VALVE INSUFFICIENCY: Primary | ICD-10-CM

## 2024-01-26 DIAGNOSIS — I71.21 ANEURYSM OF ASCENDING AORTA WITHOUT RUPTURE: ICD-10-CM

## 2024-01-26 PROCEDURE — 1160F RVW MEDS BY RX/DR IN RCRD: CPT | Performed by: INTERNAL MEDICINE

## 2024-01-26 PROCEDURE — 1159F MED LIST DOCD IN RCRD: CPT | Performed by: INTERNAL MEDICINE

## 2024-01-26 PROCEDURE — 3074F SYST BP LT 130 MM HG: CPT | Performed by: INTERNAL MEDICINE

## 2024-01-26 PROCEDURE — 99214 OFFICE O/P EST MOD 30 MIN: CPT | Performed by: INTERNAL MEDICINE

## 2024-01-26 PROCEDURE — 93000 ELECTROCARDIOGRAM COMPLETE: CPT | Performed by: INTERNAL MEDICINE

## 2024-01-26 PROCEDURE — 3078F DIAST BP <80 MM HG: CPT | Performed by: INTERNAL MEDICINE

## 2024-01-26 NOTE — PROGRESS NOTES
"Cardiology Office Visit      Encounter Date:  01/26/2024    Patient ID:   Panfilo Feliz is a 83 y.o. male.    Reason For Followup:  Ascending aortic aneurysm  Aortic regurgitation    Brief Clinical History:  Dear Stephan Torres MD    I had the pleasure of seeing Panfilo Feliz today. As you are well aware, this is a 83 y.o. male past medical history that is significant for history of hypertension ascending aortic aneurysm moderate aortic regurgitation here for follow-up for further evaluation and treatment options    Interval History:  Denies any symptoms of chest pain shortness of breath dizziness or syncope  Denies any orthopnea no weight gain    Assessment & Plan    Impressions:  Ascending aortic aneurysm 4.9 cm  Moderate aortic regurgitation  Normal LV systolic function normal LV size  Peripheral neuropathy  Thrombocytopenia  Monoclonal gammopathy  Moderate aortic regurgitation  Stable ascending aortic aneurysm at 4.8 to 5.2 cm  Hyperlipidemia    Recommendations:  Recent labs and work-up and echo and CT findings reviewed and discussed with patient  Continue current medical therapy  Plain treadmill stress test for functional status assessment with valvular heart disease  Close monitoring of ascending aortic aneurysm with a repeat echocardiogram in 6 months  Prior imaging studies including echocardiogram findings and CT chest findings reviewed and discussed with patient  Diagnosis and treatment options reviewed and discussed with patient and family  Labs with primary care physician office  Repeat CT angiogram of the chest and also echocardiogram to assess the ascending aortic aneurysm and aortic regurgitation  Low potassium diet  Recent labs and workup reviewed and discussed with patient and family  Follow-up in office in 6 months         Vitals:  Vitals:    01/26/24 1408   BP: 111/72   Pulse: 76   SpO2: 98%   Weight: 84.8 kg (187 lb)   Height: 193 cm (76\")       Physical Exam:    General: Alert, cooperative, " "no distress, appears stated age  Head:  Normocephalic, atraumatic, mucous membranes moist  Eyes:  Conjunctiva/corneas clear, EOM's intact     Neck:  Supple,  no adenopathy;      Lungs: Clear to auscultation bilaterally, no wheezes rhonchi rales are noted  Chest wall: No tenderness  Heart::  Regular rate and rhythm, S1 and S2 normal, no murmur, rub or gallop  Abdomen: Soft, non-tender, nondistended bowel sounds active  Extremities: No cyanosis, clubbing, or edema  Pulses: 2+ and symmetric all extremities  Skin:  No rashes or lesions  Neuro/psych: A&O x3. CN II through XII are grossly intact with appropriate affect              Lab Results   Component Value Date    GLUCOSE 89 01/16/2024    BUN 25 (H) 01/16/2024    CREATININE 1.20 01/16/2024    EGFR 60.4 01/16/2024    BCR 20.8 01/16/2024    K 5.3 (H) 01/16/2024    CO2 28.1 01/16/2024    CALCIUM 9.6 01/16/2024    PROTENTOTREF 6.8 01/16/2024    ALBUMIN 4.1 01/16/2024    ALBUMIN 3.7 01/16/2024    BILITOT 0.4 01/16/2024    AST 25 01/16/2024    ALT <5 01/16/2024     Results for orders placed during the hospital encounter of 07/05/23    Adult Transthoracic Echo Complete W/ Color, Spectral and Contrast if Necessary Per Protocol    Interpretation Summary    Left ventricular systolic function is normal. Calculated left ventricular EF = 56% Left ventricular ejection fraction appears to be 56 - 60%.    Left ventricular wall thickness is consistent with mild concentric hypertrophy.    Left ventricular diastolic function is consistent with (grade I) impaired relaxation.    The left atrial cavity is mildly dilated.    Estimated right ventricular systolic pressure from tricuspid regurgitation is normal (<35 mmHg).    Moderate dilation of the aortic root is present. Severe dilation of the ascending aorta is present.    Ascending aortic aneurysm measuring 4.8 cm     No results found for this or any previous visit.     No results found for: \"CHOL\", \"CHLPL\", \"TRIG\", \"HDL\", \"LDL\", " "\"LDLDIRECT\"   Results for orders placed during the hospital encounter of 07/07/22    Treadmill Stress Test    Interpretation Summary  · Arrhythmias were not significant during stress.  · Equivocal ECG evidence of myocardial ischemia. Indeterminate clinical evidence of myocardial ischemia. Findings consistent with an equivocal ECG stress test.            Objective:          Allergies:  Allergies   Allergen Reactions    Statins Myalgia       Medication Review:     Current Outpatient Medications:     atenolol (TENORMIN) 25 MG tablet, Take 1 tablet by mouth Daily., Disp: , Rfl:     cholecalciferol (VITAMIN D3) 25 MCG (1000 UT) tablet, Take 1 tablet by mouth Daily. 25 mcg BID, Disp: , Rfl:     docusate sodium (COLACE) 100 MG capsule, Take 3 capsules by mouth 2 (Two) Times a Day., Disp: , Rfl:     escitalopram (LEXAPRO) 20 MG tablet, TAKE ONE TABLET BY MOUTH DAILY, Disp: 30 tablet, Rfl: 6    levothyroxine (SYNTHROID, LEVOTHROID) 112 MCG tablet, Take 1 tablet by mouth Daily., Disp: , Rfl:     Magnesium Hydroxide (DULCOLAX PO), Take 400 mg by mouth Daily., Disp: , Rfl:     Menthol, Topical Analgesic, (BIOFREEZE EX), Apply  topically As Needed., Disp: , Rfl:     pantoprazole (PROTONIX) 40 MG EC tablet, Take 1 tablet by mouth 2 (Two) Times a Day., Disp: 60 tablet, Rfl: 1    pregabalin (LYRICA) 75 MG capsule, Take 1 capsule by mouth 2 (Two) Times a Day for 30 days., Disp: 60 capsule, Rfl: 0    vitamin B-12 (CYANOCOBALAMIN) 250 MCG tablet, TAKE ONE TABLET BY MOUTH DAILY, Disp: 30 tablet, Rfl: 4    ibuprofen (ADVIL,MOTRIN) 200 MG tablet, Take 1 tablet by mouth As Needed for Mild Pain. (Patient not taking: Reported on 1/26/2024), Disp: , Rfl:     Family History:  Family History   Problem Relation Age of Onset    Cancer Mother 85        Breast    COPD Father     Cancer Brother 59        Unknown type    Hypertension Daughter        Past Medical History:  Past Medical History:   Diagnosis Date    AAA (abdominal aortic aneurysm)     " Abnormal ECG 1980’s    Alcoholism     None since 1991    Aneurysm 2019    aortic arch    Arthritis     Basal cell carcinoma (BCC) of face     Cholelithiasis 1990’s    Cholecystectomy    Colon polyp     Colon polyps     Coronary artery disease     Difficulty walking     Disease of thyroid gland     DJD (degenerative joint disease)     Gastritis     Gastroparesis     GERD (gastroesophageal reflux disease)     GI (gastrointestinal bleed)     Hernia     Hiatal    History of bone marrow biopsy     [place] (hard of hearing)     Hyperlipidemia     Hypertension     Hypothyroidism     IgG monoclonal gammopathy     Multiple duodenal ulcers     Neuropathy     PONV (postoperative nausea and vomiting)     Prostate cancer     Reflux esophagitis     Ulcer     WBC decreased        Past surgical History:  Past Surgical History:   Procedure Laterality Date    CHOLECYSTECTOMY  1988    COLON SURGERY  1998    COLONOSCOPY  02/2013    ENDOSCOPY  2012    ESOPHAGOGASTRODUODENOSCOPY WITH DILATION OF SHATZKI'S RING    ENDOSCOPY N/A 10/09/2020    Procedure: ESOPHAGOGASTRODUODENOSCOPY with cold bx;  Surgeon: Jake Perez MD;  Location: [facility];  Service: Gastroenterology;  Laterality: N/A;  pre - nausea  post - duodenal ulcer, gastrits, gastric ulcer, hiatal hernia    ENDOSCOPY N/A 10/04/2022    Procedure: ESOPHAGOGASTRODUODENOSCOPY with biopsies with esophageal dilatation with 56 cui;  Surgeon: Jake Perez MD;  Location: [facility];  Service: Gastroenterology;  Laterality: N/A;  pre-dysphagia  post-normal     ENDOSCOPY  10/04/2022    [facility]    EYE SURGERY Bilateral     cataracts    LAMINECTOMY  2013    decompression L3-4, L4-5    PROSTATECTOMY  2007    SKIN BIOPSY      TONSILLECTOMY AND ADENOIDECTOMY      1940s    UPPER GASTROINTESTINAL ENDOSCOPY      VASECTOMY      1970s       Social History:  Social History     Socioeconomic History    Marital status:      Spouse name: Meghan    Years of education: College    Tobacco Use    Smoking status: Never    Smokeless tobacco: Never   Vaping Use    Vaping Use: Never used   Substance and Sexual Activity    Alcohol use: No     Comment: Heavy in past, none in 33 years    Drug use: Never    Sexual activity: Not Currently     Partners: Female     Birth control/protection: None       Review of Systems:  The following systems were reviewed as they relate to the cardiovascular system: Constitutional, Eyes, ENT, Cardiovascular, Respiratory, Gastrointestinal, Integumentary, Neurological, Psychiatric, Hematologic, Endocrine, Musculoskeletal, and Genitourinary. The pertinent cardiovascular findings are reported above with all other cardiovascular points within those systems being negative.    Diagnostic Study Review:     Current Electrocardiogram:    ECG 12 Lead    Date/Time: 1/26/2024 2:26 PM  Performed by: Julio C Sanchez MD    Authorized by: Julio C Sanchez MD  Comparison: compared with previous ECG   Similar to previous ECG  Rhythm: sinus rhythm  Rate: normal  BPM: 73  Conduction: 1st degree AV block  QRS axis: normal  Other findings: non-specific ST-T wave changes    Clinical impression: abnormal EKG                NOTE: The following portions of the patient's history were reviewed and updated this visit as appropriate: allergies, current medications, past family history, past medical history, past social history, past surgical history and problem list.

## 2024-02-06 ENCOUNTER — OFFICE VISIT (OUTPATIENT)
Dept: GASTROENTEROLOGY | Facility: CLINIC | Age: 84
End: 2024-02-06
Payer: MEDICARE

## 2024-02-06 VITALS
HEART RATE: 66 BPM | DIASTOLIC BLOOD PRESSURE: 73 MMHG | TEMPERATURE: 97.7 F | BODY MASS INDEX: 29.12 KG/M2 | HEIGHT: 66 IN | SYSTOLIC BLOOD PRESSURE: 113 MMHG | WEIGHT: 181.2 LBS

## 2024-02-06 DIAGNOSIS — K59.01 SLOW TRANSIT CONSTIPATION: ICD-10-CM

## 2024-02-06 DIAGNOSIS — K22.4 ESOPHAGEAL DYSMOTILITY: ICD-10-CM

## 2024-02-06 DIAGNOSIS — K21.9 GASTROESOPHAGEAL REFLUX DISEASE, UNSPECIFIED WHETHER ESOPHAGITIS PRESENT: Primary | ICD-10-CM

## 2024-02-06 PROCEDURE — 1159F MED LIST DOCD IN RCRD: CPT | Performed by: PHYSICIAN ASSISTANT

## 2024-02-06 PROCEDURE — 3074F SYST BP LT 130 MM HG: CPT | Performed by: PHYSICIAN ASSISTANT

## 2024-02-06 PROCEDURE — 1160F RVW MEDS BY RX/DR IN RCRD: CPT | Performed by: PHYSICIAN ASSISTANT

## 2024-02-06 PROCEDURE — 3078F DIAST BP <80 MM HG: CPT | Performed by: PHYSICIAN ASSISTANT

## 2024-02-06 PROCEDURE — 99214 OFFICE O/P EST MOD 30 MIN: CPT | Performed by: PHYSICIAN ASSISTANT

## 2024-02-06 RX ORDER — HYOSCYAMINE SULFATE EXTENDED-RELEASE 0.38 MG/1
0.38 TABLET ORAL EVERY 12 HOURS
Qty: 60 TABLET | Refills: 4 | Status: SHIPPED | OUTPATIENT
Start: 2024-02-06

## 2024-02-06 NOTE — PROGRESS NOTES
"Chief Complaint  Heartburn and Difficulty Swallowing    Subjective          History Of Present Illness:    Panfilo Feliz is a  83 y.o. male patient of Dr. Perez who presents for a follow-up for GERD.  Patient has a history of esophageal dysmotility, delayed gastric emptying, MGUS, neuropathy, ascending aortic aneurysm, moderate aortic valve regurgitation, hypertension, depression.    Patient reports that he still has occasional trouble swallowing. Patient denies significant heartburn, abdominal pain, vomiting.  Occasional nausea in the mornings.  Patient has had reduced appetite over the last couple of years. Patient reports he has lost some weight.  Patient endorses chronic constipation.  He takes 3 Colace daily which generally keeps him regular but occasionally still struggles with constipation. No blood in the stool or lower abdominal pain.    Of note patient is considering open heart surgery for ascending aortic aneurysm and aortic valve regurgitation.    Additional data reviewed:  EGD 10/4/2022 -empiric dilation of the esophagus, normal stomach and duodenum, 2 cm hiatal hernia. Benign pathology.  Last colonoscopy reportedly in 2013 and normal.  Esophagram 1/14/2022 with nonspecific mild esophageal dysmotility, sliding type hiatal hernia.  GES 2/22/2021 with mild delayed gastric emptying.    Objective   Vital Signs:   /73   Pulse 66   Temp 97.7 °F (36.5 °C)   Ht 167.6 cm (66\")   Wt 82.2 kg (181 lb 3.2 oz)   BMI 29.25 kg/m²       Physical Exam  Vitals reviewed.   Constitutional:       General: He is not in acute distress.     Appearance: Normal appearance. He is not ill-appearing.   HENT:      Head: Normocephalic and atraumatic.      Nose: Nose normal.      Mouth/Throat:      Pharynx: Oropharynx is clear.   Eyes:      Extraocular Movements: Extraocular movements intact.      Conjunctiva/sclera: Conjunctivae normal.      Pupils: Pupils are equal, round, and reactive to light.   Pulmonary:      Effort: " Pulmonary effort is normal.   Abdominal:      General: There is no distension.      Palpations: There is no mass.      Tenderness: There is no abdominal tenderness.   Musculoskeletal:         General: No swelling. Normal range of motion.      Cervical back: Normal range of motion.   Skin:     General: Skin is warm and dry.      Findings: No bruising or rash.   Neurological:      General: No focal deficit present.      Mental Status: He is alert and oriented to person, place, and time.      Motor: No weakness.      Gait: Gait normal.   Psychiatric:         Mood and Affect: Mood normal.          Result Review :   The following data was reviewed by: Ramandeep Angel PA-C on 02/06/2024:  CMP          6/2/2023    13:35 7/11/2023    13:56 1/16/2024    12:48   CMP   Glucose  109  89    BUN  23  25    Creatinine 1.50  1.22  1.20    EGFR 46.5  59.2  60.4    Sodium  139  141    Potassium  4.7  5.3    Chloride  103  105    Calcium  9.4  9.6    Total Protein  6.4  6.8    Total Protein  6.7  7.0    Albumin  3.5     4.2  4.1     3.7    Globulin  2.9  3.1    Globulin  2.5  2.9    Total Bilirubin  0.4  0.4    Alkaline Phosphatase  52  58    AST (SGOT)  27  25    ALT (SGPT)  5  <5    Albumin/Globulin Ratio  1.7  1.4    BUN/Creatinine Ratio  18.9  20.8    Anion Gap  10.0  7.9      CBC          7/11/2023    13:56 1/16/2024    12:48   CBC   WBC 4.27  3.72    RBC 4.48  4.71    Hemoglobin 13.3  13.8    Hematocrit 39.6     41.7  43.2    MCV 93.1  91.7    MCH 29.7  29.3    MCHC 31.9  31.9    RDW 13.9  13.8    Platelets 150  155            Assessment and Plan    Diagnoses and all orders for this visit:    1. Gastroesophageal reflux disease, unspecified whether esophagitis present (Primary)  Overview:  Added automatically from request for surgery [id number]      2. Esophageal dysmotility  -     hyoscyamine (LEVBID) 0.375 MG 12 hr tablet; Take 1 tablet by mouth Every 12 (Twelve) Hours.  Dispense: 60 tablet; Refill: 4    3. Slow transit  constipation  Overview:  Added automatically from request for surgery [id number]         Continue pantoprazole 40 mg twice daily  Trial of extended release hyoscyamine for esophageal dysmotility.  Discussed side effects such as dry mouth or lethargy.  Patient has been taking Colace up to 3 times a day with remittent episodes of constipation.  Recommend MiraLAX half a capful to a capful daily     Follow Up   Return in about 3 months (around 5/6/2024).    Dragon dictation used throughout this note.            Ramandeep De Leon PA-C   [facility]  [address] [place] [address]  Office: [phone]

## 2024-02-19 RX ORDER — PANTOPRAZOLE SODIUM 40 MG/1
40 TABLET, DELAYED RELEASE ORAL 2 TIMES DAILY
Qty: 60 TABLET | Refills: 1 | Status: SHIPPED | OUTPATIENT
Start: 2024-02-19

## 2024-02-27 ENCOUNTER — OFFICE VISIT (OUTPATIENT)
Dept: CARDIOLOGY | Facility: CLINIC | Age: 84
End: 2024-02-27
Payer: MEDICARE

## 2024-02-27 VITALS
DIASTOLIC BLOOD PRESSURE: 88 MMHG | WEIGHT: 180 LBS | HEART RATE: 76 BPM | HEIGHT: 60 IN | SYSTOLIC BLOOD PRESSURE: 130 MMHG | BODY MASS INDEX: 35.34 KG/M2 | OXYGEN SATURATION: 97 %

## 2024-02-27 DIAGNOSIS — I71.21 ANEURYSM OF ASCENDING AORTA WITHOUT RUPTURE: ICD-10-CM

## 2024-02-27 DIAGNOSIS — I35.1 NONRHEUMATIC AORTIC VALVE INSUFFICIENCY: ICD-10-CM

## 2024-02-27 DIAGNOSIS — E78.2 MIXED HYPERLIPIDEMIA: ICD-10-CM

## 2024-02-27 DIAGNOSIS — I10 PRIMARY HYPERTENSION: Primary | ICD-10-CM

## 2024-02-27 PROCEDURE — 1160F RVW MEDS BY RX/DR IN RCRD: CPT | Performed by: INTERNAL MEDICINE

## 2024-02-27 PROCEDURE — 99214 OFFICE O/P EST MOD 30 MIN: CPT | Performed by: INTERNAL MEDICINE

## 2024-02-27 PROCEDURE — 93000 ELECTROCARDIOGRAM COMPLETE: CPT | Performed by: INTERNAL MEDICINE

## 2024-02-27 PROCEDURE — 1159F MED LIST DOCD IN RCRD: CPT | Performed by: INTERNAL MEDICINE

## 2024-02-27 PROCEDURE — 3079F DIAST BP 80-89 MM HG: CPT | Performed by: INTERNAL MEDICINE

## 2024-02-27 PROCEDURE — 3075F SYST BP GE 130 - 139MM HG: CPT | Performed by: INTERNAL MEDICINE

## 2024-02-27 NOTE — PATIENT INSTRUCTIONS
Decrease atenolol to 12.5 mg daily  Consider moving forward with surgery  Go ahead and get CT echo now  Follow-up 6 weeks

## 2024-02-27 NOTE — PROGRESS NOTES
Cardiology Office Visit      Encounter Date:  2024    PATIENT IDENTIFICATION    Name: Panfilo Feliz  Age: 83 y.o. Sex: male : 1940  MRN: [id number]    Reason For Followup:  Valvular heart disease  Ascending aortic aneurysm    Brief Clinical History:  Dear Moreno Mello, [facility]    I had the pleasure of seeing Panfilo Feliz today. As you are well aware, this is a 83 y.o. male with no established history of ischemic heart disease.  He does have a history of aortic insufficiency, ascending aortic aneurysm, hypothyroidism, neuropathy, and acid reflux.  He presents today for follow-up on the above conditions.    Interval History:  2024                                           The patient presents today for an acute visit.  He reports that he saw his cardiologist last month but has deteriorated since then.  He is specifically citing issues with lightheadedness and a cold sensation all over.  He reports that over the past month this has significantly deteriorated.  Interestingly, the lightheadedness appears to occur mostly in the evenings around 630 or so.  He reports that he had been going to the gym a couple of times a week but now has not been able to do so because of his symptoms.  His neuropathy continues to deteriorate.  He reports that they have found nothing to help out with this.    He has started some medications to help with his neuropathy but none have been successful.  It is possible that some of the side effects from his medicines could be lending assistance to his symptoms.  We discussed options and we will have him back off on his atenolol to see if this helps.    In the bigger picture, the patient has been seen by cardiothoracic surgery.  Per the patient and his wife, surgery was offered at the last visit with Dr. Puente.  The patient was reluctant to move forward.    We had a lengthy discussion today.  I discussed that with the patient's deteriorating neuropathy and now endurance  "issues, it may be worthwhile to consider moving forward with surgery before his neuropathy and endurance worsens to a point that would preclude him from being a candidate for surgery.  He has a CT scan scheduled for later this year as well as an echocardiogram.  We will move those up.  He will need a cardiac catheterization as well.  I have encouraged him to reach out to Dr. Puente in order to schedule a follow-up and discuss moving forward with surgery.    Assessment & Plan    Impressions:  Ascending aortic aneurysm  Aortic insufficiency  Depression/anxiety  Hypothyroidism  Peripheral neuropathy  Acid reflux    Recommendations:  Move up plan for CT and echocardiogram due to deteriorating conditions  Move up appointment with CT surgery  Plan for cardiac catheterization after imaging has been obtained  Follow-up in 6 weeks    Diagnoses and all orders for this visit:    1. Primary hypertension (Primary)  -     ECG 12 Lead    2. Mixed hyperlipidemia  -     ECG 12 Lead    3. Aneurysm of ascending aorta without rupture  -     ECG 12 Lead    4. Nonrheumatic aortic valve insufficiency  -     ECG 12 Lead          Objective:    Vitals:  Vitals:    02/27/24 1412   BP: 130/88   BP Location: Left arm   Pulse: 76   SpO2: 97%   Weight: 81.6 kg (180 lb)   Height: 66 cm (25.98\")     Body mass index is 187.44 kg/m².      Physical Exam:    General: Alert, cooperative, no distress, appears stated age  Head:  Normocephalic, atraumatic, mucous membranes moist  Eyes:  Conjunctiva/corneas clear, EOM's intact     Neck:  Supple,  no bruit    Lungs: Coarse and diminished bilaterally  Chest wall: No tenderness  Heart::  Regular rate and rhythm, S1 and S2 normal, 1/6 diastolic murmur.  No rub or gallop  Abdomen: Soft, non-tender, nondistended bowel sounds active  Extremities: No cyanosis, clubbing, or edema  Pulses: Diminished pedal pulses  Skin:  No rashes or lesions  Neuro/psych: A&O x3. CN II through XII are grossly intact with appropriate " affect      Allergies:  Allergies   Allergen Reactions    Statins Myalgia       Medication Review:     Current Outpatient Medications:     atenolol (TENORMIN) 25 MG tablet, Take 1 tablet by mouth Daily., Disp: , Rfl:     cholecalciferol (VITAMIN D3) 25 MCG (1000 UT) tablet, Take 1 tablet by mouth Daily. 25 mcg BID, Disp: , Rfl:     docusate sodium (COLACE) 100 MG capsule, Take 3 capsules by mouth 2 (Two) Times a Day., Disp: , Rfl:     escitalopram (LEXAPRO) 20 MG tablet, TAKE ONE TABLET BY MOUTH DAILY, Disp: 30 tablet, Rfl: 6    levothyroxine (SYNTHROID, LEVOTHROID) 112 MCG tablet, Take 1 tablet by mouth Daily., Disp: , Rfl:     Magnesium Hydroxide (DULCOLAX PO), Take 400 mg by mouth Daily., Disp: , Rfl:     Menthol, Topical Analgesic, (BIOFREEZE EX), Apply  topically As Needed., Disp: , Rfl:     pantoprazole (PROTONIX) 40 MG EC tablet, TAKE 1 TABLET BY MOUTH TWICE A DAY, Disp: 60 tablet, Rfl: 1    pregabalin (LYRICA) 75 MG capsule, Take 1 capsule by mouth 2 (Two) Times a Day for 30 days., Disp: 60 capsule, Rfl: 0    vitamin B-12 (CYANOCOBALAMIN) 250 MCG tablet, TAKE ONE TABLET BY MOUTH DAILY, Disp: 30 tablet, Rfl: 4    hyoscyamine (LEVBID) 0.375 MG 12 hr tablet, Take 1 tablet by mouth Every 12 (Twelve) Hours. (Patient not taking: Reported on 2/27/2024), Disp: 60 tablet, Rfl: 4    ibuprofen (ADVIL,MOTRIN) 200 MG tablet, Take 1 tablet by mouth As Needed for Mild Pain. (Patient not taking: Reported on 2/27/2024), Disp: , Rfl:     Family History:  Family History   Problem Relation Age of Onset    Cancer Mother 85        Breast    COPD Father     Cancer Brother 59        Unknown type    Hypertension Daughter        Past Medical History:  Past Medical History:   Diagnosis Date    AAA (abdominal aortic aneurysm)     Abnormal ECG 1980’s    Alcoholism     None since 1991    Aneurysm 2019    aortic arch    Arthritis     Basal cell carcinoma (BCC) of face     Cholelithiasis 1990’s    Cholecystectomy    Colon polyp     Colon  polyps     Coronary artery disease     Difficulty walking     Disease of thyroid gland     DJD (degenerative joint disease)     Gastritis     Gastroparesis     GERD (gastroesophageal reflux disease)     GI (gastrointestinal bleed)     Hernia     Hiatal    History of bone marrow biopsy     [place] (hard of hearing)     Hyperlipidemia     Hypertension     Hypothyroidism     IgG monoclonal gammopathy     Multiple duodenal ulcers     Neuropathy     PONV (postoperative nausea and vomiting)     Prostate cancer     Reflux esophagitis     Ulcer     WBC decreased        Past Surgical History:  Past Surgical History:   Procedure Laterality Date    CHOLECYSTECTOMY  1988    COLON SURGERY  1998    COLONOSCOPY  02/2013    ENDOSCOPY  2012    ESOPHAGOGASTRODUODENOSCOPY WITH DILATION OF SHATZKI'S RING    ENDOSCOPY N/A 10/09/2020    Procedure: ESOPHAGOGASTRODUODENOSCOPY with cold bx;  Surgeon: Jake Perez MD;  Location:  [facility];  Service: Gastroenterology;  Laterality: N/A;  pre - nausea  post - duodenal ulcer, gastrits, gastric ulcer, hiatal hernia    ENDOSCOPY N/A 10/04/2022    Procedure: ESOPHAGOGASTRODUODENOSCOPY with biopsies with esophageal dilatation with 56 cui;  Surgeon: Jake Perez MD;  Location:  [facility];  Service: Gastroenterology;  Laterality: N/A;  pre-dysphagia  post-normal     ENDOSCOPY  10/04/2022    [facility]    EYE SURGERY Bilateral     cataracts    LAMINECTOMY  2013    decompression L3-4, L4-5    PROSTATECTOMY  2007    SKIN BIOPSY      TONSILLECTOMY AND ADENOIDECTOMY      1940s    UPPER GASTROINTESTINAL ENDOSCOPY      VASECTOMY      1970s       Social History:  Social History     Socioeconomic History    Marital status:      Spouse name: Meghan    Years of education: College   Tobacco Use    Smoking status: Never    Smokeless tobacco: Never   Vaping Use    Vaping Use: Never used   Substance and Sexual Activity    Alcohol use: No     Comment: Heavy in past, none in 33 years    Drug  "use: Never    Sexual activity: Not Currently     Partners: Female     Birth control/protection: None       Review of Systems:  The following systems were reviewed as they relate to the cardiovascular system: Constitutional, Eyes, ENT, Cardiovascular, Respiratory, Gastrointestinal, Integumentary, Neurological, Psychiatric, Hematologic, Endocrine, Musculoskeletal, and Genitourinary. The pertinent cardiovascular findings are reported above with all other cardiovascular points within those systems being negative.    Diagnostic Study Review:     Current Electrocardiogram:    ECG 12 Lead    Date/Time: 2/27/2024 5:47 PM  Performed by: Dilan Houston DO    Authorized by: Dilan Houston DO  Comparison: not compared with previous ECG   Previous ECG: no previous ECG available  Comments: Normal sinus rhythm with a ventricular rate of 80 bpm.  Nonspecific interventricular conduction delay.  First-degree AV block.  Consider left atrial enlargement.  Normal QT and QTc intervals.          Laboratory Data:  Lab Results   Component Value Date    GLUCOSE 89 01/16/2024    BUN 25 (H) 01/16/2024    CREATININE 1.20 01/16/2024    EGFRIFNONA 58 (L) 02/09/2022    BCR 20.8 01/16/2024    K 5.3 (H) 01/16/2024    CO2 28.1 01/16/2024    CALCIUM 9.6 01/16/2024    PROTENTOTREF 6.8 01/16/2024    ALBUMIN 4.1 01/16/2024    ALBUMIN 3.7 01/16/2024    LABIL2 1.2 01/16/2024    AST 25 01/16/2024    ALT <5 01/16/2024     Lab Results   Component Value Date    GLUCOSE 89 01/16/2024    CALCIUM 9.6 01/16/2024     01/16/2024    K 5.3 (H) 01/16/2024    CO2 28.1 01/16/2024     01/16/2024    BUN 25 (H) 01/16/2024    CREATININE 1.20 01/16/2024    EGFRIFNONA 58 (L) 02/09/2022    BCR 20.8 01/16/2024    ANIONGAP 7.9 01/16/2024     Lab Results   Component Value Date    WBC 3.72 01/16/2024    HGB 13.8 01/16/2024    HCT 43.2 01/16/2024    MCV 91.7 01/16/2024     01/16/2024     No results found for: \"CHOL\", \"CHLPL\", \"TRIG\", " "\"HDL\", \"LDL\", \"LDLDIRECT\"  Lab Results   Component Value Date    HGBA1C 5.52 03/30/2021     No results found for: \"INR\", \"PROTIME\"    Most Recent Echo:  Results for orders placed during the hospital encounter of 07/05/23    Adult Transthoracic Echo Complete W/ Color, Spectral and Contrast if Necessary Per Protocol    Interpretation Summary    Left ventricular systolic function is normal. Calculated left ventricular EF = 56% Left ventricular ejection fraction appears to be 56 - 60%.    Left ventricular wall thickness is consistent with mild concentric hypertrophy.    Left ventricular diastolic function is consistent with (grade I) impaired relaxation.    The left atrial cavity is mildly dilated.    Estimated right ventricular systolic pressure from tricuspid regurgitation is normal (<35 mmHg).    Moderate dilation of the aortic root is present. Severe dilation of the ascending aorta is present.    Ascending aortic aneurysm measuring 4.8 cm       Most Recent Stress Test:  Results for orders placed during the hospital encounter of 07/07/22    Treadmill Stress Test    Interpretation Summary  · Arrhythmias were not significant during stress.  · Equivocal ECG evidence of myocardial ischemia. Indeterminate clinical evidence of myocardial ischemia. Findings consistent with an equivocal ECG stress test.       Most Recent Cardiac Catheterization:   No results found for this or any previous visit.       NOTE: The following portions of the patient's note were reviewed, confirmed and/or updated this visit as appropriate: History of present illness/Interval history, physical examination, assessment & plan, allergies, current medications, past family history, past medical history, past social history, past surgical history and problem list.    Labs pertinent to today's visit on 02/27/2024 (including but not limited to CBC, CMP, and lipid profiles) were requested from the patient's primary care provider/hospital/clinical " "laboratory.  If the labs were available for the visit, they were reviewed with the patient.  If they were not available, when received, special interest will be made to the labs pertinent to this visit.  The patient's most recent \"in-house\" labs are noted below and have been reviewed.  Outside labs pertinent to this visit are scanned into the record and have been reviewed.    Discussions held today, 02/27/2024,regarding procedures included risk, benefits, and options including but not limited to: Death, MI, stroke, pain, bleeding, infection, and possible need for vascular/thoracic/cardiothoracic surgery.    Copied information within this note was reviewed and is current as of 02/27/2024.    Assessment and plan noted herein represents the current plan of care as of 02/27/2024.    Significant resources from our office and staff are inherent in engaging this patient in a continuous and active collaborative plan of care related to their chronic cardiovascular conditions outlined below.  The management of these conditions requires the direction of our service with specialized clinical knowledge, skills, and experience.  This collaborative care includes but is not limited to patient education, expectations and responsibilities, shared decision making around therapeutic goals, and shared commitments to achieve those goals.  "

## 2024-03-18 ENCOUNTER — HOSPITAL ENCOUNTER (OUTPATIENT)
Dept: CARDIOLOGY | Facility: HOSPITAL | Age: 84
Discharge: HOME | End: 2024-03-18
Admitting: INTERNAL MEDICINE
Payer: MEDICARE

## 2024-03-18 VITALS
WEIGHT: 180 LBS | BODY MASS INDEX: 21.92 KG/M2 | HEART RATE: 89 BPM | SYSTOLIC BLOOD PRESSURE: 102 MMHG | DIASTOLIC BLOOD PRESSURE: 67 MMHG | HEIGHT: 76 IN

## 2024-03-18 DIAGNOSIS — I71.21 ANEURYSM OF ASCENDING AORTA WITHOUT RUPTURE: ICD-10-CM

## 2024-03-18 DIAGNOSIS — E78.2 MIXED HYPERLIPIDEMIA: ICD-10-CM

## 2024-03-18 DIAGNOSIS — I10 PRIMARY HYPERTENSION: ICD-10-CM

## 2024-03-18 DIAGNOSIS — I35.1 NONRHEUMATIC AORTIC VALVE INSUFFICIENCY: ICD-10-CM

## 2024-03-18 PROCEDURE — 93306 TTE W/DOPPLER COMPLETE: CPT

## 2024-03-18 PROCEDURE — 93306 TTE W/DOPPLER COMPLETE: CPT | Performed by: INTERNAL MEDICINE

## 2024-03-19 LAB
ASCENDING AORTA: 4.7 CM
BH CV ECHO MEAS - ACS: 2.12 CM
BH CV ECHO MEAS - AI P1/2T: 579.6 MSEC
BH CV ECHO MEAS - AO MAX PG: 7.3 MMHG
BH CV ECHO MEAS - AO MEAN PG: 3.9 MMHG
BH CV ECHO MEAS - AO ROOT DIAM: 4.1 CM
BH CV ECHO MEAS - AO V2 MAX: 135.2 CM/SEC
BH CV ECHO MEAS - AO V2 VTI: 22.2 CM
BH CV ECHO MEAS - AVA(I,D): 4.8 CM2
BH CV ECHO MEAS - EDV(CUBED): 68.5 ML
BH CV ECHO MEAS - EDV(MOD-SP2): 57.9 ML
BH CV ECHO MEAS - EF(MOD-BP): 60 %
BH CV ECHO MEAS - EF(MOD-SP2): 68.1 %
BH CV ECHO MEAS - ESV(CUBED): 23.7 ML
BH CV ECHO MEAS - ESV(MOD-SP2): 18.4 ML
BH CV ECHO MEAS - FS: 29.8 %
BH CV ECHO MEAS - IVS/LVPW: 0.96 CM
BH CV ECHO MEAS - IVSD: 1.14 CM
BH CV ECHO MEAS - LA DIMENSION: 2.6 CM
BH CV ECHO MEAS - LV MASS(C)D: 164.6 GRAMS
BH CV ECHO MEAS - LV MAX PG: 5.6 MMHG
BH CV ECHO MEAS - LV MEAN PG: 2.9 MMHG
BH CV ECHO MEAS - LV V1 MAX: 118.7 CM/SEC
BH CV ECHO MEAS - LV V1 VTI: 20.4 CM
BH CV ECHO MEAS - LVIDD: 4.1 CM
BH CV ECHO MEAS - LVIDS: 2.9 CM
BH CV ECHO MEAS - LVOT AREA: 5.2 CM2
BH CV ECHO MEAS - LVOT DIAM: 2.6 CM
BH CV ECHO MEAS - LVPWD: 1.19 CM
BH CV ECHO MEAS - MR MAX PG: 57.2 MMHG
BH CV ECHO MEAS - MR MAX VEL: 378 CM/SEC
BH CV ECHO MEAS - MV A MAX VEL: 74.8 CM/SEC
BH CV ECHO MEAS - MV DEC SLOPE: 262.8 CM/SEC2
BH CV ECHO MEAS - MV DEC TIME: 0.19 SEC
BH CV ECHO MEAS - MV E MAX VEL: 50.4 CM/SEC
BH CV ECHO MEAS - MV E/A: 0.67
BH CV ECHO MEAS - MV MAX PG: 2.3 MMHG
BH CV ECHO MEAS - MV MEAN PG: 1 MMHG
BH CV ECHO MEAS - MV V2 VTI: 18.2 CM
BH CV ECHO MEAS - MVA(VTI): 5.9 CM2
BH CV ECHO MEAS - PA V2 MAX: 77.3 CM/SEC
BH CV ECHO MEAS - RAP SYSTOLE: 3 MMHG
BH CV ECHO MEAS - RV MAX PG: 0.75 MMHG
BH CV ECHO MEAS - RV V1 MAX: 43.3 CM/SEC
BH CV ECHO MEAS - RV V1 VTI: 7.7 CM
BH CV ECHO MEAS - RVSP: 24 MMHG
BH CV ECHO MEAS - SV(LVOT): 106.4 ML
BH CV ECHO MEAS - SV(MOD-SP2): 39.4 ML
BH CV ECHO MEAS - TR MAX PG: 21 MMHG
BH CV ECHO MEAS - TR MAX VEL: 229.1 CM/SEC

## 2024-04-04 ENCOUNTER — HOSPITAL ENCOUNTER (OUTPATIENT)
Dept: CT IMAGING | Facility: HOSPITAL | Age: 84
Discharge: HOME | End: 2024-04-04
Admitting: NURSE PRACTITIONER
Payer: MEDICARE

## 2024-04-04 DIAGNOSIS — I71.21 ANEURYSM OF ASCENDING AORTA WITHOUT RUPTURE: ICD-10-CM

## 2024-04-04 LAB
CREAT BLDA-MCNC: 1.3 MG/DL (ref 0.6–1.3)
EGFRCR SERPLBLD CKD-EPI 2021: 54.8 ML/MIN/1.73

## 2024-04-04 PROCEDURE — 71275 CT ANGIOGRAPHY CHEST: CPT

## 2024-04-04 PROCEDURE — 25510000001 IOPAMIDOL PER 1 ML: Performed by: NURSE PRACTITIONER

## 2024-04-04 PROCEDURE — 82565 ASSAY OF CREATININE: CPT

## 2024-04-04 RX ADMIN — IOPAMIDOL 100 ML: 755 INJECTION, SOLUTION INTRAVENOUS at 14:48

## 2024-04-05 ENCOUNTER — TELEPHONE (OUTPATIENT)
Dept: CARDIAC SURGERY | Facility: CLINIC | Age: 84
End: 2024-04-05
Payer: MEDICARE

## 2024-04-09 ENCOUNTER — OFFICE VISIT (OUTPATIENT)
Dept: CARDIOLOGY | Facility: CLINIC | Age: 84
End: 2024-04-09
Payer: MEDICARE

## 2024-04-09 VITALS
SYSTOLIC BLOOD PRESSURE: 140 MMHG | HEART RATE: 84 BPM | DIASTOLIC BLOOD PRESSURE: 90 MMHG | HEIGHT: 76 IN | WEIGHT: 183 LBS | BODY MASS INDEX: 22.29 KG/M2 | OXYGEN SATURATION: 96 %

## 2024-04-09 DIAGNOSIS — E78.2 MIXED HYPERLIPIDEMIA: ICD-10-CM

## 2024-04-09 DIAGNOSIS — I35.1 NONRHEUMATIC AORTIC VALVE INSUFFICIENCY: ICD-10-CM

## 2024-04-09 DIAGNOSIS — I71.21 ANEURYSM OF ASCENDING AORTA WITHOUT RUPTURE: Primary | ICD-10-CM

## 2024-04-09 PROCEDURE — 3080F DIAST BP >= 90 MM HG: CPT | Performed by: INTERNAL MEDICINE

## 2024-04-09 PROCEDURE — 3077F SYST BP >= 140 MM HG: CPT | Performed by: INTERNAL MEDICINE

## 2024-04-09 PROCEDURE — 1159F MED LIST DOCD IN RCRD: CPT | Performed by: INTERNAL MEDICINE

## 2024-04-09 PROCEDURE — 99214 OFFICE O/P EST MOD 30 MIN: CPT | Performed by: INTERNAL MEDICINE

## 2024-04-09 PROCEDURE — 1160F RVW MEDS BY RX/DR IN RCRD: CPT | Performed by: INTERNAL MEDICINE

## 2024-04-09 RX ORDER — ASPIRIN 81 MG/1
324 TABLET, CHEWABLE ORAL ONCE
OUTPATIENT
Start: 2024-04-09 | End: 2024-04-09

## 2024-04-09 RX ORDER — HYDROCODONE BITARTRATE AND ACETAMINOPHEN 5; 325 MG/1; MG/1
1 TABLET ORAL EVERY 4 HOURS PRN
OUTPATIENT
Start: 2024-04-09 | End: 2024-04-19

## 2024-04-09 RX ORDER — ASPIRIN 81 MG/1
81 TABLET ORAL DAILY
OUTPATIENT
Start: 2024-04-09

## 2024-04-09 RX ORDER — SODIUM CHLORIDE 0.9 % (FLUSH) 0.9 %
3-10 SYRINGE (ML) INJECTION AS NEEDED
OUTPATIENT
Start: 2024-04-09

## 2024-04-09 RX ORDER — SODIUM CHLORIDE 9 MG/ML
40 INJECTION, SOLUTION INTRAVENOUS AS NEEDED
OUTPATIENT
Start: 2024-04-09

## 2024-04-09 RX ORDER — NITROGLYCERIN 0.4 MG/1
0.4 TABLET SUBLINGUAL
OUTPATIENT
Start: 2024-04-09

## 2024-04-09 RX ORDER — SODIUM CHLORIDE 0.9 % (FLUSH) 0.9 %
3 SYRINGE (ML) INJECTION EVERY 12 HOURS SCHEDULED
OUTPATIENT
Start: 2024-04-09

## 2024-04-09 RX ORDER — ONDANSETRON 2 MG/ML
4 INJECTION INTRAMUSCULAR; INTRAVENOUS EVERY 6 HOURS PRN
OUTPATIENT
Start: 2024-04-09

## 2024-04-09 NOTE — H&P (VIEW-ONLY)
Cardiology Office Visit      Encounter Date:  2024    PATIENT IDENTIFICATION    Name: Panfilo Feliz  Age: 83 y.o. Sex: male : 1940  MRN: [id number]    Reason For Followup:  Valvular heart disease  Ascending aortic aneurysm    Brief Clinical History:  Dear Moreno Mello, [facility]    I had the pleasure of seeing Panfilo Feliz today. As you are well aware, this is a 83 y.o. male with no established history of ischemic heart disease.  He does have a history of aortic insufficiency, ascending aortic aneurysm, hypothyroidism, neuropathy, and acid reflux.  He presents today for follow-up on the above conditions.    Interval History:  2024                                           The patient presents today for an acute visit.  He reports that he saw his cardiologist last month but has deteriorated since then.  He is specifically citing issues with lightheadedness and a cold sensation all over.  He reports that over the past month this has significantly deteriorated.  Interestingly, the lightheadedness appears to occur mostly in the evenings around 630 or so.  He reports that he had been going to the gym a couple of times a week but now has not been able to do so because of his symptoms.  His neuropathy continues to deteriorate.  He reports that they have found nothing to help out with this.    He has started some medications to help with his neuropathy but none have been successful.  It is possible that some of the side effects from his medicines could be lending assistance to his symptoms.  We discussed options and we will have him back off on his atenolol to see if this helps.    In the bigger picture, the patient has been seen by cardiothoracic surgery.  Per the patient and his wife, surgery was offered at the last visit with Dr. Puente.  The patient was reluctant to move forward.    We had a lengthy discussion today.  I discussed that with the patient's deteriorating neuropathy and now endurance  issues, it may be worthwhile to consider moving forward with surgery before his neuropathy and endurance worsens to a point that would preclude him from being a candidate for surgery.  He has a CT scan scheduled for later this year as well as an echocardiogram.  We will move those up.  He will need a cardiac catheterization as well.  I have encouraged him to reach out to Dr. Puente in order to schedule a follow-up and discuss moving forward with surgery.    04/09/2024        His blood pressures have been on the low side (80s) and was was getting orthostatic. His BP today is a little on the higher side.  He is having some issues with shortness of breath with exertion.  He underwent a CT scan that demonstrated an ascending aortic aneurysm of 5.1 cm.  We again discussed the options.  He is ready to move forward with having these items addressed.  As such we will schedule him for a cardiac catheterization as well as a transesophageal echocardiogram.  He is scheduled to see Dr. Puente next week.  His blood pressure is elevated today however he has not tolerated titrations of his antihypertensives.  He reports becoming dizzy and lightheaded and nonfunctional.    Assessment & Plan    Impressions:  Ascending aortic aneurysm  Aortic insufficiency  Depression/anxiety  Hypothyroidism  Peripheral neuropathy  Acid reflux    Recommendations:  Risk benefits and options discussed with patient and spouse.  He wishes to proceed with cardiac catheterization and transesophageal echocardiogram.  Follow through with visit with Dr. Puente as scheduled  Follow-up in early June    Diagnoses and all orders for this visit:    1. Aneurysm of ascending aorta without rupture (Primary)  -     Case Request Cath Lab: Right and Left Heart Cath with possible PCI; Standing  -     COVID PRE-OP / PRE-PROCEDURE SCREENING ORDER (NO ISOLATION) - Swab, Nasopharynx; Future  -     CBC and Differential; Future  -     Basic Metabolic Panel; Future  -      Protime-INR; Future  -     Lipid Panel; Future  -     ECG 12 Lead; Future  -     sodium chloride 0.9 % flush 3 mL  -     sodium chloride 0.9 % flush 3-10 mL  -     sodium chloride 0.9 % infusion 40 mL  -     aspirin chewable tablet 324 mg  -     aspirin EC tablet 81 mg  -     ondansetron (ZOFRAN) injection 4 mg  -     methylPREDNISolone sodium succinate (SOLU-Medrol) 125 mg in sodium chloride 0.9 % 100 mL IVPB  -     HYDROcodone-acetaminophen (NORCO) 5-325 MG per tablet 1 tablet  -     nitroglycerin (NITROSTAT) SL tablet 0.4 mg  -     sodium chloride 0.9 % bolus 249 mL  -     Case Request Cath Lab: Right and Left Heart Cath with possible PCI  -     Adult Transesophageal Echo (JOSE) W/ Cont if Necessary Per Protocol; Future    2. Nonrheumatic aortic valve insufficiency  -     Case Request Cath Lab: Right and Left Heart Cath with possible PCI; Standing  -     COVID PRE-OP / PRE-PROCEDURE SCREENING ORDER (NO ISOLATION) - Swab, Nasopharynx; Future  -     CBC and Differential; Future  -     Basic Metabolic Panel; Future  -     Protime-INR; Future  -     Lipid Panel; Future  -     ECG 12 Lead; Future  -     sodium chloride 0.9 % flush 3 mL  -     sodium chloride 0.9 % flush 3-10 mL  -     sodium chloride 0.9 % infusion 40 mL  -     aspirin chewable tablet 324 mg  -     aspirin EC tablet 81 mg  -     ondansetron (ZOFRAN) injection 4 mg  -     methylPREDNISolone sodium succinate (SOLU-Medrol) 125 mg in sodium chloride 0.9 % 100 mL IVPB  -     HYDROcodone-acetaminophen (NORCO) 5-325 MG per tablet 1 tablet  -     nitroglycerin (NITROSTAT) SL tablet 0.4 mg  -     sodium chloride 0.9 % bolus 249 mL  -     Case Request Cath Lab: Right and Left Heart Cath with possible PCI  -     Adult Transesophageal Echo (JOSE) W/ Cont if Necessary Per Protocol; Future    3. Mixed hyperlipidemia    Other orders  -     Obtain Informed Consent; Standing  -     Obtain Informed Consent in Pre-Op; Standing  -     Oxygen Therapy- Nasal Cannula; 2 LPM;  "Standing  -     Obtain Informed Consent in PAT; Future  -     CBC & Differential; Standing  -     Basic Metabolic Panel; Standing  -     Protime-INR; Standing  -     Lipid Panel; Standing  -     ECG 12 Lead Pre-Op / Pre-Procedure; Standing  -     Insert Peripheral IV; Standing  -     Saline Lock & Maintain IV Access; Standing  -     Clip Bilateral Groins; Standing  -     Instruct Patient To Stop Heparin or Lovenox 24 Hours Before Scheduled Cardiac Procedure; Future  -     Verify On Day of Procedure: No Heparin or Lovenox Administration 24 Hours Before Cardiac Procedure; Standing  -     Instruct Patient To Stop Apixaban, Rivaroxaban, Edoxaban, Dibigatran, Vorapaxar, Atopaxar 48 Hours Before Scheduled Cardiac Procedure; Future  -     Verify On Day of Procedure: No Apixaban, Rivaroxaban, Edoxaban, Dabigatran, Vorapaxar, Atopaxar Administration 48 Hours Before Cardiac Procedure; Standing  -     metoprolol tartrate (LOPRESSOR) 25 MG tablet; Take 0.5 tablets by mouth 2 (Two) Times a Day.  Dispense: 90 tablet; Refill: 3            Objective:    Vitals:  Vitals:    04/09/24 1417   BP: 140/90   BP Location: Left arm   Patient Position: Sitting   Pulse: 84   SpO2: 96%   Weight: 83 kg (183 lb)   Height: 193 cm (76\")       Body mass index is 22.28 kg/m².      Physical Exam:    General: Alert, cooperative, no distress, appears stated age  Head:  Normocephalic, atraumatic, mucous membranes moist  Eyes:  Conjunctiva/corneas clear, EOM's intact     Neck:  Supple,  no bruit    Lungs: Coarse and diminished bilaterally  Chest wall: No tenderness  Heart::  Regular rate and rhythm, S1 and S2 normal, 1/6 diastolic murmur.  No rub or gallop  Abdomen: Soft, non-tender, nondistended bowel sounds active  Extremities: No cyanosis, clubbing, or edema  Pulses: Diminished pedal pulses  Skin:  No rashes or lesions  Neuro/psych: A&O x3. CN II through XII are grossly intact with appropriate affect      Allergies:  Allergies   Allergen Reactions    " Statins Myalgia       Medication Review:     Current Outpatient Medications:     cholecalciferol (VITAMIN D3) 25 MCG (1000 UT) tablet, Take 1 tablet by mouth Daily. 25 mcg BID, Disp: , Rfl:     docusate sodium (COLACE) 100 MG capsule, Take 3 capsules by mouth 2 (Two) Times a Day., Disp: , Rfl:     escitalopram (LEXAPRO) 20 MG tablet, TAKE ONE TABLET BY MOUTH DAILY, Disp: 30 tablet, Rfl: 6    hyoscyamine (LEVBID) 0.375 MG 12 hr tablet, Take 1 tablet by mouth Every 12 (Twelve) Hours., Disp: 60 tablet, Rfl: 4    levothyroxine (SYNTHROID, LEVOTHROID) 112 MCG tablet, Take 1 tablet by mouth Daily., Disp: , Rfl:     Magnesium Hydroxide (DULCOLAX PO), Take 400 mg by mouth Daily., Disp: , Rfl:     Menthol, Topical Analgesic, (BIOFREEZE EX), Apply  topically As Needed., Disp: , Rfl:     pantoprazole (PROTONIX) 40 MG EC tablet, TAKE 1 TABLET BY MOUTH TWICE A DAY, Disp: 60 tablet, Rfl: 1    vitamin B-12 (CYANOCOBALAMIN) 250 MCG tablet, TAKE ONE TABLET BY MOUTH DAILY (Patient taking differently: 2 tablets.), Disp: 30 tablet, Rfl: 4    ibuprofen (ADVIL,MOTRIN) 200 MG tablet, Take 1 tablet by mouth As Needed for Mild Pain. (Patient not taking: Reported on 2/27/2024), Disp: , Rfl:     metoprolol tartrate (LOPRESSOR) 25 MG tablet, Take 0.5 tablets by mouth 2 (Two) Times a Day., Disp: 90 tablet, Rfl: 3    Family History:  Family History   Problem Relation Age of Onset    Cancer Mother 85        Breast    COPD Father     Cancer Brother 59        Unknown type    Hypertension Daughter        Past Medical History:  Past Medical History:   Diagnosis Date    AAA (abdominal aortic aneurysm)     Abnormal ECG 1980’s    Alcoholism     None since 1991    Aneurysm 2019    aortic arch    Arthritis     Basal cell carcinoma (BCC) of face     Cholelithiasis 1990’s    Cholecystectomy    Colon polyp     Colon polyps     Coronary artery disease     Difficulty walking     Disease of thyroid gland     DJD (degenerative joint disease)     Gastritis      Gastroparesis     GERD (gastroesophageal reflux disease)     GI (gastrointestinal bleed)     Hernia     Hiatal    History of bone marrow biopsy     [place] (hard of hearing)     Hyperlipidemia     Hypertension     Hypothyroidism     IgG monoclonal gammopathy     Multiple duodenal ulcers     Neuropathy     PONV (postoperative nausea and vomiting)     Prostate cancer     Reflux esophagitis     Ulcer     WBC decreased        Past Surgical History:  Past Surgical History:   Procedure Laterality Date    CHOLECYSTECTOMY  1988    COLON SURGERY  1998    COLONOSCOPY  02/2013    ENDOSCOPY  2012    ESOPHAGOGASTRODUODENOSCOPY WITH DILATION OF SHATZKI'S RING    ENDOSCOPY N/A 10/09/2020    Procedure: ESOPHAGOGASTRODUODENOSCOPY with cold bx;  Surgeon: Jake Perez MD;  Location[facility] ENDOSCOPY;  Service: Gastroenterology;  Laterality: N/A;  pre - nausea  post - duodenal ulcer, gastrits, gastric ulcer, hiatal hernia    ENDOSCOPY N/A 10/04/2022    Procedure: ESOPHAGOGASTRODUODENOSCOPY with biopsies with esophageal dilatation with 56 cui;  Surgeon: Jake Perez MD;  [facility];  Service: Gastroenterology;  Laterality: N/A;  pre-dysphagia  post-normal     ENDOSCOPY  10/04/2022    Chris BUTTERFIELD    EYE SURGERY Bilateral     cataracts    LAMINECTOMY  2013    decompression L3-4, L4-5    PROSTATECTOMY  2007    SKIN BIOPSY      TONSILLECTOMY AND ADENOIDECTOMY      1940s    UPPER GASTROINTESTINAL ENDOSCOPY      VASECTOMY      1970s       Social History:  Social History     Socioeconomic History    Marital status:      Spouse name: Meghan    Years of education: College   Tobacco Use    Smoking status: Never    Smokeless tobacco: Never   Vaping Use    Vaping status: Never Used   Substance and Sexual Activity    Alcohol use: No     Comment: Heavy in past, none in 33 years    Drug use: Never    Sexual activity: Not Currently     Partners: Female     Birth control/protection: None       Review of Systems:  The  "following systems were reviewed as they relate to the cardiovascular system: Constitutional, Eyes, ENT, Cardiovascular, Respiratory, Gastrointestinal, Integumentary, Neurological, Psychiatric, Hematologic, Endocrine, Musculoskeletal, and Genitourinary. The pertinent cardiovascular findings are reported above with all other cardiovascular points within those systems being negative.    Diagnostic Study Review:     Current Electrocardiogram:  Procedures no new EKG.  EKG dated 27 February 2024 demonstrates sinus rhythm with a ventricular rate of 80 bpm.    Laboratory Data:  Lab Results   Component Value Date    GLUCOSE 89 01/16/2024    BUN 25 (H) 01/16/2024    CREATININE 1.30 04/04/2024    EGFRIFNONA 58 (L) 02/09/2022    BCR 20.8 01/16/2024    K 5.3 (H) 01/16/2024    CO2 28.1 01/16/2024    CALCIUM 9.6 01/16/2024    PROTENTOTREF 6.8 01/16/2024    ALBUMIN 4.1 01/16/2024    ALBUMIN 3.7 01/16/2024    LABIL2 1.2 01/16/2024    AST 25 01/16/2024    ALT <5 01/16/2024     Lab Results   Component Value Date    GLUCOSE 89 01/16/2024    CALCIUM 9.6 01/16/2024     01/16/2024    K 5.3 (H) 01/16/2024    CO2 28.1 01/16/2024     01/16/2024    BUN 25 (H) 01/16/2024    CREATININE 1.30 04/04/2024    EGFRIFNONA 58 (L) 02/09/2022    BCR 20.8 01/16/2024    ANIONGAP 7.9 01/16/2024     Lab Results   Component Value Date    WBC 3.72 01/16/2024    HGB 13.8 01/16/2024    HCT 43.2 01/16/2024    MCV 91.7 01/16/2024     01/16/2024     No results found for: \"CHOL\", \"CHLPL\", \"TRIG\", \"HDL\", \"LDL\", \"LDLDIRECT\"  Lab Results   Component Value Date    HGBA1C 5.52 03/30/2021     No results found for: \"INR\", \"PROTIME\"    Most Recent Echo:  Results for orders placed during the hospital encounter of 03/18/24    Adult Transthoracic Echo Complete W/ Cont if Necessary Per Protocol    Interpretation Summary    Left ventricular systolic function is normal. Calculated left ventricular EF = 60% Left ventricular ejection fraction appears to be 56 - " "60%.    Left ventricular wall thickness is consistent with mild concentric hypertrophy.    Left ventricular diastolic function is consistent with (grade I) impaired relaxation.    The left atrial cavity is mildly dilated.    There is mild calcification of the aortic valve mainly affecting the left coronary and right coronary cusp(s).    Estimated right ventricular systolic pressure from tricuspid regurgitation is normal (<35 mmHg).    Mild dilation of the aortic root is present. Severe dilation of the ascending aorta is present.    Ascending aortic aneurysm measuring 4.8 cm unchanged from prior echocardiogram       Most Recent Stress Test:  Results for orders placed during the hospital encounter of 07/07/22    Treadmill Stress Test    Interpretation Summary  · Arrhythmias were not significant during stress.  · Equivocal ECG evidence of myocardial ischemia. Indeterminate clinical evidence of myocardial ischemia. Findings consistent with an equivocal ECG stress test.       Most Recent Cardiac Catheterization:   No results found for this or any previous visit.       NOTE: The following portions of the patient's note were reviewed, confirmed and/or updated this visit as appropriate: History of present illness/Interval history, physical examination, assessment & plan, allergies, current medications, past family history, past medical history, past social history, past surgical history and problem list.    Labs pertinent to today's visit on 04/09/2024 (including but not limited to CBC, CMP, and lipid profiles) were requested from the patient's primary care provider/hospital/clinical laboratory.  If the labs were available for the visit, they were reviewed with the patient.  If they were not available, when received, special interest will be made to the labs pertinent to this visit.  The patient's most recent \"in-house\" labs are noted below and have been reviewed.  Outside labs pertinent to this visit are scanned into the " record and have been reviewed.    Discussions held today, 04/09/2024,regarding procedures included risk, benefits, and options including but not limited to: Death, MI, stroke, pain, bleeding, infection, and possible need for vascular/thoracic/cardiothoracic surgery.    Copied information within this note was reviewed and is current as of 04/09/2024.    Assessment and plan noted herein represents the current plan of care as of 04/09/2024.    Significant resources from our office and staff are inherent in engaging this patient in a continuous and active collaborative plan of care related to their chronic cardiovascular conditions outlined below.  The management of these conditions requires the direction of our service with specialized clinical knowledge, skills, and experience.  This collaborative care includes but is not limited to patient education, expectations and responsibilities, shared decision making around therapeutic goals, and shared commitments to achieve those goals.

## 2024-04-09 NOTE — PROGRESS NOTES
Cardiology Office Visit      Encounter Date:  2024    PATIENT IDENTIFICATION    Name: Panfilo Feliz  Age: 83 y.o. Sex: male : 1940  MRN: [id number]    Reason For Followup:  Valvular heart disease  Ascending aortic aneurysm    Brief Clinical History:  Dear Moreno Mello, [facility]    I had the pleasure of seeing Panfilo Feliz today. As you are well aware, this is a 83 y.o. male with no established history of ischemic heart disease.  He does have a history of aortic insufficiency, ascending aortic aneurysm, hypothyroidism, neuropathy, and acid reflux.  He presents today for follow-up on the above conditions.    Interval History:  2024                                           The patient presents today for an acute visit.  He reports that he saw his cardiologist last month but has deteriorated since then.  He is specifically citing issues with lightheadedness and a cold sensation all over.  He reports that over the past month this has significantly deteriorated.  Interestingly, the lightheadedness appears to occur mostly in the evenings around 630 or so.  He reports that he had been going to the gym a couple of times a week but now has not been able to do so because of his symptoms.  His neuropathy continues to deteriorate.  He reports that they have found nothing to help out with this.    He has started some medications to help with his neuropathy but none have been successful.  It is possible that some of the side effects from his medicines could be lending assistance to his symptoms.  We discussed options and we will have him back off on his atenolol to see if this helps.    In the bigger picture, the patient has been seen by cardiothoracic surgery.  Per the patient and his wife, surgery was offered at the last visit with Dr. Puente.  The patient was reluctant to move forward.    We had a lengthy discussion today.  I discussed that with the patient's deteriorating neuropathy and now endurance  issues, it may be worthwhile to consider moving forward with surgery before his neuropathy and endurance worsens to a point that would preclude him from being a candidate for surgery.  He has a CT scan scheduled for later this year as well as an echocardiogram.  We will move those up.  He will need a cardiac catheterization as well.  I have encouraged him to reach out to Dr. Puente in order to schedule a follow-up and discuss moving forward with surgery.    04/09/2024        His blood pressures have been on the low side (80s) and was was getting orthostatic. His BP today is a little on the higher side.  He is having some issues with shortness of breath with exertion.  He underwent a CT scan that demonstrated an ascending aortic aneurysm of 5.1 cm.  We again discussed the options.  He is ready to move forward with having these items addressed.  As such we will schedule him for a cardiac catheterization as well as a transesophageal echocardiogram.  He is scheduled to see Dr. Puente next week.  His blood pressure is elevated today however he has not tolerated titrations of his antihypertensives.  He reports becoming dizzy and lightheaded and nonfunctional.    Assessment & Plan    Impressions:  Ascending aortic aneurysm  Aortic insufficiency  Depression/anxiety  Hypothyroidism  Peripheral neuropathy  Acid reflux    Recommendations:  Risk benefits and options discussed with patient and spouse.  He wishes to proceed with cardiac catheterization and transesophageal echocardiogram.  Follow through with visit with Dr. Puente as scheduled  Follow-up in early June    Diagnoses and all orders for this visit:    1. Aneurysm of ascending aorta without rupture (Primary)  -     Case Request Cath Lab: Right and Left Heart Cath with possible PCI; Standing  -     COVID PRE-OP / PRE-PROCEDURE SCREENING ORDER (NO ISOLATION) - Swab, Nasopharynx; Future  -     CBC and Differential; Future  -     Basic Metabolic Panel; Future  -      Protime-INR; Future  -     Lipid Panel; Future  -     ECG 12 Lead; Future  -     sodium chloride 0.9 % flush 3 mL  -     sodium chloride 0.9 % flush 3-10 mL  -     sodium chloride 0.9 % infusion 40 mL  -     aspirin chewable tablet 324 mg  -     aspirin EC tablet 81 mg  -     ondansetron (ZOFRAN) injection 4 mg  -     methylPREDNISolone sodium succinate (SOLU-Medrol) 125 mg in sodium chloride 0.9 % 100 mL IVPB  -     HYDROcodone-acetaminophen (NORCO) 5-325 MG per tablet 1 tablet  -     nitroglycerin (NITROSTAT) SL tablet 0.4 mg  -     sodium chloride 0.9 % bolus 249 mL  -     Case Request Cath Lab: Right and Left Heart Cath with possible PCI  -     Adult Transesophageal Echo (JOSE) W/ Cont if Necessary Per Protocol; Future    2. Nonrheumatic aortic valve insufficiency  -     Case Request Cath Lab: Right and Left Heart Cath with possible PCI; Standing  -     COVID PRE-OP / PRE-PROCEDURE SCREENING ORDER (NO ISOLATION) - Swab, Nasopharynx; Future  -     CBC and Differential; Future  -     Basic Metabolic Panel; Future  -     Protime-INR; Future  -     Lipid Panel; Future  -     ECG 12 Lead; Future  -     sodium chloride 0.9 % flush 3 mL  -     sodium chloride 0.9 % flush 3-10 mL  -     sodium chloride 0.9 % infusion 40 mL  -     aspirin chewable tablet 324 mg  -     aspirin EC tablet 81 mg  -     ondansetron (ZOFRAN) injection 4 mg  -     methylPREDNISolone sodium succinate (SOLU-Medrol) 125 mg in sodium chloride 0.9 % 100 mL IVPB  -     HYDROcodone-acetaminophen (NORCO) 5-325 MG per tablet 1 tablet  -     nitroglycerin (NITROSTAT) SL tablet 0.4 mg  -     sodium chloride 0.9 % bolus 249 mL  -     Case Request Cath Lab: Right and Left Heart Cath with possible PCI  -     Adult Transesophageal Echo (JOSE) W/ Cont if Necessary Per Protocol; Future    3. Mixed hyperlipidemia    Other orders  -     Obtain Informed Consent; Standing  -     Obtain Informed Consent in Pre-Op; Standing  -     Oxygen Therapy- Nasal Cannula; 2 LPM;  "Standing  -     Obtain Informed Consent in PAT; Future  -     CBC & Differential; Standing  -     Basic Metabolic Panel; Standing  -     Protime-INR; Standing  -     Lipid Panel; Standing  -     ECG 12 Lead Pre-Op / Pre-Procedure; Standing  -     Insert Peripheral IV; Standing  -     Saline Lock & Maintain IV Access; Standing  -     Clip Bilateral Groins; Standing  -     Instruct Patient To Stop Heparin or Lovenox 24 Hours Before Scheduled Cardiac Procedure; Future  -     Verify On Day of Procedure: No Heparin or Lovenox Administration 24 Hours Before Cardiac Procedure; Standing  -     Instruct Patient To Stop Apixaban, Rivaroxaban, Edoxaban, Dibigatran, Vorapaxar, Atopaxar 48 Hours Before Scheduled Cardiac Procedure; Future  -     Verify On Day of Procedure: No Apixaban, Rivaroxaban, Edoxaban, Dabigatran, Vorapaxar, Atopaxar Administration 48 Hours Before Cardiac Procedure; Standing  -     metoprolol tartrate (LOPRESSOR) 25 MG tablet; Take 0.5 tablets by mouth 2 (Two) Times a Day.  Dispense: 90 tablet; Refill: 3            Objective:    Vitals:  Vitals:    04/09/24 1417   BP: 140/90   BP Location: Left arm   Patient Position: Sitting   Pulse: 84   SpO2: 96%   Weight: 83 kg (183 lb)   Height: 193 cm (76\")       Body mass index is 22.28 kg/m².      Physical Exam:    General: Alert, cooperative, no distress, appears stated age  Head:  Normocephalic, atraumatic, mucous membranes moist  Eyes:  Conjunctiva/corneas clear, EOM's intact     Neck:  Supple,  no bruit    Lungs: Coarse and diminished bilaterally  Chest wall: No tenderness  Heart::  Regular rate and rhythm, S1 and S2 normal, 1/6 diastolic murmur.  No rub or gallop  Abdomen: Soft, non-tender, nondistended bowel sounds active  Extremities: No cyanosis, clubbing, or edema  Pulses: Diminished pedal pulses  Skin:  No rashes or lesions  Neuro/psych: A&O x3. CN II through XII are grossly intact with appropriate affect      Allergies:  Allergies   Allergen Reactions    " Statins Myalgia       Medication Review:     Current Outpatient Medications:     cholecalciferol (VITAMIN D3) 25 MCG (1000 UT) tablet, Take 1 tablet by mouth Daily. 25 mcg BID, Disp: , Rfl:     docusate sodium (COLACE) 100 MG capsule, Take 3 capsules by mouth 2 (Two) Times a Day., Disp: , Rfl:     escitalopram (LEXAPRO) 20 MG tablet, TAKE ONE TABLET BY MOUTH DAILY, Disp: 30 tablet, Rfl: 6    hyoscyamine (LEVBID) 0.375 MG 12 hr tablet, Take 1 tablet by mouth Every 12 (Twelve) Hours., Disp: 60 tablet, Rfl: 4    levothyroxine (SYNTHROID, LEVOTHROID) 112 MCG tablet, Take 1 tablet by mouth Daily., Disp: , Rfl:     Magnesium Hydroxide (DULCOLAX PO), Take 400 mg by mouth Daily., Disp: , Rfl:     Menthol, Topical Analgesic, (BIOFREEZE EX), Apply  topically As Needed., Disp: , Rfl:     pantoprazole (PROTONIX) 40 MG EC tablet, TAKE 1 TABLET BY MOUTH TWICE A DAY, Disp: 60 tablet, Rfl: 1    vitamin B-12 (CYANOCOBALAMIN) 250 MCG tablet, TAKE ONE TABLET BY MOUTH DAILY (Patient taking differently: 2 tablets.), Disp: 30 tablet, Rfl: 4    ibuprofen (ADVIL,MOTRIN) 200 MG tablet, Take 1 tablet by mouth As Needed for Mild Pain. (Patient not taking: Reported on 2/27/2024), Disp: , Rfl:     metoprolol tartrate (LOPRESSOR) 25 MG tablet, Take 0.5 tablets by mouth 2 (Two) Times a Day., Disp: 90 tablet, Rfl: 3    Family History:  Family History   Problem Relation Age of Onset    Cancer Mother 85        Breast    COPD Father     Cancer Brother 59        Unknown type    Hypertension Daughter        Past Medical History:  Past Medical History:   Diagnosis Date    AAA (abdominal aortic aneurysm)     Abnormal ECG 1980’s    Alcoholism     None since 1991    Aneurysm 2019    aortic arch    Arthritis     Basal cell carcinoma (BCC) of face     Cholelithiasis 1990’s    Cholecystectomy    Colon polyp     Colon polyps     Coronary artery disease     Difficulty walking     Disease of thyroid gland     DJD (degenerative joint disease)     Gastritis      Gastroparesis     GERD (gastroesophageal reflux disease)     GI (gastrointestinal bleed)     Hernia     Hiatal    History of bone marrow biopsy     [place] (hard of hearing)     Hyperlipidemia     Hypertension     Hypothyroidism     IgG monoclonal gammopathy     Multiple duodenal ulcers     Neuropathy     PONV (postoperative nausea and vomiting)     Prostate cancer     Reflux esophagitis     Ulcer     WBC decreased        Past Surgical History:  Past Surgical History:   Procedure Laterality Date    CHOLECYSTECTOMY  1988    COLON SURGERY  1998    COLONOSCOPY  02/2013    ENDOSCOPY  2012    ESOPHAGOGASTRODUODENOSCOPY WITH DILATION OF SHATZKI'S RING    ENDOSCOPY N/A 10/09/2020    Procedure: ESOPHAGOGASTRODUODENOSCOPY with cold bx;  Surgeon: Jake Perez MD;  Location: [facility] ENDOSCOPY;  Service: Gastroenterology;  Laterality: N/A;  pre - nausea  post - duodenal ulcer, gastrits, gastric ulcer, hiatal hernia    ENDOSCOPY N/A 10/04/2022    Procedure: ESOPHAGOGASTRODUODENOSCOPY with biopsies with esophageal dilatation with 56 cui;  Surgeon: Jake Perez MD;  Location[facility] END[facility]COPY;  Service: Gastroenterology;  Laterality: N/A;  pre-dysphagia  post-normal     ENDOSCOPY  10/04/2022    Chris [facility]TERFIELD    EYE SURGERY Bilateral     cataracts    LAMINECTOMY  2013    decompression L3-4, L4-5    PROSTATECTOMY  2007    SKIN BIOPSY      TONSILLECTOMY AND ADENOIDECTOMY      1940s    UPPER GASTROINTESTINAL ENDOSCOPY      VASECTOMY      1970s       Social History:  Social History     Socioeconomic History    Marital status:      Spouse name: Meghan    Years of education: College   Tobacco Use    Smoking status: Never    Smokeless tobacco: Never   Vaping Use    Vaping status: Never Used   Substance and Sexual Activity    Alcohol use: No     Comment: Heavy in past, none in 33 years    Drug use: Never    Sexual activity: Not Currently     Partners: Female     Birth control/protection: None       Review of Systems:  The  "following systems were reviewed as they relate to the cardiovascular system: Constitutional, Eyes, ENT, Cardiovascular, Respiratory, Gastrointestinal, Integumentary, Neurological, Psychiatric, Hematologic, Endocrine, Musculoskeletal, and Genitourinary. The pertinent cardiovascular findings are reported above with all other cardiovascular points within those systems being negative.    Diagnostic Study Review:     Current Electrocardiogram:  Procedures no new EKG.  EKG dated 27 February 2024 demonstrates sinus rhythm with a ventricular rate of 80 bpm.    Laboratory Data:  Lab Results   Component Value Date    GLUCOSE 89 01/16/2024    BUN 25 (H) 01/16/2024    CREATININE 1.30 04/04/2024    EGFRIFNONA 58 (L) 02/09/2022    BCR 20.8 01/16/2024    K 5.3 (H) 01/16/2024    CO2 28.1 01/16/2024    CALCIUM 9.6 01/16/2024    PROTENTOTREF 6.8 01/16/2024    ALBUMIN 4.1 01/16/2024    ALBUMIN 3.7 01/16/2024    LABIL2 1.2 01/16/2024    AST 25 01/16/2024    ALT <5 01/16/2024     Lab Results   Component Value Date    GLUCOSE 89 01/16/2024    CALCIUM 9.6 01/16/2024     01/16/2024    K 5.3 (H) 01/16/2024    CO2 28.1 01/16/2024     01/16/2024    BUN 25 (H) 01/16/2024    CREATININE 1.30 04/04/2024    EGFRIFNONA 58 (L) 02/09/2022    BCR 20.8 01/16/2024    ANIONGAP 7.9 01/16/2024     Lab Results   Component Value Date    WBC 3.72 01/16/2024    HGB 13.8 01/16/2024    HCT 43.2 01/16/2024    MCV 91.7 01/16/2024     01/16/2024     No results found for: \"CHOL\", \"CHLPL\", \"TRIG\", \"HDL\", \"LDL\", \"LDLDIRECT\"  Lab Results   Component Value Date    HGBA1C 5.52 03/30/2021     No results found for: \"INR\", \"PROTIME\"    Most Recent Echo:  Results for orders placed during the hospital encounter of 03/18/24    Adult Transthoracic Echo Complete W/ Cont if Necessary Per Protocol    Interpretation Summary    Left ventricular systolic function is normal. Calculated left ventricular EF = 60% Left ventricular ejection fraction appears to be 56 - " "60%.    Left ventricular wall thickness is consistent with mild concentric hypertrophy.    Left ventricular diastolic function is consistent with (grade I) impaired relaxation.    The left atrial cavity is mildly dilated.    There is mild calcification of the aortic valve mainly affecting the left coronary and right coronary cusp(s).    Estimated right ventricular systolic pressure from tricuspid regurgitation is normal (<35 mmHg).    Mild dilation of the aortic root is present. Severe dilation of the ascending aorta is present.    Ascending aortic aneurysm measuring 4.8 cm unchanged from prior echocardiogram       Most Recent Stress Test:  Results for orders placed during the hospital encounter of 07/07/22    Treadmill Stress Test    Interpretation Summary  · Arrhythmias were not significant during stress.  · Equivocal ECG evidence of myocardial ischemia. Indeterminate clinical evidence of myocardial ischemia. Findings consistent with an equivocal ECG stress test.       Most Recent Cardiac Catheterization:   No results found for this or any previous visit.       NOTE: The following portions of the patient's note were reviewed, confirmed and/or updated this visit as appropriate: History of present illness/Interval history, physical examination, assessment & plan, allergies, current medications, past family history, past medical history, past social history, past surgical history and problem list.    Labs pertinent to today's visit on 04/09/2024 (including but not limited to CBC, CMP, and lipid profiles) were requested from the patient's primary care provider/hospital/clinical laboratory.  If the labs were available for the visit, they were reviewed with the patient.  If they were not available, when received, special interest will be made to the labs pertinent to this visit.  The patient's most recent \"in-house\" labs are noted below and have been reviewed.  Outside labs pertinent to this visit are scanned into the " record and have been reviewed.    Discussions held today, 04/09/2024,regarding procedures included risk, benefits, and options including but not limited to: Death, MI, stroke, pain, bleeding, infection, and possible need for vascular/thoracic/cardiothoracic surgery.    Copied information within this note was reviewed and is current as of 04/09/2024.    Assessment and plan noted herein represents the current plan of care as of 04/09/2024.    Significant resources from our office and staff are inherent in engaging this patient in a continuous and active collaborative plan of care related to their chronic cardiovascular conditions outlined below.  The management of these conditions requires the direction of our service with specialized clinical knowledge, skills, and experience.  This collaborative care includes but is not limited to patient education, expectations and responsibilities, shared decision making around therapeutic goals, and shared commitments to achieve those goals.

## 2024-04-10 ENCOUNTER — TELEPHONE (OUTPATIENT)
Dept: CARDIOLOGY | Facility: CLINIC | Age: 84
End: 2024-04-10

## 2024-04-17 ENCOUNTER — HOSPITAL ENCOUNTER (OUTPATIENT)
Dept: CARDIOLOGY | Facility: HOSPITAL | Age: 84
Discharge: HOME | End: 2024-04-17
Payer: MEDICARE

## 2024-04-17 ENCOUNTER — LAB (OUTPATIENT)
Dept: LAB | Facility: HOSPITAL | Age: 84
End: 2024-04-17
Payer: MEDICARE

## 2024-04-17 DIAGNOSIS — I35.1 NONRHEUMATIC AORTIC VALVE INSUFFICIENCY: ICD-10-CM

## 2024-04-17 DIAGNOSIS — I71.21 ANEURYSM OF ASCENDING AORTA WITHOUT RUPTURE: ICD-10-CM

## 2024-04-17 LAB
ANION GAP SERPL CALCULATED.3IONS-SCNC: 10 MMOL/L (ref 5–15)
BASOPHILS # BLD AUTO: 0.03 10*3/MM3 (ref 0–0.2)
BASOPHILS NFR BLD AUTO: 0.8 % (ref 0–1.5)
BUN SERPL-MCNC: 12 MG/DL (ref 8–23)
BUN/CREAT SERPL: 9 (ref 7–25)
CALCIUM SPEC-SCNC: 8.3 MG/DL (ref 8.6–10.5)
CHLORIDE SERPL-SCNC: 103 MMOL/L (ref 98–107)
CHOLEST SERPL-MCNC: 195 MG/DL (ref 0–200)
CO2 SERPL-SCNC: 25 MMOL/L (ref 22–29)
CREAT SERPL-MCNC: 1.33 MG/DL (ref 0.76–1.27)
DEPRECATED RDW RBC AUTO: 41.1 FL (ref 37–54)
EGFRCR SERPLBLD CKD-EPI 2021: 53.4 ML/MIN/1.73
EOSINOPHIL # BLD AUTO: 0.01 10*3/MM3 (ref 0–0.4)
EOSINOPHIL NFR BLD AUTO: 0.3 % (ref 0.3–6.2)
ERYTHROCYTE [DISTWIDTH] IN BLOOD BY AUTOMATED COUNT: 13.3 % (ref 12.3–15.4)
GLUCOSE SERPL-MCNC: 81 MG/DL (ref 65–99)
HCT VFR BLD AUTO: 41.9 % (ref 37.5–51)
HDLC SERPL-MCNC: 54 MG/DL (ref 40–60)
HGB BLD-MCNC: 13.7 G/DL (ref 13–17.7)
IMM GRANULOCYTES # BLD AUTO: 0.01 10*3/MM3 (ref 0–0.05)
IMM GRANULOCYTES NFR BLD AUTO: 0.3 % (ref 0–0.5)
INR PPP: 1.03 (ref 0.93–1.1)
LDLC SERPL CALC-MCNC: 123 MG/DL (ref 0–100)
LDLC/HDLC SERPL: 2.24 {RATIO}
LYMPHOCYTES # BLD AUTO: 1.37 10*3/MM3 (ref 0.7–3.1)
LYMPHOCYTES NFR BLD AUTO: 36 % (ref 19.6–45.3)
MCH RBC QN AUTO: 27.9 PG (ref 26.6–33)
MCHC RBC AUTO-ENTMCNC: 32.7 G/DL (ref 31.5–35.7)
MCV RBC AUTO: 85.3 FL (ref 79–97)
MONOCYTES # BLD AUTO: 0.57 10*3/MM3 (ref 0.1–0.9)
MONOCYTES NFR BLD AUTO: 15 % (ref 5–12)
NEUTROPHILS NFR BLD AUTO: 1.82 10*3/MM3 (ref 1.7–7)
NEUTROPHILS NFR BLD AUTO: 47.6 % (ref 42.7–76)
NRBC BLD AUTO-RTO: 0 /100 WBC (ref 0–0.2)
PLATELET # BLD AUTO: 170 10*3/MM3 (ref 140–450)
PMV BLD AUTO: 11.4 FL (ref 6–12)
POTASSIUM SERPL-SCNC: 4.4 MMOL/L (ref 3.5–5.2)
PROTHROMBIN TIME: 11.2 SECONDS (ref 9.6–11.7)
QT INTERVAL: 400 MS
QTC INTERVAL: 447 MS
RBC # BLD AUTO: 4.91 10*6/MM3 (ref 4.14–5.8)
SARS-COV-2 RNA RESP QL NAA+PROBE: NOT DETECTED
SODIUM SERPL-SCNC: 138 MMOL/L (ref 136–145)
TRIGL SERPL-MCNC: 101 MG/DL (ref 0–150)
VLDLC SERPL-MCNC: 18 MG/DL (ref 5–40)
WBC NRBC COR # BLD AUTO: 3.81 10*3/MM3 (ref 3.4–10.8)

## 2024-04-17 PROCEDURE — 93005 ELECTROCARDIOGRAM TRACING: CPT | Performed by: INTERNAL MEDICINE

## 2024-04-17 PROCEDURE — 87635 SARS-COV-2 COVID-19 AMP PRB: CPT

## 2024-04-17 PROCEDURE — 85025 COMPLETE CBC W/AUTO DIFF WBC: CPT

## 2024-04-17 PROCEDURE — 85610 PROTHROMBIN TIME: CPT

## 2024-04-17 PROCEDURE — 80048 BASIC METABOLIC PNL TOTAL CA: CPT

## 2024-04-17 PROCEDURE — 36415 COLL VENOUS BLD VENIPUNCTURE: CPT

## 2024-04-17 PROCEDURE — 80061 LIPID PANEL: CPT

## 2024-04-17 RX ORDER — PANTOPRAZOLE SODIUM 40 MG/1
40 TABLET, DELAYED RELEASE ORAL 2 TIMES DAILY
Qty: 180 TABLET | Refills: 3 | Status: SHIPPED | OUTPATIENT
Start: 2024-04-17

## 2024-04-18 ENCOUNTER — OFFICE VISIT (OUTPATIENT)
Dept: CARDIAC SURGERY | Facility: CLINIC | Age: 84
End: 2024-04-18
Payer: MEDICARE

## 2024-04-18 VITALS
TEMPERATURE: 98 F | BODY MASS INDEX: 21.55 KG/M2 | HEIGHT: 76 IN | WEIGHT: 177 LBS | SYSTOLIC BLOOD PRESSURE: 125 MMHG | RESPIRATION RATE: 20 BRPM | DIASTOLIC BLOOD PRESSURE: 85 MMHG | HEART RATE: 84 BPM | OXYGEN SATURATION: 97 %

## 2024-04-18 DIAGNOSIS — I10 PRIMARY HYPERTENSION: Primary | ICD-10-CM

## 2024-04-18 DIAGNOSIS — I71.21 ANEURYSM OF ASCENDING AORTA WITHOUT RUPTURE: ICD-10-CM

## 2024-04-18 DIAGNOSIS — D47.2 MONOCLONAL GAMMOPATHY OF UNKNOWN SIGNIFICANCE (MGUS): ICD-10-CM

## 2024-04-18 DIAGNOSIS — C61 PROSTATE CANCER: ICD-10-CM

## 2024-04-18 DIAGNOSIS — D47.2 NEUROPATHY ASSOCIATED WITH MGUS: ICD-10-CM

## 2024-04-18 DIAGNOSIS — I35.1 NONRHEUMATIC AORTIC VALVE INSUFFICIENCY: ICD-10-CM

## 2024-04-18 DIAGNOSIS — G63 NEUROPATHY ASSOCIATED WITH MGUS: ICD-10-CM

## 2024-04-18 PROCEDURE — 3074F SYST BP LT 130 MM HG: CPT | Performed by: THORACIC SURGERY (CARDIOTHORACIC VASCULAR SURGERY)

## 2024-04-18 PROCEDURE — 3079F DIAST BP 80-89 MM HG: CPT | Performed by: THORACIC SURGERY (CARDIOTHORACIC VASCULAR SURGERY)

## 2024-04-18 PROCEDURE — 1160F RVW MEDS BY RX/DR IN RCRD: CPT | Performed by: THORACIC SURGERY (CARDIOTHORACIC VASCULAR SURGERY)

## 2024-04-18 PROCEDURE — 1159F MED LIST DOCD IN RCRD: CPT | Performed by: THORACIC SURGERY (CARDIOTHORACIC VASCULAR SURGERY)

## 2024-04-18 PROCEDURE — 99214 OFFICE O/P EST MOD 30 MIN: CPT | Performed by: THORACIC SURGERY (CARDIOTHORACIC VASCULAR SURGERY)

## 2024-04-18 RX ORDER — LEVOTHYROXINE SODIUM 0.1 MG/1
100 TABLET ORAL DAILY
COMMUNITY

## 2024-04-18 RX ORDER — LANOLIN ALCOHOL/MO/W.PET/CERES
1000 CREAM (GRAM) TOPICAL DAILY
COMMUNITY

## 2024-04-18 RX ORDER — PREGABALIN 50 MG/1
50 CAPSULE ORAL 2 TIMES DAILY
COMMUNITY

## 2024-04-18 NOTE — LETTER
April 20, 2024     JAKOB Garcia  [address] [facility] [place] [address]    Patient: Panfilo Feliz   YOB: 1940   Date of Visit: 4/18/2024     Dear JAKOB Garcia:       Thank you for referring Panfilo Feliz to me for evaluation. Below are the relevant portions of my assessment and plan of care.    If you have questions, please do not hesitate to call me. I look forward to following Panfilo along with you.         Sincerely,        Spencer Puente MD        CC: DO Cindi Blanco Sebastian, MD  04/20/24 1336  Sign when Signing Visit  4/20/2024    Seen on 4/18/2024    Chief Complaint:     Follow-up evaluation of ascending aortic aneurysm    History of Present Illness:       Dear Owen and Colleagues,  It was nice to see Panfilo Feliz in follow up evaluation for his proximal thoracic aortic aneurysm. He is a 83 y.o. male with thrombocytopenia, neuropathy, MGUS, aortic valve insufficiency and a known 4.9 cm in March 2022 that progressed to a 5.2 cm in August 2023. He denies any new symptoms or signs that aneurysm related in the interval. His last chest CT showed the ascending aorta 5.1 cm which is similar to the previous CT scan, the descending thoracic aorta 3.2 cm.  And there is mild dilatation of the root.  The echocardiogram showed ejection fraction 56 to 60%, severe dilatation of the ascending aorta, and the aortic valve with mild to moderate regurgitation.  There is calcification in 2 out of 3 leaflets and the valve is trileaflet obviously.  He has no family history of aneurysms, dissections or connective tissue disorders.    Patient Active Problem List   Diagnosis   • Leukopenia   • Thrombocytopenia   • Monoclonal gammopathy of unknown significance (MGUS)   • Neuropathy associated with MGUS   • Nausea in adult patient   • Slow transit constipation   • Nausea   • DDD (degenerative disc disease), lumbar   • Chronic neck pain   • DJD (degenerative joint disease)   •  Extremity pain   • HTN (hypertension)   • Hyperlipidemia   • Hypothyroid   • LBP (low back pain)   • Lumbar canal stenosis   • Prostate cancer   • Ascending aortic aneurysm   • Nonrheumatic aortic valve insufficiency   • Other dysphagia   • Gastroesophageal reflux disease       Past Medical History:   Diagnosis Date   • AAA (abdominal aortic aneurysm)    • Abnormal ECG 1980’s   • Alcoholism     None since 1991   • Aneurysm 2019    aortic arch   • Arthritis    • Basal cell carcinoma (BCC) of face    • Cholelithiasis 1990’s    Cholecystectomy   • Colon polyp    • Colon polyps    • Coronary artery disease    • Difficulty walking    • Disease of thyroid gland    • DJD (degenerative joint disease)    • Gastritis    • Gastroparesis    • GERD (gastroesophageal reflux disease)    • GI (gastrointestinal bleed)    • Hernia     Hiatal   • History of bone marrow biopsy    • Lummi (hard of hearing)    • Hyperlipidemia    • Hypertension    • Hypothyroidism    • IgG monoclonal gammopathy    • Multiple duodenal ulcers    • Neuropathy    • PONV (postoperative nausea and vomiting)    • Prostate cancer    • Reflux esophagitis    • Ulcer    • WBC decreased        Past Surgical History:   Procedure Laterality Date   • CHOLECYSTECTOMY  1988   • COLON SURGERY  1998   • COLONOSCOPY  02/2013   • ENDOSCOPY  2012    ESOPHAGOGASTRODUODENOSCOPY WITH DILATION OF SHATZKI'S RING   • ENDOSCOPY N/A 10/09/2020    Procedure: ESOPHAGOGASTRODUODENOSCOPY with cold bx;  Surgeon: Jake Perez MD;  Location: [facility] ENDOSCOPY;  Service: Gastroenterology;  Laterality: N/A;  pre - nausea  post - duodenal ulcer, gastrits, gastric ulcer, hiatal hernia   • ENDOSCOPY N/A 10/04/2022    Procedure: ESOPHAGOGASTRODUODENOSCOPY with biopsies with esophageal dilatation with 56 cui;  Surgeon: Jake Perez MD;  Location: [facility] ENDOSCOPY;  Service: Gastroenterology;  Laterality: N/A;  pre-dysphagia  post-normal    • ENDOSCOPY  10/04/2022    Chris BUTTERFIELD   • EYE  SURGERY Bilateral     cataracts   • LAMINECTOMY  2013    decompression L3-4, L4-5   • PROSTATECTOMY  2007   • SKIN BIOPSY     • TONSILLECTOMY AND ADENOIDECTOMY      1940s   • UPPER GASTROINTESTINAL ENDOSCOPY     • VASECTOMY      1970s       Allergies   Allergen Reactions   • Statins Myalgia         Current Outpatient Medications:   •  cholecalciferol (VITAMIN D3) 25 MCG (1000 UT) tablet, Take 1 tablet by mouth Daily. 25 mcg BID, Disp: , Rfl:   •  docusate sodium (COLACE) 100 MG capsule, Take 3 capsules by mouth 2 (Two) Times a Day., Disp: , Rfl:   •  escitalopram (LEXAPRO) 20 MG tablet, TAKE ONE TABLET BY MOUTH DAILY, Disp: 30 tablet, Rfl: 6  •  hyoscyamine (LEVBID) 0.375 MG 12 hr tablet, Take 1 tablet by mouth Every 12 (Twelve) Hours. (Patient not taking: Reported on 4/18/2024), Disp: 60 tablet, Rfl: 4  •  ibuprofen (ADVIL,MOTRIN) 200 MG tablet, Take 1 tablet by mouth As Needed for Mild Pain., Disp: , Rfl:   •  Magnesium Hydroxide (DULCOLAX PO), Take 400 mg by mouth Daily., Disp: , Rfl:   •  Menthol, Topical Analgesic, (BIOFREEZE EX), Apply  topically As Needed., Disp: , Rfl:   •  metoprolol tartrate (LOPRESSOR) 25 MG tablet, Take 0.5 tablets by mouth 2 (Two) Times a Day., Disp: 90 tablet, Rfl: 3  •  pantoprazole (PROTONIX) 40 MG EC tablet, TAKE 1 TABLET BY MOUTH TWICE A DAY, Disp: 180 tablet, Rfl: 3  •  levothyroxine (SYNTHROID, LEVOTHROID) 100 MCG tablet, Take 1 tablet by mouth Daily., Disp: , Rfl:   •  pregabalin (LYRICA) 50 MG capsule, Take 1 capsule by mouth 2 (Two) Times a Day., Disp: , Rfl:   •  vitamin B-12 (CYANOCOBALAMIN) 1000 MCG tablet, Take 1 tablet by mouth Daily., Disp: , Rfl:   No current facility-administered medications for this visit.    Social History     Socioeconomic History   • Marital status:      Spouse name: Meghan   • Years of education: College   Tobacco Use   • Smoking status: Never   • Smokeless tobacco: Never   Vaping Use   • Vaping status: Never Used   Substance and Sexual  Activity   • Alcohol use: No     Comment: Heavy in past, none in 33 years   • Drug use: Never   • Sexual activity: Not Currently     Partners: Female     Birth control/protection: None       Family History   Problem Relation Age of Onset   • Cancer Mother 85        Breast   • COPD Father    • Cancer Brother 59        Unknown type   • Hypertension Daughter      Review of Systems:  As HPI, otherwise noncontributory    Physical Exam:    Vital Signs:  Weight: 80.3 kg (177 lb)   Body mass index is 21.55 kg/m².  Temp: 98 °F (36.7 °C)   Heart Rate: 84   BP: 125/85     Constitutional:       Appearance: Well-developed.   Eyes:      Conjunctiva/sclera: Conjunctivae normal.      Pupils: Pupils are equal, round, and reactive to light.   HENT:      Head: Normocephalic and atraumatic.      Nose: Nose normal.   Neck:      Thyroid: No thyromegaly.      Vascular: No JVD.      Lymphadenopathy: No cervical adenopathy.   Pulmonary:      Effort: Pulmonary effort is normal.      Breath sounds: Normal breath sounds. No rales.   Cardiovascular:      Normal rate. Regular rhythm.      Murmurs: There is a grade 2/4 high frequency blowing decrescendo, early diastolic murmur at the URSB, radiating to the apex.      No gallop.    Pulses:     Intact distal pulses. No decreased pulses.   Edema:     Peripheral edema absent.   Abdominal:      General: Bowel sounds are normal. There is no distension.      Palpations: Abdomen is soft. There is no abdominal mass.      Tenderness: There is no abdominal tenderness.   Musculoskeletal: Normal range of motion.         General: No tenderness or deformity.      Cervical back: Normal range of motion and neck supple. Skin:     General: Skin is warm and dry.      Findings: No erythema or rash.   Neurological:      Mental Status: Alert and oriented to person, place, and time.      Deep Tendon Reflexes: Reflexes are normal and symmetric.   Psychiatric:         Behavior: Behavior normal.          Assessment:      Problems Addressed this Visit          Cardiac and Vasculature    HTN (hypertension) - Primary    Ascending aortic aneurysm    Nonrheumatic aortic valve insufficiency       Hematology and Neoplasia    Monoclonal gammopathy of unknown significance (MGUS)    Prostate cancer       Neuro    Neuropathy associated with MGUS     Diagnoses         Codes Comments    Primary hypertension    -  Primary ICD-10-CM: I10  ICD-9-CM: 401.9     Aneurysm of ascending aorta without rupture     ICD-10-CM: I71.21  ICD-9-CM: 441.2     Nonrheumatic aortic valve insufficiency     ICD-10-CM: I35.1  ICD-9-CM: 424.1     Monoclonal gammopathy of unknown significance (MGUS)     ICD-10-CM: D47.2  ICD-9-CM: 273.1     Prostate cancer     ICD-10-CM: C61  ICD-9-CM: 185     Neuropathy associated with MGUS     ICD-10-CM: D47.2, G63  ICD-9-CM: 273.1, 357.4             Assessment/recommendation:     5.1-5.2 cm ascending aortic aneurysm without dissection or ulceration.  Patient had enlargement close to 5 millimeters in 12 months.  He is asymptomatic.  I discussed with the patient the natural history of aneurysm disease and the guidelines for intervention.  I discussed with the patient my recommendation of ascending aortic and possible hemiarch replacement.  Discussed the risk, benefits and terms of the procedure and he wishes to proceed.  Quoted an operative mortality less than 2% and risk complications less than 10%.  He has close to moderate aortic regurgitation and he may benefit from aortic valve repair or replacement.  Aortic root had diameter of close to 3.8 to 4 cm and most likely can be pressure.  Hypertension, well-controlled continue same regimen  Prostatic cancer, seemingly under remission    Thank you for allowing me to participate in his care.    Regards,    Spencer Puente M.D.    Addendum  The cardiac cath and JOSE were performed.  The patient does not have coronary artery disease but does have moderate aortic regurgitation.    I spent  over 35 minutes before, during after the office visit and reviewing the records, examining the patient, reviewing interpreting myself the cardiac cath, the JOSE, the 2D echocardiac, the chest CTA, spent time in discussing the findings and options on the recommendation of surgery, spent time discussing the evaluation and planning with the cardiology team and spent time in documenting in the electronic record

## 2024-04-19 ENCOUNTER — HOSPITAL ENCOUNTER (OUTPATIENT)
Dept: CARDIOLOGY | Facility: HOSPITAL | Age: 84
Discharge: HOME | End: 2024-04-19
Payer: MEDICARE

## 2024-04-19 ENCOUNTER — ANESTHESIA (OUTPATIENT)
Dept: CARDIOLOGY | Facility: HOSPITAL | Age: 84
End: 2024-04-19
Payer: MEDICARE

## 2024-04-19 ENCOUNTER — ANESTHESIA EVENT (OUTPATIENT)
Dept: CARDIOLOGY | Facility: HOSPITAL | Age: 84
End: 2024-04-19
Payer: MEDICARE

## 2024-04-19 ENCOUNTER — HOSPITAL ENCOUNTER (OUTPATIENT)
Facility: HOSPITAL | Age: 84
Setting detail: HOSPITAL OUTPATIENT SURGERY
Discharge: HOME | End: 2024-04-19
Attending: INTERNAL MEDICINE | Admitting: INTERNAL MEDICINE
Payer: MEDICARE

## 2024-04-19 VITALS
SYSTOLIC BLOOD PRESSURE: 132 MMHG | HEART RATE: 62 BPM | OXYGEN SATURATION: 100 % | DIASTOLIC BLOOD PRESSURE: 71 MMHG | RESPIRATION RATE: 16 BRPM | TEMPERATURE: 98.2 F

## 2024-04-19 VITALS
DIASTOLIC BLOOD PRESSURE: 63 MMHG | SYSTOLIC BLOOD PRESSURE: 111 MMHG | HEIGHT: 75 IN | WEIGHT: 177.47 LBS | RESPIRATION RATE: 16 BRPM | OXYGEN SATURATION: 98 % | BODY MASS INDEX: 22.07 KG/M2 | HEART RATE: 97 BPM

## 2024-04-19 DIAGNOSIS — I71.21 ANEURYSM OF ASCENDING AORTA WITHOUT RUPTURE: ICD-10-CM

## 2024-04-19 DIAGNOSIS — I35.1 NONRHEUMATIC AORTIC VALVE INSUFFICIENCY: ICD-10-CM

## 2024-04-19 LAB
ASCENDING AORTA: 5.1 CM
BASE DEFICIT: ABNORMAL
BASE DEFICIT: ABNORMAL
BASE EXCESS BLDA CALC-SCNC: <0 MMOL/L (ref 0–3)
BASE EXCESS BLDV CALC-SCNC: ABNORMAL MMOL/L
BH CV ECHO SHUNT ASSESSMENT PERFORMED (HIDDEN SCRIPTING): 1
CA-I BLDA-SCNC: 1.16 MMOL/L (ref 1.12–1.32)
CA-I BLDA-SCNC: 1.17 MMOL/L (ref 1.12–1.32)
CO2 BLDA-SCNC: 23 MMOL/L (ref 23–27)
CO2 CONTENT VENOUS: ABNORMAL
GLUCOSE BLDC GLUCOMTR-MCNC: 115 MG/DL (ref 70–105)
GLUCOSE BLDC GLUCOMTR-MCNC: 123 MG/DL (ref 70–105)
HCO3 BLDA-SCNC: 22.3 MMOL/L (ref 22–26)
HCO3 BLDV-SCNC: 22.7 MMOL/L (ref 23–28)
HCT VFR BLDA CALC: 39 % (ref 38–51)
HCT VFR BLDA CALC: 40 % (ref 38–51)
HGB BLDA-MCNC: 13.3 G/DL (ref 12–17)
HGB BLDA-MCNC: 13.6 G/DL (ref 12–17)
PCO2 BLDA: 36.9 MM HG (ref 35–45)
PCO2 BLDV: 41.8 MM HG (ref 41–51)
PH BLDA: 7.39 PH UNITS (ref 7.35–7.45)
PH BLDV: 7.34 PH UNITS (ref 7.31–7.41)
PO2 BLDA: 134 MM HG (ref 80–105)
PO2 BLDV: 46 MM HG (ref 35–42)
POTASSIUM BLDA-SCNC: 4 MMOL/L (ref 3.5–4.9)
POTASSIUM BLDA-SCNC: 4.1 MMOL/L (ref 3.5–4.9)
SAO2 % BLDCOA: 99 % (ref 95–98)
SAO2 % BLDCOV: 24 % (ref 45–75)
SODIUM BLD-SCNC: 137 MMOL/L (ref 138–146)
SODIUM BLD-SCNC: 140 MMOL/L (ref 138–146)

## 2024-04-19 PROCEDURE — 82330 ASSAY OF CALCIUM: CPT

## 2024-04-19 PROCEDURE — 25510000001 IOPAMIDOL PER 1 ML: Performed by: INTERNAL MEDICINE

## 2024-04-19 PROCEDURE — 93460 R&L HRT ART/VENTRICLE ANGIO: CPT | Performed by: INTERNAL MEDICINE

## 2024-04-19 PROCEDURE — C1769 GUIDE WIRE: HCPCS | Performed by: INTERNAL MEDICINE

## 2024-04-19 PROCEDURE — 82803 BLOOD GASES ANY COMBINATION: CPT

## 2024-04-19 PROCEDURE — 84132 ASSAY OF SERUM POTASSIUM: CPT

## 2024-04-19 PROCEDURE — 25010000002 METHYLPREDNISOLONE PER 125 MG: Performed by: INTERNAL MEDICINE

## 2024-04-19 PROCEDURE — 25810000003 SODIUM CHLORIDE 0.9 % SOLUTION

## 2024-04-19 PROCEDURE — C1894 INTRO/SHEATH, NON-LASER: HCPCS | Performed by: INTERNAL MEDICINE

## 2024-04-19 PROCEDURE — 99152 MOD SED SAME PHYS/QHP 5/>YRS: CPT | Performed by: INTERNAL MEDICINE

## 2024-04-19 PROCEDURE — 25010000002 PROPOFOL 200 MG/20ML EMULSION

## 2024-04-19 PROCEDURE — C1760 CLOSURE DEV, VASC: HCPCS | Performed by: INTERNAL MEDICINE

## 2024-04-19 PROCEDURE — 82947 ASSAY GLUCOSE BLOOD QUANT: CPT

## 2024-04-19 PROCEDURE — 85014 HEMATOCRIT: CPT

## 2024-04-19 PROCEDURE — 84295 ASSAY OF SERUM SODIUM: CPT

## 2024-04-19 PROCEDURE — 25010000002 LIDOCAINE 1 % SOLUTION: Performed by: INTERNAL MEDICINE

## 2024-04-19 PROCEDURE — 25810000003 SODIUM CHLORIDE 0.9 % SOLUTION: Performed by: INTERNAL MEDICINE

## 2024-04-19 PROCEDURE — 93320 DOPPLER ECHO COMPLETE: CPT

## 2024-04-19 PROCEDURE — 93312 ECHO TRANSESOPHAGEAL: CPT

## 2024-04-19 PROCEDURE — 93325 DOPPLER ECHO COLOR FLOW MAPG: CPT

## 2024-04-19 RX ORDER — LIDOCAINE HYDROCHLORIDE 10 MG/ML
INJECTION, SOLUTION INFILTRATION; PERINEURAL
Status: DISCONTINUED | OUTPATIENT
Start: 2024-04-19 | End: 2024-04-19 | Stop reason: HOSPADM

## 2024-04-19 RX ORDER — SODIUM CHLORIDE 9 MG/ML
40 INJECTION, SOLUTION INTRAVENOUS AS NEEDED
Status: DISCONTINUED | OUTPATIENT
Start: 2024-04-19 | End: 2024-04-19 | Stop reason: HOSPADM

## 2024-04-19 RX ORDER — NICOTINE 21 MG/24HR
1 PATCH, TRANSDERMAL 24 HOURS TRANSDERMAL DAILY PRN
Status: DISCONTINUED | OUTPATIENT
Start: 2024-04-19 | End: 2024-04-19 | Stop reason: HOSPADM

## 2024-04-19 RX ORDER — ASPIRIN 81 MG/1
81 TABLET ORAL DAILY
Status: DISCONTINUED | OUTPATIENT
Start: 2024-04-19 | End: 2024-04-19 | Stop reason: HOSPADM

## 2024-04-19 RX ORDER — METHYLPREDNISOLONE SODIUM SUCCINATE 125 MG/2ML
125 INJECTION, POWDER, LYOPHILIZED, FOR SOLUTION INTRAMUSCULAR; INTRAVENOUS ONCE
Status: COMPLETED | OUTPATIENT
Start: 2024-04-19 | End: 2024-04-19

## 2024-04-19 RX ORDER — PHENYLEPHRINE HCL IN 0.9% NACL 1 MG/10 ML
SYRINGE (ML) INTRAVENOUS AS NEEDED
Status: DISCONTINUED | OUTPATIENT
Start: 2024-04-19 | End: 2024-04-19 | Stop reason: SURG

## 2024-04-19 RX ORDER — SODIUM CHLORIDE 0.9 % (FLUSH) 0.9 %
3 SYRINGE (ML) INJECTION EVERY 12 HOURS SCHEDULED
Status: DISCONTINUED | OUTPATIENT
Start: 2024-04-19 | End: 2024-04-19 | Stop reason: HOSPADM

## 2024-04-19 RX ORDER — EPHEDRINE SULFATE 5 MG/ML
INJECTION INTRAVENOUS AS NEEDED
Status: DISCONTINUED | OUTPATIENT
Start: 2024-04-19 | End: 2024-04-19 | Stop reason: SURG

## 2024-04-19 RX ORDER — NITROGLYCERIN 0.4 MG/1
0.4 TABLET SUBLINGUAL
Status: DISCONTINUED | OUTPATIENT
Start: 2024-04-19 | End: 2024-04-19 | Stop reason: HOSPADM

## 2024-04-19 RX ORDER — ONDANSETRON 2 MG/ML
4 INJECTION INTRAMUSCULAR; INTRAVENOUS EVERY 6 HOURS PRN
Status: DISCONTINUED | OUTPATIENT
Start: 2024-04-19 | End: 2024-04-19 | Stop reason: HOSPADM

## 2024-04-19 RX ORDER — SODIUM CHLORIDE 9 MG/ML
30 INJECTION, SOLUTION INTRAVENOUS CONTINUOUS
Status: DISCONTINUED | OUTPATIENT
Start: 2024-04-19 | End: 2024-04-19 | Stop reason: HOSPADM

## 2024-04-19 RX ORDER — SODIUM CHLORIDE 9 MG/ML
INJECTION, SOLUTION INTRAVENOUS CONTINUOUS PRN
Status: DISCONTINUED | OUTPATIENT
Start: 2024-04-19 | End: 2024-04-19 | Stop reason: SURG

## 2024-04-19 RX ORDER — ASPIRIN 81 MG/1
324 TABLET, CHEWABLE ORAL ONCE
Status: COMPLETED | OUTPATIENT
Start: 2024-04-19 | End: 2024-04-19

## 2024-04-19 RX ORDER — DIPHENHYDRAMINE HCL 25 MG
25 CAPSULE ORAL EVERY 6 HOURS PRN
Status: DISCONTINUED | OUTPATIENT
Start: 2024-04-19 | End: 2024-04-19 | Stop reason: HOSPADM

## 2024-04-19 RX ORDER — HYDROCODONE BITARTRATE AND ACETAMINOPHEN 5; 325 MG/1; MG/1
1 TABLET ORAL EVERY 4 HOURS PRN
Status: DISCONTINUED | OUTPATIENT
Start: 2024-04-19 | End: 2024-04-19 | Stop reason: HOSPADM

## 2024-04-19 RX ORDER — SODIUM CHLORIDE 0.9 % (FLUSH) 0.9 %
3-10 SYRINGE (ML) INJECTION AS NEEDED
Status: DISCONTINUED | OUTPATIENT
Start: 2024-04-19 | End: 2024-04-19 | Stop reason: HOSPADM

## 2024-04-19 RX ORDER — ALUMINA, MAGNESIA, AND SIMETHICONE 2400; 2400; 240 MG/30ML; MG/30ML; MG/30ML
15 SUSPENSION ORAL EVERY 6 HOURS PRN
Status: DISCONTINUED | OUTPATIENT
Start: 2024-04-19 | End: 2024-04-19 | Stop reason: HOSPADM

## 2024-04-19 RX ORDER — ASPIRIN 81 MG/1
81 TABLET ORAL DAILY
Status: DISCONTINUED | OUTPATIENT
Start: 2024-04-20 | End: 2024-04-19 | Stop reason: HOSPADM

## 2024-04-19 RX ORDER — LIDOCAINE HYDROCHLORIDE 20 MG/ML
INJECTION, SOLUTION EPIDURAL; INFILTRATION; INTRACAUDAL; PERINEURAL AS NEEDED
Status: DISCONTINUED | OUTPATIENT
Start: 2024-04-19 | End: 2024-04-19 | Stop reason: SURG

## 2024-04-19 RX ORDER — ONDANSETRON 4 MG/1
4 TABLET, ORALLY DISINTEGRATING ORAL EVERY 6 HOURS PRN
Status: DISCONTINUED | OUTPATIENT
Start: 2024-04-19 | End: 2024-04-19 | Stop reason: HOSPADM

## 2024-04-19 RX ORDER — ACETAMINOPHEN 325 MG/1
650 TABLET ORAL EVERY 4 HOURS PRN
Status: DISCONTINUED | OUTPATIENT
Start: 2024-04-19 | End: 2024-04-19 | Stop reason: HOSPADM

## 2024-04-19 RX ORDER — PROPOFOL 10 MG/ML
INJECTION, EMULSION INTRAVENOUS CONTINUOUS PRN
Status: DISCONTINUED | OUTPATIENT
Start: 2024-04-19 | End: 2024-04-19 | Stop reason: SURG

## 2024-04-19 RX ADMIN — PROPOFOL 130 MCG/KG/MIN: 10 INJECTION, EMULSION INTRAVENOUS at 10:33

## 2024-04-19 RX ADMIN — SODIUM CHLORIDE: 9 INJECTION, SOLUTION INTRAVENOUS at 10:58

## 2024-04-19 RX ADMIN — ASPIRIN 81 MG CHEWABLE TABLET 324 MG: 81 TABLET CHEWABLE at 09:06

## 2024-04-19 RX ADMIN — EPHEDRINE SULFATE 5 MG: 5 INJECTION INTRAVENOUS at 10:44

## 2024-04-19 RX ADMIN — EPHEDRINE SULFATE 5 MG: 5 INJECTION INTRAVENOUS at 10:41

## 2024-04-19 RX ADMIN — Medication 100 MCG: at 10:52

## 2024-04-19 RX ADMIN — SODIUM CHLORIDE: 9 INJECTION, SOLUTION INTRAVENOUS at 10:24

## 2024-04-19 RX ADMIN — LIDOCAINE HYDROCHLORIDE 80 MG: 20 INJECTION, SOLUTION EPIDURAL; INFILTRATION; INTRACAUDAL; PERINEURAL at 10:32

## 2024-04-19 RX ADMIN — SODIUM CHLORIDE 249 ML: 9 INJECTION, SOLUTION INTRAVENOUS at 08:59

## 2024-04-19 RX ADMIN — Medication 100 MCG: at 10:59

## 2024-04-19 RX ADMIN — PROPOFOL 20 MG: 10 INJECTION, EMULSION INTRAVENOUS at 10:35

## 2024-04-19 RX ADMIN — Medication 100 MCG: at 10:44

## 2024-04-19 RX ADMIN — METHYLPREDNISOLONE SODIUM SUCCINATE 125 MG: 125 INJECTION, POWDER, FOR SOLUTION INTRAMUSCULAR; INTRAVENOUS at 09:06

## 2024-04-19 RX ADMIN — SODIUM CHLORIDE 30 ML/HR: 9 INJECTION, SOLUTION INTRAVENOUS at 11:23

## 2024-04-19 RX ADMIN — PROPOFOL 70 MG: 10 INJECTION, EMULSION INTRAVENOUS at 10:32

## 2024-04-19 NOTE — Clinical Note
Hemostasis started on the right femoral artery. StarClose SE was used in achieving hemostasis. Closure device deployed in the vessel. Hemostasis achieved successfully. Stable

## 2024-04-19 NOTE — Clinical Note
The left coronary artery was selectively engaged, injected and visualized. I will START or STAY ON the medications listed below when I get home from the hospital:    oxyCODONE-acetaminophen 5 mg-325 mg oral tablet  -- 2 tab(s) by mouth every 6 hours, As needed, Moderate Pain (4 - 6) MDD:4  -- Indication: For Take for pain prn    aspirin 81 mg oral tablet, chewable  -- 1 tab(s) by mouth once a day  -- Indication: For home med    clonazePAM 0.5 mg oral tablet, disintegrating  -- 1 tab(s) by mouth every 8 hours, As needed, anxiety  -- Indication: For home med    ipratropium-albuterol 0.5 mg-2.5 mg/3 mLinhalation solution  -- 3 milliliter(s) inhaled every 6 hours  -- Indication: For home med    amLODIPine 5 mg oral tablet  -- 1 tab(s) by mouth once a day  -- Indication: For home med

## 2024-04-19 NOTE — ANESTHESIA PREPROCEDURE EVALUATION
Anesthesia Evaluation     Patient summary reviewed and Nursing notes reviewed   history of anesthetic complications:  PONV  NPO Solid Status: > 8 hours  NPO Liquid Status: > 8 hours           Airway   Mallampati: II  TM distance: >3 FB  Neck ROM: full  No difficulty expected  Dental - normal exam     Pulmonary - negative pulmonary ROS and normal exam   (-) not a smoker  Cardiovascular - normal exam    ECG reviewed    (+) hypertension, valvular problems/murmurs, CAD, hyperlipidemia      Neuro/Psych- negative ROS  (-) CVA  GI/Hepatic/Renal/Endo    (+) GERD, PUD, GI bleeding , thyroid problem     Musculoskeletal     (+) neck pain  Abdominal    Substance History      OB/GYN          Other   arthritis,   history of cancer    ROS/Med Hx Other: ? Severe dilation of the aortic root is present. Severe dilation of the ascending aorta is present.  ? Ascending aortic aneurysm 4.7 cm  ? Left ventricular wall thickness is consistent with mild concentric hypertrophy.  ? Estimated left ventricular EF = 55% Estimated left ventricular EF was in agreement with the calculated left ventricular EF. Left ventricular systolic function is normal.  ? Moderate aortic valve regurgitation is present.  ? Estimated right ventricular systolic pressure from tricuspid regurgitation is normal (<35 mmHg).  ? Left ventricular diastolic function is consistent with (grade I) impaired relaxation.                      Anesthesia Plan    ASA 3     MAC   total IV anesthesia  intravenous induction     Anesthetic plan, risks, benefits, and alternatives have been provided, discussed and informed consent has been obtained with: patient.    Plan discussed with CAA and CRNA.        CODE STATUS:

## 2024-04-19 NOTE — DISCHARGE INSTR - ACTIVITY
Post Cath Instructions      Drink plenty of fluids for the next 24 hours.  This helps to eliminate the dye used in your procedure through urination.  You may resume a normal diet; however, try to avoid foods that would cause gas or constipation.    Sedative medication given to you during your catheterization may decrease your judgement and reaction time for up to 24-48 hours.  Therefore:  DO NOT drive or operate hazardous machinery (48 hours)  DO NOT consume alcoholic beverages  DO NOT make any important/legal decisions  Have someone stay with you for at least 24 hours    To allow proper healing and prevent bleeding, the following activities are to be strictly avoided for the next 24-48 hours:  Excessive bending at wound site  Straining (anything that would tense up muscles around the affected puncture site)  Lifting, pushing,or pulling objects greater than 5 pounds for 5 days  For Groin Cases:  Refrain from sexual activity  Refrain from running or vigorous walking  No prolonged sitting or standing  Limit stair climbing as much as possible    Keep the puncture site clean and dry.  You may remove the dressing tomorrow and replace it with a band-aid for at least one additional day.  Gently clean the site with mild soap and water.  No scrubbing/rubbing and lightly pat the area dry.  Showers are acceptable; however, avoid submerging in water (tub baths, hot tubs, swimming pools, dishwater, etc…) for at least one week.  The site should be completely healed before resuming these activities to reduce the risk of infection.  Check the site often.  Watch for signs and symptoms of infection and notify your physician if any of the following occur:  Bleeding or an increase in swelling at the puncture site  Fever  Increased soreness around puncture site  Foul odor or significant drainage from the puncture site  Swelling, redness, or warmth at the puncture site    **A bruise or small “pea sized” lump under the skin at the  puncture site is not unusual.  This should disappear within 3-4 weeks.**  CONTACT YOUR PHYSICIAN OR CALL 911 IF YOU EXPERIENCE ANY OF THE FOLLOWING:  Increased angina (chest pain) or frequent sensations of pressure, burning, pain, or other discomfort in the chest, arm, jaws, or stomach  Lightheadedness, dizziness, faint feeling, sweating, or difficulty breathing  Odd sensation changes like numbness, tingling, coldness, or pain in the arm or leg in which the catheter was inserted  Limb in which the catheter was inserted becomes pale/bluish in color    IMPORTANT:  Although this occurs very rarely, if you should develop bright red or excessive bleeding, feel a “pop” inside at the insertion site, or notice a sudden increase in swelling larger than a walnut, you should call 911.  Hold continuous firm pressure to the access site until emergency personnel arrive.  It is best if someone else can do this for you.

## 2024-04-20 NOTE — PROGRESS NOTES
4/20/2024    Seen on 4/18/2024    Chief Complaint:     Follow-up evaluation of ascending aortic aneurysm    History of Present Illness:       Dear Owen and Colleagues,  It was nice to see Panfilo Feliz in follow up evaluation for his proximal thoracic aortic aneurysm. He is a 83 y.o. male with thrombocytopenia, neuropathy, MGUS, aortic valve insufficiency and a known 4.9 cm in March 2022 that progressed to a 5.2 cm in August 2023. He denies any new symptoms or signs that aneurysm related in the interval. His last chest CT showed the ascending aorta 5.1 cm which is similar to the previous CT scan, the descending thoracic aorta 3.2 cm.  And there is mild dilatation of the root.  The echocardiogram showed ejection fraction 56 to 60%, severe dilatation of the ascending aorta, and the aortic valve with mild to moderate regurgitation.  There is calcification in 2 out of 3 leaflets and the valve is trileaflet obviously.  He has no family history of aneurysms, dissections or connective tissue disorders.    Patient Active Problem List   Diagnosis    Leukopenia    Thrombocytopenia    Monoclonal gammopathy of unknown significance (MGUS)    Neuropathy associated with MGUS    Nausea in adult patient    Slow transit constipation    Nausea    DDD (degenerative disc disease), lumbar    Chronic neck pain    DJD (degenerative joint disease)    Extremity pain    HTN (hypertension)    Hyperlipidemia    Hypothyroid    LBP (low back pain)    Lumbar canal stenosis    Prostate cancer    Ascending aortic aneurysm    Nonrheumatic aortic valve insufficiency    Other dysphagia    Gastroesophageal reflux disease       Past Medical History:   Diagnosis Date    AAA (abdominal aortic aneurysm)     Abnormal ECG 1980’s    Alcoholism     None since 1991    Aneurysm 2019    aortic arch    Arthritis     Basal cell carcinoma (BCC) of face     Cholelithiasis 1990’s    Cholecystectomy    Colon polyp     Colon polyps     Coronary artery disease      Difficulty walking     Disease of thyroid gland     DJD (degenerative joint disease)     Gastritis     Gastroparesis     GERD (gastroesophageal reflux disease)     GI (gastrointestinal bleed)     Hernia     Hiatal    History of bone marrow biopsy     [place] (hard of hearing)     Hyperlipidemia     Hypertension     Hypothyroidism     IgG monoclonal gammopathy     Multiple duodenal ulcers     Neuropathy     PONV (postoperative nausea and vomiting)     Prostate cancer     Reflux esophagitis     Ulcer     WBC decreased        Past Surgical History:   Procedure Laterality Date    CHOLECYSTECTOMY  1988    COLON SURGERY  1998    COLONOSCOPY  02/2013    ENDOSCOPY  2012    ESOPHAGOGASTRODUODENOSCOPY WITH DILATION OF SHATZKI'S RING    ENDOSCOPY N/A 10/09/2020    Procedure: ESOPHAGOGASTRODUODENOSCOPY with cold bx;  Surgeon: Jake Perez MD;  Location:  [facility];  Service: Gastroenterology;  Laterality: N/A;  pre - nausea  post - duodenal ulcer, gastrits, gastric ulcer, hiatal hernia    ENDOSCOPY N/A 10/04/2022    Procedure: ESOPHAGOGASTRODUODENOSCOPY with biopsies with esophageal dilatation with 56 cui;  Surgeon: Jake Perez MD;  Location:  [facility];  Service: Gastroenterology;  Laterality: N/A;  pre-dysphagia  post-normal     ENDOSCOPY  10/04/2022    [facility]    EYE SURGERY Bilateral     cataracts    LAMINECTOMY  2013    decompression L3-4, L4-5    PROSTATECTOMY  2007    SKIN BIOPSY      TONSILLECTOMY AND ADENOIDECTOMY      1940s    UPPER GASTROINTESTINAL ENDOSCOPY      VASECTOMY      1970s       Allergies   Allergen Reactions    Statins Myalgia         Current Outpatient Medications:     cholecalciferol (VITAMIN D3) 25 MCG (1000 UT) tablet, Take 1 tablet by mouth Daily. 25 mcg BID, Disp: , Rfl:     docusate sodium (COLACE) 100 MG capsule, Take 3 capsules by mouth 2 (Two) Times a Day., Disp: , Rfl:     escitalopram (LEXAPRO) 20 MG tablet, TAKE ONE TABLET BY MOUTH DAILY, Disp: 30 tablet, Rfl: 6     hyoscyamine (LEVBID) 0.375 MG 12 hr tablet, Take 1 tablet by mouth Every 12 (Twelve) Hours. (Patient not taking: Reported on 4/18/2024), Disp: 60 tablet, Rfl: 4    ibuprofen (ADVIL,MOTRIN) 200 MG tablet, Take 1 tablet by mouth As Needed for Mild Pain., Disp: , Rfl:     Magnesium Hydroxide (DULCOLAX PO), Take 400 mg by mouth Daily., Disp: , Rfl:     Menthol, Topical Analgesic, (BIOFREEZE EX), Apply  topically As Needed., Disp: , Rfl:     metoprolol tartrate (LOPRESSOR) 25 MG tablet, Take 0.5 tablets by mouth 2 (Two) Times a Day., Disp: 90 tablet, Rfl: 3    pantoprazole (PROTONIX) 40 MG EC tablet, TAKE 1 TABLET BY MOUTH TWICE A DAY, Disp: 180 tablet, Rfl: 3    levothyroxine (SYNTHROID, LEVOTHROID) 100 MCG tablet, Take 1 tablet by mouth Daily., Disp: , Rfl:     pregabalin (LYRICA) 50 MG capsule, Take 1 capsule by mouth 2 (Two) Times a Day., Disp: , Rfl:     vitamin B-12 (CYANOCOBALAMIN) 1000 MCG tablet, Take 1 tablet by mouth Daily., Disp: , Rfl:   No current facility-administered medications for this visit.    Social History     Socioeconomic History    Marital status:      Spouse name: Meghan    Years of education: College   Tobacco Use    Smoking status: Never    Smokeless tobacco: Never   Vaping Use    Vaping status: Never Used   Substance and Sexual Activity    Alcohol use: No     Comment: Heavy in past, none in 33 years    Drug use: Never    Sexual activity: Not Currently     Partners: Female     Birth control/protection: None       Family History   Problem Relation Age of Onset    Cancer Mother 85        Breast    COPD Father     Cancer Brother 59        Unknown type    Hypertension Daughter      Review of Systems:  As HPI, otherwise noncontributory    Physical Exam:    Vital Signs:  Weight: 80.3 kg (177 lb)   Body mass index is 21.55 kg/m².  Temp: 98 °F (36.7 °C)   Heart Rate: 84   BP: 125/85     Constitutional:       Appearance: Well-developed.   Eyes:      Conjunctiva/sclera: Conjunctivae normal.       Pupils: Pupils are equal, round, and reactive to light.   HENT:      Head: Normocephalic and atraumatic.      Nose: Nose normal.   Neck:      Thyroid: No thyromegaly.      Vascular: No JVD.      Lymphadenopathy: No cervical adenopathy.   Pulmonary:      Effort: Pulmonary effort is normal.      Breath sounds: Normal breath sounds. No rales.   Cardiovascular:      Normal rate. Regular rhythm.      Murmurs: There is a grade 2/4 high frequency blowing decrescendo, early diastolic murmur at the URSB, radiating to the apex.      No gallop.    Pulses:     Intact distal pulses. No decreased pulses.   Edema:     Peripheral edema absent.   Abdominal:      General: Bowel sounds are normal. There is no distension.      Palpations: Abdomen is soft. There is no abdominal mass.      Tenderness: There is no abdominal tenderness.   Musculoskeletal: Normal range of motion.         General: No tenderness or deformity.      Cervical back: Normal range of motion and neck supple. Skin:     General: Skin is warm and dry.      Findings: No erythema or rash.   Neurological:      Mental Status: Alert and oriented to person, place, and time.      Deep Tendon Reflexes: Reflexes are normal and symmetric.   Psychiatric:         Behavior: Behavior normal.          Assessment:     Problems Addressed this Visit          Cardiac and Vasculature    HTN (hypertension) - Primary    Ascending aortic aneurysm    Nonrheumatic aortic valve insufficiency       Hematology and Neoplasia    Monoclonal gammopathy of unknown significance (MGUS)    Prostate cancer       Neuro    Neuropathy associated with MGUS     Diagnoses         Codes Comments    Primary hypertension    -  Primary ICD-10-CM: I10  ICD-9-CM: 401.9     Aneurysm of ascending aorta without rupture     ICD-10-CM: I71.21  ICD-9-CM: 441.2     Nonrheumatic aortic valve insufficiency     ICD-10-CM: I35.1  ICD-9-CM: 424.1     Monoclonal gammopathy of unknown significance (MGUS)     ICD-10-CM:  D47.2  ICD-9-CM: 273.1     Prostate cancer     ICD-10-CM: C61  ICD-9-CM: 185     Neuropathy associated with MGUS     ICD-10-CM: D47.2, G63  ICD-9-CM: 273.1, 357.4             Assessment/recommendation:     5.1-5.2 cm ascending aortic aneurysm without dissection or ulceration.  Patient had enlargement close to 5 millimeters in 12 months.  He is asymptomatic.  I discussed with the patient the natural history of aneurysm disease and the guidelines for intervention.  I discussed with the patient my recommendation of ascending aortic and possible hemiarch replacement.  Discussed the risk, benefits and terms of the procedure and he wishes to proceed.  Quoted an operative mortality less than 2% and risk complications less than 10%.  He has close to moderate aortic regurgitation and he may benefit from aortic valve repair or replacement.  Aortic root had diameter of close to 3.8 to 4 cm and most likely can be pressure.  Hypertension, well-controlled continue same regimen  Prostatic cancer, seemingly under remission    Thank you for allowing me to participate in his care.    Regards,    Spencer Puente M.D.    Addendum  The cardiac cath and JOSE were performed.  The patient does not have coronary artery disease but does have moderate aortic regurgitation.    I spent over 35 minutes before, during after the office visit and reviewing the records, examining the patient, reviewing interpreting myself the cardiac cath, the JOSE, the 2D echocardiac, the chest CTA, spent time in discussing the findings and options on the recommendation of surgery, spent time discussing the evaluation and planning with the cardiology team and spent time in documenting in the electronic record

## 2024-04-24 ENCOUNTER — PREP FOR SURGERY (OUTPATIENT)
Dept: OTHER | Facility: HOSPITAL | Age: 84
End: 2024-04-24
Payer: MEDICARE

## 2024-04-24 ENCOUNTER — TELEPHONE (OUTPATIENT)
Dept: CARDIAC SURGERY | Facility: CLINIC | Age: 84
End: 2024-04-24
Payer: MEDICARE

## 2024-04-24 DIAGNOSIS — R79.1 ABNORMAL COAGULATION PROFILE: ICD-10-CM

## 2024-04-24 DIAGNOSIS — Z01.810 PREOP CARDIOVASCULAR EXAM: ICD-10-CM

## 2024-04-24 DIAGNOSIS — R79.9 ABNORMAL FINDING OF BLOOD CHEMISTRY, UNSPECIFIED: ICD-10-CM

## 2024-04-24 DIAGNOSIS — I71.21 ANEURYSM OF ASCENDING AORTA WITHOUT RUPTURE: Primary | ICD-10-CM

## 2024-04-24 RX ORDER — ALPRAZOLAM 0.25 MG/1
0.25 TABLET ORAL ONCE
OUTPATIENT
Start: 2024-04-24 | End: 2024-04-24

## 2024-04-24 RX ORDER — SODIUM CHLORIDE 0.9 % (FLUSH) 0.9 %
3-10 SYRINGE (ML) INJECTION AS NEEDED
OUTPATIENT
Start: 2024-04-24

## 2024-04-24 RX ORDER — SODIUM CHLORIDE 0.9 % (FLUSH) 0.9 %
30 SYRINGE (ML) INJECTION ONCE AS NEEDED
OUTPATIENT
Start: 2024-04-24

## 2024-04-24 RX ORDER — CHLORHEXIDINE GLUCONATE 500 MG/1
CLOTH TOPICAL EVERY 12 HOURS PRN
OUTPATIENT
Start: 2024-04-24

## 2024-04-24 RX ORDER — CHLORHEXIDINE GLUCONATE ORAL RINSE 1.2 MG/ML
15 SOLUTION DENTAL ONCE
OUTPATIENT
Start: 2024-04-24 | End: 2024-04-24

## 2024-04-24 RX ORDER — SODIUM CHLORIDE 0.9 % (FLUSH) 0.9 %
3 SYRINGE (ML) INJECTION EVERY 12 HOURS SCHEDULED
OUTPATIENT
Start: 2024-04-24

## 2024-04-24 RX ORDER — DEXTROSE MONOHYDRATE 25 G/50ML
10-50 INJECTION, SOLUTION INTRAVENOUS
OUTPATIENT
Start: 2024-04-24

## 2024-04-24 RX ORDER — CHLORHEXIDINE GLUCONATE ORAL RINSE 1.2 MG/ML
15 SOLUTION DENTAL EVERY 12 HOURS
OUTPATIENT
Start: 2024-04-24 | End: 2024-04-25

## 2024-04-24 RX ORDER — NITROGLYCERIN 0.4 MG/1
0.4 TABLET SUBLINGUAL
OUTPATIENT
Start: 2024-04-24

## 2024-04-24 RX ORDER — SODIUM CHLORIDE 9 MG/ML
40 INJECTION, SOLUTION INTRAVENOUS AS NEEDED
OUTPATIENT
Start: 2024-04-24

## 2024-04-24 RX ORDER — IBUPROFEN 600 MG/1
1 TABLET ORAL
OUTPATIENT
Start: 2024-04-24

## 2024-04-24 RX ORDER — SODIUM CHLORIDE 9 MG/ML
30 INJECTION, SOLUTION INTRAVENOUS CONTINUOUS PRN
OUTPATIENT
Start: 2024-04-24

## 2024-04-24 RX ORDER — NICOTINE POLACRILEX 4 MG
15 LOZENGE BUCCAL
OUTPATIENT
Start: 2024-04-24

## 2024-04-24 RX ORDER — ACETAMINOPHEN 325 MG/1
650 TABLET ORAL EVERY 4 HOURS PRN
OUTPATIENT
Start: 2024-04-24

## 2024-04-24 NOTE — TELEPHONE ENCOUNTER
Spoke to patient. Surgery is scheduled for 5- at 0730, arrival time is 0500. Sage will be reaching out to him to set up his PAT. He expressed a verbal understanding of these instructions. Was instructed to call back with any further questions.

## 2024-04-25 ENCOUNTER — TELEPHONE (OUTPATIENT)
Dept: ONCOLOGY | Facility: CLINIC | Age: 84
End: 2024-04-25
Payer: MEDICARE

## 2024-04-25 NOTE — TELEPHONE ENCOUNTER
Caller: Kelin Feliz    Relationship: Emergency Contact    Best call back number: [phone]     What is the best time to reach you: ASAP    Who are you requesting to speak with (clinical staff, provider,  specific staff member): CLINICAL      What was the call regarding: PATIENT AND HIS WIFE WOULD LIKE TO SPEAK TO DR PINEDA ABOUT HIS MEDS, OR A CALL BACK FROM THE NURSE IF DR PINEDA IS NOT AVAILABLE.

## 2024-04-26 ENCOUNTER — TELEPHONE (OUTPATIENT)
Dept: PREADMISSION TESTING | Facility: HOSPITAL | Age: 84
End: 2024-04-26
Payer: MEDICARE

## 2024-04-26 RX ORDER — ESCITALOPRAM OXALATE 10 MG/1
10 TABLET ORAL DAILY
Qty: 30 TABLET | Refills: 3 | Status: SHIPPED | OUTPATIENT
Start: 2024-04-26

## 2024-04-26 NOTE — TELEPHONE ENCOUNTER
Returned call to patient who states he will be unable to go the gym for an extended period of time and that he struggles with more depression whenever he can't get to the gym. Asking for an increase in his Lexapro. Discussed w/ Dr. Olvera who is okay with increasing it to 30mg daily. Relayed message to pt. Pt v/u and verified pharm. Gretchen Quintero RN

## 2024-04-27 LAB
QT INTERVAL: 400 MS
QTC INTERVAL: 447 MS

## 2024-05-09 ENCOUNTER — HOSPITAL ENCOUNTER (OUTPATIENT)
Dept: CARDIOLOGY | Facility: HOSPITAL | Age: 84
Discharge: HOME | End: 2024-05-09
Payer: MEDICARE

## 2024-05-09 ENCOUNTER — ANESTHESIA EVENT (OUTPATIENT)
Dept: PERIOP | Facility: HOSPITAL | Age: 84
End: 2024-05-09
Payer: MEDICARE

## 2024-05-09 ENCOUNTER — APPOINTMENT (OUTPATIENT)
Dept: PREADMISSION TESTING | Facility: HOSPITAL | Age: 84
DRG: 220 | End: 2024-05-09
Payer: MEDICARE

## 2024-05-09 ENCOUNTER — LAB (OUTPATIENT)
Dept: LAB | Facility: HOSPITAL | Age: 84
End: 2024-05-09
Payer: MEDICARE

## 2024-05-09 ENCOUNTER — HOSPITAL ENCOUNTER (OUTPATIENT)
Dept: GENERAL RADIOLOGY | Facility: HOSPITAL | Age: 84
Discharge: HOME | End: 2024-05-09
Payer: MEDICARE

## 2024-05-09 ENCOUNTER — HOSPITAL ENCOUNTER (OUTPATIENT)
Dept: RESPIRATORY THERAPY | Facility: HOSPITAL | Age: 84
Discharge: HOME | End: 2024-05-09
Payer: MEDICARE

## 2024-05-09 DIAGNOSIS — Z01.810 PREOP CARDIOVASCULAR EXAM: ICD-10-CM

## 2024-05-09 DIAGNOSIS — I71.21 ANEURYSM OF ASCENDING AORTA WITHOUT RUPTURE: ICD-10-CM

## 2024-05-09 DIAGNOSIS — R79.1 ABNORMAL COAGULATION PROFILE: ICD-10-CM

## 2024-05-09 DIAGNOSIS — R79.9 ABNORMAL FINDING OF BLOOD CHEMISTRY, UNSPECIFIED: ICD-10-CM

## 2024-05-09 LAB
ABO GROUP BLD: NORMAL
ALBUMIN SERPL-MCNC: 4.2 G/DL (ref 3.5–5.2)
ALBUMIN/GLOB SERPL: 1.4 G/DL
ALP SERPL-CCNC: 59 U/L (ref 39–117)
ALT SERPL W P-5'-P-CCNC: 7 U/L (ref 1–41)
ANION GAP SERPL CALCULATED.3IONS-SCNC: 8 MMOL/L (ref 5–15)
APTT PPP: 33.2 SECONDS (ref 24–31)
ARTERIAL PATENCY WRIST A: POSITIVE
AST SERPL-CCNC: 19 U/L (ref 1–40)
ATMOSPHERIC PRESS: ABNORMAL MM[HG]
BACTERIA UR QL AUTO: NORMAL /HPF
BASE EXCESS BLDA CALC-SCNC: -1.3 MMOL/L (ref 0–3)
BASOPHILS # BLD AUTO: 0.02 10*3/MM3 (ref 0–0.2)
BASOPHILS NFR BLD AUTO: 0.6 % (ref 0–1.5)
BDY SITE: ABNORMAL
BH CV XLRA MEAS LEFT DIST CCA EDV: -22.6 CM/SEC
BH CV XLRA MEAS LEFT DIST CCA PSV: -83.1 CM/SEC
BH CV XLRA MEAS LEFT DIST ICA EDV: -31.8 CM/SEC
BH CV XLRA MEAS LEFT DIST ICA PSV: -77.9 CM/SEC
BH CV XLRA MEAS LEFT ICA/CCA RATIO: 0.9
BH CV XLRA MEAS LEFT PROX CCA EDV: 20.6 CM/SEC
BH CV XLRA MEAS LEFT PROX CCA PSV: 85 CM/SEC
BH CV XLRA MEAS LEFT PROX ECA PSV: -90 CM/SEC
BH CV XLRA MEAS LEFT PROX ICA EDV: -17.6 CM/SEC
BH CV XLRA MEAS LEFT PROX ICA PSV: -48.8 CM/SEC
BH CV XLRA MEAS LEFT PROX SCLA PSV: 170 CM/SEC
BH CV XLRA MEAS LEFT VERTEBRAL A PSV: -51 CM/SEC
BH CV XLRA MEAS RIGHT DIST CCA EDV: -13.7 CM/SEC
BH CV XLRA MEAS RIGHT DIST CCA PSV: -73.9 CM/SEC
BH CV XLRA MEAS RIGHT DIST ICA EDV: -16.2 CM/SEC
BH CV XLRA MEAS RIGHT DIST ICA PSV: -49.2 CM/SEC
BH CV XLRA MEAS RIGHT ICA/CCA RATIO: 0.4
BH CV XLRA MEAS RIGHT PROX CCA EDV: 14.9 CM/SEC
BH CV XLRA MEAS RIGHT PROX CCA PSV: 112 CM/SEC
BH CV XLRA MEAS RIGHT PROX ECA PSV: -96.9 CM/SEC
BH CV XLRA MEAS RIGHT PROX ICA EDV: -15.7 CM/SEC
BH CV XLRA MEAS RIGHT PROX ICA PSV: -40.8 CM/SEC
BH CV XLRA MEAS RIGHT PROX SCLA PSV: -79.1 CM/SEC
BH CV XLRA MEAS RIGHT VERTEBRAL A PSV: -30.3 CM/SEC
BILIRUB SERPL-MCNC: 0.2 MG/DL (ref 0–1.2)
BILIRUB UR QL STRIP: NEGATIVE
BLD GP AB SCN SERPL QL: NEGATIVE
BUN SERPL-MCNC: 24 MG/DL (ref 8–23)
BUN/CREAT SERPL: 18.2 (ref 7–25)
CALCIUM SPEC-SCNC: 9.5 MG/DL (ref 8.6–10.5)
CHLORIDE SERPL-SCNC: 106 MMOL/L (ref 98–107)
CLARITY UR: CLEAR
CLOSE TME COLL+ADP + EPINEP PNL BLD: 96 % (ref 86–100)
CO2 BLDA-SCNC: 24.7 MMOL/L (ref 22–29)
CO2 SERPL-SCNC: 27 MMOL/L (ref 22–29)
COLOR UR: YELLOW
CREAT SERPL-MCNC: 1.32 MG/DL (ref 0.76–1.27)
DEPRECATED RDW RBC AUTO: 46.3 FL (ref 37–54)
EGFRCR SERPLBLD CKD-EPI 2021: 53.9 ML/MIN/1.73
EOSINOPHIL # BLD AUTO: 0.03 10*3/MM3 (ref 0–0.4)
EOSINOPHIL NFR BLD AUTO: 0.9 % (ref 0.3–6.2)
ERYTHROCYTE [DISTWIDTH] IN BLOOD BY AUTOMATED COUNT: 13.9 % (ref 12.3–15.4)
GLOBULIN UR ELPH-MCNC: 3.1 GM/DL
GLUCOSE SERPL-MCNC: 99 MG/DL (ref 65–99)
GLUCOSE UR STRIP-MCNC: NEGATIVE MG/DL
HBA1C MFR BLD: 5.5 % (ref 4.8–5.6)
HCO3 BLDA-SCNC: 23.5 MMOL/L (ref 21–28)
HCT VFR BLD AUTO: 42.9 % (ref 37.5–51)
HEMODILUTION: NO
HGB BLD-MCNC: 13.4 G/DL (ref 13–17.7)
HGB UR QL STRIP.AUTO: NEGATIVE
HYALINE CASTS UR QL AUTO: NORMAL /LPF
IMM GRANULOCYTES # BLD AUTO: 0.01 10*3/MM3 (ref 0–0.05)
IMM GRANULOCYTES NFR BLD AUTO: 0.3 % (ref 0–0.5)
INR PPP: 1 (ref 0.93–1.1)
KETONES UR QL STRIP: ABNORMAL
LEFT ARM BP: NORMAL MMHG
LEUKOCYTE ESTERASE UR QL STRIP.AUTO: ABNORMAL
LYMPHOCYTES # BLD AUTO: 1.21 10*3/MM3 (ref 0.7–3.1)
LYMPHOCYTES NFR BLD AUTO: 36.6 % (ref 19.6–45.3)
MCH RBC QN AUTO: 28.3 PG (ref 26.6–33)
MCHC RBC AUTO-ENTMCNC: 31.2 G/DL (ref 31.5–35.7)
MCV RBC AUTO: 90.7 FL (ref 79–97)
MODALITY: ABNORMAL
MONOCYTES # BLD AUTO: 0.58 10*3/MM3 (ref 0.1–0.9)
MONOCYTES NFR BLD AUTO: 17.5 % (ref 5–12)
MRSA DNA SPEC QL NAA+PROBE: NORMAL
MUCOUS THREADS URNS QL MICRO: NORMAL /HPF
NEUTROPHILS NFR BLD AUTO: 1.46 10*3/MM3 (ref 1.7–7)
NEUTROPHILS NFR BLD AUTO: 44.1 % (ref 42.7–76)
NITRITE UR QL STRIP: NEGATIVE
NRBC BLD AUTO-RTO: 0 /100 WBC (ref 0–0.2)
PCO2 BLDA: 38.9 MM HG (ref 35–48)
PH BLDA: 7.39 PH UNITS (ref 7.35–7.45)
PH UR STRIP.AUTO: 5.5 [PH] (ref 5–8)
PLATELET # BLD AUTO: 176 10*3/MM3 (ref 140–450)
PMV BLD AUTO: 10.4 FL (ref 6–12)
PO2 BLDA: 71.3 MM HG (ref 83–108)
POTASSIUM SERPL-SCNC: 4.9 MMOL/L (ref 3.5–5.2)
PROT SERPL-MCNC: 7.3 G/DL (ref 6–8.5)
PROT UR QL STRIP: NEGATIVE
PROTHROMBIN TIME: 10.9 SECONDS (ref 9.6–11.7)
QT INTERVAL: 407 MS
QTC INTERVAL: 447 MS
RBC # BLD AUTO: 4.73 10*6/MM3 (ref 4.14–5.8)
RBC # UR STRIP: NORMAL /HPF
REF LAB TEST METHOD: NORMAL
RH BLD: NEGATIVE
RIGHT ARM BP: NORMAL MMHG
SAO2 % BLDCOA: 94 % (ref 94–98)
SARS-COV-2 RNA RESP QL NAA+PROBE: NOT DETECTED
SODIUM SERPL-SCNC: 141 MMOL/L (ref 136–145)
SP GR UR STRIP: 1.02 (ref 1–1.03)
SQUAMOUS #/AREA URNS HPF: NORMAL /HPF
T&S EXPIRATION DATE: NORMAL
UROBILINOGEN UR QL STRIP: ABNORMAL
WBC # UR STRIP: NORMAL /HPF
WBC NRBC COR # BLD AUTO: 3.31 10*3/MM3 (ref 3.4–10.8)

## 2024-05-09 PROCEDURE — 82803 BLOOD GASES ANY COMBINATION: CPT | Performed by: NURSE PRACTITIONER

## 2024-05-09 PROCEDURE — 36600 WITHDRAWAL OF ARTERIAL BLOOD: CPT | Performed by: NURSE PRACTITIONER

## 2024-05-09 PROCEDURE — 87641 MR-STAPH DNA AMP PROBE: CPT

## 2024-05-09 PROCEDURE — 86901 BLOOD TYPING SEROLOGIC RH(D): CPT

## 2024-05-09 PROCEDURE — 85576 BLOOD PLATELET AGGREGATION: CPT

## 2024-05-09 PROCEDURE — 85730 THROMBOPLASTIN TIME PARTIAL: CPT

## 2024-05-09 PROCEDURE — 86900 BLOOD TYPING SEROLOGIC ABO: CPT

## 2024-05-09 PROCEDURE — 85025 COMPLETE CBC W/AUTO DIFF WBC: CPT

## 2024-05-09 PROCEDURE — 36415 COLL VENOUS BLD VENIPUNCTURE: CPT

## 2024-05-09 PROCEDURE — 71046 X-RAY EXAM CHEST 2 VIEWS: CPT

## 2024-05-09 PROCEDURE — 93005 ELECTROCARDIOGRAM TRACING: CPT | Performed by: NURSE PRACTITIONER

## 2024-05-09 PROCEDURE — 83036 HEMOGLOBIN GLYCOSYLATED A1C: CPT

## 2024-05-09 PROCEDURE — 80053 COMPREHEN METABOLIC PANEL: CPT

## 2024-05-09 PROCEDURE — 86850 RBC ANTIBODY SCREEN: CPT

## 2024-05-09 PROCEDURE — 87635 SARS-COV-2 COVID-19 AMP PRB: CPT

## 2024-05-09 PROCEDURE — 93880 EXTRACRANIAL BILAT STUDY: CPT

## 2024-05-09 PROCEDURE — 86923 COMPATIBILITY TEST ELECTRIC: CPT

## 2024-05-09 PROCEDURE — 81001 URINALYSIS AUTO W/SCOPE: CPT

## 2024-05-09 PROCEDURE — 85610 PROTHROMBIN TIME: CPT

## 2024-05-09 RX ORDER — CHLORHEXIDINE GLUCONATE 500 MG/1
CLOTH TOPICAL EVERY 12 HOURS PRN
Status: ACTIVE | OUTPATIENT
Start: 2024-05-09

## 2024-05-09 RX ORDER — CHLORHEXIDINE GLUCONATE ORAL RINSE 1.2 MG/ML
15 SOLUTION DENTAL EVERY 12 HOURS
Status: ACTIVE | OUTPATIENT
Start: 2024-05-09 | End: 2024-05-10

## 2024-05-10 ENCOUNTER — OFFICE VISIT (OUTPATIENT)
Dept: PERIOP | Facility: HOSPITAL | Age: 84
DRG: 220 | End: 2024-05-10
Payer: MEDICARE

## 2024-05-10 ENCOUNTER — ANESTHESIA (OUTPATIENT)
Dept: PERIOP | Facility: HOSPITAL | Age: 84
End: 2024-05-10
Payer: MEDICARE

## 2024-05-10 ENCOUNTER — HOSPITAL ENCOUNTER (INPATIENT)
Facility: HOSPITAL | Age: 84
LOS: 6 days | Discharge: NURSING, REHAB OR LONG-TERM CARE | DRG: 220 | End: 2024-05-16
Attending: THORACIC SURGERY (CARDIOTHORACIC VASCULAR SURGERY) | Admitting: THORACIC SURGERY (CARDIOTHORACIC VASCULAR SURGERY)
Payer: MEDICARE

## 2024-05-10 ENCOUNTER — APPOINTMENT (OUTPATIENT)
Dept: GENERAL RADIOLOGY | Facility: HOSPITAL | Age: 84
DRG: 220 | End: 2024-05-10
Payer: MEDICARE

## 2024-05-10 DIAGNOSIS — Z86.79 S/P ASCENDING AORTIC ANEURYSM REPAIR: Primary | ICD-10-CM

## 2024-05-10 DIAGNOSIS — Z98.890 S/P ASCENDING AORTIC ANEURYSM REPAIR: Primary | ICD-10-CM

## 2024-05-10 DIAGNOSIS — I71.21 ANEURYSM OF ASCENDING AORTA WITHOUT RUPTURE: ICD-10-CM

## 2024-05-10 LAB
ACT BLD: 122 SECONDS (ref 89–137)
ACT BLD: 134 SECONDS (ref 89–137)
ACT BLD: 427 SECONDS (ref 89–137)
ACT BLD: 452 SECONDS (ref 89–137)
ACT BLD: 519 SECONDS (ref 89–137)
ALBUMIN SERPL-MCNC: 3.5 G/DL (ref 3.5–5.2)
ANION GAP SERPL CALCULATED.3IONS-SCNC: 9 MMOL/L (ref 5–15)
APTT PPP: 29.4 SECONDS (ref 24–31)
APTT PPP: 30.9 SECONDS (ref 24–31)
ARTERIAL PATENCY WRIST A: ABNORMAL
ATMOSPHERIC PRESS: ABNORMAL MM[HG]
BASE DEFICIT: ABNORMAL
BASE EXCESS BLDA CALC-SCNC: -1.3 MMOL/L (ref 0–3)
BASE EXCESS BLDA CALC-SCNC: -1.7 MMOL/L (ref 0–3)
BASE EXCESS BLDA CALC-SCNC: -2.2 MMOL/L (ref 0–3)
BASE EXCESS BLDA CALC-SCNC: -2.5 MMOL/L (ref 0–3)
BASE EXCESS BLDA CALC-SCNC: -2.5 MMOL/L (ref 0–3)
BASE EXCESS BLDA CALC-SCNC: -3.8 MMOL/L (ref 0–3)
BASE EXCESS BLDA CALC-SCNC: 0 MMOL/L (ref 0–3)
BASE EXCESS BLDA CALC-SCNC: 2 MMOL/L (ref 0–3)
BASE EXCESS BLDA CALC-SCNC: <0 MMOL/L (ref 0–3)
BASE EXCESS BLDA CALC-SCNC: <0 MMOL/L (ref 0–3)
BASE EXCESS BLDV CALC-SCNC: ABNORMAL MMOL/L
BASOPHILS # BLD AUTO: 0.01 10*3/MM3 (ref 0–0.2)
BASOPHILS NFR BLD AUTO: 0.3 % (ref 0–1.5)
BDY SITE: ABNORMAL
BH BB BLOOD EXPIRATION DATE: NORMAL
BH BB BLOOD EXPIRATION DATE: NORMAL
BH BB BLOOD TYPE BARCODE: 1700
BH BB BLOOD TYPE BARCODE: 1700
BH BB DISPENSE STATUS: NORMAL
BH BB DISPENSE STATUS: NORMAL
BH BB PRODUCT CODE: NORMAL
BH BB PRODUCT CODE: NORMAL
BH BB UNIT NUMBER: NORMAL
BH BB UNIT NUMBER: NORMAL
BUN SERPL-MCNC: 20 MG/DL (ref 8–23)
BUN/CREAT SERPL: 21.1 (ref 7–25)
CA-I BLDA-SCNC: 1.01 MMOL/L (ref 1.12–1.32)
CA-I BLDA-SCNC: 1.06 MMOL/L (ref 1.12–1.32)
CA-I BLDA-SCNC: 1.09 MMOL/L (ref 1.12–1.32)
CA-I BLDA-SCNC: 1.11 MMOL/L (ref 1.15–1.33)
CA-I BLDA-SCNC: 1.17 MMOL/L (ref 1.12–1.32)
CA-I BLDA-SCNC: 1.18 MMOL/L (ref 1.15–1.33)
CA-I BLDA-SCNC: 1.21 MMOL/L (ref 1.15–1.33)
CA-I BLDA-SCNC: 1.23 MMOL/L (ref 1.15–1.33)
CA-I BLDA-SCNC: 1.25 MMOL/L (ref 1.15–1.33)
CA-I BLDA-SCNC: 1.27 MMOL/L (ref 1.15–1.33)
CA-I BLDA-SCNC: 1.3 MMOL/L (ref 1.12–1.32)
CA-I SERPL ISE-MCNC: 1.15 MMOL/L (ref 1.2–1.3)
CALCIUM SPEC-SCNC: 8.6 MG/DL (ref 8.6–10.5)
CHLORIDE SERPL-SCNC: 110 MMOL/L (ref 98–107)
CLOSE TME COLL+ADP + EPINEP PNL BLD: 96 % (ref 86–100)
CO2 BLDA-SCNC: 23.5 MMOL/L (ref 22–29)
CO2 BLDA-SCNC: 24 MMOL/L (ref 23–27)
CO2 BLDA-SCNC: 25 MMOL/L (ref 23–27)
CO2 BLDA-SCNC: 27 MMOL/L (ref 23–27)
CO2 BLDA-SCNC: 28 MMOL/L (ref 23–27)
CO2 CONTENT VENOUS: ABNORMAL
CO2 SERPL-SCNC: 22 MMOL/L (ref 22–29)
CREAT BLDA-MCNC: 0.74 MG/DL (ref 0.6–1.3)
CREAT BLDA-MCNC: 0.79 MG/DL (ref 0.6–1.3)
CREAT SERPL-MCNC: 0.95 MG/DL (ref 0.76–1.27)
CROSSMATCH INTERPRETATION: NORMAL
CROSSMATCH INTERPRETATION: NORMAL
D-LACTATE SERPL-SCNC: 0.4 MMOL/L (ref 0.2–2)
DEPRECATED RDW RBC AUTO: 44.9 FL (ref 37–54)
DEPRECATED RDW RBC AUTO: 45.6 FL (ref 37–54)
EGFRCR SERPLBLD CKD-EPI 2021: 79.9 ML/MIN/1.73
EGFRCR SERPLBLD CKD-EPI 2021: 88.7 ML/MIN/1.73
EGFRCR SERPLBLD CKD-EPI 2021: 90.5 ML/MIN/1.73
EOSINOPHIL # BLD AUTO: 0.03 10*3/MM3 (ref 0–0.4)
EOSINOPHIL NFR BLD AUTO: 1 % (ref 0.3–6.2)
ERYTHROCYTE [DISTWIDTH] IN BLOOD BY AUTOMATED COUNT: 13.7 % (ref 12.3–15.4)
ERYTHROCYTE [DISTWIDTH] IN BLOOD BY AUTOMATED COUNT: 13.8 % (ref 12.3–15.4)
FIBRINOGEN PPP-MCNC: 249 MG/DL (ref 210–450)
GLUCOSE BLDC GLUCOMTR-MCNC: 104 MG/DL (ref 70–105)
GLUCOSE BLDC GLUCOMTR-MCNC: 106 MG/DL (ref 70–105)
GLUCOSE BLDC GLUCOMTR-MCNC: 117 MG/DL (ref 70–105)
GLUCOSE BLDC GLUCOMTR-MCNC: 117 MG/DL (ref 70–105)
GLUCOSE BLDC GLUCOMTR-MCNC: 125 MG/DL (ref 70–105)
GLUCOSE BLDC GLUCOMTR-MCNC: 126 MG/DL (ref 74–100)
GLUCOSE BLDC GLUCOMTR-MCNC: 126 MG/DL (ref 74–100)
GLUCOSE BLDC GLUCOMTR-MCNC: 128 MG/DL (ref 70–105)
GLUCOSE BLDC GLUCOMTR-MCNC: 130 MG/DL (ref 70–105)
GLUCOSE BLDC GLUCOMTR-MCNC: 131 MG/DL (ref 70–105)
GLUCOSE BLDC GLUCOMTR-MCNC: 140 MG/DL (ref 70–105)
GLUCOSE BLDC GLUCOMTR-MCNC: 140 MG/DL (ref 74–100)
GLUCOSE BLDC GLUCOMTR-MCNC: 140 MG/DL (ref 74–100)
GLUCOSE BLDC GLUCOMTR-MCNC: 151 MG/DL (ref 74–100)
GLUCOSE BLDC GLUCOMTR-MCNC: 151 MG/DL (ref 74–100)
GLUCOSE BLDC GLUCOMTR-MCNC: 158 MG/DL (ref 74–100)
GLUCOSE BLDC GLUCOMTR-MCNC: 158 MG/DL (ref 74–100)
GLUCOSE BLDC GLUCOMTR-MCNC: 163 MG/DL (ref 70–105)
GLUCOSE BLDC GLUCOMTR-MCNC: 165 MG/DL (ref 74–100)
GLUCOSE BLDC GLUCOMTR-MCNC: 165 MG/DL (ref 74–100)
GLUCOSE BLDC GLUCOMTR-MCNC: 173 MG/DL (ref 70–105)
GLUCOSE BLDC GLUCOMTR-MCNC: NORMAL MG/DL
GLUCOSE SERPL-MCNC: 151 MG/DL (ref 65–99)
HCO3 BLDA-SCNC: 20.7 MMOL/L (ref 21–28)
HCO3 BLDA-SCNC: 21 MMOL/L (ref 21–28)
HCO3 BLDA-SCNC: 22.2 MMOL/L (ref 21–28)
HCO3 BLDA-SCNC: 22.4 MMOL/L (ref 21–28)
HCO3 BLDA-SCNC: 22.6 MMOL/L (ref 21–28)
HCO3 BLDA-SCNC: 23.3 MMOL/L (ref 22–26)
HCO3 BLDA-SCNC: 23.7 MMOL/L (ref 22–26)
HCO3 BLDA-SCNC: 23.8 MMOL/L (ref 21–28)
HCO3 BLDA-SCNC: 25.4 MMOL/L (ref 22–26)
HCO3 BLDA-SCNC: 26.9 MMOL/L (ref 22–26)
HCO3 BLDV-SCNC: 25.6 MMOL/L (ref 23–28)
HCT VFR BLD AUTO: 30.6 % (ref 37.5–51)
HCT VFR BLD AUTO: 34.4 % (ref 37.5–51)
HCT VFR BLDA CALC: 28 % (ref 38–51)
HCT VFR BLDA CALC: 28 % (ref 38–51)
HCT VFR BLDA CALC: 29 % (ref 38–51)
HCT VFR BLDA CALC: 33 % (ref 38–51)
HCT VFR BLDA CALC: 33 % (ref 38–51)
HCT VFR BLDA CALC: 34 % (ref 38–51)
HCT VFR BLDA CALC: 35 % (ref 38–51)
HCT VFR BLDA CALC: 35 % (ref 38–51)
HEMODILUTION: NO
HEMODILUTION: NO
HEMODILUTION: YES
HGB BLD-MCNC: 11.2 G/DL (ref 13–17.7)
HGB BLD-MCNC: 9.7 G/DL (ref 13–17.7)
HGB BLDA-MCNC: 11.1 G/DL (ref 12–17)
HGB BLDA-MCNC: 11.3 G/DL (ref 12–17)
HGB BLDA-MCNC: 11.5 G/DL (ref 12–17)
HGB BLDA-MCNC: 11.9 G/DL (ref 12–17)
HGB BLDA-MCNC: 12 G/DL (ref 12–17)
HGB BLDA-MCNC: 9.5 G/DL (ref 12–17)
HGB BLDA-MCNC: 9.6 G/DL (ref 12–17)
HGB BLDA-MCNC: 9.7 G/DL (ref 12–17)
HGB BLDA-MCNC: 9.9 G/DL (ref 12–17)
IMM GRANULOCYTES # BLD AUTO: 0.04 10*3/MM3 (ref 0–0.05)
IMM GRANULOCYTES NFR BLD AUTO: 1.3 % (ref 0–0.5)
INHALED O2 CONCENTRATION: 36 %
INHALED O2 CONCENTRATION: 40 %
INHALED O2 CONCENTRATION: 40 %
INHALED O2 CONCENTRATION: 50 %
INHALED O2 CONCENTRATION: 70 %
INHALED O2 CONCENTRATION: 70 %
INR PPP: 1.12 (ref 0.93–1.1)
INR PPP: 1.38 (ref 0.93–1.1)
LYMPHOCYTES # BLD AUTO: 0.46 10*3/MM3 (ref 0.7–3.1)
LYMPHOCYTES NFR BLD AUTO: 14.9 % (ref 19.6–45.3)
MCH RBC QN AUTO: 28.8 PG (ref 26.6–33)
MCH RBC QN AUTO: 28.9 PG (ref 26.6–33)
MCHC RBC AUTO-ENTMCNC: 31.7 G/DL (ref 31.5–35.7)
MCHC RBC AUTO-ENTMCNC: 32.6 G/DL (ref 31.5–35.7)
MCV RBC AUTO: 88.9 FL (ref 79–97)
MCV RBC AUTO: 90.8 FL (ref 79–97)
MODALITY: ABNORMAL
MONOCYTES # BLD AUTO: 0.09 10*3/MM3 (ref 0.1–0.9)
MONOCYTES NFR BLD AUTO: 2.9 % (ref 5–12)
NEUTROPHILS NFR BLD AUTO: 2.45 10*3/MM3 (ref 1.7–7)
NEUTROPHILS NFR BLD AUTO: 79.6 % (ref 42.7–76)
NRBC BLD AUTO-RTO: 0 /100 WBC (ref 0–0.2)
PCO2 BLDA: 26 MM HG (ref 35–48)
PCO2 BLDA: 36.3 MM HG (ref 35–48)
PCO2 BLDA: 36.7 MM HG (ref 35–48)
PCO2 BLDA: 37.1 MM HG (ref 35–48)
PCO2 BLDA: 39.1 MM HG (ref 35–48)
PCO2 BLDA: 39.2 MM HG (ref 35–45)
PCO2 BLDA: 42.4 MM HG (ref 35–48)
PCO2 BLDA: 43.6 MM HG (ref 35–45)
PCO2 BLDA: 44.6 MM HG (ref 35–45)
PCO2 BLDA: 47.4 MM HG (ref 35–45)
PCO2 BLDV: 50.3 MM HG (ref 41–51)
PEEP RESPIRATORY: 5 CM[H2O]
PEEP RESPIRATORY: 8 CM[H2O]
PEEP RESPIRATORY: 8 CM[H2O]
PH BLDA: 7.34 PH UNITS (ref 7.35–7.45)
PH BLDA: 7.34 PH UNITS (ref 7.35–7.45)
PH BLDA: 7.36 PH UNITS (ref 7.35–7.45)
PH BLDA: 7.37 PH UNITS (ref 7.35–7.45)
PH BLDA: 7.37 PH UNITS (ref 7.35–7.45)
PH BLDA: 7.38 PH UNITS (ref 7.35–7.45)
PH BLDA: 7.38 PH UNITS (ref 7.35–7.45)
PH BLDA: 7.39 PH UNITS (ref 7.35–7.45)
PH BLDA: 7.39 PH UNITS (ref 7.35–7.45)
PH BLDA: 7.51 PH UNITS (ref 7.35–7.45)
PH BLDV: 7.32 PH UNITS (ref 7.31–7.41)
PHOSPHATE SERPL-MCNC: 2.8 MG/DL (ref 2.5–4.5)
PLATELET # BLD AUTO: 118 10*3/MM3 (ref 140–450)
PLATELET # BLD AUTO: 79 10*3/MM3 (ref 140–450)
PMV BLD AUTO: 10.7 FL (ref 6–12)
PMV BLD AUTO: 11.2 FL (ref 6–12)
PO2 BLDA: 109.8 MM HG (ref 83–108)
PO2 BLDA: 145.7 MM HG (ref 83–108)
PO2 BLDA: 167.8 MM HG (ref 83–108)
PO2 BLDA: 176.6 MM HG (ref 83–108)
PO2 BLDA: 230.7 MM HG (ref 83–108)
PO2 BLDA: 239.2 MM HG (ref 83–108)
PO2 BLDA: 417 MM HG (ref 80–105)
PO2 BLDA: 430 MM HG (ref 80–105)
PO2 BLDA: 431 MM HG (ref 80–105)
PO2 BLDA: 449 MM HG (ref 80–105)
PO2 BLDV: 57 MM HG (ref 35–42)
POTASSIUM BLDA-SCNC: 3.8 MMOL/L (ref 3.5–4.5)
POTASSIUM BLDA-SCNC: 3.9 MMOL/L (ref 3.5–4.5)
POTASSIUM BLDA-SCNC: 3.9 MMOL/L (ref 3.5–4.5)
POTASSIUM BLDA-SCNC: 4 MMOL/L (ref 3.5–4.5)
POTASSIUM BLDA-SCNC: 4 MMOL/L (ref 3.5–4.5)
POTASSIUM BLDA-SCNC: 4 MMOL/L (ref 3.5–4.9)
POTASSIUM BLDA-SCNC: 4.2 MMOL/L (ref 3.5–4.5)
POTASSIUM BLDA-SCNC: 4.3 MMOL/L (ref 3.5–4.9)
POTASSIUM BLDA-SCNC: 4.5 MMOL/L (ref 3.5–4.9)
POTASSIUM BLDA-SCNC: 4.6 MMOL/L (ref 3.5–4.9)
POTASSIUM BLDA-SCNC: 4.8 MMOL/L (ref 3.5–4.9)
POTASSIUM SERPL-SCNC: 3.9 MMOL/L (ref 3.5–5.2)
POTASSIUM SERPL-SCNC: 4 MMOL/L (ref 3.5–5.2)
POTASSIUM SERPL-SCNC: 4.5 MMOL/L (ref 3.5–5.2)
PROTHROMBIN TIME: 12.1 SECONDS (ref 9.6–11.7)
PROTHROMBIN TIME: 14.7 SECONDS (ref 9.6–11.7)
PSV: 10 CMH2O
QT INTERVAL: 378 MS
QTC INTERVAL: 464 MS
RBC # BLD AUTO: 3.37 10*6/MM3 (ref 4.14–5.8)
RBC # BLD AUTO: 3.87 10*6/MM3 (ref 4.14–5.8)
RESPIRATORY RATE: 10
RESPIRATORY RATE: 14
SAO2 % BLDCOA: 100 % (ref 95–98)
SAO2 % BLDCOA: 98.1 % (ref 94–98)
SAO2 % BLDCOA: 99.2 % (ref 94–98)
SAO2 % BLDCOA: 99.5 % (ref 94–98)
SAO2 % BLDCOA: 99.5 % (ref 94–98)
SAO2 % BLDCOA: 99.8 % (ref 94–98)
SAO2 % BLDCOA: 99.9 % (ref 94–98)
SAO2 % BLDCOV: 27 % (ref 45–75)
SODIUM BLD-SCNC: 138 MMOL/L (ref 138–146)
SODIUM BLD-SCNC: 139 MMOL/L (ref 138–146)
SODIUM BLD-SCNC: 141 MMOL/L (ref 138–146)
SODIUM BLD-SCNC: 141 MMOL/L (ref 138–146)
SODIUM BLD-SCNC: 142 MMOL/L (ref 138–146)
SODIUM BLD-SCNC: 143 MMOL/L (ref 138–146)
SODIUM SERPL-SCNC: 141 MMOL/L (ref 136–145)
UNIT  ABO: NORMAL
UNIT  ABO: NORMAL
UNIT  RH: NORMAL
UNIT  RH: NORMAL
VENTILATOR MODE: ABNORMAL
VENTILATOR MODE: AC
VT ON VENT VENT: 750 ML
VT ON VENT VENT: 850 ML
WBC NRBC COR # BLD AUTO: 1.24 10*3/MM3 (ref 3.4–10.8)
WBC NRBC COR # BLD AUTO: 3.08 10*3/MM3 (ref 3.4–10.8)

## 2024-05-10 PROCEDURE — C1713 ANCHOR/SCREW BN/BN,TIS/BN: HCPCS | Performed by: THORACIC SURGERY (CARDIOTHORACIC VASCULAR SURGERY)

## 2024-05-10 PROCEDURE — 25010000002 PHENYLEPHRINE 10 MG/ML SOLUTION: Performed by: ANESTHESIOLOGY

## 2024-05-10 PROCEDURE — 25010000002 HEPARIN (PORCINE) PER 1000 UNITS: Performed by: THORACIC SURGERY (CARDIOTHORACIC VASCULAR SURGERY)

## 2024-05-10 PROCEDURE — 84295 ASSAY OF SERUM SODIUM: CPT

## 2024-05-10 PROCEDURE — 83605 ASSAY OF LACTIC ACID: CPT

## 2024-05-10 PROCEDURE — 85610 PROTHROMBIN TIME: CPT | Performed by: THORACIC SURGERY (CARDIOTHORACIC VASCULAR SURGERY)

## 2024-05-10 PROCEDURE — 85384 FIBRINOGEN ACTIVITY: CPT | Performed by: THORACIC SURGERY (CARDIOTHORACIC VASCULAR SURGERY)

## 2024-05-10 PROCEDURE — C1751 CATH, INF, PER/CENT/MIDLINE: HCPCS | Performed by: THORACIC SURGERY (CARDIOTHORACIC VASCULAR SURGERY)

## 2024-05-10 PROCEDURE — C1751 CATH, INF, PER/CENT/MIDLINE: HCPCS | Performed by: ANESTHESIOLOGY

## 2024-05-10 PROCEDURE — 25010000002 ACETAMINOPHEN 10 MG/ML SOLUTION: Performed by: ANESTHESIOLOGY

## 2024-05-10 PROCEDURE — 36430 TRANSFUSION BLD/BLD COMPNT: CPT

## 2024-05-10 PROCEDURE — 25810000003 SODIUM CHLORIDE 0.9 % SOLUTION 250 ML FLEX CONT: Performed by: THORACIC SURGERY (CARDIOTHORACIC VASCULAR SURGERY)

## 2024-05-10 PROCEDURE — 25010000002 PHENYLEPHRINE 10 MG/ML SOLUTION 5 ML VIAL: Performed by: THORACIC SURGERY (CARDIOTHORACIC VASCULAR SURGERY)

## 2024-05-10 PROCEDURE — 82948 REAGENT STRIP/BLOOD GLUCOSE: CPT

## 2024-05-10 PROCEDURE — 93005 ELECTROCARDIOGRAM TRACING: CPT | Performed by: NURSE PRACTITIONER

## 2024-05-10 PROCEDURE — 99221 1ST HOSP IP/OBS SF/LOW 40: CPT | Performed by: INTERNAL MEDICINE

## 2024-05-10 PROCEDURE — 85730 THROMBOPLASTIN TIME PARTIAL: CPT | Performed by: THORACIC SURGERY (CARDIOTHORACIC VASCULAR SURGERY)

## 2024-05-10 PROCEDURE — 94799 UNLISTED PULMONARY SVC/PX: CPT

## 2024-05-10 PROCEDURE — 93010 ELECTROCARDIOGRAM REPORT: CPT | Performed by: INTERNAL MEDICINE

## 2024-05-10 PROCEDURE — 25010000002 PROPOFOL 200 MG/20ML EMULSION: Performed by: ANESTHESIOLOGY

## 2024-05-10 PROCEDURE — 85027 COMPLETE CBC AUTOMATED: CPT | Performed by: THORACIC SURGERY (CARDIOTHORACIC VASCULAR SURGERY)

## 2024-05-10 PROCEDURE — 25810000003 SODIUM CHLORIDE 0.9 % SOLUTION: Performed by: NURSE PRACTITIONER

## 2024-05-10 PROCEDURE — 25010000002 CEFAZOLIN PER 500 MG: Performed by: ANESTHESIOLOGY

## 2024-05-10 PROCEDURE — 82330 ASSAY OF CALCIUM: CPT

## 2024-05-10 PROCEDURE — 82803 BLOOD GASES ANY COMBINATION: CPT

## 2024-05-10 PROCEDURE — P9041 ALBUMIN (HUMAN),5%, 50ML: HCPCS | Performed by: NURSE PRACTITIONER

## 2024-05-10 PROCEDURE — 25010000002 CEFAZOLIN PER 500 MG: Performed by: NURSE PRACTITIONER

## 2024-05-10 PROCEDURE — 85730 THROMBOPLASTIN TIME PARTIAL: CPT | Performed by: NURSE PRACTITIONER

## 2024-05-10 PROCEDURE — 94002 VENT MGMT INPAT INIT DAY: CPT

## 2024-05-10 PROCEDURE — 85347 COAGULATION TIME ACTIVATED: CPT

## 2024-05-10 PROCEDURE — C1887 CATHETER, GUIDING: HCPCS | Performed by: THORACIC SURGERY (CARDIOTHORACIC VASCULAR SURGERY)

## 2024-05-10 PROCEDURE — C1729 CATH, DRAINAGE: HCPCS | Performed by: THORACIC SURGERY (CARDIOTHORACIC VASCULAR SURGERY)

## 2024-05-10 PROCEDURE — 33859 AS-AORT GRF F/DS OTH/THN DSJ: CPT | Performed by: THORACIC SURGERY (CARDIOTHORACIC VASCULAR SURGERY)

## 2024-05-10 PROCEDURE — 25010000002 PROTAMINE SULFATE PER 10 MG: Performed by: ANESTHESIOLOGY

## 2024-05-10 PROCEDURE — 71045 X-RAY EXAM CHEST 1 VIEW: CPT

## 2024-05-10 PROCEDURE — 74018 RADEX ABDOMEN 1 VIEW: CPT

## 2024-05-10 PROCEDURE — 80069 RENAL FUNCTION PANEL: CPT | Performed by: NURSE PRACTITIONER

## 2024-05-10 PROCEDURE — 84132 ASSAY OF SERUM POTASSIUM: CPT

## 2024-05-10 PROCEDURE — 25010000002 NICARDIPINE 2.5 MG/ML SOLUTION: Performed by: ANESTHESIOLOGY

## 2024-05-10 PROCEDURE — 88304 TISSUE EXAM BY PATHOLOGIST: CPT | Performed by: THORACIC SURGERY (CARDIOTHORACIC VASCULAR SURGERY)

## 2024-05-10 PROCEDURE — 94003 VENT MGMT INPAT SUBQ DAY: CPT

## 2024-05-10 PROCEDURE — 25010000002 NICARDIPINE 2.5 MG/ML SOLUTION 10 ML VIAL: Performed by: NURSE PRACTITIONER

## 2024-05-10 PROCEDURE — 25010000002 POTASSIUM CHLORIDE PER 2 MEQ: Performed by: THORACIC SURGERY (CARDIOTHORACIC VASCULAR SURGERY)

## 2024-05-10 PROCEDURE — 94761 N-INVAS EAR/PLS OXIMETRY MLT: CPT

## 2024-05-10 PROCEDURE — 25010000002 MAGNESIUM SULFATE PER 500 MG OF MAGNESIUM: Performed by: ANESTHESIOLOGY

## 2024-05-10 PROCEDURE — 33999 UNLISTED PX CARDIAC SURGERY: CPT | Performed by: THORACIC SURGERY (CARDIOTHORACIC VASCULAR SURGERY)

## 2024-05-10 PROCEDURE — 25010000002 CALCIUM GLUCONATE 2-0.675 GM/100ML-% SOLUTION: Performed by: NURSE PRACTITIONER

## 2024-05-10 PROCEDURE — 85018 HEMOGLOBIN: CPT

## 2024-05-10 PROCEDURE — 02RX0JZ REPLACEMENT OF THORACIC AORTA, ASCENDING/ARCH WITH SYNTHETIC SUBSTITUTE, OPEN APPROACH: ICD-10-PCS | Performed by: THORACIC SURGERY (CARDIOTHORACIC VASCULAR SURGERY)

## 2024-05-10 PROCEDURE — 25010000002 ACETAMINOPHEN 10 MG/ML SOLUTION: Performed by: NURSE PRACTITIONER

## 2024-05-10 PROCEDURE — C1768 GRAFT, VASCULAR: HCPCS | Performed by: THORACIC SURGERY (CARDIOTHORACIC VASCULAR SURGERY)

## 2024-05-10 PROCEDURE — S0260 H&P FOR SURGERY: HCPCS | Performed by: THORACIC SURGERY (CARDIOTHORACIC VASCULAR SURGERY)

## 2024-05-10 PROCEDURE — 85025 COMPLETE CBC W/AUTO DIFF WBC: CPT | Performed by: NURSE PRACTITIONER

## 2024-05-10 PROCEDURE — P9035 PLATELET PHERES LEUKOREDUCED: HCPCS

## 2024-05-10 PROCEDURE — 25010000002 HEPARIN (PORCINE) PER 1000 UNITS: Performed by: ANESTHESIOLOGY

## 2024-05-10 PROCEDURE — 82947 ASSAY GLUCOSE BLOOD QUANT: CPT

## 2024-05-10 PROCEDURE — 85610 PROTHROMBIN TIME: CPT | Performed by: NURSE PRACTITIONER

## 2024-05-10 PROCEDURE — 02L70CK OCCLUSION OF LEFT ATRIAL APPENDAGE WITH EXTRALUMINAL DEVICE, OPEN APPROACH: ICD-10-PCS | Performed by: THORACIC SURGERY (CARDIOTHORACIC VASCULAR SURGERY)

## 2024-05-10 PROCEDURE — 25010000002 NITROGLYCERIN 200 MCG/ML SOLUTION: Performed by: ANESTHESIOLOGY

## 2024-05-10 PROCEDURE — 85576 BLOOD PLATELET AGGREGATION: CPT | Performed by: THORACIC SURGERY (CARDIOTHORACIC VASCULAR SURGERY)

## 2024-05-10 PROCEDURE — 82803 BLOOD GASES ANY COMBINATION: CPT | Performed by: NURSE PRACTITIONER

## 2024-05-10 PROCEDURE — 25010000002 SUCCINYLCHOLINE PER 20 MG: Performed by: ANESTHESIOLOGY

## 2024-05-10 PROCEDURE — B24BZZ4 ULTRASONOGRAPHY OF HEART WITH AORTA, TRANSESOPHAGEAL: ICD-10-PCS | Performed by: ANESTHESIOLOGY

## 2024-05-10 PROCEDURE — 25010000002 MAGNESIUM SULFATE IN D5W 1G/100ML (PREMIX) 1-5 GM/100ML-% SOLUTION: Performed by: NURSE PRACTITIONER

## 2024-05-10 PROCEDURE — 33268 EXCL LAA OPN OTH PX ANY METH: CPT | Performed by: THORACIC SURGERY (CARDIOTHORACIC VASCULAR SURGERY)

## 2024-05-10 PROCEDURE — 25010000002 MORPHINE PER 10 MG: Performed by: NURSE PRACTITIONER

## 2024-05-10 PROCEDURE — 80051 ELECTROLYTE PANEL: CPT

## 2024-05-10 PROCEDURE — 25010000002 ALBUMIN HUMAN 5% PER 50 ML: Performed by: THORACIC SURGERY (CARDIOTHORACIC VASCULAR SURGERY)

## 2024-05-10 PROCEDURE — 25810000003 SODIUM CHLORIDE 0.9 % SOLUTION 250 ML FLEX CONT: Performed by: NURSE PRACTITIONER

## 2024-05-10 PROCEDURE — 82330 ASSAY OF CALCIUM: CPT | Performed by: NURSE PRACTITIONER

## 2024-05-10 PROCEDURE — 85014 HEMATOCRIT: CPT

## 2024-05-10 PROCEDURE — 25010000002 ONDANSETRON PER 1 MG: Performed by: NURSE PRACTITIONER

## 2024-05-10 PROCEDURE — 25010000002 MIDAZOLAM PER 1 MG: Performed by: ANESTHESIOLOGY

## 2024-05-10 PROCEDURE — 25010000002 FENTANYL CITRATE (PF) 250 MCG/5ML SOLUTION: Performed by: ANESTHESIOLOGY

## 2024-05-10 PROCEDURE — 25010000002 ALBUMIN HUMAN 5% PER 50 ML: Performed by: NURSE PRACTITIONER

## 2024-05-10 PROCEDURE — 25010000002 ALBUMIN HUMAN 5% PER 50 ML

## 2024-05-10 PROCEDURE — P9041 ALBUMIN (HUMAN),5%, 50ML: HCPCS

## 2024-05-10 PROCEDURE — 93318 ECHO TRANSESOPHAGEAL INTRAOP: CPT | Performed by: ANESTHESIOLOGY

## 2024-05-10 PROCEDURE — C1889 IMPLANT/INSERT DEVICE, NOC: HCPCS | Performed by: THORACIC SURGERY (CARDIOTHORACIC VASCULAR SURGERY)

## 2024-05-10 PROCEDURE — 5A1221Z PERFORMANCE OF CARDIAC OUTPUT, CONTINUOUS: ICD-10-PCS | Performed by: THORACIC SURGERY (CARDIOTHORACIC VASCULAR SURGERY)

## 2024-05-10 PROCEDURE — 82565 ASSAY OF CREATININE: CPT

## 2024-05-10 PROCEDURE — P9100 PATHOGEN TEST FOR PLATELETS: HCPCS

## 2024-05-10 PROCEDURE — 84132 ASSAY OF SERUM POTASSIUM: CPT | Performed by: THORACIC SURGERY (CARDIOTHORACIC VASCULAR SURGERY)

## 2024-05-10 PROCEDURE — P9041 ALBUMIN (HUMAN),5%, 50ML: HCPCS | Performed by: THORACIC SURGERY (CARDIOTHORACIC VASCULAR SURGERY)

## 2024-05-10 PROCEDURE — P9037 PLATE PHERES LEUKOREDU IRRAD: HCPCS

## 2024-05-10 DEVICE — ABSORBABLE HEMOSTAT (OXIDIZED REGENERATED CELLULOSE)
Type: IMPLANTABLE DEVICE | Site: HEART | Status: FUNCTIONAL
Brand: SURGICEL

## 2024-05-10 DEVICE — SS SUTURE, 6 PER SLEEVE
Type: IMPLANTABLE DEVICE | Site: STERNUM | Status: FUNCTIONAL
Brand: MYO/WIRE II

## 2024-05-10 DEVICE — SS SUTURE, 3 PER SLEEVE
Type: IMPLANTABLE DEVICE | Site: STERNUM | Status: FUNCTIONAL
Brand: MYO/WIRE II

## 2024-05-10 DEVICE — INCISIONLINE PLEDGET TFLN SFT LG: Type: IMPLANTABLE DEVICE | Site: HEART | Status: FUNCTIONAL

## 2024-05-10 DEVICE — DEV CONTRL TISS STRATAFIX SPIRAL MNCRYL UD 3/0 PLS 30CM: Type: IMPLANTABLE DEVICE | Site: CHEST | Status: FUNCTIONAL

## 2024-05-10 DEVICE — APPL CLIP LAA ATRICLIP FLXV 45MM: Type: IMPLANTABLE DEVICE | Site: HEART | Status: FUNCTIONAL

## 2024-05-10 DEVICE — TEMP PACING WIRE
Type: IMPLANTABLE DEVICE | Site: HEART | Status: FUNCTIONAL
Brand: MYO/WIRE

## 2024-05-10 DEVICE — WAX,BONE,NATURAL
Type: IMPLANTABLE DEVICE | Site: STERNUM | Status: FUNCTIONAL
Brand: MEDLINE INDUSTRIES

## 2024-05-10 DEVICE — GELWEAVE GELATIN IMPREGNATED WOVEN VASCULAR PROSTHESIS STRAIGHT
Type: IMPLANTABLE DEVICE | Site: HEART | Status: FUNCTIONAL
Brand: GELWEAVE™

## 2024-05-10 RX ORDER — HEPARIN SODIUM 1000 [USP'U]/ML
INJECTION, SOLUTION INTRAVENOUS; SUBCUTANEOUS AS NEEDED
Status: DISCONTINUED | OUTPATIENT
Start: 2024-05-10 | End: 2024-05-10 | Stop reason: SURG

## 2024-05-10 RX ORDER — SODIUM CHLORIDE 9 MG/ML
30 INJECTION, SOLUTION INTRAVENOUS CONTINUOUS PRN
Status: DISCONTINUED | OUTPATIENT
Start: 2024-05-10 | End: 2024-05-10

## 2024-05-10 RX ORDER — ENOXAPARIN SODIUM 100 MG/ML
40 INJECTION SUBCUTANEOUS DAILY
Status: DISCONTINUED | OUTPATIENT
Start: 2024-05-11 | End: 2024-05-11

## 2024-05-10 RX ORDER — MEPERIDINE HYDROCHLORIDE 25 MG/ML
25 INJECTION INTRAMUSCULAR; INTRAVENOUS; SUBCUTANEOUS ONCE AS NEEDED
Status: ACTIVE | OUTPATIENT
Start: 2024-05-10 | End: 2024-05-10

## 2024-05-10 RX ORDER — MAGNESIUM SULFATE HEPTAHYDRATE 500 MG/ML
INJECTION, SOLUTION INTRAMUSCULAR; INTRAVENOUS AS NEEDED
Status: DISCONTINUED | OUTPATIENT
Start: 2024-05-10 | End: 2024-05-10 | Stop reason: SURG

## 2024-05-10 RX ORDER — ACETAMINOPHEN 160 MG/5ML
650 SOLUTION ORAL EVERY 4 HOURS PRN
Status: DISCONTINUED | OUTPATIENT
Start: 2024-05-11 | End: 2024-05-13

## 2024-05-10 RX ORDER — CHLORHEXIDINE GLUCONATE 500 MG/1
CLOTH TOPICAL EVERY 12 HOURS PRN
Status: DISCONTINUED | OUTPATIENT
Start: 2024-05-10 | End: 2024-05-10 | Stop reason: HOSPADM

## 2024-05-10 RX ORDER — ACETAMINOPHEN 325 MG/1
650 TABLET ORAL EVERY 4 HOURS PRN
Status: DISCONTINUED | OUTPATIENT
Start: 2024-05-11 | End: 2024-05-16 | Stop reason: HOSPADM

## 2024-05-10 RX ORDER — POTASSIUM CHLORIDE 29.8 MG/ML
20 INJECTION INTRAVENOUS ONCE
Status: COMPLETED | OUTPATIENT
Start: 2024-05-10 | End: 2024-05-10

## 2024-05-10 RX ORDER — NITROGLYCERIN 20 MG/100ML
5-50 INJECTION INTRAVENOUS CONTINUOUS PRN
Status: DISCONTINUED | OUTPATIENT
Start: 2024-05-10 | End: 2024-05-13

## 2024-05-10 RX ORDER — MAGNESIUM SULFATE 1 G/100ML
1 INJECTION INTRAVENOUS EVERY 8 HOURS
Qty: 300 ML | Refills: 0 | Status: COMPLETED | OUTPATIENT
Start: 2024-05-10 | End: 2024-05-11

## 2024-05-10 RX ORDER — ACETAMINOPHEN 325 MG/1
650 TABLET ORAL EVERY 4 HOURS PRN
Status: DISCONTINUED | OUTPATIENT
Start: 2024-05-10 | End: 2024-05-10 | Stop reason: HOSPADM

## 2024-05-10 RX ORDER — NOREPINEPHRINE BITARTRATE 0.03 MG/ML
.02-.06 INJECTION, SOLUTION INTRAVENOUS CONTINUOUS PRN
Status: DISCONTINUED | OUTPATIENT
Start: 2024-05-10 | End: 2024-05-13

## 2024-05-10 RX ORDER — SODIUM CHLORIDE 0.9 % (FLUSH) 0.9 %
3 SYRINGE (ML) INJECTION EVERY 12 HOURS SCHEDULED
Status: DISCONTINUED | OUTPATIENT
Start: 2024-05-10 | End: 2024-05-10 | Stop reason: HOSPADM

## 2024-05-10 RX ORDER — ACETAMINOPHEN 650 MG
TABLET, EXTENDED RELEASE ORAL AS NEEDED
Status: DISCONTINUED | OUTPATIENT
Start: 2024-05-10 | End: 2024-05-10 | Stop reason: HOSPADM

## 2024-05-10 RX ORDER — MIDAZOLAM HYDROCHLORIDE 1 MG/ML
INJECTION INTRAMUSCULAR; INTRAVENOUS AS NEEDED
Status: DISCONTINUED | OUTPATIENT
Start: 2024-05-10 | End: 2024-05-10 | Stop reason: SURG

## 2024-05-10 RX ORDER — NICARDIPINE HYDROCHLORIDE 2.5 MG/ML
INJECTION INTRAVENOUS AS NEEDED
Status: DISCONTINUED | OUTPATIENT
Start: 2024-05-10 | End: 2024-05-10 | Stop reason: SURG

## 2024-05-10 RX ORDER — ALBUMIN, HUMAN INJ 5% 5 %
12.5 SOLUTION INTRAVENOUS AS NEEDED
Status: COMPLETED | OUTPATIENT
Start: 2024-05-10 | End: 2024-05-10

## 2024-05-10 RX ORDER — DEXMEDETOMIDINE HYDROCHLORIDE 4 UG/ML
.2-1.5 INJECTION, SOLUTION INTRAVENOUS
Status: DISCONTINUED | OUTPATIENT
Start: 2024-05-10 | End: 2024-05-12

## 2024-05-10 RX ORDER — ONDANSETRON 2 MG/ML
4 INJECTION INTRAMUSCULAR; INTRAVENOUS EVERY 6 HOURS PRN
Status: DISCONTINUED | OUTPATIENT
Start: 2024-05-10 | End: 2024-05-16 | Stop reason: HOSPADM

## 2024-05-10 RX ORDER — ATORVASTATIN CALCIUM 40 MG/1
40 TABLET, FILM COATED ORAL NIGHTLY
Status: DISCONTINUED | OUTPATIENT
Start: 2024-05-11 | End: 2024-05-16 | Stop reason: HOSPADM

## 2024-05-10 RX ORDER — HYDROCODONE BITARTRATE AND ACETAMINOPHEN 5; 325 MG/1; MG/1
1 TABLET ORAL EVERY 4 HOURS PRN
Status: DISCONTINUED | OUTPATIENT
Start: 2024-05-10 | End: 2024-05-13

## 2024-05-10 RX ORDER — ALBUMIN, HUMAN INJ 5% 5 %
SOLUTION INTRAVENOUS
Status: COMPLETED
Start: 2024-05-10 | End: 2024-05-10

## 2024-05-10 RX ORDER — POLYETHYLENE GLYCOL 3350 17 G/17G
17 POWDER, FOR SOLUTION ORAL 2 TIMES DAILY
Status: DISCONTINUED | OUTPATIENT
Start: 2024-05-10 | End: 2024-05-16 | Stop reason: HOSPADM

## 2024-05-10 RX ORDER — ASPIRIN 81 MG/1
81 TABLET ORAL DAILY
Status: DISCONTINUED | OUTPATIENT
Start: 2024-05-11 | End: 2024-05-16 | Stop reason: HOSPADM

## 2024-05-10 RX ORDER — MORPHINE SULFATE 2 MG/ML
2 INJECTION, SOLUTION INTRAMUSCULAR; INTRAVENOUS
Status: DISCONTINUED | OUTPATIENT
Start: 2024-05-10 | End: 2024-05-13

## 2024-05-10 RX ORDER — VECURONIUM BROMIDE 1 MG/ML
INJECTION, POWDER, LYOPHILIZED, FOR SOLUTION INTRAVENOUS AS NEEDED
Status: DISCONTINUED | OUTPATIENT
Start: 2024-05-10 | End: 2024-05-10 | Stop reason: SURG

## 2024-05-10 RX ORDER — MAGNESIUM HYDROXIDE 1200 MG/15ML
LIQUID ORAL AS NEEDED
Status: DISCONTINUED | OUTPATIENT
Start: 2024-05-10 | End: 2024-05-10 | Stop reason: HOSPADM

## 2024-05-10 RX ORDER — PHENYLEPHRINE HYDROCHLORIDE 10 MG/ML
INJECTION INTRAVENOUS AS NEEDED
Status: DISCONTINUED | OUTPATIENT
Start: 2024-05-10 | End: 2024-05-10 | Stop reason: SURG

## 2024-05-10 RX ORDER — FENTANYL CITRATE 50 UG/ML
INJECTION, SOLUTION INTRAMUSCULAR; INTRAVENOUS AS NEEDED
Status: DISCONTINUED | OUTPATIENT
Start: 2024-05-10 | End: 2024-05-10 | Stop reason: SURG

## 2024-05-10 RX ORDER — SODIUM CHLORIDE 9 MG/ML
30 INJECTION, SOLUTION INTRAVENOUS CONTINUOUS
Status: DISCONTINUED | OUTPATIENT
Start: 2024-05-10 | End: 2024-05-12

## 2024-05-10 RX ORDER — PROPOFOL 10 MG/ML
INJECTION, EMULSION INTRAVENOUS AS NEEDED
Status: DISCONTINUED | OUTPATIENT
Start: 2024-05-10 | End: 2024-05-10 | Stop reason: SURG

## 2024-05-10 RX ORDER — ALPRAZOLAM 0.25 MG/1
0.25 TABLET ORAL ONCE
Status: COMPLETED | OUTPATIENT
Start: 2024-05-10 | End: 2024-05-10

## 2024-05-10 RX ORDER — AMOXICILLIN 250 MG
2 CAPSULE ORAL 2 TIMES DAILY
Status: DISCONTINUED | OUTPATIENT
Start: 2024-05-10 | End: 2024-05-16 | Stop reason: HOSPADM

## 2024-05-10 RX ORDER — SODIUM CHLORIDE 9 MG/ML
40 INJECTION, SOLUTION INTRAVENOUS AS NEEDED
Status: DISCONTINUED | OUTPATIENT
Start: 2024-05-10 | End: 2024-05-10 | Stop reason: HOSPADM

## 2024-05-10 RX ORDER — SUCCINYLCHOLINE CHLORIDE 20 MG/ML
INJECTION INTRAMUSCULAR; INTRAVENOUS AS NEEDED
Status: DISCONTINUED | OUTPATIENT
Start: 2024-05-10 | End: 2024-05-10 | Stop reason: SURG

## 2024-05-10 RX ORDER — ACETAMINOPHEN 10 MG/ML
1000 INJECTION, SOLUTION INTRAVENOUS EVERY 8 HOURS
Qty: 200 ML | Refills: 0 | Status: COMPLETED | OUTPATIENT
Start: 2024-05-10 | End: 2024-05-11

## 2024-05-10 RX ORDER — CHLORHEXIDINE GLUCONATE ORAL RINSE 1.2 MG/ML
15 SOLUTION DENTAL EVERY 12 HOURS
Status: DISCONTINUED | OUTPATIENT
Start: 2024-05-10 | End: 2024-05-16 | Stop reason: HOSPADM

## 2024-05-10 RX ORDER — ACETAMINOPHEN 650 MG/1
650 SUPPOSITORY RECTAL EVERY 4 HOURS PRN
Status: DISCONTINUED | OUTPATIENT
Start: 2024-05-11 | End: 2024-05-13

## 2024-05-10 RX ORDER — CALCIUM GLUCONATE 20 MG/ML
2000 INJECTION, SOLUTION INTRAVENOUS ONCE
Status: COMPLETED | OUTPATIENT
Start: 2024-05-10 | End: 2024-05-10

## 2024-05-10 RX ORDER — KETAMINE HCL IN NACL, ISO-OSM 100MG/10ML
SYRINGE (ML) INJECTION AS NEEDED
Status: DISCONTINUED | OUTPATIENT
Start: 2024-05-10 | End: 2024-05-10 | Stop reason: SURG

## 2024-05-10 RX ORDER — IBUPROFEN 600 MG/1
1 TABLET ORAL
Status: DISCONTINUED | OUTPATIENT
Start: 2024-05-10 | End: 2024-05-13

## 2024-05-10 RX ORDER — SODIUM CHLORIDE 0.9 % (FLUSH) 0.9 %
30 SYRINGE (ML) INJECTION ONCE AS NEEDED
Status: DISCONTINUED | OUTPATIENT
Start: 2024-05-10 | End: 2024-05-10 | Stop reason: HOSPADM

## 2024-05-10 RX ORDER — NITROGLYCERIN 20 MG/100ML
INJECTION INTRAVENOUS CONTINUOUS PRN
Status: DISCONTINUED | OUTPATIENT
Start: 2024-05-10 | End: 2024-05-10 | Stop reason: SURG

## 2024-05-10 RX ORDER — NICARDIPINE HYDROCHLORIDE 2.5 MG/ML
INJECTION INTRAVENOUS
Status: ACTIVE
Start: 2024-05-10 | End: 2024-05-10

## 2024-05-10 RX ORDER — PREGABALIN 25 MG/1
50 CAPSULE ORAL 2 TIMES DAILY
Status: DISCONTINUED | OUTPATIENT
Start: 2024-05-10 | End: 2024-05-16 | Stop reason: HOSPADM

## 2024-05-10 RX ORDER — NITROGLYCERIN 0.4 MG/1
0.4 TABLET SUBLINGUAL
Status: DISCONTINUED | OUTPATIENT
Start: 2024-05-10 | End: 2024-05-10 | Stop reason: HOSPADM

## 2024-05-10 RX ORDER — AMINOCAPROIC ACID 250 MG/ML
INJECTION, SOLUTION INTRAVENOUS AS NEEDED
Status: DISCONTINUED | OUTPATIENT
Start: 2024-05-10 | End: 2024-05-10 | Stop reason: SURG

## 2024-05-10 RX ORDER — DEXTROSE MONOHYDRATE 25 G/50ML
10-50 INJECTION, SOLUTION INTRAVENOUS
Status: DISCONTINUED | OUTPATIENT
Start: 2024-05-10 | End: 2024-05-10 | Stop reason: HOSPADM

## 2024-05-10 RX ORDER — CHLORHEXIDINE GLUCONATE ORAL RINSE 1.2 MG/ML
15 SOLUTION DENTAL ONCE
Status: COMPLETED | OUTPATIENT
Start: 2024-05-10 | End: 2024-05-10

## 2024-05-10 RX ORDER — ASPIRIN 325 MG
325 TABLET ORAL ONCE
Qty: 1 TABLET | Refills: 0 | Status: COMPLETED | OUTPATIENT
Start: 2024-05-10 | End: 2024-05-10

## 2024-05-10 RX ORDER — PANTOPRAZOLE SODIUM 40 MG/10ML
40 INJECTION, POWDER, LYOPHILIZED, FOR SOLUTION INTRAVENOUS ONCE
Qty: 10 ML | Refills: 0 | Status: COMPLETED | OUTPATIENT
Start: 2024-05-10 | End: 2024-05-10

## 2024-05-10 RX ORDER — PANTOPRAZOLE SODIUM 40 MG/1
40 TABLET, DELAYED RELEASE ORAL DAILY
Status: DISCONTINUED | OUTPATIENT
Start: 2024-05-11 | End: 2024-05-11

## 2024-05-10 RX ORDER — ALBUMIN, HUMAN INJ 5% 5 %
250 SOLUTION INTRAVENOUS ONCE
Status: COMPLETED | OUTPATIENT
Start: 2024-05-10 | End: 2024-05-10

## 2024-05-10 RX ORDER — NICOTINE POLACRILEX 4 MG
15 LOZENGE BUCCAL
Status: DISCONTINUED | OUTPATIENT
Start: 2024-05-10 | End: 2024-05-13

## 2024-05-10 RX ORDER — IBUPROFEN 600 MG/1
1 TABLET ORAL
Status: DISCONTINUED | OUTPATIENT
Start: 2024-05-10 | End: 2024-05-10 | Stop reason: HOSPADM

## 2024-05-10 RX ORDER — NITROGLYCERIN 0.4 MG/1
0.4 TABLET SUBLINGUAL
Status: DISCONTINUED | OUTPATIENT
Start: 2024-05-10 | End: 2024-05-16 | Stop reason: HOSPADM

## 2024-05-10 RX ORDER — ACETAMINOPHEN 10 MG/ML
INJECTION, SOLUTION INTRAVENOUS AS NEEDED
Status: DISCONTINUED | OUTPATIENT
Start: 2024-05-10 | End: 2024-05-10 | Stop reason: SURG

## 2024-05-10 RX ORDER — NICOTINE POLACRILEX 4 MG
15 LOZENGE BUCCAL
Status: DISCONTINUED | OUTPATIENT
Start: 2024-05-10 | End: 2024-05-10 | Stop reason: HOSPADM

## 2024-05-10 RX ORDER — LEVOTHYROXINE SODIUM 0.1 MG/1
100 TABLET ORAL
Status: DISCONTINUED | OUTPATIENT
Start: 2024-05-11 | End: 2024-05-16 | Stop reason: HOSPADM

## 2024-05-10 RX ORDER — DEXTROSE MONOHYDRATE 25 G/50ML
10-50 INJECTION, SOLUTION INTRAVENOUS
Status: DISCONTINUED | OUTPATIENT
Start: 2024-05-10 | End: 2024-05-13

## 2024-05-10 RX ORDER — SODIUM CHLORIDE 0.9 % (FLUSH) 0.9 %
3-10 SYRINGE (ML) INJECTION AS NEEDED
Status: DISCONTINUED | OUTPATIENT
Start: 2024-05-10 | End: 2024-05-10 | Stop reason: HOSPADM

## 2024-05-10 RX ORDER — NALOXONE HCL 0.4 MG/ML
0.4 VIAL (ML) INJECTION
Status: DISCONTINUED | OUTPATIENT
Start: 2024-05-10 | End: 2024-05-13

## 2024-05-10 RX ORDER — NOREPINEPHRINE BITARTRATE 0.03 MG/ML
INJECTION, SOLUTION INTRAVENOUS CONTINUOUS PRN
Status: DISCONTINUED | OUTPATIENT
Start: 2024-05-10 | End: 2024-05-10 | Stop reason: SURG

## 2024-05-10 RX ADMIN — INSULIN HUMAN 2.3 UNITS/HR: 1 INJECTION, SOLUTION INTRAVENOUS at 13:23

## 2024-05-10 RX ADMIN — PHENYLEPHRINE HYDROCHLORIDE 100 MCG: 10 INJECTION INTRAVENOUS at 07:33

## 2024-05-10 RX ADMIN — CHLORHEXIDINE GLUCONATE, 0.12% ORAL RINSE 15 ML: 1.2 SOLUTION DENTAL at 20:07

## 2024-05-10 RX ADMIN — ALBUMIN (HUMAN) 12.5 G: 12.5 INJECTION, SOLUTION INTRAVENOUS at 10:26

## 2024-05-10 RX ADMIN — SODIUM CHLORIDE 125 ML/KG/HR: 9 INJECTION, SOLUTION INTRAVENOUS at 06:39

## 2024-05-10 RX ADMIN — MAGNESIUM SULFATE HEPTAHYDRATE 2 G: 500 INJECTION, SOLUTION INTRAMUSCULAR; INTRAVENOUS at 09:00

## 2024-05-10 RX ADMIN — Medication 25 MG: at 07:00

## 2024-05-10 RX ADMIN — CEFAZOLIN 2 G: 2 INJECTION, POWDER, LYOPHILIZED, FOR SOLUTION INTRAVENOUS at 07:28

## 2024-05-10 RX ADMIN — VECURONIUM BROMIDE 4 MG: 1 INJECTION, POWDER, LYOPHILIZED, FOR SOLUTION INTRAVENOUS at 08:11

## 2024-05-10 RX ADMIN — Medication 0.05 MCG/KG/MIN: at 08:38

## 2024-05-10 RX ADMIN — SODIUM CHLORIDE 250 ML: 9 INJECTION, SOLUTION INTRAVENOUS at 14:06

## 2024-05-10 RX ADMIN — CEFAZOLIN 2 G: 2 INJECTION, POWDER, LYOPHILIZED, FOR SOLUTION INTRAVENOUS at 09:16

## 2024-05-10 RX ADMIN — PHENYLEPHRINE HYDROCHLORIDE 100 MCG: 10 INJECTION INTRAVENOUS at 07:40

## 2024-05-10 RX ADMIN — SODIUM CHLORIDE 30 ML/HR: 9 INJECTION, SOLUTION INTRAVENOUS at 11:03

## 2024-05-10 RX ADMIN — ALBUMIN (HUMAN) 12.5 G: 12.5 INJECTION, SOLUTION INTRAVENOUS at 12:06

## 2024-05-10 RX ADMIN — NICARDIPINE HYDROCHLORIDE 5 MG/HR: 25 INJECTION, SOLUTION INTRAVENOUS at 14:14

## 2024-05-10 RX ADMIN — POTASSIUM CHLORIDE 20 MEQ: 400 INJECTION, SOLUTION INTRAVENOUS at 14:04

## 2024-05-10 RX ADMIN — SODIUM CHLORIDE 250 ML: 9 INJECTION, SOLUTION INTRAVENOUS at 16:00

## 2024-05-10 RX ADMIN — PROPOFOL 50 MG: 10 INJECTION, EMULSION INTRAVENOUS at 07:00

## 2024-05-10 RX ADMIN — DEXMEDETOMIDINE HYDROCHLORIDE 0.5 MCG/KG/HR: 100 INJECTION, SOLUTION INTRAVENOUS at 07:00

## 2024-05-10 RX ADMIN — ONDANSETRON 4 MG: 2 INJECTION INTRAMUSCULAR; INTRAVENOUS at 16:11

## 2024-05-10 RX ADMIN — VECURONIUM BROMIDE 4 MG: 1 INJECTION, POWDER, LYOPHILIZED, FOR SOLUTION INTRAVENOUS at 09:03

## 2024-05-10 RX ADMIN — 0.12% CHLORHEXIDINE GLUCONATE 15 ML: 1.2 RINSE ORAL at 06:07

## 2024-05-10 RX ADMIN — ACETAMINOPHEN 1000 MG: 10 INJECTION, SOLUTION INTRAVENOUS at 09:16

## 2024-05-10 RX ADMIN — ASPIRIN 325 MG ORAL TABLET 325 MG: 325 PILL ORAL at 14:29

## 2024-05-10 RX ADMIN — AMINOCAPROIC ACID 10 G: 250 INJECTION, SOLUTION INTRAVENOUS at 07:00

## 2024-05-10 RX ADMIN — PHENYLEPHRINE HYDROCHLORIDE 0.5 MCG/KG/MIN: 10 INJECTION INTRAVENOUS at 22:03

## 2024-05-10 RX ADMIN — CALCIUM GLUCONATE 2000 MG: 20 INJECTION, SOLUTION INTRAVENOUS at 12:19

## 2024-05-10 RX ADMIN — ALBUMIN (HUMAN) 12.5 G: 12.5 INJECTION, SOLUTION INTRAVENOUS at 11:18

## 2024-05-10 RX ADMIN — MORPHINE SULFATE 2 MG: 2 INJECTION, SOLUTION INTRAMUSCULAR; INTRAVENOUS at 21:45

## 2024-05-10 RX ADMIN — FENTANYL CITRATE 1500 MCG: 50 INJECTION, SOLUTION INTRAMUSCULAR; INTRAVENOUS at 07:00

## 2024-05-10 RX ADMIN — HYDROCODONE BITARTRATE AND ACETAMINOPHEN 1 TABLET: 5; 325 TABLET ORAL at 20:07

## 2024-05-10 RX ADMIN — VECURONIUM BROMIDE 4 MG: 1 INJECTION, POWDER, LYOPHILIZED, FOR SOLUTION INTRAVENOUS at 08:49

## 2024-05-10 RX ADMIN — NICARDIPINE HYDROCHLORIDE 0.5 MCG: 2.5 INJECTION, SOLUTION INTRAVENOUS at 09:56

## 2024-05-10 RX ADMIN — MUPIROCIN 1 APPLICATION: 20 OINTMENT TOPICAL at 06:07

## 2024-05-10 RX ADMIN — ALPRAZOLAM 0.25 MG: 0.25 TABLET ORAL at 06:07

## 2024-05-10 RX ADMIN — MUPIROCIN 1 APPLICATION: 20 OINTMENT TOPICAL at 20:07

## 2024-05-10 RX ADMIN — NICARDIPINE HYDROCHLORIDE 2.5 MG/HR: 25 INJECTION, SOLUTION INTRAVENOUS at 10:27

## 2024-05-10 RX ADMIN — ALBUMIN (HUMAN) 12.5 G: 12.5 INJECTION, SOLUTION INTRAVENOUS at 11:45

## 2024-05-10 RX ADMIN — DEXMEDETOMIDINE HYDROCHLORIDE 0.2 MCG/KG/HR: 4 INJECTION, SOLUTION INTRAVENOUS at 14:29

## 2024-05-10 RX ADMIN — PREGABALIN 50 MG: 25 CAPSULE ORAL at 20:07

## 2024-05-10 RX ADMIN — VECURONIUM BROMIDE 2 MG: 1 INJECTION, POWDER, LYOPHILIZED, FOR SOLUTION INTRAVENOUS at 07:00

## 2024-05-10 RX ADMIN — SENNOSIDES AND DOCUSATE SODIUM 2 TABLET: 50; 8.6 TABLET ORAL at 20:07

## 2024-05-10 RX ADMIN — Medication 25 MG: at 06:57

## 2024-05-10 RX ADMIN — METOPROLOL TARTRATE 12.5 MG: 25 TABLET, FILM COATED ORAL at 06:07

## 2024-05-10 RX ADMIN — SODIUM CHLORIDE 2 G: 900 INJECTION INTRAVENOUS at 14:28

## 2024-05-10 RX ADMIN — AMINOCAPROIC ACID 10 G: 250 INJECTION, SOLUTION INTRAVENOUS at 09:16

## 2024-05-10 RX ADMIN — ALBUMIN (HUMAN) 250 ML: 12.5 INJECTION, SOLUTION INTRAVENOUS at 17:56

## 2024-05-10 RX ADMIN — PANTOPRAZOLE SODIUM 40 MG: 40 INJECTION, POWDER, FOR SOLUTION INTRAVENOUS at 14:28

## 2024-05-10 RX ADMIN — PROTAMINE SULFATE 250 MG: 10 INJECTION, SOLUTION INTRAVENOUS at 09:11

## 2024-05-10 RX ADMIN — MAGNESIUM SULFATE IN DEXTROSE 1 G: 10 INJECTION, SOLUTION INTRAVENOUS at 17:05

## 2024-05-10 RX ADMIN — MORPHINE SULFATE 2 MG: 2 INJECTION, SOLUTION INTRAMUSCULAR; INTRAVENOUS at 13:14

## 2024-05-10 RX ADMIN — POLYETHYLENE GLYCOL 3350 17 G: 17 POWDER, FOR SOLUTION ORAL at 20:08

## 2024-05-10 RX ADMIN — HEPARIN SODIUM 27000 UNITS: 1000 INJECTION INTRAVENOUS; SUBCUTANEOUS at 08:02

## 2024-05-10 RX ADMIN — MORPHINE SULFATE 2 MG: 2 INJECTION, SOLUTION INTRAMUSCULAR; INTRAVENOUS at 15:19

## 2024-05-10 RX ADMIN — MIDAZOLAM 5 MG: 1 INJECTION INTRAMUSCULAR; INTRAVENOUS at 06:57

## 2024-05-10 RX ADMIN — SODIUM CHLORIDE 2 G: 900 INJECTION INTRAVENOUS at 22:51

## 2024-05-10 RX ADMIN — ACETAMINOPHEN 1000 MG: 10 INJECTION INTRAVENOUS at 17:05

## 2024-05-10 RX ADMIN — VECURONIUM BROMIDE 14 MG: 1 INJECTION, POWDER, LYOPHILIZED, FOR SOLUTION INTRAVENOUS at 07:09

## 2024-05-10 RX ADMIN — SUCCINYLCHOLINE CHLORIDE 160 MG: 20 INJECTION, SOLUTION INTRAMUSCULAR; INTRAVENOUS at 07:00

## 2024-05-10 RX ADMIN — NITROGLYCERIN 20 MCG/MIN: 20 INJECTION INTRAVENOUS at 09:47

## 2024-05-10 NOTE — PLAN OF CARE
Goal Outcome Evaluation:  Plan of Care Reviewed With: patient        Progress: improving  Outcome Evaluation: Patient returned to [facility] from [facility] at 1015.  Franny not working properly, MD notified and after MD troubleshooting at bedside franny d/c'ed per MD order.  Albumin administered x5 per MAR.  250ml NS bolus administered x2 per MAR.  Last CVP 5; Patient continues to make good urine with >2L out this shift.  Chest tube OP: 190.  Mucous membranes appear dry and skin noted to have slight tenting.  Patient alert and following commands, failed breathing trials x2; remains drowsy.  Slight nystagmus noticed; MD notified, no new orders and states it is likely from anesthesia.  Chauhan catheter, Right IJ, Mediastinal chest tube x2, and epicardial wires remain in place.  Pacemaker remains connected and off.  Rhythm is sinus with 1st degree heart block.  Patient remains NPO and on bedrest.  Vent is on 40% O2 with PEEP at 5.0.  Potassium replaced per MAR.  Insulin drip continues per TYSHAWN yao infusing at 30ml/hr.  Nitro and Cardene are currently off.

## 2024-05-10 NOTE — OP NOTE
Operative Note    Date of Dictation: 05/10/24    Date of Procedure: Same    Referring Physician: Owen Houston DO    Preoperative diagnosis:  1.  5.2 cm ascending aortic aneurysm with evidence of growth  2.  4.5 cm aortic root dilatation  3.  Mild to moderate aortic regurgitation  4.  Thrombocytopenia    Postoperative diagnosis:  Same    Procedure:   1.  Elective ascending aortic aneurysm repair with a 30 mm Dacron interposition graft (CPT code 87835)  2.  Reductive root aortoplasty of the right and noncoronary sinuses (CPT code other cardiac, similar to 60983)  3.  Left atrial appendage epicardial closure with a 45 mm atrial clip device (CPT code 83597)    Surgeon: Spencer Puente MD     Assistants: Assistant: Annalisa Vazquez CSA was responsible for performing the following activities: Retraction, Suction, Irrigation, Suturing, Closing, Placing Dressing, and all aspects of the complex cardiac case  and their skilled assistance was necessary for the success of this case.    Anesthesia: General endotracheal anesthesia and JOSE    Findings:  Mid ascending aortic annulus tapering into the aortic arch and also significant root dilatation but with a trileaflet valve therefore I performed an aortoplasty to save the valve and prevent root and valve replacement.    Estimated Blood Loss: Approximately 400 cc, most of it recovered with a Cell Saver device and cardiotomy suckers and was retransfuse to the patient    STS Data:  The patient was explained the risks and benefits and alternatives of surgery and agreed to proceed.  The antibiotics and the beta-blockers were given within the STS required window.  Counseling was given about diet, alcohol and tobacco use as needed        Description of the procedure:     The patient was placed supine on the operative table. Anesthesia was given and lines placed. The patient was prepped and draped using the usual sterile technique. A median sternotomy was performed with a scalpel  and the layers carried down to the sternum using the electrocautery. The sternum was split in the midline using a vertical oscillating saw. Hemostasis was achieved. 300 units of IV heparin was given to maintain the ACT over 400.  Cannulation sutures were placed in the mid to distal aortic arch and right atrium. Cardiopulmonary bypass was started and cardioplegia cannulas were placed.  I placed a distal ascending aorta-proximal aortic arch cross-clamp incorporating part of the innominate artery to be able to replace most of the ascending aorta in the distal part.  One liter of cold blood cardioplegia was given in an antegrade fashion to achieve diastolic arrest and further doses every 15 minutes thereafter also using retrograde infusion and coronary ostia cannulas.  I retracted the heart to the right and expose the base of the left atrial appendage which was dilated.  I selected a 45 mm atrial clip device after measuring the length of the opening of the left atrial appendage into the atrium and deployed the device with good obliteration avoiding a residual sac.  Following, the aorta was transected in the mid portion and the root was explored.  I measured the sinotubular junction close to 38 to 40 mm.  The mid sinuses diameter was measured at 45 mm years approximately.  The aortic valve was trileaflet and he had mild to moderate regurgitation.  There was annular calcification at the base of the left coronary leaflet.  It was my judgment to try to preserve the root by decreasing the size and hopefully improving the degree of regurgitation without distorting the valve.  I decided to perform an reduce aortoplasty by resected a triangular segment of the noncoronary sinus of 1 cm base and 2.5 cm length and then closed the defect with a double layer of 4-0 Prolene continuous suture.  I tested the valve with the water test and there was no change.  Then I resected another wedge of the right coronary sinus which had an  asymmetric insertion of the right coronary artery closer to the noncoronary commissure.  I resected the base of approximately 0.8 cm x 2 cm in depth and then close it primarily double layer of 4-0 Prolene continuous suture.  I measured now the sinotubular junction at approximately 30 mm and the mid sinus at approximately 4.2 or less centimeters.  Following, I selected a 30 mm gel wave Dacron interposition graft and I completed the anastomosis to the proximal aortic cuff using a 4-0 Prolene continuous suture intussusceptum the graft in the root and buttressing the suture line with Vincent felt strip.  I tensed the suture line with a nerve hook and then tiedown.  Following, I transected the distal ascending aorta 1 cm below the graft and complete the distal anastomosis using a 4-0 Prolene continuous suture.  There was a good match between diameter of the aorta and the graft.  Patient was systemically rewarmed.  I placed an antegrade cardioplegia cannula and vent in the mid ascending graft and then gave the warm dose of cardioplegia, remove the air from the graft and then remove the clamp from the aorta with steep suction on the graft vent.  I allowed time for rewarming and I did not see a need on any repair sutures.    The left pleural space was suctioned and the lungs ventilated. The heart was paced till regular atrial rhythm resumed. I allowed the heart to eject  and I de-aired the left heart chambers under JOSE guidance and once hemodynamics were acceptable, then the CPB was discontinued and the venous and cardioplegia cannulas removed. The matching dose of protamine was given and the aortic cannula removed as well. AV temporary wires and pleural and mediastinal chest tubes were placed and the wound sprayed with platelet rich plasma.  Give a unit of platelets because the count came at 98,000 in the first coagulation panel and there was some degree of oozing.  The perioperative JOSE showed the aortic valve with a  decrease level of regurgitation between trace and mild. The sternum was closed with single and double wires and soft tissue in layers of reabsorbable material. The wounds were covered with sterile dressings.    Specimen removed: Proximal thoracic aorta tissue    CPB time: 59 minutes    Aortic clamp time: 47 minutes       Complications:  none           Disposition: Cardiovascular recovery room           Condition: Critical but stable.    Spencer Puente M.D.

## 2024-05-10 NOTE — CONSULTS
Referring Provider: ISRAEL Gilliam  Reason for Consultation: Ascending aortic aneurysm status postrepair    Chief complaint ascending aortic aneurysm    Cardiology assessment and plan    Ascending aortic aneurysm  Aortic insufficiency  Depression/anxiety  Hypothyroidism  Peripheral neuropathy  Acid reflux  Normal coronaries  Normal LV systolic function  Moderate aortic regurgitation  Status post ascending aortic aneurysm repair with dacryon graft  Current active root aortoplasty  Left atrial appendage closure with atrial clip device  Patient is currently intubated and sedated  Hemodynamics are stable  Continue post surgical care  Tmax is 97.2 pulse is 75 respirations are 14 blood pressure is 90/50 204/46 sats are 90%  pH is 7.36 pCO2 is 39 pO2 is 109 sodium is 143 potassium is 4.2 hemoglobin is 11  Twelve-lead EKG shows normal sinus rhythm with nonspecific ST changes  Continue current medical therapy continue close monitoring and follow-up  Continue post surgical care  Patient is still on vent but alert and awake  Further recommendations based on patient course            History of present illness:  Panfilo Feliz is a 83 y.o. male who presents with past medical history that is significant for ascending aortic aneurysm and also aortic regurgitation was scheduled for repair of the ascending aortic aneurysm  Patient underwent surgery today currently intubated with stable hemodynamics    Review of Systems  Review of Systems   Unable to perform ROS: Intubated       Past Medical History  Past Medical History:   Diagnosis Date    AAA (abdominal aortic aneurysm)     Abnormal ECG 1980’s    Alcoholism     None since 1991    Aneurysm 2019    aortic arch    Arthritis     Basal cell carcinoma (BCC) of face     Cholelithiasis 1990’s    Cholecystectomy    Colon polyp     Colon polyps     Coronary artery disease     Difficulty walking     Disease of thyroid gland     DJD (degenerative joint disease)     Gastritis     Gastroparesis      GERD (gastroesophageal reflux disease)     GI (gastrointestinal bleed)     Hernia     Hiatal    History of bone marrow biopsy     [place] (hard of hearing)     Hyperlipidemia     Hypertension     Hypothyroidism     IgG monoclonal gammopathy     Multiple duodenal ulcers     Neuropathy     PONV (postoperative nausea and vomiting)     Prostate cancer     Reflux esophagitis     Ulcer     WBC decreased     and   Past Surgical History:   Procedure Laterality Date    CARDIAC CATHETERIZATION N/A 4/19/2024    Procedure: Right and Left Heart Cath with possible PCI;  Surgeon: Dilan Houston DO;  Location:  [facility];  Service: Cardiovascular;  Laterality: N/A;  Local and IV sedation    CHOLECYSTECTOMY  1988    COLON SURGERY  1998    COLONOSCOPY  02/2013    ENDOSCOPY  2012    ESOPHAGOGASTRODUODENOSCOPY WITH DILATION OF SHATZKI'S RING    ENDOSCOPY N/A 10/09/2020    Procedure: ESOPHAGOGASTRODUODENOSCOPY with cold bx;  Surgeon: Jake Perez MD;  Location:  [facility];  Service: Gastroenterology;  Laterality: N/A;  pre - nausea  post - duodenal ulcer, gastrits, gastric ulcer, hiatal hernia    ENDOSCOPY N/A 10/04/2022    Procedure: ESOPHAGOGASTRODUODENOSCOPY with biopsies with esophageal dilatation with 56 cui;  Surgeon: Jake Perez MD;  Location:  [facility];  Service: Gastroenterology;  Laterality: N/A;  pre-dysphagia  post-normal     ENDOSCOPY  10/04/2022    Chris BUTTERFILED    EYE SURGERY Bilateral     cataracts    LAMINECTOMY  2013    decompression L3-4, L4-5    PROSTATECTOMY  2007    SKIN BIOPSY      TONSILLECTOMY AND ADENOIDECTOMY      1940s    UPPER GASTROINTESTINAL ENDOSCOPY      VASECTOMY      1970s       Family History  Family History   Problem Relation Age of Onset    Cancer Mother 85        Breast    COPD Father     Cancer Brother 59        Unknown type    Hypertension Daughter        Social History  Social History     Socioeconomic History    Marital status:       "Spouse name: Meghan    Years of education: College   Tobacco Use    Smoking status: Never    Smokeless tobacco: Never   Vaping Use    Vaping status: Never Used   Substance and Sexual Activity    Alcohol use: No     Comment: Heavy in past, none in 33 years    Drug use: Never    Sexual activity: Not Currently     Partners: Female     Birth control/protection: None       Objective     Physical Exam:  Constitutional:       Appearance: Healthy appearance. Well-developed.      Interventions: Sedated and intubated.   Eyes:      Conjunctiva/sclera: Conjunctivae normal.      Pupils: Pupils are equal, round, and reactive to light.   HENT:      Head: Normocephalic and atraumatic.   Neck:      Thyroid: No thyromegaly.   Pulmonary:      Effort: Pulmonary effort is normal. Intubated.      Breath sounds: Normal breath sounds.   Cardiovascular:      Normal rate. Regular rhythm.   Pulses:     Intact distal pulses.   Edema:     Peripheral edema absent.   Abdominal:      General: Bowel sounds are normal.      Palpations: Abdomen is soft.   Musculoskeletal:      Cervical back: Normal range of motion and neck supple. Skin:     General: Skin is warm.   Neurological:      Mental Status: Alert.     Chest tubes in for surgical dressings in place with central line in the neck    Vital Signs  Vitals:    05/10/24 1330 05/10/24 1345 05/10/24 1400 05/10/24 1554   BP:   95/54    Pulse: 92 91 91 85   Resp:    10   Temp: 96.3 °F (35.7 °C) 96.3 °F (35.7 °C) 97 °F (36.1 °C)    TempSrc:       SpO2: 99% 98% 98% 98%   Weight:       Height:           Weight  Flowsheet Rows      Flowsheet Row First Filed Value   Admission Height 193 cm (76\") Documented at 05/09/2024 1042   Admission Weight 81.4 kg (179 lb 6.4 oz) Documented at 05/09/2024 1042                Results Review:  Lab Results (last 24 hours)       Procedure Component Value Units Date/Time    POC Glucose Once [[id number]]  (Abnormal) Collected: 05/10/24 1548    Specimen: Blood Updated: 05/10/24 " 1553     Glucose 140 mg/dL      Comment: Serial Number: [id number]:  [id number]       POCT Electrolytes +HGB +HCT [[id number]]  (Abnormal) Collected: 05/10/24 1451    Specimen: Arterial Blood Updated: 05/10/24 1456     Sodium 143 mmol/L      POC Potassium 4.2 mmol/L      Ionized Calcium 1.27 mmol/L      Comment: Serial Number: [id number]:  [id number]        Glucose 151 mg/dL      Hematocrit 33 %      Hemoglobin 11.1 g/dL     POC Glucose Once [[id number]]  (Abnormal) Collected: 05/10/24 1451    Specimen: Arterial Blood Updated: 05/10/24 1456     Glucose 151 mg/dL      Comment: Serial Number: [id number]:  [id number]       Blood Gas, Arterial - [[id number]]  (Abnormal) Collected: 05/10/24 1451    Specimen: Arterial Blood Updated: 05/10/24 1456     Site Arterial Line     Lyle's Test N/A     pH, Arterial 7.369 pH units      pCO2, Arterial 39.1 mm Hg      pO2, Arterial 109.8 mm Hg      HCO3, Arterial 22.6 mmol/L      Base Excess, Arterial -2.5 mmol/L      Comment: Serial Number: [id number]:  [id number]        O2 Saturation, Arterial 98.1 %      Barometric Pressure for Blood Gas --     Comment: N/A        Modality Adult Vent     FIO2 40 %      Ventilator Mode AC     Set Tidal Volume 750     PEEP 5     Hemodilution Yes     Respiratory Rate 10    POC Glucose Once [[id number]]  (Abnormal) Collected: 05/10/24 1412    Specimen: Blood Updated: 05/10/24 1413     Glucose 163 mg/dL      Comment: Serial Number: [id number]:  [id number]       POCT Electrolytes +HGB +HCT [[id number]]  (Abnormal) Collected: 05/10/24 1329    Specimen: Arterial Blood Updated: 05/10/24 1334     Sodium 141 mmol/L      POC Potassium 3.8 mmol/L      Ionized Calcium 1.21 mmol/L      Comment: Serial Number: [id number]:  [id number]        Glucose 165 mg/dL      Hematocrit 33 %      Hemoglobin 11.3 g/dL     POC Glucose Once [[id number]]  (Abnormal) Collected: 05/10/24 1329    Specimen: Arterial Blood Updated: 05/10/24 1334     Glucose 165 mg/dL      Comment:  Serial Number: [id number]:  [id number]       Blood Gas, Arterial - [[id number]]  (Abnormal) Collected: 05/10/24 1329    Specimen: Arterial Blood Updated: 05/10/24 1334     Site Arterial Line     Lyle's Test N/A     pH, Arterial 7.385 pH units      pCO2, Arterial 37.1 mm Hg      pO2, Arterial 167.8 mm Hg      HCO3, Arterial 22.2 mmol/L      Base Excess, Arterial -2.5 mmol/L      Comment: Serial Number: [id number]:  [id number]        O2 Saturation, Arterial 99.5 %      Barometric Pressure for Blood Gas --     Comment: N/A        Modality Adult Vent     FIO2 50 %      Ventilator Mode AC     Set Tidal Volume 750     PEEP 5     Hemodilution Yes     Respiratory Rate 10    POC Glucose Once [[id number]]  (Abnormal) Collected: 05/10/24 1321    Specimen: Blood Updated: 05/10/24 1323     Glucose 173 mg/dL      Comment: Serial Number: [id number]:  [id number]       Blood Gas, Arterial - [[id number]]  (Abnormal) Collected: 05/10/24 1225    Specimen: Arterial Blood Updated: 05/10/24 1230     Site Arterial Line     Lyle's Test N/A     pH, Arterial 7.393 pH units      pCO2, Arterial 36.7 mm Hg      pO2, Arterial 239.2 mm Hg      HCO3, Arterial 22.4 mmol/L      Base Excess, Arterial -2.2 mmol/L      Comment: Serial Number: [id number]:  [id number]        O2 Saturation, Arterial 99.8 %      CO2 Content 23.5 mmol/L      Barometric Pressure for Blood Gas --     Comment: N/A        Modality Adult Vent     FIO2 70 %      Ventilator Mode AC     Set Tidal Volume 750     PEEP 8     Hemodilution Yes     Respiratory Rate 10    POCT Electrolytes +HGB +HCT [[id number]]  (Abnormal) Collected: 05/10/24 1225    Specimen: Arterial Blood Updated: 05/10/24 1230     Sodium 143 mmol/L      POC Potassium 3.9 mmol/L      Ionized Calcium 1.18 mmol/L      Comment: Serial Number: [id number]:  [id number]        Glucose 158 mg/dL      Hematocrit 35 %      Hemoglobin 12.0 g/dL     POC Glucose Once [[id number]]  (Abnormal) Collected: 05/10/24 1225    Specimen:  Arterial Blood Updated: 05/10/24 1230     Glucose 158 mg/dL      Comment: Serial Number: [id number]:  [id number]       Potassium [[id number]]  (Normal) Collected: 05/10/24 1201    Specimen: Blood, Arterial Line Updated: 05/10/24 1227     Potassium 3.9 mmol/L     Tissue Pathology Exam [[id number]] Collected: 05/10/24 0847    Specimen: Tissue from Aortic Aneurysm Updated: 05/10/24 1207    Blood Gas, Arterial - [[id number]]  (Abnormal) Collected: 05/10/24 1114    Specimen: Arterial Blood Updated: 05/10/24 1120     Site Arterial Line     Lyle's Test N/A     pH, Arterial 7.508 pH units      pCO2, Arterial 26.0 mm Hg      pO2, Arterial 230.7 mm Hg      HCO3, Arterial 20.7 mmol/L      Base Excess, Arterial -1.3 mmol/L      Comment: Serial Number: [id number]:  [id number]        O2 Saturation, Arterial 99.9 %      Barometric Pressure for Blood Gas --     Comment: N/A        Modality Adult Vent     FIO2 70 %      Ventilator Mode AC     Set Tidal Volume 850     PEEP 8     Hemodilution Yes     Respiratory Rate 14    POCT Electrolytes +HGB +HCT [[id number]]  (Abnormal) Collected: 05/10/24 1114    Specimen: Arterial Blood Updated: 05/10/24 1120     Sodium 141 mmol/L      POC Potassium 3.9 mmol/L      Ionized Calcium 1.11 mmol/L      Comment: Serial Number: [id number]:  [id number]        Glucose 140 mg/dL      Hematocrit 34 %      Hemoglobin 11.5 g/dL     POC Glucose Once [[id number]]  (Abnormal) Collected: 05/10/24 1114    Specimen: Arterial Blood Updated: 05/10/24 1120     Glucose 140 mg/dL      Comment: Serial Number: [id number]:  [id number]       Renal Function Panel [[id number]]  (Abnormal) Collected: 05/10/24 1024    Specimen: Blood Updated: 05/10/24 1059     Glucose 151 mg/dL      BUN 20 mg/dL      Creatinine 0.95 mg/dL      Sodium 141 mmol/L      Potassium 4.5 mmol/L      Chloride 110 mmol/L      CO2 22.0 mmol/L      Calcium 8.6 mg/dL      Albumin 3.5 g/dL      Phosphorus 2.8 mg/dL      Anion Gap 9.0 mmol/L      BUN/Creatinine  Ratio 21.1     eGFR 79.9 mL/min/1.73     Narrative:      GFR Normal >60  Chronic Kidney Disease <60  Kidney Failure <15    The GFR formula is only valid for adults with stable renal function between ages 18 and 70.    Protime-INR [[id number]]  (Abnormal) Collected: 05/10/24 1024    Specimen: Blood Updated: 05/10/24 1043     Protime 12.1 Seconds      INR 1.12    aPTT [[id number]]  (Normal) Collected: 05/10/24 1024    Specimen: Blood Updated: 05/10/24 1043     PTT 29.4 seconds     Calcium, Ionized [[id number]]  (Abnormal) Collected: 05/10/24 1024    Specimen: Blood Updated: 05/10/24 1042     Ionized Calcium 1.15 mmol/L     CBC & Differential [[id number]]  (Abnormal) Collected: 05/10/24 1024    Specimen: Blood Updated: 05/10/24 1033    Narrative:      The following orders were created for panel order CBC & Differential.  Procedure                               Abnormality         Status                     ---------                               -----------         ------                     CBC Auto Differential[[id number]]        Abnormal            Final result                 Please view results for these tests on the individual orders.    CBC Auto Differential [[id number]]  (Abnormal) Collected: 05/10/24 1024    Specimen: Blood Updated: 05/10/24 1033     WBC 3.08 10*3/mm3      RBC 3.87 10*6/mm3      Hemoglobin 11.2 g/dL      Hematocrit 34.4 %      MCV 88.9 fL      MCH 28.9 pg      MCHC 32.6 g/dL      RDW 13.7 %      RDW-SD 44.9 fl      MPV 10.7 fL      Platelets 118 10*3/mm3      Neutrophil % 79.6 %      Lymphocyte % 14.9 %      Monocyte % 2.9 %      Eosinophil % 1.0 %      Basophil % 0.3 %      Immature Grans % 1.3 %      Neutrophils, Absolute 2.45 10*3/mm3      Lymphocytes, Absolute 0.46 10*3/mm3      Monocytes, Absolute 0.09 10*3/mm3      Eosinophils, Absolute 0.03 10*3/mm3      Basophils, Absolute 0.01 10*3/mm3      Immature Grans, Absolute 0.04 10*3/mm3      nRBC 0.0 /100 WBC     POC Chem Panel [[id number]]   (Abnormal) Collected: 05/10/24 0828    Specimen: Venous Blood Updated: 05/10/24 1009     Glucose 106 mg/dL      Comment: Serial Number: [id number]:  [id number]        Sodium 139 mmol/L      POC Potassium 4.3 mmol/L      Ionized Calcium 1.06 mmol/L      Hematocrit 28 %      Hemoglobin 9.5 g/dL      pH, Venous 7.320 pH Units      pCO2, Venous 50.3 mm Hg      CO2 CONTENT  --     HCO3, Venous 25.6 mmol/L      Base Excess, Venous --     Base Deficit --     O2 Saturation, Venous 27.0 %      pO2, Venous 57.0 mm Hg     POC Chem 8, arterial (ISTAT) [[id number]]  (Abnormal) Collected: 05/10/24 0918    Specimen: Arterial Blood Updated: 05/10/24 1008     Ionized Calcium 1.30 mmol/L      pH, Arterial 7.390 pH units      pCO2, Arterial 44.6 mm Hg      pO2, Arterial 430.0 mm Hg      HCO3, Arterial 26.9 mmol/L      Base Excess, Arterial 2.0 mmol/L      Base Deficit --     O2 Saturation, Arterial 100.0 %      Glucose 128 mg/dL      Comment: Serial Number: [id number]:  [id number]        Sodium 139 mmol/L      POC Potassium 4.8 mmol/L      Hematocrit 29 %      Hemoglobin 9.9 g/dL      CO2 Content 28 mmol/L     POC Chem 8, arterial (ISTAT) [[id number]]  (Abnormal) Collected: 05/10/24 0848    Specimen: Arterial Blood Updated: 05/10/24 1008     Ionized Calcium 1.09 mmol/L      pH, Arterial 7.340 pH units      pCO2, Arterial 47.4 mm Hg      pO2, Arterial 417.0 mm Hg      HCO3, Arterial 25.4 mmol/L      Base Excess, Arterial 0.0 mmol/L      Base Deficit --     O2 Saturation, Arterial 100.0 %      Glucose 131 mg/dL      Comment: Serial Number: [id number]:  [id number]        Sodium 139 mmol/L      POC Potassium 4.5 mmol/L      Hematocrit 29 %      Hemoglobin 9.9 g/dL      CO2 Content 27 mmol/L     POC Chem 8, arterial (ISTAT) [[id number]]  (Abnormal) Collected: 05/10/24 0822    Specimen: Arterial Blood Updated: 05/10/24 1008     Ionized Calcium 1.01 mmol/L      pH, Arterial 7.340 pH units      pCO2, Arterial 43.6 mm Hg      pO2, Arterial 431.0 mm  Hg      HCO3, Arterial 23.7 mmol/L      Base Excess, Arterial <0.0 mmol/L      Base Deficit --     O2 Saturation, Arterial 100.0 %      Glucose 104 mg/dL      Comment: Serial Number: [id number]:  [id number]        Sodium 139 mmol/L      POC Potassium 4.6 mmol/L      Hematocrit 29 %      Hemoglobin 9.9 g/dL      CO2 Content 25 mmol/L     POC Chem 8, arterial (ISTAT) [[id number]]  (Abnormal) Collected: 05/10/24 0755    Specimen: Arterial Blood Updated: 05/10/24 1008     Ionized Calcium 1.17 mmol/L      pH, Arterial 7.380 pH units      pCO2, Arterial 39.2 mm Hg      pO2, Arterial 449.0 mm Hg      HCO3, Arterial 23.3 mmol/L      Base Excess, Arterial <0.0 mmol/L      Base Deficit --     O2 Saturation, Arterial 100.0 %      Glucose 106 mg/dL      Comment: Serial Number: [id number]:  [id number]        Sodium 138 mmol/L      POC Potassium 4.0 mmol/L      Hematocrit 35 %      Hemoglobin 11.9 g/dL      CO2 Content 24 mmol/L     Fibrinogen [[id number]]  (Normal) Collected: 05/10/24 0916    Specimen: Blood, Arterial Line Updated: 05/10/24 0938     Fibrinogen 249 mg/dL     Protime-INR [[id number]]  (Abnormal) Collected: 05/10/24 0916    Specimen: Blood, Arterial Line Updated: 05/10/24 0938     Protime 14.7 Seconds      INR 1.38    aPTT [[id number]]  (Normal) Collected: 05/10/24 0916    Specimen: Blood, Arterial Line Updated: 05/10/24 0938     PTT 30.9 seconds     CBC (No Diff) [[id number]]  (Abnormal) Collected: 05/10/24 0916    Specimen: Blood, Arterial Line Updated: 05/10/24 0931     WBC 1.24 10*3/mm3      RBC 3.37 10*6/mm3      Hemoglobin 9.7 g/dL      Hematocrit 30.6 %      MCV 90.8 fL      MCH 28.8 pg      MCHC 31.7 g/dL      RDW 13.8 %      RDW-SD 45.6 fl      MPV 11.2 fL      Platelets 79 10*3/mm3     Platelet Function ADP [[id number]]  (Normal) Collected: 05/10/24 0916    Specimen: Blood, Arterial Line Updated: 05/10/24 0931     ADP Aggregation, % Platelet 96 %     POC Activated Clotting Time [[id number]]  (Normal) Collected:  05/10/24 0918    Specimen: Blood Updated: 05/10/24 0929     Activated Clotting Time  122 Seconds      Comment: Serial Number: [id number]:  [id number]       POC Activated Clotting Time [[id number]]  (Abnormal) Collected: 05/10/24 0848    Specimen: Arterial Blood Updated: 05/10/24 0929     Activated Clotting Time  427 Seconds      Comment: Serial Number: [id number]:  [id number]       POC Activated Clotting Time [[id number]]  (Abnormal) Collected: 05/10/24 0821    Specimen: Arterial Blood Updated: 05/10/24 0928     Activated Clotting Time  452 Seconds      Comment: Serial Number: [id number]:  [id number]       POC Activated Clotting Time [[id number]]  (Abnormal) Collected: 05/10/24 0806    Specimen: Arterial Blood Updated: 05/10/24 0928     Activated Clotting Time  519 Seconds      Comment: Serial Number: [id number]:  [id number]       POC Activated Clotting Time [[id number]]  (Normal) Collected: 05/10/24 0755    Specimen: Arterial Blood Updated: 05/10/24 0928     Activated Clotting Time  134 Seconds      Comment: Serial Number: [id number]:  [id number]       POC Glucose Once [[id number]]  (Abnormal) Collected: 05/10/24 0616    Specimen: Blood Updated: 05/10/24 0618     Glucose 106 mg/dL      Comment: Serial Number: [id number]:  [id number]             Imaging Results (Last 72 Hours)       Procedure Component Value Units Date/Time    XR Abdomen KUB [[id number]] Collected: 05/10/24 1249     Updated: 05/10/24 1252    Narrative:      XR ABDOMEN KUB    Date of Exam: 5/10/2024 12:44 PM EDT    Indication: eval OGT placement    Comparison: None available.    Findings:  There is an orogastric tube with its tip in the distal gastric body. The sideport is in the region of the mid gastric body. No bowel dilatation is identified. There are cholecystectomy clips.      Impression:      Impression:  Orogastric tube tip in the distal gastric body.      Electronically Signed: Joanna Siddiqi MD    5/10/2024 12:50 PM EDT    Workstation ID:  [id number]    XR Chest 1 View [[id number]] Collected: 05/10/24 1130     Updated: 05/10/24 1135    Narrative:      XR CHEST 1 VW    Date of Exam: 5/10/2024 11:24 AM EDT    Indication: [place] reposition    Comparison: Earlier today  Findings:  The distal NG tube is cut off the field-of-view. Other support lines appear similar in position. [place]-Nilam catheter tip remains in the main pulmonary outflow tract. Heart and pulmonary vessels appear within normal limits. There is continued mild   atelectasis of the left lung base.      Impression:      Impression:  No identifiable interval change from prior exam.      Electronically Signed: Joanna Siddiqi MD    5/10/2024 11:33 AM EDT    Workstation ID: [id number]    XR Chest 1 View [[id number]] Collected: 05/10/24 1103     Updated: 05/10/24 1111    Narrative:      XR CHEST 1 VW    Date of Exam: 5/10/2024 11:01 AM EDT    Indication: Post-Op Check Line & Tube Placement    Comparison: 5/9/2024.    Findings:  There are new sternal suture wires. There is an ET tube with its tip several centimeters above the clif. There is an NG tube directed to the left upper quadrant although tip is not included. Side port appears to be near the GE junction. There is a   [place]-Nilam catheter with its tip in the main pulmonary outflow tract. There is a left atrial appendage occlusion device. There is a mediastinal drain. There is no pneumothorax. There is mild left basilar atelectasis. Heart and pulmonary vessels appear   within normal limits.      Impression:      Impression:  The NG tube should be advanced several centimeters. Other support lines appear in good position. Mild atelectasis left lung base noted.      Electronically Signed: Joanna Siddiqi MD    5/10/2024 11:09 AM EDT    Workstation ID: [id number]            Results for orders placed during the hospital encounter of 04/19/24    Adult Transesophageal Echo (JOSE) W/ Cont if Necessary Per Protocol    Interpretation Summary    Left ventricular  systolic function is normal. Left ventricular ejection fraction appears to be 56 - 60%.    The left atrial cavity is mildly dilated.    Moderate aortic valve regurgitation is present.    Mild dilation of the aortic root is present. Aneurysmal dilation of the ascending aorta is present.    Ascending aorta = 5.1 cm      Medication Review  Scheduled Meds:acetaminophen, 1,000 mg, Intravenous, Q8H  [START ON 5/11/2024] aspirin, 81 mg, Oral, Daily  [START ON 5/11/2024] atorvastatin, 40 mg, Oral, Nightly  ceFAZolin, 2 g, Intravenous, Q8H  chlorhexidine, 15 mL, Mouth/Throat, Q12H  [START ON 5/11/2024] enoxaparin, 40 mg, Subcutaneous, Daily  [START ON 5/11/2024] levothyroxine, 100 mcg, Oral, Q AM  magnesium sulfate, 1 g, Intravenous, Q8H  mupirocin, , Each Nare, BID  niCARdipine, , ,   [START ON 5/11/2024] pantoprazole, 40 mg, Oral, Daily  polyethylene glycol, 17 g, Oral, BID  pregabalin, 50 mg, Oral, BID  senna-docusate sodium, 2 tablet, Oral, BID  sodium chloride, 250 mL, Intravenous, Once      Continuous Infusions:dexmedetomidine, 0.2-1.5 mcg/kg/hr, Last Rate: 0.2 mcg/kg/hr (05/10/24 1429)  insulin, 0-100 Units/hr, Last Rate: 1.6 Units/hr (05/10/24 1549)  niCARdipine, 5-15 mg/hr, Last Rate: Stopped (05/10/24 1454)  nitroglycerin, 5-50 mcg/min, Last Rate: 10 mcg/min (05/10/24 1237)  norepinephrine, 0.02-0.06 mcg/kg/min  sodium chloride, 30 mL/hr, Last Rate: 30 mL/hr (05/10/24 1405)      PRN Meds:.  [START ON 5/11/2024] acetaminophen **OR** [START ON 5/11/2024] acetaminophen **OR** [START ON 5/11/2024] acetaminophen    Calcium Replacement - Follow Nurse / BPA Driven Protocol    dextrose    dextrose    glucagon (human recombinant)    HYDROcodone-acetaminophen    Magnesium Cardiology Dose Replacement - Follow Nurse / BPA Driven Protocol    meperidine    Morphine **AND** naloxone    niCARdipine    niCARdipine    nitroglycerin    nitroglycerin    norepinephrine    ondansetron    Phosphorus Replacement - Follow Nurse / BPA  Driven Protocol    Potassium Replacement - Follow Nurse / BPA Driven Protocol    vasopressin    Lab Results   Component Value Date    GLUCOSE 151 (H) 05/10/2024    BUN 20 05/10/2024    CREATININE 0.95 05/10/2024    EGFR 79.9 05/10/2024    BCR 21.1 05/10/2024    K 3.9 05/10/2024    CO2 22.0 05/10/2024    CALCIUM 8.6 05/10/2024    PROTENTOTREF 6.8 2024    ALBUMIN 3.5 05/10/2024    BILITOT 0.2 2024    AST 19 2024    ALT 7 2024     Results for orders placed during the hospital encounter of 24    Cardiac Catheterization/Vascular Study    Conclusion  Table formatting from the original result was not included.  Cardiac Catheterization Operative Report  PATIENT IDENTIFICATION  Name: Panfilo Feliz  Age: 83 y.o. Sex: male : 1940  MRN: [id number]    Date: 2024  PCP: @PCP@    Requesting Provider  [unfilled]    He underwent cardiac catheterization.    Indications for the procedure include: Valvular heart disease, ascending aortic aneurysm.    Procedure Details:  The risks, benefits, complications, treatment options, and expected outcomes were discussed with the patient. The patient and/or family concurred with the proposed plan, giving informed consent.    After informed consent the patient was brought to the cath lab after appropriate IV hydration was begun and oral premedication was given. He was further sedated with  none . He was prepped and draped in the usual manner. Using the modified Seldinger access technique and a micropuncture kit, a 6 Tajik sheath was placed in the femoral artery and a 7 Tajik sheath was placed in the femoral vein.. A left and right heart catheterization with coronary arteriography was performed. Findings are discussed below.    After the procedure was completed, sedation was stopped and the sheaths and catheters were all removed. Hemostasis was achieved per established hospital protocols.    Conscious sedation:  Conscious sedation was performed according  to protocol.  I supervised and directed an independent trained observer with the assistance of monitoring the patient's level of consciousness and physiologic status throughout the procedure.  Intraoperative service time was 0 minutes.    Findings:    Hemodynamics LVEDP: 13  LV: 133/2  Ao: 133/62  RA: 4  RV: 22/0  PA: 22/8  PAWP: 12  Cardiac output Amira: 7.48 (3.57)  Cardiac output thermal: 6.45 (3.08)  Left Main No significant disease  RCA Luminal regularities with no significant stenosis  LAD Luminal regularities with no significant stenosis  Circ Luminal regularities with no significant stenosis  SVG(s) Not applicable  GEORGIE Not applicable  LV Normal left ventricular systolic function ejection fraction 65 to 70%  Coronary Dominance Circumflex    Estimated Blood Loss:  Minimal    Specimens: None    Complications:  None; patient tolerated the procedure well.    Disposition: PACU - hemodynamically stable    Condition: stable    Impressions:  No angiographic evidence for significant epicardial occlusive disease  Normal left ventricular systolic function ejection fraction 65 to 70%    Recommendations:  CT surgery evaluation currently underway for aortic valve and aortic aneurysm.     Results for orders placed during the hospital encounter of 04/19/24    Adult Transesophageal Echo (JOSE) W/ Cont if Necessary Per Protocol    Interpretation Summary    Left ventricular systolic function is normal. Left ventricular ejection fraction appears to be 56 - 60%.    The left atrial cavity is mildly dilated.    Moderate aortic valve regurgitation is present.    Mild dilation of the aortic root is present. Aneurysmal dilation of the ascending aorta is present.    Ascending aorta = 5.1 cm     Lab Results   Component Value Date    CHOL 195 04/17/2024    TRIG 101 04/17/2024    HDL 54 04/17/2024     (H) 04/17/2024            Assessment & Plan       Ascending aortic aneurysm    Aneurysm of ascending aorta without  rupture          Julio C Sanchez MD  05/10/24  16:04 EDT

## 2024-05-10 NOTE — SIGNIFICANT NOTE
05/10/24 1032   OTHER   Discipline physical therapist   Rehab Time/Intention   Session Not Performed unable to evaluate, medical status change  (Pt is s/p ascending aortic arch/hemiarch replacement with Dr. Puente on 5/10/24. PT will follow-up POD #1 for PT evaluation.)   Therapy Assessment/Plan (PT)   Criteria for Skilled Interventions Met (PT) yes;meets criteria   Recommendation   PT - Next Appointment 05/11/24

## 2024-05-10 NOTE — PLAN OF CARE
Goal Outcome Evaluation:      Pt prepped for surgery this am. Patient was shaved, showered, oral hygiene, and a 20 gauge peripheral IV was placed in right AC. Beta Blocker and xanax was administered to patient. Insulin and antibiotic was not administered but given to the surgical team to take down to the CVOR. Patient and wife were given the opportunity to ask further questions.

## 2024-05-11 ENCOUNTER — APPOINTMENT (OUTPATIENT)
Dept: GENERAL RADIOLOGY | Facility: HOSPITAL | Age: 84
DRG: 220 | End: 2024-05-11
Payer: MEDICARE

## 2024-05-11 LAB
ALBUMIN SERPL-MCNC: 4 G/DL (ref 3.5–5.2)
ANION GAP SERPL CALCULATED.3IONS-SCNC: 9 MMOL/L (ref 5–15)
BASOPHILS # BLD AUTO: 0 10*3/MM3 (ref 0–0.2)
BASOPHILS NFR BLD AUTO: 0 % (ref 0–1.5)
BH BB BLOOD EXPIRATION DATE: NORMAL
BH BB BLOOD TYPE BARCODE: 6200
BH BB DISPENSE STATUS: NORMAL
BH BB PRODUCT CODE: NORMAL
BH BB UNIT NUMBER: NORMAL
BUN SERPL-MCNC: 17 MG/DL (ref 8–23)
BUN/CREAT SERPL: 18.9 (ref 7–25)
CA-I SERPL ISE-MCNC: 1.2 MMOL/L (ref 1.2–1.3)
CALCIUM SPEC-SCNC: 8.4 MG/DL (ref 8.6–10.5)
CHLORIDE SERPL-SCNC: 113 MMOL/L (ref 98–107)
CO2 SERPL-SCNC: 21 MMOL/L (ref 22–29)
CREAT SERPL-MCNC: 0.9 MG/DL (ref 0.76–1.27)
DEPRECATED RDW RBC AUTO: 47.7 FL (ref 37–54)
EGFRCR SERPLBLD CKD-EPI 2021: 85.3 ML/MIN/1.73
EOSINOPHIL # BLD AUTO: 0 10*3/MM3 (ref 0–0.4)
EOSINOPHIL NFR BLD AUTO: 0 % (ref 0.3–6.2)
ERYTHROCYTE [DISTWIDTH] IN BLOOD BY AUTOMATED COUNT: 14 % (ref 12.3–15.4)
GLUCOSE BLDC GLUCOMTR-MCNC: 112 MG/DL (ref 70–105)
GLUCOSE BLDC GLUCOMTR-MCNC: 113 MG/DL (ref 70–105)
GLUCOSE BLDC GLUCOMTR-MCNC: 126 MG/DL (ref 70–105)
GLUCOSE BLDC GLUCOMTR-MCNC: 131 MG/DL (ref 70–105)
GLUCOSE BLDC GLUCOMTR-MCNC: 132 MG/DL (ref 70–105)
GLUCOSE BLDC GLUCOMTR-MCNC: 134 MG/DL (ref 70–105)
GLUCOSE BLDC GLUCOMTR-MCNC: 135 MG/DL (ref 70–105)
GLUCOSE BLDC GLUCOMTR-MCNC: 137 MG/DL (ref 70–105)
GLUCOSE BLDC GLUCOMTR-MCNC: 137 MG/DL (ref 70–105)
GLUCOSE BLDC GLUCOMTR-MCNC: 139 MG/DL (ref 70–105)
GLUCOSE BLDC GLUCOMTR-MCNC: 144 MG/DL (ref 70–105)
GLUCOSE BLDC GLUCOMTR-MCNC: 154 MG/DL (ref 70–105)
GLUCOSE BLDC GLUCOMTR-MCNC: 159 MG/DL (ref 70–105)
GLUCOSE BLDC GLUCOMTR-MCNC: 161 MG/DL (ref 70–105)
GLUCOSE SERPL-MCNC: 135 MG/DL (ref 65–99)
HCT VFR BLD AUTO: 30.3 % (ref 37.5–51)
HGB BLD-MCNC: 9.5 G/DL (ref 13–17.7)
IMM GRANULOCYTES # BLD AUTO: 0.03 10*3/MM3 (ref 0–0.05)
IMM GRANULOCYTES NFR BLD AUTO: 0.6 % (ref 0–0.5)
INR PPP: 1.1 (ref 0.93–1.1)
LYMPHOCYTES # BLD AUTO: 0.4 10*3/MM3 (ref 0.7–3.1)
LYMPHOCYTES NFR BLD AUTO: 7.5 % (ref 19.6–45.3)
MAGNESIUM SERPL-MCNC: 2.6 MG/DL (ref 1.6–2.4)
MCH RBC QN AUTO: 29.1 PG (ref 26.6–33)
MCHC RBC AUTO-ENTMCNC: 31.4 G/DL (ref 31.5–35.7)
MCV RBC AUTO: 92.7 FL (ref 79–97)
MONOCYTES # BLD AUTO: 0.43 10*3/MM3 (ref 0.1–0.9)
MONOCYTES NFR BLD AUTO: 8.1 % (ref 5–12)
NEUTROPHILS NFR BLD AUTO: 4.46 10*3/MM3 (ref 1.7–7)
NEUTROPHILS NFR BLD AUTO: 83.8 % (ref 42.7–76)
NRBC BLD AUTO-RTO: 0 /100 WBC (ref 0–0.2)
PHOSPHATE SERPL-MCNC: 3.9 MG/DL (ref 2.5–4.5)
PLATELET # BLD AUTO: 97 10*3/MM3 (ref 140–450)
PMV BLD AUTO: 11.2 FL (ref 6–12)
POTASSIUM SERPL-SCNC: 4.2 MMOL/L (ref 3.5–5.2)
PROTHROMBIN TIME: 11.9 SECONDS (ref 9.6–11.7)
QT INTERVAL: 411 MS
QTC INTERVAL: 441 MS
RBC # BLD AUTO: 3.27 10*6/MM3 (ref 4.14–5.8)
SODIUM SERPL-SCNC: 143 MMOL/L (ref 136–145)
UNIT  ABO: NORMAL
UNIT  RH: NORMAL
WBC NRBC COR # BLD AUTO: 5.32 10*3/MM3 (ref 3.4–10.8)

## 2024-05-11 PROCEDURE — 93005 ELECTROCARDIOGRAM TRACING: CPT | Performed by: NURSE PRACTITIONER

## 2024-05-11 PROCEDURE — 93010 ELECTROCARDIOGRAM REPORT: CPT | Performed by: INTERNAL MEDICINE

## 2024-05-11 PROCEDURE — 97162 PT EVAL MOD COMPLEX 30 MIN: CPT

## 2024-05-11 PROCEDURE — 25810000003 SODIUM CHLORIDE 0.9 % SOLUTION: Performed by: THORACIC SURGERY (CARDIOTHORACIC VASCULAR SURGERY)

## 2024-05-11 PROCEDURE — 85610 PROTHROMBIN TIME: CPT | Performed by: NURSE PRACTITIONER

## 2024-05-11 PROCEDURE — 25010000002 METOCLOPRAMIDE PER 10 MG: Performed by: THORACIC SURGERY (CARDIOTHORACIC VASCULAR SURGERY)

## 2024-05-11 PROCEDURE — 25010000002 ALBUMIN HUMAN 5% PER 50 ML: Performed by: THORACIC SURGERY (CARDIOTHORACIC VASCULAR SURGERY)

## 2024-05-11 PROCEDURE — 25010000002 ACETAMINOPHEN 10 MG/ML SOLUTION: Performed by: NURSE PRACTITIONER

## 2024-05-11 PROCEDURE — 83735 ASSAY OF MAGNESIUM: CPT | Performed by: NURSE PRACTITIONER

## 2024-05-11 PROCEDURE — 99233 SBSQ HOSP IP/OBS HIGH 50: CPT | Performed by: INTERNAL MEDICINE

## 2024-05-11 PROCEDURE — 71045 X-RAY EXAM CHEST 1 VIEW: CPT

## 2024-05-11 PROCEDURE — 80069 RENAL FUNCTION PANEL: CPT | Performed by: NURSE PRACTITIONER

## 2024-05-11 PROCEDURE — 99024 POSTOP FOLLOW-UP VISIT: CPT | Performed by: THORACIC SURGERY (CARDIOTHORACIC VASCULAR SURGERY)

## 2024-05-11 PROCEDURE — P9041 ALBUMIN (HUMAN),5%, 50ML: HCPCS | Performed by: THORACIC SURGERY (CARDIOTHORACIC VASCULAR SURGERY)

## 2024-05-11 PROCEDURE — 25010000002 ONDANSETRON PER 1 MG: Performed by: NURSE PRACTITIONER

## 2024-05-11 PROCEDURE — 25010000002 MAGNESIUM SULFATE IN D5W 1G/100ML (PREMIX) 1-5 GM/100ML-% SOLUTION: Performed by: NURSE PRACTITIONER

## 2024-05-11 PROCEDURE — 25010000002 MORPHINE PER 10 MG: Performed by: NURSE PRACTITIONER

## 2024-05-11 PROCEDURE — 85025 COMPLETE CBC W/AUTO DIFF WBC: CPT | Performed by: NURSE PRACTITIONER

## 2024-05-11 PROCEDURE — 25010000002 CEFAZOLIN PER 500 MG: Performed by: NURSE PRACTITIONER

## 2024-05-11 PROCEDURE — 82330 ASSAY OF CALCIUM: CPT | Performed by: NURSE PRACTITIONER

## 2024-05-11 PROCEDURE — 82948 REAGENT STRIP/BLOOD GLUCOSE: CPT

## 2024-05-11 RX ORDER — MIDODRINE HYDROCHLORIDE 5 MG/1
10 TABLET ORAL
Status: DISCONTINUED | OUTPATIENT
Start: 2024-05-11 | End: 2024-05-15

## 2024-05-11 RX ORDER — PANTOPRAZOLE SODIUM 40 MG/1
40 TABLET, DELAYED RELEASE ORAL DAILY
Status: DISCONTINUED | OUTPATIENT
Start: 2024-05-11 | End: 2024-05-16 | Stop reason: HOSPADM

## 2024-05-11 RX ORDER — ESCITALOPRAM OXALATE 10 MG/1
20 TABLET ORAL DAILY
Status: DISCONTINUED | OUTPATIENT
Start: 2024-05-11 | End: 2024-05-16 | Stop reason: HOSPADM

## 2024-05-11 RX ORDER — ALBUMIN, HUMAN INJ 5% 5 %
250 SOLUTION INTRAVENOUS ONCE
Status: COMPLETED | OUTPATIENT
Start: 2024-05-11 | End: 2024-05-11

## 2024-05-11 RX ORDER — ALBUMIN, HUMAN INJ 5% 5 %
12.5 SOLUTION INTRAVENOUS ONCE
Status: COMPLETED | OUTPATIENT
Start: 2024-05-11 | End: 2024-05-11

## 2024-05-11 RX ORDER — METOCLOPRAMIDE HYDROCHLORIDE 5 MG/ML
10 INJECTION INTRAMUSCULAR; INTRAVENOUS EVERY 6 HOURS
Status: DISCONTINUED | OUTPATIENT
Start: 2024-05-11 | End: 2024-05-13

## 2024-05-11 RX ORDER — ENOXAPARIN SODIUM 100 MG/ML
40 INJECTION SUBCUTANEOUS DAILY
Status: DISCONTINUED | OUTPATIENT
Start: 2024-05-12 | End: 2024-05-16 | Stop reason: HOSPADM

## 2024-05-11 RX ORDER — HYOSCYAMINE SULFATE 0.38 MG/1
375 TABLET, EXTENDED RELEASE ORAL EVERY 12 HOURS
Status: DISCONTINUED | OUTPATIENT
Start: 2024-05-11 | End: 2024-05-11

## 2024-05-11 RX ORDER — ESCITALOPRAM OXALATE 10 MG/1
10 TABLET ORAL DAILY
Status: DISCONTINUED | OUTPATIENT
Start: 2024-05-11 | End: 2024-05-16 | Stop reason: HOSPADM

## 2024-05-11 RX ADMIN — MIDODRINE HYDROCHLORIDE 10 MG: 5 TABLET ORAL at 17:12

## 2024-05-11 RX ADMIN — CHLORHEXIDINE GLUCONATE, 0.12% ORAL RINSE 15 ML: 1.2 SOLUTION DENTAL at 20:05

## 2024-05-11 RX ADMIN — HYDROCODONE BITARTRATE AND ACETAMINOPHEN 1 TABLET: 5; 325 TABLET ORAL at 13:58

## 2024-05-11 RX ADMIN — SENNOSIDES AND DOCUSATE SODIUM 2 TABLET: 50; 8.6 TABLET ORAL at 08:50

## 2024-05-11 RX ADMIN — SODIUM CHLORIDE 250 ML: 9 INJECTION, SOLUTION INTRAVENOUS at 17:11

## 2024-05-11 RX ADMIN — SODIUM CHLORIDE 2 G: 900 INJECTION INTRAVENOUS at 17:10

## 2024-05-11 RX ADMIN — PREGABALIN 50 MG: 25 CAPSULE ORAL at 20:05

## 2024-05-11 RX ADMIN — HYDROCODONE BITARTRATE AND ACETAMINOPHEN 1 TABLET: 5; 325 TABLET ORAL at 08:50

## 2024-05-11 RX ADMIN — MAGNESIUM SULFATE IN DEXTROSE 1 G: 10 INJECTION, SOLUTION INTRAVENOUS at 00:26

## 2024-05-11 RX ADMIN — POLYETHYLENE GLYCOL 3350 17 G: 17 POWDER, FOR SOLUTION ORAL at 20:05

## 2024-05-11 RX ADMIN — ONDANSETRON 4 MG: 2 INJECTION INTRAMUSCULAR; INTRAVENOUS at 11:12

## 2024-05-11 RX ADMIN — HYDROCODONE BITARTRATE AND ACETAMINOPHEN 1 TABLET: 5; 325 TABLET ORAL at 03:45

## 2024-05-11 RX ADMIN — MAGNESIUM SULFATE IN DEXTROSE 1 G: 10 INJECTION, SOLUTION INTRAVENOUS at 08:50

## 2024-05-11 RX ADMIN — SODIUM CHLORIDE 2 G: 900 INJECTION INTRAVENOUS at 08:49

## 2024-05-11 RX ADMIN — ONDANSETRON 4 MG: 2 INJECTION INTRAMUSCULAR; INTRAVENOUS at 05:31

## 2024-05-11 RX ADMIN — METOCLOPRAMIDE 10 MG: 5 INJECTION, SOLUTION INTRAMUSCULAR; INTRAVENOUS at 08:49

## 2024-05-11 RX ADMIN — MORPHINE SULFATE 2 MG: 2 INJECTION, SOLUTION INTRAMUSCULAR; INTRAVENOUS at 00:46

## 2024-05-11 RX ADMIN — METOCLOPRAMIDE 10 MG: 5 INJECTION, SOLUTION INTRAMUSCULAR; INTRAVENOUS at 17:10

## 2024-05-11 RX ADMIN — MUPIROCIN 1 APPLICATION: 20 OINTMENT TOPICAL at 08:50

## 2024-05-11 RX ADMIN — PREGABALIN 50 MG: 25 CAPSULE ORAL at 08:50

## 2024-05-11 RX ADMIN — LEVOTHYROXINE SODIUM 100 MCG: 0.1 TABLET ORAL at 05:31

## 2024-05-11 RX ADMIN — ESCITALOPRAM OXALATE 10 MG: 10 TABLET ORAL at 13:57

## 2024-05-11 RX ADMIN — ALBUMIN (HUMAN) 12.5 G: 12.5 INJECTION, SOLUTION INTRAVENOUS at 01:10

## 2024-05-11 RX ADMIN — MUPIROCIN 1 APPLICATION: 20 OINTMENT TOPICAL at 20:05

## 2024-05-11 RX ADMIN — ASPIRIN 81 MG: 81 TABLET, COATED ORAL at 13:58

## 2024-05-11 RX ADMIN — PANTOPRAZOLE SODIUM 40 MG: 40 TABLET, DELAYED RELEASE ORAL at 05:31

## 2024-05-11 RX ADMIN — ACETAMINOPHEN 1000 MG: 10 INJECTION INTRAVENOUS at 00:26

## 2024-05-11 RX ADMIN — SODIUM CHLORIDE 100 ML: 9 INJECTION, SOLUTION INTRAVENOUS at 19:00

## 2024-05-11 RX ADMIN — SODIUM CHLORIDE 1000 ML: 9 INJECTION, SOLUTION INTRAVENOUS at 20:00

## 2024-05-11 RX ADMIN — HYDROCODONE BITARTRATE AND ACETAMINOPHEN 1 TABLET: 5; 325 TABLET ORAL at 17:56

## 2024-05-11 RX ADMIN — SODIUM CHLORIDE 2 G: 900 INJECTION INTRAVENOUS at 22:30

## 2024-05-11 RX ADMIN — ONDANSETRON 4 MG: 2 INJECTION INTRAMUSCULAR; INTRAVENOUS at 00:26

## 2024-05-11 RX ADMIN — SENNOSIDES AND DOCUSATE SODIUM 2 TABLET: 50; 8.6 TABLET ORAL at 20:05

## 2024-05-11 RX ADMIN — ESCITALOPRAM OXALATE 20 MG: 10 TABLET ORAL at 13:58

## 2024-05-11 RX ADMIN — MIDODRINE HYDROCHLORIDE 10 MG: 5 TABLET ORAL at 11:12

## 2024-05-11 RX ADMIN — METOCLOPRAMIDE 10 MG: 5 INJECTION, SOLUTION INTRAMUSCULAR; INTRAVENOUS at 20:39

## 2024-05-11 RX ADMIN — CHLORHEXIDINE GLUCONATE, 0.12% ORAL RINSE 15 ML: 1.2 SOLUTION DENTAL at 08:49

## 2024-05-11 RX ADMIN — POLYETHYLENE GLYCOL 3350 17 G: 17 POWDER, FOR SOLUTION ORAL at 08:50

## 2024-05-11 RX ADMIN — ALBUMIN (HUMAN) 250 ML: 12.5 INJECTION, SOLUTION INTRAVENOUS at 17:11

## 2024-05-11 RX ADMIN — ATORVASTATIN CALCIUM 40 MG: 40 TABLET, FILM COATED ORAL at 20:05

## 2024-05-11 NOTE — PROGRESS NOTES
[facility]iology [facility]      Patient Care Team:  Moreno Tapia APRN as PCP - General (Family Medicine)  Chalo Olvera MD as Consulting Physician (Hematology and Oncology)  Ganesh López MD as Consulting Physician (Neurology)  Jake Enriquez MD as Referring Physician (Family Medicine)      Cardiology assessment and plan     Ascending aortic aneurysm  Aortic insufficiency  Depression/anxiety  Hypothyroidism  Peripheral neuropathy  Acid reflux  Normal coronaries  Normal LV systolic function  Moderate aortic regurgitation  Status post ascending aortic aneurysm repair with dacryon graft  Current active root aortoplasty  Left atrial appendage closure with atrial clip device  Patient is currently intubated and sedated  Hemodynamics are stable  Continue post surgical care  Tmax is 97.2 pulse is 75 respirations are 14 blood pressure is 90/50 204/46 sats are 90%  pH is 7.36 pCO2 is 39 pO2 is 109 sodium is 143 potassium is 4.2 hemoglobin is 11  Twelve-lead EKG shows normal sinus rhythm with nonspecific ST changes  Continue current medical therapy continue close monitoring and follow-up  Continue post surgical care  Patient is still on vent but alert and awake  Further recommendations based on patient course    5/11/2024  Extubated looks comfortable  Patient is hemodynamically stable  Postop day 1 ascending aortic aneurysm repair with Dacron graft  Tmax is 98.6 pulse is 80 respirations are 15 blood pressure is 115/59 sats are 93%  Sodium is 143 potassium is 4.2 creatinine is 0.9 hemoglobin is 9.5  Hemoglobin is 9.5  Current medications include aspirin 81 mg p.o. once a day Lipitor 40 mg p.o. once a day midodrine 10 mg p.o. 3 times a day patient is on Lovenox for DVT prophylaxis  Discussed with patient and family at bedside  Discussed with surgical team  Continue current medical bed continue aggressive risk factor modification  Continue to ambulate and will continue postsurgical care  Further recommendation based on patient  "course                                    Chief Complaint: Patient is extubated alert and awake somewhat drowsy secondary to sedation    Subjective denies any new complaints clinically feels better    Interval History: Hemodynamics are stable with no cardiac arrhythmias    History taken from: patient    Review of Systems:  Review of Systems   Constitutional: Negative for chills, decreased appetite and malaise/fatigue.   HENT:  Negative for congestion and nosebleeds.    Eyes:  Negative for blurred vision and double vision.   Cardiovascular:  Negative for chest pain, dyspnea on exertion, irregular heartbeat, leg swelling, near-syncope, orthopnea and palpitations.   Respiratory:  Negative for cough and shortness of breath.    Hematologic/Lymphatic: Negative for adenopathy. Does not bruise/bleed easily.   Skin:  Negative for color change and rash.   Musculoskeletal:  Negative for back pain and joint pain.   Gastrointestinal:  Negative for bloating, abdominal pain, hematemesis and hematochezia.   Genitourinary:  Negative for flank pain and hematuria.   Neurological:  Negative for dizziness and focal weakness.   Psychiatric/Behavioral:  Negative for altered mental status. The patient does not have insomnia.    Chest tubes are in place patient is extubated central lines and tubes are in place    Objective    Vital Signs  Visit Vitals  /59 (Patient Position: Sitting)   Pulse 83   Temp 98.6 °F (37 °C)   Resp 15   Ht 193 cm (76\")   Wt 85.2 kg (187 lb 13.3 oz)   SpO2 93%   BMI 22.86 kg/m²     Oxygen Therapy  SpO2: 93 %  Pulse Oximetry Type: Continuous  Device (Oxygen Therapy): nasal cannula  Flow (L/min): 2  Oxygen Concentration (%): 40  Flowsheet Rows      Flowsheet Row First Filed Value   Admission Height 193 cm (76\") Documented at 05/09/2024 1042   Admission Weight 81.4 kg (179 lb 6.4 oz) Documented at 05/09/2024 1042          Intake & Output (last 3 days)         05/08 0701  05/09 0700 05/09 0701  05/10 0700 05/10 " 0701  05/11 0700 05/11 0701  05/12 0700    P.O.   240 240    I.V. (mL/kg)   5367.9 (63)     Blood   759     Other   60     IV Piggyback   875     Total Intake(mL/kg)   7301.9 (85.7) 240 (2.8)    Urine (mL/kg/hr)   3130 (1.5)     Emesis/NG output   0     Other   1250     Stool   0     Blood   500     Chest Tube   380     Dialysis   500     Total Output   5760     Net   +1541.9 +240                  Lines, Drains & Airways       Active LDAs       Name Placement date Placement time Site Days    CVC Double Lumen 05/10/24 Right Internal jugular 05/10/24  0723  created via procedure documentation  Internal jugular  1    Peripheral IV 05/10/24 0630 Right Antecubital 05/10/24  0630  Antecubital  1    Peripheral IV 05/10/24 2100 Posterior;Right Hand 05/10/24  2100  Hand  less than 1    Urethral Catheter Temperature probe 16 Fr. 05/10/24  --  -- 1    Y Chest Tube 1 and 2 Mediastinal 28 Fr. Mediastinal 28 Fr. 05/10/24  --  -- 1    Arterial Line 05/10/24 Left Radial 05/10/24  0656  created via procedure documentation  Radial  1    Pacer Wires 05/10/24  --  Atrial and Ventricular  1                    Physical Exam:  Constitutional:       Appearance: Well-developed.   Eyes:      Conjunctiva/sclera: Conjunctivae normal.      Pupils: Pupils are equal, round, and reactive to light.   HENT:      Head: Normocephalic and atraumatic.   Neck:      Thyroid: No thyromegaly.   Pulmonary:      Effort: Pulmonary effort is normal.      Breath sounds: Normal breath sounds.   Cardiovascular:      Normal rate. Regular rhythm.   Pulses:     Intact distal pulses.   Edema:     Peripheral edema absent.   Abdominal:      General: Bowel sounds are normal.      Palpations: Abdomen is soft.   Musculoskeletal:      Cervical back: Normal range of motion and neck supple. Skin:     General: Skin is warm.   Neurological:      Mental Status: Alert and oriented to person, place, and time.         Results Review:     I reviewed the patient's new clinical  results.    Lab Results (last 24 hours)       Procedure Component Value Units Date/Time    POC Glucose Once [[id number]]  (Abnormal) Collected: 05/11/24 0900    Specimen: Blood Updated: 05/11/24 0905     Glucose 134 mg/dL      Comment: Serial Number: [id number]:  [id number]       POC Glucose Once [[id number]]  (Abnormal) Collected: 05/11/24 0708    Specimen: Blood Updated: 05/11/24 0709     Glucose 144 mg/dL      Comment: Serial Number: [id number]:  [id number]       POC Glucose Once [[id number]]  (Abnormal) Collected: 05/11/24 0511    Specimen: Blood Updated: 05/11/24 0512     Glucose 126 mg/dL      Comment: Serial Number: [id number]:  [id number]       Renal Function Panel [[id number]]  (Abnormal) Collected: 05/11/24 0410    Specimen: Blood Updated: 05/11/24 0442     Glucose 135 mg/dL      BUN 17 mg/dL      Creatinine 0.90 mg/dL      Sodium 143 mmol/L      Potassium 4.2 mmol/L      Chloride 113 mmol/L      CO2 21.0 mmol/L      Calcium 8.4 mg/dL      Albumin 4.0 g/dL      Phosphorus 3.9 mg/dL      Anion Gap 9.0 mmol/L      BUN/Creatinine Ratio 18.9     eGFR 85.3 mL/min/1.73     Narrative:      GFR Normal >60  Chronic Kidney Disease <60  Kidney Failure <15    The GFR formula is only valid for adults with stable renal function between ages 18 and 70.    Protime-INR [[id number]]  (Abnormal) Collected: 05/11/24 0410    Specimen: Blood Updated: 05/11/24 0442     Protime 11.9 Seconds      INR 1.10    Magnesium [[id number]]  (Abnormal) Collected: 05/11/24 0410    Specimen: Blood Updated: 05/11/24 0440     Magnesium 2.6 mg/dL     Calcium, Ionized [[id number]]  (Normal) Collected: 05/11/24 0410    Specimen: Blood Updated: 05/11/24 0425     Ionized Calcium 1.20 mmol/L     CBC & Differential [[id number]]  (Abnormal) Collected: 05/11/24 0410    Specimen: Blood Updated: 05/11/24 0421    Narrative:      The following orders were created for panel order CBC & Differential.  Procedure                                Abnormality         Status                     ---------                               -----------         ------                     CBC Auto Differential[[id number]]        Abnormal            Final result                 Please view results for these tests on the individual orders.    CBC Auto Differential [[id number]]  (Abnormal) Collected: 05/11/24 0410    Specimen: Blood Updated: 05/11/24 0421     WBC 5.32 10*3/mm3      RBC 3.27 10*6/mm3      Hemoglobin 9.5 g/dL      Hematocrit 30.3 %      MCV 92.7 fL      MCH 29.1 pg      MCHC 31.4 g/dL      RDW 14.0 %      RDW-SD 47.7 fl      MPV 11.2 fL      Platelets 97 10*3/mm3      Neutrophil % 83.8 %      Lymphocyte % 7.5 %      Monocyte % 8.1 %      Eosinophil % 0.0 %      Basophil % 0.0 %      Immature Grans % 0.6 %      Neutrophils, Absolute 4.46 10*3/mm3      Lymphocytes, Absolute 0.40 10*3/mm3      Monocytes, Absolute 0.43 10*3/mm3      Eosinophils, Absolute 0.00 10*3/mm3      Basophils, Absolute 0.00 10*3/mm3      Immature Grans, Absolute 0.03 10*3/mm3      nRBC 0.0 /100 WBC     POC Glucose Once [[id number]]  (Abnormal) Collected: 05/11/24 0409    Specimen: Blood Updated: 05/11/24 0414     Glucose 135 mg/dL      Comment: Serial Number: [id number]:  [id number]       POC Glucose Once [[id number]]  (Abnormal) Collected: 05/11/24 0222    Specimen: Blood Updated: 05/11/24 0223     Glucose 139 mg/dL      Comment: Serial Number: [id number]:  [id number]       POC Glucose Once [[id number]]  (Abnormal) Collected: 05/11/24 0051    Specimen: Blood Updated: 05/11/24 0058     Glucose 137 mg/dL      Comment: Serial Number: [id number]:  [id number]       POC Glucose Once [[id number]]  (Abnormal) Collected: 05/10/24 2248    Specimen: Blood Updated: 05/10/24 2250     Glucose 130 mg/dL      Comment: Serial Number: [id number]:  [id number]       POC Creatinine [[id number]]  (Normal) Collected: 05/10/24 2245    Specimen: Arterial Blood Updated: 05/10/24 2253      Creatinine 0.74 mg/dL      Comment: Serial Number: [id number]:  [id number]        eGFR 90.5 mL/min/1.73     Blood Gas, Arterial - [[id number]]  (Abnormal) Collected: 05/10/24 2245    Specimen: Arterial Blood Updated: 05/10/24 2250     Site Arterial Line     Lyle's Test N/A     pH, Arterial 7.356 pH units      pCO2, Arterial 42.4 mm Hg      pO2, Arterial 176.6 mm Hg      HCO3, Arterial 23.8 mmol/L      Base Excess, Arterial -1.7 mmol/L      Comment: Serial Number: [id number]:  [id number]        O2 Saturation, Arterial 99.5 %      Barometric Pressure for Blood Gas --     Comment: N/A        Modality Cannula     FIO2 36 %      Hemodilution No    POCT Electrolytes +HGB +HCT [[id number]]  (Abnormal) Collected: 05/10/24 2245    Specimen: Arterial Blood Updated: 05/10/24 2250     Sodium 143 mmol/L      POC Potassium 4.0 mmol/L      Ionized Calcium 1.25 mmol/L      Comment: Serial Number: [id number]:  [id number]        Hematocrit 29 %      Hemoglobin 9.7 g/dL     POC Glucose Once [[id number]] Collected: 05/10/24 2245    Specimen: Arterial Blood Updated: 05/10/24 2250     Glucose --     Comment: Serial Number: [id number]:  [id number]       POCT Electrolytes +HGB +HCT [[id number]]  (Abnormal) Collected: 05/10/24 2126    Specimen: Arterial Blood Updated: 05/10/24 2132     Sodium 142 mmol/L      POC Potassium 4.0 mmol/L      Ionized Calcium 1.23 mmol/L      Comment: Serial Number: [id number]:  [id number]        Glucose 126 mg/dL      Hematocrit 28 %      Hemoglobin 9.6 g/dL     POC Lactate [[id number]]  (Normal) Collected: 05/10/24 2126    Specimen: Arterial Blood Updated: 05/10/24 2132     Lactate 0.4 mmol/L      Comment: Serial Number: [id number]:  [id number]       POC Glucose Once [[id number]]  (Abnormal) Collected: 05/10/24 2126    Specimen: Arterial Blood Updated: 05/10/24 2132     Glucose 126 mg/dL      Comment: Serial Number: [id number]:  [id number]       POC Creatinine [[id number]]  (Normal) Collected: 05/10/24 2126    Specimen:  Arterial Blood Updated: 05/10/24 2132     Creatinine 0.79 mg/dL      Comment: Serial Number: [id number]:  [id number]        eGFR 88.7 mL/min/1.73     Blood Gas, Arterial - [[id number]]  (Abnormal) Collected: 05/10/24 2126    Specimen: Arterial Blood Updated: 05/10/24 2132     Site Arterial Line     Lyle's Test N/A     pH, Arterial 7.370 pH units      pCO2, Arterial 36.3 mm Hg      pO2, Arterial 145.7 mm Hg      HCO3, Arterial 21.0 mmol/L      Base Excess, Arterial -3.8 mmol/L      Comment: Serial Number: [id number]:  [id number]        O2 Saturation, Arterial 99.2 %      Barometric Pressure for Blood Gas --     Comment: N/A        Modality Adult Vent     FIO2 40 %      Ventilator Mode CPAP/PS     PEEP 5     PSV 10 cmH2O      Hemodilution No    POC Glucose Once [[id number]]  (Abnormal) Collected: 05/10/24 1954    Specimen: Blood Updated: 05/10/24 1957     Glucose 125 mg/dL      Comment: Serial Number: [id number]:  [id number]       POC Glucose Once [[id number]]  (Abnormal) Collected: 05/10/24 1849    Specimen: Blood Updated: 05/10/24 1851     Glucose 117 mg/dL      Comment: Serial Number: [id number]:  [id number]       Potassium [[id number]]  (Normal) Collected: 05/10/24 1812    Specimen: Blood Updated: 05/10/24 1844     Potassium 4.0 mmol/L     POC Glucose Once [[id number]]  (Abnormal) Collected: 05/10/24 1735    Specimen: Blood Updated: 05/10/24 1736     Glucose 117 mg/dL      Comment: Serial Number: [id number]:  [id number]       POC Glucose Once [[id number]]  (Abnormal) Collected: 05/10/24 1548    Specimen: Blood Updated: 05/10/24 1553     Glucose 140 mg/dL      Comment: Serial Number: [id number]:  [id number]       POCT Electrolytes +HGB +HCT [[id number]]  (Abnormal) Collected: 05/10/24 1451    Specimen: Arterial Blood Updated: 05/10/24 1456     Sodium 143 mmol/L      POC Potassium 4.2 mmol/L      Ionized Calcium 1.27 mmol/L      Comment: Serial Number: [id number]:  [id number]        Glucose 151 mg/dL       Hematocrit 33 %      Hemoglobin 11.1 g/dL     POC Glucose Once [[id number]]  (Abnormal) Collected: 05/10/24 1451    Specimen: Arterial Blood Updated: 05/10/24 1456     Glucose 151 mg/dL      Comment: Serial Number: [id number]:  [id number]       Blood Gas, Arterial - [[id number]]  (Abnormal) Collected: 05/10/24 1451    Specimen: Arterial Blood Updated: 05/10/24 1456     Site Arterial Line     Lyle's Test N/A     pH, Arterial 7.369 pH units      pCO2, Arterial 39.1 mm Hg      pO2, Arterial 109.8 mm Hg      HCO3, Arterial 22.6 mmol/L      Base Excess, Arterial -2.5 mmol/L      Comment: Serial Number: [id number]:  [id number]        O2 Saturation, Arterial 98.1 %      Barometric Pressure for Blood Gas --     Comment: N/A        Modality Adult Vent     FIO2 40 %      Ventilator Mode AC     Set Tidal Volume 750     PEEP 5     Hemodilution Yes     Respiratory Rate 10    POC Glucose Once [[id number]]  (Abnormal) Collected: 05/10/24 1412    Specimen: Blood Updated: 05/10/24 1413     Glucose 163 mg/dL      Comment: Serial Number: [id number]:  [id number]       POCT Electrolytes +HGB +HCT [[id number]]  (Abnormal) Collected: 05/10/24 1329    Specimen: Arterial Blood Updated: 05/10/24 1334     Sodium 141 mmol/L      POC Potassium 3.8 mmol/L      Ionized Calcium 1.21 mmol/L      Comment: Serial Number: [id number]:  [id number]        Glucose 165 mg/dL      Hematocrit 33 %      Hemoglobin 11.3 g/dL     POC Glucose Once [[id number]]  (Abnormal) Collected: 05/10/24 1329    Specimen: Arterial Blood Updated: 05/10/24 1334     Glucose 165 mg/dL      Comment: Serial Number: [id number]:  [id number]       Blood Gas, Arterial - [[id number]]  (Abnormal) Collected: 05/10/24 1329    Specimen: Arterial Blood Updated: 05/10/24 1334     Site Arterial Line     Lyle's Test N/A     pH, Arterial 7.385 pH units      pCO2, Arterial 37.1 mm Hg      pO2, Arterial 167.8 mm Hg      HCO3, Arterial 22.2 mmol/L      Base Excess, Arterial -2.5 mmol/L       Comment: Serial Number: [id number]:  [id number]        O2 Saturation, Arterial 99.5 %      Barometric Pressure for Blood Gas --     Comment: N/A        Modality Adult Vent     FIO2 50 %      Ventilator Mode AC     Set Tidal Volume 750     PEEP 5     Hemodilution Yes     Respiratory Rate 10    POC Glucose Once [[id number]]  (Abnormal) Collected: 05/10/24 1321    Specimen: Blood Updated: 05/10/24 1323     Glucose 173 mg/dL      Comment: Serial Number: [id number]:  [id number]       Blood Gas, Arterial - [[id number]]  (Abnormal) Collected: 05/10/24 1225    Specimen: Arterial Blood Updated: 05/10/24 1230     Site Arterial Line     Lyle's Test N/A     pH, Arterial 7.393 pH units      pCO2, Arterial 36.7 mm Hg      pO2, Arterial 239.2 mm Hg      HCO3, Arterial 22.4 mmol/L      Base Excess, Arterial -2.2 mmol/L      Comment: Serial Number: [id number]:  [id number]        O2 Saturation, Arterial 99.8 %      CO2 Content 23.5 mmol/L      Barometric Pressure for Blood Gas --     Comment: N/A        Modality Adult Vent     FIO2 70 %      Ventilator Mode AC     Set Tidal Volume 750     PEEP 8     Hemodilution Yes     Respiratory Rate 10    POCT Electrolytes +HGB +HCT [[id number]]  (Abnormal) Collected: 05/10/24 1225    Specimen: Arterial Blood Updated: 05/10/24 1230     Sodium 143 mmol/L      POC Potassium 3.9 mmol/L      Ionized Calcium 1.18 mmol/L      Comment: Serial Number: [id number]:  [id number]        Glucose 158 mg/dL      Hematocrit 35 %      Hemoglobin 12.0 g/dL     POC Glucose Once [[id number]]  (Abnormal) Collected: 05/10/24 1225    Specimen: Arterial Blood Updated: 05/10/24 1230     Glucose 158 mg/dL      Comment: Serial Number: [id number]:  [id number]       Potassium [[id number]]  (Normal) Collected: 05/10/24 1201    Specimen: Blood, Arterial Line Updated: 05/10/24 1227     Potassium 3.9 mmol/L     Tissue Pathology Exam [[id number]] Collected: 05/10/24 0847    Specimen: Tissue from Aortic Aneurysm Updated: 05/10/24 1207           Results for orders placed during the hospital encounter of 03/18/24    Adult Transthoracic Echo Complete W/ Cont if Necessary Per Protocol    Interpretation Summary    Left ventricular systolic function is normal. Calculated left ventricular EF = 60% Left ventricular ejection fraction appears to be 56 - 60%.    Left ventricular wall thickness is consistent with mild concentric hypertrophy.    Left ventricular diastolic function is consistent with (grade I) impaired relaxation.    The left atrial cavity is mildly dilated.    There is mild calcification of the aortic valve mainly affecting the left coronary and right coronary cusp(s).    Estimated right ventricular systolic pressure from tricuspid regurgitation is normal (<35 mmHg).    Mild dilation of the aortic root is present. Severe dilation of the ascending aorta is present.    Ascending aortic aneurysm measuring 4.8 cm unchanged from prior echocardiogram        Medication Review:   I have reviewed the patient's current medication list  Scheduled Meds:aspirin, 81 mg, Oral, Daily  atorvastatin, 40 mg, Oral, Nightly  ceFAZolin, 2 g, Intravenous, Q8H  chlorhexidine, 15 mL, Mouth/Throat, Q12H  enoxaparin, 40 mg, Subcutaneous, Daily  escitalopram, 10 mg, Oral, Daily  escitalopram, 20 mg, Oral, Daily  hyoscyamine, 375 mcg, Oral, Q12H  levothyroxine, 100 mcg, Oral, Q AM  metoclopramide, 10 mg, Intravenous, Q6H  midodrine, 10 mg, Oral, TID AC  mupirocin, , Each Nare, BID  pantoprazole, 40 mg, Oral, Daily  polyethylene glycol, 17 g, Oral, BID  pregabalin, 50 mg, Oral, BID  senna-docusate sodium, 2 tablet, Oral, BID      Continuous Infusions:dexmedetomidine, 0.2-1.5 mcg/kg/hr, Last Rate: Stopped (05/11/24 1138)  insulin, 0-100 Units/hr, Last Rate: 0.2 Units/hr (05/11/24 0901)  niCARdipine, 5-15 mg/hr, Last Rate: Stopped (05/11/24 1138)  nitroglycerin, 5-50 mcg/min, Last Rate: Stopped (05/10/24 1700)  norepinephrine, 0.02-0.06 mcg/kg/min, Last Rate: Stopped  (05/11/24 1138)  phenylephrine, 0.5-3 mcg/kg/min, Last Rate: Stopped (05/11/24 0630)  sodium chloride, 30 mL/hr, Last Rate: 30 mL/hr (05/10/24 1405)      PRN Meds:.  acetaminophen **OR** acetaminophen **OR** acetaminophen    Calcium Replacement - Follow Nurse / BPA Driven Protocol    dextrose    dextrose    glucagon (human recombinant)    HYDROcodone-acetaminophen    Magnesium Cardiology Dose Replacement - Follow Nurse / BPA Driven Protocol    Morphine **AND** naloxone    niCARdipine    nitroglycerin    nitroglycerin    norepinephrine    ondansetron    Phosphorus Replacement - Follow Nurse / BPA Driven Protocol    Potassium Replacement - Follow Nurse / BPA Driven Protocol    vasopressin    ECG/EMG Results (last 24 hours)       Procedure Component Value Units Date/Time    ECG 12 Lead Other; s/p aortic surgery [[id number]] Collected: 05/10/24 1434     Updated: 05/10/24 1830     QT Interval 378 ms      QTC Interval 464 ms     Narrative:      HEART RATE= 91  bpm  RR Interval= 664  ms  MT Interval= 269  ms  P Horizontal Axis= 23  deg  P Front Axis= 63  deg  QRSD Interval= 110  ms  QT Interval= 378  ms  QTcB= 464  ms  QRS Axis= -26  deg  T Wave Axis= -30  deg  - ABNORMAL ECG -  Sinus rhythm  Prolonged MT interval  Nonspecific intraventricular conduction delay  When compared with ECG of 09-May-2024 8:41:16,  Significant axis, voltage or hypertrophy change  Electronically Signed By: Julio C Sanchez ([facility]) 10-May-2024 18:30:30  Date and Time of Study: 2024-05-10 14:34:24    ECG 12 Lead Other; s/p aortic surgery [[id number]] Collected: 05/11/24 0353     Updated: 05/11/24 0908     QT Interval 411 ms      QTC Interval 441 ms     Narrative:      HEART RATE= 69  bpm  RR Interval= 868  ms  MT Interval= 241  ms  P Horizontal Axis= 19  deg  P Front Axis= 85  deg  QRSD Interval= 128  ms  QT Interval= 411  ms  QTcB= 441  ms  QRS Axis= -6  deg  T Wave Axis= -4  deg  - ABNORMAL ECG -  Sinus rhythm  Prolonged MT interval  Nonspecific  intraventricular conduction delay  Lateral infarct, acute (LAD)  Electronically Signed By:   Date and Time of Study: 2024-05-11 03:53:49            Imaging Results (Last 24 Hours)       Procedure Component Value Units Date/Time    XR Chest 1 View [[id number]] Collected: 05/11/24 0845     Updated: 05/11/24 0848    Narrative:      XR CHEST 1 VW    Date of Exam: 5/11/2024 3:27 AM EDT    Indication: Post-Op Heart Surgery    Comparison: 5/10/2024    Findings:  Patient is again noted be status post median sternotomy. In the interval the endotracheal tube and nasogastric tubes have been removed. The [place]-Nilam catheter is been removed. The right internal jugular introducer sheath remains in place and unchanged in   position. Mediastinal drain is unchanged. Heart size is within normal limits. There is pulmonary vascular congestion and mildly prominent interstitial markings suggesting mild interstitial edema. No definite pleural effusion or pneumothorax. There is   some left basilar atelectasis which is unchanged.      Impression:      Impression:    1. Interval removal of endotracheal tube and nasogastric tube and [place]-Nilam catheter.  2. Mildly prominent interstitial markings compatible with mild interstitial edema.  3. No change in left basilar atelectasis.      Electronically Signed: Maverick Lim MD    5/11/2024 8:46 AM EDT    Workstation ID: [id number]    XR Abdomen KUB [[id number]] Collected: 05/10/24 1249     Updated: 05/10/24 1252    Narrative:      XR ABDOMEN KUB    Date of Exam: 5/10/2024 12:44 PM EDT    Indication: eval OGT placement    Comparison: None available.    Findings:  There is an orogastric tube with its tip in the distal gastric body. The sideport is in the region of the mid gastric body. No bowel dilatation is identified. There are cholecystectomy clips.      Impression:      Impression:  Orogastric tube tip in the distal gastric body.      Electronically Signed: Joanna Siddiqi MD    5/10/2024 12:50 PM  EDT    Workstation ID: [id number]          Results for orders placed during the hospital encounter of 24    Cardiac Catheterization/Vascular Study    Conclusion  Table formatting from the original result was not included.  Cardiac Catheterization Operative Report  PATIENT IDENTIFICATION  Name: Panfilo Feliz  Age: 83 y.o. Sex: male : 1940  MRN: [id number]    Date: 2024  PCP: @PCP@    Requesting Provider  [unfilled]    He underwent cardiac catheterization.    Indications for the procedure include: Valvular heart disease, ascending aortic aneurysm.    Procedure Details:  The risks, benefits, complications, treatment options, and expected outcomes were discussed with the patient. The patient and/or family concurred with the proposed plan, giving informed consent.    After informed consent the patient was brought to the cath lab after appropriate IV hydration was begun and oral premedication was given. He was further sedated with  none . He was prepped and draped in the usual manner. Using the modified Seldinger access technique and a micropuncture kit, a 6 Irish sheath was placed in the femoral artery and a 7 Irish sheath was placed in the femoral vein.. A left and right heart catheterization with coronary arteriography was performed. Findings are discussed below.    After the procedure was completed, sedation was stopped and the sheaths and catheters were all removed. Hemostasis was achieved per established hospital protocols.    Conscious sedation:  Conscious sedation was performed according to protocol.  I supervised and directed an independent trained observer with the assistance of monitoring the patient's level of consciousness and physiologic status throughout the procedure.  Intraoperative service time was 0 minutes.    Findings:    Hemodynamics LVEDP: 13  LV: 133/2  Ao: 133/62  RA: 4  RV: 22/0  PA: 22/8  PAWP: 12  Cardiac output Amira: 7.48 (3.57)  Cardiac output thermal: 6.45 (3.08)  Left Main  No significant disease  RCA Luminal regularities with no significant stenosis  LAD Luminal regularities with no significant stenosis  Circ Luminal regularities with no significant stenosis  SVG(s) Not applicable  GEORGIE Not applicable  LV Normal left ventricular systolic function ejection fraction 65 to 70%  Coronary Dominance Circumflex    Estimated Blood Loss:  Minimal    Specimens: None    Complications:  None; patient tolerated the procedure well.    Disposition: PACU - hemodynamically stable    Condition: stable    Impressions:  No angiographic evidence for significant epicardial occlusive disease  Normal left ventricular systolic function ejection fraction 65 to 70%    Recommendations:  CT surgery evaluation currently underway for aortic valve and aortic aneurysm.     Results for orders placed during the hospital encounter of 03/18/24    Adult Transthoracic Echo Complete W/ Cont if Necessary Per Protocol    Interpretation Summary    Left ventricular systolic function is normal. Calculated left ventricular EF = 60% Left ventricular ejection fraction appears to be 56 - 60%.    Left ventricular wall thickness is consistent with mild concentric hypertrophy.    Left ventricular diastolic function is consistent with (grade I) impaired relaxation.    The left atrial cavity is mildly dilated.    There is mild calcification of the aortic valve mainly affecting the left coronary and right coronary cusp(s).    Estimated right ventricular systolic pressure from tricuspid regurgitation is normal (<35 mmHg).    Mild dilation of the aortic root is present. Severe dilation of the ascending aorta is present.    Ascending aortic aneurysm measuring 4.8 cm unchanged from prior echocardiogram     Lab Results   Component Value Date    GLUCOSE 135 (H) 05/11/2024    BUN 17 05/11/2024    CREATININE 0.90 05/11/2024    EGFR 85.3 05/11/2024    BCR 18.9 05/11/2024    K 4.2 05/11/2024    CO2 21.0 (L) 05/11/2024    CALCIUM 8.4 (L) 05/11/2024  "   PROTENTOTREF 6.8 01/16/2024    ALBUMIN 4.0 05/11/2024    BILITOT 0.2 05/09/2024    AST 19 05/09/2024    ALT 7 05/09/2024      Lab Results   Component Value Date    CHOL 195 04/17/2024    TRIG 101 04/17/2024    HDL 54 04/17/2024     (H) 04/17/2024      No results found for: \"CKTOTAL\", \"CKMB\", \"CKMBINDEX\", \"TROPONINI\", \"TROPONINT\"     Assessment & Plan       Ascending aortic aneurysm    Aneurysm of ascending aorta without rupture        Julio C Sanchez MD  05/11/24  11:55 EDT               "

## 2024-05-11 NOTE — NURSING NOTE
Urine output trending lower at 30 mL over the last 2 hours. Flat CVP 5, /45 (67), eugene gtt at 0.1. Dr. Puente states to increase pacemaker rate to 90 and give additional 250 mL albumin if necessary.

## 2024-05-11 NOTE — THERAPY EVALUATION
Patient Name: Panfilo Feliz  : 1940    MRN: [id number]                              Today's Date: 2024       Admit Date: 5/10/2024    Visit Dx:     ICD-10-CM ICD-9-CM   1. Aneurysm of ascending aorta without rupture  I71.21 441.2     Patient Active Problem List   Diagnosis    Leukopenia    Thrombocytopenia    Monoclonal gammopathy of unknown significance (MGUS)    Neuropathy associated with MGUS    Nausea in adult patient    Slow transit constipation    Nausea    DDD (degenerative disc disease), lumbar    Chronic neck pain    DJD (degenerative joint disease)    Extremity pain    HTN (hypertension)    Hyperlipidemia    Hypothyroid    LBP (low back pain)    Lumbar canal stenosis    Prostate cancer    Ascending aortic aneurysm    Nonrheumatic aortic valve insufficiency    Other dysphagia    Gastroesophageal reflux disease    Aneurysm of ascending aorta without rupture     Past Medical History:   Diagnosis Date    AAA (abdominal aortic aneurysm)     Abnormal ECG ’s    Alcoholism     None since     Aneurysm 2019    aortic arch    Arthritis     Basal cell carcinoma (BCC) of face     Cholelithiasis ’s    Cholecystectomy    Colon polyp     Colon polyps     Coronary artery disease     Difficulty walking     Disease of thyroid gland     DJD (degenerative joint disease)     Gastritis     Gastroparesis     GERD (gastroesophageal reflux disease)     GI (gastrointestinal bleed)     Hernia     Hiatal    History of bone marrow biopsy     [place] (hard of hearing)     Hyperlipidemia     Hypertension     Hypothyroidism     IgG monoclonal gammopathy     Multiple duodenal ulcers     Neuropathy     PONV (postoperative nausea and vomiting)     Prostate cancer     Reflux esophagitis     Ulcer     WBC decreased      Past Surgical History:   Procedure Laterality Date    CARDIAC CATHETERIZATION N/A 2024    Procedure: Right and Left Heart Cath with possible PCI;  Surgeon: Dilan Houston DO;  Location:   [facility];  Service: Cardiovascular;  Laterality: N/A;  Local and IV sedation    CHOLECYSTECTOMY  1988    COLON SURGERY  1998    COLONOSCOPY  02/2013    ENDOSCOPY  2012    ESOPHAGOGASTRODUODENOSCOPY WITH DILATION OF SHATZKI'S RING    ENDOSCOPY N/A 10/09/2020    Procedure: ESOPHAGOGASTRODUODENOSCOPY with cold bx;  Surgeon: Jake Perez MD;  Location:  [facility];  Service: Gastroenterology;  Laterality: N/A;  pre - nausea  post - duodenal ulcer, gastrits, gastric ulcer, hiatal hernia    ENDOSCOPY N/A 10/04/2022    Procedure: ESOPHAGOGASTRODUODENOSCOPY with biopsies with esophageal dilatation with 56 cui;  Surgeon: Jake Perez MD;  Location:  [facility];  Service: Gastroenterology;  Laterality: N/A;  pre-dysphagia  post-normal     ENDOSCOPY  10/04/2022    [facility]    EYE SURGERY Bilateral     cataracts    LAMINECTOMY  2013    decompression L3-4, L4-5    PROSTATECTOMY  2007    SKIN BIOPSY      TONSILLECTOMY AND ADENOIDECTOMY      1940s    UPPER GASTROINTESTINAL ENDOSCOPY      VASECTOMY      1970s      General Information       Row Name 05/11/24 1831          Physical Therapy Time and Intention    Document Type evaluation  -EL     Mode of Treatment individual therapy;physical therapy  -       Row Name 05/11/24 1831          General Information    Prior Level of Function independent:;all household mobility;ADL's  -       Row Name 05/11/24 1831          Living Environment    People in Home spouse  -       Row Name 05/11/24 1831          Cognition    Orientation Status (Cognition) oriented x 3;other (see comments)  stated 1974 for date  -       Row Name 05/11/24 1831          Safety Issues, Functional Mobility    Impairments Affecting Function (Mobility) balance;endurance/activity tolerance;other (see comments);strength  hypotensive  -EL               User Key  (r) = Recorded By, (t) = Taken By, (c) = Cosigned By      Initials Name Provider Type    Song Pichardo PT  Physical Therapist                   Mobility       Row Name 05/11/24 1833          Bed Mobility    Comment, (Bed Mobility) sitting in chair when PT entered  -EL       Row Name 05/11/24 1833          Sit-Stand Transfer    Sit-Stand [place] (Transfers) minimum assist (75% patient effort);moderate assist (50% patient effort);other (see comments)  improved to MIN A for CTS with cueing for forward lean.  -EL     Comment, (Sit-Stand Transfer) Limited to transfer this date due to hypotension.  -EL               User Key  (r) = Recorded By, (t) = Taken By, (c) = Cosigned By      Initials Name Provider Type    Song Pichardo, PT Physical Therapist                   Obj/Interventions       Row Name 05/11/24 1835          Range of Motion Comprehensive    General Range of Motion no range of motion deficits identified  -EL       Row Name 05/11/24 1835          Strength Comprehensive (MMT)    General Manual Muscle Testing (MMT) Assessment lower extremity strength deficits identified  -EL     Comment, General Manual Muscle Testing (MMT) Assessment BLE 4-/5 gross  -EL       Row Name 05/11/24 1835          Balance    Balance Assessment sitting static balance;standing dynamic balance;standing static balance  -EL     Static Sitting Balance contact guard  -EL     Static Standing Balance minimal assist  -EL     Dynamic Standing Balance minimal assist  -EL       Row Name 05/11/24 1835          Sensory Assessment (Somatosensory)    Sensory Assessment (Somatosensory) other (see comments)  severe neuropathy in BLE  -EL               User Key  (r) = Recorded By, (t) = Taken By, (c) = Cosigned By      Initials Name Provider Type    Song Pichardo, PT Physical Therapist                   Goals/Plan       Row Name 05/11/24 1844          Bed Mobility Goal 1 (PT)    Activity/Assistive Device (Bed Mobility Goal 1, PT) bed mobility activities, all  -EL     [place] Level/Cues Needed (Bed Mobility Goal 1, PT) modified independence  -EL      Time Frame (Bed Mobility Goal 1, PT) long term goal (LTG);2 weeks  -EL       Row Name 05/11/24 1844          Transfer Goal 1 (PT)    Activity/Assistive Device (Transfer Goal 1, PT) transfers, all;walker, rolling  -EL     [place] Level/Cues Needed (Transfer Goal 1, PT) modified independence  -EL     Time Frame (Transfer Goal 1, PT) long term goal (LTG);2 weeks  -EL       Row Name 05/11/24 1844          Gait Training Goal 1 (PT)    Activity/Assistive Device (Gait Training Goal 1, PT) gait (walking locomotion);walker, rolling  -EL     [place] Level (Gait Training Goal 1, PT) modified independence  -EL     Distance (Gait Training Goal 1, PT) 250  -EL     Time Frame (Gait Training Goal 1, PT) long term goal (LTG);2 weeks  -EL       Row Name 05/11/24 1844          Therapy Assessment/Plan (PT)    Planned Therapy Interventions (PT) neuromuscular re-education;balance training;bed mobility training;transfer training;gait training;patient/family education;strengthening  -EL               User Key  (r) = Recorded By, (t) = Taken By, (c) = Cosigned By      Initials Name Provider Type    Song Pichardo, PT Physical Therapist                   Clinical Impression       Row Name 05/11/24 1837          Pain    Pretreatment Pain Rating 6/10  -EL     Posttreatment Pain Rating 6/10  -EL     Pain Location - chest  -EL       Row Name 05/11/24 1837          Plan of Care Review    Plan of Care Reviewed With patient  -EL     Outcome Evaluation Pt is an 82 YO M POD 1 aortic arch replacement. Pt reports living at home with spouse, typically is independent with all ADLs, ambulating without AD and reports no recent falls. Pt c/o severe neuropathy in BLE which does impact his balance. Pt this date educated on sternal precautions and verbalizes understanding. Pt requiring MOD A initially to come to standing from chair (improved to MIN A with cueing), but limited to standing this date due to hypotension. In sitting BP was 115/59 and in  standing BP was 84/49 (54). Nursing present and aware, BP recovered in reclined seated position. PT to continue to follow while admitted, but pt may requiring IP rheab following d/c to return to independent baseline.  -EL       Row Name 05/11/24 1837          Therapy Assessment/Plan (PT)    Rehab Potential (PT) good, to achieve stated therapy goals  -EL     Criteria for Skilled Interventions Met (PT) yes;meets criteria  -EL     Therapy Frequency (PT) 5 times/wk  -EL     Predicted Duration of Therapy Intervention (PT) Until d/c  -EL       Row Name 05/11/24 1837          Vital Signs    Pre SpO2 (%) 94  -EL     O2 Delivery Pre Treatment supplemental O2  -EL     Intra SpO2 (%) 94  -EL     O2 Delivery Intra Treatment supplemental O2  -EL     Post SpO2 (%) 95  -EL     O2 Delivery Post Treatment supplemental O2  -EL     Pre Patient Position Sitting  -EL     Intra Patient Position Standing  -EL     Post Patient Position Sitting  -EL       Row Name 05/11/24 1837          Positioning and Restraints    Pre-Treatment Position sitting in chair/recliner  -EL     Post Treatment Position chair  -EL     In Chair notified nsg;reclined;exit alarm on;encouraged to call for assist;call light within reach  -EL               User Key  (r) = Recorded By, (t) = Taken By, (c) = Cosigned By      Initials Name Provider Type    Song Pichardo, PT Physical Therapist                   Outcome Measures       Row Name 05/11/24 1844          How much help from another person do you currently need...    Turning from your back to your side while in flat bed without using bedrails? 3  -EL     Moving from lying on back to sitting on the side of a flat bed without bedrails? 2  -EL     Moving to and from a bed to a chair (including a wheelchair)? 3  -EL     Standing up from a chair using your arms (e.g., wheelchair, bedside chair)? 2  -EL     Climbing 3-5 steps with a railing? 1  -EL     To walk in hospital room? 1  -EL     AM-PAC 6 Clicks Score (PT) 12   -     Highest Level of Mobility Goal 4 --> Transfer to chair/commode  -EL       Row Name 05/11/24 1844          Functional Assessment    Outcome Measure Options AM-PAC 6 Clicks Basic Mobility (PT)  -               User Key  (r) = Recorded By, (t) = Taken By, (c) = Cosigned By      Initials Name Provider Type    EL Song Sweeney PT Physical Therapist                                 Physical Therapy Education       Title: PT OT SLP Therapies (Done)       Topic: Physical Therapy (Done)       Point: Mobility training (Done)       Learning Progress Summary             Patient Acceptance, E,TB, VU by EL at 5/11/2024 1845                         Point: Precautions (Done)       Learning Progress Summary             Patient Acceptance, E,TB, VU by EL at 5/11/2024 1845                                         User Key       Initials Effective Dates Name Provider Type Discipline     06/23/20 -  Song Sweeney PT Physical Therapist PT                  PT Recommendation and Plan  Planned Therapy Interventions (PT): neuromuscular re-education, balance training, bed mobility training, transfer training, gait training, patient/family education, strengthening  Plan of Care Reviewed With: patient  Outcome Evaluation: Pt is an 84 YO M POD 1 aortic arch replacement. Pt reports living at home with spouse, typically is independent with all ADLs, ambulating without AD and reports no recent falls. Pt c/o severe neuropathy in BLE which does impact his balance. Pt this date educated on sternal precautions and verbalizes understanding. Pt requiring MOD A initially to come to standing from chair (improved to MIN A with cueing), but limited to standing this date due to hypotension. In sitting BP was 115/59 and in standing BP was 84/49 (54). Nursing present and aware, BP recovered in reclined seated position. PT to continue to follow while admitted, but pt may requiring IP rheab following d/c to return to independent baseline.     Time  Calculation:   PT Evaluation Complexity  History, PT Evaluation Complexity: 1-2 personal factors and/or comorbidities  Examination of Body Systems (PT Eval Complexity): total of 3 or more elements  Clinical Presentation (PT Evaluation Complexity): evolving  Clinical Decision Making (PT Evaluation Complexity): moderate complexity  Overall Complexity (PT Evaluation Complexity): moderate complexity     PT Charges       Row Name 05/11/24 1845             Time Calculation    Start Time 1115  -EL      Stop Time 1136  -EL      Time Calculation (min) 21 min  -EL      PT Received On 05/11/24  -EL      PT - Next Appointment 05/13/24  -EL      PT Goal Re-Cert Due Date 05/25/24  -EL                User Key  (r) = Recorded By, (t) = Taken By, (c) = Cosigned By      Initials Name Provider Type    Song Pichardo PT Physical Therapist                  Therapy Charges for Today       Code Description Service Date Service Provider Modifiers Qty    85922792914 HC PT EVAL MOD COMPLEXITY 4 5/11/2024 Song Sweeney PT GP 1            PT G-Codes  Outcome Measure Options: AM-PAC 6 Clicks Basic Mobility (PT)  AM-PAC 6 Clicks Score (PT): 12  PT Discharge Summary  Anticipated Discharge Disposition (PT): inpatient rehabilitation facility    Song Sweeney PT  5/11/2024

## 2024-05-11 NOTE — PLAN OF CARE
Goal Outcome Evaluation:  Plan of Care Reviewed With: patient         Pt sat at bedside at 0000 with assistance of 3 staff, unable to stand r/t nausea, hypotension and weakness. Assisted back to bed. Pt dangled at bedside at 0545 this am with assistance of 3 staff and gait belt, pt unable to remain standing r/t BP 63/30, severe weakness and nausea. Pt recovered in sitting position with hiro gtt initiated. A second attempt to stand and pivot to chair was made, vitals remained more consistent without becoming as hypotensive, however pt could not place feet flat on the floor and continued to lean back onto heels without fully supporting his weight. Pt unable to safely transfer in this manner. Assisted back to bed. Hiro gtt off, /47 (70), HR 74 NSR, 99% on 2 L O2 NC.     Wife, Kelin reported to RN during previous conversation pt has been immobile at home, using urinal in chair over the last month rather than ambulate to bathroom. Pt reports severe neuropathy in feet bilaterally. Unable  to obtain standing weight. Unable to obtain bed weight r/t maintenance issue, bed to be replaced this am.

## 2024-05-11 NOTE — PROGRESS NOTES
POD #1 replacement of the ascending aorta with a graft  No major complaints  Vitals are stable  Net fluid balance +1541 cc  Labs noted  Clear bilaterally, cor is regular, dressings are intact and feet are warm  Overall he is doing well, he had an episode of postural hypotension, the CVP is 4, I will give fluids and start midodrine, increase activity and hold diuretics for now

## 2024-05-11 NOTE — NURSING NOTE
Bedside report completed. New bed obtained, pt transferred successfully with no issues. Bed weight obtained and documented.

## 2024-05-11 NOTE — PLAN OF CARE
Goal Outcome Evaluation:  Plan of Care Reviewed With: patient           Outcome Evaluation: Pt is an 84 YO M POD 1 aortic arch replacement. Pt reports living at home with spouse, typically is independent with all ADLs, ambulating without AD and reports no recent falls. Pt c/o severe neuropathy in BLE which does impact his balance. Pt this date educated on sternal precautions and verbalizes understanding. Pt requiring MOD A initially to come to standing from chair (improved to MIN A with cueing), but limited to standing this date due to hypotension. In sitting BP was 115/59 and in standing BP was 84/49 (54). Nursing present and aware, BP recovered in reclined seated position. PT to continue to follow while admitted, but pt may requiring IP rheab following d/c to return to independent baseline.      Anticipated Discharge Disposition (PT): inpatient rehabilitation facility

## 2024-05-12 ENCOUNTER — APPOINTMENT (OUTPATIENT)
Dept: GENERAL RADIOLOGY | Facility: HOSPITAL | Age: 84
DRG: 220 | End: 2024-05-12
Payer: MEDICARE

## 2024-05-12 LAB
ANION GAP SERPL CALCULATED.3IONS-SCNC: 7 MMOL/L (ref 5–15)
BUN SERPL-MCNC: 20 MG/DL (ref 8–23)
BUN/CREAT SERPL: 22 (ref 7–25)
CALCIUM SPEC-SCNC: 8.4 MG/DL (ref 8.6–10.5)
CHLORIDE SERPL-SCNC: 110 MMOL/L (ref 98–107)
CO2 SERPL-SCNC: 24 MMOL/L (ref 22–29)
CREAT SERPL-MCNC: 0.91 MG/DL (ref 0.76–1.27)
DEPRECATED RDW RBC AUTO: 48.5 FL (ref 37–54)
EGFRCR SERPLBLD CKD-EPI 2021: 84.1 ML/MIN/1.73
ERYTHROCYTE [DISTWIDTH] IN BLOOD BY AUTOMATED COUNT: 14.2 % (ref 12.3–15.4)
GLUCOSE BLDC GLUCOMTR-MCNC: 118 MG/DL (ref 70–105)
GLUCOSE BLDC GLUCOMTR-MCNC: 125 MG/DL (ref 70–105)
GLUCOSE BLDC GLUCOMTR-MCNC: 126 MG/DL (ref 70–105)
GLUCOSE BLDC GLUCOMTR-MCNC: 127 MG/DL (ref 70–105)
GLUCOSE BLDC GLUCOMTR-MCNC: 129 MG/DL (ref 70–105)
GLUCOSE BLDC GLUCOMTR-MCNC: 134 MG/DL (ref 70–105)
GLUCOSE BLDC GLUCOMTR-MCNC: 150 MG/DL (ref 70–105)
GLUCOSE BLDC GLUCOMTR-MCNC: 163 MG/DL (ref 70–105)
GLUCOSE SERPL-MCNC: 123 MG/DL (ref 65–99)
HCT VFR BLD AUTO: 32.2 % (ref 37.5–51)
HGB BLD-MCNC: 10.1 G/DL (ref 13–17.7)
MCH RBC QN AUTO: 29 PG (ref 26.6–33)
MCHC RBC AUTO-ENTMCNC: 31.4 G/DL (ref 31.5–35.7)
MCV RBC AUTO: 92.5 FL (ref 79–97)
PLATELET # BLD AUTO: 104 10*3/MM3 (ref 140–450)
PMV BLD AUTO: 11.4 FL (ref 6–12)
POTASSIUM SERPL-SCNC: 4.2 MMOL/L (ref 3.5–5.2)
QT INTERVAL: 331 MS
QT INTERVAL: 381 MS
QTC INTERVAL: 449 MS
QTC INTERVAL: 488 MS
RBC # BLD AUTO: 3.48 10*6/MM3 (ref 4.14–5.8)
SODIUM SERPL-SCNC: 141 MMOL/L (ref 136–145)
WBC NRBC COR # BLD AUTO: 7.72 10*3/MM3 (ref 3.4–10.8)

## 2024-05-12 PROCEDURE — 25010000002 MORPHINE PER 10 MG: Performed by: NURSE PRACTITIONER

## 2024-05-12 PROCEDURE — 71045 X-RAY EXAM CHEST 1 VIEW: CPT

## 2024-05-12 PROCEDURE — 25010000002 ENOXAPARIN PER 10 MG: Performed by: THORACIC SURGERY (CARDIOTHORACIC VASCULAR SURGERY)

## 2024-05-12 PROCEDURE — 85027 COMPLETE CBC AUTOMATED: CPT | Performed by: NURSE PRACTITIONER

## 2024-05-12 PROCEDURE — 93010 ELECTROCARDIOGRAM REPORT: CPT | Performed by: INTERNAL MEDICINE

## 2024-05-12 PROCEDURE — 99024 POSTOP FOLLOW-UP VISIT: CPT | Performed by: THORACIC SURGERY (CARDIOTHORACIC VASCULAR SURGERY)

## 2024-05-12 PROCEDURE — 97167 OT EVAL HIGH COMPLEX 60 MIN: CPT

## 2024-05-12 PROCEDURE — 25010000002 METOCLOPRAMIDE PER 10 MG: Performed by: THORACIC SURGERY (CARDIOTHORACIC VASCULAR SURGERY)

## 2024-05-12 PROCEDURE — 25010000002 ONDANSETRON PER 1 MG: Performed by: NURSE PRACTITIONER

## 2024-05-12 PROCEDURE — 80048 BASIC METABOLIC PNL TOTAL CA: CPT | Performed by: NURSE PRACTITIONER

## 2024-05-12 PROCEDURE — 93005 ELECTROCARDIOGRAM TRACING: CPT | Performed by: THORACIC SURGERY (CARDIOTHORACIC VASCULAR SURGERY)

## 2024-05-12 PROCEDURE — 97530 THERAPEUTIC ACTIVITIES: CPT

## 2024-05-12 PROCEDURE — 25010000002 AMIODARONE IN DEXTROSE 5% 360-4.14 MG/200ML-% SOLUTION: Performed by: THORACIC SURGERY (CARDIOTHORACIC VASCULAR SURGERY)

## 2024-05-12 PROCEDURE — 25010000002 BUMETANIDE PER 0.5 MG: Performed by: THORACIC SURGERY (CARDIOTHORACIC VASCULAR SURGERY)

## 2024-05-12 PROCEDURE — 82948 REAGENT STRIP/BLOOD GLUCOSE: CPT

## 2024-05-12 PROCEDURE — 25010000002 MAGNESIUM SULFATE 2 GM/50ML SOLUTION: Performed by: THORACIC SURGERY (CARDIOTHORACIC VASCULAR SURGERY)

## 2024-05-12 PROCEDURE — 99233 SBSQ HOSP IP/OBS HIGH 50: CPT | Performed by: INTERNAL MEDICINE

## 2024-05-12 PROCEDURE — 25010000002 AMIODARONE IN DEXTROSE 5% 150-4.21 MG/100ML-% SOLUTION: Performed by: THORACIC SURGERY (CARDIOTHORACIC VASCULAR SURGERY)

## 2024-05-12 PROCEDURE — 93005 ELECTROCARDIOGRAM TRACING: CPT | Performed by: NURSE PRACTITIONER

## 2024-05-12 RX ORDER — NICOTINE POLACRILEX 4 MG
15 LOZENGE BUCCAL
Status: DISCONTINUED | OUTPATIENT
Start: 2024-05-13 | End: 2024-05-16 | Stop reason: HOSPADM

## 2024-05-12 RX ORDER — MAGNESIUM OXIDE 400 MG/1
400 TABLET ORAL DAILY
COMMUNITY
Start: 2024-01-01

## 2024-05-12 RX ORDER — BUMETANIDE 0.25 MG/ML
1 INJECTION INTRAMUSCULAR; INTRAVENOUS ONCE
Status: COMPLETED | OUTPATIENT
Start: 2024-05-12 | End: 2024-05-12

## 2024-05-12 RX ORDER — ESCITALOPRAM OXALATE 20 MG/1
1 TABLET ORAL DAILY
COMMUNITY
Start: 2024-04-26

## 2024-05-12 RX ORDER — PANTOPRAZOLE SODIUM 40 MG/1
1 TABLET, DELAYED RELEASE ORAL 2 TIMES DAILY
COMMUNITY
Start: 2024-01-01

## 2024-05-12 RX ORDER — DEXTROSE MONOHYDRATE 25 G/50ML
25 INJECTION, SOLUTION INTRAVENOUS
Status: DISCONTINUED | OUTPATIENT
Start: 2024-05-13 | End: 2024-05-16 | Stop reason: HOSPADM

## 2024-05-12 RX ORDER — MAGNESIUM SULFATE HEPTAHYDRATE 40 MG/ML
2 INJECTION, SOLUTION INTRAVENOUS ONCE
Status: COMPLETED | OUTPATIENT
Start: 2024-05-12 | End: 2024-05-12

## 2024-05-12 RX ORDER — IBUPROFEN 600 MG/1
1 TABLET ORAL
Status: DISCONTINUED | OUTPATIENT
Start: 2024-05-13 | End: 2024-05-16 | Stop reason: HOSPADM

## 2024-05-12 RX ORDER — INSULIN LISPRO 100 [IU]/ML
2-7 INJECTION, SOLUTION INTRAVENOUS; SUBCUTANEOUS
Status: DISCONTINUED | OUTPATIENT
Start: 2024-05-13 | End: 2024-05-16 | Stop reason: HOSPADM

## 2024-05-12 RX ADMIN — ENOXAPARIN SODIUM 40 MG: 100 INJECTION SUBCUTANEOUS at 15:37

## 2024-05-12 RX ADMIN — POLYETHYLENE GLYCOL 3350 17 G: 17 POWDER, FOR SOLUTION ORAL at 20:49

## 2024-05-12 RX ADMIN — CHLORHEXIDINE GLUCONATE, 0.12% ORAL RINSE 15 ML: 1.2 SOLUTION DENTAL at 08:02

## 2024-05-12 RX ADMIN — POLYETHYLENE GLYCOL 3350 17 G: 17 POWDER, FOR SOLUTION ORAL at 10:14

## 2024-05-12 RX ADMIN — ONDANSETRON 4 MG: 2 INJECTION INTRAMUSCULAR; INTRAVENOUS at 21:40

## 2024-05-12 RX ADMIN — AMIODARONE HYDROCHLORIDE 0.5 MG/MIN: 1.8 INJECTION, SOLUTION INTRAVENOUS at 23:27

## 2024-05-12 RX ADMIN — SENNOSIDES AND DOCUSATE SODIUM 2 TABLET: 50; 8.6 TABLET ORAL at 10:14

## 2024-05-12 RX ADMIN — ASPIRIN 81 MG: 81 TABLET, COATED ORAL at 08:02

## 2024-05-12 RX ADMIN — METOCLOPRAMIDE 10 MG: 5 INJECTION, SOLUTION INTRAMUSCULAR; INTRAVENOUS at 08:31

## 2024-05-12 RX ADMIN — MAGNESIUM SULFATE HEPTAHYDRATE 2 G: 40 INJECTION, SOLUTION INTRAVENOUS at 08:18

## 2024-05-12 RX ADMIN — PREGABALIN 50 MG: 25 CAPSULE ORAL at 08:01

## 2024-05-12 RX ADMIN — BUMETANIDE 1 MG: 0.25 INJECTION INTRAMUSCULAR; INTRAVENOUS at 08:31

## 2024-05-12 RX ADMIN — MORPHINE SULFATE 2 MG: 2 INJECTION, SOLUTION INTRAMUSCULAR; INTRAVENOUS at 10:14

## 2024-05-12 RX ADMIN — AMIODARONE HYDROCHLORIDE 150 MG: 1.5 INJECTION, SOLUTION INTRAVENOUS at 08:24

## 2024-05-12 RX ADMIN — ATORVASTATIN CALCIUM 40 MG: 40 TABLET, FILM COATED ORAL at 20:49

## 2024-05-12 RX ADMIN — METOCLOPRAMIDE 10 MG: 5 INJECTION, SOLUTION INTRAMUSCULAR; INTRAVENOUS at 03:30

## 2024-05-12 RX ADMIN — ACETAMINOPHEN 650 MG: 325 TABLET, FILM COATED ORAL at 15:49

## 2024-05-12 RX ADMIN — MIDODRINE HYDROCHLORIDE 10 MG: 5 TABLET ORAL at 12:49

## 2024-05-12 RX ADMIN — CHLORHEXIDINE GLUCONATE, 0.12% ORAL RINSE 15 ML: 1.2 SOLUTION DENTAL at 20:49

## 2024-05-12 RX ADMIN — ESCITALOPRAM OXALATE 20 MG: 10 TABLET ORAL at 08:02

## 2024-05-12 RX ADMIN — ONDANSETRON 4 MG: 2 INJECTION INTRAMUSCULAR; INTRAVENOUS at 04:15

## 2024-05-12 RX ADMIN — ESCITALOPRAM OXALATE 10 MG: 10 TABLET ORAL at 08:02

## 2024-05-12 RX ADMIN — METOCLOPRAMIDE 10 MG: 5 INJECTION, SOLUTION INTRAMUSCULAR; INTRAVENOUS at 15:37

## 2024-05-12 RX ADMIN — SENNOSIDES AND DOCUSATE SODIUM 2 TABLET: 50; 8.6 TABLET ORAL at 20:49

## 2024-05-12 RX ADMIN — ONDANSETRON 4 MG: 2 INJECTION INTRAMUSCULAR; INTRAVENOUS at 10:19

## 2024-05-12 RX ADMIN — PREGABALIN 50 MG: 25 CAPSULE ORAL at 20:49

## 2024-05-12 RX ADMIN — MUPIROCIN 1 APPLICATION: 20 OINTMENT TOPICAL at 08:02

## 2024-05-12 RX ADMIN — PANTOPRAZOLE SODIUM 40 MG: 40 TABLET, DELAYED RELEASE ORAL at 08:31

## 2024-05-12 RX ADMIN — HYDROCODONE BITARTRATE AND ACETAMINOPHEN 1 TABLET: 5; 325 TABLET ORAL at 03:30

## 2024-05-12 RX ADMIN — METOCLOPRAMIDE 10 MG: 5 INJECTION, SOLUTION INTRAMUSCULAR; INTRAVENOUS at 20:49

## 2024-05-12 RX ADMIN — AMIODARONE HYDROCHLORIDE 1 MG/MIN: 1.8 INJECTION, SOLUTION INTRAVENOUS at 06:21

## 2024-05-12 RX ADMIN — LEVOTHYROXINE SODIUM 100 MCG: 0.1 TABLET ORAL at 05:32

## 2024-05-12 RX ADMIN — MUPIROCIN 1 APPLICATION: 20 OINTMENT TOPICAL at 20:49

## 2024-05-12 RX ADMIN — AMIODARONE HYDROCHLORIDE 0.5 MG/MIN: 1.8 INJECTION, SOLUTION INTRAVENOUS at 13:38

## 2024-05-12 RX ADMIN — AMIODARONE HYDROCHLORIDE 150 MG: 1.5 INJECTION, SOLUTION INTRAVENOUS at 06:21

## 2024-05-12 RX ADMIN — MIDODRINE HYDROCHLORIDE 10 MG: 5 TABLET ORAL at 17:52

## 2024-05-12 RX ADMIN — MIDODRINE HYDROCHLORIDE 10 MG: 5 TABLET ORAL at 08:01

## 2024-05-12 NOTE — PROGRESS NOTES
POD #2 ascending aortic replacement and root repair  Vitals are stable, A-fib with rapid response  Net fluid balance +3135  Urine output low, 660 cc in 24 hours and 160 cc in chest tube  Labs noted  Chest x-ray with mild congestion  Lungs are clear bilateral, cor is regular, dressings are intact  Slow progress, patient has been vasodilated requiring large amount of volume but his central venous pressure has been very low.  I will give diuretics at this point and discontinue the IV fluids.  I will give another bolus of amiodarone for his atrial fibrillation.  His left atrial appendage has been close so no anticoagulation at this point.  The chest tube will be removed.

## 2024-05-12 NOTE — PLAN OF CARE
Goal Outcome Evaluation:  Plan of Care Reviewed With: patient        Progress: improving  Outcome Evaluation: 84 YO M POD 2 aortic arch replacement. Pt reports living at home with spouse, typically is independent with all ADLs, ambulating without AD and reports no recent falls. Pt c/o severe neuropathy in BLE which does impact his balance. Pt is the one in the household doing laundry because facilities are in the basementr and sposue does not go down flights of steps in years. Pt is mildly confused and reports he has had bad reactions to anesthesia in the past. Pt needs a lot of redirectino to attend to his sternal precautions. ST memory deficit noted. Overlal pt needed mod (A) with mobility and walked 50'. His ADL skill is decreased but this should improve as lines are removed. Pt appears to be likely in need of ST IP rehab prior to home with spouse. Pt is agreeable to this plan and very pleasant.      Anticipated Discharge Disposition (OT): skilled nursing facility

## 2024-05-12 NOTE — THERAPY EVALUATION
Patient Name: Panfilo Feliz  : 1940    MRN: [id number]                              Today's Date: 2024       Admit Date: 5/10/2024    Visit Dx:     ICD-10-CM ICD-9-CM   1. Aneurysm of ascending aorta without rupture  I71.21 441.2     Patient Active Problem List   Diagnosis    Leukopenia    Thrombocytopenia    Monoclonal gammopathy of unknown significance (MGUS)    Neuropathy associated with MGUS    Nausea in adult patient    Slow transit constipation    Nausea    DDD (degenerative disc disease), lumbar    Chronic neck pain    DJD (degenerative joint disease)    Extremity pain    HTN (hypertension)    Hyperlipidemia    Hypothyroid    LBP (low back pain)    Lumbar canal stenosis    Prostate cancer    Ascending aortic aneurysm    Nonrheumatic aortic valve insufficiency    Other dysphagia    Gastroesophageal reflux disease    Aneurysm of ascending aorta without rupture     Past Medical History:   Diagnosis Date    AAA (abdominal aortic aneurysm)     Abnormal ECG ’s    Alcoholism     None since     Aneurysm 2019    aortic arch    Arthritis     Basal cell carcinoma (BCC) of face     Cholelithiasis ’s    Cholecystectomy    Colon polyp     Colon polyps     Coronary artery disease     Difficulty walking     Disease of thyroid gland     DJD (degenerative joint disease)     Gastritis     Gastroparesis     GERD (gastroesophageal reflux disease)     GI (gastrointestinal bleed)     Hernia     Hiatal    History of bone marrow biopsy     [place] (hard of hearing)     Hyperlipidemia     Hypertension     Hypothyroidism     IgG monoclonal gammopathy     Multiple duodenal ulcers     Neuropathy     PONV (postoperative nausea and vomiting)     Prostate cancer     Reflux esophagitis     Ulcer     WBC decreased      Past Surgical History:   Procedure Laterality Date    CARDIAC CATHETERIZATION N/A 2024    Procedure: Right and Left Heart Cath with possible PCI;  Surgeon: Dilan Houston DO;  Location:   [facility];  Service: Cardiovascular;  Laterality: N/A;  Local and IV sedation    CHOLECYSTECTOMY  1988    COLON SURGERY  1998    COLONOSCOPY  02/2013    ENDOSCOPY  2012    ESOPHAGOGASTRODUODENOSCOPY WITH DILATION OF SHATZKI'S RING    ENDOSCOPY N/A 10/09/2020    Procedure: ESOPHAGOGASTRODUODENOSCOPY with cold bx;  Surgeon: Jake Perez MD;  Location:  [facility];  Service: Gastroenterology;  Laterality: N/A;  pre - nausea  post - duodenal ulcer, gastrits, gastric ulcer, hiatal hernia    ENDOSCOPY N/A 10/04/2022    Procedure: ESOPHAGOGASTRODUODENOSCOPY with biopsies with esophageal dilatation with 56 cui;  Surgeon: Jake Perez MD;  Location:  [facility];  Service: Gastroenterology;  Laterality: N/A;  pre-dysphagia  post-normal     ENDOSCOPY  10/04/2022    [facility]    EYE SURGERY Bilateral     cataracts    LAMINECTOMY  2013    decompression L3-4, L4-5    PROSTATECTOMY  2007    SKIN BIOPSY      TONSILLECTOMY AND ADENOIDECTOMY      1940s    UPPER GASTROINTESTINAL ENDOSCOPY      VASECTOMY      1970s      General Information       Row Name 05/12/24 1305          General Information    Prior Level of Function independent:;all household mobility;ADL's;driving  pt does the laundry because it's in the basement and his spouse does not do stairs. She does cooking, cleaning on the main level.  -     Existing Precautions/Restrictions fall;cardiac;sternal;other (see comments)  aortic arch repair, keep BP closely in check.  -     Barriers to Rehab medically complex  -       Row Name 05/12/24 1307          Living Environment    People in Home spouse  she has mobility issues; she is mod (I), but unable to provide physical assist to pt.  -       Row Name 05/12/24 1308          Home Main Entrance    Number of Stairs, Main Entrance three  -     Stair Railings, Main Entrance railings safe and in good condition;railing on left side (ascending)  -       Row Name 05/12/24 2694           "Stairs Within Home, Primary    Number of Stairs, Within Home, Primary twelve  -     Stair Railings, Within Home, Primary railings safe and in good condition;railing on right side (ascending)  -       Row Name 05/12/24 1305          Cognition    Orientation Status (Cognition) oriented to;person;place;situation;disoriented to;time  \"1964\"  -       Row Name 05/12/24 1305          Safety Issues, Functional Mobility    Safety Issues Affecting Function (Mobility) problem-solving;sequencing abilities;insight into deficits/self-awareness;at risk behavior observed;awareness of need for assistance  -     Impairments Affecting Function (Mobility) balance;endurance/activity tolerance;postural/trunk control;strength  -     Comment, Safety Issues/Impairments (Mobility) posterior lean EOB, max cues to attend to sternal precautions, narrow [place], peripheral neuropathy.  -               User Key  (r) = Recorded By, (t) = Taken By, (c) = Cosigned By      Initials Name Provider Type     Abby Florian OT Occupational Therapist                     Mobility/ADL's       Row Name 05/12/24 1307          Bed Mobility    Bed Mobility supine-sit  -     Supine-Sit [place] (Bed Mobility) moderate assist (50% patient effort)  -     Bed Mobility, Safety Issues cognitive deficits limit understanding;decreased use of arms for pushing/pulling;impaired trunk control for bed mobility  -     Assistive Device (Bed Mobility) bed rails;head of bed elevated;draw sheet  -       Row Name 05/12/24 1309          Transfers    Transfers sit-stand transfer;stand-sit transfer  -       Row Name 05/12/24 1309          Sit-Stand Transfer    Sit-Stand [place] (Transfers) verbal cues;minimum assist (75% patient effort)  -     Assistive Device (Sit-Stand Transfers) walker, front-wheeled  -       Row Name 05/12/24 1309          Stand-Sit Transfer    Stand-Sit [place] (Transfers) verbal cues;nonverbal cues (demo/gesture);contact " guard  -MH       Row Name 05/12/24 1309          Functional Mobility    Functional Mobility- Ind. Level minimum assist (75% patient effort);1 person + 1 person to manage equipment  -     Functional Mobility- Device walker, front-wheeled  -     Functional Mobility-Distance (Feet) 50  -     Functional Mobility- Safety Issues balance decreased during turns;step length decreased;weight-shifting ability decreased  -MH       Row Name 05/12/24 1309          Activities of Daily Living    BADL Assessment/Intervention lower body dressing;toileting;feeding  -MH       Row Name 05/12/24 1309          Lower Body Dressing Assessment/Training    [place] Level (Lower Body Dressing) don;socks;dependent (less than 25% patient effort)  -MH       Row Name 05/12/24 1309          Toileting Assessment/Training    [place] Level (Toileting) toileting skills;dependent (less than 25% patient effort)  -     Comment, (Toileting) mcgarry  -MH       Row Name 05/12/24 1309          Self-Feeding Assessment/Training    [place] Level (Feeding) feeding skills;modified independence  -     Position (Self-Feeding) supported sitting  -               User Key  (r) = Recorded By, (t) = Taken By, (c) = Cosigned By      Initials Name Provider Type     Abby Florian, OT Occupational Therapist                   Obj/Interventions       Row Name 05/12/24 1330          Sensory Assessment (Somatosensory)    Sensory Subjective Reports numbness  -     Sensory Assessment B feet, neuropathy. Had difficulty keeping adequate stance with feet apart while walking.  -MH       Row Name 05/12/24 1330          Vision Assessment/Intervention    Visual Impairment/Limitations WFL  -MH       Row Name 05/12/24 1330          Range of Motion Comprehensive    General Range of Motion no range of motion deficits identified  -MH       Row Name 05/12/24 1330          Strength Comprehensive (MMT)    Comment, General Manual Muscle Testing (MMT) Assessment global  weakness, mild, restrited lifting BUE to 10 lbs.  -               User Key  (r) = Recorded By, (t) = Taken By, (c) = Cosigned By      Initials Name Provider Type     Abby Florian OT Occupational Therapist                   Goals/Plan       Row Name 05/12/24 1336          Transfer Goal 1 (OT)    Activity/Assistive Device (Transfer Goal 1, OT) transfers, all;walker, rolling  -     [place] Level/Cues Needed (Transfer Goal 1, OT) supervision required  -     Time Frame (Transfer Goal 1, OT) 2 weeks  -       Row Name 05/12/24 1336          Bathing Goal 1 (OT)    Activity/Device (Bathing Goal 1, OT) bathing skills, all  -     [place] Level/Cues Needed (Bathing Goal 1, OT) supervision required  -     Time Frame (Bathing Goal 1, OT) 2 weeks  -MH       Row Name 05/12/24 1336          Dressing Goal 1 (OT)    Activity/Device (Dressing Goal 1, OT) dressing skills, all  -     [place]/Cues Needed (Dressing Goal 1, OT) minimum assist (75% or more patient effort)  -     Time Frame (Dressing Goal 1, OT) 2 weeks  -       Row Name 05/12/24 1336          Therapy Assessment/Plan (OT)    Planned Therapy Interventions (OT) activity tolerance training;adaptive equipment training;BADL retraining;cognitive/visual perception retraining;occupation/activity based interventions;patient/caregiver education/training;transfer/mobility retraining  -               User Key  (r) = Recorded By, (t) = Taken By, (c) = Cosigned By      Initials Name Provider Type    Abby Benson OT Occupational Therapist                   Clinical Impression       Row Name 05/12/24 1332          Pain Assessment    Pretreatment Pain Rating 2/10  -     Posttreatment Pain Rating 2/10  -     Pain Location incisional  -[facility] Name 05/12/24 1332          Plan of Care Review    Plan of Care Reviewed With patient  -     Progress improving  -     Outcome Evaluation 84 YO M POD 2 aortic arch replacement. Pt reports living  at home with spouse, typically is independent with all ADLs, ambulating without AD and reports no recent falls. Pt c/o severe neuropathy in BLE which does impact his balance. Pt is the one in the household doing laundry because facilities are in the basementr and sposue does not go down flights of steps in years. Pt is mildly confused and reports he has had bad reactions to anesthesia in the past. Pt needs a lot of redirectino to attend to his sternal precautions. ST memory deficit noted. Overlal pt needed mod (A) with mobility and walked 50'. His ADL skill is decreased but this should improve as lines are removed. Pt appears to be likely in need of ST IP rehab prior to home with spouse. Pt is agreeable to this plan and very pleasant.  -       Row Name 05/12/24 6572          Therapy Assessment/Plan (OT)    Rehab Potential (OT) good, to achieve stated therapy goals  -     Criteria for Skilled Therapeutic Interventions Met (OT) skilled treatment is necessary  -     Therapy Frequency (OT) 5 times/wk  -     Predicted Duration of Therapy Intervention (OT) until d/c  -       Row Name 05/12/24 1332          Therapy Plan Review/Discharge Plan (OT)    Anticipated Discharge Disposition (OT) skilled nursing facility  -       Row Name 05/12/24 1332          Vital Signs    Pre Systolic BP Rehab 92  -MH     Pre Treatment Diastolic BP 54  -MH     Post Systolic BP Rehab 113  -MH     Post Treatment Diastolic BP 62  -MH     O2 Delivery Pre Treatment room air  -MH     O2 Delivery Intra Treatment room air  -MH     Post SpO2 (%) 96  -MH     O2 Delivery Post Treatment room air  -     Pre Patient Position Supine  -     Intra Patient Position Standing  -MH     Post Patient Position Sitting  -       Row Name 05/12/24 1332          Positioning and Restraints    Pre-Treatment Position in bed  -MH     Post Treatment Position chair  -     In Chair notified nsg;sitting;call light within reach;legs elevated;encouraged to call  for assist  -               User Key  (r) = Recorded By, (t) = Taken By, (c) = Cosigned By      Initials Name Provider Type     Abby Florian OT Occupational Therapist                   Outcome Measures    No documentation.                   Occupational Therapy Education       Title: PT OT SLP Therapies (In Progress)       Topic: Occupational Therapy (In Progress)       Point: ADL training (Done)       Description:   Instruct learner(s) on proper safety adaptation and remediation techniques during self care or transfers.   Instruct in proper use of assistive devices.                  Learning Progress Summary             Patient Acceptance, E,TB,D, VU,NR,NL by  at 5/12/2024 1337                         Point: Home exercise program (Not Started)       Description:   Instruct learner(s) on appropriate technique for monitoring, assisting and/or progressing therapeutic exercises/activities.                  Learner Progress:  Not documented in this visit.              Point: Precautions (Done)       Description:   Instruct learner(s) on prescribed precautions during self-care and functional transfers.                  Learning Progress Summary             Patient Acceptance, E,TB,D, VU,NR,NL by  at 5/12/2024 1337                         Point: Body mechanics (Done)       Description:   Instruct learner(s) on proper positioning and spine alignment during self-care, functional mobility activities and/or exercises.                  Learning Progress Summary             Patient Acceptance, E,TB,D, VU,NR,NL by  at 5/12/2024 1337                                         User Key       Initials Effective Dates Name Provider Type [facility] 06/16/21 -  Abby Florian OT Occupational Therapist OT                  OT Recommendation and Plan  Planned Therapy Interventions (OT): activity tolerance training, adaptive equipment training, BADL retraining, cognitive/visual perception retraining, occupation/activity  based interventions, patient/caregiver education/training, transfer/mobility retraining  Therapy Frequency (OT): 5 times/wk  Plan of Care Review  Plan of Care Reviewed With: patient  Progress: improving  Outcome Evaluation: 82 YO M POD 2 aortic arch replacement. Pt reports living at home with spouse, typically is independent with all ADLs, ambulating without AD and reports no recent falls. Pt c/o severe neuropathy in BLE which does impact his balance. Pt is the one in the household doing laundry because facilities are in the basementr and sposue does not go down flights of steps in years. Pt is mildly confused and reports he has had bad reactions to anesthesia in the past. Pt needs a lot of redirectino to attend to his sternal precautions. ST memory deficit noted. Overlal pt needed mod (A) with mobility and walked 50'. His ADL skill is decreased but this should improve as lines are removed. Pt appears to be likely in need of ST IP rehab prior to home with spouse. Pt is agreeable to this plan and very pleasant.     Time Calculation:         Time Calculation- OT       Row Name 05/12/24 1338             Time Calculation-     OT Start Time 1207  -      OT Stop Time 1237  -      OT Time Calculation (min) 30 min  -      Total Timed Code Minutes- OT 10 minute(s)  -      OT Received On 05/12/24  -      OT - Next Appointment 05/13/24  -      OT Goal Re-Cert Due Date 05/26/24  -                User Key  (r) = Recorded By, (t) = Taken By, (c) = Cosigned By      Initials Name Provider Type     Abby Florian OT Occupational Therapist                  Therapy Charges for Today       Code Description Service Date Service Provider Modifiers Qty    [id number]  OT THERAPEUTIC ACT EA 15 MIN 5/12/2024 Abby Florian OT GO 1    [id number]  OT EVAL HIGH COMPLEXITY 3 5/12/2024 Abby Florian OT GO 1                 Abby Florian OT  5/12/2024

## 2024-05-12 NOTE — PROGRESS NOTES
Cardiology [facility]      Patient Care Team:  Moreno Tapia APRN as PCP - General (Family Medicine)  Chalo Olvera MD as Consulting Physician (Hematology and Oncology)  Ganesh López MD as Consulting Physician (Neurology)  Jake Enriquez MD as Referring Physician (Family Medicine)      Cardiology assessment and plan     Ascending aortic aneurysm  Aortic insufficiency  Depression/anxiety  Hypothyroidism  Peripheral neuropathy  Acid reflux  Normal coronaries  Normal LV systolic function  Moderate aortic regurgitation  Status post ascending aortic aneurysm repair with dacryon graft  Current active root aortoplasty  Left atrial appendage closure with atrial clip device  Patient is currently intubated and sedated  Hemodynamics are stable  Continue post surgical care  Denies any new complaints  Tmax is 97.8 pulse is 78 respirations are 22 blood pressure is 122/65  Patient went into atrial fibrillation with rapid ventricular response requiring IV amiodarone with conversion to sinus rhythm right now  Sodium is 141 potassium is 4.2 creatinine 0.9 hemoglobin is 10.1  Twelve-lead EKG shows normal sinus rhythm with nonspecific ST changes  Continue current medical therapy continue close monitoring and follow-up  Continue post surgical care  Plans for dialysis starting today per surgical team 3 L net fluid balance of pulse to 3 L  Twelve-lead EKG shows atrial fibrillation with rapid ventricular response  Current medications include aspirin 81 mg p.o. once a day patient is on IV amiodarone patient is on Lipitor 40 mg p.o. once a day patient is on midodrine 10 mg p.o. 3 times a day patient is on Lovenox for DVT prophylaxis  Discussed with patient at bedside  Continue current medical therapy  Further recommendations based on patient course                                Chief Complaint: Patient is extubated alert and awake somewhat drowsy secondary to sedation    Subjective denies any new complaints clinically feels  "better    Interval History: Hemodynamics are stable with no cardiac arrhythmias    History taken from: patient    Review of Systems:  Review of Systems   Constitutional: Negative for chills, decreased appetite and malaise/fatigue.   HENT:  Negative for congestion and nosebleeds.    Eyes:  Negative for blurred vision and double vision.   Cardiovascular:  Negative for chest pain, dyspnea on exertion, irregular heartbeat, leg swelling, near-syncope, orthopnea and palpitations.   Respiratory:  Negative for cough and shortness of breath.    Hematologic/Lymphatic: Negative for adenopathy. Does not bruise/bleed easily.   Skin:  Negative for color change and rash.   Musculoskeletal:  Negative for back pain and joint pain.   Gastrointestinal:  Negative for bloating, abdominal pain, hematemesis and hematochezia.   Genitourinary:  Negative for flank pain and hematuria.   Neurological:  Negative for dizziness and focal weakness.   Psychiatric/Behavioral:  Negative for altered mental status. The patient does not have insomnia.    Chest tubes are in place patient is extubated central lines and tubes are in place    Objective    Vital Signs  Visit Vitals  /65   Pulse 78   Temp 97.8 °F (36.6 °C) (Oral)   Resp 22   Ht 193 cm (76\")   Wt 87.8 kg (193 lb 9.6 oz)   SpO2 95%   BMI 23.57 kg/m²     Oxygen Therapy  SpO2: 95 %  Pulse Oximetry Type: Continuous  Device (Oxygen Therapy): room air  Flow (L/min): 1  Oxygen Concentration (%): 40  Flowsheet Rows      Flowsheet Row First Filed Value   Admission Height 193 cm (76\") Documented at 05/09/2024 1042   Admission Weight 81.4 kg (179 lb 6.4 oz) Documented at 05/09/2024 1042          Intake & Output (last 3 days)         05/08 0701  05/09 0700 05/09 0701  05/10 0700 05/10 0701  05/11 0700 05/11 0701  05/12 0700    P.O.   240 240    I.V. (mL/kg)   5367.9 (63)     Blood   759     Other   60     IV Piggyback   875     Total Intake(mL/kg)   7301.9 (85.7) 240 (2.8)    Urine (mL/kg/hr)   3130 " (1.5)     Emesis/NG output   0     Other   1250     Stool   0     Blood   500     Chest Tube   380     Dialysis   500     Total Output   5760     Net   +1541.9 +240                  Lines, Drains & Airways       Active LDAs       Name Placement date Placement time Site Days    CVC Double Lumen 05/10/24 Right Internal jugular 05/10/24  0723  created via procedure documentation  Internal jugular  1    Peripheral IV 05/10/24 0630 Right Antecubital 05/10/24  0630  Antecubital  1    Peripheral IV 05/10/24 2100 Posterior;Right Hand 05/10/24  2100  Hand  less than 1    Urethral Catheter Temperature probe 16 Fr. 05/10/24  --  -- 1    Y Chest Tube 1 and 2 Mediastinal 28 Fr. Mediastinal 28 Fr. 05/10/24  --  -- 1    Arterial Line 05/10/24 Left Radial 05/10/24  0656  created via procedure documentation  Radial  1    Pacer Wires 05/10/24  --  Atrial and Ventricular  1                    Physical Exam:  Constitutional:       Appearance: Well-developed.   Eyes:      Conjunctiva/sclera: Conjunctivae normal.      Pupils: Pupils are equal, round, and reactive to light.   HENT:      Head: Normocephalic and atraumatic.   Neck:      Thyroid: No thyromegaly.   Pulmonary:      Effort: Pulmonary effort is normal.      Breath sounds: Normal breath sounds.   Cardiovascular:      Normal rate. Regular rhythm.   Pulses:     Intact distal pulses.   Edema:     Peripheral edema absent.   Abdominal:      General: Bowel sounds are normal.      Palpations: Abdomen is soft.   Musculoskeletal:      Cervical back: Normal range of motion and neck supple. Skin:     General: Skin is warm.   Neurological:      Mental Status: Alert and oriented to person, place, and time.         Results Review:     I reviewed the patient's new clinical results.    Lab Results (last 24 hours)       Procedure Component Value Units Date/Time    POC Glucose Once [[id number]]  (Abnormal) Collected: 05/12/24 0837    Specimen: Blood Updated: 05/12/24 0838     Glucose 150 mg/dL       Comment: Serial Number: [id number]:  [id number]       CBC (No Diff) [[id number]]  (Abnormal) Collected: 05/12/24 0535    Specimen: Blood Updated: 05/12/24 0654     WBC 7.72 10*3/mm3      RBC 3.48 10*6/mm3      Hemoglobin 10.1 g/dL      Hematocrit 32.2 %      MCV 92.5 fL      MCH 29.0 pg      MCHC 31.4 g/dL      RDW 14.2 %      RDW-SD 48.5 fl      MPV 11.4 fL      Platelets 104 10*3/mm3      Comment: Result checked         Basic Metabolic Panel [[id number]]  (Abnormal) Collected: 05/12/24 0535    Specimen: Blood Updated: 05/12/24 0606     Glucose 123 mg/dL      BUN 20 mg/dL      Creatinine 0.91 mg/dL      Sodium 141 mmol/L      Potassium 4.2 mmol/L      Chloride 110 mmol/L      CO2 24.0 mmol/L      Calcium 8.4 mg/dL      BUN/Creatinine Ratio 22.0     Anion Gap 7.0 mmol/L      eGFR 84.1 mL/min/1.73     Narrative:      GFR Normal >60  Chronic Kidney Disease <60  Kidney Failure <15    The GFR formula is only valid for adults with stable renal function between ages 18 and 70.    POC Glucose Once [[id number]]  (Abnormal) Collected: 05/12/24 0335    Specimen: Blood Updated: 05/12/24 0336     Glucose 118 mg/dL      Comment: Serial Number: [id number]:  [id number]       POC Glucose Once [[id number]]  (Abnormal) Collected: 05/12/24 0046    Specimen: Blood Updated: 05/12/24 0049     Glucose 127 mg/dL      Comment: Serial Number: [id number]:  [id number]       POC Glucose Once [[id number]]  (Abnormal) Collected: 05/11/24 2312    Specimen: Blood Updated: 05/11/24 2314     Glucose 113 mg/dL      Comment: Serial Number: [id number]:  [id number]       POC Glucose Once [[id number]]  (Abnormal) Collected: 05/11/24 2158    Specimen: Blood Updated: 05/11/24 2200     Glucose 131 mg/dL      Comment: Serial Number: [id number]:  [id number]       POC Glucose Once [[id number]]  (Abnormal) Collected: 05/11/24 2043    Specimen: Blood Updated: 05/11/24 2045     Glucose 161 mg/dL      Comment: Serial Number:  [id number]:  [id number]       POC Glucose Once [[id number]]  (Abnormal) Collected: 05/11/24 1938    Specimen: Blood Updated: 05/11/24 1940     Glucose 137 mg/dL      Comment: Serial Number: [id number]:  [id number]       POC Glucose Once [[id number]]  (Abnormal) Collected: 05/11/24 1736    Specimen: Blood Updated: 05/11/24 1739     Glucose 112 mg/dL      Comment: Serial Number: [id number]:  [id number]       POC Glucose Once [[id number]]  (Abnormal) Collected: 05/11/24 1611    Specimen: Blood Updated: 05/11/24 1612     Glucose 132 mg/dL      Comment: Serial Number: [id number]:  [id number]       POC Glucose Once [[id number]]  (Abnormal) Collected: 05/11/24 1426    Specimen: Blood Updated: 05/11/24 1429     Glucose 154 mg/dL      Comment: Serial Number: [id number]:  [id number]       POC Glucose Once [[id number]]  (Abnormal) Collected: 05/11/24 1255    Specimen: Blood Updated: 05/11/24 1257     Glucose 159 mg/dL      Comment: Serial Number: [id number]:  [id number]             Results for orders placed during the hospital encounter of 03/18/24    Adult Transthoracic Echo Complete W/ Cont if Necessary Per Protocol    Interpretation Summary    Left ventricular systolic function is normal. Calculated left ventricular EF = 60% Left ventricular ejection fraction appears to be 56 - 60%.    Left ventricular wall thickness is consistent with mild concentric hypertrophy.    Left ventricular diastolic function is consistent with (grade I) impaired relaxation.    The left atrial cavity is mildly dilated.    There is mild calcification of the aortic valve mainly affecting the left coronary and right coronary cusp(s).    Estimated right ventricular systolic pressure from tricuspid regurgitation is normal (<35 mmHg).    Mild dilation of the aortic root is present. Severe dilation of the ascending aorta is present.    Ascending aortic aneurysm measuring 4.8 cm unchanged from prior  echocardiogram        Medication Review:   I have reviewed the patient's current medication list  Scheduled Meds:aspirin, 81 mg, Oral, Daily  atorvastatin, 40 mg, Oral, Nightly  chlorhexidine, 15 mL, Mouth/Throat, Q12H  enoxaparin, 40 mg, Subcutaneous, Daily  escitalopram, 10 mg, Oral, Daily  escitalopram, 20 mg, Oral, Daily  levothyroxine, 100 mcg, Oral, Q AM  metoclopramide, 10 mg, Intravenous, Q6H  midodrine, 10 mg, Oral, TID AC  mupirocin, , Each Nare, BID  pantoprazole, 40 mg, Oral, Daily  polyethylene glycol, 17 g, Oral, BID  pregabalin, 50 mg, Oral, BID  senna-docusate sodium, 2 tablet, Oral, BID      Continuous Infusions:amiodarone, 1 mg/min, Last Rate: 1 mg/min (05/12/24 0833)   Followed by  amiodarone, 0.5 mg/min  dexmedetomidine, 0.2-1.5 mcg/kg/hr, Last Rate: Stopped (05/11/24 1138)  insulin, 0-100 Units/hr, Last Rate: Stopped (05/11/24 2314)  niCARdipine, 5-15 mg/hr, Last Rate: Stopped (05/11/24 1138)  nitroglycerin, 5-50 mcg/min, Last Rate: Stopped (05/10/24 1700)  norepinephrine, 0.02-0.06 mcg/kg/min, Last Rate: Stopped (05/11/24 1138)  phenylephrine, 0.5-3 mcg/kg/min, Last Rate: Stopped (05/11/24 0630)  sodium chloride, 30 mL/hr, Last Rate: 30 mL/hr (05/10/24 1405)      PRN Meds:.  acetaminophen **OR** acetaminophen **OR** acetaminophen    Calcium Replacement - Follow Nurse / BPA Driven Protocol    dextrose    dextrose    glucagon (human recombinant)    HYDROcodone-acetaminophen    Magnesium Cardiology Dose Replacement - Follow Nurse / BPA Driven Protocol    Morphine **AND** naloxone    niCARdipine    nitroglycerin    nitroglycerin    norepinephrine    ondansetron    Phosphorus Replacement - Follow Nurse / BPA Driven Protocol    Potassium Replacement - Follow Nurse / BPA Driven Protocol    vasopressin    ECG/EMG Results (last 24 hours)       Procedure Component Value Units Date/Time    ECG 12 Lead Other; s/p aortic surgery [[id number]] Collected: 05/10/24 1434     Updated: 05/10/24 1830     QT Interval  378 ms      QTC Interval 464 ms     Narrative:      HEART RATE= 91  bpm  RR Interval= 664  ms  HI Interval= 269  ms  P Horizontal Axis= 23  deg  P Front Axis= 63  deg  QRSD Interval= 110  ms  QT Interval= 378  ms  QTcB= 464  ms  QRS Axis= -26  deg  T Wave Axis= -30  deg  - ABNORMAL ECG -  Sinus rhythm  Prolonged HI interval  Nonspecific intraventricular conduction delay  When compared with ECG of 09-May-2024 8:41:16,  Significant axis, voltage or hypertrophy change  Electronically Signed By: Julio C Sanchez ([facility]) 10-May-2024 18:30:30  Date and Time of Study: 2024-05-10 14:34:24    ECG 12 Lead Other; s/p aortic surgery [[id number]] Collected: 05/11/24 0353     Updated: 05/11/24 0908     QT Interval 411 ms      QTC Interval 441 ms     Narrative:      HEART RATE= 69  bpm  RR Interval= 868  ms  HI Interval= 241  ms  P Horizontal Axis= 19  deg  P Front Axis= 85  deg  QRSD Interval= 128  ms  QT Interval= 411  ms  QTcB= 441  ms  QRS Axis= -6  deg  T Wave Axis= -4  deg  - ABNORMAL ECG -  Sinus rhythm  Prolonged HI interval  Nonspecific intraventricular conduction delay  Lateral infarct, acute (LAD)  Electronically Signed By:   Date and Time of Study: 2024-05-11 03:53:49            Imaging Results (Last 24 Hours)       Procedure Component Value Units Date/Time    XR Chest 1 View [[id number]] Collected: 05/12/24 0829     Updated: 05/12/24 0833    Narrative:      XR CHEST 1 VW    Date of Exam: 5/12/2024 5:23 AM EDT    Indication: Post-Op Heart Surgery    Comparison: Chest radiograph 5/11/2024    Findings:  Right IJ central venous catheter tip terminates over mid SVC. Median sternotomy. Left atrial appendage closure device. Epicardial pacing wires. Central thoracotomy tubes in stable position. Bibasilar airspace opacities and small effusions which appear   increasing in the right lower lobe. No overt edema. No pneumothorax. Heart size unchanged from prior exam. Aortic atherosclerotic disease. Degenerative related osseous  changes.      Impression:      Impression:  1. Increasing right lower lobe opacities and/or effusion, probably representing atelectasis given acuity. Left basilar opacities and effusion appear stable.  2. No pneumothorax.      Electronically Signed: Fab Davis MD    2024 8:31 AM EDT    Workstation ID: [id number]          Results for orders placed during the hospital encounter of 24    Cardiac Catheterization/Vascular Study    Conclusion  Table formatting from the original result was not included.  Cardiac Catheterization Operative Report  PATIENT IDENTIFICATION  Name: Panfilo Feliz  Age: 83 y.o. Sex: male : 1940  MRN: [id number]    Date: 2024  PCP: @PCP@    Requesting Provider  [unfilled]    He underwent cardiac catheterization.    Indications for the procedure include: Valvular heart disease, ascending aortic aneurysm.    Procedure Details:  The risks, benefits, complications, treatment options, and expected outcomes were discussed with the patient. The patient and/or family concurred with the proposed plan, giving informed consent.    After informed consent the patient was brought to the cath lab after appropriate IV hydration was begun and oral premedication was given. He was further sedated with  none . He was prepped and draped in the usual manner. Using the modified Seldinger access technique and a micropuncture kit, a 6 Swazi sheath was placed in the femoral artery and a 7 Swazi sheath was placed in the femoral vein.. A left and right heart catheterization with coronary arteriography was performed. Findings are discussed below.    After the procedure was completed, sedation was stopped and the sheaths and catheters were all removed. Hemostasis was achieved per established hospital protocols.    Conscious sedation:  Conscious sedation was performed according to protocol.  I supervised and directed an independent trained observer with the assistance of monitoring the patient's level  of consciousness and physiologic status throughout the procedure.  Intraoperative service time was 0 minutes.    Findings:    Hemodynamics LVEDP: 13  LV: 133/2  Ao: 133/62  RA: 4  RV: 22/0  PA: 22/8  PAWP: 12  Cardiac output Amira: 7.48 (3.57)  Cardiac output thermal: 6.45 (3.08)  Left Main No significant disease  RCA Luminal regularities with no significant stenosis  LAD Luminal regularities with no significant stenosis  Circ Luminal regularities with no significant stenosis  SVG(s) Not applicable  GEORGIE Not applicable  LV Normal left ventricular systolic function ejection fraction 65 to 70%  Coronary Dominance Circumflex    Estimated Blood Loss:  Minimal    Specimens: None    Complications:  None; patient tolerated the procedure well.    Disposition: PACU - hemodynamically stable    Condition: stable    Impressions:  No angiographic evidence for significant epicardial occlusive disease  Normal left ventricular systolic function ejection fraction 65 to 70%    Recommendations:  CT surgery evaluation currently underway for aortic valve and aortic aneurysm.     Results for orders placed during the hospital encounter of 03/18/24    Adult Transthoracic Echo Complete W/ Cont if Necessary Per Protocol    Interpretation Summary    Left ventricular systolic function is normal. Calculated left ventricular EF = 60% Left ventricular ejection fraction appears to be 56 - 60%.    Left ventricular wall thickness is consistent with mild concentric hypertrophy.    Left ventricular diastolic function is consistent with (grade I) impaired relaxation.    The left atrial cavity is mildly dilated.    There is mild calcification of the aortic valve mainly affecting the left coronary and right coronary cusp(s).    Estimated right ventricular systolic pressure from tricuspid regurgitation is normal (<35 mmHg).    Mild dilation of the aortic root is present. Severe dilation of the ascending aorta is present.    Ascending aortic aneurysm  "measuring 4.8 cm unchanged from prior echocardiogram     Lab Results   Component Value Date    GLUCOSE 123 (H) 05/12/2024    BUN 20 05/12/2024    CREATININE 0.91 05/12/2024    EGFR 84.1 05/12/2024    BCR 22.0 05/12/2024    K 4.2 05/12/2024    CO2 24.0 05/12/2024    CALCIUM 8.4 (L) 05/12/2024    PROTENTOTREF 6.8 01/16/2024    ALBUMIN 4.0 05/11/2024    BILITOT 0.2 05/09/2024    AST 19 05/09/2024    ALT 7 05/09/2024      Lab Results   Component Value Date    CHOL 195 04/17/2024    TRIG 101 04/17/2024    HDL 54 04/17/2024     (H) 04/17/2024      No results found for: \"CKTOTAL\", \"CKMB\", \"CKMBINDEX\", \"TROPONINI\", \"TROPONINT\"     Assessment & Plan       Ascending aortic aneurysm    Aneurysm of ascending aorta without rupture        Julio C Sanchez MD  05/12/24  11:08 EDT               "

## 2024-05-13 LAB
ANION GAP SERPL CALCULATED.3IONS-SCNC: 7 MMOL/L (ref 5–15)
BUN SERPL-MCNC: 21 MG/DL (ref 8–23)
BUN/CREAT SERPL: 22.3 (ref 7–25)
CALCIUM SPEC-SCNC: 8 MG/DL (ref 8.6–10.5)
CHLORIDE SERPL-SCNC: 106 MMOL/L (ref 98–107)
CO2 SERPL-SCNC: 25 MMOL/L (ref 22–29)
CREAT SERPL-MCNC: 0.94 MG/DL (ref 0.76–1.27)
DEPRECATED RDW RBC AUTO: 49.2 FL (ref 37–54)
EGFRCR SERPLBLD CKD-EPI 2021: 80.9 ML/MIN/1.73
ERYTHROCYTE [DISTWIDTH] IN BLOOD BY AUTOMATED COUNT: 14.3 % (ref 12.3–15.4)
GLUCOSE BLDC GLUCOMTR-MCNC: 102 MG/DL (ref 70–105)
GLUCOSE BLDC GLUCOMTR-MCNC: 102 MG/DL (ref 70–105)
GLUCOSE BLDC GLUCOMTR-MCNC: 108 MG/DL (ref 70–105)
GLUCOSE BLDC GLUCOMTR-MCNC: 109 MG/DL (ref 70–105)
GLUCOSE BLDC GLUCOMTR-MCNC: 112 MG/DL (ref 70–105)
GLUCOSE BLDC GLUCOMTR-MCNC: 135 MG/DL (ref 70–105)
GLUCOSE BLDC GLUCOMTR-MCNC: 136 MG/DL (ref 70–105)
GLUCOSE BLDC GLUCOMTR-MCNC: 164 MG/DL (ref 70–105)
GLUCOSE SERPL-MCNC: 110 MG/DL (ref 65–99)
HCT VFR BLD AUTO: 31.3 % (ref 37.5–51)
HGB BLD-MCNC: 9.5 G/DL (ref 13–17.7)
LAB AP CASE REPORT: NORMAL
MCH RBC QN AUTO: 28.4 PG (ref 26.6–33)
MCHC RBC AUTO-ENTMCNC: 30.4 G/DL (ref 31.5–35.7)
MCV RBC AUTO: 93.7 FL (ref 79–97)
PATH REPORT.FINAL DX SPEC: NORMAL
PATH REPORT.GROSS SPEC: NORMAL
PLATELET # BLD AUTO: 104 10*3/MM3 (ref 140–450)
PMV BLD AUTO: 11.7 FL (ref 6–12)
POTASSIUM SERPL-SCNC: 4 MMOL/L (ref 3.5–5.2)
QT INTERVAL: 392 MS
QTC INTERVAL: 454 MS
RBC # BLD AUTO: 3.34 10*6/MM3 (ref 4.14–5.8)
SODIUM SERPL-SCNC: 138 MMOL/L (ref 136–145)
WBC NRBC COR # BLD AUTO: 6.94 10*3/MM3 (ref 3.4–10.8)

## 2024-05-13 PROCEDURE — 25810000003 SODIUM CHLORIDE 0.9 % SOLUTION: Performed by: NURSE PRACTITIONER

## 2024-05-13 PROCEDURE — 99232 SBSQ HOSP IP/OBS MODERATE 35: CPT | Performed by: INTERNAL MEDICINE

## 2024-05-13 PROCEDURE — 82948 REAGENT STRIP/BLOOD GLUCOSE: CPT | Performed by: THORACIC SURGERY (CARDIOTHORACIC VASCULAR SURGERY)

## 2024-05-13 PROCEDURE — 82948 REAGENT STRIP/BLOOD GLUCOSE: CPT

## 2024-05-13 PROCEDURE — 97116 GAIT TRAINING THERAPY: CPT

## 2024-05-13 PROCEDURE — 93010 ELECTROCARDIOGRAM REPORT: CPT | Performed by: INTERNAL MEDICINE

## 2024-05-13 PROCEDURE — 85027 COMPLETE CBC AUTOMATED: CPT | Performed by: NURSE PRACTITIONER

## 2024-05-13 PROCEDURE — 93005 ELECTROCARDIOGRAM TRACING: CPT | Performed by: THORACIC SURGERY (CARDIOTHORACIC VASCULAR SURGERY)

## 2024-05-13 PROCEDURE — 80048 BASIC METABOLIC PNL TOTAL CA: CPT | Performed by: NURSE PRACTITIONER

## 2024-05-13 PROCEDURE — 99024 POSTOP FOLLOW-UP VISIT: CPT | Performed by: THORACIC SURGERY (CARDIOTHORACIC VASCULAR SURGERY)

## 2024-05-13 PROCEDURE — 97530 THERAPEUTIC ACTIVITIES: CPT

## 2024-05-13 PROCEDURE — 25010000002 ENOXAPARIN PER 10 MG: Performed by: THORACIC SURGERY (CARDIOTHORACIC VASCULAR SURGERY)

## 2024-05-13 PROCEDURE — 25010000002 METOCLOPRAMIDE PER 10 MG: Performed by: THORACIC SURGERY (CARDIOTHORACIC VASCULAR SURGERY)

## 2024-05-13 PROCEDURE — 25010000002 METOCLOPRAMIDE PER 10 MG: Performed by: NURSE PRACTITIONER

## 2024-05-13 PROCEDURE — S0260 H&P FOR SURGERY: HCPCS | Performed by: THORACIC SURGERY (CARDIOTHORACIC VASCULAR SURGERY)

## 2024-05-13 RX ORDER — SODIUM CHLORIDE 9 MG/ML
125 INJECTION, SOLUTION INTRAVENOUS CONTINUOUS
Status: DISPENSED | OUTPATIENT
Start: 2024-05-13 | End: 2024-05-14

## 2024-05-13 RX ORDER — METOCLOPRAMIDE HYDROCHLORIDE 5 MG/ML
10 INJECTION INTRAMUSCULAR; INTRAVENOUS EVERY 6 HOURS
Status: COMPLETED | OUTPATIENT
Start: 2024-05-13 | End: 2024-05-13

## 2024-05-13 RX ADMIN — Medication 12.5 MG: at 20:17

## 2024-05-13 RX ADMIN — METOCLOPRAMIDE 10 MG: 5 INJECTION, SOLUTION INTRAMUSCULAR; INTRAVENOUS at 20:40

## 2024-05-13 RX ADMIN — CHLORHEXIDINE GLUCONATE, 0.12% ORAL RINSE 15 ML: 1.2 SOLUTION DENTAL at 20:16

## 2024-05-13 RX ADMIN — MIDODRINE HYDROCHLORIDE 10 MG: 5 TABLET ORAL at 18:10

## 2024-05-13 RX ADMIN — PANTOPRAZOLE SODIUM 40 MG: 40 TABLET, DELAYED RELEASE ORAL at 08:49

## 2024-05-13 RX ADMIN — Medication 12.5 MG: at 10:01

## 2024-05-13 RX ADMIN — SODIUM CHLORIDE 125 ML/HR: 9 INJECTION, SOLUTION INTRAVENOUS at 18:43

## 2024-05-13 RX ADMIN — MIDODRINE HYDROCHLORIDE 10 MG: 5 TABLET ORAL at 08:49

## 2024-05-13 RX ADMIN — ASPIRIN 81 MG: 81 TABLET, COATED ORAL at 08:49

## 2024-05-13 RX ADMIN — PREGABALIN 50 MG: 25 CAPSULE ORAL at 08:49

## 2024-05-13 RX ADMIN — CHLORHEXIDINE GLUCONATE, 0.12% ORAL RINSE 15 ML: 1.2 SOLUTION DENTAL at 08:50

## 2024-05-13 RX ADMIN — MIDODRINE HYDROCHLORIDE 10 MG: 5 TABLET ORAL at 14:03

## 2024-05-13 RX ADMIN — METOCLOPRAMIDE 10 MG: 5 INJECTION, SOLUTION INTRAMUSCULAR; INTRAVENOUS at 03:13

## 2024-05-13 RX ADMIN — ACETAMINOPHEN 650 MG: 325 TABLET, FILM COATED ORAL at 09:17

## 2024-05-13 RX ADMIN — Medication 400 MG: at 10:01

## 2024-05-13 RX ADMIN — ACETAMINOPHEN 650 MG: 325 TABLET, FILM COATED ORAL at 20:40

## 2024-05-13 RX ADMIN — METOCLOPRAMIDE 10 MG: 5 INJECTION, SOLUTION INTRAMUSCULAR; INTRAVENOUS at 08:48

## 2024-05-13 RX ADMIN — ESCITALOPRAM OXALATE 20 MG: 10 TABLET ORAL at 08:49

## 2024-05-13 RX ADMIN — POLYETHYLENE GLYCOL 3350 17 G: 17 POWDER, FOR SOLUTION ORAL at 20:17

## 2024-05-13 RX ADMIN — ATORVASTATIN CALCIUM 40 MG: 40 TABLET, FILM COATED ORAL at 20:17

## 2024-05-13 RX ADMIN — LEVOTHYROXINE SODIUM 100 MCG: 0.1 TABLET ORAL at 06:11

## 2024-05-13 RX ADMIN — ENOXAPARIN SODIUM 40 MG: 100 INJECTION SUBCUTANEOUS at 18:10

## 2024-05-13 RX ADMIN — METOCLOPRAMIDE 10 MG: 5 INJECTION, SOLUTION INTRAMUSCULAR; INTRAVENOUS at 16:08

## 2024-05-13 RX ADMIN — SENNOSIDES AND DOCUSATE SODIUM 2 TABLET: 50; 8.6 TABLET ORAL at 20:17

## 2024-05-13 RX ADMIN — SENNOSIDES AND DOCUSATE SODIUM 2 TABLET: 50; 8.6 TABLET ORAL at 08:49

## 2024-05-13 RX ADMIN — ESCITALOPRAM OXALATE 10 MG: 10 TABLET ORAL at 08:49

## 2024-05-13 RX ADMIN — PREGABALIN 50 MG: 25 CAPSULE ORAL at 20:17

## 2024-05-13 RX ADMIN — POLYETHYLENE GLYCOL 3350 17 G: 17 POWDER, FOR SOLUTION ORAL at 08:48

## 2024-05-13 RX ADMIN — MUPIROCIN 1 APPLICATION: 20 OINTMENT TOPICAL at 21:24

## 2024-05-13 NOTE — H&P
4/20/2024     Seen on 4/18/2024     Chief Complaint:      Follow-up evaluation of ascending aortic aneurysm     History of Present Illness:                                        Dear Owen and Colleagues,  It was nice to see Panfilo Feliz in follow up evaluation for his proximal thoracic aortic aneurysm. He is a 83 y.o. male with thrombocytopenia, neuropathy, MGUS, aortic valve insufficiency and a known 4.9 cm in March 2022 that progressed to a 5.2 cm in August 2023. He denies any new symptoms or signs that aneurysm related in the interval. His last chest CT showed the ascending aorta 5.1 cm which is similar to the previous CT scan, the descending thoracic aorta 3.2 cm.  And there is mild dilatation of the root.  The echocardiogram showed ejection fraction 56 to 60%, severe dilatation of the ascending aorta, and the aortic valve with mild to moderate regurgitation.  There is calcification in 2 out of 3 leaflets and the valve is trileaflet obviously.  He has no family history of aneurysms, dissections or connective tissue disorders.     Problem List       Patient Active Problem List   Diagnosis    Leukopenia    Thrombocytopenia    Monoclonal gammopathy of unknown significance (MGUS)    Neuropathy associated with MGUS    Nausea in adult patient    Slow transit constipation    Nausea    DDD (degenerative disc disease), lumbar    Chronic neck pain    DJD (degenerative joint disease)    Extremity pain    HTN (hypertension)    Hyperlipidemia    Hypothyroid    LBP (low back pain)    Lumbar canal stenosis    Prostate cancer    Ascending aortic aneurysm    Nonrheumatic aortic valve insufficiency    Other dysphagia    Gastroesophageal reflux disease            Medical History        Past Medical History:   Diagnosis Date    AAA (abdominal aortic aneurysm)      Abnormal ECG 1980’s    Alcoholism       None since 1991    Aneurysm 2019     aortic arch    Arthritis      Basal cell carcinoma (BCC) of face      Cholelithiasis  1990’s     Cholecystectomy    Colon polyp      Colon polyps      Coronary artery disease      Difficulty walking      Disease of thyroid gland      DJD (degenerative joint disease)      Gastritis      Gastroparesis      GERD (gastroesophageal reflux disease)      GI (gastrointestinal bleed)      Hernia       Hiatal    History of bone marrow biopsy      Circle (hard of hearing)      Hyperlipidemia      Hypertension      Hypothyroidism      IgG monoclonal gammopathy      Multiple duodenal ulcers      Neuropathy      PONV (postoperative nausea and vomiting)      Prostate cancer      Reflux esophagitis      Ulcer      WBC decreased              Surgical History         Past Surgical History:   Procedure Laterality Date    CHOLECYSTECTOMY   1988    COLON SURGERY   1998    COLONOSCOPY   02/2013    ENDOSCOPY   2012     ESOPHAGOGASTRODUODENOSCOPY WITH DILATION OF SHATZKI'S RING    ENDOSCOPY N/A 10/09/2020     Procedure: ESOPHAGOGASTRODUODENOSCOPY with cold bx;  Surgeon: Jake Perez MD;  Location:  [facility];  Service: Gastroenterology;  Laterality: N/A;  pre - nausea  post - duodenal ulcer, gastrits, gastric ulcer, hiatal hernia    ENDOSCOPY N/A 10/04/2022     Procedure: ESOPHAGOGASTRODUODENOSCOPY with biopsies with esophageal dilatation with 56 cui;  Surgeon: Jake Perez MD;  Location:  [facility];  Service: Gastroenterology;  Laterality: N/A;  pre-dysphagia  post-normal     ENDOSCOPY   10/04/2022     [facility]    EYE SURGERY Bilateral       cataracts    LAMINECTOMY   2013     decompression L3-4, L4-5    PROSTATECTOMY   2007    SKIN BIOPSY        TONSILLECTOMY AND ADENOIDECTOMY         1940s    UPPER GASTROINTESTINAL ENDOSCOPY        VASECTOMY         1970s            Allergies        Allergies   Allergen Reactions    Statins Myalgia              Current Medications[]Expand by Default      Current Outpatient Medications:     cholecalciferol (VITAMIN D3) 25 MCG (1000 UT) tablet, Take 1 tablet by mouth  Daily. 25 mcg BID, Disp: , Rfl:     docusate sodium (COLACE) 100 MG capsule, Take 3 capsules by mouth 2 (Two) Times a Day., Disp: , Rfl:     escitalopram (LEXAPRO) 20 MG tablet, TAKE ONE TABLET BY MOUTH DAILY, Disp: 30 tablet, Rfl: 6    hyoscyamine (LEVBID) 0.375 MG 12 hr tablet, Take 1 tablet by mouth Every 12 (Twelve) Hours. (Patient not taking: Reported on 4/18/2024), Disp: 60 tablet, Rfl: 4    ibuprofen (ADVIL,MOTRIN) 200 MG tablet, Take 1 tablet by mouth As Needed for Mild Pain., Disp: , Rfl:     Magnesium Hydroxide (DULCOLAX PO), Take 400 mg by mouth Daily., Disp: , Rfl:     Menthol, Topical Analgesic, (BIOFREEZE EX), Apply  topically As Needed., Disp: , Rfl:     metoprolol tartrate (LOPRESSOR) 25 MG tablet, Take 0.5 tablets by mouth 2 (Two) Times a Day., Disp: 90 tablet, Rfl: 3    pantoprazole (PROTONIX) 40 MG EC tablet, TAKE 1 TABLET BY MOUTH TWICE A DAY, Disp: 180 tablet, Rfl: 3    levothyroxine (SYNTHROID, LEVOTHROID) 100 MCG tablet, Take 1 tablet by mouth Daily., Disp: , Rfl:     pregabalin (LYRICA) 50 MG capsule, Take 1 capsule by mouth 2 (Two) Times a Day., Disp: , Rfl:     vitamin B-12 (CYANOCOBALAMIN) 1000 MCG tablet, Take 1 tablet by mouth Daily., Disp: , Rfl:   No current facility-administered medications for this visit.        Social History   Social History            Socioeconomic History    Marital status:        Spouse name: Meghan    Years of education: College   Tobacco Use    Smoking status: Never    Smokeless tobacco: Never   Vaping Use    Vaping status: Never Used   Substance and Sexual Activity    Alcohol use: No       Comment: Heavy in past, none in 33 years    Drug use: Never    Sexual activity: Not Currently       Partners: Female       Birth control/protection: None                  Family History   Problem Relation Age of Onset    Cancer Mother 85         Breast    COPD Father      Cancer Brother 59         Unknown type    Hypertension Daughter        Review of Systems:  As  HPI, otherwise noncontributory     Physical Exam:     Vital Signs:  Weight: 80.3 kg (177 lb)   Body mass index is 21.55 kg/m².  Temp: 98 °F (36.7 °C)   Heart Rate: 84   BP: 125/85      Constitutional:       Appearance: Well-developed.   Eyes:      Conjunctiva/sclera: Conjunctivae normal.      Pupils: Pupils are equal, round, and reactive to light.   HENT:      Head: Normocephalic and atraumatic.      Nose: Nose normal.   Neck:      Thyroid: No thyromegaly.      Vascular: No JVD.      Lymphadenopathy: No cervical adenopathy.   Pulmonary:      Effort: Pulmonary effort is normal.      Breath sounds: Normal breath sounds. No rales.   Cardiovascular:      Normal rate. Regular rhythm.      Murmurs: There is a grade 2/4 high frequency blowing decrescendo, early diastolic murmur at the URSB, radiating to the apex.      No gallop.    Pulses:     Intact distal pulses. No decreased pulses.   Edema:     Peripheral edema absent.   Abdominal:      General: Bowel sounds are normal. There is no distension.      Palpations: Abdomen is soft. There is no abdominal mass.      Tenderness: There is no abdominal tenderness.   Musculoskeletal: Normal range of motion.         General: No tenderness or deformity.      Cervical back: Normal range of motion and neck supple. Skin:     General: Skin is warm and dry.      Findings: No erythema or rash.   Neurological:      Mental Status: Alert and oriented to person, place, and time.      Deep Tendon Reflexes: Reflexes are normal and symmetric.   Psychiatric:         Behavior: Behavior normal.            Assessment:      Problems Addressed this Visit                  Cardiac and Vasculature     HTN (hypertension) - Primary     Ascending aortic aneurysm     Nonrheumatic aortic valve insufficiency          Hematology and Neoplasia     Monoclonal gammopathy of unknown significance (MGUS)     Prostate cancer          Neuro     Neuropathy associated with MGUS      Diagnoses           Codes Comments      Primary hypertension    -  Primary ICD-10-CM: I10  ICD-9-CM: 401.9       Aneurysm of ascending aorta without rupture     ICD-10-CM: I71.21  ICD-9-CM: 441.2       Nonrheumatic aortic valve insufficiency     ICD-10-CM: I35.1  ICD-9-CM: 424.1       Monoclonal gammopathy of unknown significance (MGUS)     ICD-10-CM: D47.2  ICD-9-CM: 273.1       Prostate cancer     ICD-10-CM: C61  ICD-9-CM: 185       Neuropathy associated with MGUS     ICD-10-CM: D47.2, G63  ICD-9-CM: 273.1, 357.4                  Assessment/recommendation:      5.1-5.2 cm ascending aortic aneurysm without dissection or ulceration.  Patient had enlargement close to 5 millimeters in 12 months.  He is asymptomatic.  I discussed with the patient the natural history of aneurysm disease and the guidelines for intervention.  I discussed with the patient my recommendation of ascending aortic and possible hemiarch replacement.  Discussed the risk, benefits and terms of the procedure and he wishes to proceed.  Quoted an operative mortality less than 2% and risk complications less than 10%.  He has close to moderate aortic regurgitation and he may benefit from aortic valve repair or replacement.  Aortic root had diameter of close to 3.8 to 4 cm and most likely can be pressure.  Hypertension, well-controlled continue same regimen  Prostatic cancer, seemingly under remission     Thank you for allowing me to participate in his care.     Regards,     Spencer Puente M.D.     Addendum  The cardiac cath and JOSE were performed.  The patient does not have coronary artery disease but does have moderate aortic regurgitation.     I spent over 35 minutes before, during after the office visit and reviewing the records, examining the patient, reviewing interpreting myself the cardiac cath, the JOSE, the 2D echocardiac, the chest CTA, spent time in discussing the findings and options on the recommendation of surgery, spent time discussing the evaluation and planning with the  cardiology team and spent time in documenting in the electronic record    Addendum:  Pt seen and examined in [facility].  Labs and radiology studies reviewed.  He and his wife's questions were answered.  He has known gastroparesis and a slow moving esophagus.  He also has neuropathy of his wife that makes ambulation challenging at times.  His esophageal and gastroparesis was d/w anesthesia as well.

## 2024-05-13 NOTE — CONSULTS
Cardiac rehab evaluation s/p [facility]. Explained program and benefits. Verified phone number. Brochure and post op activity list given. Will follow and call after discharge. Patient is interested.

## 2024-05-13 NOTE — PROGRESS NOTES
"Cardiology Progress  Note      Patient Care Team:  Moreno Tapia APRN as PCP - General (Family Medicine)  Chalo Olvera MD as Consulting Physician (Hematology and Oncology)  Ganesh López MD as Consulting Physician (Neurology)  Jake Enriquez MD as Referring Physician (Family Medicine)    PATIENT IDENTIFICATION  Name: Panfilo Feliz  Age: 83 y.o.  Sex: male  :  1940  MRN: [id number]      Length of stay:    LOS: 3 days           REASON FOR FOLLOW-UP:  Ascending aortic aneurysm status postrepair  Aortic valve insufficiency-moderate        INTERVAL HISTORY  Patient seen and examined, chart and labs reviewed.  Walking in the [place] with nursing.  No respiratory distress.  Patient had atrial fibrillation the weekend and started on IV amiodarone drip.  He converted to sinus rhythm, IV amiodarone discontinued and started on oral BB.  Some mild confusion over the weekend    SUBJECTIVE    No chest discomfort  No shortness of breath  No GI complaints      REVIEW OF SYSTEMS:  Pertinent items are noted in HPI, all other systems reviewed and negative    OBJECTIVE   Hemoglobin 9.5    ASSESSMENT  Elective ascending aortic aneurysm repair/reductive root aortoplasty/left atrial appendage closure (atrial clip)  Postural hypotension  Gastroparesis  Hypothyroidism  History of prostate cancer status post prostatectomy/XRT  Paroxysmal atrial fibrillation  Elevated chads vascular score  CHADS-VASc Risk Assessment               3 Total Score    1 Hypertension    2 Age >/= 75    Criteria that do not apply:    CHF    DM    PRIOR STROKE/TIA/THROMBO    Vascular Disease    Age 65-74    Sex: Female                RECOMMENDATIONS  Patient converted to sinus rhythm, started on low-dose BB  On aspirin/statin  Monitor replete electrolytes per protocol  Continue activity as tolerated  Plans for rehab at discharge          Vital Signs  Visit Vitals  /77   Pulse 82   Temp 97.6 °F (36.4 °C) (Oral)   Resp 15   Ht 193 cm (76\")   Wt " "89.3 kg (196 lb 12.8 oz)   SpO2 93%   BMI 23.96 kg/m²     Oxygen Therapy  SpO2: 93 %  Pulse Oximetry Type: Continuous  Device (Oxygen Therapy): room air  Flow (L/min): 1  Oxygen Concentration (%): 40  Flowsheet Rows      Flowsheet Row First Filed Value   Admission Height 193 cm (76\") Documented at 05/09/2024 1042   Admission Weight 81.4 kg (179 lb 6.4 oz) Documented at 05/09/2024 1042          Intake & Output (last 3 days)         05/10 0701  05/11 0700 05/11 0701 05/12 0700 05/12 0701 05/13 0700 05/13 0701 05/14 0700    P.O. 240 1200 960 240    I.V. (mL/kg) 5367.9 (63) 2505 (28.5) 1398 (15.7)     Blood 759       Other 60       IV Piggyback 875 250      Total Intake(mL/kg) 7301.9 (85.7) 3955 (45) 2358 (26.4) 240 (2.7)    Urine (mL/kg/hr) 3130 (1.5) 660 (0.3) 1600 (0.7)     Emesis/NG output 0 0 0     Other 1250       Stool 0 0 0     Blood 500       Chest Tube 380 160 20     Dialysis 500       Total Output 5760 820 1620     Net +1541.9 +3135 +738 +240            Stool Unmeasured Occurrence  0 x 0 x     Emesis Unmeasured Occurrence  0 x 0 x           Lines, Drains & Airways       Active LDAs       Name Placement date Placement time Site Days    Peripheral IV 05/10/24 0630 Right Antecubital 05/10/24  0630  Antecubital  3    Peripheral IV 05/10/24 2100 Posterior;Right Hand 05/10/24  2100  Hand  2    Urethral Catheter Temperature probe 16 Fr. 05/10/24  --  -- 3    Pacer Wires 05/10/24  --  Atrial and Ventricular  3                           /77   Pulse 82   Temp 97.6 °F (36.4 °C) (Oral)   Resp 15   Ht 193 cm (76\")   Wt 89.3 kg (196 lb 12.8 oz)   SpO2 93%   BMI 23.96 kg/m²   Intake/Output last 3 shifts:  I/O last 3 completed shifts:  In: 4673 [P.O.:1440; I.V.:3233]  Out: 2040 [Urine:1960; Chest Tube:80]  Intake/Output this shift:  I/O this shift:  In: 240 [P.O.:240]  Out: -     PHYSICAL EXAM:    General: Alert, cooperative, no distress, appears stated age  Head:  Normocephalic, atraumatic, mucous membranes " moist  Eyes:  Conjunctivae/corneas clear, EOM's intact     Neck:  Supple,  no adenopathy; no JVD or bruit  Lungs: Clear to auscultation bilaterally, no wheezes, rhonchi or rales are noted  Chest wall: Sternal wound dry and intact  Heart::  Regular rate and rhythm, S1 and S2 normal, no murmur, rub or gallop  Abdomen: Soft, nontender, nondistended, bowel sounds active  Extremities: No cyanosis, clubbing, or edema   Pulses: 2+ and symmetric all extremities  Skin:  No rashes or lesions  Neuro/psych: A&O x3. CN II through XII are grossly intact with appropriate affect        Scheduled Meds:      aspirin, 81 mg, Oral, Daily  atorvastatin, 40 mg, Oral, Nightly  chlorhexidine, 15 mL, Mouth/Throat, Q12H  enoxaparin, 40 mg, Subcutaneous, Daily  escitalopram, 10 mg, Oral, Daily  escitalopram, 20 mg, Oral, Daily  insulin lispro, 2-7 Units, Subcutaneous, 4x Daily AC & at Bedtime  levothyroxine, 100 mcg, Oral, Q AM  magnesium oxide, 400 mg, Oral, Daily  metoclopramide, 10 mg, Intravenous, Q6H  metoprolol tartrate, 12.5 mg, Oral, Q12H  midodrine, 10 mg, Oral, TID AC  mupirocin, , Each Nare, BID  pantoprazole, 40 mg, Oral, Daily  polyethylene glycol, 17 g, Oral, BID  pregabalin, 50 mg, Oral, BID  senna-docusate sodium, 2 tablet, Oral, BID        Continuous Infusions:         PRN Meds:      acetaminophen **OR** [DISCONTINUED] acetaminophen **OR** [DISCONTINUED] acetaminophen    Calcium Replacement - Follow Nurse / BPA Driven Protocol    dextrose    dextrose    glucagon (human recombinant)    Magnesium Cardiology Dose Replacement - Follow Nurse / BPA Driven Protocol    nitroglycerin    ondansetron    Phosphorus Replacement - Follow Nurse / BPA Driven Protocol    Potassium Replacement - Follow Nurse / BPA Driven Protocol        Results Review:     I reviewed the patient's new clinical results.    CBC    Results from last 7 days   Lab Units 05/13/24  0500 05/12/24  0535 05/11/24  0410 05/10/24  2245 05/10/24  2126 05/10/24  6331  05/10/24  1329 05/10/24  1114 05/10/24  1024 05/10/24  0918 05/10/24  0916 05/10/24  0755 05/09/24  0830   WBC 10*3/mm3 6.94 7.72 5.32  --   --   --   --   --  3.08*  --  1.24*  --  3.31*   HEMOGLOBIN g/dL 9.5* 10.1* 9.5*  --   --   --   --   --  11.2*  --  9.7*  --  13.4   HEMOGLOBIN, POC g/dL  --   --   --  9.7* 9.6* 11.1* 11.3*   < >  --    < >  --    < >  --    PLATELETS 10*3/mm3 104* 104* 97*  --   --   --   --   --  118*  --  79*  --  176    < > = values in this interval not displayed.     Cr Clearance Estimated Creatinine Clearance: 76.5 mL/min (by C-G formula based on SCr of 0.94 mg/dL).  Coag   Results from last 7 days   Lab Units 05/11/24  0410 05/10/24  1024 05/10/24  0916 05/09/24  0830   INR  1.10 1.12* 1.38* 1.00   APTT seconds  --  29.4 30.9 33.2*     HbA1C   Lab Results   Component Value Date    HGBA1C 5.50 05/09/2024    HGBA1C 5.52 03/30/2021     Blood Glucose   Glucose   Date/Time Value Ref Range Status   05/13/2024 0621 109 (H) 70 - 105 mg/dL Final     Comment:     Serial Number: [id number]:  [id number]   05/13/2024 0500 108 (H) 70 - 105 mg/dL Final     Comment:     Serial Number: [id number]:  [id number]   05/13/2024 0313 102 70 - 105 mg/dL Final     Comment:     Serial Number: [id number]:  [id number]   05/13/2024 0131 112 (H) 70 - 105 mg/dL Final     Comment:     Serial Number: [id number]:  [id number]   05/12/2024 2329 129 (H) 70 - 105 mg/dL Final     Comment:     Serial Number: [id number]:  [id number]   05/12/2024 2217 134 (H) 70 - 105 mg/dL Final     Comment:     Serial Number: [id number]:  [id number]   05/12/2024 2107 125 (H) 70 - 105 mg/dL Final     Comment:     Serial Number: [id number]:  [id number]   05/12/2024 1818 163 (H) 70 - 105 mg/dL Final     Comment:     Serial Number: [id number]:  [id number]     Infection     CMP   Results from last 7 days   Lab Units 05/13/24  0500 05/12/24  0535 05/11/24  0410 05/10/24  2245 05/10/24  2126  "05/10/24  1812 05/10/24  1201 05/10/24  1024 05/09/24  0830   SODIUM mmol/L 138 141 143  --   --   --   --  141 141   POTASSIUM mmol/L 4.0 4.2 4.2  --   --  4.0 3.9 4.5 4.9   CHLORIDE mmol/L 106 110* 113*  --   --   --   --  110* 106   CO2 mmol/L 25.0 24.0 21.0*  --   --   --   --  22.0 27.0   BUN mg/dL 21 20 17  --   --   --   --  20 24*   CREATININE mg/dL 0.94 0.91 0.90 0.74 0.79  --   --  0.95 1.32*   GLUCOSE mg/dL 110* 123* 135*  --   --   --   --  151* 99   ALBUMIN g/dL  --   --  4.0  --   --   --   --  3.5 4.2   BILIRUBIN mg/dL  --   --   --   --   --   --   --   --  0.2   ALK PHOS U/L  --   --   --   --   --   --   --   --  59   AST (SGOT) U/L  --   --   --   --   --   --   --   --  19   ALT (SGPT) U/L  --   --   --   --   --   --   --   --  7     ABG    Results from last 7 days   Lab Units 05/10/24  2245 05/10/24  2126 05/10/24  1451 05/10/24  1329 05/10/24  1225 05/10/24  1114 05/10/24  0918   PH, ARTERIAL pH units 7.356 7.370 7.369 7.385 7.393 7.508* 7.390   PCO2, ARTERIAL mm Hg 42.4 36.3 39.1 37.1 36.7 26.0* 44.6   PO2 ART mm Hg 176.6* 145.7* 109.8* 167.8* 239.2* 230.7* 430.0*   O2 SATURATION ART % 99.5* 99.2* 98.1* 99.5* 99.8* 99.9* 100.0*   BASE EXCESS ART mmol/L -1.7* -3.8* -2.5* -2.5* -2.2* -1.3* 2.0     UA    Results from last 7 days   Lab Units 05/09/24  0830   NITRITE UA  Negative   WBC UA /HPF 0-2   BACTERIA UA /HPF None Seen   SQUAM EPITHEL UA /HPF 0-2     DENISSE  No results found for: \"POCMETH\", \"POCAMPHET\", \"POCBARBITUR\", \"POCBENZO\", \"POCCOCAINE\", \"POCOPIATES\", \"POCOXYCODO\", \"POCPHENCYC\", \"POCPROPOXY\", \"POCTHC\", \"POCTRICYC\"  Lysis Labs   Results from last 7 days   Lab Units 05/13/24  0500 05/12/24  0535 05/11/24  0410 05/10/24  2245 05/10/24  2126 05/10/24  1451 05/10/24  1329 05/10/24  1114 05/10/24  1024 05/10/24  0918 05/10/24  0916 05/10/24  0755 05/09/24  0830   INR   --   --  1.10  --   --   --   --   --  1.12*  --  1.38*  --  1.00   APTT seconds  --   --   --   --   --   --   --   --  29.4  " --  30.9  --  33.2*   FIBRINOGEN mg/dL  --   --   --   --   --   --   --   --   --   --  249  --   --    HEMOGLOBIN g/dL 9.5* 10.1* 9.5*  --   --   --   --   --  11.2*  --  9.7*  --  13.4   HEMOGLOBIN, POC g/dL  --   --   --  9.7* 9.6* 11.1* 11.3*   < >  --    < >  --    < >  --    PLATELETS 10*3/mm3 104* 104* 97*  --   --   --   --   --  118*  --  79*  --  176   CREATININE mg/dL 0.94 0.91 0.90 0.74 0.79  --   --   --  0.95  --   --   --  1.32*    < > = values in this interval not displayed.     Radiology(recent) XR Chest 1 View    Result Date: 5/12/2024  Impression: Stable radiographic appearance of the chest since prior comparison. No visualized pneumothorax. Electronically Signed: Fab Davis MD  5/12/2024 12:55 PM EDT  Workstation ID: [id number]    XR Chest 1 View    Result Date: 5/12/2024  Impression: 1. Increasing right lower lobe opacities and/or effusion, probably representing atelectasis given acuity. Left basilar opacities and effusion appear stable. 2. No pneumothorax. Electronically Signed: Fab Davis MD  5/12/2024 8:31 AM EDT  Workstation ID: [id number]             X-rays, labs reviewed personally by physician.    ECG/EMG Results (most recent)       Procedure Component Value Units Date/Time    ECG 12 Lead Other; s/p aortic surgery [[id number]] Collected: 05/10/24 1434     Updated: 05/10/24 1830     QT Interval 378 ms      QTC Interval 464 ms     Narrative:      HEART RATE= 91  bpm  RR Interval= 664  ms  OK Interval= 269  ms  P Horizontal Axis= 23  deg  P Front Axis= 63  deg  QRSD Interval= 110  ms  QT Interval= 378  ms  QTcB= 464  ms  QRS Axis= -26  deg  T Wave Axis= -30  deg  - ABNORMAL ECG -  Sinus rhythm  Prolonged OK interval  Nonspecific intraventricular conduction delay  When compared with ECG of 09-May-2024 8:41:16,  Significant axis, voltage or hypertrophy change  Electronically Signed By: Julio C Sanchez ([facility]) 10-May-2024 18:30:30  Date and Time of Study: 2024-05-10 14:34:24    ECG 12  Lead Other; s/p aortic surgery [[id number]] Collected: 05/11/24 0353     Updated: 05/11/24 1725     QT Interval 411 ms      QTC Interval 441 ms     Narrative:      HEART RATE= 69  bpm  RR Interval= 868  ms  ID Interval= 241  ms  P Horizontal Axis= 19  deg  P Front Axis= 85  deg  QRSD Interval= 128  ms  QT Interval= 411  ms  QTcB= 441  ms  QRS Axis= -6  deg  T Wave Axis= -4  deg  - ABNORMAL ECG -  Sinus rhythm  Prolonged ID interval  Nonspecific intraventricular conduction delay  Lateral infarct, acute (LAD)  When compared with ECG of 10-May-2024 14:34:24,  Significant rate decrease  Electronically Signed By: Julio C Sanchez ([facility]) 11-May-2024 17:25:41  Date and Time of Study: 2024-05-11 03:53:49    ECG 12 Lead Rhythm Change [[id number]] Collected: 05/12/24 0550     Updated: 05/12/24 1727     QT Interval 331 ms      QTC Interval 488 ms     Narrative:      HEART RATE= 131  bpm  RR Interval= 460  ms  ID Interval= 126  ms  P Horizontal Axis= 45  deg  P Front Axis= 77  deg  QRSD Interval= 111  ms  QT Interval= 331  ms  QTcB= 488  ms  QRS Axis= 16  deg  T Wave Axis= 119  deg  - ABNORMAL ECG -  Nonspecific intraventricular conduction delay  Inferior infarct, old  When compared with ECG of 12-May-2024 4:21:16,  Significant rate increase  Atrial flutter  Electronically Signed By: Julio C Sanchez ([facility]) 12-May-2024 17:27:24  Date and Time of Study: 2024-05-12 05:50:57    ECG 12 Lead Other; s/p aortic surgery [[id number]] Collected: 05/12/24 0421     Updated: 05/12/24 1727     QT Interval 381 ms      QTC Interval 449 ms     Narrative:      HEART RATE= 83  bpm  RR Interval= 720  ms  ID Interval= 201  ms  P Horizontal Axis= -34  deg  P Front Axis= 58  deg  QRSD Interval= 110  ms  QT Interval= 381  ms  QTcB= 449  ms  QRS Axis= 23  deg  T Wave Axis= 18  deg  - ABNORMAL ECG -  Sinus rhythm  Nonspecific intraventricular conduction delay  ST elevation, consider lateral injury  When compared with ECG of 11-May-2024  3:56:55,  Significant change in rhythm  Electronically Signed By: Julio C Sanchez ([facility]) 12-May-2024 17:27:34  Date and Time of Study: 2024-05-12 04:21:16    ECG 12 Lead Drug Monitoring; Amiodarone [[id number]] Collected: 05/13/24 0551     Updated: 05/13/24 0601     QT Interval 392 ms      QTC Interval 454 ms     Narrative:      HEART RATE= 81  bpm  RR Interval= 744  ms  ID Interval= 135  ms  P Horizontal Axis= -31  deg  P Front Axis= -20  deg  QRSD Interval= 111  ms  QT Interval= 392  ms  QTcB= 454  ms  QRS Axis= 38  deg  T Wave Axis= 37  deg  - ABNORMAL ECG -  Sinus rhythm  Nonspecific intraventricular conduction delay  ST elevation, consider inferior injury  When compared with ECG of 12-May-2024 5:50:57,  Significant change in rhythm  Electronically Signed By:   Date and Time of Study: 2024-05-13 05:51:33              Medication Review:   I have reviewed the patient's current medication list  Scheduled Meds:aspirin, 81 mg, Oral, Daily  atorvastatin, 40 mg, Oral, Nightly  chlorhexidine, 15 mL, Mouth/Throat, Q12H  enoxaparin, 40 mg, Subcutaneous, Daily  escitalopram, 10 mg, Oral, Daily  escitalopram, 20 mg, Oral, Daily  insulin lispro, 2-7 Units, Subcutaneous, 4x Daily AC & at Bedtime  levothyroxine, 100 mcg, Oral, Q AM  magnesium oxide, 400 mg, Oral, Daily  metoclopramide, 10 mg, Intravenous, Q6H  metoprolol tartrate, 12.5 mg, Oral, Q12H  midodrine, 10 mg, Oral, TID AC  mupirocin, , Each Nare, BID  pantoprazole, 40 mg, Oral, Daily  polyethylene glycol, 17 g, Oral, BID  pregabalin, 50 mg, Oral, BID  senna-docusate sodium, 2 tablet, Oral, BID      Continuous Infusions:   PRN Meds:.  acetaminophen **OR** [DISCONTINUED] acetaminophen **OR** [DISCONTINUED] acetaminophen    Calcium Replacement - Follow Nurse / BPA Driven Protocol    dextrose    dextrose    glucagon (human recombinant)    Magnesium Cardiology Dose Replacement - Follow Nurse / BPA Driven Protocol    nitroglycerin    ondansetron    Phosphorus  Replacement - Follow Nurse / BPA Driven Protocol    Potassium Replacement - Follow Nurse / BPA Driven Protocol    Imaging:  Imaging Results (Last 72 Hours)       Procedure Component Value Units Date/Time    XR Chest 1 View [[id number]] Collected: 05/12/24 1248     Updated: 05/12/24 1258    Narrative:      XR CHEST 1 VW    Date of Exam: 5/12/2024 12:43 PM EDT    Indication: post chest tube removal    Comparison: Chest radiograph 5/12/2024    Findings:  Right IJ central venous catheter tip in stable position. Median sternotomy. Left atrial appendage closure device. Heart size stable. Layering effusions and underlying bibasilar opacities favoring atelectasis stable from prior. No new consolidation,   worsening edema or pneumothorax. Degenerative related osseous changes.      Impression:      Impression:  Stable radiographic appearance of the chest since prior comparison. No visualized pneumothorax.      Electronically Signed: Fab Davis MD    5/12/2024 12:55 PM EDT    Workstation ID: [id number]    XR Chest 1 View [[id number]] Collected: 05/12/24 0829     Updated: 05/12/24 0833    Narrative:      XR CHEST 1 VW    Date of Exam: 5/12/2024 5:23 AM EDT    Indication: Post-Op Heart Surgery    Comparison: Chest radiograph 5/11/2024    Findings:  Right IJ central venous catheter tip terminates over mid SVC. Median sternotomy. Left atrial appendage closure device. Epicardial pacing wires. Central thoracotomy tubes in stable position. Bibasilar airspace opacities and small effusions which appear   increasing in the right lower lobe. No overt edema. No pneumothorax. Heart size unchanged from prior exam. Aortic atherosclerotic disease. Degenerative related osseous changes.      Impression:      Impression:  1. Increasing right lower lobe opacities and/or effusion, probably representing atelectasis given acuity. Left basilar opacities and effusion appear stable.  2. No pneumothorax.      Electronically Signed: Fab Davis MD  "   5/12/2024 8:31 AM EDT    Workstation ID: [id number]    XR Chest 1 View [[id number]] Collected: 05/11/24 0845     Updated: 05/11/24 0848    Narrative:      XR CHEST 1 VW    Date of Exam: 5/11/2024 3:27 AM EDT    Indication: Post-Op Heart Surgery    Comparison: 5/10/2024    Findings:  Patient is again noted be status post median sternotomy. In the interval the endotracheal tube and nasogastric tubes have been removed. The [place] catheter is been removed. The right internal jugular introducer sheath remains in place and unchanged in   position. Mediastinal drain is unchanged. Heart size is within normal limits. There is pulmonary vascular congestion and mildly prominent interstitial markings suggesting mild interstitial edema. No definite pleural effusion or pneumothorax. There is   some left basilar atelectasis which is unchanged.      Impression:      Impression:    1. Interval removal of endotracheal tube and nasogastric tube and [place]-Nilam catheter.  2. Mildly prominent interstitial markings compatible with mild interstitial edema.  3. No change in left basilar atelectasis.      Electronically Signed: Maverick Lim MD    5/11/2024 8:46 AM EDT    Workstation ID: [id number]    XR Abdomen KUB [[id number]] Collected: 05/10/24 1249     Updated: 05/10/24 1252    Narrative:      XR ABDOMEN KUB    Date of Exam: 5/10/2024 12:44 PM EDT    Indication: eval OGT placement    Comparison: None available.    Findings:  There is an orogastric tube with its tip in the distal gastric body. The sideport is in the region of the mid gastric body. No bowel dilatation is identified. There are cholecystectomy clips.      Impression:      Impression:  Orogastric tube tip in the distal gastric body.      Electronically Signed: Joanna Siddiqi MD    5/10/2024 12:50 PM EDT    Workstation ID: [id number]              JAVY Jolly  05/13/24  12:31 EDT       EMR Dragon/Transcription:   \"Dictated utilizing Dragon dictation\". "       Electronically signed by JAVY Jolly, 05/13/24, 12:31 PM EDT.  Copied text in this note has been reviewed by me and is accurate as of 05/13/24.    Cardiology attending:  Seen and examined.  Chart and labs reviewed. Independent interpretations of cardiac testing was performed. History and exam findings are verified with above changes noted.  Assessment and plan notated by APC after being formulated by attending consultant.  Note that greater than 50% of the time spent in care of the patient was provided by attending consultant.    Patient reports some incisional discomfort.  Patient has continued problems with orthostasis.  His blood pressure dropped into the 80s today on a walk.  Discussed case with CT surgery.  Will replace IV fluids.  He is on midodrine 3 times daily already.  Continue to monitor rate and rhythm.  Had some atrial fibrillation over the weekend but this is resolved.    Physical Exam:    General: Alert, cooperative, no distress, appears stated age  Head:  Normocephalic, atraumatic, mucous membranes moist  Eyes:  Conjunctivae/corneas clear, EOM's intact     Neck:  Supple,  no bruit  Lungs:            Coarse and diminished  Chest wall: Tender at incision  Heart::  Regular rate and rhythm, S1 and S2 normal, 1/6 systolic ejection murmur.  No rub or gallop  Abdomen: Soft, nontender, nondistended bowel sounds active  Extremities: No cyanosis, clubbing, or edema  Pulses: 2+ and symmetric all extremities  Skin:  No rashes or lesions  Neuro/psych: A&O x3. CN II through XII are grossly intact with appropriate affect

## 2024-05-13 NOTE — PLAN OF CARE
Bed mobility - Pt with tendency to lean posteriorly when seated EOB. Pt instructed in L sidebend to tap elbow on bed,  PT guides movement to complete 5 reps. Then with Max-A for sit to supine. Assist for lowering trunk and lifting BLE.   Transfers - sit to stand from recliner with Mod-A and cues for improved technique with emphasis on anterior wt shift and momentum technique. Stand to sit with CGA, cues/reminders needed for sternal prec.   Ambulation - 15 feet CGA and with rolling walker, very small step length and narrow KELTON. Distance limited by orthostatic hypotension which does not improve after MIP and upright x2 minutes.

## 2024-05-13 NOTE — THERAPY TREATMENT NOTE
Subjective: Pt agreeable to therapeutic plan of care.    Objective:     Bed mobility - Pt with tendency to lean posteriorly when seated EOB. Pt instructed in L sidebend to tap elbow on bed, and PT guides movement to complete 5 reps. Then with Max-A for sit to supine. Assist for lowering trunk and lifting BLE.   Transfers - sit to stand from recliner with Mod-A and cues for improved technique with emphasis on anterior wt shift and momentum technique. Stand to sit with CGA, cues/reminders needed for sternal prec.   Ambulation - 15 feet CGA and with rolling walker, very small step length and narrow KELTON. Distance limited by orthostatic hypotension which does not improve after MIP and upright x2 minutes.       Vitals: Hypotensive BP readings as follows: seated 118/70 (MAP 86), initial standing 86/53 (MAP 64), standing after 2 minutes 84/47 (MAP 59), and seated after short gait trial 106/63 (MAP 75).     Pain: 6 FACES   Location: sternal  Intervention for pain: Repositioned, Increased Activity, and Therapeutic Presence    Education: Provided education on the importance of mobility in the acute care setting, Verbal/Tactile Cues, Transfer Training, Gait Training, Energy conservation strategies, and Post-Op Precautions    Assessment: Panfilo Feliz presents with impaired endurance and functional mobility impairments which indicate the need for skilled intervention. Orthostatic hypotension, which worsens after prolonged standing, limits gait distance. Pt is asymptomatic despite hypotension.  Pt tolerating session today without incident. Will continue to follow and progress as tolerated.     Plan/Recommendations:   If medically appropriate, High Intensity Therapy recommended post-acute care. This is recommended as therapy feels the patient would require 5-6 days per week, 2-3 hours per day. At this time, inpatient rehabilitation (acute rehab) would be the first choice and SNF would be second. Pt requires no DME at discharge.      Pt desires Inpatient Rehabilitation placement at discharge. Pt cooperative; agreeable to therapeutic recommendations and plan of care.         Basic Mobility 6-click:  Rollin = Total, A lot = 2, A little = 3; 4 = None  Supine>Sit:   1 = Total, A lot = 2, A little = 3; 4 = None   Sit>Stand with arms:  1 = Total, A lot = 2, A little = 3; 4 = None  Bed>Chair:   1 = Total, A lot = 2, A little = 3; 4 = None  Ambulate in room:  1 = Total, A lot = 2, A little = 3; 4 = None  3-5 Steps with railin = Total, A lot = 2, A little = 3; 4 = None  Score: 13    Modified [place]: N/A = No pre-op stroke/TIA    Post-Tx Position: Supine with HOB Elevated, Alarms activated, and Call light and personal items within reach  PPE: gloves

## 2024-05-13 NOTE — PROGRESS NOTES
S/P POD# 3  elective ascending aortic aneurysm repair/ reductive root aortoplasty/ left atrial appendage closure (AtriClip)--Cindi  EF 65-70% (cath)    Subjective:  no c/o's today    Given IVFs over weekend and diuresed Sunday  Pt reports less hypotension  Nsg reported mild confusion over weekend that has improved with using Tylenol instead of narcotics  Drips:  amio completed  Wt is up 6 kgs from preop      Intake/Output Summary (Last 24 hours) at 5/13/2024 1028  Last data filed at 5/13/2024 0835  Gross per 24 hour   Intake 2358 ml   Output 1600 ml   Net 758 ml     Temp:  [97.6 °F (36.4 °C)-98.4 °F (36.9 °C)] 97.6 °F (36.4 °C)  Heart Rate:  [73-88] 82  Resp:  [15-20] 15  BP: ()/(54-77) 135/77      Results from last 7 days   Lab Units 05/13/24  0500 05/12/24  0535 05/11/24  0410 05/10/24  1114 05/10/24  1024 05/10/24  0918 05/10/24  0916   WBC 10*3/mm3 6.94 7.72 5.32  --  3.08*  --  1.24*   HEMOGLOBIN g/dL 9.5* 10.1* 9.5*  --  11.2*  --  9.7*   HEMOGLOBIN, POC   --   --   --    < >  --    < >  --    HEMATOCRIT % 31.3* 32.2* 30.3*  --  34.4*  --  30.6*   HEMATOCRIT POC   --   --   --    < >  --    < >  --    PLATELETS 10*3/mm3 104* 104* 97*  --  118*  --  79*   INR   --   --  1.10  --  1.12*  --  1.38*    < > = values in this interval not displayed.     Results from last 7 days   Lab Units 05/13/24  0500 05/12/24  0535 05/11/24  0410   CREATININE mg/dL 0.94   < > 0.90   POTASSIUM mmol/L 4.0   < > 4.2   SODIUM mmol/L 138   < > 143   MAGNESIUM mg/dL  --   --  2.6*   PHOSPHORUS mg/dL  --   --  3.9    < > = values in this interval not displayed.       Physical Exam:  Neuro intact, nad, up in recliner, no family present  Tele:  SR 80s, amio drip completed this morning  Diminished bases, 92% RA  Sternotomy healing well  Benign abd, no BM, + flatus  No edema    Assessment/Plan:  Principal Problem:    Ascending aortic aneurysm  Active Problems:    Aneurysm of ascending aorta without rupture    -Ascending aortic  aneurysm, 5.2 cm/ aortic root dilatation 4.5 cm with moderate aortic regurgitation, EF 65-70% (cath)--s/p elective ascending aortic aneurysm repair with a 30 mm Dacron interposition graft/ reductive root aortoplasty of the right and noncoronary sinuses/ left atrial appendage epicardial closure with a 45 mm AtriClip (Cindi)  - Postural hypotension  - Neuropathy of feet with ambulation issues  - Gastroparesis  - Hypothyroidism--levothyroxine  - MGUS  - Hx prostate cancer, s/p prostatectomy/XRT  - Hx leukopenia/ TCP  - Postop ABLA, expected--watch closely  - Postop TCP, consumptive--transfused 5/10  - Postop atrial fibrillation with RVR--IV amio    POD# 3.  He converted to sinus rhythm after IV amio yest.  Drip completed, no po amio ordered.  On asa/statin, add low dose bb.  He has hx postural hypotension, but had been on 25 mg metoprolol at home bid.  DC central line.  DC wires tomorrow.    [id number] Score for Atrial Fibrillation Stroke Risk   RESULT SUMMARY:  3 points  Stroke risk was 3.2% per year in >90,000 patients (the [place] Atrial Fibrillation Cohort Study) and 4.6% risk of stroke/TIA/systemic embolism.    Routine care--as above  De-escal  D/w pt/nsg, family, Dr. Puente, cardiology  Anticipate rehab at discharge    Kayli Rowe, APRN  5/13/2024  10:28 EDT

## 2024-05-13 NOTE — CASE MANAGEMENT/SOCIAL WORK
Discharge Planning Assessment   [facility]     Patient Name: Panfilo Feliz  MRN: [id number]  Today's Date: 5/13/2024    Admit Date: 5/10/2024    Plan: DC Plan: [facility] accepted and following. No precert or PASRR required.   Discharge Needs Assessment       Row Name 05/13/24 1604       Living Environment    People in Home spouse    Name(s) of People in Home Kelin Feliz    Current Living Arrangements home    Potentially Unsafe Housing Conditions none    In the past 12 months has the electric, gas, oil, or water company threatened to shut off services in your home? No    Primary Care Provided by self    Provides Primary Care For no one    Family Caregiver if Needed spouse    Family Caregiver Names Kelin Feliz    Quality of Family Relationships helpful;involved;supportive    Able to Return to Prior Arrangements yes       Resource/Environmental Concerns    Resource/Environmental Concerns none    Transportation Concerns none       Transportation Needs    In the past 12 months, has lack of transportation kept you from medical appointments or from getting medications? no    In the past 12 months, has lack of transportation kept you from meetings, work, or from getting things needed for daily living? No       Food Insecurity    Within the past 12 months, you worried that your food would run out before you got the money to buy more. Never true    Within the past 12 months, the food you bought just didn't last and you didn't have money to get more. Never true       Transition Planning    Patient/Family Anticipates Transition to home with family    Patient/Family Anticipated Services at Transition none    Transportation Anticipated family or friend will provide       Discharge Needs Assessment    Readmission Within the Last 30 Days no previous admission in last 30 days    Current Outpatient/Agency/Support Group homecare agency    Equipment Currently Used at Home none    Concerns to be Addressed  discharge planning    Anticipated Changes Related to Illness none    Equipment Needed After Discharge none    Outpatient/Agency/Support Group Needs inpatient rehabilitation facility    Discharge Facility/Level of Care Needs rehabilitation facility    Provided Post Acute Provider List? Yes    Provided Post Acute Provider Quality & Resource List? Yes    Current Discharge Risk physical impairment                   Discharge Plan       Row Name 05/13/24 1605       Plan    Plan DC Plan: [facility] accepted and following. No precert or PASRR required.    Patient/Family in Agreement with Plan yes    Provided Post Acute Provider List? Yes    Post Acute Provider List Inpatient Rehab    Provided Post Acute Provider Quality & Resource List? Yes    Post Acute Provider Quality and Resource List Inpatient Rehab    Delivered To Patient    Method of Delivery In person    Plan Comments CM spoke with patient’s nurse and [facility] NP Kayli Richmond to obtain clinical updates. PT/OT recommending Acute IP Rehab at [place].CM spoke with patient at bedside to discuss admission assessment and discharge planning. Patient confirms PCP and pharmacy. Patient confirms he is agreeable to enrolling in meds to bed program. CM enrolled patient and updated pharmacy in [facility]. Patient denies any difficulty affording medications at this time. CM informed patient of Therapy recommendations and patient is agreeable. Patient would like to stay in [place] and selected [facility] from acute rehab choices. CM placed referral in basket for [facility] and liaison Rylee informed. Rylee confirms acceptance. CM will continue to follow for any further needs and adjust discharge plan accordingly. DC Barriers: POD 3 OHS, Cardiac monitoring, central line, pacer wires, IV Reglan, moniotr labs, and watch rhythm closely for recurrent Afib.                  Continued Care and Services - Admitted Since 5/10/2024       Destination Coordination complete.       Service Provider Request Status Selected Services Address Phone Fax Patient Preferred    [facility] [address] [facility] [place] [address] [phone] --                  Expected Discharge Date and Time       Expected Discharge Date Expected Discharge Time    May 15, 2024            Demographic Summary       Row Name 05/13/24 1604       General Information    Admission Type inpatient    Arrived From home;operating room    Required Notices Provided Important Message from Medicare    Referral Source admission list    Reason for Consult discharge planning    Preferred Language English       Contact Information    Permission Granted to Share Info With                    Functional Status       Row Name 05/13/24 1604       Functional Status    Usual Activity Tolerance excellent    Current Activity Tolerance moderate       Physical Activity    On average, how many days per week do you engage in moderate to strenuous exercise (like a brisk walk)? 5 days    On average, how many minutes do you engage in exercise at this level? 60 min    Number of minutes of exercise per week 300       Functional Status, IADL    Medications independent    Meal Preparation independent    Housekeeping independent    Laundry independent    Shopping independent       Mental Status    General Appearance WDL WDL       Mental Status Summary    Recent Changes in Mental Status/Cognitive Functioning no changes       Employment/    Employment Status retired    Current or Previous Occupation not applicable           Current or Previous  Service none                 Met with patient in room       Maintain distance greater than six feet and spent less than fifteen minutes in the room.      Denisa Olmos RN     Office Phone: [phone]  Office Cell:     [phone]

## 2024-05-14 ENCOUNTER — APPOINTMENT (OUTPATIENT)
Dept: GENERAL RADIOLOGY | Facility: HOSPITAL | Age: 84
DRG: 220 | End: 2024-05-14
Payer: MEDICARE

## 2024-05-14 LAB
25(OH)D3 SERPL-MCNC: 24.9 NG/ML (ref 30–100)
ANION GAP SERPL CALCULATED.3IONS-SCNC: 9 MMOL/L (ref 5–15)
BACTERIA UR QL AUTO: ABNORMAL /HPF
BILIRUB UR QL STRIP: NEGATIVE
BUN SERPL-MCNC: 14 MG/DL (ref 8–23)
BUN/CREAT SERPL: 17.1 (ref 7–25)
CALCIUM SPEC-SCNC: 7.9 MG/DL (ref 8.6–10.5)
CHLORIDE SERPL-SCNC: 107 MMOL/L (ref 98–107)
CLARITY UR: CLEAR
CO2 SERPL-SCNC: 25 MMOL/L (ref 22–29)
COLOR UR: YELLOW
CREAT SERPL-MCNC: 0.82 MG/DL (ref 0.76–1.27)
DEPRECATED RDW RBC AUTO: 46.7 FL (ref 37–54)
EGFRCR SERPLBLD CKD-EPI 2021: 87.7 ML/MIN/1.73
ERYTHROCYTE [DISTWIDTH] IN BLOOD BY AUTOMATED COUNT: 14.1 % (ref 12.3–15.4)
GLUCOSE BLDC GLUCOMTR-MCNC: 103 MG/DL (ref 70–105)
GLUCOSE BLDC GLUCOMTR-MCNC: 109 MG/DL (ref 70–105)
GLUCOSE BLDC GLUCOMTR-MCNC: 141 MG/DL (ref 70–105)
GLUCOSE BLDC GLUCOMTR-MCNC: 95 MG/DL (ref 70–105)
GLUCOSE SERPL-MCNC: 112 MG/DL (ref 65–99)
GLUCOSE UR STRIP-MCNC: NEGATIVE MG/DL
HCT VFR BLD AUTO: 31.6 % (ref 37.5–51)
HGB BLD-MCNC: 10 G/DL (ref 13–17.7)
HGB UR QL STRIP.AUTO: NEGATIVE
HYALINE CASTS UR QL AUTO: ABNORMAL /LPF
KETONES UR QL STRIP: NEGATIVE
LEUKOCYTE ESTERASE UR QL STRIP.AUTO: ABNORMAL
MCH RBC QN AUTO: 28.6 PG (ref 26.6–33)
MCHC RBC AUTO-ENTMCNC: 31.6 G/DL (ref 31.5–35.7)
MCV RBC AUTO: 90.3 FL (ref 79–97)
NITRITE UR QL STRIP: NEGATIVE
PH UR STRIP.AUTO: 6.5 [PH] (ref 5–8)
PLATELET # BLD AUTO: 142 10*3/MM3 (ref 140–450)
PMV BLD AUTO: 11 FL (ref 6–12)
POTASSIUM SERPL-SCNC: 4.1 MMOL/L (ref 3.5–5.2)
PROT UR QL STRIP: ABNORMAL
QT INTERVAL: 400 MS
QTC INTERVAL: 479 MS
RBC # BLD AUTO: 3.5 10*6/MM3 (ref 4.14–5.8)
RBC # UR STRIP: ABNORMAL /HPF
REF LAB TEST METHOD: ABNORMAL
SODIUM SERPL-SCNC: 141 MMOL/L (ref 136–145)
SP GR UR STRIP: 1.02 (ref 1–1.03)
SQUAMOUS #/AREA URNS HPF: ABNORMAL /HPF
UROBILINOGEN UR QL STRIP: ABNORMAL
WBC # UR STRIP: ABNORMAL /HPF
WBC NRBC COR # BLD AUTO: 6.41 10*3/MM3 (ref 3.4–10.8)

## 2024-05-14 PROCEDURE — 93005 ELECTROCARDIOGRAM TRACING: CPT | Performed by: THORACIC SURGERY (CARDIOTHORACIC VASCULAR SURGERY)

## 2024-05-14 PROCEDURE — 97530 THERAPEUTIC ACTIVITIES: CPT

## 2024-05-14 PROCEDURE — 87040 BLOOD CULTURE FOR BACTERIA: CPT | Performed by: THORACIC SURGERY (CARDIOTHORACIC VASCULAR SURGERY)

## 2024-05-14 PROCEDURE — 71045 X-RAY EXAM CHEST 1 VIEW: CPT

## 2024-05-14 PROCEDURE — 82948 REAGENT STRIP/BLOOD GLUCOSE: CPT

## 2024-05-14 PROCEDURE — 80048 BASIC METABOLIC PNL TOTAL CA: CPT | Performed by: NURSE PRACTITIONER

## 2024-05-14 PROCEDURE — 25010000002 ENOXAPARIN PER 10 MG: Performed by: THORACIC SURGERY (CARDIOTHORACIC VASCULAR SURGERY)

## 2024-05-14 PROCEDURE — 99232 SBSQ HOSP IP/OBS MODERATE 35: CPT | Performed by: INTERNAL MEDICINE

## 2024-05-14 PROCEDURE — 82948 REAGENT STRIP/BLOOD GLUCOSE: CPT | Performed by: THORACIC SURGERY (CARDIOTHORACIC VASCULAR SURGERY)

## 2024-05-14 PROCEDURE — 85027 COMPLETE CBC AUTOMATED: CPT | Performed by: NURSE PRACTITIONER

## 2024-05-14 PROCEDURE — 82306 VITAMIN D 25 HYDROXY: CPT | Performed by: NURSE PRACTITIONER

## 2024-05-14 PROCEDURE — 97116 GAIT TRAINING THERAPY: CPT

## 2024-05-14 PROCEDURE — 99024 POSTOP FOLLOW-UP VISIT: CPT | Performed by: THORACIC SURGERY (CARDIOTHORACIC VASCULAR SURGERY)

## 2024-05-14 PROCEDURE — 81001 URINALYSIS AUTO W/SCOPE: CPT | Performed by: THORACIC SURGERY (CARDIOTHORACIC VASCULAR SURGERY)

## 2024-05-14 PROCEDURE — 94762 N-INVAS EAR/PLS OXIMTRY CONT: CPT

## 2024-05-14 RX ADMIN — SENNOSIDES AND DOCUSATE SODIUM 2 TABLET: 50; 8.6 TABLET ORAL at 08:45

## 2024-05-14 RX ADMIN — Medication 400 MG: at 08:45

## 2024-05-14 RX ADMIN — MUPIROCIN 1 APPLICATION: 20 OINTMENT TOPICAL at 20:06

## 2024-05-14 RX ADMIN — POLYETHYLENE GLYCOL 3350 17 G: 17 POWDER, FOR SOLUTION ORAL at 20:06

## 2024-05-14 RX ADMIN — ESCITALOPRAM OXALATE 10 MG: 10 TABLET ORAL at 08:45

## 2024-05-14 RX ADMIN — Medication 12.5 MG: at 20:06

## 2024-05-14 RX ADMIN — POLYETHYLENE GLYCOL 3350 17 G: 17 POWDER, FOR SOLUTION ORAL at 08:44

## 2024-05-14 RX ADMIN — PREGABALIN 50 MG: 25 CAPSULE ORAL at 20:06

## 2024-05-14 RX ADMIN — ACETAMINOPHEN 650 MG: 325 TABLET, FILM COATED ORAL at 20:06

## 2024-05-14 RX ADMIN — LEVOTHYROXINE SODIUM 100 MCG: 0.1 TABLET ORAL at 06:00

## 2024-05-14 RX ADMIN — SENNOSIDES AND DOCUSATE SODIUM 2 TABLET: 50; 8.6 TABLET ORAL at 20:06

## 2024-05-14 RX ADMIN — ASPIRIN 81 MG: 81 TABLET, COATED ORAL at 08:44

## 2024-05-14 RX ADMIN — CHLORHEXIDINE GLUCONATE, 0.12% ORAL RINSE 15 ML: 1.2 SOLUTION DENTAL at 20:06

## 2024-05-14 RX ADMIN — ENOXAPARIN SODIUM 40 MG: 100 INJECTION SUBCUTANEOUS at 17:08

## 2024-05-14 RX ADMIN — Medication 12.5 MG: at 08:44

## 2024-05-14 RX ADMIN — PREGABALIN 50 MG: 25 CAPSULE ORAL at 08:45

## 2024-05-14 RX ADMIN — MUPIROCIN 1 APPLICATION: 20 OINTMENT TOPICAL at 08:46

## 2024-05-14 RX ADMIN — CHLORHEXIDINE GLUCONATE, 0.12% ORAL RINSE 15 ML: 1.2 SOLUTION DENTAL at 08:44

## 2024-05-14 RX ADMIN — ATORVASTATIN CALCIUM 40 MG: 40 TABLET, FILM COATED ORAL at 20:06

## 2024-05-14 RX ADMIN — ACETAMINOPHEN 650 MG: 325 TABLET, FILM COATED ORAL at 15:05

## 2024-05-14 RX ADMIN — ESCITALOPRAM OXALATE 20 MG: 10 TABLET ORAL at 08:46

## 2024-05-14 RX ADMIN — PANTOPRAZOLE SODIUM 40 MG: 40 TABLET, DELAYED RELEASE ORAL at 08:45

## 2024-05-14 NOTE — CONSULTS
Nutrition Services    Patient Name: Panfilo Feliz  YOB: 1940  MRN: [id number]  Admission date: 5/10/2024    NUTRITION SCREENING      Encounter Information: Check on for MST of 2.  Admitted for planned cardiac surgery, S/P 5/10.  Cardiology and CT surgery following.  RD visited patient at bedside.  Patient reports ok appetite (no recent changes), nausea but no vomiting, no chewing/swallowing issues noted, unknown weight changes, #.  Patient reports frequently goes to the gym and does free weights and cardio.  NFPE completed and not consistent with nutrition diagnosis of malnutrition at this time using AND/ASPEN criteria.  Patient accepting of Boost at time of RD visit and also requested prune juice to help his bowels move.         PO Diet: Diet: Cardiac, Diabetic; Healthy Heart (2-3 Na+); Consistent Carbohydrate; Fluid Consistency: Thin (IDDSI 0)   PO Supplements: None ordered    PO Intake:  64% average PO intakes x 9 documented meals since admission        Labs (reviewed below): Reviewed, management per attending         GI Function:  No BM since admission (x 4 days ago)       Skin: Sternal incision        Weight Hx Review: Wt Readings from Last 40 Encounters:   05/14/24 89.5 kg (197 lb 5 oz)   04/19/24 80.5 kg (177 lb 7.5 oz)   04/18/24 80.3 kg (177 lb)   04/09/24 83 kg (183 lb)   03/18/24 81.6 kg (180 lb)   02/27/24 81.6 kg (180 lb)   02/06/24 82.2 kg (181 lb 3.2 oz)   01/26/24 84.8 kg (187 lb)   01/16/24 82.5 kg (181 lb 14.4 oz)   07/27/23 83.3 kg (183 lb 9.6 oz)   07/14/23 84.8 kg (187 lb)   07/11/23 83.7 kg (184 lb 9.6 oz)   07/05/23 83.9 kg (185 lb)   02/28/23 84.1 kg (185 lb 6.4 oz)   01/26/23 83.9 kg (185 lb)   01/24/23 83.8 kg (184 lb 11.2 oz)   01/13/23 84.4 kg (186 lb 1.1 oz)   11/14/22 83.9 kg (185 lb)   10/04/22 84.4 kg (186 lb)   08/09/22 84.6 kg (186 lb 8 oz)   08/09/22 84.4 kg (186 lb)   07/07/22 84.5 kg (186 lb 3.2 oz)   05/20/22 86.6 kg (191 lb)   03/03/22 86.2 kg (190 lb)    02/10/22 84.8 kg (187 lb)   02/09/22 85.9 kg (189 lb 4.8 oz)   01/31/22 86.6 kg (191 lb)   01/03/22 86.9 kg (191 lb 8 oz)   08/05/21 91.2 kg (201 lb)   08/04/21 91.5 kg (201 lb 12.8 oz)   07/15/21 94.1 kg (207 lb 8 oz)   06/14/21 94.3 kg (208 lb)   04/28/21 94.3 kg (208 lb)   03/30/21 94.6 kg (208 lb 9.6 oz)   03/10/21 93 kg (205 lb)   02/03/21 94.3 kg (207 lb 14.4 oz)   01/25/21 95.9 kg (211 lb 6.4 oz)   11/11/20 94.8 kg (209 lb)   10/12/20 92.1 kg (203 lb)   10/09/20 92.1 kg (203 lb)          Nutrition Intervention: Continue current diet and encourage good PO intakes.    Add Boost Glucose Control BID (Provides 380 kcals, 32 g protein if consumed)        Results from last 7 days   Lab Units 05/14/24  0412 05/13/24  0500 05/12/24  0535 05/10/24  1024 05/09/24  0830   SODIUM mmol/L 141 138 141   < > 141   POTASSIUM mmol/L 4.1 4.0 4.2   < > 4.9   CHLORIDE mmol/L 107 106 110*   < > 106   CO2 mmol/L 25.0 25.0 24.0   < > 27.0   BUN mg/dL 14 21 20   < > 24*   CREATININE mg/dL 0.82 0.94 0.91   < > 1.32*   CALCIUM mg/dL 7.9* 8.0* 8.4*   < > 9.5   BILIRUBIN mg/dL  --   --   --   --  0.2   ALK PHOS U/L  --   --   --   --  59   ALT (SGPT) U/L  --   --   --   --  7   AST (SGOT) U/L  --   --   --   --  19   GLUCOSE mg/dL 112* 110* 123*   < > 99    < > = values in this interval not displayed.     Results from last 7 days   Lab Units 05/14/24  0412 05/12/24  0535 05/11/24  0410   MAGNESIUM mg/dL  --   --  2.6*   PHOSPHORUS mg/dL  --   --  3.9   HEMOGLOBIN g/dL 10.0*   < > 9.5*   HEMATOCRIT % 31.6*   < > 30.3*    < > = values in this interval not displayed.     COVID19   Date Value Ref Range Status   05/09/2024 Not Detected Not Detected - Ref. Range Final     Lab Results   Component Value Date    HGBA1C 5.50 05/09/2024       RD to follow up per protocol.    Electronically signed by:  Anna Baer RD  05/14/24 11:41 EDT

## 2024-05-14 NOTE — PROGRESS NOTES
" LOS: 4 days   Patient Care Team:  Moreno Tapia APRN as PCP - General (Family Medicine)  Chalo Olvera MD as Consulting Physician (Hematology and Oncology)  Ganesh López MD as Consulting Physician (Neurology)  Jake Enriquez MD as Referring Physician (Family Medicine)    Chief Complaint: post-op     Subjective     Subjective:  Symptoms:  Stable.  No shortness of breath or chest pain.    Diet:  Adequate intake.  No nausea or vomiting.    Activity level: Returning to normal.    Pain:  He reports no pain.      Objective     Vital Signs  Temp:  [97.5 °F (36.4 °C)-99.2 °F (37.3 °C)] 99.2 °F (37.3 °C)  Heart Rate:  [78-92] 89  Resp:  [15-24] 24  BP: ()/(47-83) 129/75  Body mass index is 24.02 kg/m².    Intake/Output Summary (Last 24 hours) at 5/14/2024 0829  Last data filed at 5/14/2024 0800  Gross per 24 hour   Intake 2202 ml   Output 2235 ml   Net -33 ml     I/O this shift:  In: -   Out: 475 [Urine:475]      Wt Readings from Last 3 Encounters:   05/14/24 89.5 kg (197 lb 5 oz)   04/19/24 80.5 kg (177 lb 7.5 oz)   04/18/24 80.3 kg (177 lb)       Flowsheet Rows      Flowsheet Row First Filed Value   Admission Height 193 cm (76\") Documented at 05/09/2024 1042   Admission Weight 81.4 kg (179 lb 6.4 oz) Documented at 05/09/2024 1042            Objective:  General Appearance:  Comfortable, in no acute distress and well-appearing.    Vital signs: (most recent): Blood pressure 123/74, pulse 104, temperature (!) 101.5 °F (38.6 °C), temperature source Oral, resp. rate 23, height 193 cm (76\"), weight 89.5 kg (197 lb 5 oz), SpO2 93%.  Vital signs are normal.  No fever.    Output: Producing urine and no stool output (passing gas).    Lungs:  Normal effort and normal respiratory rate.  There are decreased breath sounds.    Heart: Normal rate.  Regular rhythm.    Abdomen: Bowel sounds are normal.     Extremities: Normal range of motion.  There is no dependent edema.    Neurological: Patient is alert and oriented to " "person, place and time.    Skin:  Warm and dry.  (Sternal incision without erythema, edema, or drainage )              Results Review:        Results from last 7 days   Lab Units 05/14/24 0412 05/13/24  0500 05/12/24  0535   WBC 10*3/mm3 6.41 6.94 7.72   HEMOGLOBIN g/dL 10.0* 9.5* 10.1*   HEMATOCRIT % 31.6* 31.3* 32.2*   PLATELETS 10*3/mm3 142 104* 104*         PT/INR:  No results found for: \"PROTIME\"/No results found for: \"INR\"    Results from last 7 days   Lab Units 05/14/24 0412 05/13/24  0500 05/12/24  0535   SODIUM mmol/L 141 138 141   POTASSIUM mmol/L 4.1 4.0 4.2   CHLORIDE mmol/L 107 106 110*   CO2 mmol/L 25.0 25.0 24.0   BUN mg/dL 14 21 20   CREATININE mg/dL 0.82 0.94 0.91   GLUCOSE mg/dL 112* 110* 123*   CALCIUM mg/dL 7.9* 8.0* 8.4*         Scheduled Meds:  aspirin, 81 mg, Oral, Daily  atorvastatin, 40 mg, Oral, Nightly  chlorhexidine, 15 mL, Mouth/Throat, Q12H  enoxaparin, 40 mg, Subcutaneous, Daily  escitalopram, 10 mg, Oral, Daily  escitalopram, 20 mg, Oral, Daily  insulin lispro, 2-7 Units, Subcutaneous, 4x Daily AC & at Bedtime  levothyroxine, 100 mcg, Oral, Q AM  magnesium oxide, 400 mg, Oral, Daily  metoprolol tartrate, 12.5 mg, Oral, Q12H  midodrine, 10 mg, Oral, TID AC  mupirocin, , Each Nare, BID  pantoprazole, 40 mg, Oral, Daily  polyethylene glycol, 17 g, Oral, BID  pregabalin, 50 mg, Oral, BID  senna-docusate sodium, 2 tablet, Oral, BID        Infusions:         Assessment & Plan         Ascending aortic aneurysm    Aneurysm of ascending aorta without rupture      Assessment & Plan    -Ascending aortic aneurysm, 5.2 cm/ aortic root dilatation 4.5 cm with moderate aortic regurgitation, EF 65-70% (cath)--s/p elective ascending aortic aneurysm repair with a 30 mm Dacron interposition graft/ reductive root aortoplasty of the right and noncoronary sinuses/ left atrial appendage epicardial closure with a 45 mm AtriClip (Cindi)  - Postural hypotension  - Neuropathy of feet with ambulation issues  - " Gastroparesis  - Hypothyroidism--levothyroxine  - MGUS  - Hx prostate cancer, s/p prostatectomy/XRT  - Hx leukopenia/ TCP  - Postop ABLA, expected--watch closely  - Postop TCP, consumptive--transfused 5/10  - Postop atrial fibrillation with RVR--IV amio    POD4:     Patient looks good this morning, sitting in bedside chair   Patient with postural hypotension, received fluid last night and BP is stable today  Patient still in SR, discontinue AV wires  Patient currently on RA, will order overnight oximetry tonight   On asa/statin/low dose bb  Continue routine care, mobilize, encourage use of IS  Patient with bed at rehab at discharge      [id number] Score for Atrial Fibrillation Stroke Risk   RESULT SUMMARY:  3 points  Stroke risk was 3.2% per year in >90,000 patients (the [place] Atrial Fibrillation Cohort Study) and 4.6% risk of stroke/TIA/systemic embolism.     Jessica Nelson PA-C  05/14/24  08:29 EDT

## 2024-05-14 NOTE — PLAN OF CARE
Goal Outcome Evaluation:      Pt. Demonstrates improved functional mobility this date mod A sit to stand transfer sand ambulates w/ min A + rolling walker support. Pt. W/ severe lateral lean sitting EOB w/ max A to correct. Pt. Continues to demonstrates cognitive deficits w/ decreased safety awareness. Recommend IP rehab at d/c to address aforementioned deficits.

## 2024-05-14 NOTE — THERAPY TREATMENT NOTE
Subjective: Pt agreeable to therapeutic plan of care.  Cognition: oriented to Person and Place    Objective:     Bed Mobility: Mod-A   Functional Transfers: Mod-A     Balance: supported, static, and standing CGA  Functional Ambulation: Min-A    Lower Body Dressing: Max-A  ADL Position: edge of bed sitting    Vitals: WNL    Pain: 0 VAS  Location: n/a  Interventions for pain: N/A  Education: Provided education on the importance of mobility in the acute care setting      Assessment: Panfilo Feliz presents with ADL impairments affecting function including balance, cognition, endurance / activity tolerance, and strength. Demonstrated functioning below baseline abilities indicate the need for continued skilled intervention while inpatient. Tolerating session today without incident. Will continue to follow and progress as tolerated.     Plan/Recommendations:   High Intensity Therapy recommended post-acute care. This is recommended as therapy feels the patient would require 5-6 days per week, 2-3 hours per day. At this time, inpatient rehabilitation (acute rehab) would be the first choice and SNF would be second.. Pt requires no DME at discharge.     Pt desires Inpatient Rehabilitation placement at discharge. Pt cooperative; agreeable to therapeutic recommendations and plan of care.     Modified [place]: N/A = No pre-op stroke/TIA    Post-Tx Position: Up in Chair  PPE: gloves

## 2024-05-14 NOTE — CASE MANAGEMENT/SOCIAL WORK
Continued Stay Note  TABITHA Cazares     Patient Name: Panfilo Feliz  MRN: [id number]  Today's Date: 5/14/2024    Admit Date: 5/10/2024    Plan: DC Plan: [facility] accepted and following. No precert or PASRR required.   Discharge Plan       Row Name 05/14/24 1628       Plan    Plan Comments CM spoke with nursing and [facility] NP to obtain and review clinical data. Patient to [facility] upon d/c. Dr. Puente said pt may be ready for d/c in 1-2 days. D/C Barriers: Cardiology following, Pacer wires, monitoring labs, febrile             Expected Discharge Date and Time       Expected Discharge Date Expected Discharge Time    May 15, 2024      Teresa Morrell RN     [phone]  [email or web address]

## 2024-05-14 NOTE — PROGRESS NOTES
"Cardiology Progress  Note      Patient Care Team:  Moreno Tapia APRN as PCP - General (Family Medicine)  Chalo Olvera MD as Consulting Physician (Hematology and Oncology)  Ganesh López MD as Consulting Physician (Neurology)  Jake Enriquez MD as Referring Physician (Family Medicine)    PATIENT IDENTIFICATION  Name: Panfilo Feliz  Age: 83 y.o.  Sex: male  :  1940  MRN: [id number]      Length of stay:    LOS: 4 days           REASON FOR FOLLOW-UP:  Ascending aortic aneurysm status postrepair  Aortic valve insufficiency-moderate        INTERVAL HISTORY  Patient seen and examined, chart and labs reviewed.  Sitting in bedside chair watching television reports feeling \"pretty good today\".  Reports good urine output overnight.      SUBJECTIVE    No chest discomfort  No shortness of breath  No GI complaints      REVIEW OF SYSTEMS:  Pertinent items are noted in HPI, all other systems reviewed and negative    OBJECTIVE   Diagnostics reviewed  Chest x-ray: Small left-sided pleural effusion with streaky bibasilar airspace opacity similar to prior exam.  No pneumothorax    ASSESSMENT  Elective ascending aortic aneurysm repair/reductive root aortoplasty/left atrial appendage closure (atrial clip)  Postural hypotension  Gastroparesis  Hypothyroidism  History of prostate cancer status post prostatectomy/XRT  Paroxysmal atrial fibrillation  Elevated chads vascular score  CHADS-VASc Risk Assessment               3 Total Score    1 Hypertension    2 Age >/= 75    Criteria that do not apply:    CHF    DM    PRIOR STROKE/TIA/THROMBO    Vascular Disease    Age 65-74    Sex: Female                RECOMMENDATIONS  Patient maintaining sinus rhythm  On low-dose BB  On aspirin/statin  Monitor replete electrolytes per protocol  Continue activity as tolerated  Plans for rehab at discharge          Vital Signs  Visit Vitals  /74 (BP Location: Left arm, Patient Position: Sitting)   Pulse 104   Temp (!) 101.5 °F (38.6 " "°C) (Oral)   Resp 23   Ht 193 cm (76\")   Wt 89.5 kg (197 lb 5 oz)   SpO2 93%   BMI 24.02 kg/m²     Oxygen Therapy  SpO2: 93 %  Pulse Oximetry Type: Continuous  Device (Oxygen Therapy): room air  Flow (L/min): 2  Oxygen Concentration (%): 40  Flowsheet Rows      Flowsheet Row First Filed Value   Admission Height 193 cm (76\") Documented at 05/09/2024 1042   Admission Weight 81.4 kg (179 lb 6.4 oz) Documented at 05/09/2024 1042          Intake & Output (last 3 days)         05/10 0701 05/11 0700 05/11 0701  05/12 0700 05/12 0701 05/13 0700 05/13 0701 05/14 0700    P.O. 240 1200 960 240    I.V. (mL/kg) 5367.9 (63) 2505 (28.5) 1398 (15.7)     Blood 759       Other 60       IV Piggyback 875 250      Total Intake(mL/kg) 7301.9 (85.7) 3955 (45) 2358 (26.4) 240 (2.7)    Urine (mL/kg/hr) 3130 (1.5) 660 (0.3) 1600 (0.7)     Emesis/NG output 0 0 0     Other 1250       Stool 0 0 0     Blood 500       Chest Tube 380 160 20     Dialysis 500       Total Output 5760 820 1620     Net +1541.9 +3135 +738 +240            Stool Unmeasured Occurrence  0 x 0 x     Emesis Unmeasured Occurrence  0 x 0 x           Lines, Drains & Airways       Active LDAs       Name Placement date Placement time Site Days    Peripheral IV 05/10/24 0630 Right Antecubital 05/10/24  0630  Antecubital  3    Peripheral IV 05/10/24 2100 Posterior;Right Hand 05/10/24  2100  Hand  2    Urethral Catheter Temperature probe 16 Fr. 05/10/24  --  -- 3    Pacer Wires 05/10/24  --  Atrial and Ventricular  3                           /74 (BP Location: Left arm, Patient Position: Sitting)   Pulse 104   Temp (!) 101.5 °F (38.6 °C) (Oral)   Resp 23   Ht 193 cm (76\")   Wt 89.5 kg (197 lb 5 oz)   SpO2 93%   BMI 24.02 kg/m²   Intake/Output last 3 shifts:  I/O last 3 completed shifts:  In: 3418 [P.O.:1320; I.V.:2098]  Out: 2510 [Urine:2510]  Intake/Output this shift:  I/O this shift:  In: 600 [P.O.:600]  Out: 475 [Urine:475]    PHYSICAL EXAM:    General: Alert, " cooperative, no distress, appears stated age  Head:  Normocephalic, atraumatic, mucous membranes moist  Eyes:  Conjunctivae/corneas clear, EOM's intact     Neck:  Supple,  no adenopathy; no JVD or bruit  Lungs: Clear to auscultation bilaterally, no wheezes, rhonchi or rales are noted  Chest wall: Sternal wound dry and intact  Heart::  Regular rate and rhythm, S1 and S2 normal, no murmur, rub or gallop  Abdomen: Soft, nontender, nondistended, bowel sounds active  Extremities: No cyanosis, clubbing, or edema   Pulses: 2+ and symmetric all extremities  Skin:  No rashes or lesions  Neuro/psych: A&O x3. CN II through XII are grossly intact with appropriate affect        Scheduled Meds:      aspirin, 81 mg, Oral, Daily  atorvastatin, 40 mg, Oral, Nightly  chlorhexidine, 15 mL, Mouth/Throat, Q12H  enoxaparin, 40 mg, Subcutaneous, Daily  escitalopram, 10 mg, Oral, Daily  escitalopram, 20 mg, Oral, Daily  insulin lispro, 2-7 Units, Subcutaneous, 4x Daily AC & at Bedtime  levothyroxine, 100 mcg, Oral, Q AM  magnesium oxide, 400 mg, Oral, Daily  metoprolol tartrate, 12.5 mg, Oral, Q12H  midodrine, 10 mg, Oral, TID AC  mupirocin, , Each Nare, BID  pantoprazole, 40 mg, Oral, Daily  polyethylene glycol, 17 g, Oral, BID  pregabalin, 50 mg, Oral, BID  senna-docusate sodium, 2 tablet, Oral, BID        Continuous Infusions:         PRN Meds:      acetaminophen **OR** [DISCONTINUED] acetaminophen **OR** [DISCONTINUED] acetaminophen    Calcium Replacement - Follow Nurse / BPA Driven Protocol    dextrose    dextrose    glucagon (human recombinant)    Magnesium Cardiology Dose Replacement - Follow Nurse / BPA Driven Protocol    nitroglycerin    ondansetron    Phosphorus Replacement - Follow Nurse / BPA Driven Protocol    Potassium Replacement - Follow Nurse / BPA Driven Protocol        Results Review:     I reviewed the patient's new clinical results.    CBC    Results from last 7 days   Lab Units 05/14/24  0412 05/13/24  0500  05/12/24  0535 05/11/24  0410 05/10/24  2245 05/10/24  2126 05/10/24  1451 05/10/24  1114 05/10/24  1024 05/10/24  0918 05/10/24  0916 05/10/24  0755 05/09/24  0830   WBC 10*3/mm3 6.41 6.94 7.72 5.32  --   --   --   --  3.08*  --  1.24*  --  3.31*   HEMOGLOBIN g/dL 10.0* 9.5* 10.1* 9.5*  --   --   --   --  11.2*  --  9.7*  --  13.4   HEMOGLOBIN, POC g/dL  --   --   --   --  9.7* 9.6* 11.1*   < >  --    < >  --    < >  --    PLATELETS 10*3/mm3 142 104* 104* 97*  --   --   --   --  118*  --  79*  --  176    < > = values in this interval not displayed.     Cr Clearance Estimated Creatinine Clearance: 87.9 mL/min (by C-G formula based on SCr of 0.82 mg/dL).  Coag   Results from last 7 days   Lab Units 05/11/24  0410 05/10/24  1024 05/10/24  0916 05/09/24  0830   INR  1.10 1.12* 1.38* 1.00   APTT seconds  --  29.4 30.9 33.2*     HbA1C   Lab Results   Component Value Date    HGBA1C 5.50 05/09/2024    HGBA1C 5.52 03/30/2021     Blood Glucose   Glucose   Date/Time Value Ref Range Status   05/14/2024 1619 103 70 - 105 mg/dL Final     Comment:     Serial Number: [id number]:  [id number]   05/14/2024 1225 141 (H) 70 - 105 mg/dL Final     Comment:     Serial Number: [id number]:  [id number]   05/14/2024 0810 109 (H) 70 - 105 mg/dL Final     Comment:     Serial Number: [id number]:  [id number]   05/13/2024 2015 135 (H) 70 - 105 mg/dL Final     Comment:     Serial Number: [id number]:  [id number]   05/13/2024 1742 136 (H) 70 - 105 mg/dL Final     Comment:     Serial Number: [id number]:  [id number]   05/13/2024 1616 164 (H) 70 - 105 mg/dL Final     Comment:     Serial Number: [id number]:  [id number]   05/13/2024 1233 102 70 - 105 mg/dL Final     Comment:     Serial Number: [id number]:  [id number]   05/13/2024 0621 109 (H) 70 - 105 mg/dL Final     Comment:     Serial Number: [id number]:  [id number]     Infection     CMP   Results from last 7 days   Lab Units 05/14/24  0412 05/13/24  0500  "05/12/24  0535 05/11/24  0410 05/10/24  2245 05/10/24  2126 05/10/24  1812 05/10/24  1201 05/10/24  1024 05/09/24  0830   SODIUM mmol/L 141 138 141 143  --   --   --   --  141 141   POTASSIUM mmol/L 4.1 4.0 4.2 4.2  --   --  4.0 3.9 4.5 4.9   CHLORIDE mmol/L 107 106 110* 113*  --   --   --   --  110* 106   CO2 mmol/L 25.0 25.0 24.0 21.0*  --   --   --   --  22.0 27.0   BUN mg/dL 14 21 20 17  --   --   --   --  20 24*   CREATININE mg/dL 0.82 0.94 0.91 0.90 0.74 0.79  --   --  0.95 1.32*   GLUCOSE mg/dL 112* 110* 123* 135*  --   --   --   --  151* 99   ALBUMIN g/dL  --   --   --  4.0  --   --   --   --  3.5 4.2   BILIRUBIN mg/dL  --   --   --   --   --   --   --   --   --  0.2   ALK PHOS U/L  --   --   --   --   --   --   --   --   --  59   AST (SGOT) U/L  --   --   --   --   --   --   --   --   --  19   ALT (SGPT) U/L  --   --   --   --   --   --   --   --   --  7     ABG    Results from last 7 days   Lab Units 05/10/24  2245 05/10/24  2126 05/10/24  1451 05/10/24  1329 05/10/24  1225 05/10/24  1114 05/10/24  0918   PH, ARTERIAL pH units 7.356 7.370 7.369 7.385 7.393 7.508* 7.390   PCO2, ARTERIAL mm Hg 42.4 36.3 39.1 37.1 36.7 26.0* 44.6   PO2 ART mm Hg 176.6* 145.7* 109.8* 167.8* 239.2* 230.7* 430.0*   O2 SATURATION ART % 99.5* 99.2* 98.1* 99.5* 99.8* 99.9* 100.0*   BASE EXCESS ART mmol/L -1.7* -3.8* -2.5* -2.5* -2.2* -1.3* 2.0     UA    Results from last 7 days   Lab Units 05/14/24  1640   NITRITE UA  Negative   WBC UA /HPF 3-5*   BACTERIA UA /HPF None Seen   SQUAM EPITHEL UA /HPF 0-2     DENISSE  No results found for: \"POCMETH\", \"POCAMPHET\", \"POCBARBITUR\", \"POCBENZO\", \"POCCOCAINE\", \"POCOPIATES\", \"POCOXYCODO\", \"POCPHENCYC\", \"POCPROPOXY\", \"POCTHC\", \"POCTRICYC\"  Lysis Labs   Results from last 7 days   Lab Units 05/14/24  0412 05/13/24  0500 05/12/24  0535 05/11/24  0410 05/10/24  2245 05/10/24  2126 05/10/24  1451 05/10/24  1114 05/10/24  1024 05/10/24  0918 05/10/24  0916 05/10/24  0755 05/09/24  0830   INR   --   --   " --  1.10  --   --   --   --  1.12*  --  1.38*  --  1.00   APTT seconds  --   --   --   --   --   --   --   --  29.4  --  30.9  --  33.2*   FIBRINOGEN mg/dL  --   --   --   --   --   --   --   --   --   --  249  --   --    HEMOGLOBIN g/dL 10.0* 9.5* 10.1* 9.5*  --   --   --   --  11.2*  --  9.7*  --  13.4   HEMOGLOBIN, POC g/dL  --   --   --   --  9.7* 9.6* 11.1*   < >  --    < >  --    < >  --    PLATELETS 10*3/mm3 142 104* 104* 97*  --   --   --   --  118*  --  79*  --  176   CREATININE mg/dL 0.82 0.94 0.91 0.90 0.74 0.79  --   --  0.95  --   --   --  1.32*    < > = values in this interval not displayed.     Radiology(recent) XR Chest 1 View    Result Date: 5/14/2024  1. Small left-sided pleural effusion and streaky bibasilar airspace opacity, similar to the prior examination. No evidence of pneumothorax.  Electronically Signed By-Dav Pollard MD On:5/14/2024 2:16 PM               X-rays, labs reviewed personally by physician.    ECG/EMG Results (most recent)       Procedure Component Value Units Date/Time    ECG 12 Lead Other; s/p aortic surgery [[id number]] Collected: 05/10/24 1434     Updated: 05/10/24 1830     QT Interval 378 ms      QTC Interval 464 ms     Narrative:      HEART RATE= 91  bpm  RR Interval= 664  ms  MI Interval= 269  ms  P Horizontal Axis= 23  deg  P Front Axis= 63  deg  QRSD Interval= 110  ms  QT Interval= 378  ms  QTcB= 464  ms  QRS Axis= -26  deg  T Wave Axis= -30  deg  - ABNORMAL ECG -  Sinus rhythm  Prolonged MI interval  Nonspecific intraventricular conduction delay  When compared with ECG of 09-May-2024 8:41:16,  Significant axis, voltage or hypertrophy change  Electronically Signed By: Julio C Sanchez ([facility]) 10-May-2024 18:30:30  Date and Time of Study: 2024-05-10 14:34:24    ECG 12 Lead Other; s/p aortic surgery [[id number]] Collected: 05/11/24 0353     Updated: 05/11/24 1725     QT Interval 411 ms      QTC Interval 441 ms     Narrative:      HEART RATE= 69  bpm  RR Interval= 868   ms  VA Interval= 241  ms  P Horizontal Axis= 19  deg  P Front Axis= 85  deg  QRSD Interval= 128  ms  QT Interval= 411  ms  QTcB= 441  ms  QRS Axis= -6  deg  T Wave Axis= -4  deg  - ABNORMAL ECG -  Sinus rhythm  Prolonged VA interval  Nonspecific intraventricular conduction delay  Lateral infarct, acute (LAD)  When compared with ECG of 10-May-2024 14:34:24,  Significant rate decrease  Electronically Signed By: Julio C Sanchez ([facility]) 11-May-2024 17:25:41  Date and Time of Study: 2024-05-11 03:53:49    ECG 12 Lead Rhythm Change [[id number]] Collected: 05/12/24 0550     Updated: 05/12/24 1727     QT Interval 331 ms      QTC Interval 488 ms     Narrative:      HEART RATE= 131  bpm  RR Interval= 460  ms  VA Interval= 126  ms  P Horizontal Axis= 45  deg  P Front Axis= 77  deg  QRSD Interval= 111  ms  QT Interval= 331  ms  QTcB= 488  ms  QRS Axis= 16  deg  T Wave Axis= 119  deg  - ABNORMAL ECG -  Nonspecific intraventricular conduction delay  Inferior infarct, old  When compared with ECG of 12-May-2024 4:21:16,  Significant rate increase  Atrial flutter  Electronically Signed By: Julio C Sanchez ([facility]) 12-May-2024 17:27:24  Date and Time of Study: 2024-05-12 05:50:57    ECG 12 Lead Other; s/p aortic surgery [[id number]] Collected: 05/12/24 0421     Updated: 05/12/24 1727     QT Interval 381 ms      QTC Interval 449 ms     Narrative:      HEART RATE= 83  bpm  RR Interval= 720  ms  VA Interval= 201  ms  P Horizontal Axis= -34  deg  P Front Axis= 58  deg  QRSD Interval= 110  ms  QT Interval= 381  ms  QTcB= 449  ms  QRS Axis= 23  deg  T Wave Axis= 18  deg  - ABNORMAL ECG -  Sinus rhythm  Nonspecific intraventricular conduction delay  ST elevation, consider lateral injury  When compared with ECG of 11-May-2024 3:56:55,  Significant change in rhythm  Electronically Signed By: Julio C Sanchez ([facility]) 12-May-2024 17:27:34  Date and Time of Study: 2024-05-12 04:21:16    ECG 12 Lead Drug Monitoring; Amiodarone  [[id number]] Collected: 05/13/24 0551     Updated: 05/13/24 1910     QT Interval 392 ms      QTC Interval 454 ms     Narrative:      HEART RATE= 81  bpm  RR Interval= 744  ms  CA Interval= 135  ms  P Horizontal Axis= -31  deg  P Front Axis= -20  deg  QRSD Interval= 111  ms  QT Interval= 392  ms  QTcB= 454  ms  QRS Axis= 38  deg  T Wave Axis= 37  deg  - ABNORMAL ECG -  Sinus rhythm  Nonspecific intraventricular conduction delay  ST elevation, consider inferior injury  When compared with ECG of 12-May-2024 5:50:57,  Significant change in rhythm  Electronically Signed By: Michael Blackburn ([facility]) 13-May-2024 19:10:21  Date and Time of Study: 2024-05-13 05:51:33    ECG 12 Lead Drug Monitoring; Amiodarone [[id number]] Collected: 05/14/24 0334     Updated: 05/14/24 0335     QT Interval 400 ms      QTC Interval 479 ms     Narrative:      HEART RATE= 86  bpm  RR Interval= 696  ms  CA Interval= 188  ms  P Horizontal Axis= 28  deg  P Front Axis= 33  deg  QRSD Interval= 116  ms  QT Interval= 400  ms  QTcB= 479  ms  QRS Axis= 49  deg  T Wave Axis= 52  deg  - ABNORMAL ECG -  Sinus rhythm  Nonspecific intraventricular conduction delay  When compared with ECG of 13-May-2024 5:51:33,  No significant change  Electronically Signed By:   Date and Time of Study: 2024-05-14 03:34:19              Medication Review:   I have reviewed the patient's current medication list  Scheduled Meds:aspirin, 81 mg, Oral, Daily  atorvastatin, 40 mg, Oral, Nightly  chlorhexidine, 15 mL, Mouth/Throat, Q12H  enoxaparin, 40 mg, Subcutaneous, Daily  escitalopram, 10 mg, Oral, Daily  escitalopram, 20 mg, Oral, Daily  insulin lispro, 2-7 Units, Subcutaneous, 4x Daily AC & at Bedtime  levothyroxine, 100 mcg, Oral, Q AM  magnesium oxide, 400 mg, Oral, Daily  metoprolol tartrate, 12.5 mg, Oral, Q12H  midodrine, 10 mg, Oral, TID AC  mupirocin, , Each Nare, BID  pantoprazole, 40 mg, Oral, Daily  polyethylene glycol, 17 g, Oral, BID  pregabalin, 50 mg, Oral,  BID  senna-docusate sodium, 2 tablet, Oral, BID      Continuous Infusions:   PRN Meds:.  acetaminophen **OR** [DISCONTINUED] acetaminophen **OR** [DISCONTINUED] acetaminophen    Calcium Replacement - Follow Nurse / BPA Driven Protocol    dextrose    dextrose    glucagon (human recombinant)    Magnesium Cardiology Dose Replacement - Follow Nurse / BPA Driven Protocol    nitroglycerin    ondansetron    Phosphorus Replacement - Follow Nurse / BPA Driven Protocol    Potassium Replacement - Follow Nurse / BPA Driven Protocol    Imaging:  Imaging Results (Last 72 Hours)       Procedure Component Value Units Date/Time    XR Chest 1 View [[id number]] Collected: 05/14/24 1415     Updated: 05/14/24 1418    Narrative:      EXAMINATION: XR CHEST 1 VW-     DATE OF EXAM: 5/14/2024 10:48 AM     INDICATION: post op open heart; I71.21-Aneurysm of the ascending aorta,  without rupture.     COMPARISON: Chest radiograph dated 5/12/2024.     TECHNIQUE: Portable AP view of the chest was obtained.     FINDINGS:  There are postsurgical changes of median sternotomy and atrial appendage  clipping. The cardiomediastinal silhouette is within normal limits.  There is aortic arch atherosclerotic calcification. Pulmonary  vascularity appears normal. There is a small left-sided pleural  effusion. There is streaky bibasilar airspace opacity. There is no  visible pneumothorax. There are degenerative changes of the thoracic  spine.       Impression:      1. Small left-sided pleural effusion and streaky bibasilar airspace  opacity, similar to the prior examination. No evidence of pneumothorax.     Electronically Signed By-Dav Pollard MD On:5/14/2024 2:16 PM       XR Chest 1 View [[id number]] Collected: 05/12/24 1248     Updated: 05/12/24 1258    Narrative:      XR CHEST 1 VW    Date of Exam: 5/12/2024 12:43 PM EDT    Indication: post chest tube removal    Comparison: Chest radiograph 5/12/2024    Findings:  Right IJ central venous catheter tip in  "stable position. Median sternotomy. Left atrial appendage closure device. Heart size stable. Layering effusions and underlying bibasilar opacities favoring atelectasis stable from prior. No new consolidation,   worsening edema or pneumothorax. Degenerative related osseous changes.      Impression:      Impression:  Stable radiographic appearance of the chest since prior comparison. No visualized pneumothorax.      Electronically Signed: Fab Davis MD    5/12/2024 12:55 PM EDT    Workstation ID: [id number]    XR Chest 1 View [[id number]] Collected: 05/12/24 0829     Updated: 05/12/24 0833    Narrative:      XR CHEST 1 VW    Date of Exam: 5/12/2024 5:23 AM EDT    Indication: Post-Op Heart Surgery    Comparison: Chest radiograph 5/11/2024    Findings:  Right IJ central venous catheter tip terminates over mid SVC. Median sternotomy. Left atrial appendage closure device. Epicardial pacing wires. Central thoracotomy tubes in stable position. Bibasilar airspace opacities and small effusions which appear   increasing in the right lower lobe. No overt edema. No pneumothorax. Heart size unchanged from prior exam. Aortic atherosclerotic disease. Degenerative related osseous changes.      Impression:      Impression:  1. Increasing right lower lobe opacities and/or effusion, probably representing atelectasis given acuity. Left basilar opacities and effusion appear stable.  2. No pneumothorax.      Electronically Signed: Fab Davis MD    5/12/2024 8:31 AM EDT    Workstation ID: [id number]              JAVY Jolly  05/14/24  16:57 EDT       EMR Dragon/Transcription:   \"Dictated utilizing Dragon dictation\".       Electronically signed by JAVY Jolly, 05/14/24, 4:57 PM EDT.    Copied text in this note has been reviewed by me and is accurate as of 05/14/24.    Cardiology attending:  Seen and examined.  Chart and labs reviewed. Independent interpretations of cardiac testing was performed. History and exam " findings are verified with above changes noted.  Assessment and plan notated by APC after being formulated by attending consultant.  Note that greater than 50% of the time spent in care of the patient was provided by attending consultant.    Patient he did receive an IV fluid bolus yesterday which may have improved his symptoms.  He has been ambulating.  He does have some sternal discomfort.  He does feel better today.  Rhythm remained stable.  No further atrial fibrillation.  Continue to monitor rate and rhythm closely.  Patient planning for discharge to rehab in the coming days.    Physical Exam:    General: Alert, cooperative, no distress, appears stated age  Head:  Normocephalic, atraumatic, mucous membranes moist  Eyes:  Conjunctivae/corneas clear, EOM's intact     Neck:  Supple,  no bruit  Lungs:            Coarse and diminished  Chest wall: Tender at incision  Heart::  Regular rate and rhythm, S1 and S2 normal, 1/6 systolic ejection murmur.  No rub or gallop  Abdomen: Soft, nontender, nondistended bowel sounds active  Extremities: No cyanosis, clubbing, or edema  Pulses: 2+ and symmetric all extremities  Skin:  No rashes or lesions  Neuro/psych: A&O x3. CN II through XII are grossly intact with appropriate affect

## 2024-05-14 NOTE — THERAPY TREATMENT NOTE
Subjective: Pt agreeable to therapeutic plan of care.    Objective:     Bed mobility - N/A or Not attempted. Pt up in chair.   Transfers - Mod-A and with rolling walker sit to stand. Max cues and guided movement for increased anterior wt shift to ease transition for sit to stand. Stand to sit completed with Marky to control lowering.   Ambulation - 120', 30' CGA and with rolling walker. Cues for increased stride length, forward gaze, and increased KELTON.     Phase 1 Cardiac Rehab Initiated - Acute Care    Cardiac Level III Activities  Sitting tolerance: >10min and supported  Standing tolerance: 5-10min and supported    Precautions:  Mid-sternal incision; avoid scapular retraction and depression.  Cardiovascular impairment post-sx; encourage energy conservation strategies.    MET level equivalent: 1.4-2.0 (Self care ADLs in sitting / slow ambulation in room, light intensity activities)     Vitals: Orthostatic. Pt is orthostatic with >20 mmHg drop for systolic BP during Sit to stand; however remains in a safe range today with systolic ~100 and MAP consistently >65 mmHg.     Pain: 6 VAS   Location: sternal when coughing.   Intervention for pain: Repositioned, RN notified, Increased Activity, and Therapeutic Presence    Education: Provided education on the importance of mobility in the acute care setting, Verbal/Tactile Cues, Transfer Training, Gait Training, Energy conservation strategies, and Post-Op Precautions    Assessment: Panfilo Feliz is progressing well toward goals. Improved BP noted today and pt is able to safely increase gait distance. He presents with continued gait, balance, endurance, and functional mobility impairments which indicate the need for skilled intervention. Especially sit to stand assist needs prevent pt from being safe to DC home with spouse's assist; she is unable to provide any lifting assistance. Pt is highly motivated and works to fatigue. Tolerating session today without incident. Will  continue to follow and progress as tolerated.     Plan/Recommendations:   If medically appropriate, High Intensity Therapy recommended post-acute care. This is recommended as therapy feels the patient would require 5-6 days per week, 2-3 hours per day. At this time, inpatient rehabilitation (acute rehab) would be the first choice and SNF would be second. Pt requires no DME at discharge.     Pt desires Inpatient Rehabilitation placement at discharge. Pt cooperative; agreeable to therapeutic recommendations and plan of care.         Basic Mobility 6-click:  Rollin = Total, A lot = 2, A little = 3; 4 = None  Supine>Sit:   1 = Total, A lot = 2, A little = 3; 4 = None   Sit>Stand with arms:  1 = Total, A lot = 2, A little = 3; 4 = None  Bed>Chair:   1 = Total, A lot = 2, A little = 3; 4 = None  Ambulate in room:  1 = Total, A lot = 2, A little = 3; 4 = None  3-5 Steps with railin = Total, A lot = 2, A little = 3; 4 = None  Score: 13    Modified [place]: N/A = No pre-op stroke/TIA    Post-Tx Position: Up in Chair, Alarms activated, and Call light and personal items within reach  PPE: gloves

## 2024-05-15 LAB
ANION GAP SERPL CALCULATED.3IONS-SCNC: 7 MMOL/L (ref 5–15)
BUN SERPL-MCNC: 12 MG/DL (ref 8–23)
BUN/CREAT SERPL: 15.6 (ref 7–25)
CALCIUM SPEC-SCNC: 8.2 MG/DL (ref 8.6–10.5)
CHLORIDE SERPL-SCNC: 106 MMOL/L (ref 98–107)
CO2 SERPL-SCNC: 29 MMOL/L (ref 22–29)
CREAT SERPL-MCNC: 0.77 MG/DL (ref 0.76–1.27)
DEPRECATED RDW RBC AUTO: 46.7 FL (ref 37–54)
EGFRCR SERPLBLD CKD-EPI 2021: 89.4 ML/MIN/1.73
ERYTHROCYTE [DISTWIDTH] IN BLOOD BY AUTOMATED COUNT: 14.1 % (ref 12.3–15.4)
GLUCOSE BLDC GLUCOMTR-MCNC: 100 MG/DL (ref 70–105)
GLUCOSE BLDC GLUCOMTR-MCNC: 111 MG/DL (ref 70–105)
GLUCOSE BLDC GLUCOMTR-MCNC: 124 MG/DL (ref 70–105)
GLUCOSE BLDC GLUCOMTR-MCNC: 99 MG/DL (ref 70–105)
GLUCOSE SERPL-MCNC: 103 MG/DL (ref 65–99)
HCT VFR BLD AUTO: 31.6 % (ref 37.5–51)
HGB BLD-MCNC: 10.1 G/DL (ref 13–17.7)
MCH RBC QN AUTO: 29 PG (ref 26.6–33)
MCHC RBC AUTO-ENTMCNC: 32 G/DL (ref 31.5–35.7)
MCV RBC AUTO: 90.8 FL (ref 79–97)
PLATELET # BLD AUTO: 151 10*3/MM3 (ref 140–450)
PMV BLD AUTO: 10.1 FL (ref 6–12)
POTASSIUM SERPL-SCNC: 4.1 MMOL/L (ref 3.5–5.2)
RBC # BLD AUTO: 3.48 10*6/MM3 (ref 4.14–5.8)
SODIUM SERPL-SCNC: 142 MMOL/L (ref 136–145)
WBC NRBC COR # BLD AUTO: 4.79 10*3/MM3 (ref 3.4–10.8)

## 2024-05-15 PROCEDURE — 82948 REAGENT STRIP/BLOOD GLUCOSE: CPT

## 2024-05-15 PROCEDURE — 97530 THERAPEUTIC ACTIVITIES: CPT

## 2024-05-15 PROCEDURE — 25010000002 ONDANSETRON PER 1 MG: Performed by: NURSE PRACTITIONER

## 2024-05-15 PROCEDURE — 99232 SBSQ HOSP IP/OBS MODERATE 35: CPT | Performed by: INTERNAL MEDICINE

## 2024-05-15 PROCEDURE — 85027 COMPLETE CBC AUTOMATED: CPT | Performed by: NURSE PRACTITIONER

## 2024-05-15 PROCEDURE — 93005 ELECTROCARDIOGRAM TRACING: CPT | Performed by: NURSE PRACTITIONER

## 2024-05-15 PROCEDURE — 93010 ELECTROCARDIOGRAM REPORT: CPT | Performed by: INTERNAL MEDICINE

## 2024-05-15 PROCEDURE — 25010000002 ENOXAPARIN PER 10 MG: Performed by: THORACIC SURGERY (CARDIOTHORACIC VASCULAR SURGERY)

## 2024-05-15 PROCEDURE — 82948 REAGENT STRIP/BLOOD GLUCOSE: CPT | Performed by: THORACIC SURGERY (CARDIOTHORACIC VASCULAR SURGERY)

## 2024-05-15 PROCEDURE — 93005 ELECTROCARDIOGRAM TRACING: CPT | Performed by: THORACIC SURGERY (CARDIOTHORACIC VASCULAR SURGERY)

## 2024-05-15 PROCEDURE — 99024 POSTOP FOLLOW-UP VISIT: CPT | Performed by: THORACIC SURGERY (CARDIOTHORACIC VASCULAR SURGERY)

## 2024-05-15 PROCEDURE — 97116 GAIT TRAINING THERAPY: CPT

## 2024-05-15 PROCEDURE — 80048 BASIC METABOLIC PNL TOTAL CA: CPT | Performed by: NURSE PRACTITIONER

## 2024-05-15 RX ORDER — BISACODYL 10 MG
10 SUPPOSITORY, RECTAL RECTAL ONCE
Status: COMPLETED | OUTPATIENT
Start: 2024-05-15 | End: 2024-05-15

## 2024-05-15 RX ORDER — LACTULOSE 10 G/15ML
20 SOLUTION ORAL ONCE
Status: COMPLETED | OUTPATIENT
Start: 2024-05-15 | End: 2024-05-15

## 2024-05-15 RX ADMIN — PREGABALIN 50 MG: 25 CAPSULE ORAL at 20:10

## 2024-05-15 RX ADMIN — SENNOSIDES AND DOCUSATE SODIUM 2 TABLET: 50; 8.6 TABLET ORAL at 20:10

## 2024-05-15 RX ADMIN — CHLORHEXIDINE GLUCONATE, 0.12% ORAL RINSE 15 ML: 1.2 SOLUTION DENTAL at 20:09

## 2024-05-15 RX ADMIN — Medication 12.5 MG: at 08:36

## 2024-05-15 RX ADMIN — MUPIROCIN 1 APPLICATION: 20 OINTMENT TOPICAL at 08:35

## 2024-05-15 RX ADMIN — Medication 12.5 MG: at 20:10

## 2024-05-15 RX ADMIN — Medication 400 MG: at 08:36

## 2024-05-15 RX ADMIN — ONDANSETRON 4 MG: 2 INJECTION INTRAMUSCULAR; INTRAVENOUS at 11:53

## 2024-05-15 RX ADMIN — ATORVASTATIN CALCIUM 40 MG: 40 TABLET, FILM COATED ORAL at 20:10

## 2024-05-15 RX ADMIN — LACTULOSE 20 G: 20 SOLUTION ORAL at 09:30

## 2024-05-15 RX ADMIN — POLYETHYLENE GLYCOL 3350 17 G: 17 POWDER, FOR SOLUTION ORAL at 08:35

## 2024-05-15 RX ADMIN — POLYETHYLENE GLYCOL 3350 17 G: 17 POWDER, FOR SOLUTION ORAL at 20:09

## 2024-05-15 RX ADMIN — ENOXAPARIN SODIUM 40 MG: 100 INJECTION SUBCUTANEOUS at 16:48

## 2024-05-15 RX ADMIN — PREGABALIN 50 MG: 25 CAPSULE ORAL at 08:36

## 2024-05-15 RX ADMIN — ESCITALOPRAM OXALATE 20 MG: 10 TABLET ORAL at 08:36

## 2024-05-15 RX ADMIN — Medication 12.5 MG: at 21:44

## 2024-05-15 RX ADMIN — ESCITALOPRAM OXALATE 10 MG: 10 TABLET ORAL at 08:39

## 2024-05-15 RX ADMIN — CHLORHEXIDINE GLUCONATE, 0.12% ORAL RINSE 15 ML: 1.2 SOLUTION DENTAL at 08:35

## 2024-05-15 RX ADMIN — ASPIRIN 81 MG: 81 TABLET, COATED ORAL at 08:35

## 2024-05-15 RX ADMIN — LEVOTHYROXINE SODIUM 100 MCG: 0.1 TABLET ORAL at 06:44

## 2024-05-15 RX ADMIN — PANTOPRAZOLE SODIUM 40 MG: 40 TABLET, DELAYED RELEASE ORAL at 08:35

## 2024-05-15 RX ADMIN — BISACODYL 10 MG: 10 SUPPOSITORY RECTAL at 13:44

## 2024-05-15 RX ADMIN — SENNOSIDES AND DOCUSATE SODIUM 2 TABLET: 50; 8.6 TABLET ORAL at 08:35

## 2024-05-15 NOTE — PROGRESS NOTES
S/P POD# 5  elective ascending aortic aneurysm repair/ reductive root aortoplasty/ left atrial appendage closure (AtriClip)--Cindi  EF 65-70% (cath)    Subjective:  no c/o's today    Temp 101.5 yest afternoon--UA negative for UTI, blood cultures sent  Nocturnal oximetry showed 37 mins desaturation < 89%  Wt is up 6 kgs from preop      Intake/Output Summary (Last 24 hours) at 5/15/2024 0832  Last data filed at 5/15/2024 0600  Gross per 24 hour   Intake 720 ml   Output 1550 ml   Net -830 ml     Temp:  [96.9 °F (36.1 °C)-101.5 °F (38.6 °C)] 98 °F (36.7 °C)  Heart Rate:  [] 91  Resp:  [19-24] 20  BP: (111-142)/(64-78) 134/77      Results from last 7 days   Lab Units 05/15/24  0407 05/14/24  0412 05/12/24  0535 05/11/24  0410 05/10/24  1114 05/10/24  1024 05/10/24  0918 05/10/24  0916   WBC 10*3/mm3 4.79 6.41   < > 5.32  --  3.08*  --  1.24*   HEMOGLOBIN g/dL 10.1* 10.0*   < > 9.5*  --  11.2*  --  9.7*   HEMOGLOBIN, POC   --   --   --   --    < >  --    < >  --    HEMATOCRIT % 31.6* 31.6*   < > 30.3*  --  34.4*  --  30.6*   HEMATOCRIT POC   --   --   --   --    < >  --    < >  --    PLATELETS 10*3/mm3 151 142   < > 97*  --  118*  --  79*   INR   --   --   --  1.10  --  1.12*  --  1.38*    < > = values in this interval not displayed.     Results from last 7 days   Lab Units 05/15/24  0407 05/12/24  0535 05/11/24  0410   CREATININE mg/dL 0.77   < > 0.90   POTASSIUM mmol/L 4.1   < > 4.2   SODIUM mmol/L 142   < > 143   MAGNESIUM mg/dL  --   --  2.6*   PHOSPHORUS mg/dL  --   --  3.9    < > = values in this interval not displayed.       Physical Exam:  Neuro intact, nad, up in recliner, no family present  Tele:  SR 90s  Diminished bases, 93% RA  Sternotomy healing well  Benign abd, no BM, + flatus  No edema    Assessment/Plan:  Principal Problem:    Ascending aortic aneurysm  Active Problems:    Aneurysm of ascending aorta without rupture    -Ascending aortic aneurysm, 5.2 cm/ aortic root dilatation 4.5 cm with moderate  aortic regurgitation, EF 65-70% (cath)--s/p elective ascending aortic aneurysm repair with a 30 mm Dacron interposition graft/ reductive root aortoplasty of the right and noncoronary sinuses/ left atrial appendage epicardial closure with a 45 mm AtriClip (Cindi)  - Postural hypotension  - Neuropathy of feet with ambulation issues  - Gastroparesis  - Hypothyroidism--levothyroxine  - MGUS  - Hx prostate cancer, s/p prostatectomy/XRT  - Hx leukopenia/ TCP  - Postop ABLA, expected--watch closely  - Postop TCP, consumptive--transfused 5/10  - Postop atrial fibrillation with RVR--IV amio    POD# 5.  Doing well.  Still no BM.  Will add lactulose today.  On asa/statin/low dose bb.  He has hx postural hypotension, but had been on 25 mg metoprolol at home bid. Plans for [facility] after BM.     [id number] Score for Atrial Fibrillation Stroke Risk   RESULT SUMMARY:  3 points  Stroke risk was 3.2% per year in >90,000 patients (the [place] Atrial Fibrillation Cohort Study) and 4.6% risk of stroke/TIA/systemic embolism.    Routine care--as above  D/w pt/nsg, family, Dr. Puente  Anticipate rehab at discharge    Kayli Nassar-Nathaniel, JAVY  5/15/2024  08:32 EDT

## 2024-05-15 NOTE — CASE MANAGEMENT/SOCIAL WORK
Continued Stay Note   [facility]     Patient Name: Panfilo Feliz  MRN: [id number]  Today's Date: 5/15/2024    Admit Date: 5/10/2024    Plan: DC Plan: [facility] accepted and following. No precert or PASRR required.   Discharge Plan       Row Name 05/15/24 1630       Plan    Plan DC Plan: [facility] accepted and following. No precert or PASRR required.    Patient/Family in Agreement with Plan yes    Provided Post Acute Provider List? N/A    Provided Post Acute Provider Quality & Resource List? N/A    Plan Comments Update: CM was informed by liaison that there last bed was given to another patient today. They can accept tomorrow if patient is able to have a BM. CM notified CTS NP of information.                 Expected Discharge Date and Time       Expected Discharge Date Expected Discharge Time    May 16, 2024           Phone communication or documentation only- no physical contact with patient or family.      Denisa Olmos RN    Office Phone: [phone]  Office Cell:     [phone]

## 2024-05-15 NOTE — THERAPY TREATMENT NOTE
Subjective: Pt agreeable to therapeutic plan of care. Pleasant and highly motivated.     Objective:     Bed mobility - N/A or Not attempted. Pt up in chair.   Transfers - Min-A and Mod-A sit to/from stand from chair. Prior to transfers PT demos technique for increased anterior wt shift. Verbal and tactile cues provided and pt demos improved technique today.   Ambulation - 150' feet SBA, CGA, and with rolling walker    Phase 1 Cardiac Rehab Initiated - Acute Care    Cardiac Level III Activities  Sitting tolerance: >10min and supported  Standing tolerance: 5-10min and supported    Precautions:  Mid-sternal incision; avoid scapular retraction and depression.  Cardiovascular impairment post-sx; encourage energy conservation strategies.      MET level equivalent: 1.4-2.0 (Self care ADLs in sitting / slow ambulation in room, light intensity activities)     Vitals: Orthostatic but remains within safe range and with MAP >65. BP as follows  - seated 136/76, standing 96/62 (MAP 73), seated 135/90.    Pain: 4 VAS   Location: sternal   Intervention for pain: Repositioned, Increased Activity, and Therapeutic Presence    Education: Provided education on the importance of mobility in the acute care setting, Verbal/Tactile Cues, Transfer Training, Gait Training, Energy conservation strategies, and Post-Op Precautions    Assessment: Panfilo Feliz presents with improving transfer technique and confidence with gait. Continued endurance and functional mobility impairments indicate the need for skilled intervention. Tolerating session today without incident. Will continue to follow and progress as tolerated.     Plan/Recommendations:   If medically appropriate, High Intensity Therapy recommended post-acute care. This is recommended as therapy feels the patient would require 5-6 days per week, 2-3 hours per day. At this time, inpatient rehabilitation (acute rehab) would be the first choice and SNF would be second. Pt requires no DME at  discharge.     Pt desires Inpatient Rehabilitation placement at discharge. Pt cooperative; agreeable to therapeutic recommendations and plan of care.         Basic Mobility 6-click:  Rollin = Total, A lot = 2, A little = 3; 4 = None  Supine>Sit:   1 = Total, A lot = 2, A little = 3; 4 = None   Sit>Stand with arms:  1 = Total, A lot = 2, A little = 3; 4 = None  Bed>Chair:   1 = Total, A lot = 2, A little = 3; 4 = None  Ambulate in room:  1 = Total, A lot = 2, A little = 3; 4 = None  3-5 Steps with railin = Total, A lot = 2, A little = 3; 4 = None  Score: 14    Modified Cruz: N/A = No pre-op stroke/TIA    Post-Tx Position: Up in Chair, Alarms activated, and Call light and personal items within reach  PPE: gloves

## 2024-05-15 NOTE — PROGRESS NOTES
Cardiology Progress  Note      Patient Care Team:  Moreno Tapia APRN as PCP - General (Family Medicine)  Chalo Olvera MD as Consulting Physician (Hematology and Oncology)  Ganesh López MD as Consulting Physician (Neurology)  Jake Enriquez MD as Referring Physician (Family Medicine)    PATIENT IDENTIFICATION  Name: Panfilo Feliz  Age: 83 y.o.  Sex: male  :  1940  MRN: [id number]      Length of stay:    LOS: 5 days           REASON FOR FOLLOW-UP:  Ascending aortic aneurysm status postrepair  Aortic valve insufficiency-moderate        INTERVAL HISTORY  Patient seen and examined, chart and labs reviewed.  Sitting in bedside chair watching television reports feeling well.  Reports good urine output overnight.  Temp 101.5 yesterday afternoon.  UA negative, blood culture sent.  Nocturnal oximetry showed significant desaturation overnight less than 89%.  Still no BM.      SUBJECTIVE    No chest discomfort  No shortness of breath  No GI complaints      REVIEW OF SYSTEMS:  Pertinent items are noted in HPI, all other systems reviewed and negative    OBJECTIVE   Diagnostics reviewed      ASSESSMENT  Elective ascending aortic aneurysm repair/reductive root aortoplasty/left atrial appendage closure (atrial clip)  Postural hypotension  Gastroparesis  Hypothyroidism  History of prostate cancer status post prostatectomy/XRT  Paroxysmal atrial fibrillation  Elevated chads vascular score  CHADS-VASc Risk Assessment               3 Total Score    1 Hypertension    2 Age >/= 75    Criteria that do not apply:    CHF    DM    PRIOR STROKE/TIA/THROMBO    Vascular Disease    Age 65-74    Sex: Female                RECOMMENDATIONS  Patient maintaining sinus rhythm  On low-dose BB  On aspirin/statin  Monitor replete electrolytes per protocol  Continue activity as tolerated  Plans for rehab at discharge          Vital Signs  Visit Vitals  /90 (BP Location: Left arm, Patient Position: Sitting)   Pulse 99   Temp  "97.5 °F (36.4 °C) (Axillary)   Resp 19   Ht 193 cm (76\")   Wt 87.9 kg (193 lb 12.6 oz)   SpO2 97%   BMI 23.59 kg/m²     Oxygen Therapy  SpO2: 97 %  Pulse Oximetry Type: Continuous  Device (Oxygen Therapy): room air  Flow (L/min): 2  Oxygen Concentration (%): 40  Flowsheet Rows      Flowsheet Row First Filed Value   Admission Height 193 cm (76\") Documented at 05/09/2024 1042   Admission Weight 81.4 kg (179 lb 6.4 oz) Documented at 05/09/2024 1042          Intake & Output (last 3 days)         05/10 0701 05/11 0700 05/11 0701 05/12 0700 05/12 0701 05/13 0700 05/13 0701 05/14 0700    P.O. 240 1200 960 240    I.V. (mL/kg) 5367.9 (63) 2505 (28.5) 1398 (15.7)     Blood 759       Other 60       IV Piggyback 875 250      Total Intake(mL/kg) 7301.9 (85.7) 3955 (45) 2358 (26.4) 240 (2.7)    Urine (mL/kg/hr) 3130 (1.5) 660 (0.3) 1600 (0.7)     Emesis/NG output 0 0 0     Other 1250       Stool 0 0 0     Blood 500       Chest Tube 380 160 20     Dialysis 500       Total Output 5760 820 1620     Net +1541.9 +3135 +738 +240            Stool Unmeasured Occurrence  0 x 0 x     Emesis Unmeasured Occurrence  0 x 0 x           Lines, Drains & Airways       Active LDAs       Name Placement date Placement time Site Days    Peripheral IV 05/10/24 0630 Right Antecubital 05/10/24  0630  Antecubital  3    Peripheral IV 05/10/24 2100 Posterior;Right Hand 05/10/24  2100  Hand  2    Urethral Catheter Temperature probe 16 Fr. 05/10/24  --  -- 3    Pacer Wires 05/10/24  --  Atrial and Ventricular  3                           /90 (BP Location: Left arm, Patient Position: Sitting)   Pulse 99   Temp 97.5 °F (36.4 °C) (Axillary)   Resp 19   Ht 193 cm (76\")   Wt 87.9 kg (193 lb 12.6 oz)   SpO2 97%   BMI 23.59 kg/m²   Intake/Output last 3 shifts:  I/O last 3 completed shifts:  In: 2442 [P.O.:1080; I.V.:1362]  Out: 2635 [Urine:2635]  Intake/Output this shift:  I/O this shift:  In: 180 [P.O.:180]  Out: -     PHYSICAL " EXAM:    General: Alert, cooperative, no distress, appears stated age  Head:  Normocephalic, atraumatic, mucous membranes moist  Eyes:  Conjunctivae/corneas clear, EOM's intact     Neck:  Supple,  no adenopathy; no JVD or bruit  Lungs: Clear to auscultation bilaterally, no wheezes, rhonchi or rales are noted  Chest wall: Sternal wound dry and intact  Heart::  Regular rate and rhythm, S1 and S2 normal, no murmur, rub or gallop  Abdomen: Soft, nontender, nondistended, bowel sounds active  Extremities: No cyanosis, clubbing, or edema   Pulses: 2+ and symmetric all extremities  Skin:  No rashes or lesions  Neuro/psych: A&O x3. CN II through XII are grossly intact with appropriate affect        Scheduled Meds:      aspirin, 81 mg, Oral, Daily  atorvastatin, 40 mg, Oral, Nightly  bisacodyl, 10 mg, Rectal, Once  chlorhexidine, 15 mL, Mouth/Throat, Q12H  enoxaparin, 40 mg, Subcutaneous, Daily  escitalopram, 10 mg, Oral, Daily  escitalopram, 20 mg, Oral, Daily  insulin lispro, 2-7 Units, Subcutaneous, 4x Daily AC & at Bedtime  levothyroxine, 100 mcg, Oral, Q AM  magnesium oxide, 400 mg, Oral, Daily  metoprolol tartrate, 12.5 mg, Oral, Q12H  mupirocin, , Each Nare, BID  pantoprazole, 40 mg, Oral, Daily  polyethylene glycol, 17 g, Oral, BID  pregabalin, 50 mg, Oral, BID  senna-docusate sodium, 2 tablet, Oral, BID        Continuous Infusions:         PRN Meds:      acetaminophen **OR** [DISCONTINUED] acetaminophen **OR** [DISCONTINUED] acetaminophen    Calcium Replacement - Follow Nurse / BPA Driven Protocol    dextrose    dextrose    glucagon (human recombinant)    Magnesium Cardiology Dose Replacement - Follow Nurse / BPA Driven Protocol    nitroglycerin    ondansetron    Phosphorus Replacement - Follow Nurse / BPA Driven Protocol    Potassium Replacement - Follow Nurse / BPA Driven Protocol        Results Review:     I reviewed the patient's new clinical results.    CBC    Results from last 7 days   Lab Units 05/15/24  3686  05/14/24  0412 05/13/24  0500 05/12/24  0535 05/11/24  0410 05/10/24  2245 05/10/24  2126 05/10/24  1114 05/10/24  1024 05/10/24  0918 05/10/24  0916   WBC 10*3/mm3 4.79 6.41 6.94 7.72 5.32  --   --   --  3.08*  --  1.24*   HEMOGLOBIN g/dL 10.1* 10.0* 9.5* 10.1* 9.5*  --   --   --  11.2*  --  9.7*   HEMOGLOBIN, POC g/dL  --   --   --   --   --  9.7* 9.6*   < >  --    < >  --    PLATELETS 10*3/mm3 151 142 104* 104* 97*  --   --   --  118*  --  79*    < > = values in this interval not displayed.     Cr Clearance Estimated Creatinine Clearance: 92 mL/min (by C-G formula based on SCr of 0.77 mg/dL).  Coag   Results from last 7 days   Lab Units 05/11/24  0410 05/10/24  1024 05/10/24  0916 05/09/24  0830   INR  1.10 1.12* 1.38* 1.00   APTT seconds  --  29.4 30.9 33.2*     HbA1C   Lab Results   Component Value Date    HGBA1C 5.50 05/09/2024    HGBA1C 5.52 03/30/2021     Blood Glucose   Glucose   Date/Time Value Ref Range Status   05/15/2024 1218 111 (H) 70 - 105 mg/dL Final     Comment:     Serial Number: [id number]:  [id number]   05/15/2024 0748 124 (H) 70 - 105 mg/dL Final     Comment:     Serial Number: [id number]:  [id number]   05/14/2024 2021 95 70 - 105 mg/dL Final     Comment:     Serial Number: [id number]:  [id number]   05/14/2024 1619 103 70 - 105 mg/dL Final     Comment:     Serial Number: [id number]:  [id number]   05/14/2024 1225 141 (H) 70 - 105 mg/dL Final     Comment:     Serial Number: [id number]:  [id number]   05/14/2024 0810 109 (H) 70 - 105 mg/dL Final     Comment:     Serial Number: [id number]:  [id number]   05/13/2024 2015 135 (H) 70 - 105 mg/dL Final     Comment:     Serial Number: [id number]:  [id number]   05/13/2024 1742 136 (H) 70 - 105 mg/dL Final     Comment:     Serial Number: [id number]:  [id number]     Infection     CMP   Results from last 7 days   Lab Units 05/15/24  0407 05/14/24  0412 05/13/24  0500 05/12/24  0535 05/11/24  0410 05/10/24  2245  "05/10/24  2126 05/10/24  1812 05/10/24  1201 05/10/24  1024 05/09/24  0830   SODIUM mmol/L 142 141 138 141 143  --   --   --   --  141 141   POTASSIUM mmol/L 4.1 4.1 4.0 4.2 4.2  --   --  4.0 3.9 4.5 4.9   CHLORIDE mmol/L 106 107 106 110* 113*  --   --   --   --  110* 106   CO2 mmol/L 29.0 25.0 25.0 24.0 21.0*  --   --   --   --  22.0 27.0   BUN mg/dL 12 14 21 20 17  --   --   --   --  20 24*   CREATININE mg/dL 0.77 0.82 0.94 0.91 0.90 0.74 0.79  --   --  0.95 1.32*   GLUCOSE mg/dL 103* 112* 110* 123* 135*  --   --   --   --  151* 99   ALBUMIN g/dL  --   --   --   --  4.0  --   --   --   --  3.5 4.2   BILIRUBIN mg/dL  --   --   --   --   --   --   --   --   --   --  0.2   ALK PHOS U/L  --   --   --   --   --   --   --   --   --   --  59   AST (SGOT) U/L  --   --   --   --   --   --   --   --   --   --  19   ALT (SGPT) U/L  --   --   --   --   --   --   --   --   --   --  7     ABG    Results from last 7 days   Lab Units 05/10/24  2245 05/10/24  2126 05/10/24  1451 05/10/24  1329 05/10/24  1225 05/10/24  1114 05/10/24  0918   PH, ARTERIAL pH units 7.356 7.370 7.369 7.385 7.393 7.508* 7.390   PCO2, ARTERIAL mm Hg 42.4 36.3 39.1 37.1 36.7 26.0* 44.6   PO2 ART mm Hg 176.6* 145.7* 109.8* 167.8* 239.2* 230.7* 430.0*   O2 SATURATION ART % 99.5* 99.2* 98.1* 99.5* 99.8* 99.9* 100.0*   BASE EXCESS ART mmol/L -1.7* -3.8* -2.5* -2.5* -2.2* -1.3* 2.0     UA    Results from last 7 days   Lab Units 05/14/24  1640   NITRITE UA  Negative   WBC UA /HPF 3-5*   BACTERIA UA /HPF None Seen   SQUAM EPITHEL UA /HPF 0-2     DENISSE  No results found for: \"POCMETH\", \"POCAMPHET\", \"POCBARBITUR\", \"POCBENZO\", \"POCCOCAINE\", \"POCOPIATES\", \"POCOXYCODO\", \"POCPHENCYC\", \"POCPROPOXY\", \"POCTHC\", \"POCTRICYC\"  Lysis Labs   Results from last 7 days   Lab Units 05/15/24  0407 05/14/24  0412 05/13/24  0500 05/12/24  0535 05/11/24  0410 05/10/24  2245 05/10/24  2126 05/10/24  1114 05/10/24  1024 05/10/24  0918 05/10/24  0916 05/10/24  0755 05/09/24  0830   INR   " --   --   --   --  1.10  --   --   --  1.12*  --  1.38*  --  1.00   APTT seconds  --   --   --   --   --   --   --   --  29.4  --  30.9  --  33.2*   FIBRINOGEN mg/dL  --   --   --   --   --   --   --   --   --   --  249  --   --    HEMOGLOBIN g/dL 10.1* 10.0* 9.5* 10.1* 9.5*  --   --   --  11.2*  --  9.7*  --  13.4   HEMOGLOBIN, POC g/dL  --   --   --   --   --  9.7* 9.6*   < >  --    < >  --    < >  --    PLATELETS 10*3/mm3 151 142 104* 104* 97*  --   --   --  118*  --  79*  --  176   CREATININE mg/dL 0.77 0.82 0.94 0.91 0.90 0.74 0.79  --  0.95  --   --   --  1.32*    < > = values in this interval not displayed.     Radiology(recent) XR Chest 1 View    Result Date: 5/14/2024  1. Small left-sided pleural effusion and streaky bibasilar airspace opacity, similar to the prior examination. No evidence of pneumothorax.  Electronically Signed By-Dav Pollard MD On:5/14/2024 2:16 PM               X-rays, labs reviewed personally by physician.    ECG/EMG Results (most recent)       Procedure Component Value Units Date/Time    ECG 12 Lead Other; s/p aortic surgery [[id number]] Collected: 05/10/24 1434     Updated: 05/10/24 1830     QT Interval 378 ms      QTC Interval 464 ms     Narrative:      HEART RATE= 91  bpm  RR Interval= 664  ms  SD Interval= 269  ms  P Horizontal Axis= 23  deg  P Front Axis= 63  deg  QRSD Interval= 110  ms  QT Interval= 378  ms  QTcB= 464  ms  QRS Axis= -26  deg  T Wave Axis= -30  deg  - ABNORMAL ECG -  Sinus rhythm  Prolonged SD interval  Nonspecific intraventricular conduction delay  When compared with ECG of 09-May-2024 8:41:16,  Significant axis, voltage or hypertrophy change  Electronically Signed By: Julio C Sanchez ([facility]) 10-May-2024 18:30:30  Date and Time of Study: 2024-05-10 14:34:24    ECG 12 Lead Other; s/p aortic surgery [[id number]] Collected: 05/11/24 0353     Updated: 05/11/24 1725     QT Interval 411 ms      QTC Interval 441 ms     Narrative:      HEART RATE= 69  bpm  RR  Interval= 868  ms  VA Interval= 241  ms  P Horizontal Axis= 19  deg  P Front Axis= 85  deg  QRSD Interval= 128  ms  QT Interval= 411  ms  QTcB= 441  ms  QRS Axis= -6  deg  T Wave Axis= -4  deg  - ABNORMAL ECG -  Sinus rhythm  Prolonged VA interval  Nonspecific intraventricular conduction delay  Lateral infarct, acute (LAD)  When compared with ECG of 10-May-2024 14:34:24,  Significant rate decrease  Electronically Signed By: Julio C Sanchez ([facility]) 11-May-2024 17:25:41  Date and Time of Study: 2024-05-11 03:53:49    ECG 12 Lead Rhythm Change [[id number]] Collected: 05/12/24 0550     Updated: 05/12/24 1727     QT Interval 331 ms      QTC Interval 488 ms     Narrative:      HEART RATE= 131  bpm  RR Interval= 460  ms  VA Interval= 126  ms  P Horizontal Axis= 45  deg  P Front Axis= 77  deg  QRSD Interval= 111  ms  QT Interval= 331  ms  QTcB= 488  ms  QRS Axis= 16  deg  T Wave Axis= 119  deg  - ABNORMAL ECG -  Nonspecific intraventricular conduction delay  Inferior infarct, old  When compared with ECG of 12-May-2024 4:21:16,  Significant rate increase  Atrial flutter  Electronically Signed By: Julio C Sanchez ([facility]) 12-May-2024 17:27:24  Date and Time of Study: 2024-05-12 05:50:57    ECG 12 Lead Other; s/p aortic surgery [[id number]] Collected: 05/12/24 0421     Updated: 05/12/24 1727     QT Interval 381 ms      QTC Interval 449 ms     Narrative:      HEART RATE= 83  bpm  RR Interval= 720  ms  VA Interval= 201  ms  P Horizontal Axis= -34  deg  P Front Axis= 58  deg  QRSD Interval= 110  ms  QT Interval= 381  ms  QTcB= 449  ms  QRS Axis= 23  deg  T Wave Axis= 18  deg  - ABNORMAL ECG -  Sinus rhythm  Nonspecific intraventricular conduction delay  ST elevation, consider lateral injury  When compared with ECG of 11-May-2024 3:56:55,  Significant change in rhythm  Electronically Signed By: Julio C Sanchez ([facility]) 12-May-2024 17:27:34  Date and Time of Study: 2024-05-12 04:21:16    ECG 12 Lead Drug Monitoring;  Amiodarone [[id number]] Collected: 05/13/24 0551     Updated: 05/13/24 1910     QT Interval 392 ms      QTC Interval 454 ms     Narrative:      HEART RATE= 81  bpm  RR Interval= 744  ms  ID Interval= 135  ms  P Horizontal Axis= -31  deg  P Front Axis= -20  deg  QRSD Interval= 111  ms  QT Interval= 392  ms  QTcB= 454  ms  QRS Axis= 38  deg  T Wave Axis= 37  deg  - ABNORMAL ECG -  Sinus rhythm  Nonspecific intraventricular conduction delay  ST elevation, consider inferior injury  When compared with ECG of 12-May-2024 5:50:57,  Significant change in rhythm  Electronically Signed By: Michael Blackburn ([facility]) 13-May-2024 19:10:21  Date and Time of Study: 2024-05-13 05:51:33    ECG 12 Lead Drug Monitoring; Amiodarone [[id number]] Collected: 05/14/24 0334     Updated: 05/14/24 0335     QT Interval 400 ms      QTC Interval 479 ms     Narrative:      HEART RATE= 86  bpm  RR Interval= 696  ms  ID Interval= 188  ms  P Horizontal Axis= 28  deg  P Front Axis= 33  deg  QRSD Interval= 116  ms  QT Interval= 400  ms  QTcB= 479  ms  QRS Axis= 49  deg  T Wave Axis= 52  deg  - ABNORMAL ECG -  Sinus rhythm  Nonspecific intraventricular conduction delay  When compared with ECG of 13-May-2024 5:51:33,  No significant change  Electronically Signed By:   Date and Time of Study: 2024-05-14 03:34:19              Medication Review:   I have reviewed the patient's current medication list  Scheduled Meds:aspirin, 81 mg, Oral, Daily  atorvastatin, 40 mg, Oral, Nightly  bisacodyl, 10 mg, Rectal, Once  chlorhexidine, 15 mL, Mouth/Throat, Q12H  enoxaparin, 40 mg, Subcutaneous, Daily  escitalopram, 10 mg, Oral, Daily  escitalopram, 20 mg, Oral, Daily  insulin lispro, 2-7 Units, Subcutaneous, 4x Daily AC & at Bedtime  levothyroxine, 100 mcg, Oral, Q AM  magnesium oxide, 400 mg, Oral, Daily  metoprolol tartrate, 12.5 mg, Oral, Q12H  mupirocin, , Each Nare, BID  pantoprazole, 40 mg, Oral, Daily  polyethylene glycol, 17 g, Oral, BID  pregabalin, 50 mg, Oral,  "BID  senna-docusate sodium, 2 tablet, Oral, BID      Continuous Infusions:   PRN Meds:.  acetaminophen **OR** [DISCONTINUED] acetaminophen **OR** [DISCONTINUED] acetaminophen    Calcium Replacement - Follow Nurse / BPA Driven Protocol    dextrose    dextrose    glucagon (human recombinant)    Magnesium Cardiology Dose Replacement - Follow Nurse / BPA Driven Protocol    nitroglycerin    ondansetron    Phosphorus Replacement - Follow Nurse / BPA Driven Protocol    Potassium Replacement - Follow Nurse / BPA Driven Protocol    Imaging:  Imaging Results (Last 72 Hours)       Procedure Component Value Units Date/Time    XR Chest 1 View [[id number]] Collected: 05/14/24 1415     Updated: 05/14/24 1418    Narrative:      EXAMINATION: XR CHEST 1 VW-     DATE OF EXAM: 5/14/2024 10:48 AM     INDICATION: post op open heart; I71.21-Aneurysm of the ascending aorta,  without rupture.     COMPARISON: Chest radiograph dated 5/12/2024.     TECHNIQUE: Portable AP view of the chest was obtained.     FINDINGS:  There are postsurgical changes of median sternotomy and atrial appendage  clipping. The cardiomediastinal silhouette is within normal limits.  There is aortic arch atherosclerotic calcification. Pulmonary  vascularity appears normal. There is a small left-sided pleural  effusion. There is streaky bibasilar airspace opacity. There is no  visible pneumothorax. There are degenerative changes of the thoracic  spine.       Impression:      1. Small left-sided pleural effusion and streaky bibasilar airspace  opacity, similar to the prior examination. No evidence of pneumothorax.     Electronically Signed By-Dav Pollard MD On:5/14/2024 2:16 PM                 JAVY Jolly  05/15/24  13:29 EDT       EMR Dragon/Transcription:   \"Dictated utilizing Dragon dictation\".       Electronically signed by JAVY Jolly, 05/15/24, 1:29 PM EDT.      Copied text in this note has been reviewed by me and is accurate as of " 05/15/24.    Cardiology attending:  Seen and examined.  Chart and labs reviewed. Independent interpretations of cardiac testing was performed. History and exam findings are verified with above changes noted.  Assessment and plan notated by APC after being formulated by attending consultant.  Note that greater than 50% of the time spent in care of the patient was provided by attending consultant.    Patient has had a couple of episodes of fever as high as 101.  Surgery is following.  Patient reports feeling well.  He was able to ambulate around the unit today.  Plans are still for him to go to rehab if no further fevers are noted.  Continue with routine surgical postoperative care.  Increase activity as tolerated.  Monitor rate and rhythm for any further arrhythmias.    Physical Exam:    General: Alert, cooperative, no distress, appears stated age  Head:  Normocephalic, atraumatic, mucous membranes moist  Eyes:  Conjunctivae/corneas clear, EOM's intact     Neck:  Supple,  no bruit  Lungs:            Coarse and diminished  Chest wall: Tender at incision  Heart::  Regular rate and rhythm, S1 and S2 normal, 1/6 systolic ejection murmur.  No rub or gallop  Abdomen: Soft, nontender, nondistended bowel sounds active  Extremities: No cyanosis, clubbing, or edema  Pulses: 2+ and symmetric all extremities  Skin:  No rashes or lesions  Neuro/psych: A&O x3. CN II through XII are grossly intact with appropriate affect

## 2024-05-15 NOTE — PLAN OF CARE
Bed mobility - N/A or Not attempted. Pt up in chair.   Transfers - Min-A and Mod-A sit to/from stand from chair. Prior to transfers PT demos technique for increased anterior wt shift. Verbal and tactile cues provided and pt demos improved technique today.   Ambulation - 150' feet SBA, CGA, and with rolling walker      Vitals: Orthostatic but remains within safe range and with MAP >65. BP as follows  - seated 136/76, standing 96/62 (MAP 73), seated 135/90.

## 2024-05-15 NOTE — DISCHARGE SUMMARY
Date of Admission: 5/10/2024  Date of Discharge: 5/16/2024    Discharge Diagnosis:   - Ascending aortic aneurysm, 5.2 cm/ aortic root dilatation 4.5 cm with moderate aortic regurgitation, EF 65-70% (cath)--s/p elective ascending aortic aneurysm repair with a 30 mm Dacron interposition graft/ reductive root aortoplasty of the right and noncoronary sinuses/ left atrial appendage epicardial closure with a 45 mm AtriClip (Cindi)  - Postural hypotension  - Neuropathy of feet with ambulation issues  - Gastroparesis  - Hypothyroidism--levothyroxine  - MGUS  - Hx prostate cancer, s/p prostatectomy/XRT  - Hx leukopenia/ TCP  - Postop ABLA, expected--watch closely  - Postop TCP, consumptive--transfused 5/10  - Postop atrial fibrillation with RVR--IV amio    Presenting Problem/History of Present Illness  Aneurysm of ascending aorta without rupture [I71.21]     Hospital Course  Patient is a 83 y.o. male who presented from home the morning of surgery.  On 5/10/2024 he underwent elective ascending aortic aneurysm repair, reductive root aortoplasty, and left atrial appendage closure per Dr. Puente.  Please see op note for full details.  He was extubated in a timely manner.  He was noted to be hypotensive with getting up and midodrine and IVFs were started.  On POD 2 he developed rapid atrial fib and IV amiodarone was initiated.  He converted to sinus rhythm.  By POD 3 he tolerated the addition of low-dose beta-blockers.  On POD 4 his pacing wires were removed.  An overnight oximetry showed 37 minutes of desaturation less than 89%.  POD5, patient had BM.  POD6 patient was deemed ready to discharge to [facility].  He was not started on anticoagulation d/t brevity of PAF.  Patient to follow up in our office and appointment is listed below.  Patient educated on sternal precautions and signs of infection.      Procedures Performed  Per Dr. Puente 5/10/2024  1.  Elective ascending aortic aneurysm repair with a 30 mm Dacron interposition graft    2.  Reductive root aortoplasty of the right and noncoronary sinuses   3.  Left atrial appendage epicardial closure with a 45 mm atrial clip device      Consults:   Consults       Date and Time Order Name Status Description    5/10/2024 10:22 AM Inpatient Cardiology Consult              Pertinent Test Results:    Lab Results   Component Value Date    WBC 7.05 05/16/2024    HGB 10.2 (L) 05/16/2024    HCT 32.4 (L) 05/16/2024    MCV 90.3 05/16/2024     05/16/2024      Lab Results   Component Value Date    GLUCOSE 88 05/16/2024    CALCIUM 8.3 (L) 05/16/2024     05/16/2024    K 4.2 05/16/2024    CO2 27.0 05/16/2024     05/16/2024    BUN 14 05/16/2024    CREATININE 0.80 05/16/2024    EGFRIFNONA 58 (L) 02/09/2022    BCR 17.5 05/16/2024    ANIONGAP 9.0 05/16/2024     Lab Results   Component Value Date    INR 1.10 05/11/2024    PROTIME 11.9 (H) 05/11/2024         Condition on Discharge: Stable for discharge to [facility]    Vital Signs  Temp:  [97.7 °F (36.5 °C)-99.1 °F (37.3 °C)] 97.7 °F (36.5 °C)  Heart Rate:  [] 95  Resp:  [13-22] 19  BP: (114-138)/(71-88) 125/72      Discharge Disposition  Skilled Nursing Facility (DC - External)    Discharge Medications     Discharge Medications        New Medications        Instructions Start Date   acetaminophen 325 MG tablet  Commonly known as: TYLENOL   650 mg, Oral, Every 4 Hours PRN      aspirin 81 MG EC tablet   81 mg, Oral, Daily   Start Date: May 17, 2024            Continue These Medications        Instructions Start Date   BIOFREEZE EX   1 Application, Apply externally, Daily PRN      cholecalciferol 25 MCG (1000 UT) tablet  Commonly known as: VITAMIN D3   1,000 Units, Oral, 2 Times Daily      docusate sodium 100 MG capsule  Commonly known as: COLACE   300 mg, Oral, 2 Times Daily      escitalopram 10 MG tablet  Commonly known as: LEXAPRO   10 mg, Oral, Daily, Take combined with 20mg tablets for a total of 30mg daily      escitalopram 20 MG tablet  Commonly  known as: LEXAPRO   20 mg, Oral, Daily, Take w/ 10 mg tab for total dose = 30 mg daily      levothyroxine 100 MCG tablet  Commonly known as: SYNTHROID, LEVOTHROID   100 mcg, Oral, Daily      magnesium oxide 400 MG tablet  Commonly known as: MAG-OX   400 mg, Oral, Daily      metoprolol tartrate 25 MG tablet  Commonly known as: LOPRESSOR   12.5 mg, Oral, 2 Times Daily      pantoprazole 40 MG EC tablet  Commonly known as: PROTONIX   40 mg, Oral, 2 Times Daily      pregabalin 50 MG capsule  Commonly known as: LYRICA   50 mg, Oral, 2 Times Daily      vitamin B-12 1000 MCG tablet  Commonly known as: CYANOCOBALAMIN   1,000 mcg, Oral, Daily               Discharge Diet:   Healthy Heart Diet    Activity at Discharge:   1. No driving until seen in office and off narcotic pain medications.  2. Shower daily. Clean incisions with warm water and antibacterial soap only. Do not put any lotion or ointments on incisions.  3. Ambulate for 10 minutes at least 3 times a day.  4. No heavy lifting > 10lbs until seen in office.   5. Take all medications as prescribed.     Follow-up Appointments  Future Appointments   Date Time Provider Department Center   5/29/2024 12:30 PM Kayli Rowe APRN [facility] [place]   6/5/2024  1:15 PM Dilan Houston DO [facility] [place]   7/24/2024 12:40 PM LAB CHAIR 5 JANICE OTTO  LAB [facility]   7/24/2024  1:00 PM Yuliana Veloz APRN [facility]     Additional Instructions for the Follow-ups that You Need to Schedule       Call MD With Problems / Concerns   As directed      Instructions: Call for temp >101 or any surgical wound drainage    Order Comments: Instructions: Call for temp >101 or any surgical wound drainage         Discharge Follow-up with PCP   As directed       Currently Documented PCP:    Moreno Tapia APRN    PCP Phone Number:    [phone]     Follow Up Details: in one month        Discharge Follow-up with Specialty: Cardiology; 2 Weeks    As directed      Specialty: Cardiology   Follow Up: 2 Weeks        Discharge Follow-up with Specified Provider: Cardiac Surgery   As directed      To: Cardiac Surgery   Follow Up Details: 5/29/24 at 12:30 pm        Referral to Cardiac Rehab   As directed              Test Results Pending at Discharge  Pending Labs       Order Current Status    Blood Culture - Blood, Arm, Left Preliminary result    Blood Culture - Blood, Arm, Right Preliminary result          STS information:  Pt discharged on asa and beta blocker      Jessica Nelson PA-C  05/16/24  14:47 EDT

## 2024-05-15 NOTE — CASE MANAGEMENT/SOCIAL WORK
Continued Stay Note   [facility]     Patient Name: Panfilo Feliz  MRN: [id number]  Today's Date: 5/15/2024    Admit Date: 5/10/2024    Plan: DC Plan: [facility] accepted and following. No precert or PASRR required.   Discharge Plan       Row Name 05/15/24 1329       Plan    Plan DC Plan: [facility] accepted and following. No precert or PASRR required.    Provided Post Acute Provider List? N/A    Provided Post Acute Provider Quality & Resource List? N/A    Plan Comments CM spoke with patient’s nurse and [facility] NP Kayli Richmond to obtain clinical updates. Patient must have BM before being able to transfer to [facility] facility. Lactulose to be started today. CM spoke with liaison at [facility]. Bed will be available tomorrow 5/16/2024 if patient is successful. CM will continue to follow for any further needs and adjust discharge plan accordingly. DC Barriers: POD 5 OHS, Cardiac monitoring, and needs BM.                 Expected Discharge Date and Time       Expected Discharge Date Expected Discharge Time    May 16, 2024           Met with patient in room      Maintain distance greater than six feet and spent less than fifteen minutes in the room.      Denisa Olmos RN    Office Phone: [phone]  Office Cell:     [phone]

## 2024-05-16 VITALS
TEMPERATURE: 97.7 F | HEART RATE: 95 BPM | DIASTOLIC BLOOD PRESSURE: 84 MMHG | WEIGHT: 191.8 LBS | SYSTOLIC BLOOD PRESSURE: 133 MMHG | OXYGEN SATURATION: 96 % | HEIGHT: 76 IN | BODY MASS INDEX: 23.36 KG/M2 | RESPIRATION RATE: 18 BRPM

## 2024-05-16 LAB
ANION GAP SERPL CALCULATED.3IONS-SCNC: 9 MMOL/L (ref 5–15)
BUN SERPL-MCNC: 14 MG/DL (ref 8–23)
BUN/CREAT SERPL: 17.5 (ref 7–25)
CALCIUM SPEC-SCNC: 8.3 MG/DL (ref 8.6–10.5)
CHLORIDE SERPL-SCNC: 102 MMOL/L (ref 98–107)
CO2 SERPL-SCNC: 27 MMOL/L (ref 22–29)
CREAT SERPL-MCNC: 0.8 MG/DL (ref 0.76–1.27)
DEPRECATED RDW RBC AUTO: 45.7 FL (ref 37–54)
EGFRCR SERPLBLD CKD-EPI 2021: 88.4 ML/MIN/1.73
ERYTHROCYTE [DISTWIDTH] IN BLOOD BY AUTOMATED COUNT: 13.7 % (ref 12.3–15.4)
GLUCOSE BLDC GLUCOMTR-MCNC: 105 MG/DL (ref 70–105)
GLUCOSE BLDC GLUCOMTR-MCNC: 86 MG/DL (ref 70–105)
GLUCOSE SERPL-MCNC: 88 MG/DL (ref 65–99)
HCT VFR BLD AUTO: 32.4 % (ref 37.5–51)
HGB BLD-MCNC: 10.2 G/DL (ref 13–17.7)
MCH RBC QN AUTO: 28.4 PG (ref 26.6–33)
MCHC RBC AUTO-ENTMCNC: 31.5 G/DL (ref 31.5–35.7)
MCV RBC AUTO: 90.3 FL (ref 79–97)
PLATELET # BLD AUTO: 179 10*3/MM3 (ref 140–450)
PMV BLD AUTO: 10 FL (ref 6–12)
POTASSIUM SERPL-SCNC: 4.2 MMOL/L (ref 3.5–5.2)
QT INTERVAL: 323 MS
QT INTERVAL: 381 MS
QTC INTERVAL: 466 MS
QTC INTERVAL: 476 MS
RBC # BLD AUTO: 3.59 10*6/MM3 (ref 4.14–5.8)
SODIUM SERPL-SCNC: 138 MMOL/L (ref 136–145)
WBC NRBC COR # BLD AUTO: 7.05 10*3/MM3 (ref 3.4–10.8)

## 2024-05-16 PROCEDURE — 25010000002 ENOXAPARIN PER 10 MG: Performed by: THORACIC SURGERY (CARDIOTHORACIC VASCULAR SURGERY)

## 2024-05-16 PROCEDURE — 97116 GAIT TRAINING THERAPY: CPT

## 2024-05-16 PROCEDURE — 93010 ELECTROCARDIOGRAM REPORT: CPT | Performed by: INTERNAL MEDICINE

## 2024-05-16 PROCEDURE — 82948 REAGENT STRIP/BLOOD GLUCOSE: CPT

## 2024-05-16 PROCEDURE — 97530 THERAPEUTIC ACTIVITIES: CPT

## 2024-05-16 PROCEDURE — 99024 POSTOP FOLLOW-UP VISIT: CPT | Performed by: THORACIC SURGERY (CARDIOTHORACIC VASCULAR SURGERY)

## 2024-05-16 PROCEDURE — 82948 REAGENT STRIP/BLOOD GLUCOSE: CPT | Performed by: THORACIC SURGERY (CARDIOTHORACIC VASCULAR SURGERY)

## 2024-05-16 PROCEDURE — 99232 SBSQ HOSP IP/OBS MODERATE 35: CPT | Performed by: INTERNAL MEDICINE

## 2024-05-16 PROCEDURE — 80048 BASIC METABOLIC PNL TOTAL CA: CPT | Performed by: NURSE PRACTITIONER

## 2024-05-16 PROCEDURE — 85027 COMPLETE CBC AUTOMATED: CPT | Performed by: NURSE PRACTITIONER

## 2024-05-16 RX ORDER — ASPIRIN 81 MG/1
81 TABLET ORAL DAILY
Start: 2024-05-17

## 2024-05-16 RX ORDER — LACTULOSE 10 G/15ML
20 SOLUTION ORAL ONCE
Status: COMPLETED | OUTPATIENT
Start: 2024-05-16 | End: 2024-05-16

## 2024-05-16 RX ORDER — ACETAMINOPHEN 325 MG/1
650 TABLET ORAL EVERY 4 HOURS PRN
COMMUNITY
Start: 2024-05-16

## 2024-05-16 RX ADMIN — Medication 400 MG: at 08:01

## 2024-05-16 RX ADMIN — ESCITALOPRAM OXALATE 10 MG: 10 TABLET ORAL at 08:01

## 2024-05-16 RX ADMIN — ASPIRIN 81 MG: 81 TABLET, COATED ORAL at 08:00

## 2024-05-16 RX ADMIN — POLYETHYLENE GLYCOL 3350 17 G: 17 POWDER, FOR SOLUTION ORAL at 08:00

## 2024-05-16 RX ADMIN — ENOXAPARIN SODIUM 40 MG: 100 INJECTION SUBCUTANEOUS at 15:13

## 2024-05-16 RX ADMIN — LACTULOSE 20 G: 20 SOLUTION ORAL at 10:56

## 2024-05-16 RX ADMIN — SENNOSIDES AND DOCUSATE SODIUM 2 TABLET: 50; 8.6 TABLET ORAL at 08:00

## 2024-05-16 RX ADMIN — LEVOTHYROXINE SODIUM 100 MCG: 0.1 TABLET ORAL at 05:40

## 2024-05-16 RX ADMIN — CHLORHEXIDINE GLUCONATE, 0.12% ORAL RINSE 15 ML: 1.2 SOLUTION DENTAL at 08:00

## 2024-05-16 RX ADMIN — ESCITALOPRAM OXALATE 20 MG: 10 TABLET ORAL at 08:00

## 2024-05-16 RX ADMIN — PANTOPRAZOLE SODIUM 40 MG: 40 TABLET, DELAYED RELEASE ORAL at 08:00

## 2024-05-16 RX ADMIN — METOPROLOL TARTRATE 25 MG: 25 TABLET, FILM COATED ORAL at 08:00

## 2024-05-16 RX ADMIN — PREGABALIN 50 MG: 25 CAPSULE ORAL at 08:00

## 2024-05-16 NOTE — PROGRESS NOTES
Cardiology Progress  Note      Patient Care Team:  Moreno Tapia APRN as PCP - General (Family Medicine)  Chalo Olvera MD as Consulting Physician (Hematology and Oncology)  Ganesh López MD as Consulting Physician (Neurology)  Jake Enriquez MD as Referring Physician (Family Medicine)    PATIENT IDENTIFICATION  Name: Panfilo Feliz  Age: 83 y.o.  Sex: male  :  1940  MRN: [id number]      Length of stay:    LOS: 6 days           REASON FOR FOLLOW-UP:  Ascending aortic aneurysm status postrepair  Aortic valve insufficiency-moderate        INTERVAL HISTORY  Seen examined.  Chart and labs reviewed.  Patient reports some mild chest discomfort.  Has been ambulating without difficulty.  No further febrile episodes.  Did have a brief episode of SVT.  Case discussed with CT surgery.  Cardiology status appears appropriate for discharge to next destination of care when okay with other services.    REVIEW OF SYSTEMS:  Pertinent items are noted in HPI, all other systems reviewed and negative    ASSESSMENT  Elective ascending aortic aneurysm repair/reductive root aortoplasty/left atrial appendage closure (atrial clip)  Postural hypotension  Gastroparesis  Hypothyroidism  History of prostate cancer status post prostatectomy/XRT  Paroxysmal atrial fibrillation  Elevated chads vascular score  CHADS-VASc Risk Assessment               3 Total Score    1 Hypertension    2 Age >/= 75    Criteria that do not apply:    CHF    DM    PRIOR STROKE/TIA/THROMBO    Vascular Disease    Age 65-74    Sex: Female                RECOMMENDATIONS  Patient maintaining sinus rhythm with only 1 brief episode of atrial fibrillation and another brief episode of SVT.  On low-dose BB  On aspirin/statin  Cardiology status appropriate for discharge next destination of care when okay with other services.  Follow-up 3 to 4 weeks as outpatient.          Vital Signs  Visit Vitals  /84   Pulse 95   Temp 97.7 °F (36.5 °C) (Oral)   Resp 18  "  Ht 193 cm (76\")   Wt 87 kg (191 lb 12.8 oz)   SpO2 96%   BMI 23.35 kg/m²     Oxygen Therapy  SpO2: 96 %  Pulse Oximetry Type: Continuous  Device (Oxygen Therapy): room air  Flow (L/min): 2  Oxygen Concentration (%): 40  Flowsheet Rows      Flowsheet Row First Filed Value   Admission Height 193 cm (76\") Documented at 05/09/2024 1042   Admission Weight 81.4 kg (179 lb 6.4 oz) Documented at 05/09/2024 1042          Intake & Output (last 3 days)         05/10 0701 05/11 0700 05/11 0701 05/12 0700 05/12 0701 05/13 0700 05/13 0701 05/14 0700    P.O. 240 1200 960 240    I.V. (mL/kg) 5367.9 (63) 2505 (28.5) 1398 (15.7)     Blood 759       Other 60       IV Piggyback 875 250      Total Intake(mL/kg) 7301.9 (85.7) 3955 (45) 2358 (26.4) 240 (2.7)    Urine (mL/kg/hr) 3130 (1.5) 660 (0.3) 1600 (0.7)     Emesis/NG output 0 0 0     Other 1250       Stool 0 0 0     Blood 500       Chest Tube 380 160 20     Dialysis 500       Total Output 5760 820 1620     Net +1541.9 +3135 +738 +240            Stool Unmeasured Occurrence  0 x 0 x     Emesis Unmeasured Occurrence  0 x 0 x           Lines, Drains & Airways       Active LDAs       Name Placement date Placement time Site Days    Peripheral IV 05/10/24 0630 Right Antecubital 05/10/24  0630  Antecubital  3    Peripheral IV 05/10/24 2100 Posterior;Right Hand 05/10/24  2100  Hand  2    Urethral Catheter Temperature probe 16 Fr. 05/10/24  --  -- 3    Pacer Wires 05/10/24  --  Atrial and Ventricular  3                           /84   Pulse 95   Temp 97.7 °F (36.5 °C) (Oral)   Resp 18   Ht 193 cm (76\")   Wt 87 kg (191 lb 12.8 oz)   SpO2 96%   BMI 23.35 kg/m²   Intake/Output last 3 shifts:  I/O last 3 completed shifts:  In: 1260 [P.O.:1260]  Out: 2000 [Urine:2000]  Intake/Output this shift:  I/O this shift:  In: 250 [P.O.:250]  Out: 250 [Urine:250]    PHYSICAL EXAM:    General: Alert, cooperative, no distress, appears stated age  Head:  Normocephalic, atraumatic, mucous " membranes moist  Eyes:  Conjunctivae/corneas clear, EOM's intact     Neck:  Supple,  no bruit  Lungs: Clear to auscultation bilaterally, no wheezes, rhonchi or rales are noted  Chest wall: Sternal wound dry and intact  Heart::  Regular rate and rhythm, S1 and S2 normal, 1/6 systolic ejection murmur.  No rub or gallop  Abdomen: Soft, nontender, nondistended, bowel sounds active  Extremities: No cyanosis, clubbing, or edema   Pulses: 2+ and symmetric all extremities  Skin:  No rashes or lesions  Neuro/psych: A&O x3. CN II through XII are grossly intact with appropriate affect        Scheduled Meds:      aspirin, 81 mg, Oral, Daily  atorvastatin, 40 mg, Oral, Nightly  chlorhexidine, 15 mL, Mouth/Throat, Q12H  enoxaparin, 40 mg, Subcutaneous, Daily  escitalopram, 10 mg, Oral, Daily  escitalopram, 20 mg, Oral, Daily  insulin lispro, 2-7 Units, Subcutaneous, 4x Daily AC & at Bedtime  levothyroxine, 100 mcg, Oral, Q AM  magnesium oxide, 400 mg, Oral, Daily  metoprolol tartrate, 25 mg, Oral, Q12H  pantoprazole, 40 mg, Oral, Daily  polyethylene glycol, 17 g, Oral, BID  pregabalin, 50 mg, Oral, BID  senna-docusate sodium, 2 tablet, Oral, BID        Continuous Infusions:         PRN Meds:      acetaminophen **OR** [DISCONTINUED] acetaminophen **OR** [DISCONTINUED] acetaminophen    Calcium Replacement - Follow Nurse / BPA Driven Protocol    dextrose    dextrose    glucagon (human recombinant)    Magnesium Cardiology Dose Replacement - Follow Nurse / BPA Driven Protocol    nitroglycerin    ondansetron    Phosphorus Replacement - Follow Nurse / BPA Driven Protocol    Potassium Replacement - Follow Nurse / BPA Driven Protocol        Results Review:     I reviewed the patient's new clinical results.    CBC    Results from last 7 days   Lab Units 05/16/24  0358 05/15/24  0407 05/14/24  0412 05/13/24  0500 05/12/24  0535 05/11/24  0410 05/10/24  2245 05/10/24  1114 05/10/24  1024   WBC 10*3/mm3 7.05 4.79 6.41 6.94 7.72 5.32  --   --   3.08*   HEMOGLOBIN g/dL 10.2* 10.1* 10.0* 9.5* 10.1* 9.5*  --   --  11.2*   HEMOGLOBIN, POC g/dL  --   --   --   --   --   --  9.7*   < >  --    PLATELETS 10*3/mm3 179 151 142 104* 104* 97*  --   --  118*    < > = values in this interval not displayed.     Cr Clearance Estimated Creatinine Clearance: 87.6 mL/min (by C-G formula based on SCr of 0.8 mg/dL).  Coag   Results from last 7 days   Lab Units 05/11/24  0410 05/10/24  1024 05/10/24  0916   INR  1.10 1.12* 1.38*   APTT seconds  --  29.4 30.9     HbA1C   Lab Results   Component Value Date    HGBA1C 5.50 05/09/2024    HGBA1C 5.52 03/30/2021     Blood Glucose   Glucose   Date/Time Value Ref Range Status   05/16/2024 1140 105 70 - 105 mg/dL Final     Comment:     Serial Number: [id number]:  [id number]   05/16/2024 0749 86 70 - 105 mg/dL Final     Comment:     Serial Number: [id number]:  [id number]   05/15/2024 2123 100 70 - 105 mg/dL Final     Comment:     Serial Number: [id number]:  [id number]   05/15/2024 1643 99 70 - 105 mg/dL Final     Comment:     Serial Number: [id number]:  [id number]   05/15/2024 1218 111 (H) 70 - 105 mg/dL Final     Comment:     Serial Number: [id number]:  [id number]   05/15/2024 0748 124 (H) 70 - 105 mg/dL Final     Comment:     Serial Number: [id number]:  [id number]   05/14/2024 2021 95 70 - 105 mg/dL Final     Comment:     Serial Number: [id number]:  [id number]   05/14/2024 1619 103 70 - 105 mg/dL Final     Comment:     Serial Number: [id number]:  [id number]     Infection   Results from last 7 days   Lab Units 05/14/24  1839   BLOODCX  No growth at 24 hours  No growth at 24 hours     CMP   Results from last 7 days   Lab Units 05/16/24  0358 05/15/24  0407 05/14/24  0412 05/13/24  0500 05/12/24  0535 05/11/24  0410 05/10/24  2245 05/10/24  2126 05/10/24  1812 05/10/24  1201 05/10/24  1024   SODIUM mmol/L 138 142 141 138 141 143  --   --   --   --  141   POTASSIUM mmol/L 4.2 4.1 4.1 4.0 4.2  "4.2  --   --  4.0   < > 4.5   CHLORIDE mmol/L 102 106 107 106 110* 113*  --   --   --   --  110*   CO2 mmol/L 27.0 29.0 25.0 25.0 24.0 21.0*  --   --   --   --  22.0   BUN mg/dL 14 12 14 21 20 17  --   --   --   --  20   CREATININE mg/dL 0.80 0.77 0.82 0.94 0.91 0.90 0.74   < >  --   --  0.95   GLUCOSE mg/dL 88 103* 112* 110* 123* 135*  --   --   --   --  151*   ALBUMIN g/dL  --   --   --   --   --  4.0  --   --   --   --  3.5    < > = values in this interval not displayed.     ABG    Results from last 7 days   Lab Units 05/10/24  2245 05/10/24  2126 05/10/24  1451 05/10/24  1329 05/10/24  1225 05/10/24  1114 05/10/24  0918   PH, ARTERIAL pH units 7.356 7.370 7.369 7.385 7.393 7.508* 7.390   PCO2, ARTERIAL mm Hg 42.4 36.3 39.1 37.1 36.7 26.0* 44.6   PO2 ART mm Hg 176.6* 145.7* 109.8* 167.8* 239.2* 230.7* 430.0*   O2 SATURATION ART % 99.5* 99.2* 98.1* 99.5* 99.8* 99.9* 100.0*   BASE EXCESS ART mmol/L -1.7* -3.8* -2.5* -2.5* -2.2* -1.3* 2.0     UA    Results from last 7 days   Lab Units 05/14/24  1640   NITRITE UA  Negative   WBC UA /HPF 3-5*   BACTERIA UA /HPF None Seen   SQUAM EPITHEL UA /HPF 0-2     DENISSE  No results found for: \"POCMETH\", \"POCAMPHET\", \"POCBARBITUR\", \"POCBENZO\", \"POCCOCAINE\", \"POCOPIATES\", \"POCOXYCODO\", \"POCPHENCYC\", \"POCPROPOXY\", \"POCTHC\", \"POCTRICYC\"  Lysis Labs   Results from last 7 days   Lab Units 05/16/24  0358 05/15/24  0407 05/14/24  0412 05/13/24  0500 05/12/24  0535 05/11/24  0410 05/10/24  2245 05/10/24  1114 05/10/24  1024 05/10/24  0918 05/10/24  0916   INR   --   --   --   --   --  1.10  --   --  1.12*  --  1.38*   APTT seconds  --   --   --   --   --   --   --   --  29.4  --  30.9   FIBRINOGEN mg/dL  --   --   --   --   --   --   --   --   --   --  249   HEMOGLOBIN g/dL 10.2* 10.1* 10.0* 9.5* 10.1* 9.5*  --   --  11.2*  --  9.7*   HEMOGLOBIN, POC g/dL  --   --   --   --   --   --  9.7*   < >  --    < >  --    PLATELETS 10*3/mm3 179 151 142 104* 104* 97*  --   --  118*  --  79* "   CREATININE mg/dL 0.80 0.77 0.82 0.94 0.91 0.90 0.74   < > 0.95  --   --     < > = values in this interval not displayed.     Radiology(recent) No radiology results for the last day            X-rays, labs reviewed personally by physician.    ECG/EMG Results (most recent)       Procedure Component Value Units Date/Time    ECG 12 Lead Other; s/p aortic surgery [[id number]] Collected: 05/10/24 1434     Updated: 05/10/24 1830     QT Interval 378 ms      QTC Interval 464 ms     Narrative:      HEART RATE= 91  bpm  RR Interval= 664  ms  IL Interval= 269  ms  P Horizontal Axis= 23  deg  P Front Axis= 63  deg  QRSD Interval= 110  ms  QT Interval= 378  ms  QTcB= 464  ms  QRS Axis= -26  deg  T Wave Axis= -30  deg  - ABNORMAL ECG -  Sinus rhythm  Prolonged IL interval  Nonspecific intraventricular conduction delay  When compared with ECG of 09-May-2024 8:41:16,  Significant axis, voltage or hypertrophy change  Electronically Signed By: Julio C Sanchez (MEHRDAD) 10-May-2024 18:30:30  Date and Time of Study: 2024-05-10 14:34:24    ECG 12 Lead Other; s/p aortic surgery [[id number]] Collected: 05/11/24 0353     Updated: 05/11/24 1725     QT Interval 411 ms      QTC Interval 441 ms     Narrative:      HEART RATE= 69  bpm  RR Interval= 868  ms  IL Interval= 241  ms  P Horizontal Axis= 19  deg  P Front Axis= 85  deg  QRSD Interval= 128  ms  QT Interval= 411  ms  QTcB= 441  ms  QRS Axis= -6  deg  T Wave Axis= -4  deg  - ABNORMAL ECG -  Sinus rhythm  Prolonged IL interval  Nonspecific intraventricular conduction delay  Lateral infarct, acute (LAD)  When compared with ECG of 10-May-2024 14:34:24,  Significant rate decrease  Electronically Signed By: Julio C Sanchez ([facility]) 11-May-2024 17:25:41  Date and Time of Study: 2024-05-11 03:53:49    ECG 12 Lead Rhythm Change [[id number]] Collected: 05/12/24 0550     Updated: 05/12/24 1727     QT Interval 331 ms      QTC Interval 488 ms     Narrative:      HEART RATE= 131  bpm  RR Interval=  460  ms  SD Interval= 126  ms  P Horizontal Axis= 45  deg  P Front Axis= 77  deg  QRSD Interval= 111  ms  QT Interval= 331  ms  QTcB= 488  ms  QRS Axis= 16  deg  T Wave Axis= 119  deg  - ABNORMAL ECG -  Nonspecific intraventricular conduction delay  Inferior infarct, old  When compared with ECG of 12-May-2024 4:21:16,  Significant rate increase  Atrial flutter  Electronically Signed By: Julio C Sanchez ([facility]) 12-May-2024 17:27:24  Date and Time of Study: 2024-05-12 05:50:57    ECG 12 Lead Other; s/p aortic surgery [[id number]] Collected: 05/12/24 0421     Updated: 05/12/24 1727     QT Interval 381 ms      QTC Interval 449 ms     Narrative:      HEART RATE= 83  bpm  RR Interval= 720  ms  SD Interval= 201  ms  P Horizontal Axis= -34  deg  P Front Axis= 58  deg  QRSD Interval= 110  ms  QT Interval= 381  ms  QTcB= 449  ms  QRS Axis= 23  deg  T Wave Axis= 18  deg  - ABNORMAL ECG -  Sinus rhythm  Nonspecific intraventricular conduction delay  ST elevation, consider lateral injury  When compared with ECG of 11-May-2024 3:56:55,  Significant change in rhythm  Electronically Signed By: Julio C Sanchez ([facility]) 12-May-2024 17:27:34  Date and Time of Study: 2024-05-12 04:21:16    ECG 12 Lead Drug Monitoring; Amiodarone [[id number]] Collected: 05/13/24 0551     Updated: 05/13/24 1910     QT Interval 392 ms      QTC Interval 454 ms     Narrative:      HEART RATE= 81  bpm  RR Interval= 744  ms  SD Interval= 135  ms  P Horizontal Axis= -31  deg  P Front Axis= -20  deg  QRSD Interval= 111  ms  QT Interval= 392  ms  QTcB= 454  ms  QRS Axis= 38  deg  T Wave Axis= 37  deg  - ABNORMAL ECG -  Sinus rhythm  Nonspecific intraventricular conduction delay  ST elevation, consider inferior injury  When compared with ECG of 12-May-2024 5:50:57,  Significant change in rhythm  Electronically Signed By: Michael Blackbunr ([facility]) 13-May-2024 19:10:21  Date and Time of Study: 2024-05-13 05:51:33    ECG 12 Lead Drug Monitoring; Amiodarone  [[id number]] Collected: 05/14/24 0334     Updated: 05/14/24 0335     QT Interval 400 ms      QTC Interval 479 ms     Narrative:      HEART RATE= 86  bpm  RR Interval= 696  ms  MO Interval= 188  ms  P Horizontal Axis= 28  deg  P Front Axis= 33  deg  QRSD Interval= 116  ms  QT Interval= 400  ms  QTcB= 479  ms  QRS Axis= 49  deg  T Wave Axis= 52  deg  - ABNORMAL ECG -  Sinus rhythm  Nonspecific intraventricular conduction delay  When compared with ECG of 13-May-2024 5:51:33,  No significant change  Electronically Signed By:   Date and Time of Study: 2024-05-14 03:34:19    ECG 12 Lead Tachycardia [[id number]] Collected: 05/15/24 1816     Updated: 05/15/24 1817     QT Interval 323 ms      QTC Interval 466 ms     Narrative:      HEART RATE= 125  bpm  RR Interval= 480  ms  MO Interval= 168  ms  P Horizontal Axis= -50  deg  P Front Axis= 24  deg  QRSD Interval= 92  ms  QT Interval= 323  ms  QTcB= 466  ms  QRS Axis= 37  deg  T Wave Axis= 161  deg  - OTHERWISE NORMAL ECG -  Sinus tachycardia  When compared with ECG of 14-May-2024 3:34:19,  Significant rate increase  Electronically Signed By:   Date and Time of Study: 2024-05-15 18:16:34    ECG 12 Lead Drug Monitoring; Amiodarone [[id number]] Collected: 05/16/24 0313     Updated: 05/16/24 0316     QT Interval 381 ms      QTC Interval 476 ms     Narrative:      HEART RATE= 94  bpm  RR Interval= 640  ms  MO Interval= 208  ms  P Horizontal Axis=   deg  P Front Axis= 48  deg  QRSD Interval= 93  ms  QT Interval= 381  ms  QTcB= 476  ms  QRS Axis= 42  deg  T Wave Axis= 103  deg  - BORDERLINE ECG -  Sinus rhythm  Borderline prolonged MO interval  When compared with ECG of 15-May-2024 18:16:34,  Significant rate decrease  New conduction abnormality  Electronically Signed By:   Date and Time of Study: 2024-05-16 03:13:49              Medication Review:   I have reviewed the patient's current medication list  Scheduled Meds:aspirin, 81 mg, Oral, Daily  atorvastatin, 40 mg, Oral,  Nightly  chlorhexidine, 15 mL, Mouth/Throat, Q12H  enoxaparin, 40 mg, Subcutaneous, Daily  escitalopram, 10 mg, Oral, Daily  escitalopram, 20 mg, Oral, Daily  insulin lispro, 2-7 Units, Subcutaneous, 4x Daily AC & at Bedtime  levothyroxine, 100 mcg, Oral, Q AM  magnesium oxide, 400 mg, Oral, Daily  metoprolol tartrate, 25 mg, Oral, Q12H  pantoprazole, 40 mg, Oral, Daily  polyethylene glycol, 17 g, Oral, BID  pregabalin, 50 mg, Oral, BID  senna-docusate sodium, 2 tablet, Oral, BID      Continuous Infusions:   PRN Meds:.  acetaminophen **OR** [DISCONTINUED] acetaminophen **OR** [DISCONTINUED] acetaminophen    Calcium Replacement - Follow Nurse / BPA Driven Protocol    dextrose    dextrose    glucagon (human recombinant)    Magnesium Cardiology Dose Replacement - Follow Nurse / BPA Driven Protocol    nitroglycerin    ondansetron    Phosphorus Replacement - Follow Nurse / BPA Driven Protocol    Potassium Replacement - Follow Nurse / BPA Driven Protocol    Imaging:  Imaging Results (Last 72 Hours)       Procedure Component Value Units Date/Time    XR Chest 1 View [[id number]] Collected: 05/14/24 1415     Updated: 05/14/24 1418    Narrative:      EXAMINATION: XR CHEST 1 VW-     DATE OF EXAM: 5/14/2024 10:48 AM     INDICATION: post op open heart; I71.21-Aneurysm of the ascending aorta,  without rupture.     COMPARISON: Chest radiograph dated 5/12/2024.     TECHNIQUE: Portable AP view of the chest was obtained.     FINDINGS:  There are postsurgical changes of median sternotomy and atrial appendage  clipping. The cardiomediastinal silhouette is within normal limits.  There is aortic arch atherosclerotic calcification. Pulmonary  vascularity appears normal. There is a small left-sided pleural  effusion. There is streaky bibasilar airspace opacity. There is no  visible pneumothorax. There are degenerative changes of the thoracic  spine.       Impression:      1. Small left-sided pleural effusion and streaky bibasilar  "airspace  opacity, similar to the prior examination. No evidence of pneumothorax.     Electronically Signed By-Dav Pollard MD On:5/14/2024 2:16 PM                 Dilan Houston DO  05/16/24  15:49 EDT       EMR Dragon/Transcription:   \"Dictated utilizing Dragon dictation\".          "

## 2024-05-16 NOTE — PLAN OF CARE
Assessment: Panfilo Feliz presents with increased assist need for sit to stand today. Pt with continued strength, endurance and functional mobility impairments which indicate the need for skilled intervention. Tolerating session today without incident. Will continue to follow and progress as tolerated.

## 2024-05-16 NOTE — PROGRESS NOTES
S/P POD# 6  elective ascending aortic aneurysm repair/ reductive root aortoplasty/ left atrial appendage closure (AtriClip)--Cindi  EF 65-70% (cath)    Subjective:  no c/o's today, reports BM yest    Tachycardia last evening, Adenosine ordered but not given, increased beta blockers with good results  While up walking this morning,   blood cultures NTD  Nocturnal oximetry showed 37 mins desaturation < 89% on 5/14  Wt is up 6 kgs from preop      Intake/Output Summary (Last 24 hours) at 5/16/2024 0835  Last data filed at 5/16/2024 0800  Gross per 24 hour   Intake 1210 ml   Output 600 ml   Net 610 ml     Temp:  [97.5 °F (36.4 °C)-99.2 °F (37.3 °C)] 98.1 °F (36.7 °C)  Heart Rate:  [] 95  Resp:  [13-22] 17  BP: (114-138)/(75-90) 129/79      Results from last 7 days   Lab Units 05/16/24  0358 05/15/24  0407 05/12/24  0535 05/11/24  0410 05/10/24  1114 05/10/24  1024 05/10/24  0918 05/10/24  0916   WBC 10*3/mm3 7.05 4.79   < > 5.32  --  3.08*  --  1.24*   HEMOGLOBIN g/dL 10.2* 10.1*   < > 9.5*  --  11.2*  --  9.7*   HEMOGLOBIN, POC   --   --   --   --    < >  --    < >  --    HEMATOCRIT % 32.4* 31.6*   < > 30.3*  --  34.4*  --  30.6*   HEMATOCRIT POC   --   --   --   --    < >  --    < >  --    PLATELETS 10*3/mm3 179 151   < > 97*  --  118*  --  79*   INR   --   --   --  1.10  --  1.12*  --  1.38*    < > = values in this interval not displayed.     Results from last 7 days   Lab Units 05/16/24 0358 05/12/24 0535 05/11/24 0410   CREATININE mg/dL 0.80   < > 0.90   POTASSIUM mmol/L 4.2   < > 4.2   SODIUM mmol/L 138   < > 143   MAGNESIUM mg/dL  --   --  2.6*   PHOSPHORUS mg/dL  --   --  3.9    < > = values in this interval not displayed.       Physical Exam:  Neuro intact, nad, just returned from walk with PT, no family present  Tele:    Diminished bases, 95% RA  Sternotomy healing well  Benign abd, + BM, + flatus  No edema    Assessment/Plan:  Principal Problem:    Ascending aortic aneurysm  Active  Problems:    Aneurysm of ascending aorta without rupture    -Ascending aortic aneurysm, 5.2 cm/ aortic root dilatation 4.5 cm with moderate aortic regurgitation, EF 65-70% (cath)--s/p elective ascending aortic aneurysm repair with a 30 mm Dacron interposition graft/ reductive root aortoplasty of the right and noncoronary sinuses/ left atrial appendage epicardial closure with a 45 mm AtriClip (Cindi)  - Postural hypotension  - Neuropathy of feet with ambulation issues  - Gastroparesis  - Hypothyroidism--levothyroxine  - MGUS  - Hx prostate cancer, s/p prostatectomy/XRT  - Hx leukopenia/ TCP  - Postop ABLA, expected--watch closely  - Postop TCP, consumptive--transfused 5/10  - Postop atrial fibrillation with RVR--IV amio    POD# 6.  Doing well.  Reports BM yesterday afternoon, requesting one additional dose of lactulose today.  On asa/statin/bb.  He has hx postural hypotension, but denies dizziness currently. Plans for [facility].     [id number] Score for Atrial Fibrillation Stroke Risk   RESULT SUMMARY:  3 points  Stroke risk was 3.2% per year in >90,000 patients (the [place] Atrial Fibrillation Cohort Study) and 4.6% risk of stroke/TIA/systemic embolism.    Routine care--as above  D/w pt/nsg, family, Dr. Puente  Anticipate rehab at discharge    Kayli Rowe, JAVY  5/16/2024  08:35 EDT

## 2024-05-16 NOTE — THERAPY TREATMENT NOTE
Subjective: Pt agreeable to therapeutic plan of care.    Objective:     Bed mobility - N/A or Not attempted.  Transfers - Mod-A, Max-A, and with rolling walker sit to stand, x2 reps. Marky for stand to sit.   Ambulation - 170' feet CGA and with rolling walker. Heavy reliance on walker. Small step length. Cues needed for upright posture and improved proximity to AD.     Phase 1 Cardiac Rehab Initiated - Acute Care    Cardiac Level III Activities  Sitting tolerance: >10min and supported  Standing tolerance: 5-10min and supported    Precautions:  Mid-sternal incision; avoid scapular retraction and depression.  Cardiovascular impairment post-sx; encourage energy conservation strategies.    MET level equivalent: 1.4-2.0 (Self care ADLs in sitting / slow ambulation in room, light intensity activities)     Vitals: Orthostatic.pt is still orthostatic upon standing and BP remains low after several minutes walking. Pt denies symptoms and MAP >65 despite hypotension.     Pain: 4 VAS   Location: sternal  Intervention for pain: Repositioned, Increased Activity, and Therapeutic Presence    Education: Provided education on the importance of mobility in the acute care setting, Verbal/Tactile Cues, Transfer Training, Gait Training, and Energy conservation strategies    Assessment: Panfilo Feliz presents with increased assist need for sit to stand today. Pt with continued strength, endurance and functional mobility impairments which indicate the need for skilled intervention. Tolerating session today without incident. Will continue to follow and progress as tolerated.     Plan/Recommendations:   If medically appropriate, High Intensity Therapy recommended post-acute care. This is recommended as therapy feels the patient would require 5-6 days per week, 2-3 hours per day. At this time, inpatient rehabilitation (acute rehab) would be the first choice and SNF would be second. Pt requires no DME at discharge.     Pt desires Inpatient  Rehabilitation placement at discharge. Pt cooperative; agreeable to therapeutic recommendations and plan of care.         Basic Mobility 6-click:  Rollin = Total, A lot = 2, A little = 3; 4 = None  Supine>Sit:   1 = Total, A lot = 2, A little = 3; 4 = None   Sit>Stand with arms:  1 = Total, A lot = 2, A little = 3; 4 = None  Bed>Chair:   1 = Total, A lot = 2, A little = 3; 4 = None  Ambulate in room:  1 = Total, A lot = 2, A little = 3; 4 = None  3-5 Steps with railin = Total, A lot = 2, A little = 3; 4 = None  Score: 13    Modified Cruz: N/A = No pre-op stroke/TIA    Post-Tx Position: Up in Chair, Alarms activated, and Call light and personal items within reach  PPE: gloves

## 2024-05-16 NOTE — CASE MANAGEMENT/SOCIAL WORK
Case Management Discharge Note      Final Note: [facility]    Provided Post Acute Provider List?: N/A  Post Acute Provider List: Inpatient Rehab  Provided Post Acute Provider Quality & Resource List?: N/A  Post Acute Provider Quality and Resource List: Inpatient Rehab  Delivered To: Patient  Method of Delivery: In person    Selected Continued Care - Admitted Since 5/10/2024       Destination Coordination complete.      Service Provider Selected Services Address Phone Fax Patient Preferred    [facility] Inpatient Rehabilitation [address] [address] [phone] --             Transportation Services  W/C Van: Skilled Nursing Facility Van    Final Discharge Disposition Code: 62 - inpatient rehab facility

## 2024-05-16 NOTE — PLAN OF CARE
Patient to D/C to [facility], report called to Anna. PIV removed and AVS sent with patient. Wife at bedside and notified of transfer.

## 2024-05-16 NOTE — CASE MANAGEMENT/SOCIAL WORK
Continued Stay Note  TABITHA Cazares     Patient Name: Panfilo Feliz  MRN: [id number]  Today's Date: 5/16/2024    Admit Date: 5/10/2024  Plan: DC Plan: [facility] accepted and following. No precert or PASRR required.   Discharge Plan       Row Name 05/16/24 1416       Plan    Plan Comments CM met with patient and wife at bedside to discuss discharge plan. Patient agreeable to [facility], wife feels unable to provide transportation. [facility] liaison Rylee confirms that [facility] can provide transportation with a pick-up time of 3pm. Patient and nurse notified.             Expected Discharge Date and Time       Expected Discharge Date Expected Discharge Time    May 16, 2024        Maggie Maldonado RN      Office Phone [phone]

## 2024-05-19 LAB
BACTERIA SPEC AEROBE CULT: NORMAL
BACTERIA SPEC AEROBE CULT: NORMAL

## 2024-05-26 LAB
QT INTERVAL: 400 MS
QTC INTERVAL: 479 MS

## 2024-05-28 ENCOUNTER — HOME HEALTH ADMISSION (OUTPATIENT)
Dept: HOME HEALTH SERVICES | Facility: HOME HEALTHCARE | Age: 84
End: 2024-05-28
Payer: MEDICARE

## 2024-05-28 ENCOUNTER — DOCUMENTATION (OUTPATIENT)
Dept: HOME HEALTH SERVICES | Facility: HOME HEALTHCARE | Age: 84
End: 2024-05-28
Payer: MEDICARE

## 2024-05-28 ENCOUNTER — TRANSCRIBE ORDERS (OUTPATIENT)
Dept: HOME HEALTH SERVICES | Facility: HOME HEALTHCARE | Age: 84
End: 2024-05-28
Payer: MEDICARE

## 2024-05-28 DIAGNOSIS — Z48.812 AFTERCARE FOLLOWING SURGERY OF THE CIRCULATORY SYSTEM, NEC: Primary | ICD-10-CM

## 2024-05-28 NOTE — PROGRESS NOTES
Facility Discharge  Panfilo Simmons - 1940  Medicare  DC 5/28 from [facility]  RN/PT    Dr. Phil Shields is the ordering provider: RN/PT    Moreno Tapia NP is the PCP/POC/Attending provider and agrees to follow the HH/POC    Dr. Jos Ibrahim performed the F2F on 5/27/24    DX: s/p abdominal aortic aneurysm without rupture, orthostatic hypotension, personal history of malignant neoplasm of prostate, polyneuropathy, gastroparesis, hypothyroidism, GERD, spinal stenosis, hyperlipidemia, monoclonal gammopathy, pleural effusion.     Address confirmed:  [address] [place] [address] [address]  [place] [address]    Contact:  #1 - [phone] (patient/spouse cell)  #2 - [phone] (home)    I spoke with the patient's spouse and they are agreeable to home health and do not have any other skilled services in the home at this time.

## 2024-05-29 ENCOUNTER — OFFICE VISIT (OUTPATIENT)
Dept: CARDIAC SURGERY | Facility: CLINIC | Age: 84
End: 2024-05-29
Payer: MEDICARE

## 2024-05-29 VITALS
OXYGEN SATURATION: 97 % | HEIGHT: 76 IN | HEART RATE: 84 BPM | SYSTOLIC BLOOD PRESSURE: 131 MMHG | WEIGHT: 180 LBS | RESPIRATION RATE: 16 BRPM | DIASTOLIC BLOOD PRESSURE: 95 MMHG | BODY MASS INDEX: 21.92 KG/M2

## 2024-05-29 DIAGNOSIS — Z98.890 S/P ASCENDING AORTIC ANEURYSM REPAIR: Primary | ICD-10-CM

## 2024-05-29 DIAGNOSIS — Z98.890 S/P LEFT ATRIAL APPENDAGE LIGATION: ICD-10-CM

## 2024-05-29 DIAGNOSIS — Z86.79 S/P ASCENDING AORTIC ANEURYSM REPAIR: Primary | ICD-10-CM

## 2024-05-29 PROCEDURE — 3075F SYST BP GE 130 - 139MM HG: CPT | Performed by: PHYSICIAN ASSISTANT

## 2024-05-29 PROCEDURE — 3080F DIAST BP >= 90 MM HG: CPT | Performed by: PHYSICIAN ASSISTANT

## 2024-05-29 PROCEDURE — 99024 POSTOP FOLLOW-UP VISIT: CPT | Performed by: PHYSICIAN ASSISTANT

## 2024-05-29 NOTE — LETTER
May 29, 2024       No Recipients    Patient: Panfilo Feliz   YOB: 1940   Date of Visit: 5/29/2024     Dear Dilan Houston, DO:       Thank you for referring Panfilo Feliz to me for evaluation. Below are the relevant portions of my assessment and plan of care.    If you have questions, please do not hesitate to call me. I look forward to following Panfilo along with you.         Sincerely,        SARATH Nelson        CC:   No Recipients    Daniela Hodgson PA  05/29/24 1709  Sign when Signing Visit  CARDIOVASCULAR SURGERY FOLLOW-UP PROGRESS NOTE  Chief Complaint: Post-op Follow Up        HPI:   Dear Moreno Mello APRN and colleagues:    It was nice to see Panfilo Feliz in follow up today after cardiac surgery.  As you know, he is an 83 y.o. male with an ascending aortic aneurysm and dilated aortic root who underwent ascending aortic aneurysm repair with a 30 mm Dacron interposition graft, reductive root aortoplasty of the right and noncoronary sinuses, and left atrial appendage epicardial closure with a 45 mm atrial clip device at [facility] by Dr. Puente on 5/10/24. He did well postoperatively and continues to do well. He was discharged to [facility] on 5/16/24 and was discharged home yesterday. He comes in today with no specific complaints. He was not pleased with his stay at Rehab secondary to cleanliness of the facility, but states they tended to his sternal incision well and made sure he was up and moving with Physical Therapy.  His activity level has been fair and he is looking forward to starting cardiac rehab and getting out and about. He states he hasn't been contacted yet by cardiac rehab to set-up an appointment. He has an appointment to see Cardiology next week. His wife, who is a retired nurse, states he was on Midodrine while in the hospital and at rehab for postural hypotension, but was not discharged home on it. She is curious at to whether or not it needs to  "be restarted.     Physical Exam:         /95 (BP Location: Left arm, Patient Position: Sitting, Cuff Size: Adult)   Pulse 84   Resp 16   Ht 193 cm (76\")   Wt 81.6 kg (180 lb)   SpO2 97%   BMI 21.91 kg/m²   Heart:  regular rate and rhythm, S1, S2 normal, no murmur, click, rub or gallop  Lungs:  clear to auscultation bilaterally  Extremities:  no edema  Incision(s):  mid chest healing well without erythema or drainage and sternum stable    Assessment/Plan:     S/P ascending aortic aneurysm repair, reductive root aortoplasty, and left atrial appendage epicardial closure. Overall, he is doing well.    Keep incisions clean and dry.  OK to drive if not taking narcotic pain medicine.  OK to begin cardiac rehab.  Follow-up as scheduled with cardiology.  Follow-up as scheduled with PCP.  Return to clinic in 1 year(s) with CT of the chest without contrast secondary to aortic aneurysm repair.    Continue lifting restriction of 10 lbs until 6 weeks and 50 lbs until 12 weeks from the date of surgery. No excessive jarring motions or twisting motions until 12 weeks from the date of surgery.    There was a small suture protruding from the most inferior aspect of his sternal incision that I clipped. Regarding patient's Midodrine, his BP is currently 131/95, so I don't see a need to restart it at this time. I instructed patient and his wife to keep a blood pressure log over the next week and take it with him to his Cardiology appointment next week.     We will see him back in 1 year, but I instructed him and his wife to call sooner with any questions or concerns.    Thank you for allowing me to participate in the care of your patient.    Regards,  SARATH Nelson    "

## 2024-05-29 NOTE — PROGRESS NOTES
"CARDIOVASCULAR SURGERY FOLLOW-UP PROGRESS NOTE  Chief Complaint: Post-op Follow Up        HPI:   Dear Dr. Tapia, JAVY Key and colleagues:    It was nice to see Panfilo Feliz in follow up today after cardiac surgery.  As you know, he is an 83 y.o. male with an ascending aortic aneurysm and dilated aortic root who underwent ascending aortic aneurysm repair with a 30 mm Dacron interposition graft, reductive root aortoplasty of the right and noncoronary sinuses, and left atrial appendage epicardial closure with a 45 mm atrial clip device at [facility] by Dr. Puente on 5/10/24. He did well postoperatively and continues to do well. He was discharged to [facility] on 5/16/24 and was discharged home yesterday. He comes in today with no specific complaints. He was not pleased with his stay at Rehab secondary to cleanliness of the facility, but states they tended to his sternal incision well and made sure he was up and moving with Physical Therapy.  His activity level has been fair and he is looking forward to starting cardiac rehab and getting out and about. He states he hasn't been contacted yet by cardiac rehab to set-up an appointment. He has an appointment to see Cardiology next week. His wife, who is a retired nurse, states he was on Midodrine while in the hospital and at rehab for postural hypotension, but was not discharged home on it. She is curious at to whether or not it needs to be restarted.     Physical Exam:         /95 (BP Location: Left arm, Patient Position: Sitting, Cuff Size: Adult)   Pulse 84   Resp 16   Ht 193 cm (76\")   Wt 81.6 kg (180 lb)   SpO2 97%   BMI 21.91 kg/m²   Heart:  regular rate and rhythm, S1, S2 normal, no murmur, click, rub or gallop  Lungs:  clear to auscultation bilaterally  Extremities:  no edema  Incision(s):  mid chest healing well without erythema or drainage and sternum stable    Assessment/Plan:     S/P ascending aortic aneurysm repair, reductive root aortoplasty, and " left atrial appendage epicardial closure. Overall, he is doing well.    Keep incisions clean and dry.  OK to drive if not taking narcotic pain medicine.  OK to begin cardiac rehab.  Follow-up as scheduled with cardiology.  Follow-up as scheduled with PCP.  Return to clinic in 1 year(s) with CT of the chest without contrast secondary to aortic aneurysm repair.    Continue lifting restriction of 10 lbs until 6 weeks and 50 lbs until 12 weeks from the date of surgery. No excessive jarring motions or twisting motions until 12 weeks from the date of surgery.    There was a small suture protruding from the most inferior aspect of his sternal incision that I clipped. Regarding patient's Midodrine, his BP is currently 131/95, so I don't see a need to restart it at this time. I instructed patient and his wife to keep a blood pressure log over the next week and take it with him to his Cardiology appointment next week.     We will see him back in 1 year, but I instructed him and his wife to call sooner with any questions or concerns.    Thank you for allowing me to participate in the care of your patient.    Regards,  SARATH Nelson

## 2024-05-30 ENCOUNTER — HOME CARE VISIT (OUTPATIENT)
Dept: HOME HEALTH SERVICES | Facility: HOME HEALTHCARE | Age: 84
End: 2024-05-30
Payer: MEDICARE

## 2024-05-30 ENCOUNTER — TELEPHONE (OUTPATIENT)
Dept: CARDIAC SURGERY | Facility: CLINIC | Age: 84
End: 2024-05-30
Payer: MEDICARE

## 2024-05-30 VITALS
HEIGHT: 76 IN | SYSTOLIC BLOOD PRESSURE: 128 MMHG | OXYGEN SATURATION: 98 % | HEART RATE: 86 BPM | RESPIRATION RATE: 12 BRPM | TEMPERATURE: 98.5 F | DIASTOLIC BLOOD PRESSURE: 62 MMHG | WEIGHT: 180 LBS | BODY MASS INDEX: 21.92 KG/M2

## 2024-05-30 PROCEDURE — G0299 HHS/HOSPICE OF RN EA 15 MIN: HCPCS

## 2024-05-30 NOTE — TELEPHONE ENCOUNTER
Home Health RN called to ask if patient can continue to take his ASA 81 mg since he has a history of duodenal ulcers.  Per JAVY Milan, it is okay to continue ASA.  I called Wendy, Home Health RN, and let her know.

## 2024-05-30 NOTE — HOME HEALTH
"SOC Note:  82y/o male admitted by SN s/p Ascending Aortic Aneurysm Repair (elective) at [facility] on 5/10.  Pt and spouse and unsure when aneurysm was discovered, but it was uncovered while testing for another illness.  Pt was transferred to [facility] on 5/16 for continued rehab.  Pt came home on 5/28.  Pt continues to have occ sternal pain and uses heart hugger as needed.  Pt is active and went to gym to workout regularly pre-op and is eager to get back to his baseline.  Pt is a retired  and his spouse is a retired nurse.    Home Health ordered for: disciplines   SN 1w4, PT eval and treat.  Pt declines RPM.    Reason for Hosp/Primary Dx/Co-morbidities: Ascending Aortic Aneurysm elective repair/Co-morbidities include:  post-op Afib with RVR (converted to NSR with amoidarone drip), Orthostatic Hypotension, Leukopenia, Thrombocytopenia, Neuropathy, Monoclonal gammopathy, Neuropathy associated with MGUS, Slow transit constipation, DDD, Chronic neck pain, DJD, HTN, Hyperlipidemia, Hypothyroid, Lumbar canal stenosis, Prostate cancer, Nonrheumatic aortic valve insufficiency, Gastroesophageal reflux disease  Focus of Care: s/p Ascending Aortic Aneurysm Repair     Patient's goal(s):  \"I want to get back into the gym to workout\"    Current Functional status/mobility/DME: Ambulatory without aide    HB status/Living Arrangements: Lives in 2 story, single family home with spouse    Skin Integrity/wound status: Midsternal incision    Code Status: No CPR    Fall Risk/Safety concerns: Fall risk    Educated on Emergency Plan, steps to take prior to going to the ER and when to Call Home Health First:  Yes    Medication issues/Concerns:  Pt is not sure whether to continue ASA because he was told by gastroenterology not to take NSAID because he has duodenal ulcers.  Message left with Dr. Puente's office.    Additional Problems/Concerns:  NA    SDOH Barriers (i.e. caregiver concerns, social isolation, transportation, food insecurity, " environment, income etc.)/Need for MSW: No    Plan for next visit:  CP assess, Incision assess, Med ed and management, Pain assess and management, Infection prevention

## 2024-05-31 ENCOUNTER — HOME CARE VISIT (OUTPATIENT)
Dept: HOME HEALTH SERVICES | Facility: HOME HEALTHCARE | Age: 84
End: 2024-05-31
Payer: MEDICARE

## 2024-06-03 ENCOUNTER — HOME CARE VISIT (OUTPATIENT)
Dept: HOME HEALTH SERVICES | Facility: HOME HEALTHCARE | Age: 84
End: 2024-06-03
Payer: MEDICARE

## 2024-06-03 DIAGNOSIS — Z98.890 S/P ASCENDING AORTIC ANEURYSM REPAIR: Primary | ICD-10-CM

## 2024-06-03 DIAGNOSIS — Z86.79 S/P ASCENDING AORTIC ANEURYSM REPAIR: Primary | ICD-10-CM

## 2024-06-03 PROCEDURE — G0151 HHCP-SERV OF PT,EA 15 MIN: HCPCS

## 2024-06-04 VITALS
TEMPERATURE: 97.5 F | RESPIRATION RATE: 18 BRPM | DIASTOLIC BLOOD PRESSURE: 66 MMHG | SYSTOLIC BLOOD PRESSURE: 120 MMHG | OXYGEN SATURATION: 95 % | HEART RATE: 83 BPM

## 2024-06-04 NOTE — HOME HEALTH
"Eval Note    Patient's goal(s): \" To get my strength back and walk everywhere\"    Services required to achieve goals: PT    Potential Issues for goal attainment: None    Problems identified: Generalized weakness with decline in gait skills/tolerance post ascending aortic aneurysm repair    Describe the Functional status and safety: Patient requires cga/sba and ambulates about 60ft with min/cga and wall/furniture support, taking slow, short steps with shuffling gait pattern    Describe any environmental issues: Patient lives in 2-story home with spouse and therefore are 3 outdoro steps without rails    Any equipment needs: N/A    POC confirmed with patient/spouse on 6/3/24"

## 2024-06-05 ENCOUNTER — OFFICE VISIT (OUTPATIENT)
Dept: CARDIOLOGY | Facility: CLINIC | Age: 84
End: 2024-06-05
Payer: MEDICARE

## 2024-06-05 VITALS
OXYGEN SATURATION: 98 % | SYSTOLIC BLOOD PRESSURE: 130 MMHG | HEIGHT: 76 IN | BODY MASS INDEX: 21.92 KG/M2 | DIASTOLIC BLOOD PRESSURE: 78 MMHG | HEART RATE: 78 BPM | WEIGHT: 180 LBS

## 2024-06-05 DIAGNOSIS — R94.31 ABNORMAL EKG: ICD-10-CM

## 2024-06-05 DIAGNOSIS — I71.21 ANEURYSM OF ASCENDING AORTA WITHOUT RUPTURE: Primary | ICD-10-CM

## 2024-06-05 DIAGNOSIS — I35.1 NONRHEUMATIC AORTIC VALVE INSUFFICIENCY: ICD-10-CM

## 2024-06-05 RX ORDER — MIDODRINE HYDROCHLORIDE 5 MG/1
5 TABLET ORAL
Qty: 270 TABLET | Refills: 3 | Status: SHIPPED | OUTPATIENT
Start: 2024-06-05

## 2024-06-05 NOTE — PROGRESS NOTES
Cardiology Office Visit      Encounter Date:  2024    PATIENT IDENTIFICATION    Name: Panfilo Feliz  Age: 83 y.o. Sex: male : 1940  MRN: [id number]    Reason For Followup:  Valvular heart disease  Ascending aortic aneurysm    Brief Clinical History:  Dear Moreno Mello APRN    I had the pleasure of seeing Panfilo Feliz today. As you are well aware, this is a 83 y.o. male with no established history of ischemic heart disease.  He does have a history of aortic insufficiency, ascending aortic aneurysm, hypothyroidism, neuropathy, and acid reflux.  He presents today for follow-up on the above conditions.    Interval History:  2024                                           The patient presents today for an acute visit.  He reports that he saw his cardiologist last month but has deteriorated since then.  He is specifically citing issues with lightheadedness and a cold sensation all over.  He reports that over the past month this has significantly deteriorated.  Interestingly, the lightheadedness appears to occur mostly in the evenings around 630 or so.  He reports that he had been going to the gym a couple of times a week but now has not been able to do so because of his symptoms.  His neuropathy continues to deteriorate.  He reports that they have found nothing to help out with this.    He has started some medications to help with his neuropathy but none have been successful.  It is possible that some of the side effects from his medicines could be lending assistance to his symptoms.  We discussed options and we will have him back off on his atenolol to see if this helps.    In the bigger picture, the patient has been seen by cardiothoracic surgery.  Per the patient and his wife, surgery was offered at the last visit with Dr. Puente.  The patient was reluctant to move forward.    We had a lengthy discussion today.  I discussed that with the patient's deteriorating neuropathy and now endurance  issues, it may be worthwhile to consider moving forward with surgery before his neuropathy and endurance worsens to a point that would preclude him from being a candidate for surgery.  He has a CT scan scheduled for later this year as well as an echocardiogram.  We will move those up.  He will need a cardiac catheterization as well.  I have encouraged him to reach out to Dr. Puente in order to schedule a follow-up and discuss moving forward with surgery.    04/09/2024        His blood pressures have been on the low side (80s) and was was getting orthostatic. His BP today is a little on the higher side.  He is having some issues with shortness of breath with exertion.  He underwent a CT scan that demonstrated an ascending aortic aneurysm of 5.1 cm.  We again discussed the options.  He is ready to move forward with having these items addressed.  As such we will schedule him for a cardiac catheterization as well as a transesophageal echocardiogram.  He is scheduled to see Dr. Puente next week.  His blood pressure is elevated today however he has not tolerated titrations of his antihypertensives.  He reports becoming dizzy and lightheaded and nonfunctional.    06/05/2024        The patient underwent ascending aortic aneurysm repair with reductive root aortoplasty of the right and noncoronary sinuses with left atrial appendage closure (atrial clip).  And he is home and now doing home PT. He is still having some orthostasis. He is on midodrine and metoprolol. Hold parameters on metoprolol are for less than 110. Will start with cardiac rehab.    Assessment & Plan    Impressions:  Ascending aortic aneurysm  Aortic insufficiency  Depression/anxiety  Hypothyroidism  Peripheral neuropathy  Acid reflux    Recommendations:  Continuation of his current cardiovascular regimen at the present time.     This includes midodrine and metoprolol  Treadmill stress test  Phase 2 cardiac rehab  Follow-up 3 months    Diagnoses and all orders  "for this visit:    1. Aneurysm of ascending aorta without rupture (Primary)  -     Cardiac Rehab Phase II; Future  -     Treadmill Stress Test; Future  -     ECG 12 Lead    2. Nonrheumatic aortic valve insufficiency  -     Cardiac Rehab Phase II; Future  -     Treadmill Stress Test; Future  -     ECG 12 Lead    3. Abnormal EKG  -     Treadmill Stress Test; Future  -     ECG 12 Lead    Other orders  -     midodrine (PROAMATINE) 5 MG tablet; Take 1 tablet by mouth 3 (Three) Times a Day Before Meals.  Dispense: 270 tablet; Refill: 3  -     metoprolol tartrate (LOPRESSOR) 25 MG tablet; Take 0.5 tablets by mouth 2 (Two) Times a Day. Indications: High Blood Pressure Disorder  Dispense: 90 tablet; Refill: 3              Objective:    Vitals:  Vitals:    06/05/24 1319   BP: 130/78   Pulse: 78   SpO2: 98%   Weight: 81.6 kg (180 lb)   Height: 193 cm (76\")         Body mass index is 21.91 kg/m².      Physical Exam:    General: Alert, cooperative, no distress, appears stated age  Head:  Normocephalic, atraumatic, mucous membranes moist  Eyes:  Conjunctiva/corneas clear, EOM's intact     Neck:  Supple,  no bruit    Lungs: Coarse and diminished bilaterally  Chest wall: Tender at incision  Heart::  Regular rate and rhythm, S1 and S2 normal, 1/6 diastolic murmur.  No rub or gallop  Abdomen: Soft, non-tender, nondistended bowel sounds active  Extremities: No cyanosis, clubbing, or edema  Pulses: Diminished pedal pulses  Skin:  No rashes or lesions  Neuro/psych: A&O x3. CN II through XII are grossly intact with appropriate affect      Allergies:  Allergies   Allergen Reactions    Statins Myalgia       Medication Review:     Current Outpatient Medications:     acetaminophen (TYLENOL) 325 MG tablet, Take 2 tablets by mouth Every 4 (Four) Hours As Needed for Mild Pain., Disp: , Rfl:     aspirin 81 MG EC tablet, Take 1 tablet by mouth Daily., Disp: , Rfl:     cholecalciferol (VITAMIN D3) 25 MCG (1000 UT) tablet, Take 1 tablet by mouth 2 " (Two) Times a Day. Indications: Vitamin D Deficiency, Disp: , Rfl:     docusate sodium (COLACE) 100 MG capsule, Take 3 capsules by mouth 2 (Two) Times a Day. Indications: Constipation, Disp: , Rfl:     escitalopram (LEXAPRO) 10 MG tablet, Take 1 tablet by mouth Daily. Take combined with 20mg tablets for a total of 30mg daily, Disp: 30 tablet, Rfl: 3    escitalopram (LEXAPRO) 20 MG tablet, Take 1 tablet by mouth Daily. Take w/ 10 mg tab for total dose = 30 mg daily  Indications: Major Depressive Disorder, Disp: , Rfl:     levothyroxine (SYNTHROID, LEVOTHROID) 100 MCG tablet, Take 1 tablet by mouth Daily. Indications: Underactive Thyroid, Disp: , Rfl:     magnesium oxide (MAG-OX) 400 MG tablet, Take 1 tablet by mouth Daily. Indications: Disorder with Low Magnesium Levels, Disp: , Rfl:     metoprolol tartrate (LOPRESSOR) 25 MG tablet, Take 0.5 tablets by mouth 2 (Two) Times a Day. Indications: High Blood Pressure Disorder, Disp: 90 tablet, Rfl: 3    pantoprazole (PROTONIX) 40 MG EC tablet, Take 1 tablet by mouth 2 (Two) Times a Day. Indications: Gastroesophageal Reflux Disease, Disp: , Rfl:     pregabalin (LYRICA) 50 MG capsule, Take 1 capsule by mouth 2 (Two) Times a Day. Indications: Neuropathic Pain, Disp: , Rfl:     vitamin B-12 (CYANOCOBALAMIN) 1000 MCG tablet, Take 1 tablet by mouth Daily. Indications: Inadequate Vitamin B12, Disp: , Rfl:     Menthol, Topical Analgesic, (BIOFREEZE EX), Apply 1 Application topically Daily As Needed (pain). Indications: Pain (Patient not taking: Reported on 6/5/2024), Disp: , Rfl:     midodrine (PROAMATINE) 5 MG tablet, Take 1 tablet by mouth 3 (Three) Times a Day Before Meals., Disp: 270 tablet, Rfl: 3  No current facility-administered medications for this visit.    Facility-Administered Medications Ordered in Other Visits:     Chlorhexidine Gluconate Cloth 2 % pads, , Topical, Q12H PRN, Kayli Rowe, APRN    Family History:  Family History   Problem Relation Age of  Onset    Cancer Mother 85        Breast    COPD Father     Cancer Brother 59        Unknown type    Hypertension Daughter        Past Medical History:  Past Medical History:   Diagnosis Date    AAA (abdominal aortic aneurysm)     Abnormal ECG 1980’s    Alcoholism     None since 1991    Aneurysm 2019    aortic arch    Arthritis     Basal cell carcinoma (BCC) of face     Cholelithiasis 1990’s    Cholecystectomy    Colon polyp     Colon polyps     Coronary artery disease     Difficulty walking     Disease of thyroid gland     DJD (degenerative joint disease)     Gastritis     Gastroparesis     GERD (gastroesophageal reflux disease)     GI (gastrointestinal bleed)     Hernia     Hiatal    History of bone marrow biopsy     [place] (hard of hearing)     Hyperlipidemia     Hypertension     Hypothyroidism     IgG monoclonal gammopathy     Multiple duodenal ulcers     Neuropathy     PONV (postoperative nausea and vomiting)     Prostate cancer     Reflux esophagitis     Ulcer     WBC decreased        Past Surgical History:  Past Surgical History:   Procedure Laterality Date    ASCENDING AORTIC ANEURYSM REPAIR W/ MECHANICAL AORTIC VALVE REPLACEMENT N/A 5/10/2024    Procedure: ASCENDING AORTIC ARCH/ possible HEMIARCH REPLACEMENT WITH CIRCULATORY ARREST, neuro monitoring, and intraop JOSE A;  Surgeon: Spencer Puente MD;  Location: [facility];  Service: Cardiothoracic;  Laterality: N/A;  Ascending aortic aneurysm repair with 30 mm gelweave graft.    CARDIAC CATHETERIZATION N/A 4/19/2024    Procedure: Right and Left Heart Cath with possible PCI;  Surgeon: Dilan Houston DO;  Location: [facility] CATH INVASIVE LOCATION;  Service: Cardiovascular;  Laterality: N/A;  Local and IV sedation    CHOLECYSTECTOMY  1988    COLON SURGERY  1998    COLONOSCOPY  02/2013    ENDOSCOPY  2012    ESOPHAGOGASTRODUODENOSCOPY WITH DILATION OF SHATZKI'S RING    ENDOSCOPY N/A 10/09/2020    Procedure: ESOPHAGOGASTRODUODENOSCOPY with cold bx;   Surgeon: Jake Perez MD;  Location:  [facility];  Service: Gastroenterology;  Laterality: N/A;  pre - nausea  post - duodenal ulcer, gastrits, gastric ulcer, hiatal hernia    ENDOSCOPY N/A 10/04/2022    Procedure: ESOPHAGOGASTRODUODENOSCOPY with biopsies with esophageal dilatation with 56 cui;  Surgeon: Jake Perez MD;  Location:  [facility];  Service: Gastroenterology;  Laterality: N/A;  pre-dysphagia  post-normal     ENDOSCOPY  10/04/2022    [facility]    EYE SURGERY Bilateral     cataracts    LAMINECTOMY  2013    decompression L3-4, L4-5    PROSTATECTOMY  2007    SKIN BIOPSY      TONSILLECTOMY AND ADENOIDECTOMY      1940s    UPPER GASTROINTESTINAL ENDOSCOPY      VASECTOMY      1970s       Social History:  Social History     Socioeconomic History    Marital status:      Spouse name: Meghan    Years of education: College   Tobacco Use    Smoking status: Never     Passive exposure: Never    Smokeless tobacco: Never   Vaping Use    Vaping status: Never Used   Substance and Sexual Activity    Alcohol use: No     Comment: Heavy in past, none in 33 years    Drug use: Never    Sexual activity: Not Currently     Partners: Female     Birth control/protection: None       Review of Systems:  The following systems were reviewed as they relate to the cardiovascular system: Constitutional, Eyes, ENT, Cardiovascular, Respiratory, Gastrointestinal, Integumentary, Neurological, Psychiatric, Hematologic, Endocrine, Musculoskeletal, and Genitourinary. The pertinent cardiovascular findings are reported above with all other cardiovascular points within those systems being negative.    Diagnostic Study Review:     Current Electrocardiogram:    ECG 12 Lead    Date/Time: 6/5/2024 5:36 PM  Performed by: Dilan Houston DO    Authorized by: Dilan Houston DO  Comparison: not compared with previous ECG   Previous ECG: no previous ECG available  Comments: Normal sinus rhythm with a  ventricular rate of 79 bpm.  Consider left atrial enlargement.  Incomplete right bundle branch block.  Borderline inferior Q waves.  Normal QT and QTc intervals.  Normal QRS axis.          Laboratory Data:  Lab Results   Component Value Date    GLUCOSE 92 05/21/2024    BUN 16 05/21/2024    CREATININE 0.92 05/21/2024    EGFRIFNONA 58 (L) 02/09/2022    BCR 17.4 05/21/2024    K 4.2 05/21/2024    CO2 24.0 05/21/2024    CALCIUM 8.6 05/21/2024    PROTENTOTREF 6.8 01/16/2024    ALBUMIN 2.9 (L) 05/17/2024    LABIL2 1.2 01/16/2024    AST 29 05/17/2024    ALT 12 05/17/2024     Lab Results   Component Value Date    GLUCOSE 92 05/21/2024    CALCIUM 8.6 05/21/2024     05/21/2024    K 4.2 05/21/2024    CO2 24.0 05/21/2024     05/21/2024    BUN 16 05/21/2024    CREATININE 0.92 05/21/2024    EGFRIFNONA 58 (L) 02/09/2022    BCR 17.4 05/21/2024    ANIONGAP 10.0 05/21/2024     Lab Results   Component Value Date    WBC 6.81 05/21/2024    HGB 9.2 (L) 05/21/2024    HCT 29.5 (L) 05/21/2024    MCV 90.8 05/21/2024     05/21/2024     Lab Results   Component Value Date    CHOL 195 04/17/2024    TRIG 101 04/17/2024    HDL 54 04/17/2024     (H) 04/17/2024     Lab Results   Component Value Date    HGBA1C 5.50 05/09/2024     Lab Results   Component Value Date    INR 1.10 05/11/2024    INR 1.12 (H) 05/10/2024    INR 1.38 (H) 05/10/2024    PROTIME 11.9 (H) 05/11/2024    PROTIME 12.1 (H) 05/10/2024    PROTIME 14.7 (H) 05/10/2024       Most Recent Echo:  Results for orders placed in visit on 05/10/24    Intra-Op Anesthesia JOSE    Narrative  Intra-Op Anesthesia JOSE    Procedure Performed: Intra-Op Anesthesia JOSE  Start Time:  End Time:    Preanesthesia Checklist:  Patient identified, IV assessed, risks and benefits discussed, monitors and equipment assessed, procedure being performed at surgeon's request and anesthesia consent obtained.    General Procedure Information  JOSE Placed for monitoring purposes only -- This is not a  diagnostic JOSE  Diagnostic Indications for Echo:  hemodynamic monitoring  Physician Requesting Echo: Spencer Puente MD  Location performed:  OR  Intubated  Bite block placed  Heart visualized  Probe Insertion:  Easy  Probe Type:  Biplane  Modalities:  Color flow mapping, continuous wave Doppler and 2D only    Echocardiographic and Doppler Measurements    Ventricles    Right Ventricle:  Cavity size normal.  Global function normal.  Left Ventricle:  Cavity size normal.  Global Function normal.  Ejection Fraction 55%.        Valves    Aortic Valve:  Annulus dilated.  Stenosis not present.  Regurgitation mild.  Leaflets normal.  Leaflet motions normal.    Mitral Valve:  Annulus normal.  Stenosis not present.  Regurgitation trace.  Leaflets normal.  Leaflet motions normal.    Tricuspid Valve:  Annulus normal.  Regurgitation absent.  Leaflet motions normal.  Pulmonic Valve:  Annulus normal.      Aorta    Ascending Aorta:  Size dilated.  Diameter 4.1 cm.  Dissection not present.  Mobile plaque not present.  Aortic Arch:  Size dilated.  Mobile plaque not present.  Descending Aorta:  Size dilated.  Diameter 2.7 cm.  Dissection not present.  Mobile plaque not present.      Atria    Right Atrium:  Size normal.  Left Atrium:  Size normal.                Anesthesia Information  Performed Personally  Anesthesiologist:  Jermain Knapp MD      Echocardiogram Comments:  Pre CPB:         Mild MR, Mild AI, Dilated Asc aorta  Post CPB:       no changes       Most Recent Stress Test:  Results for orders placed during the hospital encounter of 07/07/22    Treadmill Stress Test    Interpretation Summary  · Arrhythmias were not significant during stress.  · Equivocal ECG evidence of myocardial ischemia. Indeterminate clinical evidence of myocardial ischemia. Findings consistent with an equivocal ECG stress test.       Most Recent Cardiac Catheterization:   No results found for this or any previous visit.       NOTE: The following  "portions of the patient's note were reviewed, confirmed and/or updated this visit as appropriate: History of present illness/Interval history, physical examination, assessment & plan, allergies, current medications, past family history, past medical history, past social history, past surgical history and problem list.    Labs pertinent to today's visit on 06/05/2024 (including but not limited to CBC, CMP, and lipid profiles) were requested from the patient's primary care provider/hospital/clinical laboratory.  If the labs were available for the visit, they were reviewed with the patient.  If they were not available, when received, special interest will be made to the labs pertinent to this visit.  The patient's most recent \"in-house\" labs are noted below and have been reviewed.  Outside labs pertinent to this visit are scanned into the record and have been reviewed.    Discussions held today, 06/05/2024,regarding procedures included risk, benefits, and options including but not limited to: Death, MI, stroke, pain, bleeding, infection, and possible need for vascular/thoracic/cardiothoracic surgery.    Copied information within this note was reviewed and is current as of 06/05/2024.    Assessment and plan noted herein represents the current plan of care as of 06/05/2024.    Significant resources from our office and staff are inherent in engaging this patient in a continuous and active collaborative plan of care related to their chronic cardiovascular conditions outlined below.  The management of these conditions requires the direction of our service with specialized clinical knowledge, skills, and experience.  This collaborative care includes but is not limited to patient education, expectations and responsibilities, shared decision making around therapeutic goals, and shared commitments to achieve those goals.  "

## 2024-06-06 ENCOUNTER — HOME CARE VISIT (OUTPATIENT)
Dept: HOME HEALTH SERVICES | Facility: HOME HEALTHCARE | Age: 84
End: 2024-06-06
Payer: MEDICARE

## 2024-06-06 ENCOUNTER — TELEPHONE (OUTPATIENT)
Dept: CARDIAC REHAB | Facility: HOSPITAL | Age: 84
End: 2024-06-06
Payer: MEDICARE

## 2024-06-06 VITALS
OXYGEN SATURATION: 95 % | DIASTOLIC BLOOD PRESSURE: 68 MMHG | HEART RATE: 78 BPM | SYSTOLIC BLOOD PRESSURE: 124 MMHG | TEMPERATURE: 97.7 F | RESPIRATION RATE: 15 BRPM

## 2024-06-06 PROCEDURE — G0157 HHC PT ASSISTANT EA 15: HCPCS

## 2024-06-06 NOTE — HOME HEALTH
Spouse states no need for continued skilled nursing visits, spouse is a nurse and monitors very closely, needs continued physical therapy only.

## 2024-06-07 ENCOUNTER — TELEPHONE (OUTPATIENT)
Dept: CARDIAC REHAB | Facility: HOSPITAL | Age: 84
End: 2024-06-07
Payer: MEDICARE

## 2024-06-07 NOTE — HOME HEALTH
pt sitting up and is pepared for PT session,     feeling well and with chest harness donned .   pt denies any pain today and no new c/o's.      spouse present and is assisting as needed.      pt reporting he is motivated to improve and wants to begin OP as soon as possible,   then return to the gym.         pt was challenged today to stand for extended periods of time,  required rest periods and with cues to properly deep plb for energy conservation.  pt was educated on proper form with hep review,  with cues go perform within the correct plane and to properly deep plb for energy conservation.    o2 sats and hr were wnl's throughout       plan for next session-  cont PT with gait trg, hep review and activities to improve endurance following cabg

## 2024-06-10 ENCOUNTER — HOME CARE VISIT (OUTPATIENT)
Dept: HOME HEALTH SERVICES | Facility: HOME HEALTHCARE | Age: 84
End: 2024-06-10
Payer: MEDICARE

## 2024-06-10 VITALS
TEMPERATURE: 97.5 F | DIASTOLIC BLOOD PRESSURE: 58 MMHG | RESPIRATION RATE: 14 BRPM | OXYGEN SATURATION: 95 % | HEART RATE: 82 BPM | SYSTOLIC BLOOD PRESSURE: 112 MMHG

## 2024-06-10 PROCEDURE — G0157 HHC PT ASSISTANT EA 15: HCPCS

## 2024-06-11 NOTE — HOME HEALTH
pt sitting  up and is prepared for PT session,   feeling well and with no mentioned c/o's.  pt has been attempting hep review but with limited strength/endurance.  pt feels he is improving and better able to ambulate.   pt reporting no med changes or complications.      pt was limited but tolerant of the activities performed.   pt was minimally unsteady upon standing but was able to self correct without significant lob.    pt was cued to properly deep plb for energy conservation and to properly pace activities.     o2 sats/hr remained wnl's with no noted issues  discussed cardiac precautions and pt denied these.           plan for next session- cont PT with gait trg, hep review  with activities to improve strength/endurance following cabg.

## 2024-06-13 ENCOUNTER — HOME CARE VISIT (OUTPATIENT)
Dept: HOME HEALTH SERVICES | Facility: HOME HEALTHCARE | Age: 84
End: 2024-06-13
Payer: MEDICARE

## 2024-06-13 VITALS
RESPIRATION RATE: 16 BRPM | DIASTOLIC BLOOD PRESSURE: 60 MMHG | SYSTOLIC BLOOD PRESSURE: 112 MMHG | HEART RATE: 71 BPM | OXYGEN SATURATION: 95 % | TEMPERATURE: 98.2 F

## 2024-06-13 PROCEDURE — G0157 HHC PT ASSISTANT EA 15: HCPCS

## 2024-06-14 NOTE — HOME HEALTH
pt agrees to PT,  but reports feeling slightly nauseated and reports this is due to ulcers.   pt with chest harness donned.     pt reporting he has been attempting hep review as able but with liimtations     pt was limited today in all areas,  but was compliant and able to perform the requested activities.  pt was educated today on proper form with hep review,  needing cues to perform correctly but level of ind is improving.   pt was educated today on correct lumbar stab techniques and to avoid compensatory movement.        pt was educated on cardiac precautions and none reported.          plan for next session-   cont PT per cabg protocol with activities to improve strength/endurance and overall functional capacity only leaving home for apts when assisted.

## 2024-06-17 ENCOUNTER — HOSPITAL ENCOUNTER (OUTPATIENT)
Dept: CARDIOLOGY | Facility: HOSPITAL | Age: 84
Discharge: HOME | End: 2024-06-17
Admitting: INTERNAL MEDICINE
Payer: MEDICARE

## 2024-06-17 DIAGNOSIS — R94.31 ABNORMAL EKG: ICD-10-CM

## 2024-06-17 DIAGNOSIS — I35.1 NONRHEUMATIC AORTIC VALVE INSUFFICIENCY: ICD-10-CM

## 2024-06-17 DIAGNOSIS — I71.21 ANEURYSM OF ASCENDING AORTA WITHOUT RUPTURE: ICD-10-CM

## 2024-06-17 LAB
BH CV STRESS BP STAGE 1: NORMAL
BH CV STRESS DURATION MIN STAGE 1: 1
BH CV STRESS DURATION SEC STAGE 1: 2
BH CV STRESS GRADE STAGE 1: 10
BH CV STRESS HR STAGE 1: 85
BH CV STRESS METS STAGE 1: 4.6
BH CV STRESS PROTOCOL 1: NORMAL
BH CV STRESS RECOVERY BP: NORMAL MMHG
BH CV STRESS RECOVERY HR: 73 BPM
BH CV STRESS SPEED STAGE 1: 1.7
BH CV STRESS STAGE 1: 1
MAXIMAL PREDICTED HEART RATE: 138 BPM
PERCENT MAX PREDICTED HR: 61.59 %
STRESS BASELINE BP: NORMAL MMHG
STRESS BASELINE HR: 81 BPM
STRESS PERCENT HR: 72 %
STRESS POST ESTIMATED WORKLOAD: 4.6 METS
STRESS POST EXERCISE DUR MIN: 1 MIN
STRESS POST EXERCISE DUR SEC: 2 SEC
STRESS POST PEAK BP: NORMAL MMHG
STRESS POST PEAK HR: 85 BPM
STRESS TARGET HR: 117 BPM

## 2024-06-17 PROCEDURE — 93016 CV STRESS TEST SUPVJ ONLY: CPT | Performed by: INTERNAL MEDICINE

## 2024-06-17 PROCEDURE — 93017 CV STRESS TEST TRACING ONLY: CPT

## 2024-06-17 PROCEDURE — 93018 CV STRESS TEST I&R ONLY: CPT | Performed by: INTERNAL MEDICINE

## 2024-06-20 ENCOUNTER — HOME CARE VISIT (OUTPATIENT)
Dept: HOME HEALTH SERVICES | Facility: HOME HEALTHCARE | Age: 84
End: 2024-06-20
Payer: MEDICARE

## 2024-06-20 VITALS
SYSTOLIC BLOOD PRESSURE: 118 MMHG | DIASTOLIC BLOOD PRESSURE: 60 MMHG | RESPIRATION RATE: 15 BRPM | HEART RATE: 73 BPM | TEMPERATURE: 98.3 F | OXYGEN SATURATION: 96 %

## 2024-06-20 PROCEDURE — G0157 HHC PT ASSISTANT EA 15: HCPCS

## 2024-06-21 NOTE — HOME HEALTH
pt sitting up,  prepared for PT session and voices no c/o's.             pt with harness donned and continues to wear most times.   pt denies any falls and no med changes.   pt reports his stress test was satisfactory and he plans to begin OP PT following dc from home services.    incision is clean and dry.      pt was limited today in all areas,   needed cues to deep plb and for proper form with hep review and with rest period needed.    pt was minimally unsteady upon standing but no significant lob.  pts overall endurance and gait tolerance continue to improve.    hr and o2 sats         plan for next session-  possible dc to OP PT

## 2024-06-25 ENCOUNTER — HOME CARE VISIT (OUTPATIENT)
Dept: HOME HEALTH SERVICES | Facility: HOME HEALTHCARE | Age: 84
End: 2024-06-25
Payer: MEDICARE

## 2024-06-25 ENCOUNTER — TELEPHONE (OUTPATIENT)
Dept: CARDIOLOGY | Facility: CLINIC | Age: 84
End: 2024-06-25

## 2024-06-25 NOTE — TELEPHONE ENCOUNTER
Caller: Kelin Feliz    Relationship to patient: Emergency Contact    Best call back number: [phone]    Patient is needing: WIFE REPORTS PT HIS HAVING LOW BP, AND LIGHTHEADEDNESS. WANTS TO GET HIM IN ASAP. PLEASE CALL TO DISCUSS.

## 2024-06-26 ENCOUNTER — OFFICE VISIT (OUTPATIENT)
Age: 84
End: 2024-06-26
Payer: MEDICARE

## 2024-06-26 VITALS
OXYGEN SATURATION: 95 % | BODY MASS INDEX: 21.55 KG/M2 | HEIGHT: 76 IN | HEART RATE: 71 BPM | SYSTOLIC BLOOD PRESSURE: 106 MMHG | DIASTOLIC BLOOD PRESSURE: 71 MMHG | WEIGHT: 177 LBS

## 2024-06-26 DIAGNOSIS — I95.9 HYPOTENSION, UNSPECIFIED HYPOTENSION TYPE: ICD-10-CM

## 2024-06-26 DIAGNOSIS — I71.21 ANEURYSM OF ASCENDING AORTA WITHOUT RUPTURE: ICD-10-CM

## 2024-06-26 DIAGNOSIS — R42 DIZZINESS: Primary | ICD-10-CM

## 2024-06-26 PROCEDURE — 99214 OFFICE O/P EST MOD 30 MIN: CPT | Performed by: NURSE PRACTITIONER

## 2024-06-26 PROCEDURE — 3078F DIAST BP <80 MM HG: CPT | Performed by: NURSE PRACTITIONER

## 2024-06-26 PROCEDURE — 3074F SYST BP LT 130 MM HG: CPT | Performed by: NURSE PRACTITIONER

## 2024-06-26 RX ORDER — MIDODRINE HYDROCHLORIDE 10 MG/1
10 TABLET ORAL
Qty: 90 TABLET | Refills: 11 | Status: SHIPPED | OUTPATIENT
Start: 2024-06-26

## 2024-06-26 NOTE — PROGRESS NOTES
Cardiology Office Follow Up Visit      Primary Care Provider:  Moreno aTpia APRN    Reason for f/u:     Eval dizziness and hypotension      Subjective       History of Present Illness       Panfilo Feliz is a 83 y.o. male seen in clinic today for c/o dizziness and hypotension.    Patient has past cardiac history of ascending aortic aneurysm status post recent repair with MARITA closure on 5/10/24 complicated by brief postop A-fib and discharged home on aspirin therapy.  Preop JOSE 4/2024 showed an EF of 56 to 60% with moderate AI.  Preop cath 4/2024 showed no obstructive CAD.  PMH includes HTN, HLD, hypothyroidism, and prostate cancer status post surgery and radiation.    Patient tells me he has had difficulty with low blood pressure and dizziness since surgery.  Symptoms have been particularly bad in the last couple of days in which he has felt lightheaded to the point of presyncope and has nearly fallen.  He is having difficulty identifying precipitators of his symptoms but does feel they worsen with position change.  He is staying hydrated.  He reports his lowest home blood pressure reading has been 90/60 and the highest SBP in the 110s.  He has had to hold several doses of the Lopressor.  He denies shortness of breath, edema, or unusual weight gain.  He has been doing chair exercises with PT in his home and has been able to ambulate for 3-10 minutes.  He has only had one episode of palpitations described as his heart beating fast while lying down and this was brief and self-limited.  His spouse tells me that his recent labs in [facility] had all been normal.      ASSESSMENT/PLAN:      Diagnoses and all orders for this visit:    1. Dizziness (Primary)  -     Adult Transthoracic Echo Complete W/ Cont if Necessary Per Protocol; Future    2. Hypotension, unspecified hypotension type  -     Adult Transthoracic Echo Complete W/ Cont if Necessary Per Protocol; Future    3. Aneurysm of ascending aorta without rupture  -      Adult Transthoracic Echo Complete W/ Cont if Necessary Per Protocol; Future    Other orders  -     midodrine (PROAMATINE) 10 MG tablet; Take 1 tablet by mouth 3 (Three) Times a Day Before Meals.  Dispense: 90 tablet; Refill: 11            MEDICAL DECISION MAKING:    Will repeat 2D echo to rule out postop pericardial effusion.  I have increased midodrine to 10 mg p.o. 3 times daily.  I am reluctant to discontinue lopressor entirely d/t transient postop A-fib.  I have reiterated parameters in which patient should hold this medication.  He appears compensated with respect to volume.  He is not yet in cardiac rehab b/c he is still participating in home health.        RTC: We will keep scheduled follow-up appointment with Dr. Houston on 9/11.    Past Medical History:   Diagnosis Date    AAA (abdominal aortic aneurysm)     Abnormal ECG 1980’s    Alcoholism     None since 1991    Aneurysm 2019    aortic arch    Arthritis     Basal cell carcinoma (BCC) of face     Cholelithiasis 1990’s    Cholecystectomy    Colon polyp     Colon polyps     Coronary artery disease     Difficulty walking     Disease of thyroid gland     DJD (degenerative joint disease)     Gastritis     Gastroparesis     GERD (gastroesophageal reflux disease)     GI (gastrointestinal bleed)     Hernia     Hiatal    History of bone marrow biopsy     [place] (hard of hearing)     Hyperlipidemia     Hypertension     Hypothyroidism     IgG monoclonal gammopathy     Multiple duodenal ulcers     Neuropathy     PONV (postoperative nausea and vomiting)     Prostate cancer     Reflux esophagitis     Ulcer     WBC decreased        Past Surgical History:   Procedure Laterality Date    ASCENDING AORTIC ANEURYSM REPAIR W/ MECHANICAL AORTIC VALVE REPLACEMENT N/A 5/10/2024    Procedure: ASCENDING AORTIC ARCH/ possible HEMIARCH REPLACEMENT WITH CIRCULATORY ARREST, neuro monitoring, and intraop JOSE A;  Surgeon: Spencer Puente MD;  Location: [facility];  Service:  Cardiothoracic;  Laterality: N/A;  Ascending aortic aneurysm repair with 30 mm gelweave graft.    CARDIAC CATHETERIZATION N/A 4/19/2024    Procedure: Right and Left Heart Cath with possible PCI;  Surgeon: Dilan Houston DO;  Location:  [facility];  Service: Cardiovascular;  Laterality: N/A;  Local and IV sedation    CHOLECYSTECTOMY  1988    COLON SURGERY  1998    COLONOSCOPY  02/2013    ENDOSCOPY  2012    ESOPHAGOGASTRODUODENOSCOPY WITH DILATION OF SHATZKI'S RING    ENDOSCOPY N/A 10/09/2020    Procedure: ESOPHAGOGASTRODUODENOSCOPY with cold bx;  Surgeon: Jake Perez MD;  Location:  [facility];  Service: Gastroenterology;  Laterality: N/A;  pre - nausea  post - duodenal ulcer, gastrits, gastric ulcer, hiatal hernia    ENDOSCOPY N/A 10/04/2022    Procedure: ESOPHAGOGASTRODUODENOSCOPY with biopsies with esophageal dilatation with 56 cui;  Surgeon: Jake Perez MD;  Location:  [facility];  Service: Gastroenterology;  Laterality: N/A;  pre-dysphagia  post-normal     ENDOSCOPY  10/04/2022    [facility]    EYE SURGERY Bilateral     cataracts    LAMINECTOMY  2013    decompression L3-4, L4-5    PROSTATECTOMY  2007    SKIN BIOPSY      TONSILLECTOMY AND ADENOIDECTOMY      1940s    UPPER GASTROINTESTINAL ENDOSCOPY      VASECTOMY      1970s         Current Outpatient Medications:     acetaminophen (TYLENOL) 325 MG tablet, Take 2 tablets by mouth Every 4 (Four) Hours As Needed for Mild Pain., Disp: , Rfl:     aspirin 81 MG EC tablet, Take 1 tablet by mouth Daily., Disp: , Rfl:     cholecalciferol (VITAMIN D3) 25 MCG (1000 UT) tablet, Take 1 tablet by mouth 2 (Two) Times a Day. Indications: Vitamin D Deficiency, Disp: , Rfl:     docusate sodium (COLACE) 100 MG capsule, Take 3 capsules by mouth 2 (Two) Times a Day. Indications: Constipation, Disp: , Rfl:     escitalopram (LEXAPRO) 10 MG tablet, Take 1 tablet by mouth Daily. Take combined with 20mg tablets for a total of 30mg  daily, Disp: 30 tablet, Rfl: 3    escitalopram (LEXAPRO) 20 MG tablet, Take 1 tablet by mouth Daily. Take w/ 10 mg tab for total dose = 30 mg daily  Indications: Major Depressive Disorder, Disp: , Rfl:     levothyroxine (SYNTHROID, LEVOTHROID) 100 MCG tablet, Take 1 tablet by mouth Daily. Indications: Underactive Thyroid, Disp: , Rfl:     magnesium oxide (MAG-OX) 400 MG tablet, Take 1 tablet by mouth Daily. Indications: Disorder with Low Magnesium Levels, Disp: , Rfl:     Menthol, Topical Analgesic, (BIOFREEZE EX), Apply 1 Application topically Daily As Needed (pain)., Disp: , Rfl:     metoprolol tartrate (LOPRESSOR) 25 MG tablet, Take 0.5 tablets by mouth 2 (Two) Times a Day. Indications: High Blood Pressure Disorder, Disp: 90 tablet, Rfl: 3    midodrine (PROAMATINE) 10 MG tablet, Take 1 tablet by mouth 3 (Three) Times a Day Before Meals., Disp: 90 tablet, Rfl: 11    pantoprazole (PROTONIX) 40 MG EC tablet, Take 1 tablet by mouth 2 (Two) Times a Day. Indications: Gastroesophageal Reflux Disease, Disp: , Rfl:     pregabalin (LYRICA) 50 MG capsule, Take 1 capsule by mouth 2 (Two) Times a Day. Indications: Neuropathic Pain, Disp: , Rfl:     vitamin B-12 (CYANOCOBALAMIN) 1000 MCG tablet, Take 1 tablet by mouth Daily. Indications: Inadequate Vitamin B12, Disp: , Rfl:   No current facility-administered medications for this visit.    Facility-Administered Medications Ordered in Other Visits:     Chlorhexidine Gluconate Cloth 2 % pads, , Topical, Q12H PRN, Kayli Rowe, APRN    Social History     Socioeconomic History    Marital status:      Spouse name: Meghan    Years of education: College   Tobacco Use    Smoking status: Never     Passive exposure: Never    Smokeless tobacco: Never   Vaping Use    Vaping status: Never Used   Substance and Sexual Activity    Alcohol use: No     Comment: Heavy in past, none in 33 years    Drug use: Never    Sexual activity: Not Currently     Partners: Female      "Birth control/protection: None       Family History   Problem Relation Age of Onset    Cancer Mother 85        Breast    COPD Father     Cancer Brother 59        Unknown type    Hypertension Daughter        The following portions of the patient's history were reviewed and updated as appropriate: allergies, current medications, past family history, past medical history, past social history, past surgical history and problem list.    ROS  /71   Pulse 71   Ht 193 cm (76\")   Wt 80.3 kg (177 lb)   SpO2 95%   BMI 21.55 kg/m² .  Objective     Physical Exam    Physical Exam:  Neuro:  CV:  Resp:  GI:  Ext:  Pysch: AAOx3, [place]  S1S2 RRR, no murmur  Non-labored, CTA  BS+, abd soft  Pedal pulses palp, no edema  Calm and cooperative       Procedures           "

## 2024-06-27 ENCOUNTER — HOME CARE VISIT (OUTPATIENT)
Dept: HOME HEALTH SERVICES | Facility: HOME HEALTHCARE | Age: 84
End: 2024-06-27
Payer: MEDICARE

## 2024-06-27 PROCEDURE — G0151 HHCP-SERV OF PT,EA 15 MIN: HCPCS

## 2024-06-28 VITALS
RESPIRATION RATE: 18 BRPM | SYSTOLIC BLOOD PRESSURE: 110 MMHG | HEART RATE: 84 BPM | TEMPERATURE: 98.3 F | DIASTOLIC BLOOD PRESSURE: 70 MMHG | OXYGEN SATURATION: 97 %

## 2024-07-03 ENCOUNTER — HOSPITAL ENCOUNTER (OUTPATIENT)
Dept: CARDIOLOGY | Facility: HOSPITAL | Age: 84
Discharge: HOME | End: 2024-07-03
Admitting: NURSE PRACTITIONER
Payer: MEDICARE

## 2024-07-03 DIAGNOSIS — R42 DIZZINESS: ICD-10-CM

## 2024-07-03 DIAGNOSIS — I95.9 HYPOTENSION, UNSPECIFIED HYPOTENSION TYPE: ICD-10-CM

## 2024-07-03 DIAGNOSIS — I71.21 ANEURYSM OF ASCENDING AORTA WITHOUT RUPTURE: ICD-10-CM

## 2024-07-03 LAB
AORTIC DIMENSIONLESS INDEX: 0.59 (DI)
BH CV ECHO MEAS - AI P1/2T: 540.3 MSEC
BH CV ECHO MEAS - AO MAX PG: 5.9 MMHG
BH CV ECHO MEAS - AO MEAN PG: 3 MMHG
BH CV ECHO MEAS - AO V2 MAX: 121 CM/SEC
BH CV ECHO MEAS - AO V2 VTI: 14.7 CM
BH CV ECHO MEAS - AVA(I,D): 2.8 CM2
BH CV ECHO MEAS - EDV(CUBED): 166.4 ML
BH CV ECHO MEAS - EDV(MOD-SP2): 75.9 ML
BH CV ECHO MEAS - EDV(MOD-SP4): 90.2 ML
BH CV ECHO MEAS - EF(MOD-BP): 63.9 %
BH CV ECHO MEAS - EF(MOD-SP2): 66.5 %
BH CV ECHO MEAS - EF(MOD-SP4): 68.1 %
BH CV ECHO MEAS - ESV(CUBED): 110.6 ML
BH CV ECHO MEAS - ESV(MOD-SP2): 25.4 ML
BH CV ECHO MEAS - ESV(MOD-SP4): 28.8 ML
BH CV ECHO MEAS - FS: 12.7 %
BH CV ECHO MEAS - IVS/LVPW: 1.08 CM
BH CV ECHO MEAS - IVSD: 1.3 CM
BH CV ECHO MEAS - LA DIMENSION: 3.4 CM
BH CV ECHO MEAS - LAT PEAK E' VEL: 8.4 CM/SEC
BH CV ECHO MEAS - LV DIASTOLIC VOL/BSA (35-75): 43.1 CM2
BH CV ECHO MEAS - LV MASS(C)D: 288.2 GRAMS
BH CV ECHO MEAS - LV MAX PG: 2.03 MMHG
BH CV ECHO MEAS - LV MEAN PG: 1 MMHG
BH CV ECHO MEAS - LV SYSTOLIC VOL/BSA (12-30): 13.8 CM2
BH CV ECHO MEAS - LV V1 MAX: 71.3 CM/SEC
BH CV ECHO MEAS - LV V1 VTI: 11.9 CM
BH CV ECHO MEAS - LVIDD: 5.5 CM
BH CV ECHO MEAS - LVIDS: 4.8 CM
BH CV ECHO MEAS - LVOT AREA: 3.5 CM2
BH CV ECHO MEAS - LVOT DIAM: 2.1 CM
BH CV ECHO MEAS - LVPWD: 1.2 CM
BH CV ECHO MEAS - MED PEAK E' VEL: 5.8 CM/SEC
BH CV ECHO MEAS - MR MAX PG: 17.8 MMHG
BH CV ECHO MEAS - MR MAX VEL: 211 CM/SEC
BH CV ECHO MEAS - MV A DUR: 0.16 SEC
BH CV ECHO MEAS - MV A MAX VEL: 54.5 CM/SEC
BH CV ECHO MEAS - MV DEC TIME: 0.16 SEC
BH CV ECHO MEAS - MV E MAX VEL: 55.5 CM/SEC
BH CV ECHO MEAS - MV E/A: 1.02
BH CV ECHO MEAS - PA ACC TIME: 0.12 SEC
BH CV ECHO MEAS - PA V2 MAX: 93.4 CM/SEC
BH CV ECHO MEAS - PULM A REVS DUR: 0.15 SEC
BH CV ECHO MEAS - PULM A REVS VEL: 51.4 CM/SEC
BH CV ECHO MEAS - PULM DIAS VEL: 27.7 CM/SEC
BH CV ECHO MEAS - PULM S/D: 1.34
BH CV ECHO MEAS - PULM SYS VEL: 37.2 CM/SEC
BH CV ECHO MEAS - RV MAX PG: 4.1 MMHG
BH CV ECHO MEAS - RV V1 MAX: 101 CM/SEC
BH CV ECHO MEAS - RV V1 VTI: 22.3 CM
BH CV ECHO MEAS - SV(LVOT): 41.2 ML
BH CV ECHO MEAS - SV(MOD-SP2): 50.5 ML
BH CV ECHO MEAS - SV(MOD-SP4): 61.4 ML
BH CV ECHO MEAS - SVI(LVOT): 19.7 ML/M2
BH CV ECHO MEAS - SVI(MOD-SP2): 24.1 ML/M2
BH CV ECHO MEAS - SVI(MOD-SP4): 29.3 ML/M2
BH CV ECHO MEAS - TR MAX PG: 19.4 MMHG
BH CV ECHO MEAS - TR MAX VEL: 220 CM/SEC
BH CV ECHO MEASUREMENTS AVERAGE E/E' RATIO: 7.82
BH CV XLRA - TDI S': 14.4 CM/SEC
LEFT ATRIUM VOLUME INDEX: 24.6 ML/M2
SINUS: 4.3 CM
STJ: 3.2 CM

## 2024-07-03 PROCEDURE — 25010000002 SULFUR HEXAFLUORIDE MICROSPH 60.7-25 MG RECONSTITUTED SUSPENSION: Performed by: INTERNAL MEDICINE

## 2024-07-03 PROCEDURE — 93306 TTE W/DOPPLER COMPLETE: CPT

## 2024-07-03 RX ADMIN — SULFUR HEXAFLUORIDE 5 ML: KIT at 15:32

## 2024-07-05 ENCOUNTER — OFFICE VISIT (OUTPATIENT)
Dept: CARDIOLOGY | Facility: CLINIC | Age: 84
End: 2024-07-05
Payer: MEDICARE

## 2024-07-05 VITALS
HEART RATE: 74 BPM | OXYGEN SATURATION: 97 % | BODY MASS INDEX: 21.55 KG/M2 | DIASTOLIC BLOOD PRESSURE: 48 MMHG | WEIGHT: 177 LBS | SYSTOLIC BLOOD PRESSURE: 84 MMHG | HEIGHT: 76 IN

## 2024-07-05 DIAGNOSIS — I71.21 ANEURYSM OF ASCENDING AORTA WITHOUT RUPTURE: Primary | ICD-10-CM

## 2024-07-05 DIAGNOSIS — I95.1 ORTHOSTATIC HYPOTENSION: ICD-10-CM

## 2024-07-05 DIAGNOSIS — E78.2 MIXED HYPERLIPIDEMIA: ICD-10-CM

## 2024-07-05 RX ORDER — FLUDROCORTISONE ACETATE 0.1 MG/1
0.1 TABLET ORAL DAILY
Qty: 90 TABLET | Refills: 1 | Status: SHIPPED | OUTPATIENT
Start: 2024-07-05

## 2024-07-05 NOTE — PROGRESS NOTES
Cardiology Office Visit      Encounter Date:  2024    PATIENT IDENTIFICATION    Name: Panfilo Feliz  Age: 83 y.o. Sex: male : 1940  MRN: [id number]    Reason For Followup:  Valvular heart disease  Ascending aortic aneurysm    Brief Clinical History:  Dear Moreno Mello APRN    I had the pleasure of seeing Panfilo Feliz today. As you are well aware, this is a 83 y.o. male with no established history of ischemic heart disease.  He does have a history of aortic insufficiency, ascending aortic aneurysm, hypothyroidism, neuropathy, and acid reflux.  He presents today for follow-up on the above conditions.    Interval History:  2024                                           The patient presents today for an acute visit.  He reports that he saw his cardiologist last month but has deteriorated since then.  He is specifically citing issues with lightheadedness and a cold sensation all over.  He reports that over the past month this has significantly deteriorated.  Interestingly, the lightheadedness appears to occur mostly in the evenings around 630 or so.  He reports that he had been going to the gym a couple of times a week but now has not been able to do so because of his symptoms.  His neuropathy continues to deteriorate.  He reports that they have found nothing to help out with this.    He has started some medications to help with his neuropathy but none have been successful.  It is possible that some of the side effects from his medicines could be lending assistance to his symptoms.  We discussed options and we will have him back off on his atenolol to see if this helps.    In the bigger picture, the patient has been seen by cardiothoracic surgery.  Per the patient and his wife, surgery was offered at the last visit with Dr. Puente.  The patient was reluctant to move forward.    We had a lengthy discussion today.  I discussed that with the patient's deteriorating neuropathy and now endurance  issues, it may be worthwhile to consider moving forward with surgery before his neuropathy and endurance worsens to a point that would preclude him from being a candidate for surgery.  He has a CT scan scheduled for later this year as well as an echocardiogram.  We will move those up.  He will need a cardiac catheterization as well.  I have encouraged him to reach out to Dr. Puente in order to schedule a follow-up and discuss moving forward with surgery.    04/09/2024        His blood pressures have been on the low side (80s) and was was getting orthostatic. His BP today is a little on the higher side.  He is having some issues with shortness of breath with exertion.  He underwent a CT scan that demonstrated an ascending aortic aneurysm of 5.1 cm.  We again discussed the options.  He is ready to move forward with having these items addressed.  As such we will schedule him for a cardiac catheterization as well as a transesophageal echocardiogram.  He is scheduled to see Dr. Puente next week.  His blood pressure is elevated today however he has not tolerated titrations of his antihypertensives.  He reports becoming dizzy and lightheaded and nonfunctional.    06/05/2024        The patient underwent ascending aortic aneurysm repair with reductive root aortoplasty of the right and noncoronary sinuses with left atrial appendage closure (atrial clip).  And he is home and now doing home PT. He is still having some orthostasis. He is on midodrine and metoprolol. Hold parameters on metoprolol are for less than 110. Will start with cardiac rehab.    07/05/2024        He is still having problmes with blood pressure. He has no stamina. Significant orthostasis. He has fallen once and brusised elbow. He is on metoprolol but his pressure is only good enough to take 2 doses.     Discussed echo he does have a small to moderate-sized pericardial effusion with no evidence of tamponade.  We discussed options.  He will continue on his  "current regimen and we will add Florinef to see if this will help with his pressures.    Assessment & Plan    Impressions:  Ascending aortic aneurysm  Aortic insufficiency  Depression/anxiety  Hypothyroidism  Peripheral neuropathy  Acid reflux    Recommendations:  Continuation of his current cardiovascular regimen at the present time.     This includes midodrine  Discontinue metoprolol  Add Florinef 0.1 mg daily and titrate by 0.1 mg weekly until consistent pressure of 1 30-1 40 systolic is noted or edema.  Follow-up in 6 to 8 weeks.    Diagnoses and all orders for this visit:    1. Aneurysm of ascending aorta without rupture (Primary)    2. Mixed hyperlipidemia    3. Orthostatic hypotension    Other orders  -     fludrocortisone 0.1 MG tablet; Take 1 tablet by mouth Daily.  Dispense: 90 tablet; Refill: 1                Objective:    Vitals:  Vitals:    07/05/24 1038   BP: (!) 84/48   Pulse: 74   SpO2: 97%   Weight: 80.3 kg (177 lb)   Height: 193 cm (76\")           Body mass index is 21.55 kg/m².      Physical Exam:    General: Alert, cooperative, no distress, appears stated age  Head:  Normocephalic, atraumatic, mucous membranes moist  Eyes:  Conjunctiva/corneas clear, EOM's intact     Neck:  Supple,  no bruit    Lungs: Coarse and diminished bilaterally  Chest wall: Tender at incision  Heart::  Regular rate and rhythm, S1 and S2 normal, 1/6 diastolic murmur.  No rub or gallop  Abdomen: Soft, non-tender, nondistended bowel sounds active  Extremities: No cyanosis, clubbing, or edema  Pulses: Diminished pedal pulses  Skin:  No rashes or lesions  Neuro/psych: A&O x3. CN II through XII are grossly intact with appropriate affect      Allergies:  Allergies   Allergen Reactions    Statins Myalgia       Medication Review:     Current Outpatient Medications:     acetaminophen (TYLENOL) 325 MG tablet, Take 2 tablets by mouth Every 4 (Four) Hours As Needed for Mild Pain., Disp: , Rfl:     aspirin 81 MG EC tablet, Take 1 tablet " by mouth Daily., Disp: , Rfl:     cholecalciferol (VITAMIN D3) 25 MCG (1000 UT) tablet, Take 1 tablet by mouth 2 (Two) Times a Day. Indications: Vitamin D Deficiency, Disp: , Rfl:     docusate sodium (COLACE) 100 MG capsule, Take 3 capsules by mouth 2 (Two) Times a Day. Indications: Constipation, Disp: , Rfl:     escitalopram (LEXAPRO) 10 MG tablet, Take 1 tablet by mouth Daily. Take combined with 20mg tablets for a total of 30mg daily, Disp: 30 tablet, Rfl: 3    escitalopram (LEXAPRO) 20 MG tablet, Take 1 tablet by mouth Daily. Take w/ 10 mg tab for total dose = 30 mg daily  Indications: Major Depressive Disorder, Disp: , Rfl:     levothyroxine (SYNTHROID, LEVOTHROID) 100 MCG tablet, Take 1 tablet by mouth Daily. Indications: Underactive Thyroid, Disp: , Rfl:     magnesium oxide (MAG-OX) 400 MG tablet, Take 1 tablet by mouth Daily. Indications: Disorder with Low Magnesium Levels, Disp: , Rfl:     Menthol, Topical Analgesic, (BIOFREEZE EX), Apply 1 Application topically Daily As Needed (pain)., Disp: , Rfl:     metoprolol tartrate (LOPRESSOR) 25 MG tablet, Take 0.5 tablets by mouth 2 (Two) Times a Day. Indications: High Blood Pressure Disorder, Disp: 90 tablet, Rfl: 3    midodrine (PROAMATINE) 10 MG tablet, Take 1 tablet by mouth 3 (Three) Times a Day Before Meals., Disp: 90 tablet, Rfl: 11    pantoprazole (PROTONIX) 40 MG EC tablet, Take 1 tablet by mouth 2 (Two) Times a Day. Indications: Gastroesophageal Reflux Disease, Disp: , Rfl:     pregabalin (LYRICA) 50 MG capsule, Take 1 capsule by mouth 2 (Two) Times a Day. Indications: Neuropathic Pain, Disp: , Rfl:     vitamin B-12 (CYANOCOBALAMIN) 1000 MCG tablet, Take 1 tablet by mouth Daily. Indications: Inadequate Vitamin B12, Disp: , Rfl:     fludrocortisone 0.1 MG tablet, Take 1 tablet by mouth Daily., Disp: 90 tablet, Rfl: 1  No current facility-administered medications for this visit.    Facility-Administered Medications Ordered in Other Visits:     Chlorhexidine  Gluconate Cloth 2 % pads, , Topical, Q12H PRN, Jae, Kayli L, APRN    Family History:  Family History   Problem Relation Age of Onset    Cancer Mother 85        Breast    COPD Father     Cancer Brother 59        Unknown type    Hypertension Daughter        Past Medical History:  Past Medical History:   Diagnosis Date    AAA (abdominal aortic aneurysm)     Abnormal ECG 1980’s    Alcoholism     None since 1991    Aneurysm 2019    aortic arch    Arthritis     Basal cell carcinoma (BCC) of face     Cholelithiasis 1990’s    Cholecystectomy    Colon polyp     Colon polyps     Coronary artery disease     Difficulty walking     Disease of thyroid gland     DJD (degenerative joint disease)     Gastritis     Gastroparesis     GERD (gastroesophageal reflux disease)     GI (gastrointestinal bleed)     Hernia     Hiatal    History of bone marrow biopsy     [place] (hard of hearing)     Hyperlipidemia     Hypertension     Hypothyroidism     IgG monoclonal gammopathy     Multiple duodenal ulcers     Neuropathy     PONV (postoperative nausea and vomiting)     Prostate cancer     Reflux esophagitis     Ulcer     WBC decreased        Past Surgical History:  Past Surgical History:   Procedure Laterality Date    ASCENDING AORTIC ANEURYSM REPAIR W/ MECHANICAL AORTIC VALVE REPLACEMENT N/A 5/10/2024    Procedure: ASCENDING AORTIC ARCH/ possible HEMIARCH REPLACEMENT WITH CIRCULATORY ARREST, neuro monitoring, and intraop JOSE A;  Surgeon: Spencer Puente MD;  Location: [facility] CVOR;  Service: Cardiothoracic;  Laterality: N/A;  Ascending aortic aneurysm repair with 30 mm gelweave graft.    CARDIAC CATHETERIZATION N/A 4/19/2024    Procedure: Right and Left Heart Cath with possible PCI;  Surgeon: Dilan Houston DO;  Location: [facility] CATH INVASIVE LOCATION;  Service: Cardiovascular;  Laterality: N/A;  Local and IV sedation    CHOLECYSTECTOMY  1988    COLON SURGERY  1998    COLONOSCOPY  02/2013    ENDOSCOPY  2012     ESOPHAGOGASTRODUODENOSCOPY WITH DILATION OF SHATZKI'S RING    ENDOSCOPY N/A 10/09/2020    Procedure: ESOPHAGOGASTRODUODENOSCOPY with cold bx;  Surgeon: Jake Perez MD;  Location: [facility] ENDOSCOPY;  Service: Gastroenterology;  Laterality: N/A;  pre - nausea  post - duodenal ulcer, gastrits, gastric ulcer, hiatal hernia    ENDOSCOPY N/A 10/04/2022    Procedure: ESOPHAGOGASTRODUODENOSCOPY with biopsies with esophageal dilatation with 56 cui;  Surgeon: Jake Perez MD;  Location: [facility] ENDOSCOPY;  Service: Gastroenterology;  Laterality: N/A;  pre-dysphagia  post-normal     ENDOSCOPY  10/04/2022    Chris [facility]TERFIELD    EYE SURGERY Bilateral     cataracts    LAMINECTOMY  2013    decompression L3-4, L4-5    PROSTATECTOMY  2007    SKIN BIOPSY      TONSILLECTOMY AND ADENOIDECTOMY      1940s    UPPER GASTROINTESTINAL ENDOSCOPY      VASECTOMY      1970s       Social History:  Social History     Socioeconomic History    Marital status:      Spouse name: Meghan    Years of education: College   Tobacco Use    Smoking status: Never     Passive exposure: Never    Smokeless tobacco: Never   Vaping Use    Vaping status: Never Used   Substance and Sexual Activity    Alcohol use: No     Comment: Heavy in past, none in 33 years    Drug use: Never    Sexual activity: Not Currently     Partners: Female     Birth control/protection: None       Review of Systems:  The following systems were reviewed as they relate to the cardiovascular system: Constitutional, Eyes, ENT, Cardiovascular, Respiratory, Gastrointestinal, Integumentary, Neurological, Psychiatric, Hematologic, Endocrine, Musculoskeletal, and Genitourinary. The pertinent cardiovascular findings are reported above with all other cardiovascular points within those systems being negative.    Diagnostic Study Review:     Current Electrocardiogram:  Procedures no new EKG.  EKG dated 6/5/2024 demonstrates sinus rhythm with a ventricular rate of 79 bpm.    Laboratory  Data:  Lab Results   Component Value Date    GLUCOSE 92 05/21/2024    BUN 16 05/21/2024    CREATININE 0.92 05/21/2024    EGFRIFNONA 58 (L) 02/09/2022    BCR 17.4 05/21/2024    K 4.2 05/21/2024    CO2 24.0 05/21/2024    CALCIUM 8.6 05/21/2024    PROTENTOTREF 6.8 01/16/2024    ALBUMIN 2.9 (L) 05/17/2024    LABIL2 1.2 01/16/2024    AST 29 05/17/2024    ALT 12 05/17/2024     Lab Results   Component Value Date    GLUCOSE 92 05/21/2024    CALCIUM 8.6 05/21/2024     05/21/2024    K 4.2 05/21/2024    CO2 24.0 05/21/2024     05/21/2024    BUN 16 05/21/2024    CREATININE 0.92 05/21/2024    EGFRIFNONA 58 (L) 02/09/2022    BCR 17.4 05/21/2024    ANIONGAP 10.0 05/21/2024     Lab Results   Component Value Date    WBC 6.81 05/21/2024    HGB 9.2 (L) 05/21/2024    HCT 29.5 (L) 05/21/2024    MCV 90.8 05/21/2024     05/21/2024     Lab Results   Component Value Date    CHOL 195 04/17/2024    TRIG 101 04/17/2024    HDL 54 04/17/2024     (H) 04/17/2024     Lab Results   Component Value Date    HGBA1C 5.50 05/09/2024     Lab Results   Component Value Date    INR 1.10 05/11/2024    INR 1.12 (H) 05/10/2024    INR 1.38 (H) 05/10/2024    PROTIME 11.9 (H) 05/11/2024    PROTIME 12.1 (H) 05/10/2024    PROTIME 14.7 (H) 05/10/2024       Most Recent Echo:  Results for orders placed during the hospital encounter of 07/03/24    Adult Transthoracic Echo Complete W/ Cont if Necessary Per Protocol    Interpretation Summary    Left ventricular systolic function is normal. Calculated left ventricular EF = 63.9% Left ventricular ejection fraction appears to be 61 - 65%.    Left ventricular wall thickness is consistent with mild concentric hypertrophy.    The left atrial cavity is moderately dilated.    There is mild calcification of the aortic valve mainly affecting the left coronary and right coronary cusp(s).    Estimated right ventricular systolic pressure from tricuspid regurgitation is normal (<35 mmHg).    There is a  "moderate (1-2cm) circumferential pericardial effusion. There is no evidence of cardiac tamponade.       Most Recent Stress Test:  Results for orders placed during the hospital encounter of 06/17/24    Treadmill Stress Test    Interpretation Summary    Patient exercised only 1 minute 2 seconds achieving 4.6 METS    No significant cardiac arrhythmia no significant acute ischemic EKG changes    Patient is stable to start the cardiac rehab program       Most Recent Cardiac Catheterization:   No results found for this or any previous visit.       NOTE: The following portions of the patient's note were reviewed, confirmed and/or updated this visit as appropriate: History of present illness/Interval history, physical examination, assessment & plan, allergies, current medications, past family history, past medical history, past social history, past surgical history and problem list.    Labs pertinent to today's visit on 07/05/2024 (including but not limited to CBC, CMP, and lipid profiles) were requested from the patient's primary care provider/hospital/clinical laboratory.  If the labs were available for the visit, they were reviewed with the patient.  If they were not available, when received, special interest will be made to the labs pertinent to this visit.  The patient's most recent \"in-house\" labs are noted below and have been reviewed.  Outside labs pertinent to this visit are scanned into the record and have been reviewed.    Discussions held today, 07/05/2024,regarding procedures included risk, benefits, and options including but not limited to: Death, MI, stroke, pain, bleeding, infection, and possible need for vascular/thoracic/cardiothoracic surgery.    Copied information within this note was reviewed and is current as of 07/05/2024.    Assessment and plan noted herein represents the current plan of care as of 07/05/2024.    Significant resources from our office and staff are inherent in engaging this patient in " a continuous and active collaborative plan of care related to their chronic cardiovascular conditions outlined below.  The management of these conditions requires the direction of our service with specialized clinical knowledge, skills, and experience.  This collaborative care includes but is not limited to patient education, expectations and responsibilities, shared decision making around therapeutic goals, and shared commitments to achieve those goals.

## 2024-07-05 NOTE — PATIENT INSTRUCTIONS
Stop metoprolol  Start Florinef 0.1 mg daily and increase 0.1 mg each week until either swelling in feet OR resting blood pressure of 130-140 consistently.  Follow-up 6 weeks

## 2024-07-13 ENCOUNTER — HOSPITAL ENCOUNTER (INPATIENT)
Facility: HOSPITAL | Age: 84
LOS: 5 days | Discharge: HOME HEALTH | DRG: 312 | End: 2024-07-20
Attending: EMERGENCY MEDICINE | Admitting: INTERNAL MEDICINE
Payer: MEDICARE

## 2024-07-13 ENCOUNTER — APPOINTMENT (OUTPATIENT)
Dept: GENERAL RADIOLOGY | Facility: HOSPITAL | Age: 84
DRG: 312 | End: 2024-07-13
Payer: MEDICARE

## 2024-07-13 ENCOUNTER — APPOINTMENT (OUTPATIENT)
Dept: CT IMAGING | Facility: HOSPITAL | Age: 84
DRG: 312 | End: 2024-07-13
Payer: MEDICARE

## 2024-07-13 DIAGNOSIS — M25.512 ACUTE PAIN OF LEFT SHOULDER: ICD-10-CM

## 2024-07-13 DIAGNOSIS — I95.9 HYPOTENSION, UNSPECIFIED HYPOTENSION TYPE: Primary | ICD-10-CM
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
## 2024-07-13 DIAGNOSIS — R53.1 WEAKNESS: ICD-10-CM

## 2024-07-13 DIAGNOSIS — M54.2 NECK PAIN: ICD-10-CM

## 2024-07-13 DIAGNOSIS — I95.1 ORTHOSTATIC HYPOTENSION: ICD-10-CM

## 2024-07-13 LAB
ALBUMIN SERPL-MCNC: 3.7 G/DL (ref 3.5–5.2)
ALBUMIN/GLOB SERPL: 1.2 G/DL
ALP SERPL-CCNC: 69 U/L (ref 39–117)
ALT SERPL W P-5'-P-CCNC: 6 U/L (ref 1–41)
ANION GAP SERPL CALCULATED.3IONS-SCNC: 9.8 MMOL/L (ref 5–15)
AST SERPL-CCNC: 24 U/L (ref 1–40)
BACTERIA UR QL AUTO: NORMAL /HPF
BASOPHILS # BLD AUTO: 0.03 10*3/MM3 (ref 0–0.2)
BASOPHILS NFR BLD AUTO: 0.5 % (ref 0–1.5)
BILIRUB SERPL-MCNC: 0.5 MG/DL (ref 0–1.2)
BILIRUB UR QL STRIP: NEGATIVE
BUN SERPL-MCNC: 19 MG/DL (ref 8–23)
BUN/CREAT SERPL: 14.3 (ref 7–25)
CALCIUM SPEC-SCNC: 8.9 MG/DL (ref 8.6–10.5)
CHLORIDE SERPL-SCNC: 104 MMOL/L (ref 98–107)
CLARITY UR: CLEAR
CO2 SERPL-SCNC: 23.2 MMOL/L (ref 22–29)
COLOR UR: YELLOW
CREAT SERPL-MCNC: 1.33 MG/DL (ref 0.76–1.27)
DEPRECATED RDW RBC AUTO: 47 FL (ref 37–54)
EGFRCR SERPLBLD CKD-EPI 2021: 53 ML/MIN/1.73
EOSINOPHIL # BLD AUTO: 0.05 10*3/MM3 (ref 0–0.4)
EOSINOPHIL NFR BLD AUTO: 0.8 % (ref 0.3–6.2)
ERYTHROCYTE [DISTWIDTH] IN BLOOD BY AUTOMATED COUNT: 14.3 % (ref 12.3–15.4)
GLOBULIN UR ELPH-MCNC: 3 GM/DL
GLUCOSE SERPL-MCNC: 126 MG/DL (ref 65–99)
GLUCOSE UR STRIP-MCNC: NEGATIVE MG/DL
HCT VFR BLD AUTO: 38.9 % (ref 37.5–51)
HGB BLD-MCNC: 12.1 G/DL (ref 13–17.7)
HGB UR QL STRIP.AUTO: NEGATIVE
HOLD SPECIMEN: NORMAL
HYALINE CASTS UR QL AUTO: NORMAL /LPF
IMM GRANULOCYTES # BLD AUTO: 0.02 10*3/MM3 (ref 0–0.05)
IMM GRANULOCYTES NFR BLD AUTO: 0.3 % (ref 0–0.5)
KETONES UR QL STRIP: ABNORMAL
LEUKOCYTE ESTERASE UR QL STRIP.AUTO: ABNORMAL
LYMPHOCYTES # BLD AUTO: 0.99 10*3/MM3 (ref 0.7–3.1)
LYMPHOCYTES NFR BLD AUTO: 15.8 % (ref 19.6–45.3)
MCH RBC QN AUTO: 27.9 PG (ref 26.6–33)
MCHC RBC AUTO-ENTMCNC: 31.1 G/DL (ref 31.5–35.7)
MCV RBC AUTO: 89.8 FL (ref 79–97)
MONOCYTES # BLD AUTO: 0.87 10*3/MM3 (ref 0.1–0.9)
MONOCYTES NFR BLD AUTO: 13.9 % (ref 5–12)
NEUTROPHILS NFR BLD AUTO: 4.3 10*3/MM3 (ref 1.7–7)
NEUTROPHILS NFR BLD AUTO: 68.7 % (ref 42.7–76)
NITRITE UR QL STRIP: NEGATIVE
NRBC BLD AUTO-RTO: 0 /100 WBC (ref 0–0.2)
PH UR STRIP.AUTO: 6.5 [PH] (ref 5–8)
PLATELET # BLD AUTO: 177 10*3/MM3 (ref 140–450)
PMV BLD AUTO: 11.1 FL (ref 6–12)
POTASSIUM SERPL-SCNC: 4.2 MMOL/L (ref 3.5–5.2)
PROT SERPL-MCNC: 6.7 G/DL (ref 6–8.5)
PROT UR QL STRIP: ABNORMAL
RBC # BLD AUTO: 4.33 10*6/MM3 (ref 4.14–5.8)
RBC # UR STRIP: NORMAL /HPF
REF LAB TEST METHOD: NORMAL
SODIUM SERPL-SCNC: 137 MMOL/L (ref 136–145)
SP GR UR STRIP: 1.02 (ref 1–1.03)
SQUAMOUS #/AREA URNS HPF: NORMAL /HPF
TROPONIN T SERPL HS-MCNC: 26 NG/L
UROBILINOGEN UR QL STRIP: ABNORMAL
WBC # UR STRIP: NORMAL /HPF
WBC NRBC COR # BLD AUTO: 6.26 10*3/MM3 (ref 3.4–10.8)
WHOLE BLOOD HOLD COAG: NORMAL

## 2024-07-13 PROCEDURE — 25810000003 SODIUM CHLORIDE 0.9 % SOLUTION: Performed by: NURSE PRACTITIONER

## 2024-07-13 PROCEDURE — 73030 X-RAY EXAM OF SHOULDER: CPT

## 2024-07-13 PROCEDURE — 36415 COLL VENOUS BLD VENIPUNCTURE: CPT

## 2024-07-13 PROCEDURE — 80053 COMPREHEN METABOLIC PANEL: CPT | Performed by: NURSE PRACTITIONER

## 2024-07-13 PROCEDURE — 81001 URINALYSIS AUTO W/SCOPE: CPT | Performed by: NURSE PRACTITIONER

## 2024-07-13 PROCEDURE — 25010000002 ONDANSETRON PER 1 MG: Performed by: NURSE PRACTITIONER

## 2024-07-13 PROCEDURE — 25010000002 MORPHINE PER 10 MG: Performed by: NURSE PRACTITIONER

## 2024-07-13 PROCEDURE — 84484 ASSAY OF TROPONIN QUANT: CPT | Performed by: NURSE PRACTITIONER

## 2024-07-13 PROCEDURE — 71045 X-RAY EXAM CHEST 1 VIEW: CPT

## 2024-07-13 PROCEDURE — 85025 COMPLETE CBC W/AUTO DIFF WBC: CPT | Performed by: NURSE PRACTITIONER

## 2024-07-13 PROCEDURE — 93005 ELECTROCARDIOGRAM TRACING: CPT | Performed by: NURSE PRACTITIONER

## 2024-07-13 PROCEDURE — 99285 EMERGENCY DEPT VISIT HI MDM: CPT

## 2024-07-13 PROCEDURE — 72125 CT NECK SPINE W/O DYE: CPT

## 2024-07-13 PROCEDURE — 70450 CT HEAD/BRAIN W/O DYE: CPT

## 2024-07-13 RX ORDER — METHOCARBAMOL 500 MG/1
500 TABLET, FILM COATED ORAL ONCE
Status: COMPLETED | OUTPATIENT
Start: 2024-07-13 | End: 2024-07-13

## 2024-07-13 RX ORDER — SODIUM CHLORIDE 0.9 % (FLUSH) 0.9 %
10 SYRINGE (ML) INJECTION AS NEEDED
Status: DISCONTINUED | OUTPATIENT
Start: 2024-07-13 | End: 2024-07-20 | Stop reason: HOSPADM

## 2024-07-13 RX ORDER — ONDANSETRON 2 MG/ML
4 INJECTION INTRAMUSCULAR; INTRAVENOUS ONCE
Status: COMPLETED | OUTPATIENT
Start: 2024-07-13 | End: 2024-07-13

## 2024-07-13 RX ADMIN — MORPHINE SULFATE 4 MG: 4 INJECTION, SOLUTION INTRAMUSCULAR; INTRAVENOUS at 21:50

## 2024-07-13 RX ADMIN — SODIUM CHLORIDE 1000 ML: 9 INJECTION, SOLUTION INTRAVENOUS at 21:49

## 2024-07-13 RX ADMIN — METHOCARBAMOL 500 MG: 500 TABLET ORAL at 22:36

## 2024-07-13 RX ADMIN — ONDANSETRON 4 MG: 2 INJECTION INTRAMUSCULAR; INTRAVENOUS at 21:50

## 2024-07-13 NOTE — Clinical Note
Level of Care: Telemetry [5]   Admitting Physician: RICARDO MOSCOSO [[id number]]   Attending Physician: RICARDO MOSCOSO [[id number]]

## 2024-07-14 PROBLEM — R55 SYNCOPE: Status: ACTIVE | Noted: 2024-07-14

## 2024-07-14 LAB
GEN 5 2HR TROPONIN T REFLEX: 24 NG/L
QTC INTERVAL: NORMAL MS
TROPONIN T DELTA: -2 NG/L

## 2024-07-14 PROCEDURE — 84484 ASSAY OF TROPONIN QUANT: CPT | Performed by: NURSE PRACTITIONER

## 2024-07-14 PROCEDURE — 25810000003 SODIUM CHLORIDE 0.9 % SOLUTION: Performed by: INTERNAL MEDICINE

## 2024-07-14 PROCEDURE — G0378 HOSPITAL OBSERVATION PER HR: HCPCS

## 2024-07-14 PROCEDURE — 25010000002 ENOXAPARIN PER 10 MG

## 2024-07-14 PROCEDURE — 25010000002 ONDANSETRON PER 1 MG

## 2024-07-14 PROCEDURE — 99214 OFFICE O/P EST MOD 30 MIN: CPT | Performed by: INTERNAL MEDICINE

## 2024-07-14 RX ORDER — ESCITALOPRAM OXALATE 10 MG/1
30 TABLET ORAL DAILY
Status: DISCONTINUED | OUTPATIENT
Start: 2024-07-14 | End: 2024-07-20 | Stop reason: HOSPADM

## 2024-07-14 RX ORDER — PANTOPRAZOLE SODIUM 40 MG/1
40 TABLET, DELAYED RELEASE ORAL 2 TIMES DAILY
Status: DISCONTINUED | OUTPATIENT
Start: 2024-07-14 | End: 2024-07-20 | Stop reason: HOSPADM

## 2024-07-14 RX ORDER — NITROGLYCERIN 0.4 MG/1
0.4 TABLET SUBLINGUAL
Status: DISCONTINUED | OUTPATIENT
Start: 2024-07-14 | End: 2024-07-16

## 2024-07-14 RX ORDER — SODIUM CHLORIDE 9 MG/ML
40 INJECTION, SOLUTION INTRAVENOUS AS NEEDED
Status: DISCONTINUED | OUTPATIENT
Start: 2024-07-14 | End: 2024-07-20 | Stop reason: HOSPADM

## 2024-07-14 RX ORDER — FLUDROCORTISONE ACETATE 0.1 MG/1
0.1 TABLET ORAL EVERY 12 HOURS SCHEDULED
Status: DISCONTINUED | OUTPATIENT
Start: 2024-07-14 | End: 2024-07-16

## 2024-07-14 RX ORDER — LEVOTHYROXINE SODIUM 0.1 MG/1
100 TABLET ORAL
Status: DISCONTINUED | OUTPATIENT
Start: 2024-07-14 | End: 2024-07-20 | Stop reason: HOSPADM

## 2024-07-14 RX ORDER — ESCITALOPRAM OXALATE 10 MG/1
10 TABLET ORAL DAILY
Status: DISCONTINUED | OUTPATIENT
Start: 2024-07-14 | End: 2024-07-14

## 2024-07-14 RX ORDER — FLUDROCORTISONE ACETATE 0.1 MG/1
0.1 TABLET ORAL DAILY
Status: DISCONTINUED | OUTPATIENT
Start: 2024-07-14 | End: 2024-07-14

## 2024-07-14 RX ORDER — SODIUM CHLORIDE 9 MG/ML
125 INJECTION, SOLUTION INTRAVENOUS CONTINUOUS
Status: DISPENSED | OUTPATIENT
Start: 2024-07-14 | End: 2024-07-15

## 2024-07-14 RX ORDER — POLYETHYLENE GLYCOL 3350 17 G/17G
17 POWDER, FOR SOLUTION ORAL DAILY PRN
Status: DISCONTINUED | OUTPATIENT
Start: 2024-07-14 | End: 2024-07-20 | Stop reason: HOSPADM

## 2024-07-14 RX ORDER — SODIUM CHLORIDE 0.9 % (FLUSH) 0.9 %
10 SYRINGE (ML) INJECTION EVERY 12 HOURS SCHEDULED
Status: DISCONTINUED | OUTPATIENT
Start: 2024-07-14 | End: 2024-07-20 | Stop reason: HOSPADM

## 2024-07-14 RX ORDER — BISACODYL 10 MG
10 SUPPOSITORY, RECTAL RECTAL DAILY PRN
Status: DISCONTINUED | OUTPATIENT
Start: 2024-07-14 | End: 2024-07-20 | Stop reason: HOSPADM

## 2024-07-14 RX ORDER — METHOCARBAMOL 500 MG/1
500 TABLET, FILM COATED ORAL EVERY 8 HOURS PRN
Status: DISCONTINUED | OUTPATIENT
Start: 2024-07-14 | End: 2024-07-20 | Stop reason: HOSPADM

## 2024-07-14 RX ORDER — AMOXICILLIN 250 MG
2 CAPSULE ORAL 2 TIMES DAILY PRN
Status: DISCONTINUED | OUTPATIENT
Start: 2024-07-14 | End: 2024-07-20 | Stop reason: HOSPADM

## 2024-07-14 RX ORDER — MIDODRINE HYDROCHLORIDE 5 MG/1
10 TABLET ORAL
Status: DISCONTINUED | OUTPATIENT
Start: 2024-07-14 | End: 2024-07-16

## 2024-07-14 RX ORDER — SODIUM CHLORIDE 0.9 % (FLUSH) 0.9 %
10 SYRINGE (ML) INJECTION AS NEEDED
Status: DISCONTINUED | OUTPATIENT
Start: 2024-07-14 | End: 2024-07-20 | Stop reason: HOSPADM

## 2024-07-14 RX ORDER — BISACODYL 5 MG/1
5 TABLET, DELAYED RELEASE ORAL DAILY PRN
Status: DISCONTINUED | OUTPATIENT
Start: 2024-07-14 | End: 2024-07-20 | Stop reason: HOSPADM

## 2024-07-14 RX ORDER — ENOXAPARIN SODIUM 100 MG/ML
40 INJECTION SUBCUTANEOUS DAILY
Status: DISCONTINUED | OUTPATIENT
Start: 2024-07-14 | End: 2024-07-20 | Stop reason: HOSPADM

## 2024-07-14 RX ORDER — HYDROCODONE BITARTRATE AND ACETAMINOPHEN 5; 325 MG/1; MG/1
1 TABLET ORAL EVERY 6 HOURS PRN
Status: DISPENSED | OUTPATIENT
Start: 2024-07-14 | End: 2024-07-19

## 2024-07-14 RX ORDER — ONDANSETRON 2 MG/ML
4 INJECTION INTRAMUSCULAR; INTRAVENOUS EVERY 6 HOURS PRN
Status: DISCONTINUED | OUTPATIENT
Start: 2024-07-14 | End: 2024-07-20 | Stop reason: HOSPADM

## 2024-07-14 RX ORDER — PREGABALIN 25 MG/1
50 CAPSULE ORAL 2 TIMES DAILY
Status: DISCONTINUED | OUTPATIENT
Start: 2024-07-14 | End: 2024-07-20 | Stop reason: HOSPADM

## 2024-07-14 RX ORDER — ASPIRIN 81 MG/1
1 TABLET ORAL NIGHTLY
COMMUNITY
Start: 2024-07-22

## 2024-07-14 RX ORDER — ESCITALOPRAM OXALATE 10 MG/1
20 TABLET ORAL DAILY
Status: DISCONTINUED | OUTPATIENT
Start: 2024-07-14 | End: 2024-07-14

## 2024-07-14 RX ORDER — ACETAMINOPHEN 325 MG/1
650 TABLET ORAL EVERY 6 HOURS PRN
Status: DISCONTINUED | OUTPATIENT
Start: 2024-07-14 | End: 2024-07-20 | Stop reason: HOSPADM

## 2024-07-14 RX ORDER — ASPIRIN 81 MG/1
81 TABLET ORAL NIGHTLY
Status: DISCONTINUED | OUTPATIENT
Start: 2024-07-14 | End: 2024-07-20 | Stop reason: HOSPADM

## 2024-07-14 RX ADMIN — HYDROCODONE BITARTRATE AND ACETAMINOPHEN 1 TABLET: 5; 325 TABLET ORAL at 14:06

## 2024-07-14 RX ADMIN — PREGABALIN 50 MG: 25 CAPSULE ORAL at 20:43

## 2024-07-14 RX ADMIN — ONDANSETRON 4 MG: 2 INJECTION INTRAMUSCULAR; INTRAVENOUS at 14:10

## 2024-07-14 RX ADMIN — METOPROLOL TARTRATE 12.5 MG: 25 TABLET, FILM COATED ORAL at 20:43

## 2024-07-14 RX ADMIN — FLUDROCORTISONE ACETATE 0.1 MG: 0.1 TABLET ORAL at 11:47

## 2024-07-14 RX ADMIN — SODIUM CHLORIDE 125 ML/HR: 9 INJECTION, SOLUTION INTRAVENOUS at 20:42

## 2024-07-14 RX ADMIN — Medication 10 ML: at 20:43

## 2024-07-14 RX ADMIN — LEVOTHYROXINE SODIUM 100 MCG: 0.1 TABLET ORAL at 11:47

## 2024-07-14 RX ADMIN — PANTOPRAZOLE SODIUM 40 MG: 40 TABLET, DELAYED RELEASE ORAL at 20:43

## 2024-07-14 RX ADMIN — ASPIRIN 81 MG: 81 TABLET, COATED ORAL at 20:43

## 2024-07-14 RX ADMIN — ACETAMINOPHEN 650 MG: 325 TABLET, FILM COATED ORAL at 11:47

## 2024-07-14 RX ADMIN — FLUDROCORTISONE ACETATE 0.1 MG: 0.1 TABLET ORAL at 20:43

## 2024-07-14 RX ADMIN — MIDODRINE HYDROCHLORIDE 10 MG: 5 TABLET ORAL at 11:47

## 2024-07-14 RX ADMIN — Medication 10 ML: at 10:15

## 2024-07-14 RX ADMIN — PREGABALIN 50 MG: 25 CAPSULE ORAL at 11:47

## 2024-07-14 RX ADMIN — ESCITALOPRAM OXALATE 30 MG: 10 TABLET ORAL at 11:47

## 2024-07-14 RX ADMIN — METOPROLOL TARTRATE 12.5 MG: 25 TABLET, FILM COATED ORAL at 11:47

## 2024-07-14 RX ADMIN — ENOXAPARIN SODIUM 40 MG: 100 INJECTION SUBCUTANEOUS at 16:56

## 2024-07-14 RX ADMIN — PANTOPRAZOLE SODIUM 40 MG: 40 TABLET, DELAYED RELEASE ORAL at 11:47

## 2024-07-14 RX ADMIN — MIDODRINE HYDROCHLORIDE 10 MG: 5 TABLET ORAL at 17:52

## 2024-07-14 NOTE — PLAN OF CARE
Problem: Adult Inpatient Plan of Care  Goal: Absence of Hospital-Acquired Illness or Injury  Intervention: Identify and Manage Fall Risk  Recent Flowsheet Documentation  Taken 7/14/2024 1600 by Katy Olivares RN  Safety Promotion/Fall Prevention: safety round/check completed  Taken 7/14/2024 1400 by Katy Olivares RN  Safety Promotion/Fall Prevention: safety round/check completed  Taken 7/14/2024 1200 by Katy Olivares RN  Safety Promotion/Fall Prevention: safety round/check completed  Taken 7/14/2024 1147 by Katy Olivares RN  Safety Promotion/Fall Prevention: safety round/check completed  Taken 7/14/2024 1000 by Katy Olivares RN  Safety Promotion/Fall Prevention: safety round/check completed  Taken 7/14/2024 0800 by Katy Olivares RN  Safety Promotion/Fall Prevention: safety round/check completed  Intervention: Prevent Skin Injury  Recent Flowsheet Documentation  Taken 7/14/2024 1600 by Katy Olivares RN  Body Position: position changed independently  Taken 7/14/2024 1147 by Katy Olivares RN  Body Position: position changed independently  Taken 7/14/2024 0800 by Katy Olivares RN  Body Position: position changed independently  Intervention: Prevent and Manage VTE (Venous Thromboembolism) Risk  Recent Flowsheet Documentation  Taken 7/14/2024 1600 by Katy Olivares RN  Activity Management: activity minimized  Taken 7/14/2024 1200 by Katy Olivares RN  Activity Management:   sitting, edge of bed   back to bed  Goal: Optimal Comfort and Wellbeing  Intervention: Monitor Pain and Promote Comfort  Recent Flowsheet Documentation  Taken 7/14/2024 1147 by Katy Olivares RN  Pain Management Interventions: see MAR     Problem: Fall Injury Risk  Goal: Absence of Fall and Fall-Related Injury  Intervention: Identify and Manage Contributors  Recent Flowsheet Documentation  Taken 7/14/2024 0800 by Katy Olivares RN  Medication Review/Management: medications  reviewed  Intervention: Promote Injury-Free Environment  Recent Flowsheet Documentation  Taken 7/14/2024 1600 by Katy Olivares, RN  Safety Promotion/Fall Prevention: safety round/check completed  Taken 7/14/2024 1400 by Kayt Olivares RN  Safety Promotion/Fall Prevention: safety round/check completed  Taken 7/14/2024 1200 by Katy Olivares RN  Safety Promotion/Fall Prevention: safety round/check completed  Taken 7/14/2024 1147 by Katy Olivares, RN  Safety Promotion/Fall Prevention: safety round/check completed  Taken 7/14/2024 1000 by Katy Olivares, RN  Safety Promotion/Fall Prevention: safety round/check completed  Taken 7/14/2024 0800 by Katy Olivares, RN  Safety Promotion/Fall Prevention: safety round/check completed   Goal Outcome Evaluation:

## 2024-07-14 NOTE — CONSULTS
Cardiology consult note  Chano Jay MD, PhD    Patient Care Team:  Moreno Tapia APRN as PCP - General (Family Medicine)  Chalo Olvera MD as Consulting Physician (Hematology and Oncology)  Ganesh López MD as Consulting Physician (Neurology)  Jake Enriquez MD as Referring Physician (Family Medicine)    CHIEF COMPLAINT: Syncope falls    HISTORY OF PRESENT ILLNESS:    Patient seen and evaluated by me in the ER.  He has a complex recent cardiovascular history at 83 years old aortic valve insufficiency ascending aortic aneurysm status post surgical repair along with left atrial appendage ligation, neuropathy hypothyroidism, severe orthostatic hypotension and syncope, weight loss of 30 pounds over the last few months since surgery, orthostasis recurrent falls progressive weakness and debility.  According to records his neuropathy is progressive.  He ate continue he has lightheaded and dizziness and presyncope and has had multiple falls although not using a walker thinking that he is going to fall on the walker.  He really has significant orthostasis and I had a long discussion to patient and wife about etiology of orthostasis, dysautonomia in the setting of volume depletion anorexia and weight loss and weakness.  He had nonobstructive coronary artery disease, essential hypertension poorly tolerant of antihypertensive secondary to dizziness.  He has been on midodrine and Florinef in addition to metoprolol with hold parameters.      Review of systems otherwise negative x 14 point review of systems except as mentioned above      Historical data copied forward from previous encounters in EMR is unchanged    Telemetry demonstrates sinus rhythm    Past Medical History:   Diagnosis Date    AAA (abdominal aortic aneurysm)     Abnormal ECG 1980’s    Alcoholism     None since 1991    Aneurysm 2019    aortic arch    Arthritis     Basal cell carcinoma (BCC) of face     Cholelithiasis 1990’s    Cholecystectomy     Colon polyp     Colon polyps     Coronary artery disease     Difficulty walking     Disease of thyroid gland     DJD (degenerative joint disease)     Gastritis     Gastroparesis     GERD (gastroesophageal reflux disease)     GI (gastrointestinal bleed)     Hernia     Hiatal    History of bone marrow biopsy     [place] (hard of hearing)     Hyperlipidemia     Hypertension     Hypothyroidism     IgG monoclonal gammopathy     Multiple duodenal ulcers     Neuropathy     PONV (postoperative nausea and vomiting)     Prostate cancer     Reflux esophagitis     Ulcer     WBC decreased      Past Surgical History:   Procedure Laterality Date    ASCENDING AORTIC ANEURYSM REPAIR W/ MECHANICAL AORTIC VALVE REPLACEMENT N/A 5/10/2024    Procedure: ASCENDING AORTIC ARCH/ possible HEMIARCH REPLACEMENT WITH CIRCULATORY ARREST, neuro monitoring, and intraop JOSE A;  Surgeon: Spencer Puente MD;  Location: [facility];  Service: Cardiothoracic;  Laterality: N/A;  Ascending aortic aneurysm repair with 30 mm gelweave graft.    CARDIAC CATHETERIZATION N/A 4/19/2024    Procedure: Right and Left Heart Cath with possible PCI;  Surgeon: Dilan Houston DO;  Location: [facility] CATH INVASIVE LOCATION;  Service: Cardiovascular;  Laterality: N/A;  Local and IV sedation    CHOLECYSTECTOMY  1988    COLON SURGERY  1998    COLONOSCOPY  02/2013    ENDOSCOPY  2012    ESOPHAGOGASTRODUODENOSCOPY WITH DILATION OF SHATZKI'S RING    ENDOSCOPY N/A 10/09/2020    Procedure: ESOPHAGOGASTRODUODENOSCOPY with cold bx;  Surgeon: Jake Perez MD;  Location: [facility] ENDOSCOPY;  Service: Gastroenterology;  Laterality: N/A;  pre - nausea  post - duodenal ulcer, gastrits, gastric ulcer, hiatal hernia    ENDOSCOPY N/A 10/04/2022    Procedure: ESOPHAGOGASTRODUODENOSCOPY with biopsies with esophageal dilatation with 56 cui;  Surgeon: Jake Perez MD;  Location: [facility] END[facility];  Service: Gastroenterology;  Laterality: N/A;  pre-dysphagia  post-normal   "   ENDOSCOPY  10/04/2022    Chris [facility]TERFIELD    EYE SURGERY Bilateral     cataracts    LAMINECTOMY  2013    decompression L3-4, L4-5    PROSTATECTOMY  2007    SKIN BIOPSY      TONSILLECTOMY AND ADENOIDECTOMY      1940s    UPPER GASTROINTESTINAL ENDOSCOPY      VASECTOMY      1970s     Family History   Problem Relation Age of Onset    Cancer Mother 85        Breast    COPD Father     Cancer Brother 59        Unknown type    Hypertension Daughter      Social History     Tobacco Use    Smoking status: Never     Passive exposure: Never    Smokeless tobacco: Never   Vaping Use    Vaping status: Never Used   Substance Use Topics    Alcohol use: No     Comment: Heavy in past, none in 33 years    Drug use: Never     (Not in a hospital admission)    Allergies:  Statins    REVIEW OF SYSTEMS:  Please see the above history of present illness for pertinent positives and negatives.  The remainder of the patient's systems have been reviewed and are negative.     Vital Signs  Temp:  [97.4 °F (36.3 °C)-98.4 °F (36.9 °C)] 98 °F (36.7 °C)  Heart Rate:  [68-93] 77  Resp:  [12-16] 12  BP: ()/(38-71) 92/57    Flowsheet Rows      Flowsheet Row First Filed Value   Admission Height 193 cm (76\") Documented at 07/13/2024 2013   Admission Weight 79.4 kg (175 lb) Documented at 07/13/2024 2013             Physical Exam:  Physical Exam   Constitutional: Patient appears thin elderly male, frail, appears dry  HEENT:   Head: Normocephalic and atraumatic.   Eyes:  Pupils are equal, round, and reactive to light. EOM are intact. Sclerae are anicteric and noninjected.  Mouth and Throat: Patient has moist mucous membranes. Oropharynx is clear of any erythema or exudate.     Neck: Neck supple. No JVD present.  Appears volume depleted no thyromegaly present. No lymphadenopathy present.  Cardiovascular: Regular rate, regular rhythm, S1 normal and S2 normal.  Exam reveals no gallop and no friction rub.  Sternotomy clean and dry, 1 out of 6 " murmur  Pulmonary/Chest: Lungs are clear to auscultation bilaterally. No respiratory distress. No wheezes. No rhonchi. No rales.   Abdominal: Soft. Bowel sounds are normal. No distension and no mass. There is no hepatosplenomegaly. There is no tenderness.   Musculoskeletal: Normal muscle tone  Extremities: No edema. Pulses are palpable in all 4 extremities.  Neurological: Patient is alert and oriented to person, place, and time. Cranial nerves II-XII are grossly intact with no focal deficits.  Skin: Skin is warm. No rash noted. Nails show no clubbing.  No cyanosis or erythema.     Results Review:    I reviewed the patient's new clinical results.  Lab Results (most recent)       Procedure Component Value Units Date/Time    High Sensitivity Troponin T 2Hr [[id number]]  (Abnormal) Collected: 07/14/24 0017    Specimen: Blood Updated: 07/14/24 0039     HS Troponin T 24 ng/L      Troponin T Delta -2 ng/L     Narrative:      High Sensitive Troponin T Reference Range:  <14.0 ng/L- Negative Female for AMI  <22.0 ng/L- Negative Male for AMI  >=14 - Abnormal Female indicating possible myocardial injury.  >=22 - Abnormal Male indicating possible myocardial injury.   Clinicians would have to utilize clinical acumen, EKG, Troponin, and serial changes to determine if it is an Acute Myocardial Infarction or myocardial injury due to an underlying chronic condition.         Urinalysis With Culture If Indicated - Urine, Clean Catch [[id number]]  (Abnormal) Collected: 07/13/24 2258    Specimen: Urine, Clean Catch Updated: 07/13/24 2316     Color, UA Yellow     Appearance, UA Clear     pH, UA 6.5     Specific Gravity, UA 1.023     Glucose, UA Negative     Ketones, UA Trace     Bilirubin, UA Negative     Blood, UA Negative     Protein, UA Trace     Leuk Esterase, UA Trace     Nitrite, UA Negative     Urobilinogen, UA 1.0 E.U./dL    Narrative:      In absence of clinical symptoms, the presence of pyuria, bacteria, and/or nitrites on the  urinalysis result does not correlate with infection.    Urinalysis, Microscopic Only - Urine, Clean Catch [[id number]] Collected: 07/13/24 2258    Specimen: Urine, Clean Catch Updated: 07/13/24 2316     RBC, UA 0-2 /HPF      WBC, UA 0-2 /HPF      Comment: Urine culture not indicated.        Bacteria, UA None Seen /HPF      Squamous Epithelial Cells, UA 0-2 /HPF      Hyaline Casts, UA 3-6 /LPF      Methodology Automated Microscopy    Comprehensive Metabolic Panel [[id number]]  (Abnormal) Collected: 07/13/24 2150    Specimen: Blood Updated: 07/13/24 2232     Glucose 126 mg/dL      BUN 19 mg/dL      Creatinine 1.33 mg/dL      Sodium 137 mmol/L      Potassium 4.2 mmol/L      Comment: Slight hemolysis detected by analyzer. Result may be falsely elevated.        Chloride 104 mmol/L      CO2 23.2 mmol/L      Calcium 8.9 mg/dL      Total Protein 6.7 g/dL      Albumin 3.7 g/dL      ALT (SGPT) 6 U/L      AST (SGOT) 24 U/L      Comment: Slight hemolysis detected by analyzer. Result may be falsely elevated.        Alkaline Phosphatase 69 U/L      Total Bilirubin 0.5 mg/dL      Globulin 3.0 gm/dL      A/G Ratio 1.2 g/dL      BUN/Creatinine Ratio 14.3     Anion Gap 9.8 mmol/L      eGFR 53.0 mL/min/1.73     Narrative:      GFR Normal >60  Chronic Kidney Disease <60  Kidney Failure <15    The GFR formula is only valid for adults with stable renal function between ages 18 and 70.    High Sensitivity Troponin T [[id number]]  (Abnormal) Collected: 07/13/24 2150    Specimen: Blood Updated: 07/13/24 2230     HS Troponin T 26 ng/L     Narrative:      High Sensitive Troponin T Reference Range:  <14.0 ng/L- Negative Female for AMI  <22.0 ng/L- Negative Male for AMI  >=14 - Abnormal Female indicating possible myocardial injury.  >=22 - Abnormal Male indicating possible myocardial injury.   Clinicians would have to utilize clinical acumen, EKG, Troponin, and serial changes to determine if it is an Acute Myocardial Infarction or myocardial  injury due to an underlying chronic condition.         Extra Tubes [[id number]] Collected: 07/13/24 2046    Specimen: Blood, Venous Line Updated: 07/13/24 2201    Narrative:      The following orders were created for panel order Extra Tubes.  Procedure                               Abnormality         Status                     ---------                               -----------         ------                     Gold Top - SST[[id number]]                                   Final result               Light Blue Top[[id number]]                                   Final result                 Please view results for these tests on the individual orders.    Gold Top - SST [[id number]] Collected: 07/13/24 2046    Specimen: Blood Updated: 07/13/24 2201     Extra Tube Hold for add-ons.     Comment: Auto resulted.       Light Blue Top [[id number]] Collected: 07/13/24 2046    Specimen: Blood Updated: 07/13/24 2201     Extra Tube Hold for add-ons.     Comment: Auto resulted       CBC & Differential [[id number]]  (Abnormal) Collected: 07/13/24 2150    Specimen: Blood Updated: 07/13/24 2200    Narrative:      The following orders were created for panel order CBC & Differential.  Procedure                               Abnormality         Status                     ---------                               -----------         ------                     CBC Auto Differential[[id number]]        Abnormal            Final result                 Please view results for these tests on the individual orders.    CBC Auto Differential [[id number]]  (Abnormal) Collected: 07/13/24 2150    Specimen: Blood Updated: 07/13/24 2200     WBC 6.26 10*3/mm3      RBC 4.33 10*6/mm3      Hemoglobin 12.1 g/dL      Hematocrit 38.9 %      MCV 89.8 fL      MCH 27.9 pg      MCHC 31.1 g/dL      RDW 14.3 %      RDW-SD 47.0 fl      MPV 11.1 fL      Platelets 177 10*3/mm3      Neutrophil % 68.7 %      Lymphocyte % 15.8 %      Monocyte % 13.9 %      Eosinophil % 0.8 %       Basophil % 0.5 %      Immature Grans % 0.3 %      Neutrophils, Absolute 4.30 10*3/mm3      Lymphocytes, Absolute 0.99 10*3/mm3      Monocytes, Absolute 0.87 10*3/mm3      Eosinophils, Absolute 0.05 10*3/mm3      Basophils, Absolute 0.03 10*3/mm3      Immature Grans, Absolute 0.02 10*3/mm3      nRBC 0.0 /100 WBC             Imaging Results (Most Recent)       Procedure Component Value Units Date/Time    CT Head Without Contrast [[id number]] Collected: 07/13/24 2329     Updated: 07/13/24 2334    Narrative:      CT HEAD WO CONTRAST, CT CERVICAL SPINE WO CONTRAST    Date of Exam: 7/13/2024 11:25 PM EDT    Indication: fall.    Comparison: 9/12/2020.    Technique: Axial CT images were obtained of the head and cervical spine without contrast administration.  Coronal reconstructions were performed.  Automated exposure control and iterative reconstruction methods were used.      Findings:  There is no evidence of hemorrhage. There is no mass effect or midline shift.    There is no extracerebral collection.    Ventricles are normal in size and configuration for patient's stated age.      Posterior fossa is within normal limits.    Calvarium and skull base appear intact.   Visualized sinuses show no air fluid levels. Visualized orbits are unremarkable.    No evidence of fracture or compression deformity. No evidence of spondylolysis.  No significant spondylolisthesis. No evidence of focal lesions. The prevertebral soft tissues appear unremarkable. Surrounding soft tissues appear within normal limits. Mild   to moderate multilevel degenerative changes are present throughout the spine. The lung apices appear clear.        Impression:      Impression:  1.No acute intracranial process.  2.No acute osseous abnormality of the cervical spine.        Electronically Signed: Sailaja Murray MD    7/13/2024 11:32 PM EDT    Workstation ID: [id number]    CT Cervical Spine Without Contrast [[id number]] Collected: 07/13/24 2329     Updated:  07/13/24 2334    Narrative:      CT HEAD WO CONTRAST, CT CERVICAL SPINE WO CONTRAST    Date of Exam: 7/13/2024 11:25 PM EDT    Indication: fall.    Comparison: 9/12/2020.    Technique: Axial CT images were obtained of the head and cervical spine without contrast administration.  Coronal reconstructions were performed.  Automated exposure control and iterative reconstruction methods were used.      Findings:  There is no evidence of hemorrhage. There is no mass effect or midline shift.    There is no extracerebral collection.    Ventricles are normal in size and configuration for patient's stated age.      Posterior fossa is within normal limits.    Calvarium and skull base appear intact.   Visualized sinuses show no air fluid levels. Visualized orbits are unremarkable.    No evidence of fracture or compression deformity. No evidence of spondylolysis.  No significant spondylolisthesis. No evidence of focal lesions. The prevertebral soft tissues appear unremarkable. Surrounding soft tissues appear within normal limits. Mild   to moderate multilevel degenerative changes are present throughout the spine. The lung apices appear clear.        Impression:      Impression:  1.No acute intracranial process.  2.No acute osseous abnormality of the cervical spine.        Electronically Signed: Sailaja Murray MD    7/13/2024 11:32 PM EDT    Workstation ID: [id number]    XR Shoulder 2+ View Left [[id number]] Collected: 07/13/24 2146     Updated: 07/13/24 2148    Narrative:      XR SHOULDER 2+ VW LEFT    Date of Exam: 7/13/2024 9:27 PM EDT    Indication: fall    Comparison: None available.    Findings:  There is no acute fracture or dislocation. Acromioclavicular and glenohumeral joints appear intact. No erosions. Acromiohumeral and coracoclavicular distances are well-maintained. Severe acromioclavicular and glenohumeral osteoarthritic changes are   present. The adjacent lung and ribs appear intact.      Impression:       Impression:  No acute osseous abnormality. Severe osteoarthritic changes are present.        Electronically Signed: Sailaja Murray MD    7/13/2024 9:46 PM EDT    Workstation ID: [id number]    XR Chest 1 View [[id number]] Collected: 07/13/24 2144     Updated: 07/13/24 2147    Narrative:      XR CHEST 1 VW    Date of Exam: 7/13/2024 9:28 PM EDT    Indication: fall/pain    Comparison: 5/14/2024.    Findings:  There are no airspace consolidations. No pleural fluid. No pneumothorax. The pulmonary vasculature appears within normal limits. The cardiac and mediastinal silhouette appear unremarkable. No acute osseous abnormality identified. No displaced rib   fracture identified. Median sternotomy wires are present.      Impression:      Impression:  No acute cardiopulmonary process.        Electronically Signed: Sailaja Murray MD    7/13/2024 9:45 PM EDT    Workstation ID: [id number]          reviewed    ECG/EMG Results (most recent)       Procedure Component Value Units Date/Time    Telemetry Scan [[id number]] Resulted: 07/13/24     Updated: 07/13/24 2051    ECG 12 Lead Syncope [[id number]] Collected: 07/13/24 2142     Updated: 07/14/24 0755     QTC Interval -- ms     Narrative:      HEART RATE=79  bpm  RR Qvfxvaok=891  ms  CT Azvkhsxp=998  ms  P Horizontal Axis=-19  deg  P Front Axis=64  deg  QRSD Vxyienov=301  ms  QT Interval=  ms  QTcB=Invalid  ms  QRS Axis=-40  deg  T Wave Axis=32  deg  - ABNORMAL ECG -  Sinus rhythm  Prolonged CT interval  Probable  left atrial enlargement  Left axis deviation  Electronically Signed By: Stephan Kinney ([place]) 2024-07-14 07:55:43  Date and Time of Study:2024-07-13 21:42:37          reviewed    Assessment & Plan     Falls, weakness debility  Orthostatic hypotension  Aortic aneurysm status post surgical repair  Left atrial appendage closure  Nonobstructive CAD  Essential hypertension  Advanced neuropathy  Dysautonomia    Possibly secondary to de hydration hypotension creatinine up to 1.33 from 0.99,  possible volume depletion, gentle IV fluids been given    Patient's symptoms are multifactorial  Dysautonomia, orthostatic hypotension, neuropathy, also in the setting of volume depletion which will amplify his overall presentation  Patient needs to increase free water intake along with electrolytes to hang onto this fluid in addition to lower extremity compression  Agree with Florinef and midodrine  Repeat orthostatics    2D echo Limited fashion reordered to evaluate prior pericardial effusion as well as assess right-sided filling pressures to compare to earlier this month      Patient be seen by primary cardiologist tomorrow for further recommendations    Chano Jay MD, PhD  I discussed the patient's findings and my recommendations with patient and staff    Chano Jay MD  07/14/24  18:59 EDT

## 2024-07-14 NOTE — H&P
Patient Care Team:  Moreno Tapia APRN as PCP - General (Family Medicine)  Chalo Olvera MD as Consulting Physician (Hematology and Oncology)  Ganesh López MD as Consulting Physician (Neurology)  Jake Enriquez MD as Referring Physician (Family Medicine)    Chief complaint: Fall    Subjective     84-year-old male presented to the ER after a fall.  He has a past medical history of AAA repair and valve procedure. Patient reports that he has been hypotensive recently and his cardiologist increased his midodrine and added fludrocortisone. Patient states that over the past 2 weeks has been getting very dizzy and falling. Patient is supposed to use a walker however has not been using it when walking because he is afraid to fall and hit his chest on the walker. Patient unsure if he hit his head or not but is reporting left shoulder and left neck pain. Patient's wife reports that she does believe that he hit his head. Patient's wife gave him a Norco 5 mg and a baclofen this morning and patient reports sleeping all day. Patient denies any headache or dizziness at this time. Patient does report that when he stands he does get very dizzy and feels like he is spinning.    In the ER, CXR is neg, EKG NSR rate of 79, no acute changes. Trop 26. UA with evidence of dehydration.  GFR 53.  CT head/cervical spine no acute abnormalities.  Left shoulder xray without acute changes    Review of Systems   Constitutional:  Positive for activity change, appetite change and fatigue. Negative for fever.   HENT: Negative.     Respiratory: Negative.     Cardiovascular: Negative.    Gastrointestinal: Negative.    Musculoskeletal:  Positive for arthralgias and gait problem.   Neurological:  Positive for dizziness and weakness.   Psychiatric/Behavioral:  Negative for confusion.           History  Past Medical History:   Diagnosis Date    AAA (abdominal aortic aneurysm)     Abnormal ECG 1980’s    Alcoholism     None since 1991     Aneurysm 2019    aortic arch    Arthritis     Basal cell carcinoma (BCC) of face     Cholelithiasis 1990’s    Cholecystectomy    Colon polyp     Colon polyps     Coronary artery disease     Difficulty walking     Disease of thyroid gland     DJD (degenerative joint disease)     Gastritis     Gastroparesis     GERD (gastroesophageal reflux disease)     GI (gastrointestinal bleed)     Hernia     Hiatal    History of bone marrow biopsy     [place]     Hyperlipidemia     Hypertension     Hypothyroidism     IgG monoclonal gammopathy     Multiple duodenal ulcers     Neuropathy     PONV (postoperative nausea and vomiting)     Prostate cancer     Reflux esophagitis     Ulcer     WBC decreased      Past Surgical History:   Procedure Laterality Date    ASCENDING AORTIC ANEURYSM REPAIR W/ MECHANICAL AORTIC VALVE REPLACEMENT N/A 5/10/2024    Procedure: ASCENDING AORTIC ARCH/ possible HEMIARCH REPLACEMENT WITH CIRCULATORY ARREST, neuro monitoring, and intraop JOSE A;  Surgeon: Spencer Puente MD;  Location: [facility] CVOR;  Service: Cardiothoracic;  Laterality: N/A;  Ascending aortic aneurysm repair with 30 mm gelweave graft.    CARDIAC CATHETERIZATION N/A 4/19/2024    Procedure: Right and Left Heart Cath with possible PCI;  Surgeon: Dilan Houston DO;  Location: [facility] CATH INVASIVE LOCATION;  Service: Cardiovascular;  Laterality: N/A;  Local and IV sedation    CHOLECYSTECTOMY  1988    COLON SURGERY  1998    COLONOSCOPY  02/2013    ENDOSCOPY  2012    ESOPHAGOGASTRODUODENOSCOPY WITH DILATION OF SHATZKI'S RING    ENDOSCOPY N/A 10/09/2020    Procedure: ESOPHAGOGASTRODUODENOSCOPY with cold bx;  Surgeon: Jake Perez MD;  Location: [facility] ENDOSCOPY;  Service: Gastroenterology;  Laterality: N/A;  pre - nausea  post - duodenal ulcer, gastrits, gastric ulcer, hiatal hernia    ENDOSCOPY N/A 10/04/2022    Procedure: ESOPHAGOGASTRODUODENOSCOPY with biopsies with esophageal dilatation with 56 cui;   Surgeon: Jake Perez MD;  Location: [facility] ENDOSCOPY;  Service: Gastroenterology;  Laterality: N/A;  pre-dysphagia  post-normal     ENDOSCOPY  10/04/2022    Chris BUTTERFIELD    EYE SURGERY Bilateral     cataracts    LAMINECTOMY  2013    decompression L3-4, L4-5    PROSTATECTOMY  2007    SKIN BIOPSY      TONSILLECTOMY AND ADENOIDECTOMY      1940s    UPPER GASTROINTESTINAL ENDOSCOPY      VASECTOMY      1970s     Family History   Problem Relation Age of Onset    Cancer Mother 85        Breast    COPD Father     Cancer Brother 59        Unknown type    Hypertension Daughter      Social History     Tobacco Use    Smoking status: Never     Passive exposure: Never    Smokeless tobacco: Never   Vaping Use    Vaping status: Never Used   Substance Use Topics    Alcohol use: No     Comment: Heavy in past, none in 33 years    Drug use: Never     (Not in a hospital admission)    Allergies:  Statins    Objective     Vital Signs  Temp:  [97.4 °F (36.3 °C)-98.3 °F (36.8 °C)] 97.4 °F (36.3 °C)  Heart Rate:  [68-93] 73  Resp:  [13-14] 14  BP: ()/(38-66) 108/61     Physical Exam:      General Appearance:    Alert, cooperative, in no acute distress   Head:    Normocephalic, without obvious abnormality, atraumatic   Eyes:            Lids and lashes normal, conjunctivae and sclerae normal, no   icterus, no pallor, corneas clear, PERRLA   Ears:    Ears appear intact with no abnormalities noted   Throat:   No oral lesions, no thrush, oral mucosa moist   Neck:   No adenopathy, supple, trachea midline, no thyromegaly, no   carotid bruit, no JVD   Lungs:     Clear to auscultation,respirations regular, even and                  unlabored    Heart:    Regular rhythm and normal rate, normal S1 and S2, no            murmur, no gallop, no rub, no click   Chest Wall:    No abnormalities observed   Abdomen:     Normal bowel sounds, no masses, no organomegaly, soft        non-tender, non-distended, no guarding, no rebound                 tenderness   Extremities:   Moves all extremities well, no edema, no cyanosis, no             redness   Pulses:   Pulses palpable and equal bilaterally   Skin:   No bleeding, bruising or rash   Lymph nodes:   No palpable adenopathy   Neurologic:   Cranial nerves 2 - 12 grossly intact, sensation intact, DTR       present and equal bilaterally       Results Review:     Imaging Results (Last 24 Hours)       Procedure Component Value Units Date/Time    CT Head Without Contrast [[id number]] Collected: 07/13/24 2329     Updated: 07/13/24 2334    Narrative:      CT HEAD WO CONTRAST, CT CERVICAL SPINE WO CONTRAST    Date of Exam: 7/13/2024 11:25 PM EDT    Indication: fall.    Comparison: 9/12/2020.    Technique: Axial CT images were obtained of the head and cervical spine without contrast administration.  Coronal reconstructions were performed.  Automated exposure control and iterative reconstruction methods were used.      Findings:  There is no evidence of hemorrhage. There is no mass effect or midline shift.    There is no extracerebral collection.    Ventricles are normal in size and configuration for patient's stated age.      Posterior fossa is within normal limits.    Calvarium and skull base appear intact.   Visualized sinuses show no air fluid levels. Visualized orbits are unremarkable.    No evidence of fracture or compression deformity. No evidence of spondylolysis.  No significant spondylolisthesis. No evidence of focal lesions. The prevertebral soft tissues appear unremarkable. Surrounding soft tissues appear within normal limits. Mild   to moderate multilevel degenerative changes are present throughout the spine. The lung apices appear clear.        Impression:      Impression:  1.No acute intracranial process.  2.No acute osseous abnormality of the cervical spine.        Electronically Signed: Sailaja Murray MD    7/13/2024 11:32 PM EDT    Workstation ID: [id number]    CT Cervical Spine Without Contrast  [[id number]] Collected: 07/13/24 2329     Updated: 07/13/24 2334    Narrative:      CT HEAD WO CONTRAST, CT CERVICAL SPINE WO CONTRAST    Date of Exam: 7/13/2024 11:25 PM EDT    Indication: fall.    Comparison: 9/12/2020.    Technique: Axial CT images were obtained of the head and cervical spine without contrast administration.  Coronal reconstructions were performed.  Automated exposure control and iterative reconstruction methods were used.      Findings:  There is no evidence of hemorrhage. There is no mass effect or midline shift.    There is no extracerebral collection.    Ventricles are normal in size and configuration for patient's stated age.      Posterior fossa is within normal limits.    Calvarium and skull base appear intact.   Visualized sinuses show no air fluid levels. Visualized orbits are unremarkable.    No evidence of fracture or compression deformity. No evidence of spondylolysis.  No significant spondylolisthesis. No evidence of focal lesions. The prevertebral soft tissues appear unremarkable. Surrounding soft tissues appear within normal limits. Mild   to moderate multilevel degenerative changes are present throughout the spine. The lung apices appear clear.        Impression:      Impression:  1.No acute intracranial process.  2.No acute osseous abnormality of the cervical spine.        Electronically Signed: Sailaja Murray MD    7/13/2024 11:32 PM EDT    Workstation ID: [id number]    XR Shoulder 2+ View Left [[id number]] Collected: 07/13/24 2146     Updated: 07/13/24 2148    Narrative:      XR SHOULDER 2+ VW LEFT    Date of Exam: 7/13/2024 9:27 PM EDT    Indication: fall    Comparison: None available.    Findings:  There is no acute fracture or dislocation. Acromioclavicular and glenohumeral joints appear intact. No erosions. Acromiohumeral and coracoclavicular distances are well-maintained. Severe acromioclavicular and glenohumeral osteoarthritic changes are   present. The adjacent lung and ribs  appear intact.      Impression:      Impression:  No acute osseous abnormality. Severe osteoarthritic changes are present.        Electronically Signed: Sailaja Murray MD    7/13/2024 9:46 PM EDT    Workstation ID: [id number]    XR Chest 1 View [[id number]] Collected: 07/13/24 2144     Updated: 07/13/24 2147    Narrative:      XR CHEST 1 VW    Date of Exam: 7/13/2024 9:28 PM EDT    Indication: fall/pain    Comparison: 5/14/2024.    Findings:  There are no airspace consolidations. No pleural fluid. No pneumothorax. The pulmonary vasculature appears within normal limits. The cardiac and mediastinal silhouette appear unremarkable. No acute osseous abnormality identified. No displaced rib   fracture identified. Median sternotomy wires are present.      Impression:      Impression:  No acute cardiopulmonary process.        Electronically Signed: Sailaja Murray MD    7/13/2024 9:45 PM EDT    Workstation ID: [id number]             Lab Results (last 24 hours)       Procedure Component Value Units Date/Time    High Sensitivity Troponin T 2Hr [[id number]]  (Abnormal) Collected: 07/14/24 0017    Specimen: Blood Updated: 07/14/24 0039     HS Troponin T 24 ng/L      Troponin T Delta -2 ng/L     Narrative:      High Sensitive Troponin T Reference Range:  <14.0 ng/L- Negative Female for AMI  <22.0 ng/L- Negative Male for AMI  >=14 - Abnormal Female indicating possible myocardial injury.  >=22 - Abnormal Male indicating possible myocardial injury.   Clinicians would have to utilize clinical acumen, EKG, Troponin, and serial changes to determine if it is an Acute Myocardial Infarction or myocardial injury due to an underlying chronic condition.         Urinalysis With Culture If Indicated - Urine, Clean Catch [[id number]]  (Abnormal) Collected: 07/13/24 2258    Specimen: Urine, Clean Catch Updated: 07/13/24 2316     Color, UA Yellow     Appearance, UA Clear     pH, UA 6.5     Specific Gravity, UA 1.023     Glucose, UA Negative     Ketones, UA  Trace     Bilirubin, UA Negative     Blood, UA Negative     Protein, UA Trace     Leuk Esterase, UA Trace     Nitrite, UA Negative     Urobilinogen, UA 1.0 E.U./dL    Narrative:      In absence of clinical symptoms, the presence of pyuria, bacteria, and/or nitrites on the urinalysis result does not correlate with infection.    Urinalysis, Microscopic Only - Urine, Clean Catch [[id number]] Collected: 07/13/24 2258    Specimen: Urine, Clean Catch Updated: 07/13/24 2316     RBC, UA 0-2 /HPF      WBC, UA 0-2 /HPF      Comment: Urine culture not indicated.        Bacteria, UA None Seen /HPF      Squamous Epithelial Cells, UA 0-2 /HPF      Hyaline Casts, UA 3-6 /LPF      Methodology Automated Microscopy    Comprehensive Metabolic Panel [[id number]]  (Abnormal) Collected: 07/13/24 2150    Specimen: Blood Updated: 07/13/24 2232     Glucose 126 mg/dL      BUN 19 mg/dL      Creatinine 1.33 mg/dL      Sodium 137 mmol/L      Potassium 4.2 mmol/L      Comment: Slight hemolysis detected by analyzer. Result may be falsely elevated.        Chloride 104 mmol/L      CO2 23.2 mmol/L      Calcium 8.9 mg/dL      Total Protein 6.7 g/dL      Albumin 3.7 g/dL      ALT (SGPT) 6 U/L      AST (SGOT) 24 U/L      Comment: Slight hemolysis detected by analyzer. Result may be falsely elevated.        Alkaline Phosphatase 69 U/L      Total Bilirubin 0.5 mg/dL      Globulin 3.0 gm/dL      A/G Ratio 1.2 g/dL      BUN/Creatinine Ratio 14.3     Anion Gap 9.8 mmol/L      eGFR 53.0 mL/min/1.73     Narrative:      GFR Normal >60  Chronic Kidney Disease <60  Kidney Failure <15    The GFR formula is only valid for adults with stable renal function between ages 18 and 70.    High Sensitivity Troponin T [[id number]]  (Abnormal) Collected: 07/13/24 2150    Specimen: Blood Updated: 07/13/24 2230     HS Troponin T 26 ng/L     Narrative:      High Sensitive Troponin T Reference Range:  <14.0 ng/L- Negative Female for AMI  <22.0 ng/L- Negative Male for AMI  >=14 -  Abnormal Female indicating possible myocardial injury.  >=22 - Abnormal Male indicating possible myocardial injury.   Clinicians would have to utilize clinical acumen, EKG, Troponin, and serial changes to determine if it is an Acute Myocardial Infarction or myocardial injury due to an underlying chronic condition.         Extra Tubes [[id number]] Collected: 07/13/24 2046    Specimen: Blood, Venous Line Updated: 07/13/24 2201    Narrative:      The following orders were created for panel order Extra Tubes.  Procedure                               Abnormality         Status                     ---------                               -----------         ------                     Gold Top - SST[[id number]]                                   Final result               Light Blue Top[[id number]]                                   Final result                 Please view results for these tests on the individual orders.    Gold Top - SST [[id number]] Collected: 07/13/24 2046    Specimen: Blood Updated: 07/13/24 2201     Extra Tube Hold for add-ons.     Comment: Auto resulted.       Light Blue Top [[id number]] Collected: 07/13/24 2046    Specimen: Blood Updated: 07/13/24 2201     Extra Tube Hold for add-ons.     Comment: Auto resulted       CBC & Differential [[id number]]  (Abnormal) Collected: 07/13/24 2150    Specimen: Blood Updated: 07/13/24 2200    Narrative:      The following orders were created for panel order CBC & Differential.  Procedure                               Abnormality         Status                     ---------                               -----------         ------                     CBC Auto Differential[[id number]]        Abnormal            Final result                 Please view results for these tests on the individual orders.    CBC Auto Differential [[id number]]  (Abnormal) Collected: 07/13/24 2150    Specimen: Blood Updated: 07/13/24 2200     WBC 6.26 10*3/mm3      RBC 4.33 10*6/mm3      Hemoglobin  12.1 g/dL      Hematocrit 38.9 %      MCV 89.8 fL      MCH 27.9 pg      MCHC 31.1 g/dL      RDW 14.3 %      RDW-SD 47.0 fl      MPV 11.1 fL      Platelets 177 10*3/mm3      Neutrophil % 68.7 %      Lymphocyte % 15.8 %      Monocyte % 13.9 %      Eosinophil % 0.8 %      Basophil % 0.5 %      Immature Grans % 0.3 %      Neutrophils, Absolute 4.30 10*3/mm3      Lymphocytes, Absolute 0.99 10*3/mm3      Monocytes, Absolute 0.87 10*3/mm3      Eosinophils, Absolute 0.05 10*3/mm3      Basophils, Absolute 0.03 10*3/mm3      Immature Grans, Absolute 0.02 10*3/mm3      nRBC 0.0 /100 WBC              I reviewed the patient's new clinical results.    Assessment & Plan       Syncope  Hypotension  Fall  Weakness  - admit  - cardiology consult  - PT/OT  - continue home meds including fludrocortisone and midodrine    Neck/shoulder pain  - methocarbamol and pain meds prn    HTN  HLD  Hypothyroid  IGG monoclonal gammopathy  GERD  H/o prostate cancer  CAD  AAA    Stress ulcer prophy - PPI  DVT prophy - lovenox    I discussed the patients findings and my recommendations with patient.     Aspen Majano MD  07/14/24  09:54 EDT

## 2024-07-14 NOTE — ED PROVIDER NOTES
Subjective   Chief Complaint   Patient presents with    Fall       History of Present Illness  Patient is an 84-year-old male with a past medical history of AAA repair and valve procedure.  Patient reports that he has been hypotensive recently and his cardiologist increased his midodrine and added fludrocortisone.  Patient states that over the past 2 weeks has been getting very dizzy and falling.  Patient is supposed to use a walker however has not been using it when walking because he is afraid to fall and hit his chest on the walker.  Patient unsure if he hit his head or not but is reporting left shoulder and left neck pain.  Patient's wife reports that she does believe that he hit his head.  Patient's wife gave him a Norco 5 mg and a baclofen this morning and patient reports sleeping all day.  Patient denies any headache or dizziness at this time.  Patient does report that when he stands he does get very dizzy and feels like he is spinning.    Review of Systems   Constitutional:  Positive for fatigue. Negative for appetite change, chills, diaphoresis and fever.   Respiratory:  Negative for cough, chest tightness and shortness of breath.    Cardiovascular:  Negative for chest pain, palpitations and leg swelling.   Gastrointestinal:  Negative for abdominal pain, constipation, diarrhea, nausea and vomiting.   Genitourinary:  Negative for dysuria.   Musculoskeletal:  Positive for arthralgias, gait problem, myalgias and neck pain. Negative for back pain, joint swelling and neck stiffness.   Skin:  Negative for color change, pallor, rash and wound.   Neurological:  Positive for dizziness. Negative for seizures, facial asymmetry, light-headedness, numbness and headaches.       Past Medical History:   Diagnosis Date    AAA (abdominal aortic aneurysm)     Abnormal ECG 1980’s    Alcoholism     None since 1991    Aneurysm 2019    aortic arch    Arthritis     Basal cell carcinoma (BCC) of face     Cholelithiasis 1990’s     Cholecystectomy    Colon polyp     Colon polyps     Coronary artery disease     Difficulty walking     Disease of thyroid gland     DJD (degenerative joint disease)     Gastritis     Gastroparesis     GERD (gastroesophageal reflux disease)     GI (gastrointestinal bleed)     Hernia     Hiatal    History of bone marrow biopsy     [place]     Hyperlipidemia     Hypertension     Hypothyroidism     IgG monoclonal gammopathy     Multiple duodenal ulcers     Neuropathy     PONV (postoperative nausea and vomiting)     Prostate cancer     Reflux esophagitis     Ulcer     WBC decreased        Allergies   Allergen Reactions    Statins Myalgia       Past Surgical History:   Procedure Laterality Date    ASCENDING AORTIC ANEURYSM REPAIR W/ MECHANICAL AORTIC VALVE REPLACEMENT N/A 5/10/2024    Procedure: ASCENDING AORTIC ARCH/ possible HEMIARCH REPLACEMENT WITH CIRCULATORY ARREST, neuro monitoring, and intraop JOSE A;  Surgeon: Spencer Puente MD;  Location: [facility] CVOR;  Service: Cardiothoracic;  Laterality: N/A;  Ascending aortic aneurysm repair with 30 mm gelweave graft.    CARDIAC CATHETERIZATION N/A 4/19/2024    Procedure: Right and Left Heart Cath with possible PCI;  Surgeon: Dilan Houston DO;  Location: [facility] CATH INVASIVE LOCATION;  Service: Cardiovascular;  Laterality: N/A;  Local and IV sedation    CHOLECYSTECTOMY  1988    COLON SURGERY  1998    COLONOSCOPY  02/2013    ENDOSCOPY  2012    ESOPHAGOGASTRODUODENOSCOPY WITH DILATION OF SHATZKI'S RING    ENDOSCOPY N/A 10/09/2020    Procedure: ESOPHAGOGASTRODUODENOSCOPY with cold bx;  Surgeon: Jake Perez MD;  Location: [facility] ENDOSCOPY;  Service: Gastroenterology;  Laterality: N/A;  pre - nausea  post - duodenal ulcer, gastrits, gastric ulcer, hiatal hernia    ENDOSCOPY N/A 10/04/2022    Procedure: ESOPHAGOGASTRODUODENOSCOPY with biopsies with esophageal dilatation with 56 cui;  Surgeon: Jake Perez MD;  Location: [facility] ENDOSCOPY;   Service: Gastroenterology;  Laterality: N/A;  pre-dysphagia  post-normal     ENDOSCOPY  10/04/2022    Chris BUTTERFIELD    EYE SURGERY Bilateral     cataracts    LAMINECTOMY  2013    decompression L3-4, L4-5    PROSTATECTOMY  2007    SKIN BIOPSY      TONSILLECTOMY AND ADENOIDECTOMY      1940s    UPPER GASTROINTESTINAL ENDOSCOPY      VASECTOMY      1970s       Family History   Problem Relation Age of Onset    Cancer Mother 85        Breast    COPD Father     Cancer Brother 59        Unknown type    Hypertension Daughter        Social History     Socioeconomic History    Marital status:      Spouse name: Meghan    Years of education: College   Tobacco Use    Smoking status: Never     Passive exposure: Never    Smokeless tobacco: Never   Vaping Use    Vaping status: Never Used   Substance and Sexual Activity    Alcohol use: No     Comment: Heavy in past, none in 33 years    Drug use: Never    Sexual activity: Not Currently     Partners: Female     Birth control/protection: None           Objective   Physical Exam  Vitals and nursing note reviewed.   Constitutional:       General: He is not in acute distress.     Appearance: Normal appearance. He is normal weight. He is not ill-appearing, toxic-appearing or diaphoretic.   HENT:      Head: Normocephalic and atraumatic.      Right Ear: Tympanic membrane normal.      Left Ear: Tympanic membrane normal.      Nose: Nose normal.      Mouth/Throat:      Mouth: Mucous membranes are moist.      Pharynx: Oropharynx is clear.   Eyes:      Extraocular Movements: Extraocular movements intact.      Conjunctiva/sclera: Conjunctivae normal.      Pupils: Pupils are equal, round, and reactive to light.   Cardiovascular:      Rate and Rhythm: Normal rate and regular rhythm.      Pulses: Normal pulses.      Heart sounds: Normal heart sounds.   Pulmonary:      Effort: Pulmonary effort is normal.      Breath sounds: Normal breath sounds.   Abdominal:      General: Bowel sounds are normal.      " Tenderness: There is no abdominal tenderness. There is no right CVA tenderness, left CVA tenderness, guarding or rebound.   Musculoskeletal:         General: Tenderness and signs of injury present. No swelling or deformity.      Cervical back: Normal range of motion and neck supple. Tenderness present. No rigidity.      Right lower leg: No edema.      Left lower leg: No edema.   Skin:     General: Skin is warm and dry.      Capillary Refill: Capillary refill takes less than 2 seconds.   Neurological:      General: No focal deficit present.      Mental Status: He is alert.   Psychiatric:         Mood and Affect: Mood normal.         Procedures           ED Course  ED Course as of 07/14/24 0032   Sun Jul 14, 2024   0008 Spoke with Kira PALAFOX at [facility] who agrees to accept patient for their care [LB]      ED Course User Index  [LB] LidiaDontaJAVY Beyer      /53   Pulse 80   Temp 98.3 °F (36.8 °C) (Oral)   Resp 14   Ht 193 cm (76\")   Wt 79.4 kg (175 lb)   SpO2 93%   BMI 21.30 kg/m²   Medications   sodium chloride 0.9 % flush 10 mL (has no administration in time range)   sodium chloride 0.9 % bolus 1,000 mL (1,000 mL Intravenous New Bag 7/13/24 2149)   ondansetron (ZOFRAN) injection 4 mg (4 mg Intravenous Given 7/13/24 2150)   morphine injection 4 mg (4 mg Intravenous Given 7/13/24 2150)   methocarbamol (ROBAXIN) tablet 500 mg (500 mg Oral Given 7/13/24 2236)     CT Head Without Contrast    Result Date: 7/13/2024  Impression: 1.No acute intracranial process. 2.No acute osseous abnormality of the cervical spine. Electronically Signed: Sailaja Murray MD  7/13/2024 11:32 PM EDT  Workstation ID: [id number]    CT Cervical Spine Without Contrast    Result Date: 7/13/2024  Impression: 1.No acute intracranial process. 2.No acute osseous abnormality of the cervical spine. Electronically Signed: Sailaja Murray MD  7/13/2024 11:32 PM EDT  Workstation ID: [id number]    XR Shoulder 2+ View Left    Result Date: " 7/13/2024  Impression: No acute osseous abnormality. Severe osteoarthritic changes are present. Electronically Signed: Sailaja Murray MD  7/13/2024 9:46 PM EDT  Workstation ID: [id number]    XR Chest 1 View    Result Date: 7/13/2024  Impression: No acute cardiopulmonary process. Electronically Signed: Sailaja Murray MD  7/13/2024 9:45 PM EDT  Workstation ID: [id number]   Lab Results (last 24 hours)       Procedure Component Value Units Date/Time    CBC & Differential [[id number]]  (Abnormal) Collected: 07/13/24 2150    Specimen: Blood Updated: 07/13/24 2200    Narrative:      The following orders were created for panel order CBC & Differential.  Procedure                               Abnormality         Status                     ---------                               -----------         ------                     CBC Auto Differential[[id number]]        Abnormal            Final result                 Please view results for these tests on the individual orders.    Comprehensive Metabolic Panel [[id number]]  (Abnormal) Collected: 07/13/24 2150    Specimen: Blood Updated: 07/13/24 2232     Glucose 126 mg/dL      BUN 19 mg/dL      Creatinine 1.33 mg/dL      Sodium 137 mmol/L      Potassium 4.2 mmol/L      Comment: Slight hemolysis detected by analyzer. Result may be falsely elevated.        Chloride 104 mmol/L      CO2 23.2 mmol/L      Calcium 8.9 mg/dL      Total Protein 6.7 g/dL      Albumin 3.7 g/dL      ALT (SGPT) 6 U/L      AST (SGOT) 24 U/L      Comment: Slight hemolysis detected by analyzer. Result may be falsely elevated.        Alkaline Phosphatase 69 U/L      Total Bilirubin 0.5 mg/dL      Globulin 3.0 gm/dL      A/G Ratio 1.2 g/dL      BUN/Creatinine Ratio 14.3     Anion Gap 9.8 mmol/L      eGFR 53.0 mL/min/1.73     Narrative:      GFR Normal >60  Chronic Kidney Disease <60  Kidney Failure <15    The GFR formula is only valid for adults with stable renal function between ages 18 and 70.    High Sensitivity Troponin  T [[id number]]  (Abnormal) Collected: 07/13/24 2150    Specimen: Blood Updated: 07/13/24 2230     HS Troponin T 26 ng/L     Narrative:      High Sensitive Troponin T Reference Range:  <14.0 ng/L- Negative Female for AMI  <22.0 ng/L- Negative Male for AMI  >=14 - Abnormal Female indicating possible myocardial injury.  >=22 - Abnormal Male indicating possible myocardial injury.   Clinicians would have to utilize clinical acumen, EKG, Troponin, and serial changes to determine if it is an Acute Myocardial Infarction or myocardial injury due to an underlying chronic condition.         CBC Auto Differential [[id number]]  (Abnormal) Collected: 07/13/24 2150    Specimen: Blood Updated: 07/13/24 2200     WBC 6.26 10*3/mm3      RBC 4.33 10*6/mm3      Hemoglobin 12.1 g/dL      Hematocrit 38.9 %      MCV 89.8 fL      MCH 27.9 pg      MCHC 31.1 g/dL      RDW 14.3 %      RDW-SD 47.0 fl      MPV 11.1 fL      Platelets 177 10*3/mm3      Neutrophil % 68.7 %      Lymphocyte % 15.8 %      Monocyte % 13.9 %      Eosinophil % 0.8 %      Basophil % 0.5 %      Immature Grans % 0.3 %      Neutrophils, Absolute 4.30 10*3/mm3      Lymphocytes, Absolute 0.99 10*3/mm3      Monocytes, Absolute 0.87 10*3/mm3      Eosinophils, Absolute 0.05 10*3/mm3      Basophils, Absolute 0.03 10*3/mm3      Immature Grans, Absolute 0.02 10*3/mm3      nRBC 0.0 /100 WBC     Urinalysis With Culture If Indicated - Urine, Clean Catch [[id number]]  (Abnormal) Collected: 07/13/24 2258    Specimen: Urine, Clean Catch Updated: 07/13/24 2316     Color, UA Yellow     Appearance, UA Clear     pH, UA 6.5     Specific Gravity, UA 1.023     Glucose, UA Negative     Ketones, UA Trace     Bilirubin, UA Negative     Blood, UA Negative     Protein, UA Trace     Leuk Esterase, UA Trace     Nitrite, UA Negative     Urobilinogen, UA 1.0 E.U./dL    Narrative:      In absence of clinical symptoms, the presence of pyuria, bacteria, and/or nitrites on the urinalysis result does not  correlate with infection.    Urinalysis, Microscopic Only - Urine, Clean Catch [[id number]] Collected: 07/13/24 2258    Specimen: Urine, Clean Catch Updated: 07/13/24 2316     RBC, UA 0-2 /HPF      WBC, UA 0-2 /HPF      Comment: Urine culture not indicated.        Bacteria, UA None Seen /HPF      Squamous Epithelial Cells, UA 0-2 /HPF      Hyaline Casts, UA 3-6 /LPF      Methodology Automated Microscopy    High Sensitivity Troponin T 2Hr [[id number]] Collected: 07/14/24 0017    Specimen: Blood Updated: 07/14/24 0017                                                 Medical Decision Making  Problems Addressed:  Hypotension, unspecified hypotension type: complicated acute illness or injury    Amount and/or Complexity of Data Reviewed  Labs: ordered.  Radiology: ordered.  ECG/medicine tests: ordered.    Risk  Prescription drug management.  Decision regarding hospitalization.      Chart Review: July 5, 2024 patient visit with cardiologist  Imaging: CT Head Without Contrast    Result Date: 7/13/2024  Impression: 1.No acute intracranial process. 2.No acute osseous abnormality of the cervical spine. Electronically Signed: Sailaja Murray MD  7/13/2024 11:32 PM EDT  Workstation ID: [id number]    CT Cervical Spine Without Contrast    Result Date: 7/13/2024  Impression: 1.No acute intracranial process. 2.No acute osseous abnormality of the cervical spine. Electronically Signed: Sailaja Murray MD  7/13/2024 11:32 PM EDT  Workstation ID: [id number]    XR Shoulder 2+ View Left    Result Date: 7/13/2024  Impression: No acute osseous abnormality. Severe osteoarthritic changes are present. Electronically Signed: Sailaja Mruray MD  7/13/2024 9:46 PM EDT  Workstation ID: [id number]    XR Chest 1 View    Result Date: 7/13/2024  Impression: No acute cardiopulmonary process. Electronically Signed: Sailaja Murray MD  7/13/2024 9:45 PM EDT  Workstation ID: [id number]     EKG interpreted independently per ED attending physican-EKG shows normal sinus rhythm  with a rate of 79 just comparable to previous EKG on 5/16/2024 showed normal sinus rhythm with a rate of 94  Pt was Placed on appropriate monitoring.  Differential diagnoses considered for patient presentation, this list is not all inclusive of diagnoses considered: Syncope, CAD, TIA  Patient presents to the ED for the above complaint, underwent the above, exam and workup.     Discussion/Consultation with other providers: Spoke with JAVY Ortega, who agrees to accept on behalf of [facility]    IV was established and labs were obtained to show an initial troponin of 20 6 repeat in process at time of admission.  UA negative showing mild dehydration.  Glucose of 126.  GFR of 53 which is down from his average of 83.  Patient EKG shows normal sinus rhythm.  CT head cervical spine showed no abnormalities x-ray left shoulder and chest show no acute abnormalities.  Discussed with JAVY Ortega for [facility] who agrees to admit on their behalf.  She was given 1 L bolus morphine Zofran and Robaxin and reports that he is now comfortable with no pain.    Disposition: I discussed with the patient their test results, work-up here in the emergency department, and need for admission and further evaluation. Patient is agreeable to the plan of care. At time of disposition patient's VS are reviewed, and patient without acute distress.  Opportunity was provided for questions at the bedside, all questions and concerns were addressed.  Note Disclaimer: At [facility], we believe that sharing information builds trust and better relationships. You are receiving this note because you recently visited [facility]. It is possible you will see health information before a provider has talked with you about it. This kind of information can be easy to misunderstand. To help you fully understand what it means for your health, we urge you to discuss this note with your provider  Note dictated utilizing Dragon Dictation.  Appropriate PPE  worn during patient interactions.      Final diagnoses:   Hypotension, unspecified hypotension type   Fall, initial encounter   Weakness   Acute pain of left shoulder   Neck pain       ED Disposition  ED Disposition       ED Disposition   Decision to Admit    Condition   --    Comment   Level of Care: Telemetry [5]   Admitting Physician: RICARDO MOSCOSO [[id number]]   Attending Physician: RICARDO MOSCOSO [[id number]]                 No follow-up provider specified.       Medication List      No changes were made to your prescriptions during this visit.            Lanie Murillo, APRN  07/14/24 0032

## 2024-07-15 ENCOUNTER — APPOINTMENT (OUTPATIENT)
Dept: CARDIOLOGY | Facility: HOSPITAL | Age: 84
DRG: 312 | End: 2024-07-15
Payer: MEDICARE

## 2024-07-15 LAB
ANION GAP SERPL CALCULATED.3IONS-SCNC: 6.5 MMOL/L (ref 5–15)
BASOPHILS # BLD AUTO: 0.02 10*3/MM3 (ref 0–0.2)
BASOPHILS NFR BLD AUTO: 0.7 % (ref 0–1.5)
BH CV ECHO MEAS - RAP SYSTOLE: 3 MMHG
BH CV ECHO MEAS - RVSP: 23.3 MMHG
BH CV ECHO MEAS - TR MAX PG: 20.3 MMHG
BH CV ECHO MEAS - TR MAX VEL: 225 CM/SEC
BUN SERPL-MCNC: 17 MG/DL (ref 8–23)
BUN/CREAT SERPL: 13.9 (ref 7–25)
CALCIUM SPEC-SCNC: 8.7 MG/DL (ref 8.6–10.5)
CHLORIDE SERPL-SCNC: 106 MMOL/L (ref 98–107)
CO2 SERPL-SCNC: 27.5 MMOL/L (ref 22–29)
CREAT SERPL-MCNC: 1.22 MG/DL (ref 0.76–1.27)
DEPRECATED RDW RBC AUTO: 46.5 FL (ref 37–54)
EGFRCR SERPLBLD CKD-EPI 2021: 58.8 ML/MIN/1.73
EOSINOPHIL # BLD AUTO: 0.16 10*3/MM3 (ref 0–0.4)
EOSINOPHIL NFR BLD AUTO: 5.4 % (ref 0.3–6.2)
ERYTHROCYTE [DISTWIDTH] IN BLOOD BY AUTOMATED COUNT: 14.1 % (ref 12.3–15.4)
FLUAV RNA RESP QL NAA+PROBE: NOT DETECTED
FLUBV RNA RESP QL NAA+PROBE: NOT DETECTED
GLUCOSE SERPL-MCNC: 97 MG/DL (ref 65–99)
HCT VFR BLD AUTO: 36.7 % (ref 37.5–51)
HGB BLD-MCNC: 11.3 G/DL (ref 13–17.7)
IMM GRANULOCYTES # BLD AUTO: 0.02 10*3/MM3 (ref 0–0.05)
IMM GRANULOCYTES NFR BLD AUTO: 0.7 % (ref 0–0.5)
LYMPHOCYTES # BLD AUTO: 0.8 10*3/MM3 (ref 0.7–3.1)
LYMPHOCYTES NFR BLD AUTO: 26.8 % (ref 19.6–45.3)
MCH RBC QN AUTO: 27.7 PG (ref 26.6–33)
MCHC RBC AUTO-ENTMCNC: 30.8 G/DL (ref 31.5–35.7)
MCV RBC AUTO: 90 FL (ref 79–97)
MONOCYTES # BLD AUTO: 0.49 10*3/MM3 (ref 0.1–0.9)
MONOCYTES NFR BLD AUTO: 16.4 % (ref 5–12)
NEUTROPHILS NFR BLD AUTO: 1.5 10*3/MM3 (ref 1.7–7)
NEUTROPHILS NFR BLD AUTO: 50 % (ref 42.7–76)
NRBC BLD AUTO-RTO: 0 /100 WBC (ref 0–0.2)
PLATELET # BLD AUTO: 146 10*3/MM3 (ref 140–450)
PMV BLD AUTO: 11.3 FL (ref 6–12)
POTASSIUM SERPL-SCNC: 4.8 MMOL/L (ref 3.5–5.2)
RBC # BLD AUTO: 4.08 10*6/MM3 (ref 4.14–5.8)
RSV RNA RESP QL NAA+PROBE: NOT DETECTED
SARS-COV-2 RNA RESP QL NAA+PROBE: NOT DETECTED
SODIUM SERPL-SCNC: 140 MMOL/L (ref 136–145)
WBC NRBC COR # BLD AUTO: 2.99 10*3/MM3 (ref 3.4–10.8)

## 2024-07-15 PROCEDURE — 97166 OT EVAL MOD COMPLEX 45 MIN: CPT

## 2024-07-15 PROCEDURE — 85025 COMPLETE CBC W/AUTO DIFF WBC: CPT

## 2024-07-15 PROCEDURE — 93325 DOPPLER ECHO COLOR FLOW MAPG: CPT | Performed by: INTERNAL MEDICINE

## 2024-07-15 PROCEDURE — 93321 DOPPLER ECHO F-UP/LMTD STD: CPT

## 2024-07-15 PROCEDURE — 87637 SARSCOV2&INF A&B&RSV AMP PRB: CPT | Performed by: INTERNAL MEDICINE

## 2024-07-15 PROCEDURE — 93308 TTE F-UP OR LMTD: CPT

## 2024-07-15 PROCEDURE — 99232 SBSQ HOSP IP/OBS MODERATE 35: CPT | Performed by: INTERNAL MEDICINE

## 2024-07-15 PROCEDURE — 93325 DOPPLER ECHO COLOR FLOW MAPG: CPT

## 2024-07-15 PROCEDURE — 97162 PT EVAL MOD COMPLEX 30 MIN: CPT

## 2024-07-15 PROCEDURE — 80048 BASIC METABOLIC PNL TOTAL CA: CPT

## 2024-07-15 PROCEDURE — 93321 DOPPLER ECHO F-UP/LMTD STD: CPT | Performed by: INTERNAL MEDICINE

## 2024-07-15 PROCEDURE — 25010000002 ENOXAPARIN PER 10 MG

## 2024-07-15 PROCEDURE — 93308 TTE F-UP OR LMTD: CPT | Performed by: INTERNAL MEDICINE

## 2024-07-15 PROCEDURE — 25810000003 SODIUM CHLORIDE 0.9 % SOLUTION: Performed by: INTERNAL MEDICINE

## 2024-07-15 RX ADMIN — HYDROCODONE BITARTRATE AND ACETAMINOPHEN 1 TABLET: 5; 325 TABLET ORAL at 12:56

## 2024-07-15 RX ADMIN — Medication 10 ML: at 09:06

## 2024-07-15 RX ADMIN — METOPROLOL TARTRATE 12.5 MG: 25 TABLET, FILM COATED ORAL at 20:08

## 2024-07-15 RX ADMIN — MIDODRINE HYDROCHLORIDE 10 MG: 5 TABLET ORAL at 09:06

## 2024-07-15 RX ADMIN — FLUDROCORTISONE ACETATE 0.1 MG: 0.1 TABLET ORAL at 20:08

## 2024-07-15 RX ADMIN — Medication 10 ML: at 20:17

## 2024-07-15 RX ADMIN — HYDROCODONE BITARTRATE AND ACETAMINOPHEN 1 TABLET: 5; 325 TABLET ORAL at 19:21

## 2024-07-15 RX ADMIN — ACETAMINOPHEN 650 MG: 325 TABLET, FILM COATED ORAL at 20:56

## 2024-07-15 RX ADMIN — PREGABALIN 50 MG: 25 CAPSULE ORAL at 09:55

## 2024-07-15 RX ADMIN — PREGABALIN 50 MG: 25 CAPSULE ORAL at 20:02

## 2024-07-15 RX ADMIN — MIDODRINE HYDROCHLORIDE 10 MG: 5 TABLET ORAL at 16:31

## 2024-07-15 RX ADMIN — MIDODRINE HYDROCHLORIDE 10 MG: 5 TABLET ORAL at 12:56

## 2024-07-15 RX ADMIN — LEVOTHYROXINE SODIUM 100 MCG: 0.1 TABLET ORAL at 06:08

## 2024-07-15 RX ADMIN — SODIUM CHLORIDE 125 ML/HR: 9 INJECTION, SOLUTION INTRAVENOUS at 16:33

## 2024-07-15 RX ADMIN — PANTOPRAZOLE SODIUM 40 MG: 40 TABLET, DELAYED RELEASE ORAL at 09:06

## 2024-07-15 RX ADMIN — METOPROLOL TARTRATE 12.5 MG: 25 TABLET, FILM COATED ORAL at 09:06

## 2024-07-15 RX ADMIN — ENOXAPARIN SODIUM 40 MG: 100 INJECTION SUBCUTANEOUS at 16:31

## 2024-07-15 RX ADMIN — ASPIRIN 81 MG: 81 TABLET, COATED ORAL at 20:08

## 2024-07-15 RX ADMIN — ESCITALOPRAM OXALATE 30 MG: 10 TABLET ORAL at 09:06

## 2024-07-15 RX ADMIN — PANTOPRAZOLE SODIUM 40 MG: 40 TABLET, DELAYED RELEASE ORAL at 20:08

## 2024-07-15 RX ADMIN — FLUDROCORTISONE ACETATE 0.1 MG: 0.1 TABLET ORAL at 09:06

## 2024-07-15 NOTE — PROGRESS NOTES
LOS: 0 days   Patient Care Team:  Moreno Tapia APRN as PCP - General (Family Medicine)  Chalo Olvera MD as Consulting Physician (Hematology and Oncology)  Ganesh López MD as Consulting Physician (Neurology)  Jake Enriquez MD as Referring Physician (Family Medicine)    Subjective     Patient states that he is feeling improved and that he has for quite a long time had dizziness with position changes    Review of Systems   Constitutional:  Positive for activity change.   HENT: Negative.     Respiratory: Negative.     Cardiovascular: Negative.    Gastrointestinal: Negative.    Genitourinary: Negative.    Musculoskeletal: Negative.    Neurological:  Positive for dizziness and weakness.   Psychiatric/Behavioral: Negative.             Objective     Vital Signs  Temp:  [98 °F (36.7 °C)-98.9 °F (37.2 °C)] 98.4 °F (36.9 °C)  Heart Rate:  [67-77] 74  Resp:  [11-18] 16  BP: ()/(57-75) 136/72      Physical Exam  Vitals reviewed.   Constitutional:       Appearance: He is not ill-appearing.   HENT:      Head: Normocephalic and atraumatic.      Right Ear: External ear normal.      Left Ear: External ear normal.      Nose: Nose normal.      Mouth/Throat:      Mouth: Mucous membranes are moist.   Eyes:      General:         Right eye: No discharge.         Left eye: No discharge.   Cardiovascular:      Rate and Rhythm: Normal rate and regular rhythm.      Pulses: Normal pulses.      Heart sounds: Normal heart sounds.   Pulmonary:      Effort: Pulmonary effort is normal.      Breath sounds: Normal breath sounds.   Abdominal:      General: Bowel sounds are normal.      Palpations: Abdomen is soft.   Musculoskeletal:         General: Normal range of motion.      Cervical back: Normal range of motion.   Skin:     General: Skin is warm and dry.   Neurological:      Mental Status: He is alert and oriented to person, place, and time.   Psychiatric:         Behavior: Behavior normal.              Results  Review:    Lab Results (last 24 hours)       Procedure Component Value Units Date/Time    COVID-19, FLU A/B, RSV PCR 1 HR TAT - Swab, Nasopharynx [[id number]]  (Normal) Collected: 07/15/24 0806    Specimen: Swab from Nasopharynx Updated: 07/15/24 0858     COVID19 Not Detected     Influenza A PCR Not Detected     Influenza B PCR Not Detected     RSV, PCR Not Detected    Narrative:      Fact sheet for providers: https://www.fda.gov/media/037131/download    Fact sheet for patients: https://www.fda.gov/media/469396/download    Test performed by PCR.    Basic Metabolic Panel [[id number]]  (Abnormal) Collected: 07/15/24 0402    Specimen: Blood from Arm, Left Updated: 07/15/24 0448     Glucose 97 mg/dL      BUN 17 mg/dL      Creatinine 1.22 mg/dL      Sodium 140 mmol/L      Potassium 4.8 mmol/L      Chloride 106 mmol/L      CO2 27.5 mmol/L      Calcium 8.7 mg/dL      BUN/Creatinine Ratio 13.9     Anion Gap 6.5 mmol/L      eGFR 58.8 mL/min/1.73     Narrative:      GFR Normal >60  Chronic Kidney Disease <60  Kidney Failure <15    The GFR formula is only valid for adults with stable renal function between ages 18 and 70.    CBC & Differential [[id number]]  (Abnormal) Collected: 07/15/24 0402    Specimen: Blood from Arm, Left Updated: 07/15/24 0434    Narrative:      The following orders were created for panel order CBC & Differential.  Procedure                               Abnormality         Status                     ---------                               -----------         ------                     CBC Auto Differential[[id number]]        Abnormal            Final result                 Please view results for these tests on the individual orders.    CBC Auto Differential [[id number]]  (Abnormal) Collected: 07/15/24 0402    Specimen: Blood from Arm, Left Updated: 07/15/24 0434     WBC 2.99 10*3/mm3      RBC 4.08 10*6/mm3      Hemoglobin 11.3 g/dL      Hematocrit 36.7 %      MCV 90.0 fL      MCH 27.7 pg      MCHC 30.8 g/dL       RDW 14.1 %      RDW-SD 46.5 fl      MPV 11.3 fL      Platelets 146 10*3/mm3      Neutrophil % 50.0 %      Lymphocyte % 26.8 %      Monocyte % 16.4 %      Eosinophil % 5.4 %      Basophil % 0.7 %      Immature Grans % 0.7 %      Neutrophils, Absolute 1.50 10*3/mm3      Lymphocytes, Absolute 0.80 10*3/mm3      Monocytes, Absolute 0.49 10*3/mm3      Eosinophils, Absolute 0.16 10*3/mm3      Basophils, Absolute 0.02 10*3/mm3      Immature Grans, Absolute 0.02 10*3/mm3      nRBC 0.0 /100 WBC              Imaging Results (Last 24 Hours)       ** No results found for the last 24 hours. **                 I reviewed the patient's new clinical results.    Medication Review:   Scheduled Meds:aspirin, 81 mg, Oral, Nightly  enoxaparin, 40 mg, Subcutaneous, Daily  escitalopram, 30 mg, Oral, Daily  fludrocortisone, 0.1 mg, Oral, Q12H  levothyroxine, 100 mcg, Oral, Q AM  metoprolol tartrate, 12.5 mg, Oral, BID  midodrine, 10 mg, Oral, TID AC  pantoprazole, 40 mg, Oral, BID  pregabalin, 50 mg, Oral, BID  sodium chloride, 10 mL, Intravenous, Q12H      Continuous Infusions:Pharmacy to Dose enoxaparin (LOVENOX),   sodium chloride, 125 mL/hr, Last Rate: 125 mL/hr (07/14/24 2042)      PRN Meds:.  acetaminophen    senna-docusate sodium **AND** polyethylene glycol **AND** bisacodyl **AND** bisacodyl    Calcium Replacement - Follow Nurse / BPA Driven Protocol    HYDROcodone-acetaminophen    Magnesium Standard Dose Replacement - Follow Nurse / BPA Driven Protocol    methocarbamol    nitroglycerin    ondansetron    Pharmacy to Dose enoxaparin (LOVENOX)    Phosphorus Replacement - Follow Nurse / BPA Driven Protocol    Potassium Replacement - Follow Nurse / BPA Driven Protocol    [COMPLETED] Insert Peripheral IV **AND** sodium chloride    sodium chloride    sodium chloride     Interval History:    Assessment & Plan     Syncope  Hypotension-improving with hydration  Fall  Weakness  - cardiology following  - PT/OT  - continue home meds  including fludrocortisone and midodrine  -echo reviewed  -orthostatic  -midodrine/fludrocortisone     Neck/shoulder pain  - methocarbamol and pain meds prn     HTN  HLD  CAD  -ASA/metoprolol/    Hypothyroid-synthroid  IGG monoclonal gammopathy  GERD  H/o prostate cancer  AAA     Stress ulcer prophy - PPI  DVT prophy - lovenox    Plan for disposition:Home    Aneta Crook, APRN  07/15/24  12:41 EDT

## 2024-07-15 NOTE — CASE MANAGEMENT/SOCIAL WORK
Discharge Planning Assessment  [facility]     Patient Name: Panfilo Feliz  MRN: [id number]  Today's Date: 7/15/2024    Admit Date: 7/13/2024    Plan: Home with    Discharge Needs Assessment       Row Name 07/15/24 1725       Living Environment    People in Home spouse    Name(s) of People in Home Felicia    Current Living Arrangements home    Potentially Unsafe Housing Conditions none    In the past 12 months has the electric, gas, oil, or water company threatened to shut off services in your home? No    Primary Care Provided by self    Family Caregiver if Needed spouse    Family Caregiver Names wife-Kelin    Quality of Family Relationships helpful;involved;supportive    Able to Return to Prior Arrangements yes       Resource/Environmental Concerns    Resource/Environmental Concerns none    Transportation Concerns none       Transportation Needs    In the past 12 months, has lack of transportation kept you from medical appointments or from getting medications? no    In the past 12 months, has lack of transportation kept you from meetings, work, or from getting things needed for daily living? No       Food Insecurity    Within the past 12 months, you worried that your food would run out before you got the money to buy more. Never true    Within the past 12 months, the food you bought just didn't last and you didn't have money to get more. Never true       Transition Planning    Patient/Family Anticipates Transition to home with family    Transportation Anticipated family or friend will provide       Discharge Needs Assessment    Equipment Currently Used at Home cane, straight;walker, standard                   Discharge Plan       Row Name 07/15/24 2537       Plan    Plan Home with     Plan Comments Pt spoke with Pt at bedside. Pt plans to return home with . He said he was recently discharged from [facility]. Pt stated his primary care provider is JAVY Fajardo. Pt reported his pharmacy is [facility] on  [address]. Pt voiced that his wife, Kelin is a retired RN. He shared no concerns for care at home. Pt denied difficulty affording medication and/or food. [facility] referral sent.                  Continued Care and Services - Admitted Since 7/13/2024       Home Medical Care       Service Provider Request Status Selected Services Address Phone Fax Patient Preferred    [facility] Pending - Request Sent N/A [id number] [place] [address] [phone] --                   Demographic Summary       Row Name 07/15/24 1723       General Information    Admission Type inpatient    Arrived From emergency department    Referral Source admission list    Reason for Consult discharge planning    Preferred Language English                   Functional Status       Row Name 07/15/24 1723       Functional Status    Usual Activity Tolerance excellent    Current Activity Tolerance moderate       Physical Activity    On average, how many days per week do you engage in moderate to strenuous exercise (like a brisk walk)? 5 days    On average, how many minutes do you engage in exercise at this level? 60 min    Number of minutes of exercise per week 300       Assessment of Health Literacy    Health Literacy Good       Functional Status, IADL    Medications independent    Meal Preparation independent    Housekeeping independent    Laundry independent    Shopping independent                   Psychosocial       Row Name 07/15/24 1724       Mental Health    Little Interest or Pleasure in Doing Things 0-->not at all    Feeling Down, Depressed or Hopeless 0-->not at all       Stress    Do you feel stress - tense, restless, nervous, or anxious, or unable to sleep at night because your mind is troubled all the time - these days? Not at all       Coping/Stress    Patient Personal Strengths positive attitude;strong support system;future/goal oriented;motivated    Sources of Support spouse       Developmental Stage  (Leeanne's)    Developmental Stage Stage 8 (66 years-death/Late Adulthood) Integrity vs. Despair       C-SSRS (Recent)    Q1 Wished to be Dead (Past Month) no    Q2 Suicidal Thoughts (Past Month) no    Q6 Suicide Behavior (Lifetime) no       Violence Risk    Feels Like Hurting Others no    Previous Attempt to Harm Others no                   Abuse/Neglect       Row Name 07/15/24 1725       Personal Safety    Feels Unsafe at Home or Work/School no    Feels Threatened by Someone no    Does Anyone Try to Keep You From Having Contact with Others or Doing Things Outside Your Home? no    Physical Signs of Abuse Present no                   Legal       Row Name 07/15/24 1725       Financial Resource Strain    How hard is it for you to pay for the very basics like food, housing, medical care, and heating? Not hard       Legal    Criminal Activity/Legal Involvement none                   Substance Abuse       Row Name 07/15/24 1725       AUDIT-C (Alcohol Use Disorders ID Test)    Q1: How often do you have a drink containing alcohol? Never    Q2: How many drinks containing alcohol do you have on a typical day when you are drinking? None    Q3: How often do you have six or more drinks on one occasion? Never    Audit-C Score 0             GUSTAVO Montgomery, FREDOW, APHSW-C  Medical Social Worker  [facility] Office:[phone]  es-Fri Office: [phone]   [email or web address]

## 2024-07-15 NOTE — THERAPY EVALUATION
Patient Name: Panfilo Feliz  : 1940    MRN: [id number]                              Today's Date: 7/15/2024       Admit Date: 2024    Visit Dx:     ICD-10-CM ICD-9-CM   1. Hypotension, unspecified hypotension type  I95.9 458.9   2. Fall, initial encounter  W19.XXXA E888.9   3. Weakness  R53.1 780.79   4. Acute pain of left shoulder  M25.512 719.41   5. Neck pain  M54.2 723.1     Patient Active Problem List   Diagnosis    Leukopenia    Thrombocytopenia    Monoclonal gammopathy of unknown significance (MGUS)    Neuropathy associated with MGUS    Nausea in adult patient    Slow transit constipation    Nausea    DDD (degenerative disc disease), lumbar    Chronic neck pain    DJD (degenerative joint disease)    Extremity pain    HTN (hypertension)    Hyperlipidemia    Hypothyroid    LBP (low back pain)    Lumbar canal stenosis    Prostate cancer    Ascending aortic aneurysm    Nonrheumatic aortic valve insufficiency    Other dysphagia    Gastroesophageal reflux disease    Aneurysm of ascending aorta without rupture    Dizziness    Orthostatic hypotension    Syncope     Past Medical History:   Diagnosis Date    AAA (abdominal aortic aneurysm)     Abnormal ECG ’s    Alcoholism     None since     Aneurysm 2019    aortic arch    Arthritis     Basal cell carcinoma (BCC) of face     Cholelithiasis ’s    Cholecystectomy    Colon polyp     Colon polyps     Coronary artery disease     Difficulty walking     Disease of thyroid gland     DJD (degenerative joint disease)     Gastritis     Gastroparesis     GERD (gastroesophageal reflux disease)     GI (gastrointestinal bleed)     Hernia     Hiatal    History of bone marrow biopsy     [place] (hard of hearing)     Hyperlipidemia     Hypertension     Hypothyroidism     IgG monoclonal gammopathy     Multiple duodenal ulcers     Neuropathy     PONV (postoperative nausea and vomiting)     Prostate cancer     Reflux esophagitis     Ulcer     WBC decreased      Past  Surgical History:   Procedure Laterality Date    ASCENDING AORTIC ANEURYSM REPAIR W/ MECHANICAL AORTIC VALVE REPLACEMENT N/A 5/10/2024    Procedure: ASCENDING AORTIC ARCH/ possible HEMIARCH REPLACEMENT WITH CIRCULATORY ARREST, neuro monitoring, and intraop JOSE A;  Surgeon: Spencer Puente MD;  Location: [facility] CVOR;  Service: Cardiothoracic;  Laterality: N/A;  Ascending aortic aneurysm repair with 30 mm gelweave graft.    CARDIAC CATHETERIZATION N/A 4/19/2024    Procedure: Right and Left Heart Cath with possible PCI;  Surgeon: Dilan Houston DO;  Location: [facility] CATH INVASIVE LOCATION;  Service: Cardiovascular;  Laterality: N/A;  Local and IV sedation    CHOLECYSTECTOMY  1988    COLON SURGERY  1998    COLONOSCOPY  02/2013    ENDOSCOPY  2012    ESOPHAGOGASTRODUODENOSCOPY WITH DILATION OF SHATZKI'S RING    ENDOSCOPY N/A 10/09/2020    Procedure: ESOPHAGOGASTRODUODENOSCOPY with cold bx;  Surgeon: Jake Perez MD;  Location:  [facility];  Service: Gastroenterology;  Laterality: N/A;  pre - nausea  post - duodenal ulcer, gastrits, gastric ulcer, hiatal hernia    ENDOSCOPY N/A 10/04/2022    Procedure: ESOPHAGOGASTRODUODENOSCOPY with biopsies with esophageal dilatation with 56 cui;  Surgeon: Jake Perez MD;  Location:  [facility];  Service: Gastroenterology;  Laterality: N/A;  pre-dysphagia  post-normal     ENDOSCOPY  10/04/2022    Chris [facility]    EYE SURGERY Bilateral     cataracts    LAMINECTOMY  2013    decompression L3-4, L4-5    PROSTATECTOMY  2007    SKIN BIOPSY      TONSILLECTOMY AND ADENOIDECTOMY      1940s    UPPER GASTROINTESTINAL ENDOSCOPY      VASECTOMY      1970s      General Information       Row Name 07/15/24 1612          Physical Therapy Time and Intention    Document Type evaluation  -OD     Mode of Treatment physical therapy  -OD       Row Name 07/15/24 1612          General Information    Patient Profile Reviewed yes  -OD     Prior Level of Function  independent:;ADL's;all household mobility  -OD     Existing Precautions/Restrictions fall;orthostatic hypotension  -OD     Barriers to Rehab medically complex  -OD       Row Name 07/15/24 1612          Living Environment    People in Home spouse  -OD       Row Name 07/15/24 1612          Home Main Entrance    Number of Stairs, Main Entrance two  -OD       Row Name 07/15/24 1612          Stairs Within Home, Primary    Number of Stairs, Within Home, Primary twelve  -OD       Row Name 07/15/24 1612          Cognition    Orientation Status (Cognition) oriented x 4  -OD       Row Name 07/15/24 1612          Safety Issues, Functional Mobility    Safety Issues Affecting Function (Mobility) judgment;positioning of assistive device;problem-solving;sequencing abilities  -OD     Impairments Affecting Function (Mobility) endurance/activity tolerance;balance  -OD               User Key  (r) = Recorded By, (t) = Taken By, (c) = Cosigned By      Initials Name Provider Type    OD Talisha Reese PT Physical Therapist                   Mobility       Row Name 07/15/24 1613          Bed Mobility    Bed Mobility bed mobility (all) activities  -OD     All Activities, [place] (Bed Mobility) contact guard  -OD       Row Name 07/15/24 1613          Bed-Chair Transfer    Bed-Chair [place] (Transfers) contact guard;minimum assist (75% patient effort)  -OD     Assistive Device (Bed-Chair Transfers) walker, front-wheeled  -OD     Comment, (Bed-Chair Transfer) Diffiuclty sequencing with RW  -OD       Row Name 07/15/24 1613          Sit-Stand Transfer    Sit-Stand [place] (Transfers) contact guard  -OD       Row Name 07/15/24 1613          Gait/Stairs (Locomotion)    [place] Level (Gait) contact guard  -OD     Patient was able to Ambulate yes  -OD     Distance in Feet (Gait) 2  -OD     Deviations/Abnormal Patterns (Gait) other (see comments)  short steps during transfer r/t IV  -OD               User Key  (r) = Recorded By,  (t) = Taken By, (c) = Cosigned By      Initials Name Provider Type    Talisha Beal, COLIN Physical Therapist                   Obj/Interventions       Row Name 07/15/24 1614          Range of Motion Comprehensive    General Range of Motion no range of motion deficits identified  -OD       Row Name 07/15/24 1614          Strength Comprehensive (MMT)    General Manual Muscle Testing (MMT) Assessment no strength deficits identified  -OD       Row Name 07/15/24 1614          Motor Skills    Motor Skills functional endurance  -OD     Functional Endurance fair  -OD       Row Name 07/15/24 1614          Balance    Balance Interventions sitting;minimal challenge;standing;dynamic;moderate challenge  -OD       Row Name 07/15/24 1614          Sensory Assessment (Somatosensory)    Sensory Assessment (Somatosensory) sensation intact  -OD               User Key  (r) = Recorded By, (t) = Taken By, (c) = Cosigned By      Initials Name Provider Type    OD Talisha Reese, PT Physical Therapist                   Goals/Plan       Row Name 07/15/24 1617          Bed Mobility Goal 1 (PT)    Activity/Assistive Device (Bed Mobility Goal 1, PT) bed mobility activities, all  -OD     [place] Level/Cues Needed (Bed Mobility Goal 1, PT) independent  -OD     Time Frame (Bed Mobility Goal 1, PT) long term goal (LTG);2 weeks  -OD       Row Name 07/15/24 1617          Transfer Goal 1 (PT)    Activity/Assistive Device (Transfer Goal 1, PT) transfers, all  -OD     [place] Level/Cues Needed (Transfer Goal 1, PT) independent  -OD     Time Frame (Transfer Goal 1, PT) long term goal (LTG);2 weeks  -OD       Row Name 07/15/24 1617          Gait Training Goal 1 (PT)    Activity/Assistive Device (Gait Training Goal 1, PT) gait (walking locomotion)  -OD     [place] Level (Gait Training Goal 1, PT) independent  -OD     Distance (Gait Training Goal 1, PT) 150'  -OD     Time Frame (Gait Training Goal 1, PT) long term goal (LTG);2 weeks  -OD        Row Name 07/15/24 1617          Therapy Assessment/Plan (PT)    Planned Therapy Interventions (PT) balance training;bed mobility training;gait training;home exercise program;neuromuscular re-education;ROM (range of motion);stair training;strengthening;stretching;patient/family education;postural re-education;transfer training  -OD               User Key  (r) = Recorded By, (t) = Taken By, (c) = Cosigned By      Initials Name Provider Type    OD Talisha Reese, PT Physical Therapist                   Clinical Impression       Row Name 07/15/24 1614          Pain    Pretreatment Pain Rating 0/10 - no pain  -OD     Posttreatment Pain Rating 0/10 - no pain  -OD       Row Name 07/15/24 1614          Plan of Care Review    Plan of Care Reviewed With patient  -OD     Progress no change  -OD     Outcome Evaluation Panfilo Feliz is an 83 y/o M with PMH of AAA repair, IGG monoclonal gammopathy, HTN, HLD, CAD who presented to [facility] ED on 7/13/24 for several week history of dizziness and falls. Cardiology consulted, reports dysautonomia, orthostatic hypotension, and neuropathy complicating pt's symptoms. At baseline, pt lives [place] his [place] in [place], and has been requiring Marky for ADLs and mobility since his open heart surgery. Pt this date AAOx4, though demo mild confusion/sequencing deficits with mobility. Orthostatics (-) this pm. Pt was CGA for STS and CGA-Marky for tranfser to/from bedside chair. Pt appears near baseline function but would benefit from HHPT upon d/c to adderss balance and endurance. PT will follow.  -OD       Row Name 07/15/24 1614          Therapy Assessment/Plan (PT)    Rehab Potential (PT) good, to achieve stated therapy goals  -OD     Criteria for Skilled Interventions Met (PT) yes;meets criteria  -OD     Therapy Frequency (PT) 3 times/wk  -OD     Predicted Duration of Therapy Intervention (PT) until d/c  -OD       Row Name 07/15/24 1611          Vital Signs    Pre Systolic BP Rehab 122  -OD     Pre  Treatment Diastolic BP 82  -OD     Intra Systolic BP Rehab 118  -OD     Intra Treatment Diastolic BP 71  -OD     Post Systolic BP Rehab 134  -OD     Post Treatment Diastolic BP 71  -OD     O2 Delivery Pre Treatment room air  -OD     O2 Delivery Intra Treatment room air  -OD     O2 Delivery Post Treatment room air  -OD     Pre Patient Position Supine  -OD     Intra Patient Position Standing  -OD     Post Patient Position Sitting  -OD       Row Name 07/15/24 1614          Positioning and Restraints    Pre-Treatment Position in bed  -OD     Post Treatment Position bed  -OD     In Bed call light within reach;encouraged to call for assist;sitting;with nsg  -OD               User Key  (r) = Recorded By, (t) = Taken By, (c) = Cosigned By      Initials Name Provider Type    OD Talisha Reese, PT Physical Therapist                   Outcome Measures       Row Name 07/15/24 1617 07/15/24 1513       How much help from another person do you currently need...    Turning from your back to your side while in flat bed without using bedrails? 4  -OD 4  -RM    Moving from lying on back to sitting on the side of a flat bed without bedrails? 4  -OD 4  -RM    Moving to and from a bed to a chair (including a wheelchair)? 3  -OD 4  -RM    Standing up from a chair using your arms (e.g., wheelchair, bedside chair)? 3  -OD 4  -RM    Climbing 3-5 steps with a railing? 3  -OD 3  -RM    To walk in hospital room? 3  -OD 3  -RM    AM-PAC 6 Clicks Score (PT) 20  -OD 22  -RM    Highest Level of Mobility Goal 6 --> Walk 10 steps or more  -OD 7 --> Walk 25 feet or more  -RM      Row Name 07/15/24 0810          How much help from another person do you currently need...    Turning from your back to your side while in flat bed without using bedrails? 4  -KN     Moving from lying on back to sitting on the side of a flat bed without bedrails? 4  -KN     Moving to and from a bed to a chair (including a wheelchair)? 4  -KN     Standing up from a chair using  your arms (e.g., wheelchair, bedside chair)? 3  -KN     Climbing 3-5 steps with a railing? 3  -KN     To walk in hospital room? 3  -KN     AM-PAC 6 Clicks Score (PT) 21  -KN     Highest Level of Mobility Goal 6 --> Walk 10 steps or more  -KN       Row Name 07/15/24 1617          Functional Assessment    Outcome Measure Options AM-PAC 6 Clicks Basic Mobility (PT)  -OD               User Key  (r) = Recorded By, (t) = Taken By, (c) = Cosigned By      Initials Name Provider Type    KN Estella Gimenez, RN Registered Nurse    RM Beverly Hawthorne RN Registered Nurse    Talisha Beal, PT Physical Therapist                                 Physical Therapy Education       Title: PT OT SLP Therapies (Done)       Topic: Physical Therapy (Done)       Point: Mobility training (Done)       Learning Progress Summary             Patient Acceptance, E, VU by OD at 7/15/2024 1618                         Point: Home exercise program (Done)       Learning Progress Summary             Patient Acceptance, E, VU by OD at 7/15/2024 1618                         Point: Body mechanics (Done)       Learning Progress Summary             Patient Acceptance, E, VU by OD at 7/15/2024 1618                         Point: Precautions (Done)       Learning Progress Summary             Patient Acceptance, E, VU by OD at 7/15/2024 1618                                         User Key       Initials Effective Dates Name Provider Type Discipline    OD 05/11/23 -  Talisha Reese, COLIN Physical Therapist PT                  PT Recommendation and Plan  Planned Therapy Interventions (PT): balance training, bed mobility training, gait training, home exercise program, neuromuscular re-education, ROM (range of motion), stair training, strengthening, stretching, patient/family education, postural re-education, transfer training  Plan of Care Reviewed With: patient  Progress: no change  Outcome Evaluation: Panfilo Feliz is an 83 y/o M with PMH of AAA repair,  IGG monoclonal gammopathy, HTN, HLD, CAD who presented to [facility] ED on 7/13/24 for several week history of dizziness and falls. Cardiology consulted, reports dysautonomia, orthostatic hypotension, and neuropathy complicating pt's symptoms. At baseline, pt lives with his spouse [place], and has been requiring Marky for ADLs and mobility since his open heart surgery. Pt this date AAOx4, though demo mild confusion/sequencing deficits with mobility. Orthostatics (-) this pm. Pt was CGA for STS and CGA-Marky for tranfser to/from bedside chair. Pt appears near baseline function but would benefit from HHPT upon d/c to adderss balance and endurance. PT will follow.     Time Calculation:         PT Charges       Row Name 07/15/24 1618             Time Calculation    Start Time 1412  -OD      Stop Time 1445  -OD      Time Calculation (min) 33 min  -OD      PT Received On 07/15/24  -OD      PT - Next Appointment 07/17/24  -OD      PT Goal Re-Cert Due Date 07/29/24  -OD         Time Calculation- PT    Total Timed Code Minutes- PT 0 minute(s)  -OD                User Key  (r) = Recorded By, (t) = Taken By, (c) = Cosigned By      Initials Name Provider Type    OD Talisha Reese, COLIN Physical Therapist                  Therapy Charges for Today       Code Description Service Date Service Provider Modifiers Qty    [id number] HC PT EVAL MOD COMPLEXITY 4 7/15/2024 Talisha Reese, PT GP 1            PT G-Codes  Outcome Measure Options: AM-PAC 6 Clicks Basic Mobility (PT)  AM-PAC 6 Clicks Score (PT): 20  PT Discharge Summary  Anticipated Discharge Disposition (PT): home with home health    Talisha Reese PT  7/15/2024

## 2024-07-15 NOTE — PROGRESS NOTES
[facility] CARDIOLOGY  [facility]        LOS:  LOS: 0 days   Patient Name: Panfilo Feliz  Age/Sex: 84 y.o. male  : 1940  MRN: [id number]    Day of Service: 07/15/24   Length of Stay: 0  Encounter Provider: JAVY Mg  Place of Service: [facility] CARDIOLOGY  Patient Care Team:  Moreno Tapia APRN as PCP - General (Family Medicine)  Chalo Olvera MD as Consulting Physician (Hematology and Oncology)  Ganesh López MD as Consulting Physician (Neurology)  Jake Enriquez MD as Referring Physician (Family Medicine)      Cardiology assessment and plan    Syncope  Orthostatic hypotension  Patient is currently on midodrine for orthostatic hypotension which was recently increased and also fludrocortisone was added  Echocardiogram with normal LV systolic function and moderate pericardial effusion  Ascending aortic aneurysm s/p repair with left atrial appendage closure  Postop atrial fibrillation with no recurrence of symptoms  No significant obstructive coronary artery disease  Normal LV systolic function  Hypertension  Hyperlipidemia  Hypothyroidism  Prostate cancer status post surgery  Acute on chronic kidney injury    Denies any new complaints  Still continues to be orthostatic  Tmax is 98.9 pulse is 74 respirations are 14 blood pressure is 120/68 sats are 95%  Positive orthostatic vitals at the time of presentation  Sodium is 140 potassium is 4.8 creatinine is 1.2  Hemoglobin is 11  Current medications include aspirin 81 mg p.o. once a day metoprolol 12.5 mg p.o. twice daily midodrine 10 mg p.o. 3 times a day patient is on fludrocortisone 0.1 mg every 12 hours he is on Lovenox for DVT prophylaxis  Plans to repeat orthostatic vitals  If patient fails current medications consider adding Northera  Repeat echocardiogram this admission with a moderate pericardial effusion but no evidence of any tamponade  Diagnosis and  treatment options reviewed and discussed with patient and family  Consider extended Holter monitor study at the time of discharge  Further recommendation based on patient course            Subjective:     Chief Complaint:  F/U Syncope    Subjective:   Patient has not been out of bed yet this morning.  Patient has not been eating or drinking well at home.    Current Medications:   Scheduled Meds:aspirin, 81 mg, Oral, Nightly  enoxaparin, 40 mg, Subcutaneous, Daily  escitalopram, 30 mg, Oral, Daily  fludrocortisone, 0.1 mg, Oral, Q12H  levothyroxine, 100 mcg, Oral, Q AM  metoprolol tartrate, 12.5 mg, Oral, BID  midodrine, 10 mg, Oral, TID AC  pantoprazole, 40 mg, Oral, BID  pregabalin, 50 mg, Oral, BID  sodium chloride, 10 mL, Intravenous, Q12H      Continuous Infusions:Pharmacy to Dose enoxaparin (LOVENOX),   sodium chloride, 125 mL/hr, Last Rate: 125 mL/hr (07/14/24 2042)        Allergies:  Allergies   Allergen Reactions    Statins Myalgia       Review of Systems   Constitutional: Negative for chills, decreased appetite and malaise/fatigue.   HENT:  Negative for congestion and nosebleeds.    Eyes:  Negative for blurred vision and double vision.   Cardiovascular:  Positive for near-syncope and syncope. Negative for chest pain, dyspnea on exertion, irregular heartbeat and leg swelling.   Respiratory:  Negative for cough and shortness of breath.    Hematologic/Lymphatic: Negative for adenopathy. Does not bruise/bleed easily.   Skin:  Negative for color change and rash.   Musculoskeletal:  Negative for back pain and joint pain.   Gastrointestinal:  Negative for bloating, abdominal pain, hematemesis and hematochezia.   Genitourinary:  Negative for flank pain and hematuria.   Neurological:  Negative for dizziness and focal weakness.   Psychiatric/Behavioral:  Negative for altered mental status. The patient does not have insomnia.        Objective:     Temp:  [98 °F (36.7 °C)-98.9 °F (37.2 °C)] 98.9 °F (37.2 °C)  Heart  "Rate:  [67-77] 74  Resp:  [11-18] 14  BP: ()/(57-75) 123/68     Intake/Output Summary (Last 24 hours) at 7/15/2024 0941  Last data filed at 7/15/2024 0900  Gross per 24 hour   Intake 480 ml   Output 650 ml   Net -170 ml     Body mass index is 21.55 kg/m².      07/13/24 2013 07/14/24  0138 07/15/24  0654   Weight: 79.4 kg (175 lb) 80.3 kg (177 lb 0.5 oz) 80.3 kg (177 lb)         Physical Exam:  Neuro:  CV:  Resp:  GI:  Ext:  Tele: AAOx3, no gross deficits  S1S2 RRR, no murmur  Nonlabored, CTA  BS+, abd soft  Pedal pulses palp, no edema  SR                                                   Lab Review:   Results from last 7 days   Lab Units 07/15/24  0402 07/13/24  2150   SODIUM mmol/L 140 137   POTASSIUM mmol/L 4.8 4.2   CHLORIDE mmol/L 106 104   CO2 mmol/L 27.5 23.2   BUN mg/dL 17 19   CREATININE mg/dL 1.22 1.33*   GLUCOSE mg/dL 97 126*   CALCIUM mg/dL 8.7 8.9   AST (SGOT) U/L  --  24   ALT (SGPT) U/L  --  6     Results from last 7 days   Lab Units 07/14/24  0017 07/13/24  2150   HSTROP T ng/L 24* 26*     Results from last 7 days   Lab Units 07/15/24  0402 07/13/24  2150   WBC 10*3/mm3 2.99* 6.26   HEMOGLOBIN g/dL 11.3* 12.1*   HEMATOCRIT % 36.7* 38.9   PLATELETS 10*3/mm3 146 177                   Invalid input(s): \"LDLCALC\"            Recent Radiology:  Imaging Results (Most Recent)       Procedure Component Value Units Date/Time    CT Head Without Contrast [[id number]] Collected: 07/13/24 2329     Updated: 07/13/24 2334    Narrative:      CT HEAD WO CONTRAST, CT CERVICAL SPINE WO CONTRAST    Date of Exam: 7/13/2024 11:25 PM EDT    Indication: fall.    Comparison: 9/12/2020.    Technique: Axial CT images were obtained of the head and cervical spine without contrast administration.  Coronal reconstructions were performed.  Automated exposure control and iterative reconstruction methods were used.      Findings:  There is no evidence of hemorrhage. There is no mass effect or midline shift.    There is no " extracerebral collection.    Ventricles are normal in size and configuration for patient's stated age.      Posterior fossa is within normal limits.    Calvarium and skull base appear intact.   Visualized sinuses show no air fluid levels. Visualized orbits are unremarkable.    No evidence of fracture or compression deformity. No evidence of spondylolysis.  No significant spondylolisthesis. No evidence of focal lesions. The prevertebral soft tissues appear unremarkable. Surrounding soft tissues appear within normal limits. Mild   to moderate multilevel degenerative changes are present throughout the spine. The lung apices appear clear.        Impression:      Impression:  1.No acute intracranial process.  2.No acute osseous abnormality of the cervical spine.        Electronically Signed: Sailaja Murray MD    7/13/2024 11:32 PM EDT    Workstation ID: [id number]    CT Cervical Spine Without Contrast [[id number]] Collected: 07/13/24 2329     Updated: 07/13/24 2334    Narrative:      CT HEAD WO CONTRAST, CT CERVICAL SPINE WO CONTRAST    Date of Exam: 7/13/2024 11:25 PM EDT    Indication: fall.    Comparison: 9/12/2020.    Technique: Axial CT images were obtained of the head and cervical spine without contrast administration.  Coronal reconstructions were performed.  Automated exposure control and iterative reconstruction methods were used.      Findings:  There is no evidence of hemorrhage. There is no mass effect or midline shift.    There is no extracerebral collection.    Ventricles are normal in size and configuration for patient's stated age.      Posterior fossa is within normal limits.    Calvarium and skull base appear intact.   Visualized sinuses show no air fluid levels. Visualized orbits are unremarkable.    No evidence of fracture or compression deformity. No evidence of spondylolysis.  No significant spondylolisthesis. No evidence of focal lesions. The prevertebral soft tissues appear unremarkable. Surrounding  soft tissues appear within normal limits. Mild   to moderate multilevel degenerative changes are present throughout the spine. The lung apices appear clear.        Impression:      Impression:  1.No acute intracranial process.  2.No acute osseous abnormality of the cervical spine.        Electronically Signed: Sailaja Murray MD    7/13/2024 11:32 PM EDT    Workstation ID: [id number]    XR Shoulder 2+ View Left [[id number]] Collected: 07/13/24 2146     Updated: 07/13/24 2148    Narrative:      XR SHOULDER 2+ VW LEFT    Date of Exam: 7/13/2024 9:27 PM EDT    Indication: fall    Comparison: None available.    Findings:  There is no acute fracture or dislocation. Acromioclavicular and glenohumeral joints appear intact. No erosions. Acromiohumeral and coracoclavicular distances are well-maintained. Severe acromioclavicular and glenohumeral osteoarthritic changes are   present. The adjacent lung and ribs appear intact.      Impression:      Impression:  No acute osseous abnormality. Severe osteoarthritic changes are present.        Electronically Signed: Sailaja Murray MD    7/13/2024 9:46 PM EDT    Workstation ID: [id number]    XR Chest 1 View [[id number]] Collected: 07/13/24 2144     Updated: 07/13/24 2147    Narrative:      XR CHEST 1 VW    Date of Exam: 7/13/2024 9:28 PM EDT    Indication: fall/pain    Comparison: 5/14/2024.    Findings:  There are no airspace consolidations. No pleural fluid. No pneumothorax. The pulmonary vasculature appears within normal limits. The cardiac and mediastinal silhouette appear unremarkable. No acute osseous abnormality identified. No displaced rib   fracture identified. Median sternotomy wires are present.      Impression:      Impression:  No acute cardiopulmonary process.        Electronically Signed: Sailaja Murray MD    7/13/2024 9:45 PM EDT    Workstation ID: [id number]            ECHOCARDIOGRAM:    Results for orders placed during the hospital encounter of 07/13/24    Adult Transthoracic Echo  Limited W/ Cont if Necessary Per Protocol    Interpretation Summary    Left ventricular systolic function is normal. Left ventricular ejection fraction appears to be 56 - 60%.    Left ventricular wall thickness is consistent with mild concentric hypertrophy.    The left atrial cavity is moderately dilated.    Estimated right ventricular systolic pressure from tricuspid regurgitation is normal (<35 mmHg).    There is a moderate (1-2cm) circumferential pericardial effusion. There is no evidence of cardiac tamponade.    There is a moderate sized left pleural effusion.        I reviewed the patient's new clinical results.    EKG:      Assessment:       Syncope    1) Syncope with fall  - positive orthostatics  - on midodrine for orthostatic hypotension, recently increased; fludrocortisone added  - on IVFs  - repeat 2D echo pending    2) ascending aortic aneurysm status post recent repair with MARITA closure on 5/10/24   - post op 2D echo 7/2024 showed EF 61-65%, mild AI, and moderate pericardial effusion without tamponade    3) brief postop A-fib 5/2024  - on aspirin; not on anticoagulation  - on beta blocker  - Preop JOSE 4/2024 showed an EF of 56 to 60% with moderate AI. Preop cath 4/2024 showed no obstructive CAD.   - Preop cath 4/2024 showed no obstructive CAD.     4) hx HTN    5) HLD    6) hypothyroidism    7) prostate cancer status post surgery and radiation    8) EUNICE  - Cr improved    Plan:   BP better today.  Repeat orthostatics today and ambulate.  Repeat limited 2D echo pending for re-eval pericardial effusion.          Electronically signed by JAVY Mg, 07/15/24, 9:50 AM EDT.

## 2024-07-15 NOTE — PLAN OF CARE
Goal Outcome Evaluation:  Plan of Care Reviewed With: patient           Outcome Evaluation: 85 y/o male presenting to [facility] secondary to dizziness and falls at home. PMH includes AAA repair and valve procedure 5/2024. Pt reports that prior to his heart surgery he was going to the [facility] multiple times a month. At time of evaluation, pt A&OX4. Orthostatic BP assessed throughout. Pt /76 in supine, 125/75 sitting at EOB, and dropped to 116/64 in standing. Pt BP continued to drop following standing for 2 minutes with a new BP of 99/96. Pt educated to sit EOB for BLE exercises then able to perform BLE exercises in standing. Following these pt BP continued to remain low at 96/54 following 6 minute seated rest break. Pt BP unstable and RN made aware. OT anticipates that once the pt BP is under control he will be good to return home with HHOT and RW use. OT will follow.      Anticipated Discharge Disposition (OT): home with home health, home with assist

## 2024-07-15 NOTE — PLAN OF CARE
Goal Outcome Evaluation:  Plan of Care Reviewed With: patient        Progress: no change  Outcome Evaluation: Panfilo Feliz is an 85 y/o M with PMH of AAA repair, IGG monoclonal gammopathy, HTN, HLD, CAD who presented to [facility] ED on 7/13/24 for several week history of dizziness and falls. Cardiology consulted, reports dysautonomia, orthostatic hypotension, and neuropathy complicating pt's symptoms. At baseline, pt lives with his spouse in [place], and has been requiring [facility] for ADLs and mobility since his open heart surgery. Pt this date AAOx4, though demo mild confusion/sequencing deficits with mobility. Orthostatics (-) this pm. Pt was CGA for STS and CGA-Marky for tranfser to/from bedside chair. Pt appears near baseline function but would benefit from HHPT upon d/c to adderss balance and endurance. PT will follow.      Anticipated Discharge Disposition (PT): home with home health

## 2024-07-15 NOTE — PLAN OF CARE
Goal Outcome Evaluation:              Outcome Evaluation: Pt admitted to 1E. Pt VSS. Pt states, he's not staying over here, doesn't not what he did wrong to get in this room, and wan't moved ASAP. House informed, to work on getting him a new room. Pt experience informed, to work on getting him moved.

## 2024-07-15 NOTE — PLAN OF CARE
Goal Outcome Evaluation:  Plan of Care Reviewed With: patient        Progress: improving  Outcome Evaluation: VSS, positive orthostatics noted on previous shift, pt stood at bedside x1 this shift to urinate and asymptomatic. IVF started per cardiology order. Continuing to monitor b/p at this time. Plan for limited echo in AM. Care plan evolving at this time.

## 2024-07-15 NOTE — THERAPY EVALUATION
Patient Name: Panfilo Feliz  : 1940    MRN: [id number]                              Today's Date: 7/15/2024       Admit Date: 2024    Visit Dx:     ICD-10-CM ICD-9-CM   1. Hypotension, unspecified hypotension type  I95.9 458.9   2. Fall, initial encounter  W19.XXXA E888.9   3. Weakness  R53.1 780.79   4. Acute pain of left shoulder  M25.512 719.41   5. Neck pain  M54.2 723.1     Patient Active Problem List   Diagnosis    Leukopenia    Thrombocytopenia    Monoclonal gammopathy of unknown significance (MGUS)    Neuropathy associated with MGUS    Nausea in adult patient    Slow transit constipation    Nausea    DDD (degenerative disc disease), lumbar    Chronic neck pain    DJD (degenerative joint disease)    Extremity pain    HTN (hypertension)    Hyperlipidemia    Hypothyroid    LBP (low back pain)    Lumbar canal stenosis    Prostate cancer    Ascending aortic aneurysm    Nonrheumatic aortic valve insufficiency    Other dysphagia    Gastroesophageal reflux disease    Aneurysm of ascending aorta without rupture    Dizziness    Orthostatic hypotension    Syncope     Past Medical History:   Diagnosis Date    AAA (abdominal aortic aneurysm)     Abnormal ECG ’s    Alcoholism     None since     Aneurysm 2019    aortic arch    Arthritis     Basal cell carcinoma (BCC) of face     Cholelithiasis ’s    Cholecystectomy    Colon polyp     Colon polyps     Coronary artery disease     Difficulty walking     Disease of thyroid gland     DJD (degenerative joint disease)     Gastritis     Gastroparesis     GERD (gastroesophageal reflux disease)     GI (gastrointestinal bleed)     Hernia     Hiatal    History of bone marrow biopsy     [place] (hard of hearing)     Hyperlipidemia     Hypertension     Hypothyroidism     IgG monoclonal gammopathy     Multiple duodenal ulcers     Neuropathy     PONV (postoperative nausea and vomiting)     Prostate cancer     Reflux esophagitis     Ulcer     WBC decreased      Past  Surgical History:   Procedure Laterality Date    ASCENDING AORTIC ANEURYSM REPAIR W/ MECHANICAL AORTIC VALVE REPLACEMENT N/A 5/10/2024    Procedure: ASCENDING AORTIC ARCH/ possible HEMIARCH REPLACEMENT WITH CIRCULATORY ARREST, neuro monitoring, and intraop JOSE A;  Surgeon: Spencer Puente MD;  Location: [facility];  Service: Cardiothoracic;  Laterality: N/A;  Ascending aortic aneurysm repair with 30 mm gelweave graft.    CARDIAC CATHETERIZATION N/A 4/19/2024    Procedure: Right and Left Heart Cath with possible PCI;  Surgeon: Dilan Houston DO;  Location: [facility] CATH INVASIVE LOCATION;  Service: Cardiovascular;  Laterality: N/A;  Local and IV sedation    CHOLECYSTECTOMY  1988    COLON SURGERY  1998    COLONOSCOPY  02/2013    ENDOSCOPY  2012    ESOPHAGOGASTRODUODENOSCOPY WITH DILATION OF SHATZKI'S RING    ENDOSCOPY N/A 10/09/2020    Procedure: ESOPHAGOGASTRODUODENOSCOPY with cold bx;  Surgeon: Jake Perez MD;  Location:  [facility];  Service: Gastroenterology;  Laterality: N/A;  pre - nausea  post - duodenal ulcer, gastrits, gastric ulcer, hiatal hernia    ENDOSCOPY N/A 10/04/2022    Procedure: ESOPHAGOGASTRODUODENOSCOPY with biopsies with esophageal dilatation with 56 cui;  Surgeon: Jake Perez MD;  Location:  [facility];  Service: Gastroenterology;  Laterality: N/A;  pre-dysphagia  post-normal     ENDOSCOPY  10/04/2022    Chris BUTTERFIELD    EYE SURGERY Bilateral     cataracts    LAMINECTOMY  2013    decompression L3-4, L4-5    PROSTATECTOMY  2007    SKIN BIOPSY      TONSILLECTOMY AND ADENOIDECTOMY      1940s    UPPER GASTROINTESTINAL ENDOSCOPY      VASECTOMY      1970s      General Information       Row Name 07/15/24 1338          OT Time and Intention    Document Type evaluation  -BL     Mode of Treatment other (see comments)  -BL       Row Name 07/15/24 1718          General Information    Patient Profile Reviewed yes  -BL     Prior Level of Function independent:;ADL's;all  household mobility  -BL     Existing Precautions/Restrictions fall  -BL       Row Name 07/15/24 1338          Living Environment    People in Home spouse  -BL       Row Name 07/15/24 1338          Cognition    Orientation Status (Cognition) oriented x 4  -BL       Row Name 07/15/24 1338          Safety Issues, Functional Mobility    Impairments Affecting Function (Mobility) endurance/activity tolerance  -BL               User Key  (r) = Recorded By, (t) = Taken By, (c) = Cosigned By      Initials Name Provider Type    BL Anitra Holt OT Occupational Therapist                     Mobility/ADL's       Row Name 07/15/24 1339          Bed Mobility    Bed Mobility supine-sit;sit-supine  -BL     Supine-Sit [place] (Bed Mobility) contact guard  -BL     Sit-Supine [place] (Bed Mobility) contact guard  -BL       Row Name 07/15/24 1339          Transfers    Transfers sit-stand transfer  -BL       Row Name 07/15/24 1339          Sit-Stand Transfer    Sit-Stand [place] (Transfers) contact guard  -BL     Comment, (Sit-Stand Transfer) Steps towards HOB  -BL               User Key  (r) = Recorded By, (t) = Taken By, (c) = Cosigned By      Initials Name Provider Type    Anitra Ratliff OT Occupational Therapist                   Obj/Interventions       Row Name 07/15/24 1341          Vision Assessment/Intervention    Visual Impairment/Limitations WFL  -BL       Row Name 07/15/24 1341          Balance    Balance Assessment standing static balance;standing dynamic balance  -BL     Static Standing Balance contact guard  -BL     Dynamic Standing Balance minimal assist  -BL     Position/Device Used, Standing Balance supported;walker, front-wheeled  -BL               User Key  (r) = Recorded By, (t) = Taken By, (c) = Cosigned By      Initials Name Provider Type    Anitra Ratliff OT Occupational Therapist                   Goals/Plan       Row Name 07/15/24 1357          Dressing Goal 1 (OT)     Activity/Device (Dressing Goal 1, OT) dressing skills, all  -BL     [place]/Cues Needed (Dressing Goal 1, OT) supervision required  -BL     Time Frame (Dressing Goal 1, OT) 2 weeks  -BL       Row Name 07/15/24 4212          Toileting Goal 1 (OT)    Activity/Device (Toileting Goal 1, OT) toileting skills, all  -BL     [place] Level/Cues Needed (Toileting Goal 1, OT) contact guard required  -BL     Time Frame (Toileting Goal 1, OT) 2 weeks  -BL       Row Name 07/15/24 1354          Grooming Goal 1 (OT)    Activity/Device (Grooming Goal 1, OT) grooming skills, all  -BL     [place] (Grooming Goal 1, OT) modified independence  -BL     Time Frame (Grooming Goal 1, OT) 2 weeks  -BL       Row Name 07/15/24 3683          Therapy Assessment/Plan (OT)    Planned Therapy Interventions (OT) activity tolerance training;BADL retraining;cognitive/visual perception retraining;edema control/reduction;functional balance retraining;neuromuscular control/coordination retraining;occupation/activity based interventions;patient/caregiver education/training;ROM/therapeutic exercise;strengthening exercise;transfer/mobility retraining  -BL               User Key  (r) = Recorded By, (t) = Taken By, (c) = Cosigned By      Initials Name Provider Type    BL Anitra Holt, OT Occupational Therapist                   Clinical Impression       Row Name 07/15/24 7477          Pain Assessment    Pretreatment Pain Rating 0/10 - no pain  -BL     Posttreatment Pain Rating 0/10 - no pain  -BL       Row Name 07/15/24 0238          Plan of Care Review    Plan of Care Reviewed With patient  -BL     Outcome Evaluation 85 y/o male presenting to [facility] secondary to dizziness and falls at home. PMH includes AAA repair and valve procedure 5/2024. Pt reports that prior to his heart surgery he was going to the [facility] multiple times a month. At time of evaluation, pt A&OX4. Orthostatic BP assessed throughout. Pt /76 in supine, 125/75 sitting at  EOB, and dropped to 116/64 in standing. Pt BP continued to drop following standing for 2 minutes with a new BP of 99/96. Pt educated to sit EOB for BLE exercises then able to perform BLE exercises in standing. Following these pt BP continued to remain low at 96/54 following 6 minute seated rest break. Pt BP unstable and RN made aware. OT anticipates that once the pt BP is under control he will be good to return home with HHOT and RW use. OT will follow.  -BL       Row Name 07/15/24 1345          Therapy Assessment/Plan (OT)    Therapy Frequency (OT) 5 times/wk  -BL       Row Name 07/15/24 1345          Therapy Plan Review/Discharge Plan (OT)    Anticipated Discharge Disposition (OT) home with home health;home with assist  -BL       Row Name 07/15/24 1345          Vital Signs    Pre Systolic BP Rehab 144  -BL     Pre Treatment Diastolic BP 76  -BL     Intra Systolic BP Rehab 125  -BL     Intra Treatment Diastolic BP 75  -BL     Post Systolic BP Rehab 96  -BL     Post Treatment Diastolic BP 54  -BL     Pretreatment Heart Rate (beats/min) 77  -BL     Intratreatment Heart Rate (beats/min) 76  -BL     Posttreatment Heart Rate (beats/min) 69  -BL     Pre SpO2 (%) 96  -BL     O2 Delivery Pre Treatment room air  -BL     Intra SpO2 (%) 96  -BL     O2 Delivery Intra Treatment room air  -BL     Post SpO2 (%) 95  -BL     O2 Delivery Post Treatment room air  -BL     Pre Patient Position Supine  -BL     Intra Patient Position Sitting  -BL     Post Patient Position Standing  -BL       Row Name 07/15/24 1345          Positioning and Restraints    Pre-Treatment Position in bed  -BL     Post Treatment Position bed  -BL     In Bed notified nsg;exit alarm on  -BL               User Key  (r) = Recorded By, (t) = Taken By, (c) = Cosigned By      Initials Name Provider Type    BL Anitra Holt, OT Occupational Therapist                   Outcome Measures       Row Name 07/15/24 0843          How much help from another person do you  currently need...    Turning from your back to your side while in flat bed without using bedrails? 4  -KN     Moving from lying on back to sitting on the side of a flat bed without bedrails? 4  -KN     Moving to and from a bed to a chair (including a wheelchair)? 4  -KN     Standing up from a chair using your arms (e.g., wheelchair, bedside chair)? 3  -KN     Climbing 3-5 steps with a railing? 3  -KN     To walk in hospital room? 3  -KN     AM-PAC 6 Clicks Score (PT) 21  -KN     Highest Level of Mobility Goal 6 --> Walk 10 steps or more  -KN               User Key  (r) = Recorded By, (t) = Taken By, (c) = Cosigned By      Initials Name Provider Type    KN Estella Gimenez, RN Registered Nurse                    Occupational Therapy Education       Title: PT OT SLP Therapies (Done)       Topic: Occupational Therapy (Done)       Point: ADL training (Done)       Description:   Instruct learner(s) on proper safety adaptation and remediation techniques during self care or transfers.   Instruct in proper use of assistive devices.                  Learning Progress Summary             Patient Acceptance, E,TB, VU by  at 7/15/2024 1402                                         User Key       Initials Effective Dates Name Provider Type Discipline     09/22/22 -  Anitra Holt OT Occupational Therapist OT                  OT Recommendation and Plan  Planned Therapy Interventions (OT): activity tolerance training, BADL retraining, cognitive/visual perception retraining, edema control/reduction, functional balance retraining, neuromuscular control/coordination retraining, occupation/activity based interventions, patient/caregiver education/training, ROM/therapeutic exercise, strengthening exercise, transfer/mobility retraining  Therapy Frequency (OT): 5 times/wk  Plan of Care Review  Plan of Care Reviewed With: patient  Outcome Evaluation: 83 y/o male presenting to [facility] secondary to dizziness and falls at home. PM  includes AAA repair and valve procedure 5/2024. Pt reports that prior to his heart surgery he was going to the [facility] multiple times a month. At time of evaluation, pt A&OX4. Orthostatic BP assessed throughout. Pt /76 in supine, 125/75 sitting at EOB, and dropped to 116/64 in standing. Pt BP continued to drop following standing for 2 minutes with a new BP of 99/96. Pt educated to sit EOB for BLE exercises then able to perform BLE exercises in standing. Following these pt BP continued to remain low at 96/54 following 6 minute seated rest break. Pt BP unstable and RN made aware. OT anticipates that once the pt BP is under control he will be good to return home with HHOT and RW use. OT will follow.     Time Calculation:         Time Calculation- OT       Row Name 07/15/24 1404             Time Calculation- OT    OT Start Time 0951  -BL      OT Stop Time 1022  -BL      OT Time Calculation (min) 31 min  -BL      OT Received On 07/15/24  -      OT - Next Appointment 07/16/24  -      OT Goal Re-Cert Due Date 07/29/24  -                User Key  (r) = Recorded By, (t) = Taken By, (c) = Cosigned By      Initials Name Provider Type    BL Anitra Holt OT Occupational Therapist                  Therapy Charges for Today       Code Description Service Date Service Provider Modifiers Qty    [id number] HC OT EVAL MOD COMPLEXITY 4 7/15/2024 Anitra Holt OT GO 1                 Anitra Holt OT  7/15/2024

## 2024-07-16 LAB
ANION GAP SERPL CALCULATED.3IONS-SCNC: 11 MMOL/L (ref 5–15)
BASOPHILS # BLD AUTO: 0.02 10*3/MM3 (ref 0–0.2)
BASOPHILS NFR BLD AUTO: 0.4 % (ref 0–1.5)
BUN SERPL-MCNC: 12 MG/DL (ref 8–23)
BUN/CREAT SERPL: 12.4 (ref 7–25)
CALCIUM SPEC-SCNC: 8.3 MG/DL (ref 8.6–10.5)
CHLORIDE SERPL-SCNC: 102 MMOL/L (ref 98–107)
CO2 SERPL-SCNC: 23 MMOL/L (ref 22–29)
CREAT SERPL-MCNC: 0.97 MG/DL (ref 0.76–1.27)
DEPRECATED RDW RBC AUTO: 44.3 FL (ref 37–54)
EGFRCR SERPLBLD CKD-EPI 2021: 77.5 ML/MIN/1.73
EOSINOPHIL # BLD AUTO: 0.03 10*3/MM3 (ref 0–0.4)
EOSINOPHIL NFR BLD AUTO: 0.6 % (ref 0.3–6.2)
ERYTHROCYTE [DISTWIDTH] IN BLOOD BY AUTOMATED COUNT: 13.9 % (ref 12.3–15.4)
GLUCOSE SERPL-MCNC: 105 MG/DL (ref 65–99)
HCT VFR BLD AUTO: 34.3 % (ref 37.5–51)
HGB BLD-MCNC: 11 G/DL (ref 13–17.7)
IMM GRANULOCYTES # BLD AUTO: 0.02 10*3/MM3 (ref 0–0.05)
IMM GRANULOCYTES NFR BLD AUTO: 0.4 % (ref 0–0.5)
LYMPHOCYTES # BLD AUTO: 0.84 10*3/MM3 (ref 0.7–3.1)
LYMPHOCYTES NFR BLD AUTO: 17.6 % (ref 19.6–45.3)
MCH RBC QN AUTO: 27.9 PG (ref 26.6–33)
MCHC RBC AUTO-ENTMCNC: 32.1 G/DL (ref 31.5–35.7)
MCV RBC AUTO: 87.1 FL (ref 79–97)
MONOCYTES # BLD AUTO: 0.52 10*3/MM3 (ref 0.1–0.9)
MONOCYTES NFR BLD AUTO: 10.9 % (ref 5–12)
NEUTROPHILS NFR BLD AUTO: 3.35 10*3/MM3 (ref 1.7–7)
NEUTROPHILS NFR BLD AUTO: 70.1 % (ref 42.7–76)
NRBC BLD AUTO-RTO: 0 /100 WBC (ref 0–0.2)
PLATELET # BLD AUTO: 149 10*3/MM3 (ref 140–450)
PMV BLD AUTO: 10.9 FL (ref 6–12)
POTASSIUM SERPL-SCNC: 3.9 MMOL/L (ref 3.5–5.2)
RBC # BLD AUTO: 3.94 10*6/MM3 (ref 4.14–5.8)
SODIUM SERPL-SCNC: 136 MMOL/L (ref 136–145)
WBC NRBC COR # BLD AUTO: 4.78 10*3/MM3 (ref 3.4–10.8)

## 2024-07-16 PROCEDURE — 25810000003 SODIUM CHLORIDE 0.9 % SOLUTION: Performed by: INTERNAL MEDICINE

## 2024-07-16 PROCEDURE — 85025 COMPLETE CBC W/AUTO DIFF WBC: CPT | Performed by: INTERNAL MEDICINE

## 2024-07-16 PROCEDURE — 80048 BASIC METABOLIC PNL TOTAL CA: CPT

## 2024-07-16 PROCEDURE — 99232 SBSQ HOSP IP/OBS MODERATE 35: CPT | Performed by: INTERNAL MEDICINE

## 2024-07-16 PROCEDURE — 25010000002 ENOXAPARIN PER 10 MG

## 2024-07-16 PROCEDURE — 97110 THERAPEUTIC EXERCISES: CPT

## 2024-07-16 PROCEDURE — 97530 THERAPEUTIC ACTIVITIES: CPT

## 2024-07-16 RX ORDER — FLUDROCORTISONE ACETATE 0.1 MG/1
100 TABLET ORAL EVERY 12 HOURS SCHEDULED
Status: DISCONTINUED | OUTPATIENT
Start: 2024-07-16 | End: 2024-07-20 | Stop reason: HOSPADM

## 2024-07-16 RX ORDER — SODIUM CHLORIDE 9 MG/ML
100 INJECTION, SOLUTION INTRAVENOUS CONTINUOUS
Status: DISPENSED | OUTPATIENT
Start: 2024-07-16 | End: 2024-07-16

## 2024-07-16 RX ORDER — MIDODRINE HYDROCHLORIDE 5 MG/1
15 TABLET ORAL
Status: DISCONTINUED | OUTPATIENT
Start: 2024-07-16 | End: 2024-07-20 | Stop reason: HOSPADM

## 2024-07-16 RX ADMIN — Medication 10 ML: at 20:15

## 2024-07-16 RX ADMIN — MIDODRINE HYDROCHLORIDE 10 MG: 5 TABLET ORAL at 11:54

## 2024-07-16 RX ADMIN — FLUDROCORTISONE ACETATE 100 MCG: 0.1 TABLET ORAL at 15:35

## 2024-07-16 RX ADMIN — PANTOPRAZOLE SODIUM 40 MG: 40 TABLET, DELAYED RELEASE ORAL at 20:14

## 2024-07-16 RX ADMIN — METOPROLOL TARTRATE 12.5 MG: 25 TABLET, FILM COATED ORAL at 20:14

## 2024-07-16 RX ADMIN — PREGABALIN 50 MG: 25 CAPSULE ORAL at 08:37

## 2024-07-16 RX ADMIN — HYDROCODONE BITARTRATE AND ACETAMINOPHEN 1 TABLET: 5; 325 TABLET ORAL at 20:14

## 2024-07-16 RX ADMIN — FLUDROCORTISONE ACETATE 0.1 MG: 0.1 TABLET ORAL at 10:11

## 2024-07-16 RX ADMIN — LEVOTHYROXINE SODIUM 100 MCG: 0.1 TABLET ORAL at 05:52

## 2024-07-16 RX ADMIN — PANTOPRAZOLE SODIUM 40 MG: 40 TABLET, DELAYED RELEASE ORAL at 08:37

## 2024-07-16 RX ADMIN — ESCITALOPRAM OXALATE 30 MG: 10 TABLET ORAL at 08:37

## 2024-07-16 RX ADMIN — MIDODRINE HYDROCHLORIDE 10 MG: 5 TABLET ORAL at 08:37

## 2024-07-16 RX ADMIN — MIDODRINE HYDROCHLORIDE 15 MG: 5 TABLET ORAL at 18:06

## 2024-07-16 RX ADMIN — ASPIRIN 81 MG: 81 TABLET, COATED ORAL at 20:14

## 2024-07-16 RX ADMIN — FLUDROCORTISONE ACETATE 100 MCG: 0.1 TABLET ORAL at 20:16

## 2024-07-16 RX ADMIN — PREGABALIN 50 MG: 25 CAPSULE ORAL at 20:14

## 2024-07-16 RX ADMIN — SODIUM CHLORIDE 100 ML/HR: 9 INJECTION, SOLUTION INTRAVENOUS at 15:35

## 2024-07-16 RX ADMIN — Medication 10 ML: at 08:37

## 2024-07-16 RX ADMIN — ENOXAPARIN SODIUM 40 MG: 100 INJECTION SUBCUTANEOUS at 15:35

## 2024-07-16 NOTE — PROGRESS NOTES
LOS: 1 day   Patient Care Team:  Moreno Tapia APRN as PCP - General (Family Medicine)  Chalo Olvera MD as Consulting Physician (Hematology and Oncology)  Ganesh López MD as Consulting Physician (Neurology)  Jake Enriquez MD as Referring Physician (Family Medicine)    Subjective     Interval History: Continues to be orthostatic    Patient Complaints: Dizziness when standing.  Improved from admission but still symptomatic.    History taken from: patient    Review of Systems   Constitutional:  Positive for activity change. Negative for appetite change, chills, diaphoresis, fatigue and fever.   HENT:  Negative for facial swelling.    Eyes:  Negative for visual disturbance.   Respiratory:  Negative for cough, shortness of breath, wheezing and stridor.    Cardiovascular:  Negative for chest pain, palpitations and leg swelling.   Gastrointestinal:  Negative for abdominal pain, blood in stool, constipation and diarrhea.   Endocrine: Negative for polyuria.   Genitourinary:  Negative for dysuria.   Musculoskeletal:  Positive for gait problem. Negative for arthralgias and back pain.   Skin:  Negative for rash and wound.   Neurological:  Positive for light-headedness. Negative for dizziness, tremors, syncope and weakness.   Psychiatric/Behavioral:  Negative for confusion.            Objective     Vital Signs  Temp:  [98.8 °F (37.1 °C)-101.6 °F (38.7 °C)] 98.8 °F (37.1 °C)  Heart Rate:  [69-93] 72  Resp:  [16-25] 16  BP: ()/(53-81) 160/75    Physical Exam:     General Appearance:    Alert, cooperative, in no acute distress,   Head:    Normocephalic, without obvious abnormality, atraumatic   Eyes:            Lids and lashes normal, conjunctivae and sclerae normal, no   icterus, no pallor, corneas clear, PERRLA   Ears:    Ears appear intact with no abnormalities noted   Throat:   No oral lesions, no thrush, oral mucosa moist   Neck:   No adenopathy, supple, trachea midline, no thyromegaly, no   carotid  bruit, no JVD   Lungs:     Clear to auscultation,respirations regular, even and                  unlabored    Heart:    Regular rhythm and normal rate, normal S1 and S2, no            murmur, no gallop, no rub, no click   Chest Wall:    No abnormalities observed   Abdomen:     Normal bowel sounds, no masses, no organomegaly, soft        Non-tender non-distended, no guarding,   Extremities:   Moves all extremities well, no edema, no cyanosis, no             Redness   Pulses:   Pulses palpable and equal bilaterally   Skin:   No bleeding, bruising or rash   Lymph nodes:   No palpable adenopathy   Neurologic:   Cranial nerves 2 - 12 grossly intact, sensation intact, DTR       present and equal bilaterally        Results Review:    Lab Results (last 24 hours)       Procedure Component Value Units Date/Time    Basic Metabolic Panel [[id number]]  (Abnormal) Collected: 07/16/24 0311    Specimen: Blood from Arm, Left Updated: 07/16/24 0355     Glucose 105 mg/dL      BUN 12 mg/dL      Creatinine 0.97 mg/dL      Sodium 136 mmol/L      Potassium 3.9 mmol/L      Chloride 102 mmol/L      CO2 23.0 mmol/L      Calcium 8.3 mg/dL      BUN/Creatinine Ratio 12.4     Anion Gap 11.0 mmol/L      eGFR 77.5 mL/min/1.73     Narrative:      GFR Normal >60  Chronic Kidney Disease <60  Kidney Failure <15    The GFR formula is only valid for adults with stable renal function between ages 18 and 70.    CBC & Differential [[id number]]  (Abnormal) Collected: 07/16/24 0311    Specimen: Blood from Arm, Left Updated: 07/16/24 0328    Narrative:      The following orders were created for panel order CBC & Differential.  Procedure                               Abnormality         Status                     ---------                               -----------         ------                     CBC Auto Differential[[id number]]        Abnormal            Final result                 Please view results for these tests on the individual orders.    CBC Auto  Differential [[id number]]  (Abnormal) Collected: 07/16/24 0311    Specimen: Blood from Arm, Left Updated: 07/16/24 0328     WBC 4.78 10*3/mm3      RBC 3.94 10*6/mm3      Hemoglobin 11.0 g/dL      Hematocrit 34.3 %      MCV 87.1 fL      MCH 27.9 pg      MCHC 32.1 g/dL      RDW 13.9 %      RDW-SD 44.3 fl      MPV 10.9 fL      Platelets 149 10*3/mm3      Neutrophil % 70.1 %      Lymphocyte % 17.6 %      Monocyte % 10.9 %      Eosinophil % 0.6 %      Basophil % 0.4 %      Immature Grans % 0.4 %      Neutrophils, Absolute 3.35 10*3/mm3      Lymphocytes, Absolute 0.84 10*3/mm3      Monocytes, Absolute 0.52 10*3/mm3      Eosinophils, Absolute 0.03 10*3/mm3      Basophils, Absolute 0.02 10*3/mm3      Immature Grans, Absolute 0.02 10*3/mm3      nRBC 0.0 /100 WBC              Imaging Results (Last 24 Hours)       ** No results found for the last 24 hours. **                 I reviewed the patient's new clinical results.    Medication Review:   Scheduled Meds:aspirin, 81 mg, Oral, Nightly  enoxaparin, 40 mg, Subcutaneous, Daily  escitalopram, 30 mg, Oral, Daily  fludrocortisone, 100 mcg, Oral, Q12H  levothyroxine, 100 mcg, Oral, Q AM  metoprolol tartrate, 12.5 mg, Oral, BID  midodrine, 15 mg, Oral, TID AC  pantoprazole, 40 mg, Oral, BID  pregabalin, 50 mg, Oral, BID  sodium chloride, 10 mL, Intravenous, Q12H      Continuous Infusions:Pharmacy to Dose enoxaparin (LOVENOX),   sodium chloride, 100 mL/hr      PRN Meds:.  acetaminophen    senna-docusate sodium **AND** polyethylene glycol **AND** bisacodyl **AND** bisacodyl    Calcium Replacement - Follow Nurse / BPA Driven Protocol    HYDROcodone-acetaminophen    Magnesium Standard Dose Replacement - Follow Nurse / BPA Driven Protocol    methocarbamol    ondansetron    Pharmacy to Dose enoxaparin (LOVENOX)    Phosphorus Replacement - Follow Nurse / BPA Driven Protocol    Potassium Replacement - Follow Nurse / BPA Driven Protocol    [COMPLETED] Insert Peripheral IV **AND** sodium  chloride    sodium chloride    sodium chloride     Assessment & Plan       Syncope  Orthostatic hypotension -improved but not resolved with midodrine and fludrocortisone.  Will increase dose of midodrine today per Dr. Sanchez  Peripheral neuropathy-continue Lyrica  Hypothyroidism-levothyroxine  Generalized anxiety-escitalopram  Chronic coronary artery disease-continue metoprolol and aspirin  History of prostate cancer  History of AAA      Lovenox for DVT prophylaxis  PPI for stress ulcer prophylaxis        Plan for disposition: Monitor overnight with medication changes    Magaly Teran MD  07/16/24  15:34 EDT

## 2024-07-16 NOTE — PLAN OF CARE
Problem: Adult Inpatient Plan of Care  Goal: Plan of Care Review  Outcome: Ongoing, Progressing  Goal: Patient-Specific Goal (Individualized)  Outcome: Ongoing, Progressing  Goal: Absence of Hospital-Acquired Illness or Injury  Outcome: Ongoing, Progressing  Intervention: Identify and Manage Fall Risk  Recent Flowsheet Documentation  Taken 7/16/2024 1727 by Sunshine Mccann RN  Safety Promotion/Fall Prevention: safety round/check completed  Taken 7/16/2024 1555 by Sunshine Mccann RN  Safety Promotion/Fall Prevention: safety round/check completed  Taken 7/16/2024 1400 by Sunshine Mccann RN  Safety Promotion/Fall Prevention: safety round/check completed  Taken 7/16/2024 1200 by Sunshine Mccann RN  Safety Promotion/Fall Prevention: safety round/check completed  Taken 7/16/2024 1100 by Sunshine Mccann RN  Safety Promotion/Fall Prevention: safety round/check completed  Taken 7/16/2024 1013 by Sunshine Mccann RN  Safety Promotion/Fall Prevention: safety round/check completed  Taken 7/16/2024 0836 by Sunshine Mccann RN  Safety Promotion/Fall Prevention: safety round/check completed  Intervention: Prevent Skin Injury  Recent Flowsheet Documentation  Taken 7/16/2024 1100 by Sunshine Mccann RN  Body Position: position changed independently  Taken 7/16/2024 0836 by Sunshine Mccann RN  Body Position: position changed independently  Intervention: Prevent and Manage VTE (Venous Thromboembolism) Risk  Recent Flowsheet Documentation  Taken 7/16/2024 1200 by Sunshine Mccann RN  Activity Management: up in chair  Taken 7/16/2024 1100 by Sunshine Mccann RN  Activity Management: up in chair  Taken 7/16/2024 0836 by Sunshine Mccann RN  VTE Prevention/Management:   bilateral   sequential compression devices on  Goal: Optimal Comfort and Wellbeing  Outcome: Ongoing, Progressing  Intervention: Provide Person-Centered Care  Recent Flowsheet Documentation  Taken 7/16/2024 0836 by Sunshine Mccann RN  Trust  Relationship/Rapport:   care explained   choices provided   thoughts/feelings acknowledged   reassurance provided   questions encouraged  Goal: Readiness for Transition of Care  Outcome: Ongoing, Progressing     Problem: Fall Injury Risk  Goal: Absence of Fall and Fall-Related Injury  Outcome: Ongoing, Progressing  Intervention: Promote Injury-Free Environment  Recent Flowsheet Documentation  Taken 7/16/2024 1727 by Sunshine Mccann, RN  Safety Promotion/Fall Prevention: safety round/check completed  Taken 7/16/2024 1555 by Sunshine Mccann, RN  Safety Promotion/Fall Prevention: safety round/check completed  Taken 7/16/2024 1400 by Sunshine Mccann, RN  Safety Promotion/Fall Prevention: safety round/check completed  Taken 7/16/2024 1200 by Sunshine Mccann, RN  Safety Promotion/Fall Prevention: safety round/check completed  Taken 7/16/2024 1100 by Sunshine Mccann, RN  Safety Promotion/Fall Prevention: safety round/check completed  Taken 7/16/2024 1013 by Sunshine Mccann, RN  Safety Promotion/Fall Prevention: safety round/check completed  Taken 7/16/2024 0836 by Sunshine Mccann, RN  Safety Promotion/Fall Prevention: safety round/check completed     Problem: Diabetes Comorbidity  Goal: Blood Glucose Level Within Targeted Range  Outcome: Ongoing, Progressing   Goal Outcome Evaluation:

## 2024-07-16 NOTE — SIGNIFICANT NOTE
07/16/24 1324   OTHER   Discipline occupational therapist   Rehab Time/Intention   Session Not Performed other (see comments)  (Pt eating lunch, will f/u as time permits.)   Therapy Assessment/Plan (PT)   Criteria for Skilled Interventions Met (PT) yes   Recommendation   OT - Next Appointment 07/17/24

## 2024-07-16 NOTE — PROGRESS NOTES
[facility] CARDIOLOGY  [facility]        LOS:  LOS: 1 day   Patient Name: Panfilo Feliz  Age/Sex: 84 y.o. male  : 1940  MRN: [id number]    Day of Service: 24   Length of Stay: 1  Encounter Provider: JAVY Mg  Place of Service: [facility] CARDIOLOGY  Patient Care Team:  Moreno Tapia APRN as PCP - General (Family Medicine)  Chalo Olvera MD as Consulting Physician (Hematology and Oncology)  Ganesh López MD as Consulting Physician (Neurology)  Jake Enriquez MD as Referring Physician (Family Medicine)    Cardiology assessment and plan     Syncope  Orthostatic hypotension  Patient is currently on midodrine for orthostatic hypotension which was recently increased and also fludrocortisone was added  Echocardiogram with normal LV systolic function and moderate pericardial effusion  Ascending aortic aneurysm s/p repair with left atrial appendage closure  Postop atrial fibrillation with no recurrence of symptoms  No significant obstructive coronary artery disease  Normal LV systolic function  Hypertension  Hyperlipidemia  Hypothyroidism  Prostate cancer status post surgery  Acute on chronic kidney injury     Denies any new complaints  Still continues to be orthostatic  Tmax is 98.8 pulse is 72 respirations of 16 blood pressure is 88/53 to 160/75 sats are 95%  Current medications include aspirin 81 mg p.o. once a day metoprolol 12.5 mg p.o. twice daily midodrine 10 mg p.o. 3 times a day patient is on fludrocortisone 0.1 mg every 12 hours he is on Lovenox for DVT prophylaxis  Droxidopa cannot be initiated secondary to nonavailability of the medication in the hospital  Increase the midodrine to 15 mg p.o. 3 times a day  Start patient on IV hydration for 6 hours  Plans to repeat orthostatic vitals  If patient fails current medications consider adding Northera  Repeat echocardiogram this admission with a moderate  pericardial effusion but no evidence of any tamponade  Diagnosis and treatment options reviewed and discussed with patient and family  Consider extended Holter monitor study at the time of discharge  Further recommendation based on patient course                    Subjective:     Chief Complaint:  F/U Syncope    Subjective:   Morning orthostatics could not be performed safely as patient was very dizzy sitting.  It appears that these have since been repeated and have improved to which patient can transfer to the chair.  Patient reports he has made efforts to improve his hydration.    Current Medications:   Scheduled Meds:aspirin, 81 mg, Oral, Nightly  enoxaparin, 40 mg, Subcutaneous, Daily  escitalopram, 30 mg, Oral, Daily  fludrocortisone, 100 mcg, Oral, Q12H  levothyroxine, 100 mcg, Oral, Q AM  metoprolol tartrate, 12.5 mg, Oral, BID  midodrine, 15 mg, Oral, TID AC  pantoprazole, 40 mg, Oral, BID  pregabalin, 50 mg, Oral, BID  sodium chloride, 10 mL, Intravenous, Q12H      Continuous Infusions:Pharmacy to Dose enoxaparin (LOVENOX),         Allergies:  Allergies   Allergen Reactions    Statins Myalgia       Review of Systems   Constitutional: Negative for chills, decreased appetite and malaise/fatigue.   HENT:  Negative for congestion and nosebleeds.    Eyes:  Negative for blurred vision and double vision.   Cardiovascular:  Negative for chest pain, dyspnea on exertion, irregular heartbeat, leg swelling, near-syncope, orthopnea and palpitations.   Respiratory:  Negative for cough and shortness of breath.    Hematologic/Lymphatic: Negative for adenopathy. Does not bruise/bleed easily.   Skin:  Negative for color change and rash.   Musculoskeletal:  Negative for back pain and joint pain.   Gastrointestinal:  Negative for bloating, abdominal pain, hematemesis and hematochezia.   Genitourinary:  Negative for flank pain and hematuria.   Neurological:  Negative for dizziness and focal weakness.   Psychiatric/Behavioral:   "Negative for altered mental status. The patient does not have insomnia.        Objective:     Temp:  [98 °F (36.7 °C)-101.6 °F (38.7 °C)] 98.8 °F (37.1 °C)  Heart Rate:  [69-93] 93  Resp:  [16-25] 16  BP: (104-157)/(58-81) 104/58     Intake/Output Summary (Last 24 hours) at 7/16/2024 1219  Last data filed at 7/16/2024 1146  Gross per 24 hour   Intake 1600 ml   Output 1675 ml   Net -75 ml     Body mass index is 21.55 kg/m².      07/13/24 2013 07/14/24  0138 07/15/24  0654   Weight: 79.4 kg (175 lb) 80.3 kg (177 lb 0.5 oz) 80.3 kg (177 lb)         Physical Exam:  Neuro:  CV:  Resp:  GI:  Ext:  Tele: AAOx3, no gross deficits  S1S2 RRR, no murmur  Nonlabored, CTA  BS+, abd soft  Pedal pulses palp, no edema  SR                                                   Lab Review:   Results from last 7 days   Lab Units 07/16/24  0311 07/15/24  0402 07/13/24  2150   SODIUM mmol/L 136 140 137   POTASSIUM mmol/L 3.9 4.8 4.2   CHLORIDE mmol/L 102 106 104   CO2 mmol/L 23.0 27.5 23.2   BUN mg/dL 12 17 19   CREATININE mg/dL 0.97 1.22 1.33*   GLUCOSE mg/dL 105* 97 126*   CALCIUM mg/dL 8.3* 8.7 8.9   AST (SGOT) U/L  --   --  24   ALT (SGPT) U/L  --   --  6     Results from last 7 days   Lab Units 07/14/24  0017 07/13/24  2150   HSTROP T ng/L 24* 26*     Results from last 7 days   Lab Units 07/16/24  0311 07/15/24  0402   WBC 10*3/mm3 4.78 2.99*   HEMOGLOBIN g/dL 11.0* 11.3*   HEMATOCRIT % 34.3* 36.7*   PLATELETS 10*3/mm3 149 146                   Invalid input(s): \"LDLCALC\"            Recent Radiology:  Imaging Results (Most Recent)       Procedure Component Value Units Date/Time    CT Head Without Contrast [[id number]] Collected: 07/13/24 2329     Updated: 07/13/24 2334    Narrative:      CT HEAD WO CONTRAST, CT CERVICAL SPINE WO CONTRAST    Date of Exam: 7/13/2024 11:25 PM EDT    Indication: fall.    Comparison: 9/12/2020.    Technique: Axial CT images were obtained of the head and cervical spine without contrast administration.  " Coronal reconstructions were performed.  Automated exposure control and iterative reconstruction methods were used.      Findings:  There is no evidence of hemorrhage. There is no mass effect or midline shift.    There is no extracerebral collection.    Ventricles are normal in size and configuration for patient's stated age.      Posterior fossa is within normal limits.    Calvarium and skull base appear intact.   Visualized sinuses show no air fluid levels. Visualized orbits are unremarkable.    No evidence of fracture or compression deformity. No evidence of spondylolysis.  No significant spondylolisthesis. No evidence of focal lesions. The prevertebral soft tissues appear unremarkable. Surrounding soft tissues appear within normal limits. Mild   to moderate multilevel degenerative changes are present throughout the spine. The lung apices appear clear.        Impression:      Impression:  1.No acute intracranial process.  2.No acute osseous abnormality of the cervical spine.        Electronically Signed: Sailaja Murray MD    7/13/2024 11:32 PM EDT    Workstation ID: [id number]    CT Cervical Spine Without Contrast [[id number]] Collected: 07/13/24 2329     Updated: 07/13/24 2334    Narrative:      CT HEAD WO CONTRAST, CT CERVICAL SPINE WO CONTRAST    Date of Exam: 7/13/2024 11:25 PM EDT    Indication: fall.    Comparison: 9/12/2020.    Technique: Axial CT images were obtained of the head and cervical spine without contrast administration.  Coronal reconstructions were performed.  Automated exposure control and iterative reconstruction methods were used.      Findings:  There is no evidence of hemorrhage. There is no mass effect or midline shift.    There is no extracerebral collection.    Ventricles are normal in size and configuration for patient's stated age.      Posterior fossa is within normal limits.    Calvarium and skull base appear intact.   Visualized sinuses show no air fluid levels. Visualized orbits are  unremarkable.    No evidence of fracture or compression deformity. No evidence of spondylolysis.  No significant spondylolisthesis. No evidence of focal lesions. The prevertebral soft tissues appear unremarkable. Surrounding soft tissues appear within normal limits. Mild   to moderate multilevel degenerative changes are present throughout the spine. The lung apices appear clear.        Impression:      Impression:  1.No acute intracranial process.  2.No acute osseous abnormality of the cervical spine.        Electronically Signed: Sailaja Murray MD    7/13/2024 11:32 PM EDT    Workstation ID: [id number]    XR Shoulder 2+ View Left [[id number]] Collected: 07/13/24 2146     Updated: 07/13/24 2148    Narrative:      XR SHOULDER 2+ VW LEFT    Date of Exam: 7/13/2024 9:27 PM EDT    Indication: fall    Comparison: None available.    Findings:  There is no acute fracture or dislocation. Acromioclavicular and glenohumeral joints appear intact. No erosions. Acromiohumeral and coracoclavicular distances are well-maintained. Severe acromioclavicular and glenohumeral osteoarthritic changes are   present. The adjacent lung and ribs appear intact.      Impression:      Impression:  No acute osseous abnormality. Severe osteoarthritic changes are present.        Electronically Signed: Sailaja Murray MD    7/13/2024 9:46 PM EDT    Workstation ID: [id number]    XR Chest 1 View [[id number]] Collected: 07/13/24 2144     Updated: 07/13/24 2147    Narrative:      XR CHEST 1 VW    Date of Exam: 7/13/2024 9:28 PM EDT    Indication: fall/pain    Comparison: 5/14/2024.    Findings:  There are no airspace consolidations. No pleural fluid. No pneumothorax. The pulmonary vasculature appears within normal limits. The cardiac and mediastinal silhouette appear unremarkable. No acute osseous abnormality identified. No displaced rib   fracture identified. Median sternotomy wires are present.      Impression:      Impression:  No acute cardiopulmonary  process.        Electronically Signed: Sailaja Murray MD    7/13/2024 9:45 PM EDT    Workstation ID: [id number]            ECHOCARDIOGRAM:    Results for orders placed during the hospital encounter of 07/13/24    Adult Transthoracic Echo Limited W/ Cont if Necessary Per Protocol    Interpretation Summary    Left ventricular systolic function is normal. Left ventricular ejection fraction appears to be 56 - 60%.    Left ventricular wall thickness is consistent with mild concentric hypertrophy.    The left atrial cavity is moderately dilated.    Estimated right ventricular systolic pressure from tricuspid regurgitation is normal (<35 mmHg).    There is a moderate (1-2cm) circumferential pericardial effusion. There is no evidence of cardiac tamponade.    There is a moderate sized left pleural effusion.        I reviewed the patient's new clinical results.    EKG:      Assessment:       Syncope    1) Syncope with fall  - positive orthostatics  - on midodrine for orthostatic hypotension, recently increased; fludrocortisone added  - received IVFs  - repeat 2D echo 7/15 shows no change in moderate pericardial effusion without tamponade     2) ascending aortic aneurysm status post recent repair with MARITA closure on 5/10/24   - post op 2D echo 7/2024 showed EF 61-65%, mild AI, and moderate pericardial effusion without tamponade     3) brief postop A-fib 5/2024  - on aspirin; not on anticoagulation  - on beta blocker  - Preop JOSE 4/2024 showed an EF of 56 to 60% with moderate AI. Preop cath 4/2024 showed no obstructive CAD.   - Preop cath 4/2024 showed no obstructive CAD.      4) hx HTN     5) HLD     6) hypothyroidism     7) prostate cancer status post surgery and radiation     8) EUNICE  - Cr improved    Plan:   Orthostatics repeated this afternoon were still positive but improved.  Repeat limited 2D echo shows no change in moderate pericardial effusion without tamponade.  Will order compression stockings.  As d/w Dr. Sanchez,  patient was going to be trialed on Droxidopa, but this is unavailable at this facility.  Midodrine will be increased to 15 mg PO tid.  Of note, patient febrile last night without leukocytosis.  Defer to internal medicine.          Electronically signed by JAVY Mg, 07/16/24, 12:25 PM EDT.

## 2024-07-16 NOTE — PLAN OF CARE
Goal Outcome Evaluation:   Panfilo Feliz presents with ADL impairments affecting function including balance, endurance / activity tolerance, and strength. Demonstrated functioning below baseline abilities indicate the need for continued skilled intervention while inpatient. Pt t/f from sitting to standing with MIN A-CGA, posterior lean noted.  Pt t/f from chair <> bed with MIN A.  Pt t/f from sitting to supine with MIN A.  Pt completed BUE AROM 1 x 15 in order to stabilize BP.  Tolerating session today without incident. Will continue to follow and progress as tolerated.

## 2024-07-16 NOTE — NURSING NOTE
Obtained orthostatic vital signs   Lyin/76 (102)  Sittin/61 (76)  Standin/58 (66)    Patient states that he felt fine once he stood up - denies any dizziness or lightheadedness. Transferred to chair without incident with assistx1.

## 2024-07-16 NOTE — NURSING NOTE
Attempted to get orthostatic vitals on patient. Patient unable to stand for orthostatic BP. Attempt was made, but patient became dizzy. Patient assisted back to bed and resituated. No signs of distress noted.

## 2024-07-16 NOTE — NURSING NOTE
Second set of orthostatic vitals taken while patient working with OT.    Sitting 123/61 (69)  Standing 88/53 (63)  Lying 160/75 (100)    Patient states that he was not dizzy while standing, however he was very unsteady and had difficulty balancing without two people holding on to him. RN communicated this to Dr. Teran and Dr. Sanchez.

## 2024-07-16 NOTE — THERAPY TREATMENT NOTE
"Subjective: Pt agreeable to therapeutic plan of care.  Cognition: oriented to Person, Place, Time, and Situation    **ORTHOSTATIC**    Objective:     Bed Mobility: Min-A   Functional Transfers: CGA and Min-A     Balance: supported and standing Min-A posterior lean noted    Therapeutic Exercise - 15 Reps B UE AROM lying supine    Vitals: Orthostatic; asymptomatic  Sittin/61  Standin/53  Supine: 160/75    Pain: 4 VAS  Location: shoulder  Interventions for pain: Repositioned and Therapeutic Presence  Education: Provided education on the importance of mobility in the acute care setting and Transfer Training      Assessment: Panfilo Feliz presents with ADL impairments affecting function including balance, endurance / activity tolerance, and strength. Demonstrated functioning below baseline abilities indicate the need for continued skilled intervention while inpatient. Pt t/f from sitting to standing with MIN A-CGA, posterior lean noted.  Pt t/f from chair <> bed with MIN A.  Pt t/f from sitting to supine with MIN A.  Pt completed BUE AROM 1 x 15 in order to stabilize BP.  Tolerating session today without incident. Will continue to follow and progress as tolerated.     Plan/Recommendations:   Low Intensity Therapy recommended post-acute care - This is recommended as therapy feels this patient would require 2-3 visits per week. OP or HH would be the best option depending on patient's home bound status. Consider, if the patient has other  \"skilled\" needs such as wounds, IV antibiotics, etc. Combined with \"low intensity\" could also equate to a SNF. If patient is medically complex, consider LTAC.. Pt requires no DME at discharge.     Pt desires Home with family assist and Home Health at discharge. Pt cooperative; agreeable to therapeutic recommendations and plan of care.     Modified [place]: N/A = No pre-op stroke/TIA    Post-Tx Position: Supine with HOB Elevated, Alarms activated, and Call light and personal " items within reach  PPE: gloves

## 2024-07-17 ENCOUNTER — DOCUMENTATION (OUTPATIENT)
Dept: HOME HEALTH SERVICES | Facility: HOME HEALTHCARE | Age: 84
End: 2024-07-17
Payer: MEDICARE

## 2024-07-17 LAB
ANION GAP SERPL CALCULATED.3IONS-SCNC: 9.4 MMOL/L (ref 5–15)
BASOPHILS # BLD AUTO: 0.02 10*3/MM3 (ref 0–0.2)
BASOPHILS NFR BLD AUTO: 0.5 % (ref 0–1.5)
BUN SERPL-MCNC: 14 MG/DL (ref 8–23)
BUN/CREAT SERPL: 13 (ref 7–25)
CALCIUM SPEC-SCNC: 8.7 MG/DL (ref 8.6–10.5)
CHLORIDE SERPL-SCNC: 101 MMOL/L (ref 98–107)
CO2 SERPL-SCNC: 28.6 MMOL/L (ref 22–29)
CREAT SERPL-MCNC: 1.08 MG/DL (ref 0.76–1.27)
DEPRECATED RDW RBC AUTO: 46.3 FL (ref 37–54)
EGFRCR SERPLBLD CKD-EPI 2021: 68.1 ML/MIN/1.73
EOSINOPHIL # BLD AUTO: 0.08 10*3/MM3 (ref 0–0.4)
EOSINOPHIL NFR BLD AUTO: 2 % (ref 0.3–6.2)
ERYTHROCYTE [DISTWIDTH] IN BLOOD BY AUTOMATED COUNT: 14.2 % (ref 12.3–15.4)
GLUCOSE SERPL-MCNC: 91 MG/DL (ref 65–99)
HCT VFR BLD AUTO: 37.5 % (ref 37.5–51)
HGB BLD-MCNC: 11.6 G/DL (ref 13–17.7)
IMM GRANULOCYTES # BLD AUTO: 0.01 10*3/MM3 (ref 0–0.05)
IMM GRANULOCYTES NFR BLD AUTO: 0.3 % (ref 0–0.5)
LYMPHOCYTES # BLD AUTO: 0.6 10*3/MM3 (ref 0.7–3.1)
LYMPHOCYTES NFR BLD AUTO: 15 % (ref 19.6–45.3)
MCH RBC QN AUTO: 27.4 PG (ref 26.6–33)
MCHC RBC AUTO-ENTMCNC: 30.9 G/DL (ref 31.5–35.7)
MCV RBC AUTO: 88.7 FL (ref 79–97)
MONOCYTES # BLD AUTO: 0.51 10*3/MM3 (ref 0.1–0.9)
MONOCYTES NFR BLD AUTO: 12.8 % (ref 5–12)
NEUTROPHILS NFR BLD AUTO: 2.77 10*3/MM3 (ref 1.7–7)
NEUTROPHILS NFR BLD AUTO: 69.4 % (ref 42.7–76)
NRBC BLD AUTO-RTO: 0 /100 WBC (ref 0–0.2)
PLATELET # BLD AUTO: 155 10*3/MM3 (ref 140–450)
PMV BLD AUTO: 11.1 FL (ref 6–12)
POTASSIUM SERPL-SCNC: 4 MMOL/L (ref 3.5–5.2)
RBC # BLD AUTO: 4.23 10*6/MM3 (ref 4.14–5.8)
SODIUM SERPL-SCNC: 139 MMOL/L (ref 136–145)
WBC NRBC COR # BLD AUTO: 3.99 10*3/MM3 (ref 3.4–10.8)

## 2024-07-17 PROCEDURE — 97535 SELF CARE MNGMENT TRAINING: CPT | Performed by: OCCUPATIONAL THERAPIST

## 2024-07-17 PROCEDURE — 97110 THERAPEUTIC EXERCISES: CPT | Performed by: OCCUPATIONAL THERAPIST

## 2024-07-17 PROCEDURE — 80048 BASIC METABOLIC PNL TOTAL CA: CPT

## 2024-07-17 PROCEDURE — 97530 THERAPEUTIC ACTIVITIES: CPT

## 2024-07-17 PROCEDURE — 85025 COMPLETE CBC W/AUTO DIFF WBC: CPT | Performed by: INTERNAL MEDICINE

## 2024-07-17 PROCEDURE — 99232 SBSQ HOSP IP/OBS MODERATE 35: CPT | Performed by: INTERNAL MEDICINE

## 2024-07-17 PROCEDURE — 97110 THERAPEUTIC EXERCISES: CPT

## 2024-07-17 PROCEDURE — 25010000002 ENOXAPARIN PER 10 MG

## 2024-07-17 RX ADMIN — ENOXAPARIN SODIUM 40 MG: 100 INJECTION SUBCUTANEOUS at 16:13

## 2024-07-17 RX ADMIN — Medication 10 ML: at 08:57

## 2024-07-17 RX ADMIN — PANTOPRAZOLE SODIUM 40 MG: 40 TABLET, DELAYED RELEASE ORAL at 08:57

## 2024-07-17 RX ADMIN — PREGABALIN 50 MG: 25 CAPSULE ORAL at 08:57

## 2024-07-17 RX ADMIN — ASPIRIN 81 MG: 81 TABLET, COATED ORAL at 20:09

## 2024-07-17 RX ADMIN — METOPROLOL TARTRATE 12.5 MG: 25 TABLET, FILM COATED ORAL at 20:09

## 2024-07-17 RX ADMIN — FLUDROCORTISONE ACETATE 100 MCG: 0.1 TABLET ORAL at 10:28

## 2024-07-17 RX ADMIN — LEVOTHYROXINE SODIUM 100 MCG: 0.1 TABLET ORAL at 06:47

## 2024-07-17 RX ADMIN — MIDODRINE HYDROCHLORIDE 15 MG: 5 TABLET ORAL at 06:47

## 2024-07-17 RX ADMIN — METOPROLOL TARTRATE 12.5 MG: 25 TABLET, FILM COATED ORAL at 08:56

## 2024-07-17 RX ADMIN — MIDODRINE HYDROCHLORIDE 15 MG: 5 TABLET ORAL at 10:47

## 2024-07-17 RX ADMIN — SENNOSIDES AND DOCUSATE SODIUM 2 TABLET: 50; 8.6 TABLET ORAL at 20:09

## 2024-07-17 RX ADMIN — Medication 10 ML: at 20:09

## 2024-07-17 RX ADMIN — BISACODYL 10 MG: 10 SUPPOSITORY RECTAL at 16:13

## 2024-07-17 RX ADMIN — MIDODRINE HYDROCHLORIDE 15 MG: 5 TABLET ORAL at 16:58

## 2024-07-17 RX ADMIN — PREGABALIN 50 MG: 25 CAPSULE ORAL at 20:09

## 2024-07-17 RX ADMIN — ESCITALOPRAM OXALATE 30 MG: 10 TABLET ORAL at 08:57

## 2024-07-17 RX ADMIN — FLUDROCORTISONE ACETATE 100 MCG: 0.1 TABLET ORAL at 20:22

## 2024-07-17 RX ADMIN — PANTOPRAZOLE SODIUM 40 MG: 40 TABLET, DELAYED RELEASE ORAL at 20:09

## 2024-07-17 NOTE — PLAN OF CARE
"Goal Outcome Evaluation:     Bed mobility - N/A or Not attempted. Pt already up in the chair  Transfers - CGA, Min-A, and with rolling walker  sit stand about 10 reps during tx session.  Pt needed cues for hand placement for sit to stand and to sit.  Pt was tremulous at times.     Ambulation - N/A or Not attempted.  Took orthostatic blood pressure 2 times    Therapeutic Exercise - 10 Reps B LE AROM supported sitting / chair      If medically appropriate, Low Intensity Therapy recommended post-acute care - This is recommended as therapy feels this patient would require 2-3 visits per week. OP or HH would be the best option depending on patient's home bound status. Consider, if the patient has other  \"skilled\" needs such as wounds, IV antibiotics, etc. Combined with \"low intensity\" could also equate to a SNF. If patient is medically complex, consider LTAC. Pt requires no DME at discharge.     Pt desires Skilled Rehab placement at discharge. Pt cooperative; agreeable to therapeutic recommendations and plan of care.                                    "

## 2024-07-17 NOTE — THERAPY TREATMENT NOTE
Subjective: Pt agreeable to therapeutic plan of care.  Cognition: oriented to Person, Place, Time, and Situation    Objective:     PRECAUTIONS:  **ORTHOSTATIC**    Bed Mobility: Modified-Independent      Balance: sitting EOB Supervision.     Lower Body Dressing: Supervision  ADL Position: edge of bed sitting  ADL Comments: donning/doffing socks.     Toileting: Independent using urinal in supine position.   ADL Position: supine  ADL Comments:     Therapeutic Exercise - 10 Reps B UE & BLE AROM unsupported sitting / EOB    Vitals: Orthostatic. Supine BP= 150/72; sitting =126/73. After ex & LB dressing sitting EOB BP continued to drop to 114/63. Pt has JAQUAN hose & abdominal binder in place.     Pain: 0   Education: Verbal/Tactile Cues. Discussed safety prec related to orthostatic hypotension. Encouraging pt to perform as much of his bathing, dressing & toileting ADLs in sitting position instead of standing as a safety prec to decrease risk of falls.       Assessment: Panfilo Feliz presents with ADL impairments affecting function including orthostatic hypotension. Pt now has knee high JAQUAN hose & abdominal binder in place, but still continues with symptomatic orthostatic hypotension. Discussed compensatory tech to dec/prevent risk of falls/syncope while performing BADLs. Pt's safety and mobility are compromised & pt may benefit from continued therapy in a controlled & monitored environment to advance his status while closely monitoring his BP if it does not improve while in the acute care setting. Will continue to monitor's pt progress. Demonstrated functioning below baseline abilities indicate the need for continued skilled intervention while inpatient. Tolerating session today without incident. Will continue to follow and progress as tolerated.     Plan/Recommendations:   Low Intensity Therapy recommended post-acute care - This is recommended as therapy feels this patient would require 2-3 visits per week. OP or HH  "would be the best option depending on patient's home bound status. Consider, if the patient has other  \"skilled\" needs such as wounds, IV antibiotics, etc. Combined with \"low intensity\" could also equate to a SNF. If patient is medically complex, consider LTAC.. Pt requires no DME at discharge.     Pt desires Home with Home Health and Home with family assist or SNF at discharge depending on if his BP & dizziness improves. Pt cooperative; agreeable to therapeutic recommendations and plan of care.     Modified [place]: N/A = No pre-op stroke/TIA    Post-Tx Position: Supine with HOB Elevated, Alarms activated, and Call light and personal items within reach  PPE: gloves    "

## 2024-07-17 NOTE — PROGRESS NOTES
[facility] CARDIOLOGY  [facility]        LOS:  LOS: 2 days   Patient Name: Panfilo Feliz  Age/Sex: 84 y.o. male  : 1940  MRN: [id number]    Day of Service: 24   Length of Stay: 2  Encounter Provider: JAVY Mg  Place of Service: [facility] CARDIOLOGY  Patient Care Team:  Moreno Tapia APRN as PCP - General (Family Medicine)  Chalo Olvera MD as Consulting Physician (Hematology and Oncology)  Ganesh López MD as Consulting Physician (Neurology)  Jake Enriquez MD as Referring Physician (Family Medicine)    Cardiology assessment and plan     Syncope  Orthostatic hypotension/symptomatic orthostatic hypotension  Patient is currently on midodrine for orthostatic hypotension which was recently increased and also fludrocortisone was added  Echocardiogram with normal LV systolic function and moderate pericardial effusion  Ascending aortic aneurysm s/p repair with left atrial appendage closure  Postop atrial fibrillation with no recurrence of symptoms  No significant obstructive coronary artery disease  Normal LV systolic function/moderate pericardial effusion  Hypertension  Hyperlipidemia  Hypothyroidism  Prostate cancer status post surgery  Supine hypertension     Denies any new complaints  Still continues to be orthostatic  Tmax is 99.2 pulse is 92 respirations are 20 blood pressure is 153/77 to 75/45 sats are 93%  Sodium is 139 potassium is 4.0 creatinine is 1.0 hemoglobin is 11.6  Current medications include aspirin 81 mg p.o. once a day metoprolol 12.5 mg p.o. twice daily midodrine 15 mg p.o. 3 times a day patient is on fludrocortisone 0.1 mg every 12 hours he is on Lovenox for DVT prophylaxis  Droxidopa cannot be initiated secondary to nonavailability of the medication in the hospital  Continue midodrine to 15 mg p.o. 3 times a day  If patient fails current medications consider adding Northera  Repeat  echocardiogram this admission with a moderate pericardial effusion but no evidence of any tamponade  Diagnosis and treatment options reviewed and discussed with patient and family  Recommended possible consideration for rehab facility placement with ongoing symptoms of orthostasis with no significant improvement with current medical therapy  We will try to arrange and see if droxidopa can be prescribed as an outpatient  Further recommendation based on patient course       Subjective:     Chief Complaint:  F/U orthostatic hypotension    Subjective:   Patient wearing compression stockings but still developed dizziness again this morning with standing for orthostatics.    Current Medications:   Scheduled Meds:aspirin, 81 mg, Oral, Nightly  enoxaparin, 40 mg, Subcutaneous, Daily  escitalopram, 30 mg, Oral, Daily  fludrocortisone, 100 mcg, Oral, Q12H  levothyroxine, 100 mcg, Oral, Q AM  metoprolol tartrate, 12.5 mg, Oral, BID  midodrine, 15 mg, Oral, TID AC  pantoprazole, 40 mg, Oral, BID  pregabalin, 50 mg, Oral, BID  sodium chloride, 10 mL, Intravenous, Q12H      Continuous Infusions:Pharmacy to Dose enoxaparin (LOVENOX),         Allergies:  Allergies   Allergen Reactions    Statins Myalgia       Review of Systems   Constitutional: Negative for chills, decreased appetite and malaise/fatigue.   HENT:  Negative for congestion and nosebleeds.    Eyes:  Negative for blurred vision and double vision.   Cardiovascular:  Positive for near-syncope and syncope. Negative for chest pain, dyspnea on exertion, irregular heartbeat, leg swelling, orthopnea and palpitations.   Respiratory:  Negative for cough and shortness of breath.    Hematologic/Lymphatic: Negative for adenopathy. Does not bruise/bleed easily.   Skin:  Negative for color change and rash.   Musculoskeletal:  Negative for back pain and joint swelling.   Gastrointestinal:  Negative for bloating, abdominal pain, hematemesis and hematochezia.   Genitourinary:  Negative  "for flank pain and hematuria.   Neurological:  Negative for dizziness and focal weakness.   Psychiatric/Behavioral:  Negative for altered mental status. The patient does not have insomnia.        Objective:     Temp:  [98.2 °F (36.8 °C)-99.2 °F (37.3 °C)] 99.2 °F (37.3 °C)  Heart Rate:  [67-93] 92  Resp:  [11-16] 13  BP: ()/(45-82) 75/45     Intake/Output Summary (Last 24 hours) at 7/17/2024 1026  Last data filed at 7/17/2024 0859  Gross per 24 hour   Intake 480 ml   Output 2200 ml   Net -1720 ml     Body mass index is 21.55 kg/m².      07/13/24 2013 07/14/24 0138 07/15/24  0654   Weight: 79.4 kg (175 lb) 80.3 kg (177 lb 0.5 oz) 80.3 kg (177 lb)         Physical Exam:  Neuro:  CV:  Resp:  GI:  Ext:  Tele: AAOx3, no gross deficits  S1S2 RRR, no murmur  Nonlabored, CTA  BS+, abd soft  Pedal pulses palp, no edema  SR                                                   Lab Review:   Results from last 7 days   Lab Units 07/17/24 0347 07/16/24  0311 07/15/24  0402 07/13/24  2150   SODIUM mmol/L 139 136   < > 137   POTASSIUM mmol/L 4.0 3.9   < > 4.2   CHLORIDE mmol/L 101 102   < > 104   CO2 mmol/L 28.6 23.0   < > 23.2   BUN mg/dL 14 12   < > 19   CREATININE mg/dL 1.08 0.97   < > 1.33*   GLUCOSE mg/dL 91 105*   < > 126*   CALCIUM mg/dL 8.7 8.3*   < > 8.9   AST (SGOT) U/L  --   --   --  24   ALT (SGPT) U/L  --   --   --  6    < > = values in this interval not displayed.     Results from last 7 days   Lab Units 07/14/24  0017 07/13/24  2150   HSTROP T ng/L 24* 26*     Results from last 7 days   Lab Units 07/17/24 0347 07/16/24  0311   WBC 10*3/mm3 3.99 4.78   HEMOGLOBIN g/dL 11.6* 11.0*   HEMATOCRIT % 37.5 34.3*   PLATELETS 10*3/mm3 155 149                   Invalid input(s): \"LDLCALC\"            Recent Radiology:  Imaging Results (Most Recent)       Procedure Component Value Units Date/Time    CT Head Without Contrast [[id number]] Collected: 07/13/24 2329     Updated: 07/13/24 2334    Narrative:      CT HEAD WO " CONTRAST, CT CERVICAL SPINE WO CONTRAST    Date of Exam: 7/13/2024 11:25 PM EDT    Indication: fall.    Comparison: 9/12/2020.    Technique: Axial CT images were obtained of the head and cervical spine without contrast administration.  Coronal reconstructions were performed.  Automated exposure control and iterative reconstruction methods were used.      Findings:  There is no evidence of hemorrhage. There is no mass effect or midline shift.    There is no extracerebral collection.    Ventricles are normal in size and configuration for patient's stated age.      Posterior fossa is within normal limits.    Calvarium and skull base appear intact.   Visualized sinuses show no air fluid levels. Visualized orbits are unremarkable.    No evidence of fracture or compression deformity. No evidence of spondylolysis.  No significant spondylolisthesis. No evidence of focal lesions. The prevertebral soft tissues appear unremarkable. Surrounding soft tissues appear within normal limits. Mild   to moderate multilevel degenerative changes are present throughout the spine. The lung apices appear clear.        Impression:      Impression:  1.No acute intracranial process.  2.No acute osseous abnormality of the cervical spine.        Electronically Signed: Sailaja Murray MD    7/13/2024 11:32 PM EDT    Workstation ID: [id number]    CT Cervical Spine Without Contrast [[id number]] Collected: 07/13/24 2329     Updated: 07/13/24 2334    Narrative:      CT HEAD WO CONTRAST, CT CERVICAL SPINE WO CONTRAST    Date of Exam: 7/13/2024 11:25 PM EDT    Indication: fall.    Comparison: 9/12/2020.    Technique: Axial CT images were obtained of the head and cervical spine without contrast administration.  Coronal reconstructions were performed.  Automated exposure control and iterative reconstruction methods were used.      Findings:  There is no evidence of hemorrhage. There is no mass effect or midline shift.    There is no extracerebral  collection.    Ventricles are normal in size and configuration for patient's stated age.      Posterior fossa is within normal limits.    Calvarium and skull base appear intact.   Visualized sinuses show no air fluid levels. Visualized orbits are unremarkable.    No evidence of fracture or compression deformity. No evidence of spondylolysis.  No significant spondylolisthesis. No evidence of focal lesions. The prevertebral soft tissues appear unremarkable. Surrounding soft tissues appear within normal limits. Mild   to moderate multilevel degenerative changes are present throughout the spine. The lung apices appear clear.        Impression:      Impression:  1.No acute intracranial process.  2.No acute osseous abnormality of the cervical spine.        Electronically Signed: Sailaja Murray MD    7/13/2024 11:32 PM EDT    Workstation ID: [id number]    XR Shoulder 2+ View Left [[id number]] Collected: 07/13/24 2146     Updated: 07/13/24 2148    Narrative:      XR SHOULDER 2+ VW LEFT    Date of Exam: 7/13/2024 9:27 PM EDT    Indication: fall    Comparison: None available.    Findings:  There is no acute fracture or dislocation. Acromioclavicular and glenohumeral joints appear intact. No erosions. Acromiohumeral and coracoclavicular distances are well-maintained. Severe acromioclavicular and glenohumeral osteoarthritic changes are   present. The adjacent lung and ribs appear intact.      Impression:      Impression:  No acute osseous abnormality. Severe osteoarthritic changes are present.        Electronically Signed: Sailaja Murray MD    7/13/2024 9:46 PM EDT    Workstation ID: [id number]    XR Chest 1 View [[id number]] Collected: 07/13/24 2144     Updated: 07/13/24 2147    Narrative:      XR CHEST 1 VW    Date of Exam: 7/13/2024 9:28 PM EDT    Indication: fall/pain    Comparison: 5/14/2024.    Findings:  There are no airspace consolidations. No pleural fluid. No pneumothorax. The pulmonary vasculature appears within normal limits.  The cardiac and mediastinal silhouette appear unremarkable. No acute osseous abnormality identified. No displaced rib   fracture identified. Median sternotomy wires are present.      Impression:      Impression:  No acute cardiopulmonary process.        Electronically Signed: Sailaja Murray MD    7/13/2024 9:45 PM EDT    Workstation ID: [id number]            ECHOCARDIOGRAM:    Results for orders placed during the hospital encounter of 07/13/24    Adult Transthoracic Echo Limited W/ Cont if Necessary Per Protocol    Interpretation Summary    Left ventricular systolic function is normal. Left ventricular ejection fraction appears to be 56 - 60%.    Left ventricular wall thickness is consistent with mild concentric hypertrophy.    The left atrial cavity is moderately dilated.    Estimated right ventricular systolic pressure from tricuspid regurgitation is normal (<35 mmHg).    There is a moderate (1-2cm) circumferential pericardial effusion. There is no evidence of cardiac tamponade.    There is a moderate sized left pleural effusion.        I reviewed the patient's new clinical results.    EKG:      Assessment:       Syncope    1) Syncope with fall  - positive orthostatics  - on midodrine for orthostatic hypotension, recently increased; fludrocortisone added; Droxidopa, but this is unavailable at this facility.   - received IVFs  - repeat 2D echo 7/15 shows no change in moderate pericardial effusion without tamponade     2) ascending aortic aneurysm status post recent repair with MARITA closure on 5/10/24   - post op 2D echo 7/2024 showed EF 61-65%, mild AI, and moderate pericardial effusion without tamponade     3) brief postop A-fib 5/2024  - on aspirin; not on anticoagulation  - on beta blocker  - Preop JOSE 4/2024 showed an EF of 56 to 60% with moderate AI. Preop cath 4/2024 showed no obstructive CAD.   - Preop cath 4/2024 showed no obstructive CAD.      4) hx HTN     5) HLD     6) hypothyroidism     7) prostate cancer  status post surgery and radiation     8) EUNICE  - Cr improved    Plan:   Patient dottie symptomatic with positive orthostatics on midodrine, Florinef, and compression stockings.  Permissive resting hypertension.  Will add abdominal binder.  Patient needs to be OOB tid.  Continue work with physical therapy with leg exercises in bed if unable to ambulate.  As d/w Dr. Sanchez, anticipate patient will need to return to rehab on discharge.          Electronically signed by JAVY Mg, 07/17/24, 10:32 AM EDT.

## 2024-07-17 NOTE — PROGRESS NOTES
LOS: 2 days   Patient Care Team:  Moreno Tapia APRN as PCP - General (Family Medicine)  Chalo Olvera MD as Consulting Physician (Hematology and Oncology)  Ganesh López MD as Consulting Physician (Neurology)  Jake Enriquez MD as Referring Physician (Family Medicine)    Subjective     Interval History: Continues to be orthostatic    Patient Complaints: Dizziness when standing.  Improved from admission but still symptomatic.    History taken from: patient    Review of Systems   Constitutional:  Positive for activity change. Negative for appetite change, chills, diaphoresis, fatigue and fever.   HENT:  Negative for facial swelling.    Eyes:  Negative for visual disturbance.   Respiratory:  Negative for cough, shortness of breath, wheezing and stridor.    Cardiovascular:  Negative for chest pain, palpitations and leg swelling.   Gastrointestinal:  Negative for abdominal pain, blood in stool, constipation and diarrhea.   Endocrine: Negative for polyuria.   Genitourinary:  Negative for dysuria.   Musculoskeletal:  Positive for gait problem. Negative for arthralgias and back pain.   Skin:  Negative for rash and wound.   Neurological:  Positive for light-headedness. Negative for dizziness, tremors, syncope and weakness.   Psychiatric/Behavioral:  Negative for confusion.            Objective     Vital Signs  Temp:  [98.2 °F (36.8 °C)-99.2 °F (37.3 °C)] 99.2 °F (37.3 °C)  Heart Rate:  [67-93] 92  Resp:  [11-16] 13  BP: ()/(45-82) 75/45    Physical Exam:     General Appearance:    Alert, cooperative, in no acute distress,   Head:    Normocephalic, without obvious abnormality, atraumatic   Eyes:            Lids and lashes normal, conjunctivae and sclerae normal, no   icterus, no pallor, corneas clear, PERRLA   Ears:    Ears appear intact with no abnormalities noted   Throat:   No oral lesions, no thrush, oral mucosa moist   Neck:   No adenopathy, supple, trachea midline, no thyromegaly, no   carotid  bruit, no JVD   Lungs:     Clear to auscultation,respirations regular, even and                  unlabored    Heart:    Regular rhythm and normal rate, normal S1 and S2, no            murmur, no gallop, no rub, no click   Chest Wall:    No abnormalities observed   Abdomen:     Normal bowel sounds, no masses, no organomegaly, soft        Non-tender non-distended, no guarding,   Extremities:   Moves all extremities well, no edema, no cyanosis, no             Redness   Pulses:   Pulses palpable and equal bilaterally   Skin:   No bleeding, bruising or rash   Lymph nodes:   No palpable adenopathy   Neurologic:   Cranial nerves 2 - 12 grossly intact, sensation intact, DTR       present and equal bilaterally        Results Review:    Lab Results (last 24 hours)       Procedure Component Value Units Date/Time    Basic Metabolic Panel [[id number]]  (Normal) Collected: 07/17/24 0347    Specimen: Blood from Arm, Left Updated: 07/17/24 0514     Glucose 91 mg/dL      BUN 14 mg/dL      Creatinine 1.08 mg/dL      Sodium 139 mmol/L      Potassium 4.0 mmol/L      Chloride 101 mmol/L      CO2 28.6 mmol/L      Calcium 8.7 mg/dL      BUN/Creatinine Ratio 13.0     Anion Gap 9.4 mmol/L      eGFR 68.1 mL/min/1.73     Narrative:      GFR Normal >60  Chronic Kidney Disease <60  Kidney Failure <15    The GFR formula is only valid for adults with stable renal function between ages 18 and 70.    CBC & Differential [[id number]]  (Abnormal) Collected: 07/17/24 0347    Specimen: Blood from Arm, Left Updated: 07/17/24 0448    Narrative:      The following orders were created for panel order CBC & Differential.  Procedure                               Abnormality         Status                     ---------                               -----------         ------                     CBC Auto Differential[[id number]]        Abnormal            Final result                 Please view results for these tests on the individual orders.    CBC Auto  Differential [[id number]]  (Abnormal) Collected: 07/17/24 0347    Specimen: Blood from Arm, Left Updated: 07/17/24 0448     WBC 3.99 10*3/mm3      RBC 4.23 10*6/mm3      Hemoglobin 11.6 g/dL      Hematocrit 37.5 %      MCV 88.7 fL      MCH 27.4 pg      MCHC 30.9 g/dL      RDW 14.2 %      RDW-SD 46.3 fl      MPV 11.1 fL      Platelets 155 10*3/mm3      Neutrophil % 69.4 %      Lymphocyte % 15.0 %      Monocyte % 12.8 %      Eosinophil % 2.0 %      Basophil % 0.5 %      Immature Grans % 0.3 %      Neutrophils, Absolute 2.77 10*3/mm3      Lymphocytes, Absolute 0.60 10*3/mm3      Monocytes, Absolute 0.51 10*3/mm3      Eosinophils, Absolute 0.08 10*3/mm3      Basophils, Absolute 0.02 10*3/mm3      Immature Grans, Absolute 0.01 10*3/mm3      nRBC 0.0 /100 WBC              Imaging Results (Last 24 Hours)       ** No results found for the last 24 hours. **                 I reviewed the patient's new clinical results.    Medication Review:   Scheduled Meds:aspirin, 81 mg, Oral, Nightly  enoxaparin, 40 mg, Subcutaneous, Daily  escitalopram, 30 mg, Oral, Daily  fludrocortisone, 100 mcg, Oral, Q12H  levothyroxine, 100 mcg, Oral, Q AM  metoprolol tartrate, 12.5 mg, Oral, BID  midodrine, 15 mg, Oral, TID AC  pantoprazole, 40 mg, Oral, BID  pregabalin, 50 mg, Oral, BID  sodium chloride, 10 mL, Intravenous, Q12H      Continuous Infusions:Pharmacy to Dose enoxaparin (LOVENOX),       PRN Meds:.  acetaminophen    senna-docusate sodium **AND** polyethylene glycol **AND** bisacodyl **AND** bisacodyl    Calcium Replacement - Follow Nurse / BPA Driven Protocol    HYDROcodone-acetaminophen    Magnesium Standard Dose Replacement - Follow Nurse / BPA Driven Protocol    methocarbamol    ondansetron    Pharmacy to Dose enoxaparin (LOVENOX)    Phosphorus Replacement - Follow Nurse / BPA Driven Protocol    Potassium Replacement - Follow Nurse / BPA Driven Protocol    [COMPLETED] Insert Peripheral IV **AND** sodium chloride    sodium chloride     sodium chloride     Assessment & Plan       Syncope  Orthostatic hypotension -improved but not resolved with increased dose midodrine and fludrocortisone.  Cardiology adding abdominal binder.  Peripheral neuropathy-continue Lyrica  Hypothyroidism-levothyroxine  Generalized anxiety-escitalopram  Chronic coronary artery disease-continue metoprolol and aspirin  History of prostate cancer  History of AAA      Lovenox for DVT prophylaxis  PPI for stress ulcer prophylaxis        Plan for disposition: Skilled rehab    JAVY Azar  07/17/24  11:22 EDT

## 2024-07-17 NOTE — PLAN OF CARE
Goal Outcome Evaluation:  Plan of Care Reviewed With: patient        Progress: no change  Outcome Evaluation: Nurse practitioner saw patient this morning. Orthostatic BP was not promising. 138/65 when laying down and 75/45 when standing up. NP will order abdominal binder. PT and OT is on board. Ideally patient will be ambulated through the halls 3x a day, but if not possible, at least getting up in the chair and doing leg exercises. Compression stockings on patient's legs and patient was taught the calves are like a second heart that pushes blood back to the heart. Definitely will not be discharging today, even with home health.

## 2024-07-17 NOTE — PLAN OF CARE
"Goal Outcome Evaluation:   PRECAUTIONS:  **ORTHOSTATIC**    Assessment: Panfilo Feliz presents with ADL impairments affecting function including orthostatic hypotension. Pt now has knee high JAQUAN hose & abdominal binder in place, but still continues with symptomatic orthostatic hypotension. Discussed compensatory tech to dec/prevent risk of falls/syncope while performing BADLs. Pt's safety and mobility are compromised & pt may benefit from continued therapy in a controlled & monitored environment to advance his status while closely monitoring his BP if it does not improve while in the acute care setting. Will continue to monitor's pt progress. Demonstrated functioning below baseline abilities indicate the need for continued skilled intervention while inpatient. Tolerating session today without incident. Will continue to follow and progress as tolerated.     Plan/Recommendations:   Low Intensity Therapy recommended post-acute care - This is recommended as therapy feels this patient would require 2-3 visits per week. OP or HH would be the best option depending on patient's home bound status. Consider, if the patient has other  \"skilled\" needs such as wounds, IV antibiotics, etc. Combined with \"low intensity\" could also equate to a SNF. If patient is medically complex, consider LTAC.. Pt requires no DME at discharge.     Pt desires Home with Home Health and Home with family assist or SNF at discharge depending on if his BP & dizziness improves. Pt cooperative; agreeable to therapeutic recommendations and plan of care.                                            "

## 2024-07-17 NOTE — THERAPY TREATMENT NOTE
"Subjective: Pt agreeable to therapeutic plan of care.  Nursing requested for PT to don abdominal binder to and to take orthostatic blood pressures.     Objective:     Bed mobility - N/A or Not attempted. Pt already up in the chair  Transfers - CGA, Min-A, and with rolling walker  sit stand about 10 reps during tx session.  Pt needed cues for hand placement for sit to stand and to sit.  Pt was tremulous at times.     Ambulation - N/A or Not attempted.  Took orthostatic blood pressure 2 times    Therapeutic Exercise - 10 Reps B LE AROM supported sitting / chair    Vitals: Orthostatic  supine: n/a.  Pt up in the chair                                  Sittin/58                                  Standin/36  Pt was symptomatic but was able to stand for about 2 minutes before needing to sit down.     Pain: 0 VAS       Education: Provided education on the importance of mobility in the acute care setting, Verbal/Tactile Cues, and Transfer Training    Assessment: Panfilo Feliz presents with functional mobility impairments which indicate the need for skilled intervention. Tolerating session today without incident. Pt continues with orthostatic blood pressures when standing with symptoms of dizziness the longer he stands.  If pt does not make progress with his mobility may change recommendation to rehab at d/c.  Pt reported his MD wants him to go and he is agreeable. Will continue to follow and progress as tolerated.     Plan/Recommendations:   If medically appropriate, Low Intensity Therapy recommended post-acute care - This is recommended as therapy feels this patient would require 2-3 visits per week. OP or HH would be the best option depending on patient's home bound status. Consider, if the patient has other  \"skilled\" needs such as wounds, IV antibiotics, etc. Combined with \"low intensity\" could also equate to a SNF. If patient is medically complex, consider LTAC. Pt requires no DME at discharge.     Pt desires " Skilled Rehab placement at discharge. Pt cooperative; agreeable to therapeutic recommendations and plan of care.     Basic Mobility 6-click:  Rollin = Total, A lot = 2, A little = 3; 4 = None  Supine>Sit:   1 = Total, A lot = 2, A little = 3; 4 = None   Sit>Stand with arms:  1 = Total, A lot = 2, A little = 3; 4 = None  Bed>Chair:   1 = Total, A lot = 2, A little = 3; 4 = None  Ambulate in room:  1 = Total, A lot = 2, A little = 3; 4 = None  3-5 Steps with railin = Total, A lot = 2, A little = 3; 4 = None  Score: 17    Modified Collier: 4 = Moderately severe disability (Unable to attend to own bodily needs without assistance, and unable to walk unassisted)     Post-Tx Position: Up in Chair, Alarms activated, and Call light and personal items within reach  pt's wife present  PPE: gloves

## 2024-07-17 NOTE — PROGRESS NOTES
Spoke with Patient at bedside.  Demographics verified and agreeable to Home Health services.Verified no other agency in home.  Additional info: Best number to contact pt is by wife cell at [phone].  Wife is a nurse.   Disciplines: PT OT  Pharmacy: [facility]  PCP: Moreno PALAFOX  is agreeable to follow for Home Health orders and sign the POC.

## 2024-07-17 NOTE — CASE MANAGEMENT/SOCIAL WORK
Continued Stay Note  TABITHA Cazares     Patient Name: Panfilo Feliz  MRN: [id number]  Today's Date: 7/17/2024    Admit Date: 7/13/2024    Plan: DC PLAN: [facility]. Possible Cardiac Rehab.       Discharge Plan       Row Name 07/17/24 1038       Plan    Plan DC PLAN: Home with [facility]. Possible Cardiac Rehab.        Patient/Family in Agreement with Plan yes    Plan Comments [facility] accepted. DC BARRIERS: Hypotension, Midorine, Abdominal Binder.                      Expected Discharge Date and Time       Expected Discharge Date Expected Discharge Time    Jul 17, 2024           Prisca Barnes RN   Case Management  [phone] cell.

## 2024-07-18 LAB
BASOPHILS # BLD AUTO: 0.01 10*3/MM3 (ref 0–0.2)
BASOPHILS NFR BLD AUTO: 0.3 % (ref 0–1.5)
DEPRECATED RDW RBC AUTO: 43.8 FL (ref 37–54)
EOSINOPHIL # BLD AUTO: 0.06 10*3/MM3 (ref 0–0.4)
EOSINOPHIL NFR BLD AUTO: 1.9 % (ref 0.3–6.2)
ERYTHROCYTE [DISTWIDTH] IN BLOOD BY AUTOMATED COUNT: 14 % (ref 12.3–15.4)
HCT VFR BLD AUTO: 32.2 % (ref 37.5–51)
HGB BLD-MCNC: 10.3 G/DL (ref 13–17.7)
IMM GRANULOCYTES # BLD AUTO: 0.02 10*3/MM3 (ref 0–0.05)
IMM GRANULOCYTES NFR BLD AUTO: 0.6 % (ref 0–0.5)
LYMPHOCYTES # BLD AUTO: 0.6 10*3/MM3 (ref 0.7–3.1)
LYMPHOCYTES NFR BLD AUTO: 18.6 % (ref 19.6–45.3)
MCH RBC QN AUTO: 27.4 PG (ref 26.6–33)
MCHC RBC AUTO-ENTMCNC: 32 G/DL (ref 31.5–35.7)
MCV RBC AUTO: 85.6 FL (ref 79–97)
MONOCYTES # BLD AUTO: 0.45 10*3/MM3 (ref 0.1–0.9)
MONOCYTES NFR BLD AUTO: 13.9 % (ref 5–12)
NEUTROPHILS NFR BLD AUTO: 2.09 10*3/MM3 (ref 1.7–7)
NEUTROPHILS NFR BLD AUTO: 64.7 % (ref 42.7–76)
NRBC BLD AUTO-RTO: 0 /100 WBC (ref 0–0.2)
PLATELET # BLD AUTO: 140 10*3/MM3 (ref 140–450)
PMV BLD AUTO: 11.6 FL (ref 6–12)
RBC # BLD AUTO: 3.76 10*6/MM3 (ref 4.14–5.8)
WBC NRBC COR # BLD AUTO: 3.23 10*3/MM3 (ref 3.4–10.8)

## 2024-07-18 PROCEDURE — 97110 THERAPEUTIC EXERCISES: CPT

## 2024-07-18 PROCEDURE — 97116 GAIT TRAINING THERAPY: CPT

## 2024-07-18 PROCEDURE — 85025 COMPLETE CBC W/AUTO DIFF WBC: CPT | Performed by: INTERNAL MEDICINE

## 2024-07-18 PROCEDURE — 99232 SBSQ HOSP IP/OBS MODERATE 35: CPT | Performed by: INTERNAL MEDICINE

## 2024-07-18 PROCEDURE — 97530 THERAPEUTIC ACTIVITIES: CPT

## 2024-07-18 PROCEDURE — 25010000002 ENOXAPARIN PER 10 MG

## 2024-07-18 RX ORDER — PYRIDOSTIGMINE BROMIDE 60 MG/1
60 TABLET ORAL EVERY 12 HOURS SCHEDULED
Status: DISCONTINUED | OUTPATIENT
Start: 2024-07-18 | End: 2024-07-20 | Stop reason: HOSPADM

## 2024-07-18 RX ADMIN — Medication 10 ML: at 08:41

## 2024-07-18 RX ADMIN — HYDROCODONE BITARTRATE AND ACETAMINOPHEN 1 TABLET: 5; 325 TABLET ORAL at 20:12

## 2024-07-18 RX ADMIN — ENOXAPARIN SODIUM 40 MG: 100 INJECTION SUBCUTANEOUS at 16:36

## 2024-07-18 RX ADMIN — PYRIDOSTIGMINE BROMIDE 60 MG: 60 TABLET ORAL at 20:11

## 2024-07-18 RX ADMIN — ASPIRIN 81 MG: 81 TABLET, COATED ORAL at 20:11

## 2024-07-18 RX ADMIN — Medication 10 ML: at 20:12

## 2024-07-18 RX ADMIN — FLUDROCORTISONE ACETATE 100 MCG: 0.1 TABLET ORAL at 08:40

## 2024-07-18 RX ADMIN — METOPROLOL TARTRATE 12.5 MG: 25 TABLET, FILM COATED ORAL at 08:40

## 2024-07-18 RX ADMIN — POLYETHYLENE GLYCOL 3350 17 G: 17 POWDER, FOR SOLUTION ORAL at 16:36

## 2024-07-18 RX ADMIN — METOPROLOL TARTRATE 12.5 MG: 25 TABLET, FILM COATED ORAL at 20:11

## 2024-07-18 RX ADMIN — PANTOPRAZOLE SODIUM 40 MG: 40 TABLET, DELAYED RELEASE ORAL at 20:11

## 2024-07-18 RX ADMIN — ESCITALOPRAM OXALATE 30 MG: 10 TABLET ORAL at 08:39

## 2024-07-18 RX ADMIN — LEVOTHYROXINE SODIUM 100 MCG: 0.1 TABLET ORAL at 06:28

## 2024-07-18 RX ADMIN — PREGABALIN 50 MG: 25 CAPSULE ORAL at 08:39

## 2024-07-18 RX ADMIN — PREGABALIN 50 MG: 25 CAPSULE ORAL at 20:11

## 2024-07-18 RX ADMIN — PANTOPRAZOLE SODIUM 40 MG: 40 TABLET, DELAYED RELEASE ORAL at 08:39

## 2024-07-18 RX ADMIN — MIDODRINE HYDROCHLORIDE 15 MG: 5 TABLET ORAL at 11:52

## 2024-07-18 RX ADMIN — FLUDROCORTISONE ACETATE 100 MCG: 0.1 TABLET ORAL at 20:11

## 2024-07-18 RX ADMIN — METHOCARBAMOL 500 MG: 500 TABLET ORAL at 20:27

## 2024-07-18 RX ADMIN — MIDODRINE HYDROCHLORIDE 15 MG: 5 TABLET ORAL at 08:39

## 2024-07-18 NOTE — PROGRESS NOTES
[facility] CARDIOLOGY  [facility]        LOS:  LOS: 3 days   Patient Name: Panfilo Feliz  Age/Sex: 84 y.o. male  : 1940  MRN: [id number]    Day of Service: 24   Length of Stay: 3  Encounter Provider: Julio C Sanchez MD  Place of Service: [facility] CARDIOLOGY  Patient Care Team:  Moreno Tapia APRN as PCP - General (Family Medicine)  Chalo Olvera MD as Consulting Physician (Hematology and Oncology)  Ganesh López MD as Consulting Physician (Neurology)  Jake Enriquez MD as Referring Physician (Family Medicine)    Cardiology assessment and plan     Syncope  Orthostatic hypotension/symptomatic orthostatic hypotension  Patient is currently on midodrine for orthostatic hypotension which was recently increased and also fludrocortisone was added  Echocardiogram with normal LV systolic function and moderate pericardial effusion  Ascending aortic aneurysm s/p repair with left atrial appendage closure  Postop atrial fibrillation with no recurrence of symptoms  No significant obstructive coronary artery disease  Normal LV systolic function/moderate pericardial effusion  Hypertension  Hyperlipidemia  Hypothyroidism  Prostate cancer status post surgery  Supine hypertension         Denies any new complaints  Continues to be asymptomatic orthostatic hypotension  Tmax is 98.8 pulse is 74 respirations are 16 blood pressure is 90/44 to 147/74 sats are 98%  Sodium is 139 potassium is 4.0 creatinine is 1.0 hemoglobin is 10.3  Droxidopa cannot be initiated secondary to nonavailability of the medication in the hospital  Continue midodrine to 15 mg p.o. 3 times a day  If patient fails current medications consider adding Northera  Repeat echocardiogram this admission with a moderate pericardial effusion but no evidence of any tamponade  Diagnosis and treatment options reviewed and discussed with patient and family  Recommended possible  consideration for rehab facility placement with ongoing symptoms of orthostasis with no significant improvement with current medical therapy  We will try to arrange and see if droxidopa can be prescribed as an outpatient  Plans for rehab placement to help with physical therapy for improving the muscle tone  Will add pyridostigmine to see if that will help with the patient orthostatic symptoms  Further recommendation based on patient course                     Subjective:     Chief Complaint:  F/U orthostatic hypotension    Subjective:   Patient wearing compression stockings but still developed dizziness again this morning with standing for orthostatics.    Current Medications:   Scheduled Meds:aspirin, 81 mg, Oral, Nightly  enoxaparin, 40 mg, Subcutaneous, Daily  escitalopram, 30 mg, Oral, Daily  fludrocortisone, 100 mcg, Oral, Q12H  levothyroxine, 100 mcg, Oral, Q AM  metoprolol tartrate, 12.5 mg, Oral, BID  midodrine, 15 mg, Oral, TID AC  pantoprazole, 40 mg, Oral, BID  pregabalin, 50 mg, Oral, BID  sodium chloride, 10 mL, Intravenous, Q12H      Continuous Infusions:Pharmacy to Dose enoxaparin (LOVENOX),         Allergies:  Allergies   Allergen Reactions    Statins Myalgia       Review of Systems   Constitutional: Negative for chills, decreased appetite and malaise/fatigue.   HENT:  Negative for congestion and nosebleeds.    Eyes:  Negative for blurred vision and double vision.   Cardiovascular:  Positive for near-syncope and syncope. Negative for chest pain, dyspnea on exertion, irregular heartbeat, leg swelling, orthopnea and palpitations.   Respiratory:  Negative for cough and shortness of breath.    Hematologic/Lymphatic: Negative for adenopathy. Does not bruise/bleed easily.   Skin:  Negative for color change and rash.   Musculoskeletal:  Negative for back pain and joint swelling.   Gastrointestinal:  Negative for bloating, abdominal pain, hematemesis and hematochezia.   Genitourinary:  Negative for flank  "pain and hematuria.   Neurological:  Negative for dizziness and focal weakness.   Psychiatric/Behavioral:  Negative for altered mental status. The patient does not have insomnia.        Objective:     Temp:  [98.1 °F (36.7 °C)-99.6 °F (37.6 °C)] 98.8 °F (37.1 °C)  Heart Rate:  [65-94] 75  Resp:  [15-21] 16  BP: ()/(44-84) 119/73     Intake/Output Summary (Last 24 hours) at 7/18/2024 1409  Last data filed at 7/18/2024 1258  Gross per 24 hour   Intake 360 ml   Output 175 ml   Net 185 ml     Body mass index is 21.55 kg/m².      07/13/24 2013 07/14/24  0138 07/15/24  0654   Weight: 79.4 kg (175 lb) 80.3 kg (177 lb 0.5 oz) 80.3 kg (177 lb)         Physical Exam:  Neuro:  CV:  Resp:  GI:  Ext:  Tele: AAOx3, no gross deficits  S1S2 RRR, no murmur  Nonlabored, CTA  BS+, abd soft  Pedal pulses palp, no edema  SR                                                   Lab Review:   Results from last 7 days   Lab Units 07/17/24  0347 07/16/24  0311 07/15/24  0402 07/13/24  2150   SODIUM mmol/L 139 136   < > 137   POTASSIUM mmol/L 4.0 3.9   < > 4.2   CHLORIDE mmol/L 101 102   < > 104   CO2 mmol/L 28.6 23.0   < > 23.2   BUN mg/dL 14 12   < > 19   CREATININE mg/dL 1.08 0.97   < > 1.33*   GLUCOSE mg/dL 91 105*   < > 126*   CALCIUM mg/dL 8.7 8.3*   < > 8.9   AST (SGOT) U/L  --   --   --  24   ALT (SGPT) U/L  --   --   --  6    < > = values in this interval not displayed.     Results from last 7 days   Lab Units 07/14/24  0017 07/13/24  2150   HSTROP T ng/L 24* 26*     Results from last 7 days   Lab Units 07/18/24  0307 07/17/24  0347   WBC 10*3/mm3 3.23* 3.99   HEMOGLOBIN g/dL 10.3* 11.6*   HEMATOCRIT % 32.2* 37.5   PLATELETS 10*3/mm3 140 155                   Invalid input(s): \"LDLCALC\"            Recent Radiology:  Imaging Results (Most Recent)       Procedure Component Value Units Date/Time    CT Head Without Contrast [[id number]] Collected: 07/13/24 2329     Updated: 07/13/24 2334    Narrative:      CT HEAD WO CONTRAST, CT " CERVICAL SPINE WO CONTRAST    Date of Exam: 7/13/2024 11:25 PM EDT    Indication: fall.    Comparison: 9/12/2020.    Technique: Axial CT images were obtained of the head and cervical spine without contrast administration.  Coronal reconstructions were performed.  Automated exposure control and iterative reconstruction methods were used.      Findings:  There is no evidence of hemorrhage. There is no mass effect or midline shift.    There is no extracerebral collection.    Ventricles are normal in size and configuration for patient's stated age.      Posterior fossa is within normal limits.    Calvarium and skull base appear intact.   Visualized sinuses show no air fluid levels. Visualized orbits are unremarkable.    No evidence of fracture or compression deformity. No evidence of spondylolysis.  No significant spondylolisthesis. No evidence of focal lesions. The prevertebral soft tissues appear unremarkable. Surrounding soft tissues appear within normal limits. Mild   to moderate multilevel degenerative changes are present throughout the spine. The lung apices appear clear.        Impression:      Impression:  1.No acute intracranial process.  2.No acute osseous abnormality of the cervical spine.        Electronically Signed: Sailaja Murray MD    7/13/2024 11:32 PM EDT    Workstation ID: [id number]    CT Cervical Spine Without Contrast [[id number]] Collected: 07/13/24 2329     Updated: 07/13/24 2334    Narrative:      CT HEAD WO CONTRAST, CT CERVICAL SPINE WO CONTRAST    Date of Exam: 7/13/2024 11:25 PM EDT    Indication: fall.    Comparison: 9/12/2020.    Technique: Axial CT images were obtained of the head and cervical spine without contrast administration.  Coronal reconstructions were performed.  Automated exposure control and iterative reconstruction methods were used.      Findings:  There is no evidence of hemorrhage. There is no mass effect or midline shift.    There is no extracerebral collection.    Ventricles  are normal in size and configuration for patient's stated age.      Posterior fossa is within normal limits.    Calvarium and skull base appear intact.   Visualized sinuses show no air fluid levels. Visualized orbits are unremarkable.    No evidence of fracture or compression deformity. No evidence of spondylolysis.  No significant spondylolisthesis. No evidence of focal lesions. The prevertebral soft tissues appear unremarkable. Surrounding soft tissues appear within normal limits. Mild   to moderate multilevel degenerative changes are present throughout the spine. The lung apices appear clear.        Impression:      Impression:  1.No acute intracranial process.  2.No acute osseous abnormality of the cervical spine.        Electronically Signed: Sailaja Murray MD    7/13/2024 11:32 PM EDT    Workstation ID: [id number]    XR Shoulder 2+ View Left [[id number]] Collected: 07/13/24 2146     Updated: 07/13/24 2148    Narrative:      XR SHOULDER 2+ VW LEFT    Date of Exam: 7/13/2024 9:27 PM EDT    Indication: fall    Comparison: None available.    Findings:  There is no acute fracture or dislocation. Acromioclavicular and glenohumeral joints appear intact. No erosions. Acromiohumeral and coracoclavicular distances are well-maintained. Severe acromioclavicular and glenohumeral osteoarthritic changes are   present. The adjacent lung and ribs appear intact.      Impression:      Impression:  No acute osseous abnormality. Severe osteoarthritic changes are present.        Electronically Signed: Sailaja Murray MD    7/13/2024 9:46 PM EDT    Workstation ID: [id number]    XR Chest 1 View [[id number]] Collected: 07/13/24 2144     Updated: 07/13/24 2147    Narrative:      XR CHEST 1 VW    Date of Exam: 7/13/2024 9:28 PM EDT    Indication: fall/pain    Comparison: 5/14/2024.    Findings:  There are no airspace consolidations. No pleural fluid. No pneumothorax. The pulmonary vasculature appears within normal limits. The cardiac and  mediastinal silhouette appear unremarkable. No acute osseous abnormality identified. No displaced rib   fracture identified. Median sternotomy wires are present.      Impression:      Impression:  No acute cardiopulmonary process.        Electronically Signed: Sailaja Murray MD    7/13/2024 9:45 PM EDT    Workstation ID: [id number]            ECHOCARDIOGRAM:    Results for orders placed during the hospital encounter of 07/13/24    Adult Transthoracic Echo Limited W/ Cont if Necessary Per Protocol    Interpretation Summary    Left ventricular systolic function is normal. Left ventricular ejection fraction appears to be 56 - 60%.    Left ventricular wall thickness is consistent with mild concentric hypertrophy.    The left atrial cavity is moderately dilated.    Estimated right ventricular systolic pressure from tricuspid regurgitation is normal (<35 mmHg).    There is a moderate (1-2cm) circumferential pericardial effusion. There is no evidence of cardiac tamponade.    There is a moderate sized left pleural effusion.        I reviewed the patient's new clinical results.    EKG:      Assessment:       Syncope    1) Syncope with fall  - positive orthostatics  - on midodrine for orthostatic hypotension, recently increased; fludrocortisone added; Droxidopa, but this is unavailable at this facility.   - received IVFs  - repeat 2D echo 7/15 shows no change in moderate pericardial effusion without tamponade     2) ascending aortic aneurysm status post recent repair with MARITA closure on 5/10/24   - post op 2D echo 7/2024 showed EF 61-65%, mild AI, and moderate pericardial effusion without tamponade     3) brief postop A-fib 5/2024  - on aspirin; not on anticoagulation  - on beta blocker  - Preop JOSE 4/2024 showed an EF of 56 to 60% with moderate AI. Preop cath 4/2024 showed no obstructive CAD.   - Preop cath 4/2024 showed no obstructive CAD.      4) hx HTN     5) HLD     6) hypothyroidism     7) prostate cancer status post  surgery and radiation     8) EUNICE  - Cr improved

## 2024-07-18 NOTE — PLAN OF CARE
Goal Outcome Evaluation:        Assessment: Panfilo Feliz is an 85 y/o M who presents to [facility] on 7/13/24 with several weeks of dizziness and falls. PMH of AAA repair, IGG monoclonal gammopathy, HTN, HLD, CAD. Cardiology consulted, reports dysautonomia, orthostatic hypotension, and neuropathy complicating pt's symptoms. Today pt required SBA for bed mobility and CGA with RW for STS and amb 8', 25'. Completed standing LE therex. Pt was hypertensive and orthostatic (supine 161/85, sitting /65, standing 100/57, standing after 5x /64, after amb and LE therex 144/78). Educated pt on the importance of wearing ashanti hose and abdominal binder, as well as completing STS before attempting amb to maintain BP. Also educated pt on importance of staying hydrated in relation to BP. Will continue to follow and progress while here. Recommending home with assist at d/c as pt's biggest limitation is BP and he is safe from a mobility standpoint.       Plan/Recommendations:   If medically appropriate, No ongoing therapy recommended post-acute care. No therapy needs. Pt requires no DME at discharge.     Pt desires Home with family assist at discharge. Pt cooperative; agreeable to therapeutic recommendations and plan of care.

## 2024-07-18 NOTE — PROGRESS NOTES
LOS: 3 days   Patient Care Team:  Moreno Tapia APRN as PCP - General (Family Medicine)  Chalo Olvera MD as Consulting Physician (Hematology and Oncology)  Ganesh López MD as Consulting Physician (Neurology)  Jake Enriquez MD as Referring Physician (Family Medicine)    Subjective     Patient continues with symptomatic orthostatic pressures    Review of Systems   Constitutional:  Positive for activity change.   HENT: Negative.     Respiratory: Negative.     Cardiovascular: Negative.    Gastrointestinal: Negative.    Genitourinary: Negative.    Musculoskeletal: Negative.    Neurological:  Positive for dizziness and weakness.   Psychiatric/Behavioral: Negative.             Objective     Vital Signs  Temp:  [98.1 °F (36.7 °C)-99.6 °F (37.6 °C)] 99.2 °F (37.3 °C)  Heart Rate:  [65-94] 94  Resp:  [15-21] 15  BP: ()/(36-84) 90/44      Physical Exam  Vitals reviewed.   Constitutional:       Appearance: He is not ill-appearing.   HENT:      Head: Normocephalic and atraumatic.      Right Ear: External ear normal.      Left Ear: External ear normal.      Nose: Nose normal.      Mouth/Throat:      Mouth: Mucous membranes are moist.   Eyes:      General:         Right eye: No discharge.         Left eye: No discharge.   Cardiovascular:      Rate and Rhythm: Normal rate and regular rhythm.      Pulses: Normal pulses.      Heart sounds: Normal heart sounds.   Pulmonary:      Effort: Pulmonary effort is normal.      Breath sounds: Normal breath sounds.   Abdominal:      General: Bowel sounds are normal.      Palpations: Abdomen is soft.   Musculoskeletal:         General: Normal range of motion.      Cervical back: Normal range of motion.   Skin:     General: Skin is warm and dry.   Neurological:      Mental Status: He is alert and oriented to person, place, and time.   Psychiatric:         Behavior: Behavior normal.              Results Review:    Lab Results (last 24 hours)       Procedure Component  Value Units Date/Time    CBC & Differential [[id number]]  (Abnormal) Collected: 07/18/24 0307    Specimen: Blood Updated: 07/18/24 0426    Narrative:      The following orders were created for panel order CBC & Differential.  Procedure                               Abnormality         Status                     ---------                               -----------         ------                     CBC Auto Differential[[id number]]        Abnormal            Final result                 Please view results for these tests on the individual orders.    CBC Auto Differential [[id number]]  (Abnormal) Collected: 07/18/24 0307    Specimen: Blood Updated: 07/18/24 0426     WBC 3.23 10*3/mm3      RBC 3.76 10*6/mm3      Hemoglobin 10.3 g/dL      Hematocrit 32.2 %      MCV 85.6 fL      MCH 27.4 pg      MCHC 32.0 g/dL      RDW 14.0 %      RDW-SD 43.8 fl      MPV 11.6 fL      Platelets 140 10*3/mm3      Neutrophil % 64.7 %      Lymphocyte % 18.6 %      Monocyte % 13.9 %      Eosinophil % 1.9 %      Basophil % 0.3 %      Immature Grans % 0.6 %      Neutrophils, Absolute 2.09 10*3/mm3      Lymphocytes, Absolute 0.60 10*3/mm3      Monocytes, Absolute 0.45 10*3/mm3      Eosinophils, Absolute 0.06 10*3/mm3      Basophils, Absolute 0.01 10*3/mm3      Immature Grans, Absolute 0.02 10*3/mm3      nRBC 0.0 /100 WBC              Imaging Results (Last 24 Hours)       ** No results found for the last 24 hours. **                 I reviewed the patient's new clinical results.    Medication Review:   Scheduled Meds:aspirin, 81 mg, Oral, Nightly  enoxaparin, 40 mg, Subcutaneous, Daily  escitalopram, 30 mg, Oral, Daily  fludrocortisone, 100 mcg, Oral, Q12H  levothyroxine, 100 mcg, Oral, Q AM  metoprolol tartrate, 12.5 mg, Oral, BID  midodrine, 15 mg, Oral, TID AC  pantoprazole, 40 mg, Oral, BID  pregabalin, 50 mg, Oral, BID  sodium chloride, 10 mL, Intravenous, Q12H      Continuous Infusions:Pharmacy to Dose enoxaparin (LOVENOX),       PRN Meds:.   acetaminophen    senna-docusate sodium **AND** polyethylene glycol **AND** bisacodyl **AND** bisacodyl    Calcium Replacement - Follow Nurse / BPA Driven Protocol    HYDROcodone-acetaminophen    Magnesium Standard Dose Replacement - Follow Nurse / BPA Driven Protocol    methocarbamol    ondansetron    Pharmacy to Dose enoxaparin (LOVENOX)    Phosphorus Replacement - Follow Nurse / BPA Driven Protocol    Potassium Replacement - Follow Nurse / BPA Driven Protocol    [COMPLETED] Insert Peripheral IV **AND** sodium chloride    sodium chloride    sodium chloride     Interval History:    Assessment & Plan     Syncope  Hypotension-improving with hydration  Fall  Weakness  Orthostatic hypotension  - cardiology following  - PT/OT  - continue home meds including fludrocortisone and midodrine  -echo reviewed  -orthostatic  -midodrine/fludrocortisone     Neck/shoulder pain  - methocarbamol and pain meds prn     HTN  HLD  CAD  -ASA/metoprolol/    Hypothyroid-synthroid  IGG monoclonal gammopathy  GERD  H/o prostate cancer  AAA     Stress ulcer prophy - PPI  DVT prophy - lovenox    Plan for disposition:Agreeable to inpt rehab    Aneta Crook, JAVY  07/18/24  11:08 EDT

## 2024-07-18 NOTE — CASE MANAGEMENT/SOCIAL WORK
Continued Stay Note   [facility]     Patient Name: Panfilo Feliz  MRN: [id number]  Today's Date: 7/18/2024    Admit Date: 7/13/2024    Plan: Home with SNF or Acute Rehab. Will need PASRR, no precert needed, pending PT re-eval   Discharge Plan       Row Name 07/18/24 1428       Plan    Plan Home with SNF or Acute Rehab. Will need PASRR, no precert needed, pending PT re-eval    Patient/Family in Agreement with Plan yes    Provided Post Acute Provider List? Yes    Post Acute Provider List Inpatient Rehab;Nursing Home    Delivered To Patient    Method of Delivery In person    Plan Comments Pt would like SNF or Acute rehab. Will need PASRR for SNF, no precert needed. Pt as CM to call wife for SNF choices, CM left vm waiting to hear back. PT will need to re-eval pt and make recommendations. PASRR requested. DC Barriers:orthostatic pressure               Expected Discharge Date and Time       Expected Discharge Date Expected Discharge Time    Jul 17, 2024         Thelma Luke RN    phone [phone]  fax [phone]

## 2024-07-18 NOTE — THERAPY TREATMENT NOTE
Subjective: Pt agreeable to therapeutic plan of care. Pt reported he was tired after completing PT.     Objective: Pt orthostatic, responds well to STS before attempting amb     Bed mobility - SBA  Transfers - CGA and with rolling walker, verbal cues for UE placement   Ambulation - 8, 25 feet CGA and with rolling walker    Therapeutic Exercise - 10 Reps standing with CGA and RW: marches, toe raises, heel raises, hip abd  5x STS, 10x STS with CGA and RW    Vitals: Hypertensive and Orthostatic  Supine 161/85  Sitting /65  Standing 100/57  After 5x /64  After amb 8' 131/66  After standing LE therex 144/78    Pain: 0 VAS   Location: NA  Intervention for pain: N/A    Education: Provided education on the importance of mobility in the acute care setting, Verbal/Tactile Cues, Transfer Training, and Gait Training    Assessment: Panfilo Feliz is an 85 y/o M who presents to [facility] on 7/13/24 with several weeks of dizziness and falls. PMH of AAA repair, IGG monoclonal gammopathy, HTN, HLD, CAD. Cardiology consulted, reports dysautonomia, orthostatic hypotension, and neuropathy complicating pt's symptoms. Today pt required SBA for bed mobility and CGA with RW for STS and amb 8', 25'. Completed standing LE therex. Pt was hypertensive and orthostatic (supine 161/85, sitting /65, standing 100/57, standing after 5x /64, after amb and LE therex 144/78). Educated pt on the importance of wearing ashanti hose and abdominal binder, as well as completing STS before attempting amb to maintain BP. Also educated pt on importance of staying hydrated in relation to BP. Will continue to follow and progress while here. Recommending home with assist at d/c as pt's biggest limitation is BP and he is safe from a mobility standpoint.       Plan/Recommendations:   If medically appropriate, No ongoing therapy recommended post-acute care. No therapy needs. Pt requires no DME at discharge.     Pt desires Home with family assist at  discharge. Pt cooperative; agreeable to therapeutic recommendations and plan of care.         Post-Tx Position: Supine with HOB Elevated and Call light and personal items within reach  PPE: gloves

## 2024-07-18 NOTE — PLAN OF CARE
Goal Outcome Evaluation:           Progress: no change  Outcome Evaluation: Pt stays in bed during shift to rest, up to bedside commode for bm, pt unsuccessful and stool softeners given. No pain or shortness of breath reported. Pt calm and cooperative, bed alarm on, call light within reach and care continues.      Problem: Adult Inpatient Plan of Care  Goal: Plan of Care Review  Outcome: Ongoing, Progressing  Flowsheets (Taken 7/18/2024 0259)  Progress: no change  Outcome Evaluation: Pt stays in bed during shift to rest, up to bedside commode for bm, pt unsuccessful and stool softeners given. No pain or shortness of breath reported. Pt calm and cooperative, bed alarm on, call light within reach and care continues.  Goal: Patient-Specific Goal (Individualized)  Outcome: Ongoing, Progressing  Goal: Absence of Hospital-Acquired Illness or Injury  Outcome: Ongoing, Progressing  Intervention: Identify and Manage Fall Risk  Recent Flowsheet Documentation  Taken 7/18/2024 0000 by Melly Montoya, RN  Safety Promotion/Fall Prevention:   activity supervised   assistive device/personal items within reach   clutter free environment maintained   fall prevention program maintained   nonskid shoes/slippers when out of bed   room organization consistent   safety round/check completed  Taken 7/17/2024 2200 by Melly Montoya, RN  Safety Promotion/Fall Prevention:   activity supervised   assistive device/personal items within reach   clutter free environment maintained   fall prevention program maintained   nonskid shoes/slippers when out of bed   room organization consistent   safety round/check completed  Taken 7/17/2024 2000 by Melly Montoya, RN  Safety Promotion/Fall Prevention:   activity supervised   assistive device/personal items within reach   clutter free environment maintained   fall prevention program maintained   nonskid shoes/slippers when out of bed   room organization consistent   safety round/check completed  Intervention:  Prevent Skin Injury  Recent Flowsheet Documentation  Taken 7/17/2024 2000 by Melly Montoya RN  Body Position: position changed independently  Intervention: Prevent and Manage VTE (Venous Thromboembolism) Risk  Recent Flowsheet Documentation  Taken 7/18/2024 0000 by Melly Montoya RN  VTE Prevention/Management:   bilateral   compression stockings on  Range of Motion: active ROM (range of motion) encouraged  Taken 7/17/2024 2000 by Melly Montoya RN  Activity Management:   up to bedside commode   back to bed  VTE Prevention/Management:   bilateral   compression stockings on  Range of Motion: active ROM (range of motion) encouraged  Intervention: Prevent Infection  Recent Flowsheet Documentation  Taken 7/18/2024 0000 by Melly Montoya RN  Infection Prevention:   hand hygiene promoted   personal protective equipment utilized   rest/sleep promoted   single patient room provided  Taken 7/17/2024 2200 by Melly Montoya RN  Infection Prevention:   hand hygiene promoted   personal protective equipment utilized   rest/sleep promoted   single patient room provided  Taken 7/17/2024 2000 by Melly Montoya RN  Infection Prevention:   hand hygiene promoted   personal protective equipment utilized   rest/sleep promoted   single patient room provided  Goal: Optimal Comfort and Wellbeing  Outcome: Ongoing, Progressing  Intervention: Provide Person-Centered Care  Recent Flowsheet Documentation  Taken 7/18/2024 0000 by Melly Montoya RN  Trust Relationship/Rapport:   care explained   thoughts/feelings acknowledged  Taken 7/17/2024 2000 by Melly Montoya RN  Trust Relationship/Rapport:   care explained   thoughts/feelings acknowledged  Goal: Readiness for Transition of Care  Outcome: Ongoing, Progressing     Problem: Fall Injury Risk  Goal: Absence of Fall and Fall-Related Injury  Outcome: Ongoing, Progressing  Intervention: Identify and Manage Contributors  Recent Flowsheet Documentation  Taken 7/18/2024 0000 by Melly Montoya RN  Medication Review/Management:  medications reviewed  Taken 7/17/2024 2200 by Melly Montoya RN  Medication Review/Management: medications reviewed  Taken 7/17/2024 2000 by Melly Montoya RN  Medication Review/Management: medications reviewed  Intervention: Promote Injury-Free Environment  Recent Flowsheet Documentation  Taken 7/18/2024 0000 by Melly Montoya RN  Safety Promotion/Fall Prevention:   activity supervised   assistive device/personal items within reach   clutter free environment maintained   fall prevention program maintained   nonskid shoes/slippers when out of bed   room organization consistent   safety round/check completed  Taken 7/17/2024 2200 by Melly Montoya RN  Safety Promotion/Fall Prevention:   activity supervised   assistive device/personal items within reach   clutter free environment maintained   fall prevention program maintained   nonskid shoes/slippers when out of bed   room organization consistent   safety round/check completed  Taken 7/17/2024 2000 by Melly Montoya RN  Safety Promotion/Fall Prevention:   activity supervised   assistive device/personal items within reach   clutter free environment maintained   fall prevention program maintained   nonskid shoes/slippers when out of bed   room organization consistent   safety round/check completed     Problem: Diabetes Comorbidity  Goal: Blood Glucose Level Within Targeted Range  Outcome: Ongoing, Progressing     Problem: Skin Injury Risk Increased  Goal: Skin Health and Integrity  Outcome: Ongoing, Progressing     Problem: Syncope  Goal: Absence of Syncopal Symptoms  Outcome: Ongoing, Progressing     Problem: Constipation  Goal: Effective Bowel Elimination  Outcome: Ongoing, Progressing

## 2024-07-19 LAB
BASOPHILS # BLD MANUAL: 0.03 10*3/MM3 (ref 0–0.2)
BASOPHILS NFR BLD MANUAL: 1 % (ref 0–1.5)
DEPRECATED RDW RBC AUTO: 45.4 FL (ref 37–54)
EOSINOPHIL # BLD MANUAL: 0.03 10*3/MM3 (ref 0–0.4)
EOSINOPHIL NFR BLD MANUAL: 1 % (ref 0.3–6.2)
ERYTHROCYTE [DISTWIDTH] IN BLOOD BY AUTOMATED COUNT: 14 % (ref 12.3–15.4)
HCT VFR BLD AUTO: 35.2 % (ref 37.5–51)
HGB BLD-MCNC: 10.8 G/DL (ref 13–17.7)
LARGE PLATELETS: ABNORMAL
LYMPHOCYTES # BLD MANUAL: 1.11 10*3/MM3 (ref 0.7–3.1)
LYMPHOCYTES NFR BLD MANUAL: 15 % (ref 5–12)
MCH RBC QN AUTO: 27.1 PG (ref 26.6–33)
MCHC RBC AUTO-ENTMCNC: 30.7 G/DL (ref 31.5–35.7)
MCV RBC AUTO: 88.2 FL (ref 79–97)
MONOCYTES # BLD: 0.47 10*3/MM3 (ref 0.1–0.9)
NEUTROPHILS # BLD AUTO: 1.52 10*3/MM3 (ref 1.7–7)
NEUTROPHILS NFR BLD MANUAL: 43 % (ref 42.7–76)
NEUTS BAND NFR BLD MANUAL: 5 % (ref 0–5)
PLATELET # BLD AUTO: 129 10*3/MM3 (ref 140–450)
PMV BLD AUTO: 10.7 FL (ref 6–12)
RBC # BLD AUTO: 3.99 10*6/MM3 (ref 4.14–5.8)
RBC MORPH BLD: NORMAL
SCAN SLIDE: NORMAL
VARIANT LYMPHS NFR BLD MANUAL: 31 % (ref 19.6–45.3)
VARIANT LYMPHS NFR BLD MANUAL: 4 % (ref 0–5)
WBC MORPH BLD: NORMAL
WBC NRBC COR # BLD AUTO: 3.16 10*3/MM3 (ref 3.4–10.8)

## 2024-07-19 PROCEDURE — 25010000002 ENOXAPARIN PER 10 MG

## 2024-07-19 PROCEDURE — 85025 COMPLETE CBC W/AUTO DIFF WBC: CPT | Performed by: INTERNAL MEDICINE

## 2024-07-19 PROCEDURE — 99233 SBSQ HOSP IP/OBS HIGH 50: CPT | Performed by: INTERNAL MEDICINE

## 2024-07-19 PROCEDURE — 85007 BL SMEAR W/DIFF WBC COUNT: CPT | Performed by: INTERNAL MEDICINE

## 2024-07-19 RX ADMIN — MIDODRINE HYDROCHLORIDE 15 MG: 5 TABLET ORAL at 11:00

## 2024-07-19 RX ADMIN — SENNOSIDES AND DOCUSATE SODIUM 2 TABLET: 50; 8.6 TABLET ORAL at 20:11

## 2024-07-19 RX ADMIN — PYRIDOSTIGMINE BROMIDE 60 MG: 60 TABLET ORAL at 09:00

## 2024-07-19 RX ADMIN — SENNOSIDES AND DOCUSATE SODIUM 2 TABLET: 50; 8.6 TABLET ORAL at 07:38

## 2024-07-19 RX ADMIN — Medication 10 ML: at 20:15

## 2024-07-19 RX ADMIN — ENOXAPARIN SODIUM 40 MG: 100 INJECTION SUBCUTANEOUS at 17:01

## 2024-07-19 RX ADMIN — PANTOPRAZOLE SODIUM 40 MG: 40 TABLET, DELAYED RELEASE ORAL at 20:11

## 2024-07-19 RX ADMIN — ESCITALOPRAM OXALATE 30 MG: 10 TABLET ORAL at 07:38

## 2024-07-19 RX ADMIN — ASPIRIN 81 MG: 81 TABLET, COATED ORAL at 20:11

## 2024-07-19 RX ADMIN — Medication 10 ML: at 09:00

## 2024-07-19 RX ADMIN — MIDODRINE HYDROCHLORIDE 15 MG: 5 TABLET ORAL at 17:01

## 2024-07-19 RX ADMIN — PREGABALIN 50 MG: 25 CAPSULE ORAL at 20:11

## 2024-07-19 RX ADMIN — PANTOPRAZOLE SODIUM 40 MG: 40 TABLET, DELAYED RELEASE ORAL at 07:43

## 2024-07-19 RX ADMIN — METOPROLOL TARTRATE 12.5 MG: 25 TABLET, FILM COATED ORAL at 07:42

## 2024-07-19 RX ADMIN — LEVOTHYROXINE SODIUM 100 MCG: 0.1 TABLET ORAL at 06:18

## 2024-07-19 RX ADMIN — PYRIDOSTIGMINE BROMIDE 60 MG: 60 TABLET ORAL at 20:11

## 2024-07-19 RX ADMIN — FLUDROCORTISONE ACETATE 100 MCG: 0.1 TABLET ORAL at 09:00

## 2024-07-19 RX ADMIN — FLUDROCORTISONE ACETATE 100 MCG: 0.1 TABLET ORAL at 20:11

## 2024-07-19 RX ADMIN — METOPROLOL TARTRATE 12.5 MG: 25 TABLET, FILM COATED ORAL at 20:11

## 2024-07-19 RX ADMIN — PREGABALIN 50 MG: 25 CAPSULE ORAL at 07:40

## 2024-07-19 RX ADMIN — MIDODRINE HYDROCHLORIDE 15 MG: 5 TABLET ORAL at 07:39

## 2024-07-19 NOTE — DISCHARGE PLACEMENT REQUEST
"Panfilo Moon (84 y.o. Male)       Date of Birth   1940    Social Security Number       Address   [address] [place] [address] [place] [address]    Home Phone   [phone]    MRN   [id number]       Protestant   None    Marital Status                               Admission Date   7/13/24    Admission Type   Emergency    Admitting Provider   Magaly Teran MD    Attending Provider   Magaly Teran MD    Department, Room/Bed   [address] PEDIATRICS, 202/1       Discharge Date       Discharge Disposition       Discharge Destination                                 Attending Provider: Magaly Teran MD    Allergies: Statins    Isolation: None   Infection: None   Code Status: CPR    Ht: 193 cm (76\")   Wt: 80.3 kg (177 lb)    Admission Cmt: None   Principal Problem: Syncope [R55]                   Active Insurance as of 7/13/2024       Primary Coverage       Payor Plan Insurance Group Employer/Plan Group    MEDICARE MEDICARE A & B        Payor Plan Address Payor Plan Phone Number Payor Plan Fax Number Effective Dates    [address] [phone]  7/1/2006 - None Entered    [facility] [id number]         Subscriber Name Subscriber Birth Date Member ID       PANFILO MOON 1940 [id number]               Secondary Coverage       Payor Plan Insurance Group Employer/Plan Group    [facility] SUPP [id number]       Payor Plan Address Payor Plan Phone Number Payor Plan Fax Number Effective Dates    [address]   12/1/2016 - None Entered    [facility] [id number]         Subscriber Name Subscriber Birth Date Member ID       PANFILO MOON 1940 [id number]                     Emergency Contacts        (Rel.) Home Phone Work Phone Mobile Phone    Kelin Moon (Spouse) [phone] -- [phone]                "

## 2024-07-19 NOTE — CASE MANAGEMENT/SOCIAL WORK
Continued Stay Note   [facility]     Patient Name: Panfilo Feliz  MRN: [id number]  Today's Date: 7/19/2024    Admit Date: 7/13/2024    Plan: Home with [facility], accepted   Discharge Plan       Row Name 07/19/24 1724       Plan    Plan Home with [facility], accepted    Patient/Family in Agreement with Plan yes    Plan Comments Home with [facility], accepted. DC Barriers: Orthostatics      Row Name 07/19/24 1638       Plan    Plan Home with HHC, pending choices    Patient/Family in Agreement with Plan yes    Plan Comments CM met with patient again to obtain choices for HHC. On 7/18 CM left a list of HHC agencies for pt and wife to look over, pt stated they did not make any chocies and asked that I contact his wife. CM left two VM for wife, Kelin to call CM with choices for HHC. Waiting on answer from Kelni. DC Barrier: Orthostatics               Expected Discharge Date and Time       Expected Discharge Date Expected Discharge Time    Jul 17, 2024         Thelma Luke RN    phone [phone]  fax [phone]

## 2024-07-19 NOTE — CASE MANAGEMENT/SOCIAL WORK
Continued Stay Note  TABITHA Cazares     Patient Name: Panfilo Feliz  MRN: [id number]  Today's Date: 7/19/2024    Admit Date: 7/13/2024    Plan: Home with HHC, pending choices   Discharge Plan       Row Name 07/19/24 1638       Plan    Plan Home with HHC, pending choices    Patient/Family in Agreement with Plan yes    Plan Comments CM met with patient again to obtain choices for HHC. On 7/18 CM left a list of HHC agencies for pt and wife to look over, pt stated they did not make any chocies and asked that I contact his wife. CM left two VM for wife, Kelin to call CM with choices for HHC. Waiting on answer from Kelin. DC Barrier: Orthostatics               Expected Discharge Date and Time       Expected Discharge Date Expected Discharge Time    Jul 17, 2024         Thelma Luke RN    phone [phone]  fax [phone]

## 2024-07-19 NOTE — PLAN OF CARE
Problem: Adult Inpatient Plan of Care  Goal: Plan of Care Review  Outcome: Ongoing, Progressing  Flowsheets (Taken 7/19/2024 1842)  Plan of Care Reviewed With: patient  Goal: Patient-Specific Goal (Individualized)  Outcome: Ongoing, Progressing  Goal: Absence of Hospital-Acquired Illness or Injury  Outcome: Ongoing, Progressing  Intervention: Identify and Manage Fall Risk  Recent Flowsheet Documentation  Taken 7/19/2024 1800 by Deysi Newton RN  Safety Promotion/Fall Prevention: safety round/check completed  Taken 7/19/2024 1200 by Deysi Newton RN  Safety Promotion/Fall Prevention: safety round/check completed  Taken 7/19/2024 0800 by Deysi Newton RN  Safety Promotion/Fall Prevention: safety round/check completed  Taken 7/19/2024 0700 by Deysi Newton RN  Safety Promotion/Fall Prevention: safety round/check completed  Intervention: Prevent Skin Injury  Recent Flowsheet Documentation  Taken 7/19/2024 0800 by Deysi Newton RN  Body Position: position changed independently  Skin Protection: adhesive use limited  Intervention: Prevent and Manage VTE (Venous Thromboembolism) Risk  Recent Flowsheet Documentation  Taken 7/19/2024 0800 by Deysi Newton RN  VTE Prevention/Management:   compression stockings on   bilateral  Range of Motion: active ROM (range of motion) encouraged  Goal: Optimal Comfort and Wellbeing  Outcome: Ongoing, Progressing  Intervention: Provide Person-Centered Care  Recent Flowsheet Documentation  Taken 7/19/2024 1200 by Deysi Newton RN  Trust Relationship/Rapport:   care explained   thoughts/feelings acknowledged  Taken 7/19/2024 0800 by Deysi Newton RN  Trust Relationship/Rapport:   care explained   thoughts/feelings acknowledged  Goal: Readiness for Transition of Care  Outcome: Ongoing, Progressing   Goal Outcome Evaluation:  Plan of Care Reviewed With: patient

## 2024-07-19 NOTE — PLAN OF CARE
Goal Outcome Evaluation:         Seemed to sleep well, denies soa,dizziness, urinal at bedside, room air, prn hydrocodone/robaxin for left shoulder discomfort, no BM this shift as of yet

## 2024-07-20 ENCOUNTER — READMISSION MANAGEMENT (OUTPATIENT)
Dept: CALL CENTER | Facility: HOSPITAL | Age: 84
End: 2024-07-20
Payer: MEDICARE

## 2024-07-20 ENCOUNTER — HOME HEALTH ADMISSION (OUTPATIENT)
Dept: HOME HEALTH SERVICES | Facility: HOME HEALTHCARE | Age: 84
End: 2024-07-20
Payer: MEDICARE

## 2024-07-20 VITALS
BODY MASS INDEX: 21.55 KG/M2 | DIASTOLIC BLOOD PRESSURE: 71 MMHG | SYSTOLIC BLOOD PRESSURE: 131 MMHG | HEART RATE: 62 BPM | HEIGHT: 76 IN | WEIGHT: 177 LBS | TEMPERATURE: 97.2 F | RESPIRATION RATE: 18 BRPM | OXYGEN SATURATION: 97 %

## 2024-07-20 LAB
BASOPHILS # BLD MANUAL: 0.02 10*3/MM3 (ref 0–0.2)
BASOPHILS NFR BLD MANUAL: 1 % (ref 0–1.5)
DEPRECATED RDW RBC AUTO: 45.3 FL (ref 37–54)
EOSINOPHIL # BLD MANUAL: 0.07 10*3/MM3 (ref 0–0.4)
EOSINOPHIL NFR BLD MANUAL: 3 % (ref 0.3–6.2)
ERYTHROCYTE [DISTWIDTH] IN BLOOD BY AUTOMATED COUNT: 14.3 % (ref 12.3–15.4)
HCT VFR BLD AUTO: 34 % (ref 37.5–51)
HGB BLD-MCNC: 10.5 G/DL (ref 13–17.7)
LARGE PLATELETS: ABNORMAL
LYMPHOCYTES # BLD MANUAL: 1.02 10*3/MM3 (ref 0.7–3.1)
LYMPHOCYTES NFR BLD MANUAL: 14 % (ref 5–12)
MCH RBC QN AUTO: 26.8 PG (ref 26.6–33)
MCHC RBC AUTO-ENTMCNC: 30.9 G/DL (ref 31.5–35.7)
MCV RBC AUTO: 86.7 FL (ref 79–97)
METAMYELOCYTES NFR BLD MANUAL: 2 % (ref 0–0)
MONOCYTES # BLD: 0.34 10*3/MM3 (ref 0.1–0.9)
NEUTROPHILS # BLD AUTO: 0.93 10*3/MM3 (ref 1.7–7)
NEUTROPHILS NFR BLD MANUAL: 32 % (ref 42.7–76)
NEUTS BAND NFR BLD MANUAL: 6 % (ref 0–5)
PLATELET # BLD AUTO: 149 10*3/MM3 (ref 140–450)
PMV BLD AUTO: 10.1 FL (ref 6–12)
RBC # BLD AUTO: 3.92 10*6/MM3 (ref 4.14–5.8)
RBC MORPH BLD: NORMAL
SCAN SLIDE: NORMAL
VARIANT LYMPHS NFR BLD MANUAL: 37 % (ref 19.6–45.3)
VARIANT LYMPHS NFR BLD MANUAL: 5 % (ref 0–5)
WBC MORPH BLD: NORMAL
WBC NRBC COR # BLD AUTO: 2.44 10*3/MM3 (ref 3.4–10.8)

## 2024-07-20 PROCEDURE — 85007 BL SMEAR W/DIFF WBC COUNT: CPT | Performed by: INTERNAL MEDICINE

## 2024-07-20 PROCEDURE — 99232 SBSQ HOSP IP/OBS MODERATE 35: CPT | Performed by: INTERNAL MEDICINE

## 2024-07-20 PROCEDURE — 85025 COMPLETE CBC W/AUTO DIFF WBC: CPT | Performed by: INTERNAL MEDICINE

## 2024-07-20 RX ORDER — PYRIDOSTIGMINE BROMIDE 60 MG/1
60 TABLET ORAL EVERY 12 HOURS SCHEDULED
Qty: 60 TABLET | Refills: 1 | Status: SHIPPED | OUTPATIENT
Start: 2024-07-20

## 2024-07-20 RX ORDER — FLUDROCORTISONE ACETATE 0.1 MG/1
0.1 TABLET ORAL 2 TIMES DAILY
Qty: 60 TABLET | Refills: 1 | Status: SHIPPED | OUTPATIENT
Start: 2024-07-20

## 2024-07-20 RX ORDER — POLYETHYLENE GLYCOL 3350 17 G/17G
17 POWDER, FOR SOLUTION ORAL DAILY
Status: DISCONTINUED | OUTPATIENT
Start: 2024-07-20 | End: 2024-07-20 | Stop reason: HOSPADM

## 2024-07-20 RX ORDER — MIDODRINE HYDROCHLORIDE 5 MG/1
15 TABLET ORAL
Qty: 90 TABLET | Refills: 1 | Status: SHIPPED | OUTPATIENT
Start: 2024-07-20

## 2024-07-20 RX ORDER — POLYETHYLENE GLYCOL 3350 17 G/17G
17 POWDER, FOR SOLUTION ORAL DAILY PRN
Start: 2024-07-20

## 2024-07-20 RX ORDER — METHOCARBAMOL 500 MG/1
500 TABLET, FILM COATED ORAL EVERY 8 HOURS PRN
Qty: 20 TABLET | Refills: 0 | Status: SHIPPED | OUTPATIENT
Start: 2024-07-20

## 2024-07-20 RX ORDER — DOCUSATE SODIUM 100 MG/1
100 CAPSULE, LIQUID FILLED ORAL 2 TIMES DAILY
Status: DISCONTINUED | OUTPATIENT
Start: 2024-07-20 | End: 2024-07-20 | Stop reason: HOSPADM

## 2024-07-20 RX ADMIN — Medication 10 ML: at 08:25

## 2024-07-20 RX ADMIN — LEVOTHYROXINE SODIUM 100 MCG: 0.1 TABLET ORAL at 04:21

## 2024-07-20 RX ADMIN — PANTOPRAZOLE SODIUM 40 MG: 40 TABLET, DELAYED RELEASE ORAL at 08:25

## 2024-07-20 RX ADMIN — PREGABALIN 50 MG: 25 CAPSULE ORAL at 08:25

## 2024-07-20 RX ADMIN — METOPROLOL TARTRATE 12.5 MG: 25 TABLET, FILM COATED ORAL at 08:26

## 2024-07-20 NOTE — CASE MANAGEMENT/SOCIAL WORK
Continued Stay Note   [facility]     Patient Name: Panfilo Feliz  MRN: [id number]  Today's Date: 7/20/2024    Admit Date: 7/13/2024    Plan: [facility] with [facility] ; accepted and order in place.   Discharge Plan       Row Name 07/20/24 1040       Plan    Plan Home with [facility] ; accepted and order in place.

## 2024-07-20 NOTE — PLAN OF CARE
Problem: Adult Inpatient Plan of Care  Goal: Plan of Care Review  Outcome: Ongoing, Progressing  Flowsheets (Taken 7/20/2024 0301)  Progress: no change  Plan of Care Reviewed With: patient  Goal: Patient-Specific Goal (Individualized)  Outcome: Ongoing, Progressing  Goal: Absence of Hospital-Acquired Illness or Injury  Outcome: Ongoing, Progressing  Intervention: Identify and Manage Fall Risk  Recent Flowsheet Documentation  Taken 7/20/2024 0200 by Kuldeep Wong RN  Safety Promotion/Fall Prevention: safety round/check completed  Taken 7/20/2024 0039 by Kuldeep Wong RN  Safety Promotion/Fall Prevention: safety round/check completed  Taken 7/19/2024 2300 by Kuldeep Wong RN  Safety Promotion/Fall Prevention: safety round/check completed  Taken 7/19/2024 2200 by Kuldeep Wong RN  Safety Promotion/Fall Prevention: safety round/check completed  Taken 7/19/2024 2100 by Kuldeep Wong RN  Safety Promotion/Fall Prevention: safety round/check completed  Taken 7/19/2024 2010 by Kuldeep Wong RN  Safety Promotion/Fall Prevention: safety round/check completed  Taken 7/19/2024 1901 by Kuldeep Wong RN  Safety Promotion/Fall Prevention: safety round/check completed  Intervention: Prevent Skin Injury  Recent Flowsheet Documentation  Taken 7/20/2024 0200 by Kuldeep Wong RN  Body Position: position changed independently  Taken 7/20/2024 0039 by Kuldeep Wong RN  Body Position: position changed independently  Taken 7/19/2024 2010 by Kuldeep Wong RN  Body Position: position changed independently  Skin Protection:   adhesive use limited   tubing/devices free from skin contact  Intervention: Prevent and Manage VTE (Venous Thromboembolism) Risk  Recent Flowsheet Documentation  Taken 7/19/2024 2010 by Kuldeep Wong RN  Activity Management: activity encouraged  Intervention: Prevent Infection  Recent Flowsheet Documentation  Taken 7/20/2024 0200 by Kuldeep Wong RN  Infection Prevention:  environmental surveillance performed  Taken 7/20/2024 0039 by Kuldeep Wong RN  Infection Prevention: environmental surveillance performed  Taken 7/19/2024 2300 by Kuldeep Wong RN  Infection Prevention: environmental surveillance performed  Taken 7/19/2024 2200 by Kuldeep Wong RN  Infection Prevention: environmental surveillance performed  Taken 7/19/2024 2100 by Kuldeep Wong RN  Infection Prevention: environmental surveillance performed  Taken 7/19/2024 2010 by Kuldeep Wong RN  Infection Prevention: environmental surveillance performed  Taken 7/19/2024 1901 by Kuldeep Wong RN  Infection Prevention: environmental surveillance performed  Goal: Optimal Comfort and Wellbeing  Outcome: Ongoing, Progressing  Intervention: Provide Person-Centered Care  Recent Flowsheet Documentation  Taken 7/19/2024 2010 by Kuldeep Wong RN  Trust Relationship/Rapport:   care explained   thoughts/feelings acknowledged  Goal: Readiness for Transition of Care  Outcome: Ongoing, Progressing     Problem: Fall Injury Risk  Goal: Absence of Fall and Fall-Related Injury  Outcome: Ongoing, Progressing  Intervention: Promote Injury-Free Environment  Recent Flowsheet Documentation  Taken 7/20/2024 0200 by Kuldeep Wong RN  Safety Promotion/Fall Prevention: safety round/check completed  Taken 7/20/2024 0039 by Kuldeep Wong RN  Safety Promotion/Fall Prevention: safety round/check completed  Taken 7/19/2024 2300 by Kuldeep Wong RN  Safety Promotion/Fall Prevention: safety round/check completed  Taken 7/19/2024 2200 by Kuldeep Wong RN  Safety Promotion/Fall Prevention: safety round/check completed  Taken 7/19/2024 2100 by Kuldeep Wong RN  Safety Promotion/Fall Prevention: safety round/check completed  Taken 7/19/2024 2010 by Kuldeep Wong RN  Safety Promotion/Fall Prevention: safety round/check completed  Taken 7/19/2024 1901 by Kuldeep Wong RN  Safety Promotion/Fall Prevention: safety  round/check completed     Problem: Diabetes Comorbidity  Goal: Blood Glucose Level Within Targeted Range  Outcome: Ongoing, Progressing     Problem: Skin Injury Risk Increased  Goal: Skin Health and Integrity  Outcome: Ongoing, Progressing  Intervention: Optimize Skin Protection  Recent Flowsheet Documentation  Taken 7/19/2024 2010 by Kuldeep Wong, RN  Pressure Reduction Techniques: frequent weight shift encouraged  Pressure Reduction Devices: pressure-redistributing mattress utilized  Skin Protection:   adhesive use limited   tubing/devices free from skin contact     Problem: Syncope  Goal: Absence of Syncopal Symptoms  Outcome: Ongoing, Progressing     Problem: Constipation  Goal: Effective Bowel Elimination  Outcome: Ongoing, Progressing  Intervention: Promote Effective Bowel Elimination  Recent Flowsheet Documentation  Taken 7/19/2024 2010 by Kuldeep Wong, RN  Bowel Elimination Management: enema given   Goal Outcome Evaluation:  Plan of Care Reviewed With: patient        Progress: no change

## 2024-07-20 NOTE — PROGRESS NOTES
Referring Provider: Magaly Teran MD    Reason for follow-up:  Orthostatic hypotension     Patient Care Team:  Moreno Tapia APRN as PCP - General (Family Medicine)  Chalo Olvera MD as Consulting Physician (Hematology and Oncology)  Ganesh López MD as Consulting Physician (Neurology)  Jake Enriquez MD as Referring Physician (Family Medicine)    Subjective .      ROS    Today, the patient has been without any chest discomfort ,shortness of breath, palpitations, dizziness or syncope.  Denies having any headache ,abdominal pain ,nausea, vomiting , diarrhea constipation, loss of weight or loss of appetite.  Denies having any excessive bruising ,hematuria or blood in the stool.    Review of all systems negative except as indicated    History  Past Medical History:   Diagnosis Date    AAA (abdominal aortic aneurysm)     Abnormal ECG 1980’s    Alcoholism     None since 1991    Aneurysm 2019    aortic arch    Arthritis     Basal cell carcinoma (BCC) of face     Cholelithiasis 1990’s    Cholecystectomy    Colon polyp     Colon polyps     Coronary artery disease     Difficulty walking     Disease of thyroid gland     DJD (degenerative joint disease)     Gastritis     Gastroparesis     GERD (gastroesophageal reflux disease)     GI (gastrointestinal bleed)     Hernia     Hiatal    History of bone marrow biopsy     [place] (hard of hearing)     Hyperlipidemia     Hypertension     Hypothyroidism     IgG monoclonal gammopathy     Multiple duodenal ulcers     Neuropathy     PONV (postoperative nausea and vomiting)     Prostate cancer     Reflux esophagitis     Ulcer     WBC decreased        Past Surgical History:   Procedure Laterality Date    ASCENDING AORTIC ANEURYSM REPAIR W/ MECHANICAL AORTIC VALVE REPLACEMENT N/A 5/10/2024    Procedure: ASCENDING AORTIC ARCH/ possible HEMIARCH REPLACEMENT WITH CIRCULATORY ARREST, neuro monitoring, and intraop JOSE A;  Surgeon: Spencer Puente MD;  Location: [facility];  Service:  Cardiothoracic;  Laterality: N/A;  Ascending aortic aneurysm repair with 30 mm gelweave graft.    CARDIAC CATHETERIZATION N/A 4/19/2024    Procedure: Right and Left Heart Cath with possible PCI;  Surgeon: Dilan Houston DO;  Location:  [facility];  Service: Cardiovascular;  Laterality: N/A;  Local and IV sedation    CHOLECYSTECTOMY  1988    COLON SURGERY  1998    COLONOSCOPY  02/2013    ENDOSCOPY  2012    ESOPHAGOGASTRODUODENOSCOPY WITH DILATION OF SHATZKI'S RING    ENDOSCOPY N/A 10/09/2020    Procedure: ESOPHAGOGASTRODUODENOSCOPY with cold bx;  Surgeon: Jake Perez MD;  Location:  [facility];  Service: Gastroenterology;  Laterality: N/A;  pre - nausea  post - duodenal ulcer, gastrits, gastric ulcer, hiatal hernia    ENDOSCOPY N/A 10/04/2022    Procedure: ESOPHAGOGASTRODUODENOSCOPY with biopsies with esophageal dilatation with 56 cui;  Surgeon: Jake Perez MD;  Location:  [facility];  Service: Gastroenterology;  Laterality: N/A;  pre-dysphagia  post-normal     ENDOSCOPY  10/04/2022    Chris [facility]TERFIELD    EYE SURGERY Bilateral     cataracts    LAMINECTOMY  2013    decompression L3-4, L4-5    PROSTATECTOMY  2007    SKIN BIOPSY      TONSILLECTOMY AND ADENOIDECTOMY      1940s    UPPER GASTROINTESTINAL ENDOSCOPY      VASECTOMY      1970s       Family History   Problem Relation Age of Onset    Cancer Mother 85        Breast    COPD Father     Cancer Brother 59        Unknown type    Hypertension Daughter        Social History     Tobacco Use    Smoking status: Never     Passive exposure: Never    Smokeless tobacco: Never   Vaping Use    Vaping status: Never Used   Substance Use Topics    Alcohol use: No     Comment: Heavy in past, none in 33 years    Drug use: Never        Medications Prior to Admission   Medication Sig Dispense Refill Last Dose    aspirin 81 MG EC tablet Take 1 tablet by mouth Every Night.   7/12/2024    docusate sodium (COLACE) 100 MG capsule Take 3  capsules by mouth 2 (Two) Times a Day. Indications: Constipation   7/13/2024    escitalopram (LEXAPRO) 10 MG tablet Take 1 tablet by mouth Daily. Take combined with 20mg tablets for a total of 30mg daily 30 tablet 3 7/13/2024    escitalopram (LEXAPRO) 20 MG tablet Take 1 tablet by mouth Daily. Take w/ 10 mg tab for total dose = 30 mg daily  Indications: Major Depressive Disorder   7/13/2024    fludrocortisone 0.1 MG tablet Take 1 tablet by mouth Daily. 90 tablet 1 7/13/2024    levothyroxine (SYNTHROID, LEVOTHROID) 100 MCG tablet Take 1 tablet by mouth Daily. Indications: Underactive Thyroid   7/13/2024    Menthol, Topical Analgesic, (BIOFREEZE EX) Apply 1 Application topically Daily As Needed (pain).   7/13/2024    metoprolol tartrate (LOPRESSOR) 25 MG tablet Take 0.5 tablets by mouth 2 (Two) Times a Day. Indications: High Blood Pressure Disorder 90 tablet 3 7/13/2024    midodrine (PROAMATINE) 10 MG tablet Take 1 tablet by mouth 3 (Three) Times a Day Before Meals. 90 tablet 11 7/13/2024    pantoprazole (PROTONIX) 40 MG EC tablet Take 1 tablet by mouth 2 (Two) Times a Day. Indications: Gastroesophageal Reflux Disease   7/13/2024    pregabalin (LYRICA) 50 MG capsule Take 1 capsule by mouth 2 (Two) Times a Day. Indications: Neuropathic Pain   7/13/2024    vitamin B-12 (CYANOCOBALAMIN) 1000 MCG tablet Take 1 tablet by mouth Daily. Indications: Inadequate Vitamin B12   7/13/2024    acetaminophen (TYLENOL) 325 MG tablet Take 2 tablets by mouth Every 4 (Four) Hours As Needed for Mild Pain.          Allergies  Statins    Scheduled Meds:aspirin, 81 mg, Oral, Nightly  docusate sodium, 100 mg, Oral, BID  enoxaparin, 40 mg, Subcutaneous, Daily  escitalopram, 30 mg, Oral, Daily  fludrocortisone, 100 mcg, Oral, Q12H  levothyroxine, 100 mcg, Oral, Q AM  metoprolol tartrate, 12.5 mg, Oral, BID  midodrine, 15 mg, Oral, TID AC  pantoprazole, 40 mg, Oral, BID  polyethylene glycol, 17 g, Oral, Daily  pregabalin, 50 mg, Oral,  "BID  pyridostigmine, 60 mg, Oral, Q12H  sodium chloride, 10 mL, Intravenous, Q12H      Continuous Infusions:Pharmacy to Dose enoxaparin (LOVENOX),       PRN Meds:.  acetaminophen    senna-docusate sodium **AND** polyethylene glycol **AND** bisacodyl **AND** bisacodyl    Calcium Replacement - Follow Nurse / BPA Driven Protocol    Magnesium Standard Dose Replacement - Follow Nurse / BPA Driven Protocol    methocarbamol    ondansetron    Pharmacy to Dose enoxaparin (LOVENOX)    Phosphorus Replacement - Follow Nurse / BPA Driven Protocol    Potassium Replacement - Follow Nurse / BPA Driven Protocol    [COMPLETED] Insert Peripheral IV **AND** sodium chloride    sodium chloride    sodium chloride    Objective     VITAL SIGNS  Vitals:    07/20/24 0039 07/20/24 0415 07/20/24 0755 07/20/24 0830   BP: 127/71 127/72 135/78 131/71   BP Location: Left arm Left arm Left arm Left arm   Patient Position: Lying  Lying Lying   Pulse: 63 62 62    Resp: 21 15 18    Temp: 98 °F (36.7 °C) 97.3 °F (36.3 °C) 97.2 °F (36.2 °C)    TempSrc: Oral Oral Oral    SpO2: 95% 97% 97%    Weight:       Height:           Flowsheet Rows      Flowsheet Row First Filed Value   Admission Height 193 cm (76\") Documented at 07/13/2024 2013   Admission Weight 79.4 kg (175 lb) Documented at 07/13/2024 2013              Intake/Output Summary (Last 24 hours) at 7/20/2024 1118  Last data filed at 7/20/2024 0903  Gross per 24 hour   Intake 908 ml   Output 850 ml   Net 58 ml        TELEMETRY: Sinus rhythm    Physical Exam:  The patient is alert, oriented and in no distress.  Vital signs as noted above.  Head and neck revealed no carotid bruits or jugular venous distention.  No thyromegaly or lymphadenopathy is present  Lungs clear.  No wheezing.  Breath sounds are normal bilaterally.  Heart normal first and second heart sounds.  No murmur. No precordial rub is present.  No gallop is present.  Abdomen soft and nontender.  No organomegaly is present.  Extremities with " good peripheral pulses without any pedal edema.  Skin warm and dry.  Musculoskeletal system is grossly normal  CNS grossly normal    Reviewed and updated.    Results Review:   I reviewed the patient's new clinical results.  Lab Results (last 24 hours)       Procedure Component Value Units Date/Time    CBC & Differential [[id number]]  (Abnormal) Collected: 07/20/24 0419    Specimen: Blood from Arm, Left Updated: 07/20/24 0513    Narrative:      The following orders were created for panel order CBC & Differential.  Procedure                               Abnormality         Status                     ---------                               -----------         ------                     CBC Auto Differential[[id number]]        Abnormal            Final result               Scan Slide[[id number]]                                       Final result                 Please view results for these tests on the individual orders.    Scan Slide [[id number]] Collected: 07/20/24 0419    Specimen: Blood from Arm, Left Updated: 07/20/24 0513     Scan Slide --     Comment: See Manual Differential Results       Manual Differential [[id number]]  (Abnormal) Collected: 07/20/24 0419    Specimen: Blood from Arm, Left Updated: 07/20/24 0513     Neutrophil % 32.0 %      Lymphocyte % 37.0 %      Monocyte % 14.0 %      Eosinophil % 3.0 %      Basophil % 1.0 %      Bands %  6.0 %      Metamyelocyte % 2.0 %      Atypical Lymphocyte % 5.0 %      Neutrophils Absolute 0.93 10*3/mm3      Lymphocytes Absolute 1.02 10*3/mm3      Monocytes Absolute 0.34 10*3/mm3      Eosinophils Absolute 0.07 10*3/mm3      Basophils Absolute 0.02 10*3/mm3      RBC Morphology Normal     WBC Morphology Normal     Large Platelets Slight/1+    CBC Auto Differential [[id number]]  (Abnormal) Collected: 07/20/24 0419    Specimen: Blood from Arm, Left Updated: 07/20/24 0513     WBC 2.44 10*3/mm3      RBC 3.92 10*6/mm3      Hemoglobin 10.5 g/dL      Hematocrit 34.0 %      MCV 86.7  fL      MCH 26.8 pg      MCHC 30.9 g/dL      RDW 14.3 %      RDW-SD 45.3 fl      MPV 10.1 fL      Platelets 149 10*3/mm3     Narrative:      The previously reported component NRBC is no longer being reported. Previous result was 0.0 /100 WBC (Reference Range: 0.0-0.2 /100 WBC) on 7/20/2024 at 0431 EDT.            Imaging Results (Last 24 Hours)       ** No results found for the last 24 hours. **        LAB RESULTS (LAST 7 DAYS)    CBC  Results from last 7 days   Lab Units 07/20/24  0419 07/19/24  0452 07/18/24  0307 07/17/24  0347 07/16/24  0311 07/15/24  0402 07/13/24  2150   WBC 10*3/mm3 2.44* 3.16* 3.23* 3.99 4.78 2.99* 6.26   RBC 10*6/mm3 3.92* 3.99* 3.76* 4.23 3.94* 4.08* 4.33   HEMOGLOBIN g/dL 10.5* 10.8* 10.3* 11.6* 11.0* 11.3* 12.1*   HEMATOCRIT % 34.0* 35.2* 32.2* 37.5 34.3* 36.7* 38.9   MCV fL 86.7 88.2 85.6 88.7 87.1 90.0 89.8   PLATELETS 10*3/mm3 149 129* 140 155 149 146 177       BMP  Results from last 7 days   Lab Units 07/17/24  0347 07/16/24  0311 07/15/24  0402 07/13/24  2150   SODIUM mmol/L 139 136 140 137   POTASSIUM mmol/L 4.0 3.9 4.8 4.2   CHLORIDE mmol/L 101 102 106 104   CO2 mmol/L 28.6 23.0 27.5 23.2   BUN mg/dL 14 12 17 19   CREATININE mg/dL 1.08 0.97 1.22 1.33*   GLUCOSE mg/dL 91 105* 97 126*       CMP   Results from last 7 days   Lab Units 07/17/24  0347 07/16/24  0311 07/15/24  0402 07/13/24  2150   SODIUM mmol/L 139 136 140 137   POTASSIUM mmol/L 4.0 3.9 4.8 4.2   CHLORIDE mmol/L 101 102 106 104   CO2 mmol/L 28.6 23.0 27.5 23.2   BUN mg/dL 14 12 17 19   CREATININE mg/dL 1.08 0.97 1.22 1.33*   GLUCOSE mg/dL 91 105* 97 126*   ALBUMIN g/dL  --   --   --  3.7   BILIRUBIN mg/dL  --   --   --  0.5   ALK PHOS U/L  --   --   --  69   AST (SGOT) U/L  --   --   --  24   ALT (SGPT) U/L  --   --   --  6         BNP        TROPONIN  Results from last 7 days   Lab Units 07/14/24  0017   HSTROP T ng/L 24*       CoAg        Creatinine Clearance  Estimated Creatinine Clearance: 58.9 mL/min (by C-G  formula based on SCr of 1.08 mg/dL).    ABG        Radiology  No radiology results for the last day        EKG            I personally viewed and interpreted the patient's EKG/Telemetry data: Sinus rhythm    ECHOCARDIOGRAM:    Results for orders placed during the hospital encounter of 24    Adult Transthoracic Echo Limited W/ Cont if Necessary Per Protocol    Interpretation Summary    Left ventricular systolic function is normal. Left ventricular ejection fraction appears to be 56 - 60%.    Left ventricular wall thickness is consistent with mild concentric hypertrophy.    The left atrial cavity is moderately dilated.    Estimated right ventricular systolic pressure from tricuspid regurgitation is normal (<35 mmHg).    There is a moderate (1-2cm) circumferential pericardial effusion. There is no evidence of cardiac tamponade.    There is a moderate sized left pleural effusion.          STRESS TEST  Results for orders placed during the hospital encounter of 24    Treadmill Stress Test    Interpretation Summary    Patient exercised only 1 minute 2 seconds achieving 4.6 METS    No significant cardiac arrhythmia no significant acute ischemic EKG changes    Patient is stable to start the cardiac rehab program        Cardiolite (Tc-99m sestamibi) stress test    CARDIAC CATHETERIZATION  Results for orders placed during the hospital encounter of 24    Cardiac Catheterization/Vascular Study    Conclusion  Table formatting from the original result was not included.  Cardiac Catheterization Operative Report  PATIENT IDENTIFICATION  Name: Panfilo Feliz  Age: 83 y.o. Sex: male : 1940  MRN: [id number]    Date: 2024  PCP: @PCP@    Requesting Provider  [unfilled]    He underwent cardiac catheterization.    Indications for the procedure include: Valvular heart disease, ascending aortic aneurysm.    Procedure Details:  The risks, benefits, complications, treatment options, and expected outcomes were  discussed with the patient. The patient and/or family concurred with the proposed plan, giving informed consent.    After informed consent the patient was brought to the cath lab after appropriate IV hydration was begun and oral premedication was given. He was further sedated with  none . He was prepped and draped in the usual manner. Using the modified Seldinger access technique and a micropuncture kit, a 6 Palestinian sheath was placed in the femoral artery and a 7 Palestinian sheath was placed in the femoral vein.. A left and right heart catheterization with coronary arteriography was performed. Findings are discussed below.    After the procedure was completed, sedation was stopped and the sheaths and catheters were all removed. Hemostasis was achieved per established hospital protocols.    Conscious sedation:  Conscious sedation was performed according to protocol.  I supervised and directed an independent trained observer with the assistance of monitoring the patient's level of consciousness and physiologic status throughout the procedure.  Intraoperative service time was 0 minutes.    Findings:    Hemodynamics LVEDP: 13  LV: 133/2  Ao: 133/62  RA: 4  RV: 22/0  PA: 22/8  PAWP: 12  Cardiac output Amira: 7.48 (3.57)  Cardiac output thermal: 6.45 (3.08)  Left Main No significant disease  RCA Luminal regularities with no significant stenosis  LAD Luminal regularities with no significant stenosis  Circ Luminal regularities with no significant stenosis  SVG(s) Not applicable  GEORGIE Not applicable  LV Normal left ventricular systolic function ejection fraction 65 to 70%  Coronary Dominance Circumflex    Estimated Blood Loss:  Minimal    Specimens: None    Complications:  None; patient tolerated the procedure well.    Disposition: PACU - hemodynamically stable    Condition: stable    Impressions:  No angiographic evidence for significant epicardial occlusive disease  Normal left ventricular systolic function ejection fraction 65  "to 70%    Recommendations:  CT surgery evaluation currently underway for aortic valve and aortic aneurysm.                OTHER:         Assessment & Plan     Principal Problem:    Syncope    The following from primary cardiologist was reviewed  \"Syncope  Orthostatic hypotension/symptomatic orthostatic hypotension  Patient is currently on midodrine for orthostatic hypotension which was recently increased and also fludrocortisone was added  Echocardiogram with normal LV systolic function and moderate pericardial effusion  Ascending aortic aneurysm s/p repair with left atrial appendage closure  Postop atrial fibrillation with no recurrence of symptoms  No significant obstructive coronary artery disease  Normal LV systolic function/moderate pericardial effusion  Hypertension  Hyperlipidemia  Hypothyroidism  Prostate cancer status post surgery  Supine hypertension       Denies any new complaints  Patient clinically feels better after starting the pyridostigmine  Orthostatic symptoms have improved  Denies any new complaints  He is working with physical therapy  Tmax is 99 pulse is 59-70 respirations are 16 blood pressure is 90/53 to 140/78 sats are 96%  Current medications include metoprolol 12.5 mg p.o. twice daily midodrine 15 mg p.o. 3 times a day patient is on aspirin 81 mg p.o. once a day patient is on Lovenox for DVT prophylaxis he is on fludrocortisone 100 mcg p.o. twice a day  Continue current medical therapy continue aggressive risk factor modification  Continue work with physical therapy  Need for close monitoring and follow-up and also need for close monitoring of blood pressure at home reviewed and discussed with patient  Risk of supine hypertension reviewed and discussed with patient and family  Continue current medical therapy close monitoring and follow-up  Further recommendation based on patient course\"      7/20/2024.    The patient presented with problems with orthostatic hypotension.  History of " syncope with fall.  Patient is on Florinef droxidopa and midodrine.  Echocardiogram 7/15/2024 revealed moderate pericardial effusion without tamponade.    Stabilized ascending aneurysm repair and left atrial appendage closure on 5/10/2024.    History of.  Atrial fibrillation.  Patient is on aspirin but not other anticoagulants.  Rate is well-controlled.    Hypertension-stable.    Hypothyroidism-on supplements    Dyslipidemia-on medications    EUNICE-improved  BUN 14 creatinine 1.08-7/17/2024.    Medications were reviewed and updated.  Patient is on aspirin subcu Lovenox    Florinef levothyroxine metoprolol tartrate 12.5 mg twice daily midodrine pantoprazole Lyrica Mestinon    Further plan will depend on patient's progress.  Reviewed and updated 7/20/2024        Salma De La Garza MD  07/20/24  11:18 EDT

## 2024-07-20 NOTE — DISCHARGE SUMMARY
Date of Discharge:  7/20/2024    Discharge Diagnosis:   Syncope  Weakness  Orthostatic hypotension  Fall  Hyperlipidemia  CAD  Hypothyroid  IGG monoclonal gammopathy  GERD  H/o prostate cancer  AAA      Presenting Problem/History of Present Illness  Active Hospital Problems    Diagnosis  POA    **Syncope [R55]  Yes      Resolved Hospital Problems   No resolved problems to display.          Hospital Course    Patient is a 84 y.o. male presented with past medical history of AAA repair,, severe orthostatic hypotension with syncope, obstipation, weight loss, hyperlipidemia and CAD.  Patient reports that he has been hypotensive recently and his cardiologist increased his midodrine and added fludrocortisone. Patient states that over the past 2 weeks has been getting very dizzy and falling. Patient is supposed to use a walker however has not been using it when walking because he is afraid to fall and hit his chest on the walker.  Midodrine and fludrocortisone were maximized with addition of mestinonand cardiology may add droxidopa as an outpatient.  However for the last 2 days patient has maintained blood pressure is back to baseline. He will need to maintain aggressive bowel regimen due to chronic constipation and addition of 3 new medications. He will need to follow up with PCP in 2 weeks and cardiology in 3 weeks. He will have  follow     Procedures Performed         Consults:   Consults       Date and Time Order Name Status Description    7/14/2024  7:33 AM Inpatient Cardiology Consult Completed             Pertinent Test Results:    Lab Results (most recent)       Procedure Component Value Units Date/Time    CBC & Differential [[id number]]  (Abnormal) Collected: 07/20/24 0419    Specimen: Blood from Arm, Left Updated: 07/20/24 0525    Narrative:      The following orders were created for panel order CBC & Differential.  Procedure                               Abnormality         Status                     ---------                                -----------         ------                     CBC Auto Differential[[id number]]        Abnormal            Final result               Scan Slide[[id number]]                                       Final result                 Please view results for these tests on the individual orders.    Scan Slide [[id number]] Collected: 07/20/24 0419    Specimen: Blood from Arm, Left Updated: 07/20/24 0513     Scan Slide --     Comment: See Manual Differential Results       Manual Differential [[id number]]  (Abnormal) Collected: 07/20/24 0419    Specimen: Blood from Arm, Left Updated: 07/20/24 0513     Neutrophil % 32.0 %      Lymphocyte % 37.0 %      Monocyte % 14.0 %      Eosinophil % 3.0 %      Basophil % 1.0 %      Bands %  6.0 %      Metamyelocyte % 2.0 %      Atypical Lymphocyte % 5.0 %      Neutrophils Absolute 0.93 10*3/mm3      Lymphocytes Absolute 1.02 10*3/mm3      Monocytes Absolute 0.34 10*3/mm3      Eosinophils Absolute 0.07 10*3/mm3      Basophils Absolute 0.02 10*3/mm3      RBC Morphology Normal     WBC Morphology Normal     Large Platelets Slight/1+    CBC Auto Differential [[id number]]  (Abnormal) Collected: 07/20/24 0419    Specimen: Blood from Arm, Left Updated: 07/20/24 0513     WBC 2.44 10*3/mm3      RBC 3.92 10*6/mm3      Hemoglobin 10.5 g/dL      Hematocrit 34.0 %      MCV 86.7 fL      MCH 26.8 pg      MCHC 30.9 g/dL      RDW 14.3 %      RDW-SD 45.3 fl      MPV 10.1 fL      Platelets 149 10*3/mm3     Narrative:      The previously reported component NRBC is no longer being reported. Previous result was 0.0 /100 WBC (Reference Range: 0.0-0.2 /100 WBC) on 7/20/2024 at 0431 EDT.    CBC & Differential [[id number]]  (Abnormal) Collected: 07/19/24 0452    Specimen: Blood Updated: 07/19/24 0654    Narrative:      The following orders were created for panel order CBC & Differential.  Procedure                               Abnormality         Status                     ---------                                -----------         ------                     CBC Auto Differential[[id number]]        Abnormal            Final result               Scan Slide[[id number]]                                       Final result                 Please view results for these tests on the individual orders.    Scan Slide [[id number]] Collected: 07/19/24 0452    Specimen: Blood Updated: 07/19/24 0654     Scan Slide --     Comment: See Manual Differential Results       Manual Differential [[id number]]  (Abnormal) Collected: 07/19/24 0452    Specimen: Blood Updated: 07/19/24 0654     Neutrophil % 43.0 %      Lymphocyte % 31.0 %      Monocyte % 15.0 %      Eosinophil % 1.0 %      Basophil % 1.0 %      Bands %  5.0 %      Atypical Lymphocyte % 4.0 %      Neutrophils Absolute 1.52 10*3/mm3      Lymphocytes Absolute 1.11 10*3/mm3      Monocytes Absolute 0.47 10*3/mm3      Eosinophils Absolute 0.03 10*3/mm3      Basophils Absolute 0.03 10*3/mm3      RBC Morphology Normal     WBC Morphology Normal     Large Platelets Slight/1+    CBC Auto Differential [[id number]]  (Abnormal) Collected: 07/19/24 0452    Specimen: Blood Updated: 07/19/24 0654     WBC 3.16 10*3/mm3      RBC 3.99 10*6/mm3      Hemoglobin 10.8 g/dL      Hematocrit 35.2 %      MCV 88.2 fL      MCH 27.1 pg      MCHC 30.7 g/dL      RDW 14.0 %      RDW-SD 45.4 fl      MPV 10.7 fL      Platelets 129 10*3/mm3     Narrative:      The previously reported component NRBC is no longer being reported. Previous result was 0.0 /100 WBC (Reference Range: 0.0-0.2 /100 WBC) on 7/19/2024 at 0527 EDT.    Basic Metabolic Panel [[id number]]  (Normal) Collected: 07/17/24 0347    Specimen: Blood from Arm, Left Updated: 07/17/24 0514     Glucose 91 mg/dL      BUN 14 mg/dL      Creatinine 1.08 mg/dL      Sodium 139 mmol/L      Potassium 4.0 mmol/L      Chloride 101 mmol/L      CO2 28.6 mmol/L      Calcium 8.7 mg/dL      BUN/Creatinine Ratio 13.0     Anion Gap 9.4 mmol/L      eGFR 68.1 mL/min/1.73      Narrative:      GFR Normal >60  Chronic Kidney Disease <60  Kidney Failure <15    The GFR formula is only valid for adults with stable renal function between ages 18 and 70.    Basic Metabolic Panel [[id number]]  (Abnormal) Collected: 07/16/24 0311    Specimen: Blood from Arm, Left Updated: 07/16/24 0355     Glucose 105 mg/dL      BUN 12 mg/dL      Creatinine 0.97 mg/dL      Sodium 136 mmol/L      Potassium 3.9 mmol/L      Chloride 102 mmol/L      CO2 23.0 mmol/L      Calcium 8.3 mg/dL      BUN/Creatinine Ratio 12.4     Anion Gap 11.0 mmol/L      eGFR 77.5 mL/min/1.73     Narrative:      GFR Normal >60  Chronic Kidney Disease <60  Kidney Failure <15    The GFR formula is only valid for adults with stable renal function between ages 18 and 70.    COVID-19, FLU A/B, RSV PCR 1 HR TAT - Swab, Nasopharynx [[id number]]  (Normal) Collected: 07/15/24 0806    Specimen: Swab from Nasopharynx Updated: 07/15/24 0858     COVID19 Not Detected     Influenza A PCR Not Detected     Influenza B PCR Not Detected     RSV, PCR Not Detected    Narrative:      Fact sheet for providers: https://www.fda.gov/media/041254/download    Fact sheet for patients: https://www.fda.gov/media/467347/download    Test performed by PCR.    High Sensitivity Troponin T 2Hr [[id number]]  (Abnormal) Collected: 07/14/24 0017    Specimen: Blood Updated: 07/14/24 0039     HS Troponin T 24 ng/L      Troponin T Delta -2 ng/L     Narrative:      High Sensitive Troponin T Reference Range:  <14.0 ng/L- Negative Female for AMI  <22.0 ng/L- Negative Male for AMI  >=14 - Abnormal Female indicating possible myocardial injury.  >=22 - Abnormal Male indicating possible myocardial injury.   Clinicians would have to utilize clinical acumen, EKG, Troponin, and serial changes to determine if it is an Acute Myocardial Infarction or myocardial injury due to an underlying chronic condition.         Urinalysis With Culture If Indicated - Urine, Clean Catch [[id number]]   (Abnormal) Collected: 07/13/24 2258    Specimen: Urine, Clean Catch Updated: 07/13/24 2316     Color, UA Yellow     Appearance, UA Clear     pH, UA 6.5     Specific Gravity, UA 1.023     Glucose, UA Negative     Ketones, UA Trace     Bilirubin, UA Negative     Blood, UA Negative     Protein, UA Trace     Leuk Esterase, UA Trace     Nitrite, UA Negative     Urobilinogen, UA 1.0 E.U./dL    Narrative:      In absence of clinical symptoms, the presence of pyuria, bacteria, and/or nitrites on the urinalysis result does not correlate with infection.    Urinalysis, Microscopic Only - Urine, Clean Catch [[id number]] Collected: 07/13/24 2258    Specimen: Urine, Clean Catch Updated: 07/13/24 2316     RBC, UA 0-2 /HPF      WBC, UA 0-2 /HPF      Comment: Urine culture not indicated.        Bacteria, UA None Seen /HPF      Squamous Epithelial Cells, UA 0-2 /HPF      Hyaline Casts, UA 3-6 /LPF      Methodology Automated Microscopy    Comprehensive Metabolic Panel [[id number]]  (Abnormal) Collected: 07/13/24 2150    Specimen: Blood Updated: 07/13/24 2232     Glucose 126 mg/dL      BUN 19 mg/dL      Creatinine 1.33 mg/dL      Sodium 137 mmol/L      Potassium 4.2 mmol/L      Comment: Slight hemolysis detected by analyzer. Result may be falsely elevated.        Chloride 104 mmol/L      CO2 23.2 mmol/L      Calcium 8.9 mg/dL      Total Protein 6.7 g/dL      Albumin 3.7 g/dL      ALT (SGPT) 6 U/L      AST (SGOT) 24 U/L      Comment: Slight hemolysis detected by analyzer. Result may be falsely elevated.        Alkaline Phosphatase 69 U/L      Total Bilirubin 0.5 mg/dL      Globulin 3.0 gm/dL      A/G Ratio 1.2 g/dL      BUN/Creatinine Ratio 14.3     Anion Gap 9.8 mmol/L      eGFR 53.0 mL/min/1.73     Narrative:      GFR Normal >60  Chronic Kidney Disease <60  Kidney Failure <15    The GFR formula is only valid for adults with stable renal function between ages 18 and 70.    High Sensitivity Troponin T [[id number]]  (Abnormal) Collected:  07/13/24 2150    Specimen: Blood Updated: 07/13/24 2230     HS Troponin T 26 ng/L     Narrative:      High Sensitive Troponin T Reference Range:  <14.0 ng/L- Negative Female for AMI  <22.0 ng/L- Negative Male for AMI  >=14 - Abnormal Female indicating possible myocardial injury.  >=22 - Abnormal Male indicating possible myocardial injury.   Clinicians would have to utilize clinical acumen, EKG, Troponin, and serial changes to determine if it is an Acute Myocardial Infarction or myocardial injury due to an underlying chronic condition.         Extra Tubes [[id number]] Collected: 07/13/24 2046    Specimen: Blood, Venous Line Updated: 07/13/24 2201    Narrative:      The following orders were created for panel order Extra Tubes.  Procedure                               Abnormality         Status                     ---------                               -----------         ------                     Gold Top - SST[[id number]]                                   Final result               Light Blue Top[[id number]]                                   Final result                 Please view results for these tests on the individual orders.    Gold Top - SST [[id number]] Collected: 07/13/24 2046    Specimen: Blood Updated: 07/13/24 2201     Extra Tube Hold for add-ons.     Comment: Auto resulted.       Light Blue Top [[id number]] Collected: 07/13/24 2046    Specimen: Blood Updated: 07/13/24 2201     Extra Tube Hold for add-ons.     Comment: Auto resulted                Results for orders placed during the hospital encounter of 07/13/24    Adult Transthoracic Echo Limited W/ Cont if Necessary Per Protocol    Interpretation Summary    Left ventricular systolic function is normal. Left ventricular ejection fraction appears to be 56 - 60%.    Left ventricular wall thickness is consistent with mild concentric hypertrophy.    The left atrial cavity is moderately dilated.    Estimated right ventricular systolic pressure from tricuspid  regurgitation is normal (<35 mmHg).    There is a moderate (1-2cm) circumferential pericardial effusion. There is no evidence of cardiac tamponade.    There is a moderate sized left pleural effusion.       Condition on Discharge:  Stable    Vital Signs  Temp:  [97.2 °F (36.2 °C)-99 °F (37.2 °C)] 97.2 °F (36.2 °C)  Heart Rate:  [59-64] 62  Resp:  [15-21] 18  BP: ()/(53-78) 131/71      Physical Exam  Vitals reviewed.   Constitutional:       Appearance: He is not ill-appearing.   HENT:      Head: Normocephalic and atraumatic.      Right Ear: External ear normal.      Left Ear: External ear normal.      Nose: Nose normal.      Mouth/Throat:      Mouth: Mucous membranes are moist.   Eyes:      General:         Right eye: No discharge.         Left eye: No discharge.   Cardiovascular:      Rate and Rhythm: Normal rate and regular rhythm.      Pulses: Normal pulses.      Heart sounds: Normal heart sounds.   Pulmonary:      Effort: Pulmonary effort is normal.      Breath sounds: Normal breath sounds.   Abdominal:      General: Bowel sounds are normal.      Palpations: Abdomen is soft.   Musculoskeletal:         General: Normal range of motion.      Cervical back: Normal range of motion.   Skin:     General: Skin is warm and dry.   Neurological:      Mental Status: He is alert and oriented to person, place, and time.   Psychiatric:         Behavior: Behavior normal.              [place] [facility] [place]     Discharge Medications        New Medications        Instructions Start Date   linaclotide 145 MCG capsule capsule  Commonly known as: Linzess   145 mcg, Oral, Every Morning Before Breakfast      methocarbamol 500 MG tablet  Commonly known as: ROBAXIN   500 mg, Oral, Every 8 Hours PRN      polyethylene glycol 17 g packet  Commonly known as: MIRALAX   17 g, Oral, Daily PRN      pyridostigmine 60 MG tablet  Commonly known as: MESTINON   60 mg, Oral, Every 12 Hours Scheduled              Changes to Medications        Instructions Start Date   fludrocortisone 0.1 MG tablet  What changed: when to take this   0.1 mg, Oral, 2 Times Daily      midodrine 5 MG tablet  Commonly known as: PROAMATINE  What changed:   medication strength  how much to take   15 mg, Oral, 3 Times Daily Before Meals             Continue These Medications        Instructions Start Date   acetaminophen 325 MG tablet  Commonly known as: TYLENOL   650 mg, Oral, Every 4 Hours PRN      aspirin 81 MG EC tablet   81 mg, Oral, Nightly      BIOFREEZE EX   1 Application, Apply externally, Daily PRN      docusate sodium 100 MG capsule  Commonly known as: COLACE   3 capsules, Oral, 2 Times Daily      escitalopram 10 MG tablet  Commonly known as: LEXAPRO   10 mg, Oral, Daily, Take combined with 20mg tablets for a total of 30mg daily      escitalopram 20 MG tablet  Commonly known as: LEXAPRO   1 tablet, Oral, Daily, Take w/ 10 mg tab for total dose = 30 mg daily      levothyroxine 100 MCG tablet  Commonly known as: SYNTHROID, LEVOTHROID   1 tablet, Oral, Daily      metoprolol tartrate 25 MG tablet  Commonly known as: LOPRESSOR   12.5 mg, Oral, 2 Times Daily      pantoprazole 40 MG EC tablet  Commonly known as: PROTONIX   1 tablet, Oral, 2 Times Daily      pregabalin 50 MG capsule  Commonly known as: LYRICA   1 capsule, Oral, 2 Times Daily      vitamin B-12 1000 MCG tablet  Commonly known as: CYANOCOBALAMIN   1 tablet, Oral, Daily               Discharge Diet:   Diet Instructions       Diet: Regular/House Diet; Regular (IDDSI 7); Thin (IDDSI 0)      Discharge Diet: Regular/House Diet    Texture: Regular (IDDSI 7)    Fluid Consistency: Thin (IDDSI 0)            Activity at Discharge:   Activity Instructions       Gradually Increase Activity Until at Pre-Hospitalization Level              Follow-up Appointments  Future Appointments   Date Time Provider Department Center   8/1/2024  2:00 PM LAB CHAIR [facility]  [facility]    8/1/2024  2:20 PM Yuliana Veloz, APRN [facility]   9/11/2024  1:15 PM Dilan Houston DO [facility]   5/28/2025  2:30 PM Kayli Rowe APRN [facility]     Additional Instructions for the Follow-ups that You Need to Schedule       Ambulatory Referral to Home Health (Hospital)   As directed      Face to Face Visit Date: 7/20/2024   Follow-up provider for Plan of Care?: I treated the patient in an acute care facility and will not continue treatment after discharge.   Follow-up provider: RAGHAVENDRA LYNCH [[id number]]   Reason/Clinical Findings: post hospitalization   Describe mobility limitations that make leaving home difficult: weak   Nursing/Therapeutic Services Requested: Skilled Nursing   Skilled nursing orders: Medication education Cardiopulmonary assessments   Frequency: 1 Week 1        Discharge Follow-up with PCP   As directed       Currently Documented PCP:    Raghavendra Lynch APRN    PCP Phone Number:    [phone]     Follow Up Details: call for appointment to be seen in 2 weeks        Discharge Follow-up with Specified Provider: Dr. Lentz; 3 Weeks   As directed      To: Dr. Lentz   Follow Up: 3 Weeks                Test Results Pending at Discharge       JAVY Proctor  07/20/24  09:00 EDT    Time: Discharge 25 min

## 2024-07-20 NOTE — PROGRESS NOTES
[facility] CARDIOLOGY  [facility]        LOS:  LOS: 5 days   Patient Name: Panfilo Feliz  Age/Sex: 84 y.o. male  : 1940  MRN: [id number]    Day of Service: 24   Length of Stay: 5  Encounter Provider: Julio C Sanchez MD  Place of Service: [facility] CARDIOLOGY  Patient Care Team:  Moreno Tapia APRN as PCP - General (Family Medicine)  Chalo Olvera MD as Consulting Physician (Hematology and Oncology)  Ganesh López MD as Consulting Physician (Neurology)  Jake Enriquez MD as Referring Physician (Family Medicine)    Cardiology assessment and plan     Syncope  Orthostatic hypotension/symptomatic orthostatic hypotension  Patient is currently on midodrine for orthostatic hypotension which was recently increased and also fludrocortisone was added  Echocardiogram with normal LV systolic function and moderate pericardial effusion  Ascending aortic aneurysm s/p repair with left atrial appendage closure  Postop atrial fibrillation with no recurrence of symptoms  No significant obstructive coronary artery disease  Normal LV systolic function/moderate pericardial effusion  Hypertension  Hyperlipidemia  Hypothyroidism  Prostate cancer status post surgery  Supine hypertension         Denies any new complaints  Patient clinically feels better after starting the pyridostigmine  Orthostatic symptoms have improved  Denies any new complaints  He is working with physical therapy  Tmax is 99 pulse is 59-70 respirations are 16 blood pressure is 90/53 to 140/78 sats are 96%  Current medications include metoprolol 12.5 mg p.o. twice daily midodrine 15 mg p.o. 3 times a day patient is on aspirin 81 mg p.o. once a day patient is on Lovenox for DVT prophylaxis he is on fludrocortisone 100 mcg p.o. twice a day  Continue current medical therapy continue aggressive risk factor modification  Continue work with physical therapy  Need for close  monitoring and follow-up and also need for close monitoring of blood pressure at home reviewed and discussed with patient  Risk of supine hypertension reviewed and discussed with patient and family  Continue current medical therapy close monitoring and follow-up  Further recommendation based on patient course                  Subjective:     Chief Complaint:  F/U orthostatic hypotension    Subjective:   Patient wearing compression stockings but still developed dizziness again this morning with standing for orthostatics.    Current Medications:   Scheduled Meds:aspirin, 81 mg, Oral, Nightly  docusate sodium, 100 mg, Oral, BID  enoxaparin, 40 mg, Subcutaneous, Daily  escitalopram, 30 mg, Oral, Daily  fludrocortisone, 100 mcg, Oral, Q12H  levothyroxine, 100 mcg, Oral, Q AM  metoprolol tartrate, 12.5 mg, Oral, BID  midodrine, 15 mg, Oral, TID AC  pantoprazole, 40 mg, Oral, BID  polyethylene glycol, 17 g, Oral, Daily  pregabalin, 50 mg, Oral, BID  pyridostigmine, 60 mg, Oral, Q12H  sodium chloride, 10 mL, Intravenous, Q12H      Continuous Infusions:Pharmacy to Dose enoxaparin (LOVENOX),         Allergies:  Allergies   Allergen Reactions    Statins Myalgia       Review of Systems   Constitutional: Negative for chills, decreased appetite and malaise/fatigue.   HENT:  Negative for congestion and nosebleeds.    Eyes:  Negative for blurred vision and double vision.   Cardiovascular:  Positive for near-syncope and syncope. Negative for chest pain, dyspnea on exertion, irregular heartbeat, leg swelling, orthopnea and palpitations.   Respiratory:  Negative for cough and shortness of breath.    Hematologic/Lymphatic: Negative for adenopathy. Does not bruise/bleed easily.   Skin:  Negative for color change and rash.   Musculoskeletal:  Negative for back pain and joint swelling.   Gastrointestinal:  Negative for bloating, abdominal pain, hematemesis and hematochezia.   Genitourinary:  Negative for flank pain and hematuria.  "  Neurological:  Negative for dizziness and focal weakness.   Psychiatric/Behavioral:  Negative for altered mental status. The patient does not have insomnia.        Objective:     Temp:  [97.2 °F (36.2 °C)-99 °F (37.2 °C)] 97.2 °F (36.2 °C)  Heart Rate:  [59-64] 62  Resp:  [15-21] 18  BP: ()/(53-78) 131/71     Intake/Output Summary (Last 24 hours) at 7/20/2024 0940  Last data filed at 7/20/2024 0903  Gross per 24 hour   Intake 908 ml   Output 850 ml   Net 58 ml     Body mass index is 21.55 kg/m².      07/13/24 2013 07/14/24  0138 07/15/24  0654   Weight: 79.4 kg (175 lb) 80.3 kg (177 lb 0.5 oz) 80.3 kg (177 lb)         Physical Exam:  Neuro:  CV:  Resp:  GI:  Ext:  Tele: AAOx3, no gross deficits  S1S2 RRR, no murmur  Nonlabored, CTA  BS+, abd soft  Pedal pulses palp, no edema  SR                                                   Lab Review:   Results from last 7 days   Lab Units 07/17/24  0347 07/16/24  0311 07/15/24  0402 07/13/24  2150   SODIUM mmol/L 139 136   < > 137   POTASSIUM mmol/L 4.0 3.9   < > 4.2   CHLORIDE mmol/L 101 102   < > 104   CO2 mmol/L 28.6 23.0   < > 23.2   BUN mg/dL 14 12   < > 19   CREATININE mg/dL 1.08 0.97   < > 1.33*   GLUCOSE mg/dL 91 105*   < > 126*   CALCIUM mg/dL 8.7 8.3*   < > 8.9   AST (SGOT) U/L  --   --   --  24   ALT (SGPT) U/L  --   --   --  6    < > = values in this interval not displayed.     Results from last 7 days   Lab Units 07/14/24  0017 07/13/24  2150   HSTROP T ng/L 24* 26*     Results from last 7 days   Lab Units 07/20/24  0419 07/19/24  0452   WBC 10*3/mm3 2.44* 3.16*   HEMOGLOBIN g/dL 10.5* 10.8*   HEMATOCRIT % 34.0* 35.2*   PLATELETS 10*3/mm3 149 129*                   Invalid input(s): \"LDLCALC\"            Recent Radiology:  Imaging Results (Most Recent)       Procedure Component Value Units Date/Time    CT Head Without Contrast [[id number]] Collected: 07/13/24 2329     Updated: 07/13/24 2334    Narrative:      CT HEAD WO CONTRAST, CT CERVICAL SPINE WO " CONTRAST    Date of Exam: 7/13/2024 11:25 PM EDT    Indication: fall.    Comparison: 9/12/2020.    Technique: Axial CT images were obtained of the head and cervical spine without contrast administration.  Coronal reconstructions were performed.  Automated exposure control and iterative reconstruction methods were used.      Findings:  There is no evidence of hemorrhage. There is no mass effect or midline shift.    There is no extracerebral collection.    Ventricles are normal in size and configuration for patient's stated age.      Posterior fossa is within normal limits.    Calvarium and skull base appear intact.   Visualized sinuses show no air fluid levels. Visualized orbits are unremarkable.    No evidence of fracture or compression deformity. No evidence of spondylolysis.  No significant spondylolisthesis. No evidence of focal lesions. The prevertebral soft tissues appear unremarkable. Surrounding soft tissues appear within normal limits. Mild   to moderate multilevel degenerative changes are present throughout the spine. The lung apices appear clear.        Impression:      Impression:  1.No acute intracranial process.  2.No acute osseous abnormality of the cervical spine.        Electronically Signed: Sailaja Murray MD    7/13/2024 11:32 PM EDT    Workstation ID: [id number]    CT Cervical Spine Without Contrast [[id number]] Collected: 07/13/24 2329     Updated: 07/13/24 2334    Narrative:      CT HEAD WO CONTRAST, CT CERVICAL SPINE WO CONTRAST    Date of Exam: 7/13/2024 11:25 PM EDT    Indication: fall.    Comparison: 9/12/2020.    Technique: Axial CT images were obtained of the head and cervical spine without contrast administration.  Coronal reconstructions were performed.  Automated exposure control and iterative reconstruction methods were used.      Findings:  There is no evidence of hemorrhage. There is no mass effect or midline shift.    There is no extracerebral collection.    Ventricles are normal in size  and configuration for patient's stated age.      Posterior fossa is within normal limits.    Calvarium and skull base appear intact.   Visualized sinuses show no air fluid levels. Visualized orbits are unremarkable.    No evidence of fracture or compression deformity. No evidence of spondylolysis.  No significant spondylolisthesis. No evidence of focal lesions. The prevertebral soft tissues appear unremarkable. Surrounding soft tissues appear within normal limits. Mild   to moderate multilevel degenerative changes are present throughout the spine. The lung apices appear clear.        Impression:      Impression:  1.No acute intracranial process.  2.No acute osseous abnormality of the cervical spine.        Electronically Signed: Sailaja Murray MD    7/13/2024 11:32 PM EDT    Workstation ID: [id number]    XR Shoulder 2+ View Left [[id number]] Collected: 07/13/24 2146     Updated: 07/13/24 2148    Narrative:      XR SHOULDER 2+ VW LEFT    Date of Exam: 7/13/2024 9:27 PM EDT    Indication: fall    Comparison: None available.    Findings:  There is no acute fracture or dislocation. Acromioclavicular and glenohumeral joints appear intact. No erosions. Acromiohumeral and coracoclavicular distances are well-maintained. Severe acromioclavicular and glenohumeral osteoarthritic changes are   present. The adjacent lung and ribs appear intact.      Impression:      Impression:  No acute osseous abnormality. Severe osteoarthritic changes are present.        Electronically Signed: Sailaja Murray MD    7/13/2024 9:46 PM EDT    Workstation ID: [id number]    XR Chest 1 View [[id number]] Collected: 07/13/24 2144     Updated: 07/13/24 2147    Narrative:      XR CHEST 1 VW    Date of Exam: 7/13/2024 9:28 PM EDT    Indication: fall/pain    Comparison: 5/14/2024.    Findings:  There are no airspace consolidations. No pleural fluid. No pneumothorax. The pulmonary vasculature appears within normal limits. The cardiac and mediastinal silhouette  appear unremarkable. No acute osseous abnormality identified. No displaced rib   fracture identified. Median sternotomy wires are present.      Impression:      Impression:  No acute cardiopulmonary process.        Electronically Signed: Sailaja Murray MD    7/13/2024 9:45 PM EDT    Workstation ID: [id number]            ECHOCARDIOGRAM:    Results for orders placed during the hospital encounter of 07/13/24    Adult Transthoracic Echo Limited W/ Cont if Necessary Per Protocol    Interpretation Summary    Left ventricular systolic function is normal. Left ventricular ejection fraction appears to be 56 - 60%.    Left ventricular wall thickness is consistent with mild concentric hypertrophy.    The left atrial cavity is moderately dilated.    Estimated right ventricular systolic pressure from tricuspid regurgitation is normal (<35 mmHg).    There is a moderate (1-2cm) circumferential pericardial effusion. There is no evidence of cardiac tamponade.    There is a moderate sized left pleural effusion.        I reviewed the patient's new clinical results.    EKG:      Assessment:       Syncope    1) Syncope with fall  - positive orthostatics  - on midodrine for orthostatic hypotension, recently increased; fludrocortisone added; Droxidopa, but this is unavailable at this facility.   - received IVFs  - repeat 2D echo 7/15 shows no change in moderate pericardial effusion without tamponade     2) ascending aortic aneurysm status post recent repair with MARITA closure on 5/10/24   - post op 2D echo 7/2024 showed EF 61-65%, mild AI, and moderate pericardial effusion without tamponade     3) brief postop A-fib 5/2024  - on aspirin; not on anticoagulation  - on beta blocker  - Preop JOSE 4/2024 showed an EF of 56 to 60% with moderate AI. Preop cath 4/2024 showed no obstructive CAD.   - Preop cath 4/2024 showed no obstructive CAD.      4) hx HTN     5) HLD     6) hypothyroidism     7) prostate cancer status post surgery and radiation     8)  EUNICE  - Cr improved

## 2024-07-21 NOTE — CASE MANAGEMENT/SOCIAL WORK
Case Management Discharge Note      Final Note: Home with [facility]       Home Medical Care Coordination complete.      Service Provider Selected Services Address Phone Fax Patient Preferred    [facility] [address] [facility] [place] [address] [phone] --               Transportation Services  Private: Car    Final Discharge Disposition Code: 06 - home with home health care

## 2024-07-21 NOTE — OUTREACH NOTE
Prep Survey      Flowsheet Row Responses   [facility] facility patient discharged from? [facility]   Is LACE score < 7 ? No   Eligibility Readm Mgmt   Discharge diagnosis Syncope   Does the patient have one of the following disease processes/diagnoses(primary or secondary)? Other   Does the patient have Home health ordered? Yes   What is the Home health agency?  [facility]   Is there a DME ordered? No   Prep survey completed? Yes            Maegan WOOD - Registered Nurse

## 2024-07-22 ENCOUNTER — HOME CARE VISIT (OUTPATIENT)
Dept: HOME HEALTH SERVICES | Facility: HOME HEALTHCARE | Age: 84
End: 2024-07-22
Payer: MEDICARE

## 2024-07-22 VITALS
DIASTOLIC BLOOD PRESSURE: 44 MMHG | HEART RATE: 76 BPM | RESPIRATION RATE: 18 BRPM | TEMPERATURE: 97 F | SYSTOLIC BLOOD PRESSURE: 84 MMHG | OXYGEN SATURATION: 98 %

## 2024-07-22 PROCEDURE — G0299 HHS/HOSPICE OF RN EA 15 MIN: HCPCS

## 2024-07-22 NOTE — HOME HEALTH
"SOC Note:  pt. with discharge from [facility] on 7/20/24 for Orthostatic hypotension.  Pt. has medical hx. of AAA, MVR in 5/24, hx. of prostate cancer, hypotension, falls, weakness, chronic constipation, weight loss.  Pt. lives with wife who is a retired RN, in a 2 story home, she is primary caregiver.  Pt. was in [facility] last week for admission for fall and a syncope episode, pt. has orthostatic hypotension, currently sees a cardiologist, GI for chronic constipation. Pt. states he has had a few falls at home, and this most recent recent hospital stay he is weak, having trouble ambulating, and weakness in BUE, and BLE.  Pt. is in agreeance with [facility] for SN, PT, OT, and HHA.  Pt. is in agreeance with [facility].    Home Health ordered for: disciplines SN, OT, PT, and HHA    Reason for Hosp/Primary Dx/Co-morbidities: Orthostatic Hypotension, syncope, fall, weakness    Focus of Care: Orthostatic hypotension    Patient's goal(s): \"To get my strength back up\"    Current Functional status/mobility/DME: uses walker, and cane    HB status/Living Arrangements: lives at home with wife    Skin Integrity/wound status: WNL, at risk    Code Status: Full code    Fall Risk/Safety concerns: high risk    Educated on Emergency Plan, steps to take prior to going to the ER and when to Call Home Health [facility]:  pt. and wife aware     Medication issues/Concerns: new medication compliance concerns    Additional Problems/Concerns: n/a    SDOH Barriers (i.e. caregiver concerns, social isolation, transportation, food insecurity, environment, income etc.)/Need for MSW: no"

## 2024-07-23 ENCOUNTER — HOME CARE VISIT (OUTPATIENT)
Dept: HOME HEALTH SERVICES | Facility: HOME HEALTHCARE | Age: 84
End: 2024-07-23
Payer: MEDICARE

## 2024-07-23 VITALS
TEMPERATURE: 97.7 F | HEART RATE: 66 BPM | SYSTOLIC BLOOD PRESSURE: 146 MMHG | DIASTOLIC BLOOD PRESSURE: 84 MMHG | OXYGEN SATURATION: 96 %

## 2024-07-23 PROCEDURE — G0151 HHCP-SERV OF PT,EA 15 MIN: HCPCS

## 2024-07-24 ENCOUNTER — HOME CARE VISIT (OUTPATIENT)
Dept: HOME HEALTH SERVICES | Facility: HOME HEALTHCARE | Age: 84
End: 2024-07-24
Payer: MEDICARE

## 2024-07-24 ENCOUNTER — READMISSION MANAGEMENT (OUTPATIENT)
Dept: CALL CENTER | Facility: HOSPITAL | Age: 84
End: 2024-07-24
Payer: MEDICARE

## 2024-07-24 DIAGNOSIS — R42 DIZZINESS: Primary | ICD-10-CM

## 2024-07-24 DIAGNOSIS — R55 SYNCOPE, UNSPECIFIED SYNCOPE TYPE: ICD-10-CM

## 2024-07-24 PROCEDURE — G0156 HHCP-SVS OF AIDE,EA 15 MIN: HCPCS

## 2024-07-24 NOTE — OUTREACH NOTE
Medical Week 1 Survey      Flowsheet Row Responses   [facility] patient discharged from? [facility]   Does the patient have one of the following disease processes/diagnoses(primary or secondary)? Other   Week 1 attempt successful? Yes   Call start time 1251   Call end time 1305   General alerts for this patient Spouse Kelin is a nurse.   Discharge diagnosis Syncope   Person spoke with today (if not patient) and relationship Spouse- kelin   Meds reviewed with patient/caregiver? Yes   Does the patient have all medications ordered at discharge? Yes   Prescription comments no concerns or questions noted.   Is the patient taking all medications as directed (includes completed medication regime)? Yes   Does the patient have a primary care provider?  Yes   Comments regarding PCP Patient has pcp follow up scheduled.   What is the Home health agency?  [facility]   Has home health visited the patient within 72 hours of discharge? Yes   Psychosocial issues? No   Did the patient receive a copy of their discharge instructions? Yes   Nursing interventions Reviewed instructions with patient   What is the patient's perception of their health status since discharge? Improving   Is the patient/caregiver able to teach back signs and symptoms related to disease process for when to call PCP? Yes   Is the patient/caregiver able to teach back signs and symptoms related to disease process for when to call 911? Yes   Is the patient/caregiver able to teach back the hierarchy of who to call/visit for symptoms/problems? PCP, Specialist, Home health nurse, Urgent Care, ED, 911 Yes   Week 1 call completed? Yes   Graduated Yes   Wrap up additional comments Spouse reports doing well, he is getting more exercise being home. Home Health has been to the home and working well with the patient. HH ordered light weight wc. Cards will be ordering Holter monitor. No current concerns or questions noted.   Call end time 1305            Marium Bell Registered  Nurse

## 2024-07-25 ENCOUNTER — HOME CARE VISIT (OUTPATIENT)
Dept: HOME HEALTH SERVICES | Facility: HOME HEALTHCARE | Age: 84
End: 2024-07-25
Payer: MEDICARE

## 2024-07-25 ENCOUNTER — HOSPITAL ENCOUNTER (OUTPATIENT)
Dept: CARDIOLOGY | Facility: HOSPITAL | Age: 84
Discharge: HOME | End: 2024-07-25
Payer: MEDICARE

## 2024-07-25 VITALS
TEMPERATURE: 98.2 F | DIASTOLIC BLOOD PRESSURE: 60 MMHG | OXYGEN SATURATION: 96 % | RESPIRATION RATE: 15 BRPM | SYSTOLIC BLOOD PRESSURE: 112 MMHG

## 2024-07-25 VITALS
DIASTOLIC BLOOD PRESSURE: 60 MMHG | HEART RATE: 76 BPM | SYSTOLIC BLOOD PRESSURE: 100 MMHG | RESPIRATION RATE: 18 BRPM | OXYGEN SATURATION: 97 % | TEMPERATURE: 97 F

## 2024-07-25 DIAGNOSIS — R55 SYNCOPE, UNSPECIFIED SYNCOPE TYPE: ICD-10-CM

## 2024-07-25 DIAGNOSIS — R42 DIZZINESS: ICD-10-CM

## 2024-07-25 PROCEDURE — G0157 HHC PT ASSISTANT EA 15: HCPCS

## 2024-07-25 PROCEDURE — G0299 HHS/HOSPICE OF RN EA 15 MIN: HCPCS

## 2024-07-25 NOTE — HOME HEALTH
pt sitting up, prepared for PT session,    feeling fair but with limited strength/endurance and functional capacity.        pt reports he now stays in his bed most of the day and very much wants to improve and be able to navigate the steps to get to the lower level of the home.    spouse present and is concerned regarding pts new onset of weakness.    pt denies any falls since last PT session but reports he fell last week attempting [place] ambulate to the bathroom.    it was suggested pt go to rehab but pt requested coming home with home PT.      pt was very limited today in all areas with low endurance,   lightheadedness when standing with bp drop to 94/50 ( MD is aware and pt has been treated for this as this is an ongoing condition)  pt was challenged to stand for extended periods of time and gait tolerance was very limited.   discussed the importance of standing each 1-2 hours and to sit up vs remaining in the bed throughout the day.    advised sba of spouse with all standing activities and encouraged wc use at all times when dizzy       plan for next session-   gait trg, hep review with advancement,    transfer trg,   step trg when able

## 2024-07-26 ENCOUNTER — HOME CARE VISIT (OUTPATIENT)
Dept: HOME HEALTH SERVICES | Facility: HOME HEALTHCARE | Age: 84
End: 2024-07-26
Payer: MEDICARE

## 2024-07-29 ENCOUNTER — HOME CARE VISIT (OUTPATIENT)
Dept: HOME HEALTH SERVICES | Facility: HOME HEALTHCARE | Age: 84
End: 2024-07-29
Payer: MEDICARE

## 2024-07-29 VITALS
SYSTOLIC BLOOD PRESSURE: 122 MMHG | RESPIRATION RATE: 18 BRPM | TEMPERATURE: 97.9 F | HEART RATE: 76 BPM | OXYGEN SATURATION: 97 % | DIASTOLIC BLOOD PRESSURE: 60 MMHG

## 2024-07-29 PROCEDURE — G0299 HHS/HOSPICE OF RN EA 15 MIN: HCPCS

## 2024-07-30 ENCOUNTER — HOME CARE VISIT (OUTPATIENT)
Dept: HOME HEALTH SERVICES | Facility: HOME HEALTHCARE | Age: 84
End: 2024-07-30
Payer: MEDICARE

## 2024-07-30 ENCOUNTER — LAB REQUISITION (OUTPATIENT)
Dept: LAB | Facility: HOSPITAL | Age: 84
End: 2024-07-30
Payer: MEDICARE

## 2024-07-30 DIAGNOSIS — N39.0 URINARY TRACT INFECTION, SITE NOT SPECIFIED: ICD-10-CM

## 2024-07-30 LAB
BILIRUB UR QL STRIP: NEGATIVE
CLARITY UR: CLEAR
COLOR UR: ABNORMAL
GLUCOSE UR STRIP-MCNC: NEGATIVE MG/DL
HGB UR QL STRIP.AUTO: NEGATIVE
KETONES UR QL STRIP: ABNORMAL
LEUKOCYTE ESTERASE UR QL STRIP.AUTO: NEGATIVE
NITRITE UR QL STRIP: NEGATIVE
PH UR STRIP.AUTO: 6 [PH] (ref 5–8)
PROT UR QL STRIP: ABNORMAL
SP GR UR STRIP: 1.03 (ref 1–1.03)
UROBILINOGEN UR QL STRIP: ABNORMAL

## 2024-07-30 PROCEDURE — 81003 URINALYSIS AUTO W/O SCOPE: CPT | Performed by: NURSE PRACTITIONER

## 2024-07-30 PROCEDURE — G0299 HHS/HOSPICE OF RN EA 15 MIN: HCPCS

## 2024-07-31 ENCOUNTER — HOME CARE VISIT (OUTPATIENT)
Dept: HOME HEALTH SERVICES | Facility: HOME HEALTHCARE | Age: 84
End: 2024-07-31
Payer: MEDICARE

## 2024-08-01 ENCOUNTER — HOME CARE VISIT (OUTPATIENT)
Dept: HOME HEALTH SERVICES | Facility: HOME HEALTHCARE | Age: 84
End: 2024-08-01
Payer: MEDICARE

## 2024-08-01 VITALS
HEART RATE: 72 BPM | DIASTOLIC BLOOD PRESSURE: 62 MMHG | SYSTOLIC BLOOD PRESSURE: 120 MMHG | TEMPERATURE: 97 F | OXYGEN SATURATION: 98 % | RESPIRATION RATE: 18 BRPM

## 2024-08-01 VITALS
SYSTOLIC BLOOD PRESSURE: 124 MMHG | HEART RATE: 78 BPM | OXYGEN SATURATION: 96 % | RESPIRATION RATE: 15 BRPM | DIASTOLIC BLOOD PRESSURE: 60 MMHG | TEMPERATURE: 97.6 F

## 2024-08-01 PROCEDURE — G0299 HHS/HOSPICE OF RN EA 15 MIN: HCPCS

## 2024-08-01 PROCEDURE — G0157 HHC PT ASSISTANT EA 15: HCPCS

## 2024-08-02 ENCOUNTER — HOME CARE VISIT (OUTPATIENT)
Dept: HOME HEALTH SERVICES | Facility: HOME HEALTHCARE | Age: 84
End: 2024-08-02
Payer: MEDICARE

## 2024-08-02 ENCOUNTER — LAB REQUISITION (OUTPATIENT)
Dept: LAB | Facility: HOSPITAL | Age: 84
End: 2024-08-02
Payer: MEDICARE

## 2024-08-02 VITALS
TEMPERATURE: 97 F | HEART RATE: 76 BPM | DIASTOLIC BLOOD PRESSURE: 70 MMHG | OXYGEN SATURATION: 98 % | SYSTOLIC BLOOD PRESSURE: 126 MMHG | RESPIRATION RATE: 18 BRPM

## 2024-08-02 DIAGNOSIS — I95.1 ORTHOSTATIC HYPOTENSION: ICD-10-CM

## 2024-08-02 LAB
ALBUMIN SERPL-MCNC: 3.9 G/DL (ref 3.5–5.2)
ALBUMIN/GLOB SERPL: 1.3 G/DL
ALP SERPL-CCNC: 55 U/L (ref 39–117)
ALT SERPL W P-5'-P-CCNC: 12 U/L (ref 1–41)
ANION GAP SERPL CALCULATED.3IONS-SCNC: 10.5 MMOL/L (ref 5–15)
AST SERPL-CCNC: 21 U/L (ref 1–40)
BASOPHILS # BLD AUTO: 0.03 10*3/MM3 (ref 0–0.2)
BASOPHILS NFR BLD AUTO: 0.8 % (ref 0–1.5)
BILIRUB SERPL-MCNC: 0.4 MG/DL (ref 0–1.2)
BUN SERPL-MCNC: 14 MG/DL (ref 8–23)
BUN/CREAT SERPL: 15.6 (ref 7–25)
CALCIUM SPEC-SCNC: 9.4 MG/DL (ref 8.6–10.5)
CHLORIDE SERPL-SCNC: 105 MMOL/L (ref 98–107)
CO2 SERPL-SCNC: 28.5 MMOL/L (ref 22–29)
CREAT SERPL-MCNC: 0.9 MG/DL (ref 0.76–1.27)
DEPRECATED RDW RBC AUTO: 46.3 FL (ref 37–54)
EGFRCR SERPLBLD CKD-EPI 2021: 84.7 ML/MIN/1.73
EOSINOPHIL # BLD AUTO: 0.08 10*3/MM3 (ref 0–0.4)
EOSINOPHIL NFR BLD AUTO: 2.2 % (ref 0.3–6.2)
ERYTHROCYTE [DISTWIDTH] IN BLOOD BY AUTOMATED COUNT: 14.4 % (ref 12.3–15.4)
GLOBULIN UR ELPH-MCNC: 3.1 GM/DL
GLUCOSE SERPL-MCNC: 90 MG/DL (ref 65–99)
HCT VFR BLD AUTO: 36.3 % (ref 37.5–51)
HGB BLD-MCNC: 11.2 G/DL (ref 13–17.7)
IMM GRANULOCYTES # BLD AUTO: 0.01 10*3/MM3 (ref 0–0.05)
IMM GRANULOCYTES NFR BLD AUTO: 0.3 % (ref 0–0.5)
LYMPHOCYTES # BLD AUTO: 1.31 10*3/MM3 (ref 0.7–3.1)
LYMPHOCYTES NFR BLD AUTO: 36.5 % (ref 19.6–45.3)
MCH RBC QN AUTO: 27 PG (ref 26.6–33)
MCHC RBC AUTO-ENTMCNC: 30.9 G/DL (ref 31.5–35.7)
MCV RBC AUTO: 87.5 FL (ref 79–97)
MONOCYTES # BLD AUTO: 0.44 10*3/MM3 (ref 0.1–0.9)
MONOCYTES NFR BLD AUTO: 12.3 % (ref 5–12)
NEUTROPHILS NFR BLD AUTO: 1.72 10*3/MM3 (ref 1.7–7)
NEUTROPHILS NFR BLD AUTO: 47.9 % (ref 42.7–76)
NRBC BLD AUTO-RTO: 0 /100 WBC (ref 0–0.2)
PLATELET # BLD AUTO: 268 10*3/MM3 (ref 140–450)
PMV BLD AUTO: 11 FL (ref 6–12)
POTASSIUM SERPL-SCNC: 3.5 MMOL/L (ref 3.5–5.2)
PROT SERPL-MCNC: 7 G/DL (ref 6–8.5)
RBC # BLD AUTO: 4.15 10*6/MM3 (ref 4.14–5.8)
SODIUM SERPL-SCNC: 144 MMOL/L (ref 136–145)
WBC NRBC COR # BLD AUTO: 3.59 10*3/MM3 (ref 3.4–10.8)

## 2024-08-02 PROCEDURE — 85025 COMPLETE CBC W/AUTO DIFF WBC: CPT | Performed by: NURSE PRACTITIONER

## 2024-08-02 PROCEDURE — 80053 COMPREHEN METABOLIC PANEL: CPT | Performed by: NURSE PRACTITIONER

## 2024-08-02 PROCEDURE — G0299 HHS/HOSPICE OF RN EA 15 MIN: HCPCS

## 2024-08-02 NOTE — HOME HEALTH
pt sitting up in a chair on arrival,  states he feels fair and feeling better,    stating he was running temps earlier this week and was assessed for a UTI but with negative results.         pt was limited today but was able to descend the steps,   requested staying on the lower level following PT session.   advised pt to sit and return up the steps in a seated position for safety.  pts transfer ability was slightly improved but still challenged.  pt was minimally unsteady upon standing and struggled to stand, unable to push with LUE due to recent tricep injury.   pt unsure of reason for this but thinks it was caused when transferring out of bed and pulling too hard.   pt was encouraged to ice 3x daily to the LUE  pt required instruction to properly perform ther ex with rest periods needed and cues to properly to deep plb           plan for next session-,    cont with step trg,  balance trg,   gait trg,  hep review with advancement

## 2024-08-02 NOTE — PROGRESS NOTES
REASON FOR FOLLOWUP:    1.Minimal leukopenia with thrombocytopenia in the background of minimal IgG monoclonal gammopathy. The patient has no splenomegaly, no peripheral adenopathy, and no symptoms that suggest lupus or collagen vascular disease. Bone marrow   a  spirate documented no evidence of myeloma, lymphoma, leukemia, myelodysplasia, or any other abnormality. Bone marrow chromosomal analysis as well as flow cytometry were negative.   2.  Inflammatory polyneuropathy.  He initiated IVIG on 10/3/2019.  He did receive his second IVIG on 11/7/2019.allergic reaction to it stopping infusion all togethere  3.  Approximately 10 days after his second IVIG infusion he did experience serum sickness that included rash, generalized arthralgias, skin peeling of the palms.  Dr. Olvera prescribed 20 mg of prednisone and his symptoms have resolved after 2 days.      REASON FOR VISIT: PATIENT WAS CALLED THE DAY BEFORE BY THE OFFICE TO ASK FOR SYMPTOMS THAT COULD BE CONSISTENT WITH CORONAVIRUS INFECTION, AND BEING NEGATIVE WAS SCHEDULED TO BE SEEN IN THE OFFICE TODAY. SIMILAR QUESTIONING TODAY INCLUDING, CHILLS, FEVER, NEW COUGH, SHORTNESS OF BREATH, DIARRHEA,DIFFUSE BODY ACHES  AND CHANGES IN SMELL OR TASTE WERE NEGATIVE.THE PATIENT DENIED ANY CONTACT WITH PERSONS WHO WERE POSITIVE FOR COVID, AND PATIENT IS NOT IN CATEGORY OF HIGH RISK BEHAVIOR TO ACQUIRE COVID.    DURING THE VISIT WITH THE PATIENT TODAY , PATIENT HAD FACE MASK, MY MEDICAL ASSISTANT AND I  HAD PROPPER PROTECTIVE EQUIPMENT, AND I DID HAND HYGIENE WITH SOAP AND WATER BEFORE AND AFTER THE VISIT.  TESTED FOR COVID RECENTLY NEGATIVE.  This patient is here today in the company of his wife after he has had an upper GI endoscopy documenting very significant gastritis and was placed on a PPI for this.  He still has some nausea, but it is not as bad as before.  He is starting to modify his diet to try to be a healthier eater.  He has no vomiting, no hematemesis and no  melena.  He also was seen by Dr. England in the interim.  He does not believe that the patient will have any process related to cervical cord compression.  The patient also was seen by Darrick Garcia MD, Neurologist, and an EMG was performed.  This, according to the report, does not suggest idiopathic inflammatory polyneuropathy.  Dr. Garcia will follow up on him in a few months.    In the meantime, his neurological symptomatolgy continues with tingling and numbness in his toes and hands.  He is able to walk and actually he has a little bit more freedom to walk now that he feels better in general terms. He has been drinking liquids appropriately.  Urination is fine.  He is back on Lyrica as well to try to control his neuropathic symptomatology.                    Past Medical History:   Diagnosis Date   • Arthritis    • Basal cell carcinoma (BCC) of face    • Disease of thyroid gland    • DJD (degenerative joint disease)    • History of bone marrow biopsy    • Hyperlipidemia    • Hypertension    • Hypothyroidism    • IgG monoclonal gammopathy    • Prostate cancer (CMS/HCC)    • Reflux esophagitis      Past Surgical History:   Procedure Laterality Date   • CHOLECYSTECTOMY  1988   • COLON SURGERY  1998   • COLONOSCOPY  02/2013   • ENDOSCOPY  2012    ESOPHAGOGASTRODUODENOSCOPY WITH DILATION OF SHATZKI'S RING   • ENDOSCOPY N/A 10/9/2020    Procedure: ESOPHAGOGASTRODUODENOSCOPY with cold bx;  Surgeon: Jake Perez MD;  Location: [facility] ENDOSCOPY;  Service: Gastroenterology;  Laterality: N/A;  pre - nausea  post - duodenal ulcer, gastrits, gastric ulcer, hiatal hernia   • LAMINECTOMY  2013    decompression L3-4, L4-5   • PROSTATECTOMY  2007   • SKIN BIOPSY     • TONSILLECTOMY AND ADENOIDECTOMY      1940s   • VASECTOMY      1970s     Social History     Socioeconomic History   • Marital status:      Spouse name: Meghan   • Number of children: Not on file   • Years of education: College   • Highest education  level: Not on file   Occupational History   • Occupation:      Employer: RETIRED     Comment: [employer]   Tobacco Use   • Smoking status: Never Smoker   • Smokeless tobacco: Never Used   Substance and Sexual Activity   • Alcohol use: No     Comment: Heavy in past, none in 33 years   • Drug use: No     Family History   Problem Relation Age of Onset   • Cancer Mother 85        Breast   • COPD Father    • Cancer Brother 59        Unknown type   • Hypertension Daughter            Current Outpatient Medications on File Prior to Visit   Medication Sig Dispense Refill   • atenolol (TENORMIN) 25 MG tablet Take 25 mg by mouth daily.     • colestipol (COLESTID) 1 G tablet Take 1 g by mouth 2 (two) times a day.     • escitalopram (LEXAPRO) 10 MG tablet TAKE ONE TABLET BY MOUTH DAILY 90 tablet 0   • levothyroxine (SYNTHROID, LEVOTHROID) 112 MCG tablet Take 112 mcg by mouth daily.     • Magnesium Hydroxide (DULCOLAX PO) Take 100 mg by mouth Daily.     • Menthol, Topical Analgesic, 4 % gel Apply  topically.     • ondansetron (ZOFRAN) 4 MG tablet Take 1 tablet by mouth Every 12 (Twelve) Hours As Needed for Nausea or Vomiting. 30 tablet 3   • pantoprazole (PROTONIX) 40 MG EC tablet Take 1 tablet by mouth 2 (Two) Times a Day. 60 tablet 12   • pregabalin (LYRICA) 50 MG capsule      • promethazine (PHENERGAN) 12.5 MG tablet      • [DISCONTINUED] aspirin 81 MG chewable tablet Chew 81 mg daily.     • [DISCONTINUED] Ibuprofen (ADVIL PO) Take  by mouth As Needed.     • [DISCONTINUED] sucralfate (CARAFATE) 1 g tablet      • [DISCONTINUED] valACYclovir (VALTREX) 1000 MG tablet        No current facility-administered medications on file prior to visit.    No Known Allergies      ONCOLOGIC/HEMATOLOGIC HISTORY: History from previous dates can be found in the separate document.                     REVIEW OF SYSTEMS:   General: no fever, no chills,  fatigue,no weight changes, no lack of appetite.  Eyes: no epiphora,  xerophthalmia,conjunctivitis, pain, glaucoma, blurred vision, blindness, secretion, photophobia, proptosis, diplopia.  Ears: no otorrhea, tinnitus, otorrhagia, deafness, pain, vertigo.  Nose: no rhinorrhea, no epistaxis, no alteration in perception of odors, no sinuses pressure.  Mouth: no alteration in gums or teeth,  No ulcers, no difficulty with mastication or deglut ion, no odynophagia.  Neck: no masses or pain, no thyroid alterations, no pain in muscles or arteries, no carotid odynia, no crepitation.  Respiratory: no cough, no sputum production,no dyspnea,no trepopnea, no pleuritic pain,no hemoptysis.  Heart: no syncope, no irregularity, no palpitations, no angina,no orthopnea,no paroxysmal nocturnal dyspnea.  Vascular Venous: no tenderness,no edema,no palpable cords,no postphlebitic syndrome, no skin changes no ulcerations.  Vascular Arterial: no distal ischemia, noclaudication, no gangrene, no neuropathic ischemic pain, no skin ulcers, no paleness no cyanosis.  GI: no dysphagia, no odynophagia, no regurgitation, no heartburn,no indigestion,no nausea,no vomiting,no hematemesis ,no melena,no jaundice,no distention, no obstipation,no enterorrhagia,no proctalgia,no anal  lesions, no changes in bowel habits.  : no frequency, no hesitancy, no hematuria, no discharge,no  pain.  Musculoskeletal: no muscle or tendon pain or inflammation,no  joint pain, no edema, no functional limitation,no fasciculations, no mass.  Neurologic: no headache, no seizures, noalterations on Craneal nerves, no motor deficit, STATED U/LE sensory deficit, normal coordination, no alteration in memory,normal orientation, calculation,normal writting, verbal and written language.  Skin: no rashes,no pruritus no localized lesions.  Psychiatric: no anxiety, no depression,no agitation, no delusions, proper insight.                  Vitals:    11/11/20 1447   BP: 114/74   Pulse: 70   Resp: 20   Temp: 97.7 °F (36.5 °C)   TempSrc: Temporal   SpO2:  "96%   Weight: 94.8 kg (209 lb)   Height: 190.5 cm (75\")                    PHYSICAL EXAMINATION:      I HAVE PERSONALLY REVIEWED THE HISTORY OF THE PRESENT ILLNESS, PAST MEDICAL HISTORY, FAMILY HISTORY, SOCIAL HISTORY, ALLERGIES, MEDICATIONS STATED ABOVE IN THE OFFICE NOTE FROM TODAY.    Patient screened  for depression based on a PHQ-9 score of 3 on 7/2/2020.     GENERAL:  Well-developed, well-nourished  Patient  in no acute distress.   SKIN:  Warm, dry ,NO rashes,NO purpura ,NO petechiae.  HEENT:  Pupils were equal and reactive to light and accomodation, conjunctivae noninjected, no pterygium, normal extraocular movements, normal visual acuity.   NECK:  Supple with good range of motion; no thyromegaly or masses, no JVD or bruits, no cervical adenopathies.No carotid artery pain, no carotid abnormal pulsation , NO arterial dance.  LYMPHATICS:  No cervical, NO supraclavicular, NO axillary,NO epitrochlear , NO inguinal adenopathy.  CARDIAC   normal rate and regular rhythm, without murmur,NO rubs NO S3 NO S4 right or left . Normal femoral, popliteal, pedis, brachial and carotid pulses.  VASCULAR ARTERIAL: normal carotids,brachial,radial,femoral,popliteal, pedis pulses , no bruits.no paleness or cyanosis, no pain, no edema, no numbness, no gangrene.  VASCULAR VENOUS: no cyanosis, collateral circulation, varicosities, edema, palpable cords, pain, erythema.  ABDOMEN:  Soft, nontender with no hepatomegaly, no splenomegaly,no masses, no ascites, no collateral circulation,no distention,no Merrill sign, no abdominal pain, no inguinal hernias,no umbilical hernia, no abdominal bruits. Normal bowel sounds.  GENITAL: Not  Performed.  EXTREMITIES  AND SPINE:  No clubbing, cyanosis or edema, no deformities or pain .No kyphosis, scoliosis, deformities or pain in spine, ribs or pelvic bone.  NEUROLOGICAL:  Patient was awake, alert, oriented to time, person and place.SENSORY DEFICIT IN FEET AND HANDS, NO MOTOR " DEFICIT                                     [facility]  Order: [id number] - Part of Panel Order [id number]  Status:  Final result   Visible to patient:  No (not released)   Dx:  Cervical spinal stenosis  Specimen Information: Blood        Component   Ref Range & Units 14:29   WBC   3.40 - 10.80 10*3/mm3 4.41    RBC   4.14 - 5.80 10*6/mm3 4.85    Hemoglobin   13.0 - 17.7 g/dL 14.1    Hematocrit   37.5 - 51.0 % 44.7    MCV   79.0 - 97.0 fL 92.2    MCH   26.6 - 33.0 pg 29.1    MCHC   31.5 - 35.7 g/dL 31.5    RDW   12.3 - 15.4 % 13.2    RDW-SD   37.0 - 54.0 fl 44.6    MPV   6.0 - 12.0 fL 10.8    Platelets   140 - 450 10*3/mm3 159    Neutrophil %   42.7 - 76.0 % 48.1    Lymphocyte %   19.6 - 45.3 % 37.6    Monocyte %   5.0 - 12.0 % 12.2High     Eosinophil %   0.3 - 6.2 % 0.9    Basophil %   0.0 - 1.5 % 0.5    Immature Grans %   0.0 - 0.5 % 0.7High     Neutrophils, Absolute   1.70 - 7.00 10*3/mm3 2.12    Lymphocytes, Absolute   0.70 - 3.10 10*3/mm3 1.66    Monocytes, Absolute   0.10 - 0.90 10*3/mm3 0.54    Eosinophils, Absolute   0.00 - 0.40 10*3/mm3 0.04            Tissue Pathology Exam: [id number]  Order: [id number]  Status:  Final result   Visible to patient:  Yes (MyChart)   Next appt:  02/03/2021 at 01:50 PM in Lab (LAB CHAIR 1 CBC FAM)   Dx:  Slow transit constipation; Nausea  Specimen Information: A: Small Intestine, Duodenum; Tissue    B: Gastric, Body; Tissue        Component    Case Report   Surgical Pathology Report                         Case: [id number]                                   Authorizing Provider:  Jake Perez MD        Collected:           10/09/2020 12:20 PM           Ordering Location:     [facility]  Received:            10/09/2020 02:52 PM                                  ENDO SUITES                                                                   Pathologist:           Fab Berg MD                                                           Specimens:   1) - Small Intestine, Duodenum, duodenum bx                                                          2) - Gastric, Body, gastric bx                                                             Final Diagnosis   1.  Duodenum, Biopsy:  Benign duodenal and small intestinal mucosa with.               A. Normal villous architecture.               B. No active cryptitis, crypt abscess formation, granulomatous inflammation nor                    intestinal parasites identified.     2.  Gastric Biopsy:   Benign gastric mucosa with                 A.  Mild chronic inflammation.               B.  No H. pylori-like organisms identified by routine staining.      jat/brb   Electronically signed by Fab Berg MD on 10/12/2020 at            File Link    Scan on 10/1/2020 by Chalo Olvera MD: NERVE CONDUCTION STUDY 1-2       Key Information    Document ID File Type Document Type Description   [id number] Image EMG - SCAN NERVE CONDUCTION STUDY 1-2   Import Information    Attached At Date Time User Dept   Order Level 10/1/2020  Chalo Olvera MD    Order    Nerve Conduction Study 1-2 [[id number]]   Encounter    Hospital Encounter on 10/1/20 with Rigoberto Garcia MD                             ASSESSMENT:  This patient has monoclonal gammopathy of unknown significance and he has undergone bone marrow testing of this on 2 different occasions not being able to document myeloma, lymphoma, myelodysplasia, leukemia or myelofibrosis. Chromosomal analysis has been normal. The patient has not had any significant difference in his monoclonal protein quantification.    The patient has associated with this sensory peripheral neuropathy with balance issues and painful numbness in his feet. We have tried Neurontin in the past with no benefit, now we are trying Lyrica and it seems to be that this is not going to be any benefit to him neither.    We have also tried prednisone with no benefit as per indication by his Neurologist  and we have tried immunoglobulin with no benefit and actually this medicine triggered serum sickness in him that required short course of prednisone therapy.    I discussed with his attending physician through video medicine from [facility] , the fact that we still have no diagnosis in this patient in regard his sensory neuropathy and his minimal monoclonal protein. He is going to be seen by neurology in that institution also by video medicine. The patient continues having progressing symptoms now in his upper extremities up to his elbows in regard to sensory changes and in his lower extremities up to his knees. He has imbalance and he has a negative Romberg test. Actually his bicipital and tricipital reflexes are very active 2-3. His patellar and Achilles are abolished. Vibratory sensation is normal throughout. The nausea is another symptom that is very bothersome and his insomnia is bothersome. I do not think we have any diagnosis on him at this time and I would like to proceed as follows:  1. We will wait to see what the [facility] physicians have to say in regard to his overall picture.   2. I would like for him to proceed with an MRI scan of the brain, cervical and thoracic spine to see if there is anything that is making all of these symptoms from the neurological system to occur and the nausea to occur as well as his deafness. Maybe all of these things are connected. I would not be surprised if we need to pursue a lumbar puncture.             Since the previous visit the patient underwent an MRI scan of the brain that shows no major alterations, radiologist calls minimal vascular alterations. Most importantly the MRI of the cervical spine documents significant spinal stenosis at the level of C5. I reviewed the pictures of this in the PAC system [facility] with the patient and his wife. The MRI of the thoracic spine shows degenerative changes. Most importantly none of the areas on MRI in the  cranium, cervical, thoracic spine disclose any lesions that could be consistent with myeloma, lymphoma or tumoral lesions of any nature. All of the changes are related to degenerative changes. 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     The patient was further reviewed along with his wife on 11/11/2020. We have a lot of issues to discuss today as follows:  1. His chronic nausea is better. He has had an upper GI endoscopy, he had the location of a Schatzki's ring and also he had biopsies of the stomach that documented gastritis, H. Pylori negative. The patient is now on PPI. His nausea is better. He is eating better and he is not taking any aspirin or antiinflammatory medications, or alcohol at all to reduce gastritis. I point out to him that a lot of that his stress that he carries through all the time probably has something to do with this, I point out that his stomach is a reflection of his soul.     2. His second issue to discuss is his neuropathy. He has been seen by Dr. Darrick Garcia. EMG was performed. Abnormalities documented in the EMG and the interpretation per Dr. Garcia believing that this does not represent a typical feature of inflammatory neuropathy.     Obviously the patient was also seen by Dr. England, I thought that he had cervical spinal stenosis. Dr. England does not believe that that is the case neither. Therefore we still have no definitive diagnosis in regard to the neuropathy in this patient in regards to what is the cause of this besides the association with minimal monoclonal protein that we have evident in peripheral blood. The patient has been advised again to try Lyrica. He started the medicine 2 weeks ago in a larger amount and he is going to give this medicine a good try and see what happens. That is fine from my point of view.     At least today from the point of view of his blood counts, his white count, hemoglobin and platelets remain normal. Monoclonal proteins  have been obtained today and they will be reviewed with him on the telephone.     I also reviewed all the pictures pertinent to the endoscopy and the pathology of endoscopy as stated above in Epic.       I asked the patient to find some other things to do besides watching news all the time and decrease his exposure to stressful situations. I asked him to do different things around the house now that he is feeling better and engage himself in different activities that will be beneficial in the long run for his overall mental and physical health.    I would like to review him back in 3 months with a CBC, CMP and monoclonal protein studies.    I discussed all these facts with the patient and his wife who was present in the room.         I DISCUSSED WITH PATIENT IN DETAIL FORMS TO DECREASE CHANCES OF CORONAVIRUS INFECTION INCLUDING ISOLATION, PROPER HAND HIGIENE, AVOID PUBLIC PLACES  WITH CROWDS, FOLLOW  CDC RECOMENDATIONS, AND KEEP PERSONAL AND SOCIAL RESPONSIBILITY, WARE A MASK IN PUBLIC PLACES.  PATIENT IS AWARE THIS INFECTION COULD HAVE SEVERE CONSEQUENCES TO PERSONAL HEALTH AND FAMILY RAMIFICATIONS OF THIS.                         402

## 2024-08-06 ENCOUNTER — HOME CARE VISIT (OUTPATIENT)
Dept: HOME HEALTH SERVICES | Facility: HOME HEALTHCARE | Age: 84
End: 2024-08-06
Payer: MEDICARE

## 2024-08-06 ENCOUNTER — TELEPHONE (OUTPATIENT)
Dept: ONCOLOGY | Facility: CLINIC | Age: 84
End: 2024-08-06
Payer: MEDICARE

## 2024-08-06 VITALS
DIASTOLIC BLOOD PRESSURE: 68 MMHG | RESPIRATION RATE: 16 BRPM | SYSTOLIC BLOOD PRESSURE: 132 MMHG | TEMPERATURE: 97.7 F | OXYGEN SATURATION: 96 % | HEART RATE: 86 BPM

## 2024-08-06 PROCEDURE — G0157 HHC PT ASSISTANT EA 15: HCPCS

## 2024-08-06 RX ORDER — ESCITALOPRAM OXALATE 10 MG/1
10 TABLET ORAL DAILY
Qty: 30 TABLET | Refills: 3 | OUTPATIENT
Start: 2024-08-06

## 2024-08-06 RX ORDER — ESCITALOPRAM OXALATE 20 MG/1
20 TABLET ORAL DAILY
OUTPATIENT
Start: 2024-08-06

## 2024-08-06 NOTE — TELEPHONE ENCOUNTER
Caller: Panfilo Feliz    Relationship to patient: Self    Best call back number: [phone]    Type of visit: FU 1    If rescheduling, when is the original appointment: 8/1 (CAN)     Additional notes:PT REQUESTS DR CARDENAS AS HIS NEW PROVIDER

## 2024-08-06 NOTE — TELEPHONE ENCOUNTER
Caller: Panfilo Feliz    Relationship: Self    Best call back number: [phone]    Requested Prescriptions:   Requested Prescriptions     Pending Prescriptions Disp Refills    escitalopram (LEXAPRO) 10 MG tablet 30 tablet 3     Sig: Take 1 tablet by mouth Daily. Take combined with 20mg tablets for a total of 30mg daily  Indications: Major Depressive Disorder    escitalopram (LEXAPRO) 20 MG tablet       Sig: Take 1 tablet by mouth Daily. Take w/ 10 mg tab for total dose = 30 mg daily  Indications: Major Depressive Disorder        Pharmacy where request should be sent: [facility] [id number] [facility] [address] [facility] - [phone]  - [phone] FX     Last office visit with prescribing clinician: 1/16/2024   Last telemedicine visit with prescribing clinician: Visit date not found   Next office visit with prescribing clinician: Visit date not found       Does the patient have less than a 3 day supply:  [x] Yes  [] No    Would you like a call back once the refill request has been completed: [] Yes [x] No    If the office needs to give you a call back, can they leave a voicemail: [] Yes [x] No    Marj Dick Rep   08/06/24 15:55 EDT

## 2024-08-07 ENCOUNTER — HOME CARE VISIT (OUTPATIENT)
Dept: HOME HEALTH SERVICES | Facility: HOME HEALTHCARE | Age: 84
End: 2024-08-07
Payer: MEDICARE

## 2024-08-07 PROCEDURE — G0299 HHS/HOSPICE OF RN EA 15 MIN: HCPCS

## 2024-08-07 RX ORDER — ESCITALOPRAM OXALATE 20 MG/1
20 TABLET ORAL DAILY
Qty: 30 TABLET | Refills: 3 | Status: SHIPPED | OUTPATIENT
Start: 2024-08-07

## 2024-08-07 RX ORDER — ESCITALOPRAM OXALATE 10 MG/1
10 TABLET ORAL DAILY
Qty: 30 TABLET | Refills: 3 | Status: SHIPPED | OUTPATIENT
Start: 2024-08-07

## 2024-08-07 NOTE — HOME HEALTH
pt sitting up and is prepared for PT session,     feeling fair but with limited strength/endurance.   pt reports he has been attempting hep review and has beern descending the steps in a seated position as he feels unsafe doing this standing.     pt denies any falls and no new med changes     worked with pt up/down the steps today,  with cues given to properly position the LEs, to non reciprocate and to flex the trunk going up.   pt was challenged to properly advance the LEs and needed cues to properly flex the hips to improve foot clearance during gait trg.    pt was fatigued to end PT session but was pleased to have participated   education needed to properly perfform ther ex.      plan for next session-  cont PT with gait trg,   transfer trg,  balance trg and transfer education

## 2024-08-08 ENCOUNTER — HOME CARE VISIT (OUTPATIENT)
Dept: HOME HEALTH SERVICES | Facility: HOME HEALTHCARE | Age: 84
End: 2024-08-08
Payer: MEDICARE

## 2024-08-08 VITALS
OXYGEN SATURATION: 96 % | DIASTOLIC BLOOD PRESSURE: 70 MMHG | HEART RATE: 82 BPM | TEMPERATURE: 98.2 F | RESPIRATION RATE: 15 BRPM | SYSTOLIC BLOOD PRESSURE: 134 MMHG

## 2024-08-08 PROCEDURE — G0157 HHC PT ASSISTANT EA 15: HCPCS

## 2024-08-09 NOTE — HOME HEALTH
pt sitting up and is prepared for PT session,      pt reporting he has been attempting hep review but with limitations.      pt feels he is slowly improving but continues to have strength deficits and with challenges in the ability to walk and does not yet feel comfortable on the steps and requess further strengthening with step trg.      pt was challenged today to stand for extended periods of time, but slowly improving.  pt was educated today on proper form with hep review,  with cues needed to deep plb and to contract/hold.  pt was minimally unsteady upon standing but was able to self correct,  continued challenges in the ability to stand.       pt was educated today on proper deep plb for energy conservation but no noted dyspnea  continued balance deficits and limited gait tolerance with cga/min assistance needed on steps both up/down with education given for proper sequencing       plan for next session-     assess progress to goals with potential extension of PT to allow safe mobility up/down steps

## 2024-08-13 ENCOUNTER — HOME CARE VISIT (OUTPATIENT)
Dept: HOME HEALTH SERVICES | Facility: HOME HEALTHCARE | Age: 84
End: 2024-08-13
Payer: MEDICARE

## 2024-08-13 VITALS
SYSTOLIC BLOOD PRESSURE: 132 MMHG | TEMPERATURE: 98.2 F | DIASTOLIC BLOOD PRESSURE: 64 MMHG | HEART RATE: 73 BPM | RESPIRATION RATE: 15 BRPM | OXYGEN SATURATION: 97 %

## 2024-08-13 PROCEDURE — G0157 HHC PT ASSISTANT EA 15: HCPCS

## 2024-08-14 ENCOUNTER — HOME CARE VISIT (OUTPATIENT)
Dept: HOME HEALTH SERVICES | Facility: HOME HEALTHCARE | Age: 84
End: 2024-08-14
Payer: MEDICARE

## 2024-08-14 VITALS
RESPIRATION RATE: 18 BRPM | TEMPERATURE: 97.9 F | OXYGEN SATURATION: 97 % | SYSTOLIC BLOOD PRESSURE: 140 MMHG | HEART RATE: 72 BPM | DIASTOLIC BLOOD PRESSURE: 86 MMHG

## 2024-08-14 PROCEDURE — G0299 HHS/HOSPICE OF RN EA 15 MIN: HCPCS

## 2024-08-14 NOTE — HOME HEALTH
pt presents today wearing a mask.   reports he has diarrhea.    pt states he is motivated to improve and return to plf.     pt has been attempting hep review admits continued limitations.   pt feels he is improving with PT intervention but struggles with continued LE weakness and balance deficits.    pt feels he is navigating the steps better with less fatigue.      pt was limited today in most areas with continued weakness and needed rest periods at times,  but standing tolerance has improved and dynamic balance is improving.  pt was educated on proper form with hep review,   not yet ind with cues needed to contract/hold and to perform in the correct plane.    pt is navigating the steps better but still challenged with weakness and mild unsteadiness with descent.        plan for next session- cont PT with gait trg, hep review, balance trg and transfer education- assess progress toward goals and cont/dc PT as needed

## 2024-08-15 ENCOUNTER — HOME CARE VISIT (OUTPATIENT)
Dept: HOME HEALTH SERVICES | Facility: HOME HEALTHCARE | Age: 84
End: 2024-08-15
Payer: MEDICARE

## 2024-08-15 PROCEDURE — G0151 HHCP-SERV OF PT,EA 15 MIN: HCPCS

## 2024-08-18 VITALS
DIASTOLIC BLOOD PRESSURE: 80 MMHG | HEART RATE: 85 BPM | OXYGEN SATURATION: 96 % | TEMPERATURE: 98.2 F | SYSTOLIC BLOOD PRESSURE: 156 MMHG | RESPIRATION RATE: 18 BRPM

## 2024-08-19 RX ORDER — MIDODRINE HYDROCHLORIDE 5 MG/1
15 TABLET ORAL
Qty: 90 TABLET | Refills: 1 | Status: SHIPPED | OUTPATIENT
Start: 2024-08-19

## 2024-08-20 ENCOUNTER — TELEPHONE (OUTPATIENT)
Dept: CARDIOLOGY | Facility: CLINIC | Age: 84
End: 2024-08-20
Payer: MEDICARE

## 2024-08-20 ENCOUNTER — HOME CARE VISIT (OUTPATIENT)
Dept: HOME HEALTH SERVICES | Facility: HOME HEALTHCARE | Age: 84
End: 2024-08-20
Payer: MEDICARE

## 2024-08-20 RX ORDER — AMOXICILLIN 500 MG/1
2000 CAPSULE ORAL DAILY
Qty: 4 CAPSULE | Refills: 0 | Status: SHIPPED | OUTPATIENT
Start: 2024-08-20

## 2024-08-20 NOTE — TELEPHONE ENCOUNTER
----- Message from Dilan Houston sent at 8/19/2024  5:34 PM EDT -----  Regarding: RE: dental work  He should receive antibiotic prophylaxis like a valve replacement.  ----- Message -----  From: Josefina Fang MA  Sent: 8/19/2024  10:49 AM EDT  To: Dilan Houston,   Subject: dental work                                      Lorena is asking if her dad needs to do anything or hold any medication for a teeth cleaning? I didn't think so but wanted to check with you

## 2024-08-20 NOTE — TELEPHONE ENCOUNTER
Caller: Kelin Feliz    Relationship: Emergency Contact    Best call back number:[phone]    What was the call regarding: PATIENT WAS TOLD HE NEEDED PRESCRIPTION FOR ANTIBIOTIC BEFORE TEETH CLEANING, BUT DENTIST OFFICE DOESN'T WRITE PRESCRIPTIONS ANYMORE. PATIENT NEEDS DR WARREN TO WRITE THE PRESCRIPTION. PLEASE ADVISE.

## 2024-08-21 ENCOUNTER — HOME CARE VISIT (OUTPATIENT)
Dept: HOME HEALTH SERVICES | Facility: HOME HEALTHCARE | Age: 84
End: 2024-08-21
Payer: MEDICARE

## 2024-08-21 VITALS
SYSTOLIC BLOOD PRESSURE: 140 MMHG | OXYGEN SATURATION: 98 % | TEMPERATURE: 97 F | BODY MASS INDEX: 22.15 KG/M2 | WEIGHT: 182 LBS | HEART RATE: 78 BPM | DIASTOLIC BLOOD PRESSURE: 84 MMHG | RESPIRATION RATE: 18 BRPM

## 2024-08-21 PROCEDURE — G0299 HHS/HOSPICE OF RN EA 15 MIN: HCPCS

## 2024-08-28 ENCOUNTER — HOME CARE VISIT (OUTPATIENT)
Dept: HOME HEALTH SERVICES | Facility: HOME HEALTHCARE | Age: 84
End: 2024-08-28
Payer: MEDICARE

## 2024-08-28 PROCEDURE — G0299 HHS/HOSPICE OF RN EA 15 MIN: HCPCS

## 2024-08-28 RX ORDER — MIDODRINE HYDROCHLORIDE 5 MG/1
15 TABLET ORAL
Qty: 90 TABLET | Refills: 1 | Status: SHIPPED | OUTPATIENT
Start: 2024-08-28

## 2024-08-29 VITALS
HEART RATE: 72 BPM | TEMPERATURE: 97 F | RESPIRATION RATE: 18 BRPM | DIASTOLIC BLOOD PRESSURE: 83 MMHG | OXYGEN SATURATION: 98 % | SYSTOLIC BLOOD PRESSURE: 134 MMHG

## 2024-09-05 ENCOUNTER — OFFICE VISIT (OUTPATIENT)
Dept: ONCOLOGY | Facility: CLINIC | Age: 84
End: 2024-09-05
Payer: MEDICARE

## 2024-09-05 ENCOUNTER — LAB (OUTPATIENT)
Dept: LAB | Facility: HOSPITAL | Age: 84
End: 2024-09-05
Payer: MEDICARE

## 2024-09-05 VITALS
WEIGHT: 183.2 LBS | RESPIRATION RATE: 16 BRPM | BODY MASS INDEX: 22.31 KG/M2 | HEIGHT: 76 IN | HEART RATE: 62 BPM | DIASTOLIC BLOOD PRESSURE: 82 MMHG | SYSTOLIC BLOOD PRESSURE: 152 MMHG | TEMPERATURE: 97.8 F | OXYGEN SATURATION: 94 %

## 2024-09-05 DIAGNOSIS — D47.2 MONOCLONAL GAMMOPATHY OF UNKNOWN SIGNIFICANCE (MGUS): ICD-10-CM

## 2024-09-05 DIAGNOSIS — D47.2 MONOCLONAL GAMMOPATHY OF UNKNOWN SIGNIFICANCE (MGUS): Primary | ICD-10-CM

## 2024-09-05 LAB
ALBUMIN SERPL-MCNC: 4.4 G/DL (ref 3.5–5.2)
ALBUMIN/GLOB SERPL: 1.4 G/DL
ALP SERPL-CCNC: 67 U/L (ref 39–117)
ALT SERPL W P-5'-P-CCNC: 12 U/L (ref 1–41)
ANION GAP SERPL CALCULATED.3IONS-SCNC: 9.6 MMOL/L (ref 5–15)
AST SERPL-CCNC: 28 U/L (ref 1–40)
BASOPHILS # BLD AUTO: 0.03 10*3/MM3 (ref 0–0.2)
BASOPHILS NFR BLD AUTO: 0.7 % (ref 0–1.5)
BILIRUB SERPL-MCNC: 0.2 MG/DL (ref 0–1.2)
BUN SERPL-MCNC: 22 MG/DL (ref 8–23)
BUN/CREAT SERPL: 19 (ref 7–25)
CALCIUM SPEC-SCNC: 9.6 MG/DL (ref 8.6–10.5)
CHLORIDE SERPL-SCNC: 104 MMOL/L (ref 98–107)
CO2 SERPL-SCNC: 28.4 MMOL/L (ref 22–29)
CREAT SERPL-MCNC: 1.16 MG/DL (ref 0.76–1.27)
DEPRECATED RDW RBC AUTO: 46.5 FL (ref 37–54)
EGFRCR SERPLBLD CKD-EPI 2021: 62.5 ML/MIN/1.73
EOSINOPHIL # BLD AUTO: 0.04 10*3/MM3 (ref 0–0.4)
EOSINOPHIL NFR BLD AUTO: 0.9 % (ref 0.3–6.2)
ERYTHROCYTE [DISTWIDTH] IN BLOOD BY AUTOMATED COUNT: 15 % (ref 12.3–15.4)
GLOBULIN UR ELPH-MCNC: 3.1 GM/DL
GLUCOSE SERPL-MCNC: 93 MG/DL (ref 65–99)
HCT VFR BLD AUTO: 40.1 % (ref 37.5–51)
HGB BLD-MCNC: 12.5 G/DL (ref 13–17.7)
IMM GRANULOCYTES # BLD AUTO: 0.01 10*3/MM3 (ref 0–0.05)
IMM GRANULOCYTES NFR BLD AUTO: 0.2 % (ref 0–0.5)
LYMPHOCYTES # BLD AUTO: 1.45 10*3/MM3 (ref 0.7–3.1)
LYMPHOCYTES NFR BLD AUTO: 31.5 % (ref 19.6–45.3)
MCH RBC QN AUTO: 26.7 PG (ref 26.6–33)
MCHC RBC AUTO-ENTMCNC: 31.2 G/DL (ref 31.5–35.7)
MCV RBC AUTO: 85.5 FL (ref 79–97)
MONOCYTES # BLD AUTO: 0.51 10*3/MM3 (ref 0.1–0.9)
MONOCYTES NFR BLD AUTO: 11.1 % (ref 5–12)
NEUTROPHILS NFR BLD AUTO: 2.56 10*3/MM3 (ref 1.7–7)
NEUTROPHILS NFR BLD AUTO: 55.6 % (ref 42.7–76)
NRBC BLD AUTO-RTO: 0 /100 WBC (ref 0–0.2)
PLATELET # BLD AUTO: 194 10*3/MM3 (ref 140–450)
PMV BLD AUTO: 9.9 FL (ref 6–12)
POTASSIUM SERPL-SCNC: 4 MMOL/L (ref 3.5–5.2)
PROT SERPL-MCNC: 7.5 G/DL (ref 6–8.5)
RBC # BLD AUTO: 4.69 10*6/MM3 (ref 4.14–5.8)
SODIUM SERPL-SCNC: 142 MMOL/L (ref 136–145)
WBC NRBC COR # BLD AUTO: 4.6 10*3/MM3 (ref 3.4–10.8)

## 2024-09-05 PROCEDURE — 36415 COLL VENOUS BLD VENIPUNCTURE: CPT

## 2024-09-05 PROCEDURE — 80053 COMPREHEN METABOLIC PANEL: CPT

## 2024-09-05 PROCEDURE — 85025 COMPLETE CBC W/AUTO DIFF WBC: CPT

## 2024-09-05 RX ORDER — MAGNESIUM 200 MG
1000 TABLET ORAL DAILY
Qty: 30 EACH | Refills: 6 | Status: SHIPPED | OUTPATIENT
Start: 2024-09-05

## 2024-09-05 NOTE — PROGRESS NOTES
REASON FOR FOLLOWUP:    1.Minimal leukopenia with thrombocytopenia in the background of minimal IgG monoclonal gammopathy. The patient has no splenomegaly, no peripheral adenopathy, and no symptoms that suggest lupus or collagen vascular disease. Bone marrow   a  spirate documented no evidence of myeloma, lymphoma, leukemia, myelodysplasia, or any other abnormality. Bone marrow chromosomal analysis as well as flow cytometry were negative.   2.  Inflammatory polyneuropathy.  He initiated IVIG on 10/3/2019.  He did receive his second IVIG on 11/7/2019.allergic reaction to it stopping infusion all togethere  3.  Approximately 10 days after his second IVIG infusion he did experience serum sickness that included rash, generalized arthralgias, skin peeling of the palms.  Dr. Olvera prescribed 20 mg of prednisone and his symptoms have resolved after 2 days.      HISTORY OF PRESENT ILLNESS:    The patient is 84 y.o. male with the above-mentioned history, who returns to the office today for 6-month follow-up.  We continue to follow him in regards to his MGUS.  He also struggles with neuropathy which is overall unchanged.  Since being seen 6 months ago he did undergo AAA repair.  He had a complicated recovery including hypotension and is now taking midodrine 3 times daily.  He does report he is having some episodes of hypertension.  He has scheduled follow-up with cardiology in the coming weeks.  His neuropathy remains overall unchanged.  He does continue on Lyrica 50 mg twice daily.    Past Medical History:   Diagnosis Date    AAA (abdominal aortic aneurysm)     Abnormal ECG 1980’s    Alcoholism     None since 1991    Aneurysm 2019    aortic arch    Arthritis     Basal cell carcinoma (BCC) of face     Cholelithiasis 1990’s    Cholecystectomy    Colon polyp     Colon polyps     Coronary artery disease     Difficulty walking     Disease of thyroid gland     DJD (degenerative joint disease)     Gastritis     Gastroparesis      GERD (gastroesophageal reflux disease)     GI (gastrointestinal bleed)     Hernia     Hiatal    History of bone marrow biopsy     [place] (hard of hearing)     Hyperlipidemia     Hypertension     Hypothyroidism     IgG monoclonal gammopathy     Multiple duodenal ulcers     Neuropathy     PONV (postoperative nausea and vomiting)     Prostate cancer     Reflux esophagitis     Ulcer     WBC decreased      Past Surgical History:   Procedure Laterality Date    ASCENDING AORTIC ANEURYSM REPAIR W/ MECHANICAL AORTIC VALVE REPLACEMENT N/A 5/10/2024    Procedure: ASCENDING AORTIC ARCH/ possible HEMIARCH REPLACEMENT WITH CIRCULATORY ARREST, neuro monitoring, and intraop JOSE A;  Surgeon: Spencer Puente MD;  Location: [facility];  Service: Cardiothoracic;  Laterality: N/A;  Ascending aortic aneurysm repair with 30 mm gelweave graft.    CARDIAC CATHETERIZATION N/A 4/19/2024    Procedure: Right and Left Heart Cath with possible PCI;  Surgeon: Dilan Houston DO;  Location: [facility] CATH INVASIVE LOCATION;  Service: Cardiovascular;  Laterality: N/A;  Local and IV sedation    CHOLECYSTECTOMY  1988    COLON SURGERY  1998    COLONOSCOPY  02/2013    ENDOSCOPY  2012    ESOPHAGOGASTRODUODENOSCOPY WITH DILATION OF SHATZKI'S RING    ENDOSCOPY N/A 10/09/2020    Procedure: ESOPHAGOGASTRODUODENOSCOPY with cold bx;  Surgeon: Jake Perez MD;  Location:  [facility];  Service: Gastroenterology;  Laterality: N/A;  pre - nausea  post - duodenal ulcer, gastrits, gastric ulcer, hiatal hernia    ENDOSCOPY N/A 10/04/2022    Procedure: ESOPHAGOGASTRODUODENOSCOPY with biopsies with esophageal dilatation with 56 cui;  Surgeon: Jake Perez MD;  Location:  [facility];  Service: Gastroenterology;  Laterality: N/A;  pre-dysphagia  post-normal     ENDOSCOPY  10/04/2022    [facility]    EYE SURGERY Bilateral     cataracts    LAMINECTOMY  2013    decompression L3-4, L4-5    PROSTATECTOMY  2007    SKIN BIOPSY      TONSILLECTOMY  AND ADENOIDECTOMY      1940s    UPPER GASTROINTESTINAL ENDOSCOPY      VASECTOMY      1970s     Social History     Socioeconomic History    Marital status:      Spouse name: Meghan    Years of education: College   Tobacco Use    Smoking status: Never     Passive exposure: Never    Smokeless tobacco: Never   Vaping Use    Vaping status: Never Used   Substance and Sexual Activity    Alcohol use: No     Comment: Heavy in past, none in 33 years    Drug use: Never    Sexual activity: Not Currently     Partners: Female     Birth control/protection: None     Family History   Problem Relation Age of Onset    Cancer Mother 85        Breast    COPD Father     Cancer Brother 59        Unknown type    Hypertension Daughter            Current Outpatient Medications on File Prior to Visit   Medication Sig Dispense Refill    acetaminophen (TYLENOL) 325 MG tablet Take 2 tablets by mouth Every 4 (Four) Hours As Needed for Mild Pain.      amoxicillin (AMOXIL) 500 MG capsule Take 4 capsules by mouth Daily. Take 4 capsules 30-60 minutes before dental cleaning. 4 capsule 0    aspirin 81 MG EC tablet Take 1 tablet by mouth Every Night. Indications: Carotid Artery Stenting      docusate sodium (COLACE) 100 MG capsule Take 3 capsules by mouth 2 (Two) Times a Day. Indications: Constipation      escitalopram (LEXAPRO) 10 MG tablet Take 1 tablet by mouth Daily. Take combined with 20mg tablets for a total of 30mg daily  Indications: Major Depressive Disorder 30 tablet 3    escitalopram (LEXAPRO) 20 MG tablet Take 1 tablet by mouth Daily. Take w/ 10 mg tab for total dose = 30 mg daily  Indications: Major Depressive Disorder 30 tablet 3    fludrocortisone 0.1 MG tablet Take 1 tablet by mouth 2 (Two) Times a Day. 60 tablet 1    levothyroxine (SYNTHROID, LEVOTHROID) 100 MCG tablet Take 1 tablet by mouth Daily. Indications: Underactive Thyroid      Menthol, Topical Analgesic, (BIOFREEZE EX) Apply 1 Application topically Daily As Needed  "(pain).      methocarbamol (ROBAXIN) 500 MG tablet Take 1 tablet by mouth Every 8 (Eight) Hours As Needed for Muscle Spasms. 20 tablet 0    metoprolol tartrate (LOPRESSOR) 25 MG tablet Take 0.5 tablets by mouth 2 (Two) Times a Day. Indications: High Blood Pressure Disorder 90 tablet 3    midodrine (PROAMATINE) 5 MG tablet Take 3 tablets by mouth 3 (Three) Times a Day Before Meals. Indications: Disorder of Low Blood Pressure 90 tablet 1    pantoprazole (PROTONIX) 40 MG EC tablet Take 1 tablet by mouth 2 (Two) Times a Day. Indications: Gastroesophageal Reflux Disease      polyethylene glycol (MIRALAX) 17 g packet Take 17 g by mouth Daily As Needed (Use if senna-docusate is ineffective).      pregabalin (LYRICA) 50 MG capsule Take 1 capsule by mouth 2 (Two) Times a Day. Indications: Neuropathic Pain      pyridostigmine (MESTINON) 60 MG tablet Take 1 tablet by mouth Every 12 (Twelve) Hours. 60 tablet 1    [DISCONTINUED] vitamin B-12 (CYANOCOBALAMIN) 1000 MCG tablet Take 1 tablet by mouth Daily. Indications: Inadequate Vitamin B12      linaclotide (Linzess) 145 MCG capsule capsule Take 1 capsule by mouth Every Morning Before Breakfast. (Patient not taking: Reported on 7/29/2024) 30 capsule 1     Current Facility-Administered Medications on File Prior to Visit   Medication Dose Route Frequency Provider Last Rate Last Admin    Chlorhexidine Gluconate Cloth 2 % pads   Topical Q12H PRN Kayli Rowe APRN         Allergies   Allergen Reactions    Statins Myalgia         ONCOLOGIC/HEMATOLOGIC HISTORY: History from previous dates can be found in the separate document.         Vitals:    09/05/24 1132   BP: 152/82   Pulse: 62   Resp: 16   Temp: 97.8 °F (36.6 °C)   TempSrc: Oral   SpO2: 94%   Weight: 83.1 kg (183 lb 3.2 oz)   Height: 193 cm (75.98\")       PHYSICAL EXAM :   GENERAL:  Well-developed, well-nourished in no acute distress.   SKIN:  Warm, dry without rashes, purpura or petechiae.  HEAD:  " Normocephalic.  EYES:  Pupils equal, round.  EOMs intact.  Conjunctivae normal.  EARS:  Hearing intact.  NOSE:  Septum midline.  No excoriations or nasal discharge.  CHEST:  Lungs clear to auscultation. Good airflow.  CARDIAC:  Regular rate.   ABDOMEN:  Soft, nontender with no organomegaly or masses. Bowel sounds present  EXTREMITIES:  No clubbing, cyanosis or edema.  NEUROLOGICAL: No focal neurological deficits.  PSYCHIATRIC:  Normal affect and mood.      Results from last 7 days   Lab Units 09/05/24  1121   WBC 10*3/mm3 4.60   NEUTROS ABS 10*3/mm3 2.56   HEMOGLOBIN g/dL 12.5*   HEMATOCRIT % 40.1   PLATELETS 10*3/mm3 194     Results from last 7 days   Lab Units 09/05/24  1121   SODIUM mmol/L 142   POTASSIUM mmol/L 4.0   CHLORIDE mmol/L 104   CO2 mmol/L 28.4   BUN mg/dL 22   CREATININE mg/dL 1.16   CALCIUM mg/dL 9.6   ALBUMIN g/dL 4.4   BILIRUBIN mg/dL 0.2   ALK PHOS U/L 67   ALT (SGPT) U/L 12   AST (SGOT) U/L 28   GLUCOSE mg/dL 93           ASSESSMENT:      1. MGUS:   undergone bone marrow testing of this on 2 different occasions not being able to document myeloma, lymphoma, myelodysplasia, leukemia or myelofibrosis. Chromosomal analysis has been normal.   He patient has not had any significant difference in his monoclonal protein quantification.  We have also tried prednisone with no benefit as per indication by his Neurologist and we have tried immunoglobulin with no benefit and actually this medicine triggered serum sickness in him that required short course of prednisone therapy.  MRI scan of the brain that shows no major alterations, radiologist calls minimal vascular alterations. Most importantly the MRI of the cervical spine documents significant spinal stenosis at the level of C5. I reviewed the pictures of this in the PAC system [facility] with the patient and his wife. The MRI of the thoracic spine shows degenerative changes. Most importantly none of the areas on MRI in the cranium,  cervical, thoracic spine disclose any lesions that could be consistent with myeloma, lymphoma or tumoral lesions of any nature. All of the changes are related to degenerative changes. 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   1/24/2023 IgG 1001, IgA 375, IgM 22, M spike 0.3, kappa free light chain 41.3, lambda free light chain 18.4, kappa lambda ratio 2.24  1/16/2024 stability of labs with IgG 1097, IgA 364, IgM 21, M spike 0.3, kappa free light chain 43.0, lambda free light chain 18.8, kappa lambda ratio 2.28  Repeat protein studies are pending today, 9/5/2024    2. Neuropathy. He has been seen by Dr. Darrick Garcia. EMG was performed. Abnormalities documented in the EMG and the interpretation per Dr. Garcia believing that this does not represent a typical feature of inflammatory neuropathy.   Lyrica 50 mg twice daily   Historically has had some benefit with B12.  We did discuss today resuming oral sublingual B12  Continues to follow with neurology      3.  Thoracic aortic aneurysm: Being followed by cardiology.     Ultimately undergoing elective ascending aortic aneurysm repair with Dr. Puente 5/10/2024    PLAN:  The patient remains in observation regarding his MGUS.  Repeat protein studies are pending today  Patient will begin sublingual B12 1000 mcg daily  Continue to follow-up with neurology and cardiology  Return in 6 months for CBC, CMP, KARLEE, SPEP, FLC and MD follow-up transitioning care to Dr. Ruffin    I spent 30 minutes caring for Panfilo on this date of service. This time includes time spent by me in the following activities: preparing for the visit, reviewing tests, obtaining and/or reviewing a separately obtained history, performing a medically appropriate examination and/or evaluation, counseling and educating the patient/family/caregiver, ordering medications, tests, or procedures, documenting information in the medical record, and care coordination.       Yuliana Veloz,  APRN  09/05/2024

## 2024-09-06 LAB
ALBUMIN SERPL ELPH-MCNC: 3.7 G/DL (ref 2.9–4.4)
ALBUMIN/GLOB SERPL: 1.2 {RATIO} (ref 0.7–1.7)
ALPHA1 GLOB SERPL ELPH-MCNC: 0.3 G/DL (ref 0–0.4)
ALPHA2 GLOB SERPL ELPH-MCNC: 0.7 G/DL (ref 0.4–1)
B-GLOBULIN SERPL ELPH-MCNC: 1.2 G/DL (ref 0.7–1.3)
GAMMA GLOB SERPL ELPH-MCNC: 1 G/DL (ref 0.4–1.8)
GLOBULIN SER-MCNC: 3.2 G/DL (ref 2.2–3.9)
IGA SERPL-MCNC: 355 MG/DL (ref 61–437)
IGG SERPL-MCNC: 1099 MG/DL (ref 603–1613)
IGM SERPL-MCNC: 21 MG/DL (ref 15–143)
INTERPRETATION SERPL IEP-IMP: ABNORMAL
KAPPA LC FREE SER-MCNC: 44.2 MG/L (ref 3.3–19.4)
KAPPA LC FREE/LAMBDA FREE SER: 2.38 {RATIO} (ref 0.26–1.65)
LABORATORY COMMENT REPORT: ABNORMAL
LAMBDA LC FREE SERPL-MCNC: 18.6 MG/L (ref 5.7–26.3)
M PROTEIN SERPL ELPH-MCNC: 0.3 G/DL
PROT SERPL-MCNC: 6.9 G/DL (ref 6–8.5)

## 2024-09-09 ENCOUNTER — TELEPHONE (OUTPATIENT)
Dept: ONCOLOGY | Facility: CLINIC | Age: 84
End: 2024-09-09
Payer: MEDICARE

## 2024-09-09 NOTE — TELEPHONE ENCOUNTER
Attempted to call pt, no answer. LVM informing him of below message and asked him to call with any questions or concerns.       ----- Message from Yuliana Veloz sent at 9/9/2024  8:19 AM EDT -----  Can you please mary this patient and let him know his protein studies were stable, no change which is good news.    Thanks    Yuliana  ----- Message -----  From: Lab, Background User  Sent: 9/5/2024  11:25 AM EDT  To: JAVY Estrella

## 2024-09-11 ENCOUNTER — OFFICE VISIT (OUTPATIENT)
Dept: CARDIOLOGY | Facility: CLINIC | Age: 84
End: 2024-09-11
Payer: MEDICARE

## 2024-09-11 VITALS
BODY MASS INDEX: 22.63 KG/M2 | DIASTOLIC BLOOD PRESSURE: 90 MMHG | WEIGHT: 182 LBS | OXYGEN SATURATION: 100 % | HEIGHT: 75 IN | HEART RATE: 66 BPM | SYSTOLIC BLOOD PRESSURE: 168 MMHG

## 2024-09-11 DIAGNOSIS — E78.2 MIXED HYPERLIPIDEMIA: ICD-10-CM

## 2024-09-11 DIAGNOSIS — I35.1 NONRHEUMATIC AORTIC VALVE INSUFFICIENCY: ICD-10-CM

## 2024-09-11 DIAGNOSIS — I10 PRIMARY HYPERTENSION: ICD-10-CM

## 2024-09-11 DIAGNOSIS — I71.21 ANEURYSM OF ASCENDING AORTA WITHOUT RUPTURE: ICD-10-CM

## 2024-09-11 DIAGNOSIS — R42 ORTHOSTATIC LIGHTHEADEDNESS: Primary | ICD-10-CM

## 2024-09-11 PROCEDURE — 1159F MED LIST DOCD IN RCRD: CPT | Performed by: NURSE PRACTITIONER

## 2024-09-11 PROCEDURE — 1160F RVW MEDS BY RX/DR IN RCRD: CPT | Performed by: NURSE PRACTITIONER

## 2024-09-11 PROCEDURE — 3077F SYST BP >= 140 MM HG: CPT | Performed by: NURSE PRACTITIONER

## 2024-09-11 PROCEDURE — 3080F DIAST BP >= 90 MM HG: CPT | Performed by: NURSE PRACTITIONER

## 2024-09-11 PROCEDURE — 99214 OFFICE O/P EST MOD 30 MIN: CPT | Performed by: NURSE PRACTITIONER

## 2024-09-11 RX ORDER — MIDODRINE HYDROCHLORIDE 5 MG/1
10 TABLET ORAL
COMMUNITY
Start: 2024-09-11

## 2024-09-11 NOTE — PROGRESS NOTES
[facility] CARDIOLOGY      REASON FOR FOLLOW-UP:  Valvular heart disease  Ascending aortic aneurysm status post repair          Chief Complaint   Patient presents with    Follow-up         Dear oMreno Tapia APRN        History of Present Illness   It was my pleasure to see Abisai Feliz in the office today.  He is an 84-year-old male with no established history of ischemic heart disease.  Past medical history includes aortic valve insufficiency, ascending aortic aneurysm, hypothyroidism, neuropathy, acid reflux, orthostatic hypotension.    On 5/10/2024 the patient underwent elective ascending aortic aneurysm repair with 30 mm Dacron interposition graft/reductive root aortoplasty of the right and noncoronary sinuses/left atrial appendage epicardial closure with 45 mm atrial clip by Dr. Puente.  Developed rapid atrial fibrillation on postop day 2 and converted with IV amiodarone.  He was discharged home on postop day 6.  He was not started on anticoagulation due to the brevity of PAF.  He was seen in office follow-up and noted to be hypotensive.  He was started on midodrine.    He was readmitted 7/13/2024 to [facility] due to hypotension and falls.  Midodrine was maximized at 15 mg every 8 hours and he was also started on Florinef.  He presents today with his wife in follow-up from that hospitalization.    Today, Mr. Feliz reports that he feels well.  He denies any chest discomfort, shortness of breath.  He has had no lower extremity edema.  His blood pressure is elevated in the office today at 168/90.  His wife is a retired RN and does monitor his blood pressures closely.  She stated these have been elevated recently at home.      ASSESSMENT:  Ascending aortic aneurysm status post repair  Aortic valve insufficiency  History of orthostatic hypotension-currently hypertensive  Peripheral neuropathy  Depression/anxiety  Acid reflux    PLAN:  I will discontinue Florinef.  We can decrease  midodrine to 10 mg every 8 hours.  Continue to monitor pressures at home.  Due to his orthostatic hypotension, he may need blood pressures on the higher end of normal in order to prevent falls/syncope.  Continue aspirin, BB  Follow-up with primary cardiologist in 3 months or sooner if needed      CHF Guideline Directed Medical Therapy  Beta Blocker:   ARNI/ACE/ARB:   SGLT 2 inhibitors:   Diuretics:   Aldosterone Antagonist:   Vasodilators & Nitrates:       Diagnoses and all orders for this visit:    1. Orthostatic lightheadedness (Primary)    2. Aneurysm of ascending aorta without rupture    3. Nonrheumatic aortic valve insufficiency    4. Primary hypertension    5. Mixed hyperlipidemia          The following portions of the patient's history were reviewed and updated as appropriate: allergies, current medications, past family history, past medical history, past social history, past surgical history, and problem list.    REVIEW OF SYSTEMS:    Review of Systems   Neurological:  Positive for light-headedness.   All other systems reviewed and are negative.      Vitals:    09/11/24 1330   BP: 168/90   Pulse: 66   SpO2: 100%         PHYSICAL EXAM:    General: Alert, cooperative, no distress, appears stated age  Head:  Normocephalic, atraumatic, mucous membranes moist  Eyes:  Conjunctiva/corneas clear, EOM's intact     Neck:  Supple,  no JVD or bruit     Lungs: Clear to auscultation bilaterally, no wheezes rhonchi rales are noted  Chest wall: No tenderness  Musculoskeletal:   Ambulates freely without assistance  Heart::  Regular rate and rhythm, S1 and S2 normal, no murmur, rub or gallop  Abdomen: Soft, non-tender, nondistended, bowel sounds active, no abdominal bruit  Extremities: No cyanosis, clubbing, or edema   Pulses: 2+ and symmetric all extremities  Skin:  No rashes or lesions  Neuro/psych: A&O x3. CN II through XII are grossly intact with appropriate affect        Past Medical History:   Diagnosis Date    AAA  (abdominal aortic aneurysm)     Abnormal ECG 1980’s    Alcoholism     None since 1991    Aneurysm 2019    aortic arch    Arthritis     Basal cell carcinoma (BCC) of face     Cholelithiasis 1990’s    Cholecystectomy    Colon polyp     Colon polyps     Coronary artery disease     Difficulty walking     Disease of thyroid gland     DJD (degenerative joint disease)     Gastritis     Gastroparesis     GERD (gastroesophageal reflux disease)     GI (gastrointestinal bleed)     Heart valve disease     Hernia     Hiatal    History of bone marrow biopsy     [place] (hard of hearing)     Hyperlipidemia     Hypertension     Hypothyroidism     IgG monoclonal gammopathy     Multiple duodenal ulcers     Neuropathy     PONV (postoperative nausea and vomiting)     Prostate cancer     Reflux esophagitis     Ulcer     WBC decreased        Past Surgical History:   Procedure Laterality Date    ARTERIAL ANEURYSM REPAIR      ASCENDING AORTIC ANEURYSM REPAIR W/ MECHANICAL AORTIC VALVE REPLACEMENT N/A 05/10/2024    Procedure: ASCENDING AORTIC ARCH/ possible HEMIARCH REPLACEMENT WITH CIRCULATORY ARREST, neuro monitoring, and intraop JOSE A;  Surgeon: Spencer Puente MD;  Location: [facility];  Service: Cardiothoracic;  Laterality: N/A;  Ascending aortic aneurysm repair with 30 mm gelweave graft.    CARDIAC CATHETERIZATION N/A 04/19/2024    Procedure: Right and Left Heart Cath with possible PCI;  Surgeon: Dilan Houston DO;  Location: [facility] CATH INVASIVE LOCATION;  Service: Cardiovascular;  Laterality: N/A;  Local and IV sedation    CHOLECYSTECTOMY  1988    COLON SURGERY  1998    COLONOSCOPY  02/2013    ENDOSCOPY  2012    ESOPHAGOGASTRODUODENOSCOPY WITH DILATION OF SHATZKI'S RING    ENDOSCOPY N/A 10/09/2020    Procedure: ESOPHAGOGASTRODUODENOSCOPY with cold bx;  Surgeon: Jake Perez MD;  Location: [facility];  Service: Gastroenterology;  Laterality: N/A;  pre - nausea  post - duodenal ulcer, gastrits, gastric ulcer,  hiatal hernia    ENDOSCOPY N/A 10/04/2022    Procedure: ESOPHAGOGASTRODUODENOSCOPY with biopsies with esophageal dilatation with 56 cui;  Surgeon: Jake Perez MD;  Location: [facility]COPY;  Service: Gastroenterology;  Laterality: N/A;  pre-dysphagia  post-normal     ENDOSCOPY  10/04/2022    Chris BUTTERFIELD    EYE SURGERY Bilateral     cataracts    LAMINECTOMY  2013    decompression L3-4, L4-5    PROSTATECTOMY  2007    SKIN BIOPSY      TONSILLECTOMY AND ADENOIDECTOMY      1940s    UPPER GASTROINTESTINAL ENDOSCOPY      VASECTOMY      1970s         Current Outpatient Medications:     acetaminophen (TYLENOL) 325 MG tablet, Take 2 tablets by mouth Every 4 (Four) Hours As Needed for Mild Pain., Disp: , Rfl:     aspirin 81 MG EC tablet, Take 1 tablet by mouth Every Night. Indications: Carotid Artery Stenting, Disp: , Rfl:     Cyanocobalamin (B-12) 1000 MCG sublingual tablet, Place 1 tablet under the tongue Daily., Disp: 30 each, Rfl: 6    docusate sodium (COLACE) 100 MG capsule, Take 3 capsules by mouth 2 (Two) Times a Day. Indications: Constipation, Disp: , Rfl:     escitalopram (LEXAPRO) 10 MG tablet, Take 1 tablet by mouth Daily. Take combined with 20mg tablets for a total of 30mg daily  Indications: Major Depressive Disorder, Disp: 30 tablet, Rfl: 3    escitalopram (LEXAPRO) 20 MG tablet, Take 1 tablet by mouth Daily. Take w/ 10 mg tab for total dose = 30 mg daily  Indications: Major Depressive Disorder, Disp: 30 tablet, Rfl: 3    levothyroxine (SYNTHROID, LEVOTHROID) 100 MCG tablet, Take 1 tablet by mouth Daily. Indications: Underactive Thyroid, Disp: , Rfl:     linaclotide (Linzess) 145 MCG capsule capsule, Take 1 capsule by mouth Every Morning Before Breakfast. (Patient taking differently: Take 1 capsule by mouth Every Morning Before Breakfast.), Disp: 30 capsule, Rfl: 1    Menthol, Topical Analgesic, (BIOFREEZE EX), Apply 1 Application topically Daily As Needed (pain)., Disp: , Rfl:     methocarbamol  "(ROBAXIN) 500 MG tablet, Take 1 tablet by mouth Every 8 (Eight) Hours As Needed for Muscle Spasms., Disp: 20 tablet, Rfl: 0    metoprolol tartrate (LOPRESSOR) 25 MG tablet, Take 0.5 tablets by mouth 2 (Two) Times a Day. Indications: High Blood Pressure Disorder, Disp: 90 tablet, Rfl: 3    midodrine (PROAMATINE) 5 MG tablet, Take 2 tablets by mouth 3 (Three) Times a Day Before Meals. Indications: Disorder of Low Blood Pressure, Disp: , Rfl:     pantoprazole (PROTONIX) 40 MG EC tablet, Take 1 tablet by mouth 2 (Two) Times a Day. Indications: Gastroesophageal Reflux Disease, Disp: , Rfl:     polyethylene glycol (MIRALAX) 17 g packet, Take 17 g by mouth Daily As Needed (Use if senna-docusate is ineffective)., Disp: , Rfl:     pregabalin (LYRICA) 50 MG capsule, Take 1 capsule by mouth 2 (Two) Times a Day. Indications: Neuropathic Pain, Disp: , Rfl:     pyridostigmine (MESTINON) 60 MG tablet, Take 1 tablet by mouth Every 12 (Twelve) Hours., Disp: 60 tablet, Rfl: 1  No current facility-administered medications for this visit.    Facility-Administered Medications Ordered in Other Visits:     Chlorhexidine Gluconate Cloth 2 % pads, , Topical, Q12H PRN, Kayli Rowe, JAVY    Allergies   Allergen Reactions    Statins Myalgia       Family History   Problem Relation Age of Onset    Cancer Mother 85        Breast    COPD Father     Cancer Brother 59        Unknown type    Hypertension Daughter        Social History     Tobacco Use    Smoking status: Never     Passive exposure: Never    Smokeless tobacco: Never   Substance Use Topics    Alcohol use: Not Currently     Comment: No ETOH since 1991           Current Electrocardiogram:  Procedures        EMR Dragon/Transcription:   \"Dictated utilizing Dragon dictation\".   Copied text in this note has been reviewed by me and is accurate as of 09/12/24.      "

## 2024-10-03 RX ORDER — MIDODRINE HYDROCHLORIDE 5 MG/1
TABLET ORAL
Qty: 270 TABLET | Refills: 2 | Status: SHIPPED | OUTPATIENT
Start: 2024-10-03 | End: 2024-10-04

## 2024-10-04 RX ORDER — MIDODRINE HYDROCHLORIDE 5 MG/1
TABLET ORAL
Qty: 270 TABLET | Refills: 1 | Status: SHIPPED | OUTPATIENT
Start: 2024-10-04

## 2024-11-20 ENCOUNTER — OFFICE VISIT (OUTPATIENT)
Dept: CARDIOLOGY | Facility: CLINIC | Age: 84
End: 2024-11-20
Payer: MEDICARE

## 2024-11-20 VITALS
RESPIRATION RATE: 18 BRPM | OXYGEN SATURATION: 98 % | HEIGHT: 75 IN | WEIGHT: 182 LBS | BODY MASS INDEX: 22.63 KG/M2 | HEART RATE: 78 BPM | DIASTOLIC BLOOD PRESSURE: 78 MMHG | SYSTOLIC BLOOD PRESSURE: 124 MMHG

## 2024-11-20 DIAGNOSIS — F32.A DEPRESSION, UNSPECIFIED DEPRESSION TYPE: ICD-10-CM

## 2024-11-20 DIAGNOSIS — I10 PRIMARY HYPERTENSION: ICD-10-CM

## 2024-11-20 DIAGNOSIS — E78.2 MIXED HYPERLIPIDEMIA: ICD-10-CM

## 2024-11-20 DIAGNOSIS — I95.1 ORTHOSTATIC HYPOTENSION: ICD-10-CM

## 2024-11-20 DIAGNOSIS — I71.21 ANEURYSM OF ASCENDING AORTA WITHOUT RUPTURE: Primary | ICD-10-CM

## 2024-11-20 RX ORDER — FLUDROCORTISONE ACETATE 0.1 MG/1
0.1 TABLET ORAL 2 TIMES DAILY
Qty: 180 TABLET | Refills: 3 | Status: SHIPPED | OUTPATIENT
Start: 2024-11-20

## 2024-11-20 NOTE — PROGRESS NOTES
Cardiology Office Visit      Encounter Date:  2024    PATIENT IDENTIFICATION    Name: Panfilo Feliz  Age: 84 y.o. Sex: male : 1940  MRN: [id number]    Reason For Followup:  Valvular heart disease  Ascending aortic aneurysm    Brief Clinical History:  Dear Moreno Mello APRN    I had the pleasure of seeing Panfilo Feliz today. As you are well aware, this is a 84 y.o. male with no established history of ischemic heart disease.  He does have a history of aortic insufficiency, ascending aortic aneurysm, hypothyroidism, neuropathy, and acid reflux.  He presents today for follow-up on the above conditions.    Interval History:  2024                                           The patient presents today for an acute visit.  He reports that he saw his cardiologist last month but has deteriorated since then.  He is specifically citing issues with lightheadedness and a cold sensation all over.  He reports that over the past month this has significantly deteriorated.  Interestingly, the lightheadedness appears to occur mostly in the evenings around 630 or so.  He reports that he had been going to the gym a couple of times a week but now has not been able to do so because of his symptoms.  His neuropathy continues to deteriorate.  He reports that they have found nothing to help out with this.    He has started some medications to help with his neuropathy but none have been successful.  It is possible that some of the side effects from his medicines could be lending assistance to his symptoms.  We discussed options and we will have him back off on his atenolol to see if this helps.    In the bigger picture, the patient has been seen by cardiothoracic surgery.  Per the patient and his wife, surgery was offered at the last visit with Dr. Puente.  The patient was reluctant to move forward.    We had a lengthy discussion today.  I discussed that with the patient's deteriorating neuropathy and now endurance  issues, it may be worthwhile to consider moving forward with surgery before his neuropathy and endurance worsens to a point that would preclude him from being a candidate for surgery.  He has a CT scan scheduled for later this year as well as an echocardiogram.  We will move those up.  He will need a cardiac catheterization as well.  I have encouraged him to reach out to Dr. Puente in order to schedule a follow-up and discuss moving forward with surgery.    04/09/2024        His blood pressures have been on the low side (80s) and was was getting orthostatic. His BP today is a little on the higher side.  He is having some issues with shortness of breath with exertion.  He underwent a CT scan that demonstrated an ascending aortic aneurysm of 5.1 cm.  We again discussed the options.  He is ready to move forward with having these items addressed.  As such we will schedule him for a cardiac catheterization as well as a transesophageal echocardiogram.  He is scheduled to see Dr. Puente next week.  His blood pressure is elevated today however he has not tolerated titrations of his antihypertensives.  He reports becoming dizzy and lightheaded and nonfunctional.    06/05/2024        The patient underwent ascending aortic aneurysm repair with reductive root aortoplasty of the right and noncoronary sinuses with left atrial appendage closure (atrial clip).  And he is home and now doing home PT. He is still having some orthostasis. He is on midodrine and metoprolol. Hold parameters on metoprolol are for less than 110. Will start with cardiac rehab.    07/05/2024        He is still having problmes with blood pressure. He has no stamina. Significant orthostasis. He has fallen once and brusised elbow. He is on metoprolol but his pressure is only good enough to take 2 doses.     Discussed echo he does have a small to moderate-sized pericardial effusion with no evidence of tamponade.  We discussed options.  He will continue on his  current regimen and we will add Florinef to see if this will help with his pressures.    11/20/2024        He is on midodrine 15 mg 3 times a day.  He is also taking Florinef 0.1 mg daily.  He is continuing to have problems with orthostasis.  He fell yesterday and has a contusion to the right side of his face.  He is becoming increasingly frustrated with his condition.  He reports that he feels depressed.  We discussed getting him in to see a psychiatrist to help with this.    Moving forward with medical therapy for his orthostasis, we will increase his Florinef to 0.1 mg twice daily and continue to do so every other week by 0.1 mg until his pressure is adequate and he is no longer orthostatic.    Assessment & Plan    Impressions:  Ascending aortic aneurysm  Aortic insufficiency  Depression/anxiety  Hypothyroidism  Peripheral neuropathy  Acid reflux    Recommendations:  Continuation of his current cardiovascular regimen at the present time.     This includes midodrine  Discontinue metoprolol  Add Florinef 0.1 mg daily and titrate by 0.1 mg weekly until consistent pressure of 150 to 160 mmHg is noted or edema develops  Follow-up in 4 weeks    Diagnoses and all orders for this visit:    1. Aneurysm of ascending aorta without rupture (Primary)  -     Ambulatory Referral to Psychiatry  -     Comprehensive Metabolic Panel; Future    2. Primary hypertension  -     Ambulatory Referral to Psychiatry  -     Comprehensive Metabolic Panel; Future    3. Mixed hyperlipidemia  -     Ambulatory Referral to Psychiatry  -     Comprehensive Metabolic Panel; Future    4. Depression, unspecified depression type  -     Ambulatory Referral to Psychiatry  -     Comprehensive Metabolic Panel; Future    5. Orthostatic hypotension    Other orders  -     fludrocortisone 0.1 MG tablet; Take 1 tablet by mouth 2 (Two) Times a Day.  Dispense: 180 tablet; Refill: 3                  Objective:    Vitals:  Vitals:    11/20/24 1542   BP: 124/78   BP  "Location: Left arm   Patient Position: Sitting   Cuff Size: Large Adult   Pulse: 78   Resp: 18   SpO2: 98%   Weight: 82.6 kg (182 lb)   Height: 190.5 cm (75\")             Body mass index is 22.75 kg/m².      Physical Exam:    General: Alert, cooperative, no distress, appears stated age  Head:  Normocephalic, atraumatic, mucous membranes moist  Eyes:  Conjunctiva/corneas clear, EOM's intact     Neck:  Supple,  no bruit    Lungs: Coarse and diminished bilaterally  Chest wall: Tender at incision  Heart::  Regular rate and rhythm, S1 and S2 normal, 1/6 diastolic murmur.  No rub or gallop  Abdomen: Soft, non-tender, nondistended bowel sounds active  Extremities: No cyanosis, clubbing, or edema  Pulses: Diminished pedal pulses  Skin:  No rashes or lesions.  Some ecchymoses over right side of face  Neuro/psych: A&O x3. CN II through XII are grossly intact with appropriate affect      Allergies:  Allergies   Allergen Reactions    Statins Myalgia       Medication Review:     Current Outpatient Medications:     acetaminophen (TYLENOL) 325 MG tablet, Take 2 tablets by mouth Every 4 (Four) Hours As Needed for Mild Pain., Disp: , Rfl:     aspirin 81 MG EC tablet, Take 1 tablet by mouth Every Night. Indications: Carotid Artery Stenting, Disp: , Rfl:     Cyanocobalamin (B-12) 1000 MCG sublingual tablet, Place 1 tablet under the tongue Daily., Disp: 30 each, Rfl: 6    escitalopram (LEXAPRO) 10 MG tablet, Take 1 tablet by mouth Daily. Take combined with 20mg tablets for a total of 30mg daily  Indications: Major Depressive Disorder, Disp: 30 tablet, Rfl: 3    escitalopram (LEXAPRO) 20 MG tablet, Take 1 tablet by mouth Daily. Take w/ 10 mg tab for total dose = 30 mg daily  Indications: Major Depressive Disorder, Disp: 30 tablet, Rfl: 3    levothyroxine (SYNTHROID, LEVOTHROID) 100 MCG tablet, Take 1 tablet by mouth Daily. Indications: Underactive Thyroid, Disp: , Rfl:     Menthol, Topical Analgesic, (BIOFREEZE EX), Apply 1 Application " topically Daily As Needed (pain)., Disp: , Rfl:     methocarbamol (ROBAXIN) 500 MG tablet, Take 1 tablet by mouth Every 8 (Eight) Hours As Needed for Muscle Spasms., Disp: 20 tablet, Rfl: 0    metoprolol tartrate (LOPRESSOR) 25 MG tablet, Take 0.5 tablets by mouth 2 (Two) Times a Day. Indications: High Blood Pressure Disorder, Disp: 90 tablet, Rfl: 3    midodrine (PROAMATINE) 5 MG tablet, TAKE THREE TABLETS BY MOUTH THREE TIMES A DAY BEFORE MEALS, Disp: 270 tablet, Rfl: 1    pantoprazole (PROTONIX) 40 MG EC tablet, Take 1 tablet by mouth 2 (Two) Times a Day. Indications: Gastroesophageal Reflux Disease, Disp: , Rfl:     pregabalin (LYRICA) 50 MG capsule, Take 1 capsule by mouth 2 (Two) Times a Day. Indications: Neuropathic Pain, Disp: , Rfl:     pyridostigmine (MESTINON) 60 MG tablet, Take 1 tablet by mouth Every 12 (Twelve) Hours., Disp: 60 tablet, Rfl: 1    fludrocortisone 0.1 MG tablet, Take 1 tablet by mouth 2 (Two) Times a Day., Disp: 180 tablet, Rfl: 3  No current facility-administered medications for this visit.    Facility-Administered Medications Ordered in Other Visits:     Chlorhexidine Gluconate Cloth 2 % pads, , Topical, Q12H PRN, Kayli Rowe, APRN    Family History:  Family History   Problem Relation Age of Onset    Cancer Mother 85        Breast    COPD Father     Cancer Brother 59        Unknown type    Hypertension Daughter        Past Medical History:  Past Medical History:   Diagnosis Date    AAA (abdominal aortic aneurysm)     Abnormal ECG 1980’s    Alcoholism     None since 1991    Aneurysm 2019    aortic arch    Arthritis     Basal cell carcinoma (BCC) of face     Cholelithiasis 1990’s    Cholecystectomy    Colon polyp     Colon polyps     Coronary artery disease     Difficulty walking     Disease of thyroid gland     DJD (degenerative joint disease)     Gastritis     Gastroparesis     GERD (gastroesophageal reflux disease)     GI (gastrointestinal bleed)     Heart valve disease      Hernia     Hiatal    History of bone marrow biopsy     [place] (hard of hearing)     Hyperlipidemia     Hypertension     Hypothyroidism     IgG monoclonal gammopathy     Multiple duodenal ulcers     Neuropathy     PONV (postoperative nausea and vomiting)     Prostate cancer     Reflux esophagitis     Ulcer     WBC decreased        Past Surgical History:  Past Surgical History:   Procedure Laterality Date    ARTERIAL ANEURYSM REPAIR      ASCENDING AORTIC ANEURYSM REPAIR W/ MECHANICAL AORTIC VALVE REPLACEMENT N/A 05/10/2024    Procedure: ASCENDING AORTIC ARCH/ possible HEMIARCH REPLACEMENT WITH CIRCULATORY ARREST, neuro monitoring, and intraop JOSE A;  Surgeon: Spencer Puente MD;  Location: [facility];  Service: Cardiothoracic;  Laterality: N/A;  Ascending aortic aneurysm repair with 30 mm gelweave graft.    CARDIAC CATHETERIZATION N/A 04/19/2024    Procedure: Right and Left Heart Cath with possible PCI;  Surgeon: Dilan Houston DO;  Location: [facility] CATH INVASIVE LOCATION;  Service: Cardiovascular;  Laterality: N/A;  Local and IV sedation    CHOLECYSTECTOMY  1988    COLON SURGERY  1998    COLONOSCOPY  02/2013    ENDOSCOPY  2012    ESOPHAGOGASTRODUODENOSCOPY WITH DILATION OF SHATZKI'S RING    ENDOSCOPY N/A 10/09/2020    Procedure: ESOPHAGOGASTRODUODENOSCOPY with cold bx;  Surgeon: Jake Perez MD;  Location:  [facility];  Service: Gastroenterology;  Laterality: N/A;  pre - nausea  post - duodenal ulcer, gastrits, gastric ulcer, hiatal hernia    ENDOSCOPY N/A 10/04/2022    Procedure: ESOPHAGOGASTRODUODENOSCOPY with biopsies with esophageal dilatation with 56 cui;  Surgeon: Jake Perez MD;  Location:  [facility];  Service: Gastroenterology;  Laterality: N/A;  pre-dysphagia  post-normal     ENDOSCOPY  10/04/2022    [facility]    EYE SURGERY Bilateral     cataracts    LAMINECTOMY  2013    decompression L3-4, L4-5    PROSTATECTOMY  2007    SKIN BIOPSY      TONSILLECTOMY AND  ADENOIDECTOMY      1940s    UPPER GASTROINTESTINAL ENDOSCOPY      VASECTOMY      1970s       Social History:  Social History     Socioeconomic History    Marital status:      Spouse name: Meghan    Years of education: College   Tobacco Use    Smoking status: Never     Passive exposure: Never    Smokeless tobacco: Never   Vaping Use    Vaping status: Never Used   Substance and Sexual Activity    Alcohol use: Not Currently     Comment: No ETOH since 1991    Drug use: Never    Sexual activity: Not Currently     Partners: Female     Birth control/protection: None       Review of Systems:  The following systems were reviewed as they relate to the cardiovascular system: Constitutional, Eyes, ENT, Cardiovascular, Respiratory, Gastrointestinal, Integumentary, Neurological, Psychiatric, Hematologic, Endocrine, Musculoskeletal, and Genitourinary. The pertinent cardiovascular findings are reported above with all other cardiovascular points within those systems being negative.    Diagnostic Study Review:     Current Electrocardiogram:  Procedures no new EKG.  EKG dated 13 July 2024 demonstrates sinus rhythm with a ventricular rate of 79 bpm.    Laboratory Data:  Lab Results   Component Value Date    GLUCOSE 93 09/05/2024    BUN 22 09/05/2024    CREATININE 1.16 09/05/2024    EGFRIFNONA 58 (L) 02/09/2022    BCR 19.0 09/05/2024    K 4.0 09/05/2024    CO2 28.4 09/05/2024    CALCIUM 9.6 09/05/2024    PROTENTOTREF 6.9 09/05/2024    ALBUMIN 4.4 09/05/2024    ALBUMIN 3.7 09/05/2024    LABIL2 1.2 09/05/2024    AST 28 09/05/2024    ALT 12 09/05/2024     Lab Results   Component Value Date    GLUCOSE 93 09/05/2024    CALCIUM 9.6 09/05/2024     09/05/2024    K 4.0 09/05/2024    CO2 28.4 09/05/2024     09/05/2024    BUN 22 09/05/2024    CREATININE 1.16 09/05/2024    EGFRIFNONA 58 (L) 02/09/2022    BCR 19.0 09/05/2024    ANIONGAP 9.6 09/05/2024     Lab Results   Component Value Date    WBC 4.60 09/05/2024    HGB 12.5 (L)  09/05/2024    HCT 40.1 09/05/2024    MCV 85.5 09/05/2024     09/05/2024     Lab Results   Component Value Date    CHOL 195 04/17/2024    TRIG 101 04/17/2024    HDL 54 04/17/2024     (H) 04/17/2024     Lab Results   Component Value Date    HGBA1C 5.50 05/09/2024     Lab Results   Component Value Date    INR 1.10 05/11/2024    INR 1.12 (H) 05/10/2024    INR 1.38 (H) 05/10/2024    PROTIME 11.9 (H) 05/11/2024    PROTIME 12.1 (H) 05/10/2024    PROTIME 14.7 (H) 05/10/2024       Most Recent Echo:  Results for orders placed during the hospital encounter of 07/13/24    Adult Transthoracic Echo Limited W/ Cont if Necessary Per Protocol    Interpretation Summary    Left ventricular systolic function is normal. Left ventricular ejection fraction appears to be 56 - 60%.    Left ventricular wall thickness is consistent with mild concentric hypertrophy.    The left atrial cavity is moderately dilated.    Estimated right ventricular systolic pressure from tricuspid regurgitation is normal (<35 mmHg).    There is a moderate (1-2cm) circumferential pericardial effusion. There is no evidence of cardiac tamponade.    There is a moderate sized left pleural effusion.       Most Recent Stress Test:  Results for orders placed during the hospital encounter of 06/17/24    Treadmill Stress Test    Interpretation Summary    Patient exercised only 1 minute 2 seconds achieving 4.6 METS    No significant cardiac arrhythmia no significant acute ischemic EKG changes    Patient is stable to start the cardiac rehab program       Most Recent Cardiac Catheterization:   No results found for this or any previous visit.       NOTE: The following portions of the patient's note were reviewed, confirmed and/or updated this visit as appropriate: History of present illness/Interval history, physical examination, assessment & plan, allergies, current medications, past family history, past medical history, past social history, past surgical history  "and problem list.    Labs pertinent to today's visit on 11/20/2024 (including but not limited to CBC, CMP, and lipid profiles) were requested from the patient's primary care provider/hospital/clinical laboratory.  If the labs were available for the visit, they were reviewed with the patient.  If they were not available, when received, special interest will be made to the labs pertinent to this visit.  The patient's most recent \"in-house\" labs are noted below and have been reviewed.  Outside labs pertinent to this visit are scanned into the record and have been reviewed.    Discussions held today, 11/20/2024,regarding procedures included risk, benefits, and options including but not limited to: Death, MI, stroke, pain, bleeding, infection, and possible need for vascular/thoracic/cardiothoracic surgery.    Copied information within this note was reviewed and is current as of 11/20/2024.    Assessment and plan noted herein represents the current plan of care as of 11/20/2024.    Significant resources from our office and staff are inherent in engaging this patient in a continuous and active collaborative plan of care related to their chronic cardiovascular conditions outlined below.  The management of these conditions requires the direction of our service with specialized clinical knowledge, skills, and experience.  This collaborative care includes but is not limited to patient education, expectations and responsibilities, shared decision making around therapeutic goals, and shared commitments to achieve those goals.  "

## 2024-11-22 ENCOUNTER — TELEPHONE (OUTPATIENT)
Dept: CARDIOLOGY | Facility: CLINIC | Age: 84
End: 2024-11-22

## 2024-11-22 NOTE — TELEPHONE ENCOUNTER
Caller: Kelin Feliz    Relationship to patient: Emergency Contact    Best call back number: [phone]    Patient is needing: PT'S WIFE CALLED TO LET DR. WARREN THAT PT IS ALREADY TAKING fludrocortisone 0.1 MG tablet 1 TAB 2X DAILY. PT'S WIFE STATED THAT PT NEEDS TO BE ON THE NEXT STEP UP DOSAGE FOR 2 WEEKS. PLEASE CALL PT'S WIFE TO LET HER KNOW IF THE DOSAGE NEEDS TO BE INCREASED TO NEXT STEP DOSAGE.

## 2024-11-25 ENCOUNTER — LAB (OUTPATIENT)
Dept: LAB | Facility: HOSPITAL | Age: 84
End: 2024-11-25
Payer: MEDICARE

## 2024-11-25 DIAGNOSIS — I10 PRIMARY HYPERTENSION: ICD-10-CM

## 2024-11-25 DIAGNOSIS — I71.21 ANEURYSM OF ASCENDING AORTA WITHOUT RUPTURE: ICD-10-CM

## 2024-11-25 DIAGNOSIS — E78.2 MIXED HYPERLIPIDEMIA: ICD-10-CM

## 2024-11-25 DIAGNOSIS — F32.A DEPRESSION, UNSPECIFIED DEPRESSION TYPE: ICD-10-CM

## 2024-11-25 LAB
ALBUMIN SERPL-MCNC: 4.4 G/DL (ref 3.5–5.2)
ALBUMIN/GLOB SERPL: 1.5 G/DL
ALP SERPL-CCNC: 64 U/L (ref 39–117)
ALT SERPL W P-5'-P-CCNC: 9 U/L (ref 1–41)
ANION GAP SERPL CALCULATED.3IONS-SCNC: 10 MMOL/L (ref 5–15)
AST SERPL-CCNC: 27 U/L (ref 1–40)
BILIRUB SERPL-MCNC: 0.4 MG/DL (ref 0–1.2)
BUN SERPL-MCNC: 14 MG/DL (ref 8–23)
BUN/CREAT SERPL: 11.3 (ref 7–25)
CALCIUM SPEC-SCNC: 9.6 MG/DL (ref 8.6–10.5)
CHLORIDE SERPL-SCNC: 101 MMOL/L (ref 98–107)
CO2 SERPL-SCNC: 28 MMOL/L (ref 22–29)
CREAT SERPL-MCNC: 1.24 MG/DL (ref 0.76–1.27)
EGFRCR SERPLBLD CKD-EPI 2021: 57.7 ML/MIN/1.73
GLOBULIN UR ELPH-MCNC: 2.9 GM/DL
GLUCOSE SERPL-MCNC: 91 MG/DL (ref 65–99)
POTASSIUM SERPL-SCNC: 3.6 MMOL/L (ref 3.5–5.2)
PROT SERPL-MCNC: 7.3 G/DL (ref 6–8.5)
SODIUM SERPL-SCNC: 139 MMOL/L (ref 136–145)

## 2024-11-25 PROCEDURE — 36415 COLL VENOUS BLD VENIPUNCTURE: CPT

## 2024-11-25 PROCEDURE — 80053 COMPREHEN METABOLIC PANEL: CPT

## 2024-12-05 ENCOUNTER — TELEPHONE (OUTPATIENT)
Dept: CARDIOLOGY | Facility: CLINIC | Age: 84
End: 2024-12-05
Payer: MEDICARE

## 2024-12-05 DIAGNOSIS — I95.1 ORTHOSTATIC HYPOTENSION: Primary | ICD-10-CM

## 2024-12-05 RX ORDER — FLUDROCORTISONE ACETATE 0.1 MG/1
0.2 TABLET ORAL 2 TIMES DAILY
Qty: 150 TABLET | Refills: 1 | Status: SHIPPED | OUTPATIENT
Start: 2024-12-05 | End: 2024-12-06 | Stop reason: SDUPTHER

## 2024-12-05 NOTE — TELEPHONE ENCOUNTER
----- Message from Dilan Houston sent at 12/5/2024 10:43 AM EST -----  Regarding: RE: f/u on fludricort  Go up 0.1 mg/day and continue to monitor. 90s is a little better  ----- Message -----  From: Josefina Fang MA  Sent: 12/5/2024   9:50 AM EST  To: Dilan Houston,   Subject: f/u on fludricort                                Wife calling to report he is still having dizzy spells with standing, BP 90s/60s-40's. No swelling. She increased the fludricort 2 weeks ago. BP when sitting is normal, highest was 140 systolic.

## 2024-12-06 ENCOUNTER — TELEPHONE (OUTPATIENT)
Dept: CARDIOLOGY | Facility: CLINIC | Age: 84
End: 2024-12-06
Payer: MEDICARE

## 2024-12-06 DIAGNOSIS — I95.1 ORTHOSTATIC HYPOTENSION: ICD-10-CM

## 2024-12-06 RX ORDER — FLUDROCORTISONE ACETATE 0.1 MG/1
TABLET ORAL
Qty: 150 TABLET | Refills: 1 | Status: SHIPPED | OUTPATIENT
Start: 2024-12-06

## 2024-12-06 NOTE — TELEPHONE ENCOUNTER
Caller: QUITA OR ANY OTHER PHARMACIST AVAILABLE    Relationship: [facility]    Best call back number: [phone]    Which medication are you concerned about: FLUDROCORTISONE    Who prescribed you this medication: DR WARREN    When did you start taking this medication: NA    What are your concerns: THERE ARE 2 SETS OF DIFFERING DIRECTIONS SENT OVER WITH THE PRESCRIPTION AND PHARMACIST WANTS TO KNOW WHICH IS THE CORRECT INSTRUCTIONS FOR THE PATIENT TO TAKE THE MEDICATION.     How long have you had these concerns:

## 2024-12-18 ENCOUNTER — OFFICE VISIT (OUTPATIENT)
Dept: CARDIOLOGY | Facility: CLINIC | Age: 84
End: 2024-12-18
Payer: MEDICARE

## 2024-12-18 VITALS
DIASTOLIC BLOOD PRESSURE: 80 MMHG | WEIGHT: 176 LBS | SYSTOLIC BLOOD PRESSURE: 146 MMHG | HEART RATE: 59 BPM | OXYGEN SATURATION: 96 % | BODY MASS INDEX: 22 KG/M2

## 2024-12-18 DIAGNOSIS — I35.1 NONRHEUMATIC AORTIC VALVE INSUFFICIENCY: Primary | ICD-10-CM

## 2024-12-18 DIAGNOSIS — I95.1 ORTHOSTATIC HYPOTENSION: ICD-10-CM

## 2024-12-18 NOTE — PROGRESS NOTES
Cardiology Office Visit      Encounter Date:  2024    PATIENT IDENTIFICATION    Name: Panfilo Feliz  Age: 84 y.o. Sex: male : 1940  MRN: [id number]    Reason For Followup:  Valvular heart disease  Ascending aortic aneurysm    Brief Clinical History:  Dear Moreno Mello APRN    I had the pleasure of seeing Panfilo Feliz today. As you are well aware, this is a 84 y.o. male with no established history of ischemic heart disease.  He does have a history of aortic insufficiency, ascending aortic aneurysm, hypothyroidism, neuropathy, and acid reflux.  He presents today for follow-up on the above conditions.    Interval History:  2024                                           The patient presents today for an acute visit.  He reports that he saw his cardiologist last month but has deteriorated since then.  He is specifically citing issues with lightheadedness and a cold sensation all over.  He reports that over the past month this has significantly deteriorated.  Interestingly, the lightheadedness appears to occur mostly in the evenings around 630 or so.  He reports that he had been going to the gym a couple of times a week but now has not been able to do so because of his symptoms.  His neuropathy continues to deteriorate.  He reports that they have found nothing to help out with this.    He has started some medications to help with his neuropathy but none have been successful.  It is possible that some of the side effects from his medicines could be lending assistance to his symptoms.  We discussed options and we will have him back off on his atenolol to see if this helps.    In the bigger picture, the patient has been seen by cardiothoracic surgery.  Per the patient and his wife, surgery was offered at the last visit with Dr. Puente.  The patient was reluctant to move forward.    We had a lengthy discussion today.  I discussed that with the patient's deteriorating neuropathy and now endurance  issues, it may be worthwhile to consider moving forward with surgery before his neuropathy and endurance worsens to a point that would preclude him from being a candidate for surgery.  He has a CT scan scheduled for later this year as well as an echocardiogram.  We will move those up.  He will need a cardiac catheterization as well.  I have encouraged him to reach out to Dr. Puente in order to schedule a follow-up and discuss moving forward with surgery.    04/09/2024        His blood pressures have been on the low side (80s) and was was getting orthostatic. His BP today is a little on the higher side.  He is having some issues with shortness of breath with exertion.  He underwent a CT scan that demonstrated an ascending aortic aneurysm of 5.1 cm.  We again discussed the options.  He is ready to move forward with having these items addressed.  As such we will schedule him for a cardiac catheterization as well as a transesophageal echocardiogram.  He is scheduled to see Dr. Puente next week.  His blood pressure is elevated today however he has not tolerated titrations of his antihypertensives.  He reports becoming dizzy and lightheaded and nonfunctional.    06/05/2024        The patient underwent ascending aortic aneurysm repair with reductive root aortoplasty of the right and noncoronary sinuses with left atrial appendage closure (atrial clip).  And he is home and now doing home PT. He is still having some orthostasis. He is on midodrine and metoprolol. Hold parameters on metoprolol are for less than 110. Will start with cardiac rehab.    07/05/2024        He is still having problmes with blood pressure. He has no stamina. Significant orthostasis. He has fallen once and brusised elbow. He is on metoprolol but his pressure is only good enough to take 2 doses.     Discussed echo he does have a small to moderate-sized pericardial effusion with no evidence of tamponade.  We discussed options.  He will continue on his  current regimen and we will add Florinef to see if this will help with his pressures.    11/20/2024        He is on midodrine 15 mg 3 times a day.  He is also taking Florinef 0.1 mg daily.  He is continuing to have problems with orthostasis.  He fell yesterday and has a contusion to the right side of his face.  He is becoming increasingly frustrated with his condition.  He reports that he feels depressed.  We discussed getting him in to see a psychiatrist to help with this.    Moving forward with medical therapy for his orthostasis, we will increase his Florinef to 0.1 mg twice daily and continue to do so every other week by 0.1 mg until his pressure is adequate and he is no longer orthostatic.    12/18/2024        His blood pressure is elevated but he has a profound orthostatic hyoptension. His supine pressure is 192/102, sitting 140/80, and 118/72. Unfortunately we have to allow for permissive hypertension in order to keep him from syncopal events and injury.     He is currently taking florinef 0.2, midodrine, mestinon and metoprolol 7 AM. Then midodrine and florinef 0.1 around 2. Then will take midodrine, mestinon and florinef 0.2 at 8 PM    Pressures at home are in the 160s laying, 120s sitting and 80s-100 standing.    Would like to start cardiac rehab. Will place order.     He has made improvements. He is able to bath himself and he has not fallen since last visit.    He had orthostatics performed today in the office.  Lying 192/102, sitting 140/180, standing 118/72.  Discussed options.  Would prefer not to further increase patient's Florinef and midodrine due to significant supine hypertension.  Patient will continue on his current regimen and follow-up.  We will start him in phase 2 cardiac rehab    Assessment & Plan    Impressions:  Ascending aortic aneurysm  Aortic insufficiency  Depression/anxiety  Hypothyroidism  Peripheral neuropathy  Acid reflux    Recommendations:  Continuation of his current  cardiovascular regimen at the present time.     This includes midodrine and Florinef  Phase 2 cardiac rehab  Follow-up in 8 weeks      Diagnoses and all orders for this visit:    1. Nonrheumatic aortic valve insufficiency (Primary)  -     Cardiac Rehab Phase II; Future    2. Orthostatic hypotension                    Objective:    Vitals:  Vitals:    12/18/24 1147   BP: 146/80   BP Location: Left arm   Patient Position: Sitting   Pulse: 59   SpO2: 96%   Weight: 79.8 kg (176 lb)             Body mass index is 22 kg/m².      Physical Exam:    General: Alert, cooperative, no distress, appears stated age  Head:  Normocephalic, atraumatic, mucous membranes moist  Eyes:  Conjunctiva/corneas clear, EOM's intact     Neck:  Supple,  no bruit    Lungs: Coarse and diminished bilaterally  Chest wall: Tender at incision  Heart::  Regular rate and rhythm, S1 and S2 normal, 1/6 diastolic murmur.  No rub or gallop  Abdomen: Soft, non-tender, nondistended bowel sounds active  Extremities: No cyanosis, clubbing, or edema  Pulses: Diminished pedal pulses  Skin:  No rashes or lesions.  Some ecchymoses over right side of face  Neuro/psych: A&O x3. CN II through XII are grossly intact with appropriate affect      Allergies:  Allergies   Allergen Reactions    Statins Myalgia       Medication Review:     Current Outpatient Medications:     acetaminophen (TYLENOL) 325 MG tablet, Take 2 tablets by mouth Every 4 (Four) Hours As Needed for Mild Pain., Disp: , Rfl:     aspirin 81 MG EC tablet, Take 1 tablet by mouth Every Night. Indications: Carotid Artery Stenting, Disp: , Rfl:     Cyanocobalamin (B-12) 1000 MCG sublingual tablet, Place 1 tablet under the tongue Daily., Disp: 30 each, Rfl: 6    escitalopram (LEXAPRO) 10 MG tablet, Take 1 tablet by mouth Daily. Take combined with 20mg tablets for a total of 30mg daily  Indications: Major Depressive Disorder, Disp: 30 tablet, Rfl: 3    escitalopram (LEXAPRO) 20 MG tablet, Take 1 tablet by mouth  Daily. Take w/ 10 mg tab for total dose = 30 mg daily  Indications: Major Depressive Disorder, Disp: 30 tablet, Rfl: 3    fludrocortisone 0.1 MG tablet, Take 3 tablets in AM, 2 tablets in PM., Disp: 150 tablet, Rfl: 1    levothyroxine (SYNTHROID, LEVOTHROID) 100 MCG tablet, Take 1 tablet by mouth Daily. Indications: Underactive Thyroid, Disp: , Rfl:     Menthol, Topical Analgesic, (BIOFREEZE EX), Apply 1 Application topically Daily As Needed (pain)., Disp: , Rfl:     methocarbamol (ROBAXIN) 500 MG tablet, Take 1 tablet by mouth Every 8 (Eight) Hours As Needed for Muscle Spasms., Disp: 20 tablet, Rfl: 0    metoprolol tartrate (LOPRESSOR) 25 MG tablet, Take 0.5 tablets by mouth 2 (Two) Times a Day. Indications: High Blood Pressure Disorder, Disp: 90 tablet, Rfl: 3    pantoprazole (PROTONIX) 40 MG EC tablet, Take 1 tablet by mouth 2 (Two) Times a Day. Indications: Gastroesophageal Reflux Disease, Disp: , Rfl:     pregabalin (LYRICA) 50 MG capsule, Take 1 capsule by mouth 2 (Two) Times a Day. Indications: Neuropathic Pain, Disp: , Rfl:     pyridostigmine (MESTINON) 60 MG tablet, Take 1 tablet by mouth Every 12 (Twelve) Hours., Disp: 60 tablet, Rfl: 1    midodrine (PROAMATINE) 5 MG tablet, TAKE THREE TABLETS BY MOUTH THREE TIMES A DAY BEFORE MEALS, Disp: 270 tablet, Rfl: 1  No current facility-administered medications for this visit.    Facility-Administered Medications Ordered in Other Visits:     Chlorhexidine Gluconate Cloth 2 % pads, , Topical, Q12H PRN, Kayli Rowe, APRN    Family History:  Family History   Problem Relation Age of Onset    Cancer Mother 85        Breast    COPD Father     Cancer Brother 59        Unknown type    Hypertension Daughter        Past Medical History:  Past Medical History:   Diagnosis Date    AAA (abdominal aortic aneurysm)     Abnormal ECG 1980’s    Alcoholism     None since 1991    Aneurysm 2019    aortic arch    Arthritis     Basal cell carcinoma (BCC) of face      Cholelithiasis 1990’s    Cholecystectomy    Colon polyp     Colon polyps     Coronary artery disease     Difficulty walking     Disease of thyroid gland     DJD (degenerative joint disease)     Gastritis     Gastroparesis     GERD (gastroesophageal reflux disease)     GI (gastrointestinal bleed)     Heart valve disease     Hernia     Hiatal    History of bone marrow biopsy     [place] (hard of hearing)     Hyperlipidemia     Hypertension     Hypothyroidism     IgG monoclonal gammopathy     Multiple duodenal ulcers     Neuropathy     PONV (postoperative nausea and vomiting)     Prostate cancer     Reflux esophagitis     Ulcer     WBC decreased        Past Surgical History:  Past Surgical History:   Procedure Laterality Date    ARTERIAL ANEURYSM REPAIR      ASCENDING AORTIC ANEURYSM REPAIR W/ MECHANICAL AORTIC VALVE REPLACEMENT N/A 05/10/2024    Procedure: ASCENDING AORTIC ARCH/ possible HEMIARCH REPLACEMENT WITH CIRCULATORY ARREST, neuro monitoring, and intraop JOSE A;  Surgeon: Spencer Puente MD;  Location: [facility] CVOR;  Service: Cardiothoracic;  Laterality: N/A;  Ascending aortic aneurysm repair with 30 mm gelweave graft.    CARDIAC CATHETERIZATION N/A 04/19/2024    Procedure: Right and Left Heart Cath with possible PCI;  Surgeon: Dilan Houston DO;  Location: [facility] CATH INVASIVE LOCATION;  Service: Cardiovascular;  Laterality: N/A;  Local and IV sedation    CHOLECYSTECTOMY  1988    COLON SURGERY  1998    COLONOSCOPY  02/2013    ENDOSCOPY  2012    ESOPHAGOGASTRODUODENOSCOPY WITH DILATION OF SHATZKI'S RING    ENDOSCOPY N/A 10/09/2020    Procedure: ESOPHAGOGASTRODUODENOSCOPY with cold bx;  Surgeon: Jake Perez MD;  Location: [facility] ENDOSCOPY;  Service: Gastroenterology;  Laterality: N/A;  pre - nausea  post - duodenal ulcer, gastrits, gastric ulcer, hiatal hernia    ENDOSCOPY N/A 10/04/2022    Procedure: ESOPHAGOGASTRODUODENOSCOPY with biopsies with esophageal dilatation with 56 cui;  Surgeon:  Jake Perez MD;  Location: [facility] ENDOSCOPY;  Service: Gastroenterology;  Laterality: N/A;  pre-dysphagia  post-normal     ENDOSCOPY  10/04/2022    Chris [facility]TERFIELD    EYE SURGERY Bilateral     cataracts    LAMINECTOMY  2013    decompression L3-4, L4-5    PROSTATECTOMY  2007    SKIN BIOPSY      TONSILLECTOMY AND ADENOIDECTOMY      1940s    UPPER GASTROINTESTINAL ENDOSCOPY      VASECTOMY      1970s       Social History:  Social History     Socioeconomic History    Marital status:      Spouse name: Meghan    Years of education: College   Tobacco Use    Smoking status: Never     Passive exposure: Never    Smokeless tobacco: Never   Vaping Use    Vaping status: Never Used   Substance and Sexual Activity    Alcohol use: Not Currently     Comment: No ETOH since 1991    Drug use: Never    Sexual activity: Not Currently     Partners: Female     Birth control/protection: None       Review of Systems:  The following systems were reviewed as they relate to the cardiovascular system: Constitutional, Eyes, ENT, Cardiovascular, Respiratory, Gastrointestinal, Integumentary, Neurological, Psychiatric, Hematologic, Endocrine, Musculoskeletal, and Genitourinary. The pertinent cardiovascular findings are reported above with all other cardiovascular points within those systems being negative.    Diagnostic Study Review:     Current Electrocardiogram:  Procedures normal sinus rhythm with a ventricular rate of 59 bpm.  Right bundle branch block.  Prolonged QT and QTc intervals of 533 and 529 ms respectively.  Normal QRS axis.    Laboratory Data:  Lab Results   Component Value Date    GLUCOSE 91 11/25/2024    BUN 14 11/25/2024    CREATININE 1.24 11/25/2024    EGFRIFNONA 58 (L) 02/09/2022    BCR 11.3 11/25/2024    K 3.6 11/25/2024    CO2 28.0 11/25/2024    CALCIUM 9.6 11/25/2024    PROTENTOTREF 6.9 09/05/2024    ALBUMIN 4.4 11/25/2024    LABIL2 1.2 09/05/2024    AST 27 11/25/2024    ALT 9 11/25/2024     Lab Results   Component Value  Date    GLUCOSE 91 11/25/2024    CALCIUM 9.6 11/25/2024     11/25/2024    K 3.6 11/25/2024    CO2 28.0 11/25/2024     11/25/2024    BUN 14 11/25/2024    CREATININE 1.24 11/25/2024    EGFRIFNONA 58 (L) 02/09/2022    BCR 11.3 11/25/2024    ANIONGAP 10.0 11/25/2024     Lab Results   Component Value Date    WBC 4.60 09/05/2024    HGB 12.5 (L) 09/05/2024    HCT 40.1 09/05/2024    MCV 85.5 09/05/2024     09/05/2024     Lab Results   Component Value Date    CHOL 195 04/17/2024    TRIG 101 04/17/2024    HDL 54 04/17/2024     (H) 04/17/2024     Lab Results   Component Value Date    HGBA1C 5.50 05/09/2024     Lab Results   Component Value Date    INR 1.10 05/11/2024    INR 1.12 (H) 05/10/2024    INR 1.38 (H) 05/10/2024    PROTIME 11.9 (H) 05/11/2024    PROTIME 12.1 (H) 05/10/2024    PROTIME 14.7 (H) 05/10/2024       Most Recent Echo:  Results for orders placed during the hospital encounter of 07/13/24    Adult Transthoracic Echo Limited W/ Cont if Necessary Per Protocol    Interpretation Summary    Left ventricular systolic function is normal. Left ventricular ejection fraction appears to be 56 - 60%.    Left ventricular wall thickness is consistent with mild concentric hypertrophy.    The left atrial cavity is moderately dilated.    Estimated right ventricular systolic pressure from tricuspid regurgitation is normal (<35 mmHg).    There is a moderate (1-2cm) circumferential pericardial effusion. There is no evidence of cardiac tamponade.    There is a moderate sized left pleural effusion.       Most Recent Stress Test:  Results for orders placed during the hospital encounter of 06/17/24    Treadmill Stress Test    Interpretation Summary    Patient exercised only 1 minute 2 seconds achieving 4.6 METS    No significant cardiac arrhythmia no significant acute ischemic EKG changes    Patient is stable to start the cardiac rehab program       Most Recent Cardiac Catheterization:   No results found for  "this or any previous visit.       NOTE: The following portions of the patient's note were reviewed, confirmed and/or updated this visit as appropriate: History of present illness/Interval history, physical examination, assessment & plan, allergies, current medications, past family history, past medical history, past social history, past surgical history and problem list.    Labs pertinent to today's visit on 12/18/2024 (including but not limited to CBC, CMP, and lipid profiles) were requested from the patient's primary care provider/hospital/clinical laboratory.  If the labs were available for the visit, they were reviewed with the patient.  If they were not available, when received, special interest will be made to the labs pertinent to this visit.  The patient's most recent \"in-house\" labs are noted below and have been reviewed.  Outside labs pertinent to this visit are scanned into the record and have been reviewed.    Discussions held today, 12/18/2024,regarding procedures included risk, benefits, and options including but not limited to: Death, MI, stroke, pain, bleeding, infection, and possible need for vascular/thoracic/cardiothoracic surgery.    Copied information within this note was reviewed and is current as of 12/18/2024.    Assessment and plan noted herein represents the current plan of care as of 12/18/2024.    Significant resources from our office and staff are inherent in engaging this patient in a continuous and active collaborative plan of care related to their chronic cardiovascular conditions outlined below.  The management of these conditions requires the direction of our service with specialized clinical knowledge, skills, and experience.  This collaborative care includes but is not limited to patient education, expectations and responsibilities, shared decision making around therapeutic goals, and shared commitments to achieve those goals.  "

## 2024-12-31 RX ORDER — MIDODRINE HYDROCHLORIDE 5 MG/1
TABLET ORAL
Qty: 270 TABLET | Refills: 1 | Status: SHIPPED | OUTPATIENT
Start: 2024-12-31

## 2025-02-13 ENCOUNTER — TELEMEDICINE (OUTPATIENT)
Dept: PSYCHIATRY | Facility: CLINIC | Age: 85
End: 2025-02-13
Payer: MEDICARE

## 2025-02-13 DIAGNOSIS — F33.1 MODERATE EPISODE OF RECURRENT MAJOR DEPRESSIVE DISORDER: Primary | ICD-10-CM

## 2025-02-13 PROCEDURE — 90792 PSYCH DIAG EVAL W/MED SRVCS: CPT | Performed by: NURSE PRACTITIONER

## 2025-02-13 PROCEDURE — 1160F RVW MEDS BY RX/DR IN RCRD: CPT | Performed by: NURSE PRACTITIONER

## 2025-02-13 PROCEDURE — 1159F MED LIST DOCD IN RCRD: CPT | Performed by: NURSE PRACTITIONER

## 2025-02-13 RX ORDER — VILAZODONE HYDROCHLORIDE 10 MG/1
10 TABLET ORAL DAILY
Qty: 30 TABLET | Refills: 1 | Status: SHIPPED | OUTPATIENT
Start: 2025-02-13

## 2025-02-13 NOTE — PROGRESS NOTES
Patient Name: Panfilo Feliz  MRN: [id number]   :  1940     Referring Physician: Moreno Tapia APRN    This provider is located in her home office through the [facility] (through [facility]), [address], [address] using a secure [employer] Video Visit through [employer]. Patient is being seen remotely via telehealth at their home address in [place], and stated they are in a secure environment for this session. The patient's condition being diagnosed/treated is appropriate for telemedicine. The provider identified herself as well as her credentials.   The patient, and/or patients guardian, consent to be seen remotely, and when consent is given they understand that the consent allows for patient identifiable information to be sent to a third party as needed.   They may refuse to be seen remotely at any time. The electronic data is encrypted and password protected, and the patient and/or guardian has been advised of the potential risks to privacy not withstanding such measures.    You have chosen to receive care through a telehealth visit.  Do you consent to use a video/audio connection for your medical care today? Yes    Chief Complaint:     ICD-10-CM ICD-9-CM   1. Moderate episode of recurrent major depressive disorder  F33.1 296.32       HPI:   Panfilo Feliz is a 84 y.o. male who is here today for initial evaluation of Depression.  Patient has been on Lexapro for about 10 years.  Currently is overwhelmed.  Has never recovered from open heart surgery.  Went to a physical rehab which was not a good experience.  Patient has frequent falls.  Patient states working out at the gym is his outlet that makes his mood better.  He cannot get to the gym because of physical health and this gets him depressed.  Patient has lost significant weight.  Stays in the house most of the time.  Legs are weak because of no current exercise.    Past Medical History:   Past Medical  History:   Diagnosis Date    AAA (abdominal aortic aneurysm)     Abnormal ECG 1980’s    Alcoholism     None since 1991    Aneurysm 2019    aortic arch    Arthritis     Basal cell carcinoma (BCC) of face     Cholelithiasis 1990’s    Cholecystectomy    Colon polyp     Colon polyps     Coronary artery disease     Difficulty walking     Disease of thyroid gland     DJD (degenerative joint disease)     Gastritis     Gastroparesis     GERD (gastroesophageal reflux disease)     GI (gastrointestinal bleed)     Heart valve disease     Hernia     Hiatal    History of bone marrow biopsy     [place] (hard of hearing)     Hyperlipidemia     Hypertension     Hypothyroidism     IgG monoclonal gammopathy     Multiple duodenal ulcers     Neuropathy     PONV (postoperative nausea and vomiting)     Prostate cancer     Reflux esophagitis     Ulcer     WBC decreased        Past Surgical History:   Past Surgical History:   Procedure Laterality Date    ARTERIAL ANEURYSM REPAIR      ASCENDING AORTIC ANEURYSM REPAIR W/ MECHANICAL AORTIC VALVE REPLACEMENT N/A 05/10/2024    Procedure: ASCENDING AORTIC ARCH/ possible HEMIARCH REPLACEMENT WITH CIRCULATORY ARREST, neuro monitoring, and intraop JOSE A;  Surgeon: Spencer Puente MD;  Location: [facility] CVOR;  Service: Cardiothoracic;  Laterality: N/A;  Ascending aortic aneurysm repair with 30 mm gelweave graft.    CARDIAC CATHETERIZATION N/A 04/19/2024    Procedure: Right and Left Heart Cath with possible PCI;  Surgeon: Dilan Houston DO;  Location: [facility] CATH INVASIVE LOCATION;  Service: Cardiovascular;  Laterality: N/A;  Local and IV sedation    CHOLECYSTECTOMY  1988    COLON SURGERY  1998    COLONOSCOPY  02/2013    ENDOSCOPY  2012    ESOPHAGOGASTRODUODENOSCOPY WITH DILATION OF SHATZKI'S RING    ENDOSCOPY N/A 10/09/2020    Procedure: ESOPHAGOGASTRODUODENOSCOPY with cold bx;  Surgeon: Jake Perez MD;  Location: [facility] ENDOSCOPY;  Service: Gastroenterology;  Laterality: N/A;  pre -  nausea  post - duodenal ulcer, gastrits, gastric ulcer, hiatal hernia    ENDOSCOPY N/A 10/04/2022    Procedure: ESOPHAGOGASTRODUODENOSCOPY with biopsies with esophageal dilatation with 56 cui;  Surgeon: Jake Perez MD;  Location: [facility] ENDOSCOPY;  Service: Gastroenterology;  Laterality: N/A;  pre-dysphagia  post-normal     ENDOSCOPY  10/04/2022    [facility]    EYE SURGERY Bilateral     cataracts    LAMINECTOMY  2013    decompression L3-4, L4-5    PROSTATECTOMY  2007    SKIN BIOPSY      TONSILLECTOMY AND ADENOIDECTOMY      1940s    UPPER GASTROINTESTINAL ENDOSCOPY      VASECTOMY      1970s       Social History:   Social History     Socioeconomic History    Marital status:      Spouse name: Meghan    Years of education: College   Tobacco Use    Smoking status: Never     Passive exposure: Never    Smokeless tobacco: Never   Vaping Use    Vaping status: Never Used   Substance and Sexual Activity    Alcohol use: Not Currently     Comment: No ETOH since 1991    Drug use: Never    Sexual activity: Not Currently     Partners: Female     Birth control/protection: None       Family History:  Family History   Problem Relation Age of Onset    Cancer Mother 85        Breast    COPD Father     Cancer Brother 59        Unknown type    Hypertension Daughter        Allergy:  Allergies   Allergen Reactions    Statins Myalgia       Current Medications:   Current Outpatient Medications   Medication Sig Dispense Refill    acetaminophen (TYLENOL) 325 MG tablet Take 2 tablets by mouth Every 4 (Four) Hours As Needed for Mild Pain.      aspirin 81 MG EC tablet Take 1 tablet by mouth Every Night. Indications: Carotid Artery Stenting      Cyanocobalamin (B-12) 1000 MCG sublingual tablet Place 1 tablet under the tongue Daily. 30 each 6    escitalopram (LEXAPRO) 10 MG tablet Take 1 tablet by mouth Daily. Take combined with 20mg tablets for a total of 30mg daily  Indications: Major Depressive Disorder 30 tablet 3     fludrocortisone 0.1 MG tablet TAKE THREE TABLETS BY MOUTH EVERY MORNING AND TAKE TWO TABLETS BY MOUTH EVERY EVENING 150 tablet 1    levothyroxine (SYNTHROID, LEVOTHROID) 100 MCG tablet Take 1 tablet by mouth Daily. Indications: Underactive Thyroid      Menthol, Topical Analgesic, (BIOFREEZE EX) Apply 1 Application topically Daily As Needed (pain).      methocarbamol (ROBAXIN) 500 MG tablet Take 1 tablet by mouth Every 8 (Eight) Hours As Needed for Muscle Spasms. 20 tablet 0    metoprolol tartrate (LOPRESSOR) 25 MG tablet Take 0.5 tablets by mouth 2 (Two) Times a Day. Indications: High Blood Pressure Disorder 90 tablet 3    midodrine (PROAMATINE) 5 MG tablet TAKE THREE TABLETS BY MOUTH THREE TIMES A DAY BEFORE MEALS 270 tablet 6    pantoprazole (PROTONIX) 40 MG EC tablet Take 1 tablet by mouth 2 (Two) Times a Day. Indications: Gastroesophageal Reflux Disease      pregabalin (LYRICA) 50 MG capsule Take 1 capsule by mouth 2 (Two) Times a Day. Indications: Neuropathic Pain      pyridostigmine (MESTINON) 60 MG tablet Take 1 tablet by mouth Every 12 (Twelve) Hours. 60 tablet 1    vilazodone (Viibryd) 10 MG tablet tablet Take 1 tablet by mouth Daily. Take po q am with food. 30 tablet 1     No current facility-administered medications for this visit.     Facility-Administered Medications Ordered in Other Visits   Medication Dose Route Frequency Provider Last Rate Last Admin    Chlorhexidine Gluconate Cloth 2 % pads   Topical Q12H PRN Kayli Rowe, JAVY           Lab Results:   Lab on 11/25/2024   Component Date Value Ref Range Status    Glucose 11/25/2024 91  65 - 99 mg/dL Final    BUN 11/25/2024 14  8 - 23 mg/dL Final    Creatinine 11/25/2024 1.24  0.76 - 1.27 mg/dL Final    Sodium 11/25/2024 139  136 - 145 mmol/L Final    Potassium 11/25/2024 3.6  3.5 - 5.2 mmol/L Final    Chloride 11/25/2024 101  98 - 107 mmol/L Final    CO2 11/25/2024 28.0  22.0 - 29.0 mmol/L Final    Calcium 11/25/2024 9.6  8.6 - 10.5  mg/dL Final    Total Protein 11/25/2024 7.3  6.0 - 8.5 g/dL Final    Albumin 11/25/2024 4.4  3.5 - 5.2 g/dL Final    ALT (SGPT) 11/25/2024 9  1 - 41 U/L Final    AST (SGOT) 11/25/2024 27  1 - 40 U/L Final    Alkaline Phosphatase 11/25/2024 64  39 - 117 U/L Final    Total Bilirubin 11/25/2024 0.4  0.0 - 1.2 mg/dL Final    Globulin 11/25/2024 2.9  gm/dL Final    A/G Ratio 11/25/2024 1.5  g/dL Final    BUN/Creatinine Ratio 11/25/2024 11.3  7.0 - 25.0 Final    Anion Gap 11/25/2024 10.0  5.0 - 15.0 mmol/L Final    eGFR 11/25/2024 57.7 (L)  >60.0 mL/min/1.73 Final       Review of Symptoms:   Review of Systems   Constitutional:  Negative for activity change, appetite change, fatigue, unexpected weight gain and unexpected weight loss.   Respiratory:  Negative for shortness of breath and wheezing.    Gastrointestinal:  Negative for constipation, diarrhea, nausea and vomiting.   Musculoskeletal:  Negative for gait problem.   Skin:  Negative for dry skin and rash.   Neurological:  Negative for dizziness, speech difficulty, weakness, light-headedness, headache, memory problem and confusion.   Psychiatric/Behavioral:  Negative for agitation, behavioral problems, decreased concentration, dysphoric mood, hallucinations, self-injury, sleep disturbance, suicidal ideas, negative for hyperactivity, depressed mood and stress. The patient is not nervous/anxious.        Physical Exam:   Physical Exam  Constitutional:       General: He is not in acute distress.     Appearance: He is well-developed. He is not diaphoretic.   HENT:      Head: Normocephalic and atraumatic.   Eyes:      Conjunctiva/sclera: Conjunctivae normal.   Pulmonary:      Effort: Pulmonary effort is normal. No respiratory distress.   Musculoskeletal:         General: Normal range of motion.      Cervical back: Full passive range of motion without pain and normal range of motion.   Neurological:      Mental Status: He is alert and oriented to person, place, and time.    Psychiatric:         Mood and Affect: Mood is not anxious or depressed. Affect is not labile, blunt, angry or inappropriate.         Speech: Speech is not rapid and pressured or tangential.         Behavior: Behavior normal. Behavior is not agitated, slowed, aggressive, withdrawn, hyperactive or combative. Behavior is cooperative.         Thought Content: Thought content normal. Thought content is not paranoid or delusional. Thought content does not include homicidal or suicidal ideation. Thought content does not include homicidal or suicidal plan.         Judgment: Judgment normal.       There were no vitals taken for this visit.  There is no height or weight on file to calculate BMI. Video appt unable to obtain.     Mental Status Exam:   Appearance: appropriate  Hygiene:   good  Cooperation:  Cooperative  Eye Contact:  Good  Psychomotor Behavior:  Appropriate  Mood:depressed and dysthymic  Affect:  Appropriate  Hopelessness: Denies  Speech:  Normal  Thought Process:  Goal directed  Thought Content:  Normal  Suicidal:  None  Homicidal:  None  Hallucinations:  None  Delusion:  None  Memory:  Intact  Orientation:  Person, Place, Time, and Situation  Reliability:  good  Insight:  Good  Judgement:  Good  Impulse Control:  Good    PHQ-9 Depression Screening  Little interest or pleasure in doing things?     Feeling down, depressed, or hopeless?     Trouble falling or staying asleep, or sleeping too much?     Feeling tired or having little energy?     Poor appetite or overeating?     Feeling bad about yourself - or that you are a failure or have let yourself or your family down?     Trouble concentrating on things, such as reading the newspaper or watching television?     Moving or speaking so slowly that other people could have noticed? Or the opposite - being so fidgety or restless that you have been moving around a lot more than usual?     Thoughts that you would be better off dead, or of hurting yourself in some  way?     PHQ-9 Total Score     If you checked off any problems, how difficult have these problems made it for you to do your work, take care of things at home, or get along with other people?        Assessment/Plan:   Diagnoses and all orders for this visit:    1. Moderate episode of recurrent major depressive disorder (Primary)  -     vilazodone (Viibryd) 10 MG tablet tablet; Take 1 tablet by mouth Daily. Take po q am with food.  Dispense: 30 tablet; Refill: 1    Patient struggling significantly with mood and depression.  Cannot get out and exercise which is his outlet.  Wife is very supportive.  We will try Viibryd 10 mg every morning with food.  We will follow-up in a month.    A psychological evaluation was conducted in order to assess past and current level of functioning. Areas assessed included, but were not limited to: perception of social support, perception of ability to face and deal with challenges in life (positive functioning), anxiety symptoms, depressive symptoms, perspective on beliefs/belief system, coping skills for stress, intelligence level,  Therapeutic rapport was established. Interventions conducted today were geared towards incorporating medication management along with support for continued therapy. Education was also provided as to the med management with this provider and what to expect in subsequent sessions.    We discussed risks, benefits,goals and side effects of the above medication and the patient was agreeable with the plan.Patient was educated on the importance of compliance with treatment and follow-up appointments. Patient is aware to contact the [facility] [phone] with any worsening of symptoms. To call for questions or concerns and return early if necessary. Patent is agreeable to go to the Emergency Department or call 911 should they begin SI/HI.     Part of this note may be an electronic transcription/translation of spoken language to  printed text using the Dragon Dictation System.    Return in about 4 weeks (around 3/13/2025) for Follow Up 30 min, Video visit.    Lashaun Solis, APRN

## 2025-02-24 DIAGNOSIS — I95.1 ORTHOSTATIC HYPOTENSION: ICD-10-CM

## 2025-02-25 RX ORDER — FLUDROCORTISONE ACETATE 0.1 MG/1
TABLET ORAL
Qty: 150 TABLET | Refills: 1 | Status: SHIPPED | OUTPATIENT
Start: 2025-02-25

## 2025-02-28 RX ORDER — MIDODRINE HYDROCHLORIDE 5 MG/1
TABLET ORAL
Qty: 270 TABLET | Refills: 6 | Status: SHIPPED | OUTPATIENT
Start: 2025-02-28

## 2025-02-28 NOTE — PROGRESS NOTES
CC: follow up    HPI:  Panfilo Feliz is a  84 y.o.  right-handed male who is here for follow-up.  He has longstanding history of neuropathy.  Other medical history includes MGUS with leukopenia and thrombocytopenia, lumbar stenosis status postlaminectomy in 2013, hypertension, hyperlipidemia, hypothyroidism, prostate cancer, aortic aneurysm under surveillance, dysphagia.  He was last seen by my colleague in 2023.  Reviewed prior documentation and made edits as appropriate:      He was referred by Dr. Goldberg with symptoms beginning 6 years previous noted as numbness in feet.  He has history of low back pain and decompressive surgery in 2013 of L3/4 and L4/5.  IgG kappa of low concentration.  He has had 2 bone marrow biopsies negative for multiple myeloma and at least 1 staining with [place] red that was negative for amyloid.  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  He underwent IVIG therapy in 2019 but after just 2 doses he had allergic reaction and after 10 days experienced serum sickness with rash, generalized arthralgias, skin peeling at the palms.  He was given prednisone by his hematologist and this resolved.   Apparently symptoms did not improve with IVIG or steroids.  Evaluation at [facility] in 2020 deemed etiology related to MGUS or amyloidosis unlikely.  It looks he was supposed to follow-up in the peripheral nerve clinic.  In follow-up with Dr. Garcia in 2021 inflammatory etiology was thought unlikely first because progression was slow and so immunotherapy was not retried.  He was treated for low B12 with B12 levels in the 300s.     Wife and patient told me they believe symptoms began rather acutely 10 to 12 years ago- affected him significantly all at once.  Since time of evaluation symptoms have been slowly progressive.  Sounds like he has tried Neurontin with no real help.  He endorses numbness  "and burning in feet.  For over a year he has been on Lyrica 75 twice daily.  Nighttime symptoms are the worst.  Taking 100 twice a day was too sedating.  He takes his Lyrica about 7:30.  He says that its pretty difficult to get to sleep and when he wakes up in the morning he has bothersome symptoms.  Wife says he frequently moves his legs.  Is b12 was in range at a follow up visit.  He sees GI for chronic symptoms of dysphagia and has had a EGD with dilation in the past.  The GI symptoms apparently have been ongoing for longer than 5 years.  He has a new diagnosis of gastroparesis.  He is on Cymbalta and also Elavil prescribed by his GI doctor.    He is here with wife again today.  He is in a wheelchair.  Is mobility is limited. 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  Also since last seen he had a AAA with repair and follows with cardiology for BP.  He is on midodrine and florinef and mestinon.  He had a recent MRI brain and brought CDs.  It is said negative for stroke.   He does endorse some mild to moderate headache and he is not a headache person.  He is also describing symptoms c/w orthostasis.  They state they are here mostly today because he needs his lyrica prescription.  Appears has been a MG is a thought.             Social history: Retired , very active, goes to the gym daily, exercise routine his weights.      Family history:      Pain Scale:        ROS:  Review of Systems      Reviewed ROS conducted by MA and stephie        Physical Exam:  There were no vitals filed for this visit.   Orthostatic BP:    2:06 [id number]/80Heart Pvfe76Winiue09.1 kg (170 lb)Cutcxj391.5 cm (75\")CdU120 %     There is no height or weight on file to calculate BMI.        General: pt well appearing, no distress  HEENT: Normocephalic, conjunctiva normal, external canals normal, no nasal discharge, moist mucous membranes  Neck: No lymphadenopathy, thyroid not enlarged, no JVD  CV: " Regular rate and rhythm, no murmurs negative no bruits auscultated at neck, equal pulses  Pulmonary: Normal respiratory effort, clear to auscultation bilaterally  Extremities: no edema, bruising, or skin lesions  Pysch: good eye contact, cooperative, full affect, euthymic mood, good attention, good insight  Mental: alert, conversant, AOx3, provides history. Language fluent, names, repeats.  CN: CN II-XII intact, PERRL, dysconjugate gaze, OD abduction deficit, no gaze palsy or nystagmus, intact facial sensation, no facial assymetry, intact hearing, symmetric palate elevation, tongue midline, good SCM strength  Motor: no atrophy, abnormal mvmts, no pronator drift  Sensory: reduced to all modalities in glove stocking distribution  Reflexes: hyporeflexive  Coordination: no ataxia on finger-nose, heel-shin  Gait: wide based unsteady with turns, forward flexed        Results:      Lab Results   Component Value Date    GLUCOSE 91 11/25/2024    BUN 14 11/25/2024    CREATININE 1.24 11/25/2024    EGFRIFNONA 58 (L) 02/09/2022    BCR 11.3 11/25/2024    CO2 28.0 11/25/2024    CALCIUM 9.6 11/25/2024    ALBUMIN 4.4 11/25/2024    AST 27 11/25/2024    ALT 9 11/25/2024       Lab Results   Component Value Date    WBC 4.60 09/05/2024    HGB 12.5 (L) 09/05/2024    HCT 40.1 09/05/2024    MCV 85.5 09/05/2024     09/05/2024         .  Lab Results   Component Value Date    RPR Non-Reactive 03/30/2021         Lab Results   Component Value Date    TSH 0.341 03/30/2021         Lab Results   Component Value Date    [id number] 518 07/11/2023         Lab Results   Component Value Date    FOLATE 6.81 08/16/2021         Lab Results   Component Value Date    HGBA1C 5.50 05/09/2024         Lab Results   Component Value Date    GLUCOSE 91 11/25/2024    BUN 14 11/25/2024    CREATININE 1.24 11/25/2024    EGFRIFNONA 58 (L) 02/09/2022    BCR 11.3 11/25/2024    K 3.6 11/25/2024    CO2 28.0 11/25/2024    CALCIUM 9.6 11/25/2024    ALBUMIN 4.4  11/25/2024    AST 27 11/25/2024    ALT 9 11/25/2024       EMG 2017 Dr Sears  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 Superimposed L5/S1 radic, L4 radic on L, features of CIDP not seen    [facility] consultation 2020 unlikely AL amylodosis or MGUS related-MRI nerve follow through [place], rf, ccp, amena, anca, hep, cryoglobulins and sweat test were to be checked    ANGELES, rnp, smith, antisclerorma, sjogrens, antichormatin, maggie, anti centromere b, and ANCA, cryoglobulins wnl CK elevated 300s    Diagnosis:  Idiopathic polyneuropathy   Dysautonomia   R CN 6 palsy  AAA s/p repair June 2024  MGUS  H/o lumbar decompression  H/o prostate cancer  H/o dysphagia  Depression     Impression: 85 yo RH with rather significant polyneuropathy of slow progressive course, treated for inflammatory etiology in the past without clear benefit and [employer] consultation deeming PN r/t MGUS or amyloidosis unlikely.  He is maintained on lyrica.  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  He has isolated abducens palsy and exam c/w with his longstanding length dependent neuropathy.  He denies much improvement of visual symptoms and still follows with ophtho he tells.  He brings in his MRI CD which I have uploaded and reviewed.  There is no stroke, t2 shine through or R frontal lobe, flair t2 significantly motion degraded, temporal lobe atrophy.  Sed rate which is normal.  He also had rheum labs that are unremarkable.  He is having issues with orthostasis no unexpected in light of is significant neuropathy.    Compression hose rx   Watch and wait approach with CN 6 palsy, may not need prism glasses  I believe he is taking mestinon for his orthostasis, and he is having GI side effects-asked him to try reduced dose  Add neuropathy invitae genetic and autoimmune panel, RF/ CCP, rpr, crp, paraneoplastic panel, no need for myasthenia  panel with low suspicion      F/u in 2 mos    I spent at least 60 minutes interviewing, examining, and counseling patient.  I independently reviewed documentation, laboratory and diagnostic findings, external documentation where applicable, and formulated treatment plan which was discussed with the patient.

## 2025-03-06 ENCOUNTER — OFFICE VISIT (OUTPATIENT)
Dept: NEUROLOGY | Facility: CLINIC | Age: 85
End: 2025-03-06
Payer: MEDICARE

## 2025-03-06 VITALS
HEIGHT: 75 IN | OXYGEN SATURATION: 96 % | HEART RATE: 76 BPM | SYSTOLIC BLOOD PRESSURE: 130 MMHG | DIASTOLIC BLOOD PRESSURE: 80 MMHG | BODY MASS INDEX: 21.14 KG/M2 | WEIGHT: 170 LBS

## 2025-03-06 DIAGNOSIS — D47.2 NEUROPATHY ASSOCIATED WITH MGUS: Primary | ICD-10-CM

## 2025-03-06 DIAGNOSIS — I71.21 ASCENDING AORTIC ANEURYSM, UNSPECIFIED WHETHER RUPTURED: ICD-10-CM

## 2025-03-06 DIAGNOSIS — G63 NEUROPATHY ASSOCIATED WITH MGUS: Primary | ICD-10-CM

## 2025-03-06 DIAGNOSIS — I95.1 ORTHOSTATIC HYPOTENSION: ICD-10-CM

## 2025-03-06 DIAGNOSIS — D47.2 MONOCLONAL GAMMOPATHY OF UNKNOWN SIGNIFICANCE (MGUS): ICD-10-CM

## 2025-03-06 RX ORDER — MAGNESIUM OXIDE 400 MG/1
400 TABLET ORAL DAILY
COMMUNITY

## 2025-03-06 NOTE — LETTER
March 20, 2025     JAVY Garcia  [address]  [place] [address]    Patient: Panfilo Feliz   YOB: 1940   Date of Visit: 3/6/2025     Dear JAVY Garcia:       Thank you for referring Panfilo Feliz to me for evaluation. Below are the relevant portions of my assessment and plan of care.    If you have questions, please do not hesitate to call me. I look forward to following Panfilo along with you.         Sincerely,        SARATH Gudino        CC: MD Stephan Basurto MD Cash, Cortney A, PA  03/20/25 1357  Signed  CC: follow up    HPI:  Panfilo Feliz is a  84 y.o.  right-handed male who is here for follow-up.  He has longstanding history of neuropathy.  Other medical history includes MGUS with leukopenia and thrombocytopenia, lumbar stenosis status postlaminectomy in 2013, hypertension, hyperlipidemia, hypothyroidism, prostate cancer, aortic aneurysm under surveillance, dysphagia.  He was last seen by my colleague in 2023.  Reviewed prior documentation and made edits as appropriate:      He was referred by Dr. Goldberg with symptoms beginning 6 years previous noted as numbness in feet.  He has history of low back pain and decompressive surgery in 2013 of L3/4 and L4/5.  IgG kappa of low concentration.  He has had 2 bone marrow biopsies negative for multiple myeloma and at least 1 staining with Congo red that was negative for amyloid.  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  He underwent IVIG therapy in 2019 but after just 2 doses he had allergic reaction and after 10 days experienced serum sickness with rash, generalized arthralgias, skin peeling at the palms.  He was given prednisone by his hematologist and this resolved.   Apparently symptoms did not improve with IVIG or steroids.  Evaluation at [facility] in 2020 deemed etiology related to MGUS or  amyloidosis unlikely.  It looks he was supposed to follow-up in the peripheral nerve clinic.  In follow-up with Dr. Garcia in 2021 inflammatory etiology was thought unlikely first because progression was slow and so immunotherapy was not retried.  He was treated for low B12 with B12 levels in the 300s.     Wife and patient told me they believe symptoms began rather acutely 10 to 12 years ago- affected him significantly all at once.  Since time of evaluation symptoms have been slowly progressive.  Sounds like he has tried Neurontin with no real help.  He endorses numbness and burning in feet.  For over a year he has been on Lyrica 75 twice daily.  Nighttime symptoms are the worst.  Taking 100 twice a day was too sedating.  He takes his Lyrica about 7:30.  He says that its pretty difficult to get to sleep and when he wakes up in the morning he has bothersome symptoms.  Wife says he frequently moves his legs.  Is b12 was in range at a follow up visit.  He sees GI for chronic symptoms of dysphagia and has had a EGD with dilation in the past.  The GI symptoms apparently have been ongoing for longer than 5 years.  He has a new diagnosis of gastroparesis.  He is on Cymbalta and also Elavil prescribed by his GI doctor.    He is here with wife again today.  He is in a wheelchair.  Is mobility is limited. 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  Also since last seen he had a AAA with repair and follows with cardiology for BP.  He is on midodrine and florinef and mestinon.  He had a recent MRI brain and brought CDs.  It is said negative for stroke.   He does endorse some mild to moderate headache and he is not a headache person.  He is also describing symptoms c/w orthostasis.  They state they are here mostly today because he needs his lyrica prescription.  Appears has been a MG is a thought.             Social history: Retired , very active, goes to the gym daily, exercise routine  "his weights.      Family history:      Pain Scale:        ROS:  Review of Systems      Reviewed ROS conducted by MA and stephie        Physical Exam:  There were no vitals filed for this visit.   Orthostatic BP:    2:06 [id number]/80Heart Azeu35Brxhxu34.1 kg (170 lb)Uiwmrs789.5 cm (75\")HrG325 %     There is no height or weight on file to calculate BMI.        General: pt well appearing, no distress  HEENT: Normocephalic, conjunctiva normal, external canals normal, no nasal discharge, moist mucous membranes  Neck: No lymphadenopathy, thyroid not enlarged, no JVD  CV: Regular rate and rhythm, no murmurs negative no bruits auscultated at neck, equal pulses  Pulmonary: Normal respiratory effort, clear to auscultation bilaterally  Extremities: no edema, bruising, or skin lesions  Pysch: good eye contact, cooperative, full affect, euthymic mood, good attention, good insight  Mental: alert, conversant, AOx3, provides history. Language fluent, names, repeats.  CN: CN II-XII intact, PERRL, dysconjugate gaze, OD abduction deficit, no gaze palsy or nystagmus, intact facial sensation, no facial assymetry, intact hearing, symmetric palate elevation, tongue midline, good SCM strength  Motor: no atrophy, abnormal mvmts, no pronator drift  Sensory: reduced to all modalities in glove stocking distribution  Reflexes: hyporeflexive  Coordination: no ataxia on finger-nose, heel-shin  Gait: wide based unsteady with turns, forward flexed        Results:      Lab Results   Component Value Date    GLUCOSE 91 11/25/2024    BUN 14 11/25/2024    CREATININE 1.24 11/25/2024    EGFRIFNONA 58 (L) 02/09/2022    BCR 11.3 11/25/2024    CO2 28.0 11/25/2024    CALCIUM 9.6 11/25/2024    ALBUMIN 4.4 11/25/2024    AST 27 11/25/2024    ALT 9 11/25/2024       Lab Results   Component Value Date    WBC 4.60 09/05/2024    HGB 12.5 (L) 09/05/2024    HCT 40.1 09/05/2024    MCV 85.5 09/05/2024     09/05/2024         .  Lab Results   Component Value Date "    RPR Non-Reactive 03/30/2021         Lab Results   Component Value Date    TSH 0.341 03/30/2021         Lab Results   Component Value Date    YRCZXIMC32 518 07/11/2023         Lab Results   Component Value Date    FOLATE 6.81 08/16/2021         Lab Results   Component Value Date    HGBA1C 5.50 05/09/2024         Lab Results   Component Value Date    GLUCOSE 91 11/25/2024    BUN 14 11/25/2024    CREATININE 1.24 11/25/2024    EGFRIFNONA 58 (L) 02/09/2022    BCR 11.3 11/25/2024    K 3.6 11/25/2024    CO2 28.0 11/25/2024    CALCIUM 9.6 11/25/2024    ALBUMIN 4.4 11/25/2024    AST 27 11/25/2024    ALT 9 11/25/2024       EMG 2017 Dr Sears  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 Superimposed L5/S1 radic, L4 radic on L, features of CIDP not seen    [facility] consultation 2020 unlikely AL amylodosis or MGUS related-MRI nerve follow through [place], rf, ccp, amena, anca, hep, cryoglobulins and sweat test were to be checked    ANGELES, rnp, smith, antisclerorma, sjogrens, antichormatin, maggie, anti centromere b, and ANCA, cryoglobulins wnl CK elevated 300s    Diagnosis:  Idiopathic polyneuropathy   Dysautonomia   R CN 6 palsy  AAA s/p repair June 2024  MGUS  H/o lumbar decompression  H/o prostate cancer  H/o dysphagia  Depression     Impression: 83 yo RH with rather significant polyneuropathy of slow progressive course, treated for inflammatory etiology in the past without clear benefit and [facility] consultation deeming PN r/t MGUS or amyloidosis unlikely.  He is maintained on lyrica.  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  He has isolated abducens palsy and exam c/w with his longstanding length dependent neuropathy.  He denies much improvement of visual symptoms and still follows with ophtho he tells.  He brings in his MRI CD which I have uploaded and reviewed.  There is no stroke, t2 shine through or R  frontal lobe, flair t2 significantly motion degraded, temporal lobe atrophy.  Sed rate which is normal.  He also had rheum labs that are unremarkable.  He is having issues with orthostasis no unexpected in light of is significant neuropathy.    Compression hose rx   Watch and wait approach with CN 6 palsy, may not need prism glasses  I believe he is taking mestinon for his orthostasis, and he is having GI side effects-asked him to try reduced dose  Add neuropathy invitae genetic and autoimmune panel, RF/ CCP, rpr, crp, paraneoplastic panel, no need for myasthenia panel with low suspicion      F/u in 2 mos    I spent at least 60 minutes interviewing, examining, and counseling patient.  I independently reviewed documentation, laboratory and diagnostic findings, external documentation where applicable, and formulated treatment plan which was discussed with the patient.

## 2025-03-12 ENCOUNTER — OFFICE VISIT (OUTPATIENT)
Dept: CARDIOLOGY | Facility: CLINIC | Age: 85
End: 2025-03-12
Payer: MEDICARE

## 2025-03-12 VITALS
DIASTOLIC BLOOD PRESSURE: 87 MMHG | SYSTOLIC BLOOD PRESSURE: 170 MMHG | OXYGEN SATURATION: 97 % | HEIGHT: 75 IN | HEART RATE: 56 BPM | BODY MASS INDEX: 21.64 KG/M2 | WEIGHT: 174 LBS

## 2025-03-12 DIAGNOSIS — I95.1 ORTHOSTATIC HYPOTENSION: ICD-10-CM

## 2025-03-12 DIAGNOSIS — I10 PRIMARY HYPERTENSION: ICD-10-CM

## 2025-03-12 DIAGNOSIS — E78.2 MIXED HYPERLIPIDEMIA: Primary | ICD-10-CM

## 2025-03-12 DIAGNOSIS — I71.21 ANEURYSM OF ASCENDING AORTA WITHOUT RUPTURE: ICD-10-CM

## 2025-03-12 NOTE — PROGRESS NOTES
Cardiology Office Visit      Encounter Date:  2025    PATIENT IDENTIFICATION    Name: Panfilo Feliz  Age: 84 y.o. Sex: male : 1940  MRN: [id number]    Reason For Followup:  Valvular heart disease  Ascending aortic aneurysm    Brief Clinical History:  Dear Stephan Torres MD    I had the pleasure of seeing Panfilo Feliz today. As you are well aware, this is a 84 y.o. male with no established history of ischemic heart disease.  He does have a history of aortic insufficiency, ascending aortic aneurysm, hypothyroidism, neuropathy, and acid reflux.  He presents today for follow-up on the above conditions.    Interval History:  2024                                           The patient presents today for an acute visit.  He reports that he saw his cardiologist last month but has deteriorated since then.  He is specifically citing issues with lightheadedness and a cold sensation all over.  He reports that over the past month this has significantly deteriorated.  Interestingly, the lightheadedness appears to occur mostly in the evenings around 630 or so.  He reports that he had been going to the gym a couple of times a week but now has not been able to do so because of his symptoms.  His neuropathy continues to deteriorate.  He reports that they have found nothing to help out with this.    He has started some medications to help with his neuropathy but none have been successful.  It is possible that some of the side effects from his medicines could be lending assistance to his symptoms.  We discussed options and we will have him back off on his atenolol to see if this helps.    In the bigger picture, the patient has been seen by cardiothoracic surgery.  Per the patient and his wife, surgery was offered at the last visit with Dr. Puente.  The patient was reluctant to move forward.    We had a lengthy discussion today.  I discussed that with the patient's deteriorating neuropathy and now endurance  issues, it may be worthwhile to consider moving forward with surgery before his neuropathy and endurance worsens to a point that would preclude him from being a candidate for surgery.  He has a CT scan scheduled for later this year as well as an echocardiogram.  We will move those up.  He will need a cardiac catheterization as well.  I have encouraged him to reach out to Dr. Puente in order to schedule a follow-up and discuss moving forward with surgery.    04/09/2024        His blood pressures have been on the low side (80s) and was was getting orthostatic. His BP today is a little on the higher side.  He is having some issues with shortness of breath with exertion.  He underwent a CT scan that demonstrated an ascending aortic aneurysm of 5.1 cm.  We again discussed the options.  He is ready to move forward with having these items addressed.  As such we will schedule him for a cardiac catheterization as well as a transesophageal echocardiogram.  He is scheduled to see Dr. Puente next week.  His blood pressure is elevated today however he has not tolerated titrations of his antihypertensives.  He reports becoming dizzy and lightheaded and nonfunctional.    06/05/2024        The patient underwent ascending aortic aneurysm repair with reductive root aortoplasty of the right and noncoronary sinuses with left atrial appendage closure (atrial clip).  And he is home and now doing home PT. He is still having some orthostasis. He is on midodrine and metoprolol. Hold parameters on metoprolol are for less than 110. Will start with cardiac rehab.    07/05/2024        He is still having problmes with blood pressure. He has no stamina. Significant orthostasis. He has fallen once and brusised elbow. He is on metoprolol but his pressure is only good enough to take 2 doses.     Discussed echo he does have a small to moderate-sized pericardial effusion with no evidence of tamponade.  We discussed options.  He will continue on his  current regimen and we will add Florinef to see if this will help with his pressures.    11/20/2024        He is on midodrine 15 mg 3 times a day.  He is also taking Florinef 0.1 mg daily.  He is continuing to have problems with orthostasis.  He fell yesterday and has a contusion to the right side of his face.  He is becoming increasingly frustrated with his condition.  He reports that he feels depressed.  We discussed getting him in to see a psychiatrist to help with this.    Moving forward with medical therapy for his orthostasis, we will increase his Florinef to 0.1 mg twice daily and continue to do so every other week by 0.1 mg until his pressure is adequate and he is no longer orthostatic.    12/18/2024        His blood pressure is elevated but he has a profound orthostatic hyoptension. His supine pressure is 192/102, sitting 140/80, and 118/72. Unfortunately we have to allow for permissive hypertension in order to keep him from syncopal events and injury.     He is currently taking florinef 0.2, midodrine, mestinon and metoprolol 7 AM. Then midodrine and florinef 0.1 around 2. Then will take midodrine, mestinon and florinef 0.2 at 8 PM    Pressures at home are in the 160s laying, 120s sitting and 80s-100 standing.    Would like to start cardiac rehab. Will place order.     He has made improvements. He is able to bath himself and he has not fallen since last visit.    He had orthostatics performed today in the office.  Lying 192/102, sitting 140/180, standing 118/72.  Discussed options.  Would prefer not to further increase patient's Florinef and midodrine due to significant supine hypertension.  Patient will continue on his current regimen and follow-up.  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 He had double vision watching ball game. He had just had a skin cancer taken off over on that side of his face the day before. He had an MRI and there was no  "stroke or mass. He has been told the movement will come back in a few weeks.     He reports that he feels \"doped out\" and he is worried it is some medications. He was started on vyvance 3-4 weeks.     He has loose stools and fecal urgency.  He is concerned this may be from the Florinef or midodrine.  This would be unusual.    We discussed options.  We will allow him a few more weeks for his cranial nerve paralysis to resolve.  This may be affecting his gait and exacerbating his orthostatic symptoms.  Although he is orthostatic today in the office, his pressure only drops to 134/80 upon standing.  Supine pressure was 178/88.  If he continues to have symptoms, we have discussed potentially referring him to a center that specializes in severe orthostatic hypotension.    Assessment & Plan    Impressions:  Ascending aortic aneurysm status post repair May 2024  Aortic insufficiency  Depression/anxiety  Hypothyroidism  Peripheral neuropathy  Acid reflux    Recommendations:  Continuation of his current cardiovascular regimen at the present time.     This includes midodrine and Florinef  Consider referral to tertiary center if symptoms are still persistent and severe at next visit  Follow-up in 3 months      Diagnoses and all orders for this visit:    1. Mixed hyperlipidemia (Primary)    2. Aneurysm of ascending aorta without rupture    3. Primary hypertension    4. Orthostatic hypotension                      Objective:    Vitals:  Vitals:    03/12/25 1301   BP: 170/87   BP Location: Left arm   Patient Position: Sitting   Cuff Size: Adult   Pulse: 56   SpO2: 97%   Weight: 78.9 kg (174 lb)   Height: 190.5 cm (75\")             Body mass index is 21.75 kg/m².      Physical Exam:    General: Alert, cooperative, no distress, appears stated age.  Frail  Head:  Normocephalic, atraumatic, mucous membranes moist  Eyes:  Conjunctiva/corneas clear, EOM's intact     Neck:  Supple,  no bruit    Lungs: Coarse and diminished " bilaterally  Chest wall: Tender at incision  Heart::  Regular rate and rhythm, S1 and S2 normal, 1/6 diastolic murmur.  No rub or gallop  Abdomen: Soft, non-tender, nondistended bowel sounds active  Extremities: No cyanosis, clubbing, or edema  Pulses: Diminished pedal pulses  Skin:  No rashes or lesions.  Some ecchymoses over right side of face  Neuro/psych: A&O x3. CN II through XII are grossly intact with appropriate affect      Allergies:  Allergies   Allergen Reactions    Statins Myalgia       Medication Review:     Current Outpatient Medications:     acetaminophen (TYLENOL) 325 MG tablet, Take 2 tablets by mouth Every 4 (Four) Hours As Needed for Mild Pain., Disp: , Rfl:     aspirin 81 MG EC tablet, Take 1 tablet by mouth Every Night. Indications: Carotid Artery Stenting, Disp: , Rfl:     Cyanocobalamin (B-12) 1000 MCG sublingual tablet, Place 1 tablet under the tongue Daily. (Patient taking differently: Take 1 tablet by mouth Daily.), Disp: 30 each, Rfl: 6    escitalopram (LEXAPRO) 10 MG tablet, Take 1 tablet by mouth Daily. Take combined with 20mg tablets for a total of 30mg daily  Indications: Major Depressive Disorder (Patient taking differently: Take 1 tablet by mouth Daily. 10 mg only  Indications: Major Depressive Disorder), Disp: 30 tablet, Rfl: 3    fludrocortisone 0.1 MG tablet, TAKE THREE TABLETS BY MOUTH EVERY MORNING AND TAKE TWO TABLETS BY MOUTH EVERY EVENING, Disp: 150 tablet, Rfl: 1    levothyroxine (SYNTHROID, LEVOTHROID) 100 MCG tablet, Take 1 tablet by mouth Daily. Indications: Underactive Thyroid, Disp: , Rfl:     magnesium oxide (MAG-OX) 400 MG tablet, Take 1 tablet by mouth Daily., Disp: , Rfl:     Menthol, Topical Analgesic, (BIOFREEZE EX), Apply 1 Application topically Daily As Needed (pain)., Disp: , Rfl:     metoprolol tartrate (LOPRESSOR) 25 MG tablet, Take 0.5 tablets by mouth 2 (Two) Times a Day. Indications: High Blood Pressure Disorder, Disp: 90 tablet, Rfl: 3    midodrine  (PROAMATINE) 5 MG tablet, TAKE THREE TABLETS BY MOUTH THREE TIMES A DAY BEFORE MEALS, Disp: 270 tablet, Rfl: 6    pantoprazole (PROTONIX) 40 MG EC tablet, Take 1 tablet by mouth 2 (Two) Times a Day. Indications: Gastroesophageal Reflux Disease, Disp: , Rfl:     pregabalin (LYRICA) 50 MG capsule, Take 1 capsule by mouth 2 (Two) Times a Day. Indications: Neuropathic Pain, Disp: , Rfl:     pyridostigmine (MESTINON) 60 MG tablet, Take 1 tablet by mouth Every 12 (Twelve) Hours., Disp: 60 tablet, Rfl: 1    vilazodone (Viibryd) 10 MG tablet tablet, Take 1 tablet by mouth Daily. Take po q am with food., Disp: 30 tablet, Rfl: 1    vitamin D3 125 MCG (5000 UT) capsule capsule, Take 250 Units by mouth Daily., Disp: , Rfl:   No current facility-administered medications for this visit.    Facility-Administered Medications Ordered in Other Visits:     Chlorhexidine Gluconate Cloth 2 % pads, , Topical, Q12H PRN, Kayli Rowe, APRN    Family History:  Family History   Problem Relation Age of Onset    Cancer Mother 85        Breast    COPD Father     Cancer Brother 59        Unknown etiology    Hypertension Daughter        Past Medical History:  Past Medical History:   Diagnosis Date    AAA (abdominal aortic aneurysm)     Abnormal ECG 1980’s    Alcoholism     None since 1991    Aneurysm 2019    aortic arch    Arthritis     Basal cell carcinoma (BCC) of face     Cholelithiasis 1990’s    Cholecystectomy    Chronic pain disorder     Colon polyp     Colon polyps     Coronary artery disease     Depression     Difficulty walking     Disease of thyroid gland     DJD (degenerative joint disease)     Gastritis     Gastroparesis     GERD (gastroesophageal reflux disease)     GI (gastrointestinal bleed)     Heart valve disease     Hernia     Hiatal    Hiatal hernia     History of bone marrow biopsy     [place] (hard of hearing)     Hyperlipidemia     Hypertension     Hypothyroidism     IgG monoclonal gammopathy     Multiple duodenal  ulcers     Neuropathy     Peripheral neuropathy     PONV (postoperative nausea and vomiting)     Prostate cancer     Reflux esophagitis     Ulcer     WBC decreased        Past Surgical History:  Past Surgical History:   Procedure Laterality Date    ARTERIAL ANEURYSM REPAIR      ASCENDING AORTIC ANEURYSM REPAIR W/ MECHANICAL AORTIC VALVE REPLACEMENT N/A 05/10/2024    Procedure: ASCENDING AORTIC ARCH/ possible HEMIARCH REPLACEMENT WITH CIRCULATORY ARREST, neuro monitoring, and intraop JOSE A;  Surgeon: Spencer Puente MD;  Location: [facility] CV[facility];  Service: Cardiothoracic;  Laterality: N/A;  Ascending aortic aneurysm repair with 30 mm gelweave graft.    BACK SURGERY      CARDIAC CATHETERIZATION N/A 04/19/2024    Procedure: Right and Left Heart Cath with possible PCI;  Surgeon: Dilan Houston DO;  Location: [facility] CATH INVASIVE LOCATION;  Service: Cardiovascular;  Laterality: N/A;  Local and IV sedation    CHOLECYSTECTOMY  1988    COLON SURGERY  1998    COLONOSCOPY  02/2013    ENDOSCOPY  2012    ESOPHAGOGASTRODUODENOSCOPY WITH DILATION OF SHATZKI'S RING    ENDOSCOPY N/A 10/09/2020    Procedure: ESOPHAGOGASTRODUODENOSCOPY with cold bx;  Surgeon: Jake Perez MD;  Location:  [facility];  Service: Gastroenterology;  Laterality: N/A;  pre - nausea  post - duodenal ulcer, gastrits, gastric ulcer, hiatal hernia    ENDOSCOPY N/A 10/04/2022    Procedure: ESOPHAGOGASTRODUODENOSCOPY with biopsies with esophageal dilatation with 56 cui;  Surgeon: Jake Perez MD;  Location:  [facility];  Service: Gastroenterology;  Laterality: N/A;  pre-dysphagia  post-normal     ENDOSCOPY  10/04/2022    Chris [facility]TERFIELD    EYE SURGERY Bilateral     cataracts    JOINT REPLACEMENT      LAMINECTOMY  2013    decompression L3-4, L4-5    PROSTATECTOMY  2007    SKIN BIOPSY      TONSILLECTOMY AND ADENOIDECTOMY      1940s    UPPER GASTROINTESTINAL ENDOSCOPY      VASCULAR SURGERY      VASECTOMY      1970s       Social  History:  Social History     Socioeconomic History    Marital status:      Spouse name: Meghan    Years of education: College   Tobacco Use    Smoking status: Never     Passive exposure: Never    Smokeless tobacco: Never   Vaping Use    Vaping status: Never Used   Substance and Sexual Activity    Alcohol use: Not Currently     Comment: No ETOH since 1991    Drug use: Never    Sexual activity: Not Currently     Partners: Female     Birth control/protection: None       Review of Systems:  The following systems were reviewed as they relate to the cardiovascular system: Constitutional, Eyes, ENT, Cardiovascular, Respiratory, Gastrointestinal, Integumentary, Neurological, Psychiatric, Hematologic, Endocrine, Musculoskeletal, and Genitourinary. The pertinent cardiovascular findings are reported above with all other cardiovascular points within those systems being negative.    Diagnostic Study Review:     Current Electrocardiogram:  Procedures no new EKG.  EKG dated 12/18/2024 demonstrates sinus rhythm with a ventricular rate of 59 bpm.    Laboratory Data:  Lab Results   Component Value Date    GLUCOSE 91 11/25/2024    BUN 14 11/25/2024    CREATININE 1.24 11/25/2024    EGFRIFNONA 58 (L) 02/09/2022    BCR 11.3 11/25/2024    K 3.6 11/25/2024    CO2 28.0 11/25/2024    CALCIUM 9.6 11/25/2024    ALBUMIN 4.4 11/25/2024    AST 27 11/25/2024    ALT 9 11/25/2024     Lab Results   Component Value Date    GLUCOSE 91 11/25/2024    CALCIUM 9.6 11/25/2024     11/25/2024    K 3.6 11/25/2024    CO2 28.0 11/25/2024     11/25/2024    BUN 14 11/25/2024    CREATININE 1.24 11/25/2024    EGFRIFNONA 58 (L) 02/09/2022    BCR 11.3 11/25/2024    ANIONGAP 10.0 11/25/2024     Lab Results   Component Value Date    WBC 4.60 09/05/2024    HGB 12.5 (L) 09/05/2024    HCT 40.1 09/05/2024    MCV 85.5 09/05/2024     09/05/2024     Lab Results   Component Value Date    CHOL 195 04/17/2024    TRIG 101 04/17/2024    HDL 54 04/17/2024      (H) 04/17/2024     Lab Results   Component Value Date    HGBA1C 5.50 05/09/2024     Lab Results   Component Value Date    INR 1.10 05/11/2024    INR 1.12 (H) 05/10/2024    INR 1.38 (H) 05/10/2024    PROTIME 11.9 (H) 05/11/2024    PROTIME 12.1 (H) 05/10/2024    PROTIME 14.7 (H) 05/10/2024       Most Recent Echo:  Results for orders placed during the hospital encounter of 07/13/24    Adult Transthoracic Echo Limited W/ Cont if Necessary Per Protocol    Interpretation Summary    Left ventricular systolic function is normal. Left ventricular ejection fraction appears to be 56 - 60%.    Left ventricular wall thickness is consistent with mild concentric hypertrophy.    The left atrial cavity is moderately dilated.    Estimated right ventricular systolic pressure from tricuspid regurgitation is normal (<35 mmHg).    There is a moderate (1-2cm) circumferential pericardial effusion. There is no evidence of cardiac tamponade.    There is a moderate sized left pleural effusion.       Most Recent Stress Test:  Results for orders placed during the hospital encounter of 06/17/24    Treadmill Stress Test    Interpretation Summary    Patient exercised only 1 minute 2 seconds achieving 4.6 METS    No significant cardiac arrhythmia no significant acute ischemic EKG changes    Patient is stable to start the cardiac rehab program       Most Recent Cardiac Catheterization:   No results found for this or any previous visit.       NOTE: The following portions of the patient's note were reviewed, confirmed and/or updated this visit as appropriate: History of present illness/Interval history, physical examination, assessment & plan, allergies, current medications, past family history, past medical history, past social history, past surgical history and problem list.    Labs pertinent to today's visit on 03/12/2025 (including but not limited to CBC, CMP, and lipid profiles) were requested from the patient's primary care  "provider/hospital/clinical laboratory.  If the labs were available for the visit, they were reviewed with the patient.  If they were not available, when received, special interest will be made to the labs pertinent to this visit.  The patient's most recent \"in-house\" labs are noted below and have been reviewed.  Outside labs pertinent to this visit are scanned into the record and have been reviewed.    Discussions held today, 03/12/2025,regarding procedures included risk, benefits, and options including but not limited to: Death, MI, stroke, pain, bleeding, infection, and possible need for vascular/thoracic/cardiothoracic surgery.    Copied information within this note was reviewed and is current as of 03/12/2025.    Assessment and plan noted herein represents the current plan of care as of 03/12/2025.    Significant resources from our office and staff are inherent in engaging this patient in a continuous and active collaborative plan of care related to their chronic cardiovascular conditions outlined below.  The management of these conditions requires the direction of our service with specialized clinical knowledge, skills, and experience.  This collaborative care includes but is not limited to patient education, expectations and responsibilities, shared decision making around therapeutic goals, and shared commitments to achieve those goals.  "

## 2025-03-13 ENCOUNTER — OFFICE VISIT (OUTPATIENT)
Dept: ONCOLOGY | Facility: CLINIC | Age: 85
End: 2025-03-13
Payer: MEDICARE

## 2025-03-13 ENCOUNTER — LAB (OUTPATIENT)
Dept: LAB | Facility: HOSPITAL | Age: 85
End: 2025-03-13
Payer: MEDICARE

## 2025-03-13 ENCOUNTER — INFUSION (OUTPATIENT)
Dept: ONCOLOGY | Facility: HOSPITAL | Age: 85
End: 2025-03-13
Payer: MEDICARE

## 2025-03-13 VITALS
TEMPERATURE: 97.6 F | DIASTOLIC BLOOD PRESSURE: 85 MMHG | SYSTOLIC BLOOD PRESSURE: 162 MMHG | WEIGHT: 173.6 LBS | OXYGEN SATURATION: 96 % | HEIGHT: 75 IN | BODY MASS INDEX: 21.58 KG/M2 | HEART RATE: 57 BPM | RESPIRATION RATE: 16 BRPM

## 2025-03-13 DIAGNOSIS — D47.2 NEUROPATHY ASSOCIATED WITH MGUS: ICD-10-CM

## 2025-03-13 DIAGNOSIS — R42 DIZZINESS: Primary | ICD-10-CM

## 2025-03-13 DIAGNOSIS — D47.2 MONOCLONAL GAMMOPATHY OF UNKNOWN SIGNIFICANCE (MGUS): Primary | ICD-10-CM

## 2025-03-13 DIAGNOSIS — D47.2 MONOCLONAL GAMMOPATHY OF UNKNOWN SIGNIFICANCE (MGUS): ICD-10-CM

## 2025-03-13 DIAGNOSIS — G63 NEUROPATHY ASSOCIATED WITH MGUS: ICD-10-CM

## 2025-03-13 LAB
ALBUMIN SERPL-MCNC: 3.9 G/DL (ref 3.5–5.2)
ALBUMIN/GLOB SERPL: 1.3 G/DL
ALP SERPL-CCNC: 54 U/L (ref 39–117)
ALT SERPL W P-5'-P-CCNC: 18 U/L (ref 1–41)
ANION GAP SERPL CALCULATED.3IONS-SCNC: 12.3 MMOL/L (ref 5–15)
AST SERPL-CCNC: 47 U/L (ref 1–40)
BASOPHILS # BLD AUTO: 0.02 10*3/MM3 (ref 0–0.2)
BASOPHILS NFR BLD AUTO: 0.6 % (ref 0–1.5)
BILIRUB SERPL-MCNC: 0.6 MG/DL (ref 0–1.2)
BUN SERPL-MCNC: 11 MG/DL (ref 8–23)
BUN/CREAT SERPL: 8.6 (ref 7–25)
CALCIUM SPEC-SCNC: 9.3 MG/DL (ref 8.6–10.5)
CHLORIDE SERPL-SCNC: 91 MMOL/L (ref 98–107)
CK SERPL-CCNC: 380 U/L (ref 20–200)
CO2 SERPL-SCNC: 40.7 MMOL/L (ref 22–29)
CREAT SERPL-MCNC: 1.28 MG/DL (ref 0.76–1.27)
DEPRECATED RDW RBC AUTO: 46.2 FL (ref 37–54)
EGFRCR SERPLBLD CKD-EPI 2021: 55.5 ML/MIN/1.73
EOSINOPHIL # BLD AUTO: 0.04 10*3/MM3 (ref 0–0.4)
EOSINOPHIL NFR BLD AUTO: 1.2 % (ref 0.3–6.2)
ERYTHROCYTE [DISTWIDTH] IN BLOOD BY AUTOMATED COUNT: 15.2 % (ref 12.3–15.4)
ERYTHROCYTE [SEDIMENTATION RATE] IN BLOOD: 11 MM/HR (ref 0–20)
GLOBULIN UR ELPH-MCNC: 3 GM/DL
GLUCOSE SERPL-MCNC: 128 MG/DL (ref 65–99)
HCT VFR BLD AUTO: 39.5 % (ref 37.5–51)
HGB BLD-MCNC: 12.8 G/DL (ref 13–17.7)
IMM GRANULOCYTES # BLD AUTO: 0.02 10*3/MM3 (ref 0–0.05)
IMM GRANULOCYTES NFR BLD AUTO: 0.6 % (ref 0–0.5)
LYMPHOCYTES # BLD AUTO: 1.24 10*3/MM3 (ref 0.7–3.1)
LYMPHOCYTES NFR BLD AUTO: 37.8 % (ref 19.6–45.3)
MAGNESIUM SERPL-MCNC: 2.1 MG/DL (ref 1.6–2.4)
MCH RBC QN AUTO: 26.9 PG (ref 26.6–33)
MCHC RBC AUTO-ENTMCNC: 32.4 G/DL (ref 31.5–35.7)
MCV RBC AUTO: 83 FL (ref 79–97)
MONOCYTES # BLD AUTO: 0.49 10*3/MM3 (ref 0.1–0.9)
MONOCYTES NFR BLD AUTO: 14.9 % (ref 5–12)
NEUTROPHILS NFR BLD AUTO: 1.47 10*3/MM3 (ref 1.7–7)
NEUTROPHILS NFR BLD AUTO: 44.9 % (ref 42.7–76)
NRBC BLD AUTO-RTO: 0 /100 WBC (ref 0–0.2)
PLATELET # BLD AUTO: 180 10*3/MM3 (ref 140–450)
PMV BLD AUTO: 11.5 FL (ref 6–12)
POTASSIUM SERPL-SCNC: 2 MMOL/L (ref 3.5–5.2)
PROT SERPL-MCNC: 6.9 G/DL (ref 6–8.5)
RBC # BLD AUTO: 4.76 10*6/MM3 (ref 4.14–5.8)
SODIUM SERPL-SCNC: 144 MMOL/L (ref 136–145)
WBC NRBC COR # BLD AUTO: 3.28 10*3/MM3 (ref 3.4–10.8)

## 2025-03-13 PROCEDURE — 25810000003 SODIUM CHLORIDE 0.9 % SOLUTION: Performed by: INTERNAL MEDICINE

## 2025-03-13 PROCEDURE — 25010000002 POTASSIUM CHLORIDE 10 MEQ/100ML SOLUTION: Performed by: INTERNAL MEDICINE

## 2025-03-13 PROCEDURE — 85652 RBC SED RATE AUTOMATED: CPT | Performed by: INTERNAL MEDICINE

## 2025-03-13 PROCEDURE — 96365 THER/PROPH/DIAG IV INF INIT: CPT

## 2025-03-13 PROCEDURE — 96366 THER/PROPH/DIAG IV INF ADDON: CPT

## 2025-03-13 PROCEDURE — 85025 COMPLETE CBC W/AUTO DIFF WBC: CPT

## 2025-03-13 PROCEDURE — 82550 ASSAY OF CK (CPK): CPT | Performed by: INTERNAL MEDICINE

## 2025-03-13 PROCEDURE — 83735 ASSAY OF MAGNESIUM: CPT

## 2025-03-13 PROCEDURE — 36415 COLL VENOUS BLD VENIPUNCTURE: CPT | Performed by: INTERNAL MEDICINE

## 2025-03-13 PROCEDURE — 80053 COMPREHEN METABOLIC PANEL: CPT

## 2025-03-13 RX ORDER — POTASSIUM CHLORIDE 7.45 MG/ML
10 INJECTION INTRAVENOUS ONCE
Status: COMPLETED | OUTPATIENT
Start: 2025-03-13 | End: 2025-03-13

## 2025-03-13 RX ORDER — POTASSIUM CHLORIDE 750 MG/1
20 TABLET, EXTENDED RELEASE ORAL 2 TIMES DAILY
Qty: 60 TABLET | Refills: 1 | Status: SHIPPED | OUTPATIENT
Start: 2025-03-13

## 2025-03-13 RX ORDER — SODIUM CHLORIDE 9 MG/ML
500 INJECTION, SOLUTION INTRAVENOUS ONCE
Status: COMPLETED | OUTPATIENT
Start: 2025-03-13 | End: 2025-03-13

## 2025-03-13 RX ADMIN — POTASSIUM CHLORIDE 10 MEQ: 10 INJECTION, SOLUTION INTRAVENOUS at 12:54

## 2025-03-13 RX ADMIN — POTASSIUM CHLORIDE 10 MEQ: 10 INJECTION, SOLUTION INTRAVENOUS at 13:52

## 2025-03-13 RX ADMIN — SODIUM CHLORIDE 500 ML/HR: 9 INJECTION, SOLUTION INTRAVENOUS at 12:54

## 2025-03-13 NOTE — PROGRESS NOTES
Right 6th nerve palsy REASON FOR FOLLOWUP:    1.Minimal leukopenia with thrombocytopenia in the background of minimal IgG monoclonal gammopathy. The patient has no splenomegaly, no peripheral adenopathy, and no symptoms that suggest lupus or collagen vascular disease. Bone marrow   a  spirate documented no evidence of myeloma, lymphoma, leukemia, myelodysplasia, or any other abnormality. Bone marrow chromosomal analysis as well as flow cytometry were negative.   2.  Inflammatory polyneuropathy.  He initiated IVIG on 10/3/2019.  He did receive his second IVIG on 11/7/2019.allergic reaction to it stopping infusion all togethere  3.  Approximately 10 days after his second IVIG infusion he did experience serum sickness that included rash, generalized arthralgias, skin peeling of the palms.  Dr. Olvera prescribed 20 mg of prednisone and his symptoms have resolved after 2 days.      HISTORY OF PRESENT ILLNESS:    The patient is 84 y.o. male with the above-mentioned history, who returns to the office today for 6-month follow-up.  He had last been reviewed 9/5/2024 and is now transitioning to an additional physician CBC practice from Dr. Olvera's assisted.  We continue to follow him in regards to his MGUS.  He also struggles with neuropathy which is overall unchanged.  When he is seen in September 2024 he had recently undergone AAA repair.  He had a complicated recovery including hypotension and is now taking midodrine 3 times daily.  Continue to have episodes of hypertension and follow-up with cardiology routinely.  His neuropathy remains overall unchanged.  He does continue on Lyrica 50 mg twice daily.      As he is seen 3/15/2025 his cardiology assessment in November and January 2025 are reviewed with patient continuing midodrine, metoprolol discontinued and he was referred to psychiatry for worsening depression.  He was seen by psychiatry 3/3/2025 with a trial of Viibryd plan to 10 mg daily.    The patient is seen  3/13/2025 and his complex history is reviewed with he and his wife.  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  He has been seeing neurology concerning this and possibly as result of therapy initiated for myasthenia gravis?.  In the meantime his general weakness remains severe and he is developed diarrhea possibly as result of recent medication initiations.  At the time of this dictation he has been found to be hypokalemic with potassium of 2.0.  We are planning IV infusions as well as additional p.o. potassium to help correct this.  Pending also at this time this is his paraprotein studies and we have discussed an additional rheumatologic assessment considering his multiple symptoms.       Past Medical History:   Diagnosis Date    AAA (abdominal aortic aneurysm)     Abnormal ECG 1980’s    Alcoholism     None since 1991    Aneurysm 2019    aortic arch    Arthritis     Basal cell carcinoma (BCC) of face     Cholelithiasis 1990’s    Cholecystectomy    Chronic pain disorder     Colon polyp     Colon polyps     Coronary artery disease     Depression     Difficulty walking     Disease of thyroid gland     DJD (degenerative joint disease)     Gastritis     Gastroparesis     GERD (gastroesophageal reflux disease)     GI (gastrointestinal bleed)     Heart valve disease     Hernia     Hiatal    Hiatal hernia     History of bone marrow biopsy     [employer] (hard of hearing)     Hyperlipidemia     Hypertension     Hypothyroidism     IgG monoclonal gammopathy     Multiple duodenal ulcers     Neuropathy     Peripheral neuropathy     PONV (postoperative nausea and vomiting)     Prostate cancer     Reflux esophagitis     Ulcer     WBC decreased      Past Surgical History:   Procedure Laterality Date    ARTERIAL ANEURYSM REPAIR      ASCENDING AORTIC ANEURYSM REPAIR W/ MECHANICAL AORTIC VALVE REPLACEMENT N/A 05/10/2024    Procedure: ASCENDING AORTIC ARCH/ possible HEMIARCH REPLACEMENT WITH CIRCULATORY ARREST, neuro  monitoring, and intraop JOSE A;  Surgeon: Spencer Puente MD;  Location: [facility] CVOR;  Service: Cardiothoracic;  Laterality: N/A;  Ascending aortic aneurysm repair with 30 mm gelweave graft.    BACK SURGERY      CARDIAC CATHETERIZATION N/A 04/19/2024    Procedure: Right and Left Heart Cath with possible PCI;  Surgeon: Dilan Houston DO;  Location: [facility] CATH INVASIVE LOCATION;  Service: Cardiovascular;  Laterality: N/A;  Local and IV sedation    CHOLECYSTECTOMY  1988    COLON SURGERY  1998    COLONOSCOPY  02/2013    ENDOSCOPY  2012    ESOPHAGOGASTRODUODENOSCOPY WITH DILATION OF SHATZKI'S RING    ENDOSCOPY N/A 10/09/2020    Procedure: ESOPHAGOGASTRODUODENOSCOPY with cold bx;  Surgeon: Jake Perez MD;  Location:  [facility];  Service: Gastroenterology;  Laterality: N/A;  pre - nausea  post - duodenal ulcer, gastrits, gastric ulcer, hiatal hernia    ENDOSCOPY N/A 10/04/2022    Procedure: ESOPHAGOGASTRODUODENOSCOPY with biopsies with esophageal dilatation with 56 cui;  Surgeon: Jake Perez MD;  Location:  [facility];  Service: Gastroenterology;  Laterality: N/A;  pre-dysphagia  post-normal     ENDOSCOPY  10/04/2022    [facility]    EYE SURGERY Bilateral     cataracts    JOINT REPLACEMENT      LAMINECTOMY  2013    decompression L3-4, L4-5    PROSTATECTOMY  2007    SKIN BIOPSY      TONSILLECTOMY AND ADENOIDECTOMY      1940s    UPPER GASTROINTESTINAL ENDOSCOPY      VASCULAR SURGERY      VASECTOMY      1970s     Social History     Socioeconomic History    Marital status:      Spouse name: Meghan    Years of education: College   Tobacco Use    Smoking status: Never     Passive exposure: Never    Smokeless tobacco: Never   Vaping Use    Vaping status: Never Used   Substance and Sexual Activity    Alcohol use: Not Currently     Comment: No ETOH since 1991    Drug use: Never    Sexual activity: Not Currently     Partners: Female     Birth control/protection: None     Family History    Problem Relation Age of Onset    Cancer Mother 85        Breast    COPD Father     Cancer Brother 59        Unknown etiology    Hypertension Daughter            Current Outpatient Medications on File Prior to Visit   Medication Sig Dispense Refill    acetaminophen (TYLENOL) 325 MG tablet Take 2 tablets by mouth Every 4 (Four) Hours As Needed for Mild Pain.      aspirin 81 MG EC tablet Take 1 tablet by mouth Every Night. Indications: Carotid Artery Stenting      Cyanocobalamin (B-12) 1000 MCG sublingual tablet Place 1 tablet under the tongue Daily. (Patient taking differently: Take 1 tablet by mouth Daily.) 30 each 6    escitalopram (LEXAPRO) 10 MG tablet Take 1 tablet by mouth Daily. Take combined with 20mg tablets for a total of 30mg daily  Indications: Major Depressive Disorder (Patient taking differently: Take 1 tablet by mouth Daily. 10 mg only  Indications: Major Depressive Disorder) 30 tablet 3    fludrocortisone 0.1 MG tablet TAKE THREE TABLETS BY MOUTH EVERY MORNING AND TAKE TWO TABLETS BY MOUTH EVERY EVENING 150 tablet 1    levothyroxine (SYNTHROID, LEVOTHROID) 100 MCG tablet Take 1 tablet by mouth Daily. Indications: Underactive Thyroid      magnesium oxide (MAG-OX) 400 MG tablet Take 1 tablet by mouth Daily.      Menthol, Topical Analgesic, (BIOFREEZE EX) Apply 1 Application topically Daily As Needed (pain).      metoprolol tartrate (LOPRESSOR) 25 MG tablet Take 0.5 tablets by mouth 2 (Two) Times a Day. Indications: High Blood Pressure Disorder 90 tablet 3    midodrine (PROAMATINE) 5 MG tablet TAKE THREE TABLETS BY MOUTH THREE TIMES A DAY BEFORE MEALS 270 tablet 6    pantoprazole (PROTONIX) 40 MG EC tablet Take 1 tablet by mouth 2 (Two) Times a Day. Indications: Gastroesophageal Reflux Disease      pregabalin (LYRICA) 50 MG capsule Take 1 capsule by mouth 2 (Two) Times a Day. Indications: Neuropathic Pain      pyridostigmine (MESTINON) 60 MG tablet Take 1 tablet by mouth Every 12 (Twelve) Hours. 60  "tablet 1    vilazodone (Viibryd) 10 MG tablet tablet Take 1 tablet by mouth Daily. Take po q am with food. 30 tablet 1    vitamin D3 125 MCG (5000 UT) capsule capsule Take 250 Units by mouth Daily.       Current Facility-Administered Medications on File Prior to Visit   Medication Dose Route Frequency Provider Last Rate Last Admin    Chlorhexidine Gluconate Cloth 2 % pads   Topical Q12H PRN Kayli Rowe APRN         Allergies   Allergen Reactions    Statins Myalgia         ONCOLOGIC/HEMATOLOGIC HISTORY: History from previous dates can be found in the separate document.         Vitals:    03/13/25 1132   BP: 162/85   Pulse: 57   Resp: 16   Temp: 97.6 °F (36.4 °C)   TempSrc: Oral   SpO2: 96%   Weight: 78.7 kg (173 lb 9.6 oz)   Height: 190.5 cm (75\")         PHYSICAL EXAM :   GENERAL:  Well-developed, thin appearing  maleusing a walker for ambulation  SKIN:  Warm, dry without rashes, purpura or petechiae.  HEAD:  Normocephalic.  EYES:  Pupils equal, round.  Right 6th nerve palsy  EARS:  Hearing intact.  NOSE:  Septum midline.  No excoriations or nasal discharge.  CHEST:  Lungs clear to auscultation. Good airflow.  CARDIAC:  Regular rate.   ABDOMEN:  Soft, nontender with no organomegaly or masses. Bowel sounds present  EXTREMITIES:  No clubbing, cyanosis or edema.  NEUROLOGICAL: Patient with clear muscular weakness particularly in proximal thighs, generalized tremor upper extremities.  PSYCHIATRIC:  Normal affect and mood.      WBC   Date Value Ref Range Status   03/13/2025 3.28 (L) 3.40 - 10.80 10*3/mm3 Final     RBC   Date Value Ref Range Status   03/13/2025 4.76 4.14 - 5.80 10*6/mm3 Final     Hemoglobin   Date Value Ref Range Status   03/13/2025 12.8 (L) 13.0 - 17.7 g/dL Final     Hematocrit   Date Value Ref Range Status   03/13/2025 39.5 37.5 - 51.0 % Final     MCV   Date Value Ref Range Status   03/13/2025 83.0 79.0 - 97.0 fL Final     MCH   Date Value Ref Range Status   03/13/2025 26.9 " 26.6 - 33.0 pg Final     MCHC   Date Value Ref Range Status   03/13/2025 32.4 31.5 - 35.7 g/dL Final     RDW   Date Value Ref Range Status   03/13/2025 15.2 12.3 - 15.4 % Final     RDW-SD   Date Value Ref Range Status   03/13/2025 46.2 37.0 - 54.0 fl Final     MPV   Date Value Ref Range Status   03/13/2025 11.5 6.0 - 12.0 fL Final     Platelets   Date Value Ref Range Status   03/13/2025 180 140 - 450 10*3/mm3 Final     Neutrophil %   Date Value Ref Range Status   03/13/2025 44.9 42.7 - 76.0 % Final     Lymphocyte %   Date Value Ref Range Status   03/13/2025 37.8 19.6 - 45.3 % Final     Monocyte %   Date Value Ref Range Status   03/13/2025 14.9 (H) 5.0 - 12.0 % Final     Eosinophil %   Date Value Ref Range Status   03/13/2025 1.2 0.3 - 6.2 % Final     Basophil %   Date Value Ref Range Status   03/13/2025 0.6 0.0 - 1.5 % Final     Immature Grans %   Date Value Ref Range Status   03/13/2025 0.6 (H) 0.0 - 0.5 % Final     Neutrophils, Absolute   Date Value Ref Range Status   03/13/2025 1.47 (L) 1.70 - 7.00 10*3/mm3 Final     Lymphocytes, Absolute   Date Value Ref Range Status   03/13/2025 1.24 0.70 - 3.10 10*3/mm3 Final     Monocytes, Absolute   Date Value Ref Range Status   03/13/2025 0.49 0.10 - 0.90 10*3/mm3 Final     Eosinophils, Absolute   Date Value Ref Range Status   03/13/2025 0.04 0.00 - 0.40 10*3/mm3 Final     Basophils, Absolute   Date Value Ref Range Status   03/13/2025 0.02 0.00 - 0.20 10*3/mm3 Final     Immature Grans, Absolute   Date Value Ref Range Status   03/13/2025 0.02 0.00 - 0.05 10*3/mm3 Final     nRBC   Date Value Ref Range Status   03/13/2025 0.0 0.0 - 0.2 /100 WBC Final     Axumin    ASSESSMENT:      1. MGUS:   undergone bone marrow testing of this on 2 different occasions not being able to document myeloma, lymphoma, myelodysplasia, leukemia or myelofibrosis. Chromosomal analysis has been normal.   He patient has not had any significant difference in his monoclonal protein quantification.  We  have also tried prednisone with no benefit as per indication by his Neurologist and we have tried immunoglobulin with no benefit and actually this medicine triggered serum sickness in him that required short course of prednisone therapy.  MRI scan of the brain that shows no major alterations, radiologist calls minimal vascular alterations. Most importantly the MRI of the cervical spine documents significant spinal stenosis at the level of C5. I reviewed the pictures of this in the PAC system [facility] with the patient and his wife. The MRI of the thoracic spine shows degenerative changes. Most importantly none of the areas on MRI in the cranium, cervical, thoracic spine disclose any lesions that could be consistent with myeloma, lymphoma or tumoral lesions of any nature. All of the changes are related to degenerative changes. 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   1/24/2023 IgG 1001, IgA 375, IgM 22, M spike 0.3, kappa free light chain 41.3, lambda free light chain 18.4, kappa lambda ratio 2.24  1/16/2024 stability of labs with IgG 1097, IgA 364, IgM 21, M spike 0.3, kappa free light chain 43.0, lambda free light chain 18.8, kappa lambda ratio 2.28  Repeat protein studies 9/5/2024 with M spike of 0.3 and kappa/lambda ratio of 2.38  Pending repeat studies 3/13/2025    2. Neuropathy. He has been seen by Dr. Darrick Garcia. EMG was performed. Abnormalities documented in the EMG and the interpretation per Dr. Garcia believing that this does not represent a typical feature of inflammatory neuropathy.   Lyrica 50 mg twice daily   Historically has had some benefit with B12.  We did discuss today resuming oral sublingual B12  Continues to follow with neurology.  When seen 3/13/2025 there is an uncertain indication for Mestinon therapy though we have discussed a cranial nerve  palsy can be seen with myasthenia gravis.  He is being seen by neurology with follow-up in the next several  weeks.      3.  Thoracic aortic aneurysm: Being followed by cardiology.     Ultimately undergoing elective ascending aortic aneurysm repair with Dr. Puente 5/10/2024    4.  Electrolyte abnormalities, mild EUNICE  IV potassium in office today-20 mill equivalents with additional saline replacement therapy, oral potassium escribed to pharmacy, magnesium replacement as needed    PLAN:  The patient remains in observation regarding his MGUS.  Repeat protein studies are pending today  Electrolyte repletion today  Patient will continue sublingual B12 1000 mcg daily  Continue to follow-up with neurology and cardiology, copy to those groups today considering his neurologic findings  Additional laboratory studies today including ANCA panel, SUNNY comprehensive profile  22 weeks-CBC, KARLEE, PE, free some light chains, sed rate, CMP  Return in 6 months for MD follow-up    I spent 60 minutes caring for Panfilo on this date of service. This time includes time spent by me in the following activities: preparing for the visit, reviewing tests, obtaining and/or reviewing a separately obtained history, performing a medically appropriate examination and/or evaluation, counseling and educating the patient/family/caregiver, ordering medications, tests, or procedures, documenting information in the medical record, and care coordination.       Jameel Ruffin MD  03/13/2025

## 2025-03-13 NOTE — LETTER
March 13, 2025     Stephan Rubio MD  [address]  [place] [address]    Patient: Panfilo Feliz   YOB: 1940   Date of Visit: 3/13/2025     Dear Stephan Rubio MD:       Thank you for referring Panfilo Feliz to me for evaluation. Below are the relevant portions of my assessment and plan of care.    If you have questions, please do not hesitate to call me. I look forward to following Panfilo along with you.         Sincerely,        Jameel Ruffin MD        CC: MD Rigoberto Hearn MD Kommor, Michael D., MD  03/13/25 1247  Sign when Signing Visit   REASON FOR FOLLOWUP:    1.Minimal leukopenia with thrombocytopenia in the background of minimal IgG monoclonal gammopathy. The patient has no splenomegaly, no peripheral adenopathy, and no symptoms that suggest lupus or collagen vascular disease. Bone marrow   a  spirate documented no evidence of myeloma, lymphoma, leukemia, myelodysplasia, or any other abnormality. Bone marrow chromosomal analysis as well as flow cytometry were negative.   2.  Inflammatory polyneuropathy.  He initiated IVIG on 10/3/2019.  He did receive his second IVIG on 11/7/2019.allergic reaction to it stopping infusion all togethere  3.  Approximately 10 days after his second IVIG infusion he did experience serum sickness that included rash, generalized arthralgias, skin peeling of the palms.  Dr. Olvera prescribed 20 mg of prednisone and his symptoms have resolved after 2 days.      HISTORY OF PRESENT ILLNESS:    The patient is 84 y.o. male with the above-mentioned history, who returns to the office today for 6-month follow-up.  He had last been reviewed 9/5/2024 and is now transitioning to an additional physician CBC practice from Dr. Olvera's long term.  We continue to follow him in regards to his MGUS.  He also struggles with neuropathy which is overall unchanged.  When he is seen in September 2024 he had recently undergone AAA repair.  He had a complicated  recovery including hypotension and is now taking midodrine 3 times daily.  Continue to have episodes of hypertension and follow-up with cardiology routinely.  His neuropathy remains overall unchanged.  He does continue on Lyrica 50 mg twice daily.      As he is seen 3/15/2025 his cardiology assessment in November and January 2025 are reviewed with patient continuing midodrine, metoprolol discontinued and he was referred to psychiatry for worsening depression.  He was seen by psychiatry 3/3/2025 with a trial of Viibryd plan to 10 mg daily.    The patient is seen 3/13/2025 and his complex history is reviewed with he and his wife.  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  He has been seeing neurology concerning this and possibly as result of therapy initiated for myasthenia gravis?.  In the meantime his general weakness remains severe and he is developed diarrhea possibly as result of recent medication initiations.  At the time of this dictation he has been found to be hypokalemic with potassium of 2.0.  We are planning IV infusions as well as additional p.o. potassium to help correct this.  Pending also at this time this is his paraprotein studies and we have discussed an additional rheumatologic assessment considering his multiple symptoms.       Past Medical History:   Diagnosis Date   • AAA (abdominal aortic aneurysm)    • Abnormal ECG 1980’s   • Alcoholism     None since 1991   • Aneurysm 2019    aortic arch   • Arthritis    • Basal cell carcinoma (BCC) of face    • Cholelithiasis 1990’s    Cholecystectomy   • Chronic pain disorder    • Colon polyp    • Colon polyps    • Coronary artery disease    • Depression    • Difficulty walking    • Disease of thyroid gland    • DJD (degenerative joint disease)    • Gastritis    • Gastroparesis    • GERD (gastroesophageal reflux disease)    • GI (gastrointestinal bleed)    • Heart valve disease    • Hernia     Hiatal   • Hiatal hernia    • History  of bone marrow biopsy    • Telida (hard of hearing)    • Hyperlipidemia    • Hypertension    • Hypothyroidism    • IgG monoclonal gammopathy    • Multiple duodenal ulcers    • Neuropathy    • Peripheral neuropathy    • PONV (postoperative nausea and vomiting)    • Prostate cancer    • Reflux esophagitis    • Ulcer    • WBC decreased      Past Surgical History:   Procedure Laterality Date   • ARTERIAL ANEURYSM REPAIR     • ASCENDING AORTIC ANEURYSM REPAIR W/ MECHANICAL AORTIC VALVE REPLACEMENT N/A 05/10/2024    Procedure: ASCENDING AORTIC ARCH/ possible HEMIARCH REPLACEMENT WITH CIRCULATORY ARREST, neuro monitoring, and intraop JOSE A;  Surgeon: Spencer Puente MD;  Location: [facility] CVOR;  Service: Cardiothoracic;  Laterality: N/A;  Ascending aortic aneurysm repair with 30 mm gelweave graft.   • BACK SURGERY     • CARDIAC CATHETERIZATION N/A 04/19/2024    Procedure: Right and Left Heart Cath with possible PCI;  Surgeon: Dilan Houston DO;  Location: [facility] CATH INVASIVE LOCATION;  Service: Cardiovascular;  Laterality: N/A;  Local and IV sedation   • CHOLECYSTECTOMY  1988   • COLON SURGERY  1998   • COLONOSCOPY  02/2013   • ENDOSCOPY  2012    ESOPHAGOGASTRODUODENOSCOPY WITH DILATION OF SHATZKI'S RING   • ENDOSCOPY N/A 10/09/2020    Procedure: ESOPHAGOGASTRODUODENOSCOPY with cold bx;  Surgeon: Jake Perez MD;  Location: [facility] END[facility];  Service: Gastroenterology;  Laterality: N/A;  pre - nausea  post - duodenal ulcer, gastrits, gastric ulcer, hiatal hernia   • ENDOSCOPY N/A 10/04/2022    Procedure: ESOPHAGOGASTRODUODENOSCOPY with biopsies with esophageal dilatation with 56 cui;  Surgeon: Jake Perez MD;  Location: [facility] ENDOSCOPY;  Service: Gastroenterology;  Laterality: N/A;  pre-dysphagia  post-normal    • ENDOSCOPY  10/04/2022    Chris BUTTERFIELD   • EYE SURGERY Bilateral     cataracts   • JOINT REPLACEMENT     • LAMINECTOMY  2013    decompression L3-4, L4-5   • PROSTATECTOMY  2007   • SKIN  BIOPSY     • TONSILLECTOMY AND ADENOIDECTOMY      1940s   • UPPER GASTROINTESTINAL ENDOSCOPY     • VASCULAR SURGERY     • VASECTOMY      1970s     Social History     Socioeconomic History   • Marital status:      Spouse name: Meghan   • Years of education: College   Tobacco Use   • Smoking status: Never     Passive exposure: Never   • Smokeless tobacco: Never   Vaping Use   • Vaping status: Never Used   Substance and Sexual Activity   • Alcohol use: Not Currently     Comment: No ETOH since 1991   • Drug use: Never   • Sexual activity: Not Currently     Partners: Female     Birth control/protection: None     Family History   Problem Relation Age of Onset   • Cancer Mother 85        Breast   • COPD Father    • Cancer Brother 59        Unknown etiology   • Hypertension Daughter            Current Outpatient Medications on File Prior to Visit   Medication Sig Dispense Refill   • acetaminophen (TYLENOL) 325 MG tablet Take 2 tablets by mouth Every 4 (Four) Hours As Needed for Mild Pain.     • aspirin 81 MG EC tablet Take 1 tablet by mouth Every Night. Indications: Carotid Artery Stenting     • Cyanocobalamin (B-12) 1000 MCG sublingual tablet Place 1 tablet under the tongue Daily. (Patient taking differently: Take 1 tablet by mouth Daily.) 30 each 6   • escitalopram (LEXAPRO) 10 MG tablet Take 1 tablet by mouth Daily. Take combined with 20mg tablets for a total of 30mg daily  Indications: Major Depressive Disorder (Patient taking differently: Take 1 tablet by mouth Daily. 10 mg only  Indications: Major Depressive Disorder) 30 tablet 3   • fludrocortisone 0.1 MG tablet TAKE THREE TABLETS BY MOUTH EVERY MORNING AND TAKE TWO TABLETS BY MOUTH EVERY EVENING 150 tablet 1   • levothyroxine (SYNTHROID, LEVOTHROID) 100 MCG tablet Take 1 tablet by mouth Daily. Indications: Underactive Thyroid     • magnesium oxide (MAG-OX) 400 MG tablet Take 1 tablet by mouth Daily.     • Menthol, Topical Analgesic, (BIOFREEZE EX) Apply 1  "Application topically Daily As Needed (pain).     • metoprolol tartrate (LOPRESSOR) 25 MG tablet Take 0.5 tablets by mouth 2 (Two) Times a Day. Indications: High Blood Pressure Disorder 90 tablet 3   • midodrine (PROAMATINE) 5 MG tablet TAKE THREE TABLETS BY MOUTH THREE TIMES A DAY BEFORE MEALS 270 tablet 6   • pantoprazole (PROTONIX) 40 MG EC tablet Take 1 tablet by mouth 2 (Two) Times a Day. Indications: Gastroesophageal Reflux Disease     • pregabalin (LYRICA) 50 MG capsule Take 1 capsule by mouth 2 (Two) Times a Day. Indications: Neuropathic Pain     • pyridostigmine (MESTINON) 60 MG tablet Take 1 tablet by mouth Every 12 (Twelve) Hours. 60 tablet 1   • vilazodone (Viibryd) 10 MG tablet tablet Take 1 tablet by mouth Daily. Take po q am with food. 30 tablet 1   • vitamin D3 125 MCG (5000 UT) capsule capsule Take 250 Units by mouth Daily.       Current Facility-Administered Medications on File Prior to Visit   Medication Dose Route Frequency Provider Last Rate Last Admin   • Chlorhexidine Gluconate Cloth 2 % pads   Topical Q12H PRN Kayli Rowe APRN         Allergies   Allergen Reactions   • Statins Myalgia         ONCOLOGIC/HEMATOLOGIC HISTORY: History from previous dates can be found in the separate document.         Vitals:    03/13/25 1132   BP: 162/85   Pulse: 57   Resp: 16   Temp: 97.6 °F (36.4 °C)   TempSrc: Oral   SpO2: 96%   Weight: 78.7 kg (173 lb 9.6 oz)   Height: 190.5 cm (75\")         PHYSICAL EXAM :   GENERAL:  Well-developed, thin appearing  maleusing a walker for ambulation  SKIN:  Warm, dry without rashes, purpura or petechiae.  HEAD:  Normocephalic.  EYES:  Pupils equal, round.  EOMs intact.  Conjunctivae normal.  EARS:  Hearing intact.  NOSE:  Septum midline.  No excoriations or nasal discharge.  CHEST:  Lungs clear to auscultation. Good airflow.  CARDIAC:  Regular rate.   ABDOMEN:  Soft, nontender with no organomegaly or masses. Bowel sounds present  EXTREMITIES:  No " clubbing, cyanosis or edema.  NEUROLOGICAL: Patient with clear muscular weakness particularly in proximal thighs, generalized tremor upper extremities.  PSYCHIATRIC:  Normal affect and mood.      WBC   Date Value Ref Range Status   03/13/2025 3.28 (L) 3.40 - 10.80 10*3/mm3 Final     RBC   Date Value Ref Range Status   03/13/2025 4.76 4.14 - 5.80 10*6/mm3 Final     Hemoglobin   Date Value Ref Range Status   03/13/2025 12.8 (L) 13.0 - 17.7 g/dL Final     Hematocrit   Date Value Ref Range Status   03/13/2025 39.5 37.5 - 51.0 % Final     MCV   Date Value Ref Range Status   03/13/2025 83.0 79.0 - 97.0 fL Final     MCH   Date Value Ref Range Status   03/13/2025 26.9 26.6 - 33.0 pg Final     MCHC   Date Value Ref Range Status   03/13/2025 32.4 31.5 - 35.7 g/dL Final     RDW   Date Value Ref Range Status   03/13/2025 15.2 12.3 - 15.4 % Final     RDW-SD   Date Value Ref Range Status   03/13/2025 46.2 37.0 - 54.0 fl Final     MPV   Date Value Ref Range Status   03/13/2025 11.5 6.0 - 12.0 fL Final     Platelets   Date Value Ref Range Status   03/13/2025 180 140 - 450 10*3/mm3 Final     Neutrophil %   Date Value Ref Range Status   03/13/2025 44.9 42.7 - 76.0 % Final     Lymphocyte %   Date Value Ref Range Status   03/13/2025 37.8 19.6 - 45.3 % Final     Monocyte %   Date Value Ref Range Status   03/13/2025 14.9 (H) 5.0 - 12.0 % Final     Eosinophil %   Date Value Ref Range Status   03/13/2025 1.2 0.3 - 6.2 % Final     Basophil %   Date Value Ref Range Status   03/13/2025 0.6 0.0 - 1.5 % Final     Immature Grans %   Date Value Ref Range Status   03/13/2025 0.6 (H) 0.0 - 0.5 % Final     Neutrophils, Absolute   Date Value Ref Range Status   03/13/2025 1.47 (L) 1.70 - 7.00 10*3/mm3 Final     Lymphocytes, Absolute   Date Value Ref Range Status   03/13/2025 1.24 0.70 - 3.10 10*3/mm3 Final     Monocytes, Absolute   Date Value Ref Range Status   03/13/2025 0.49 0.10 - 0.90 10*3/mm3 Final     Eosinophils, Absolute   Date Value Ref Range  Status   03/13/2025 0.04 0.00 - 0.40 10*3/mm3 Final     Basophils, Absolute   Date Value Ref Range Status   03/13/2025 0.02 0.00 - 0.20 10*3/mm3 Final     Immature Grans, Absolute   Date Value Ref Range Status   03/13/2025 0.02 0.00 - 0.05 10*3/mm3 Final     nRBC   Date Value Ref Range Status   03/13/2025 0.0 0.0 - 0.2 /100 WBC Final     Axumin    ASSESSMENT:      1. MGUS:   undergone bone marrow testing of this on 2 different occasions not being able to document myeloma, lymphoma, myelodysplasia, leukemia or myelofibrosis. Chromosomal analysis has been normal.   He patient has not had any significant difference in his monoclonal protein quantification.  We have also tried prednisone with no benefit as per indication by his Neurologist and we have tried immunoglobulin with no benefit and actually this medicine triggered serum sickness in him that required short course of prednisone therapy.  MRI scan of the brain that shows no major alterations, radiologist calls minimal vascular alterations. Most importantly the MRI of the cervical spine documents significant spinal stenosis at the level of C5. I reviewed the pictures of this in the PAC system [facility] with the patient and his wife. The MRI of the thoracic spine shows degenerative changes. Most importantly none of the areas on MRI in the cranium, cervical, thoracic spine disclose any lesions that could be consistent with myeloma, lymphoma or tumoral lesions of any nature. All of the changes are related to degenerative changes. 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   1/24/2023 IgG 1001, IgA 375, IgM 22, M spike 0.3, kappa free light chain 41.3, lambda free light chain 18.4, kappa lambda ratio 2.24  1/16/2024 stability of labs with IgG 1097, IgA 364, IgM 21, M spike 0.3, kappa free light chain 43.0, lambda free light chain 18.8, kappa lambda ratio 2.28  Repeat protein studies 9/5/2024 with M spike of 0.3 and kappa/lambda ratio of  2.38  Pending repeat studies 3/13/2025    2. Neuropathy. He has been seen by Dr. Darrick Garcia. EMG was performed. Abnormalities documented in the EMG and the interpretation per Dr. Garcia believing that this does not represent a typical feature of inflammatory neuropathy.   Lyrica 50 mg twice daily   Historically has had some benefit with B12.  We did discuss today resuming oral sublingual B12  Continues to follow with neurology.  When seen 3/13/2025 there is an uncertain indication for Mestinon therapy though we have discussed a cranial nerve  palsy can be seen with myasthenia gravis.  He is being seen by neurology with follow-up in the next several weeks.      3.  Thoracic aortic aneurysm: Being followed by cardiology.     Ultimately undergoing elective ascending aortic aneurysm repair with Dr. Puente 5/10/2024    4.  Electrolyte abnormalities, mild EUNICE  IV potassium in office today-20 mill equivalents with additional saline replacement therapy, oral potassium escribed to pharmacy, magnesium replacement as needed    PLAN:  The patient remains in observation regarding his MGUS.  Repeat protein studies are pending today  Electrolyte repletion today  Patient will continue sublingual B12 1000 mcg daily  Continue to follow-up with neurology and cardiology, copy to those groups today considering his neurologic findings  Additional laboratory studies today including ANCA panel, SUNNY comprehensive profile  22 weeks-CBC, KARLEE, PE, free some light chains, sed rate, CMP  Return in 6 months for MD follow-up    I spent 60 minutes caring for Panfilo on this date of service. This time includes time spent by me in the following activities: preparing for the visit, reviewing tests, obtaining and/or reviewing a separately obtained history, performing a medically appropriate examination and/or evaluation, counseling and educating the patient/family/caregiver, ordering medications, tests, or procedures, documenting information in the medical  record, and care coordination.       Jameel Ruffin MD  03/13/2025

## 2025-03-13 NOTE — NURSING NOTE
Lab Results   Component Value Date    GLUCOSE 128 (H) 03/13/2025    BUN 11 03/13/2025    CREATININE 1.28 (H) 03/13/2025     03/13/2025    K 2.0 (C) 03/13/2025    CL 91 (L) 03/13/2025    CALCIUM 9.3 03/13/2025    PROTEINTOT 6.9 03/13/2025    ALBUMIN 3.9 03/13/2025    ALT 18 03/13/2025    AST 47 (H) 03/13/2025    ALKPHOS 54 03/13/2025    BILITOT 0.6 03/13/2025    GLOB 3.0 03/13/2025    AGRATIO 1.3 03/13/2025    BCR 8.6 03/13/2025    ANIONGAP 12.3 03/13/2025    EGFR 55.5 (L) 03/13/2025    Per Dr. Ruffin, pt to get 20mEq IV Potassium Chloride while in office today. Prescription sent to pt's pharmacy for him to take 40mEq daily. Pt and pt's wife v/u.

## 2025-03-14 LAB
C-ANCA TITR SER IF: NORMAL TITER
CENTROMERE B AB SER-ACNC: <0.2 AI (ref 0–0.9)
CHROMATIN AB SERPL-ACNC: <0.2 AI (ref 0–0.9)
DSDNA AB SER-ACNC: 5 IU/ML (ref 0–9)
ENA JO1 AB SER-ACNC: <0.2 AI (ref 0–0.9)
ENA RNP AB SER-ACNC: <0.2 AI (ref 0–0.9)
ENA SCL70 AB SER-ACNC: <0.2 AI (ref 0–0.9)
ENA SM AB SER-ACNC: <0.2 AI (ref 0–0.9)
ENA SS-A AB SER-ACNC: <0.2 AI (ref 0–0.9)
ENA SS-B AB SER-ACNC: <0.2 AI (ref 0–0.9)
Lab: NORMAL
MYELOPEROXIDASE AB SER IA-ACNC: <0.2 UNITS (ref 0–0.9)
P-ANCA ATYPICAL TITR SER IF: NORMAL TITER
P-ANCA TITR SER IF: NORMAL TITER
PROTEINASE3 AB SER IA-ACNC: <0.2 UNITS (ref 0–0.9)

## 2025-03-17 ENCOUNTER — TELEMEDICINE (OUTPATIENT)
Dept: PSYCHIATRY | Facility: CLINIC | Age: 85
End: 2025-03-17
Payer: MEDICARE

## 2025-03-17 ENCOUNTER — TELEPHONE (OUTPATIENT)
Dept: PSYCHIATRY | Facility: CLINIC | Age: 85
End: 2025-03-17

## 2025-03-17 DIAGNOSIS — F33.1 MODERATE EPISODE OF RECURRENT MAJOR DEPRESSIVE DISORDER: Primary | ICD-10-CM

## 2025-03-17 LAB
ALBUMIN SERPL ELPH-MCNC: 3.5 G/DL (ref 2.9–4.4)
ALBUMIN/GLOB SERPL: 1.1 {RATIO} (ref 0.7–1.7)
ALPHA1 GLOB SERPL ELPH-MCNC: 0.3 G/DL (ref 0–0.4)
ALPHA2 GLOB SERPL ELPH-MCNC: 0.7 G/DL (ref 0.4–1)
B-GLOBULIN SERPL ELPH-MCNC: 1.2 G/DL (ref 0.7–1.3)
GAMMA GLOB SERPL ELPH-MCNC: 0.9 G/DL (ref 0.4–1.8)
GLOBULIN SER-MCNC: 3.2 G/DL (ref 2.2–3.9)
IGA SERPL-MCNC: 349 MG/DL (ref 61–437)
IGG SERPL-MCNC: 969 MG/DL (ref 603–1613)
IGM SERPL-MCNC: 22 MG/DL (ref 15–143)
INTERPRETATION SERPL IEP-IMP: ABNORMAL
KAPPA LC FREE SER-MCNC: 45.7 MG/L (ref 3.3–19.4)
KAPPA LC FREE/LAMBDA FREE SER: 2.23 {RATIO} (ref 0.26–1.65)
LABORATORY COMMENT REPORT: ABNORMAL
LAMBDA LC FREE SERPL-MCNC: 20.5 MG/L (ref 5.7–26.3)
M PROTEIN SERPL ELPH-MCNC: 0.2 G/DL
PROT SERPL-MCNC: 6.7 G/DL (ref 6–8.5)

## 2025-03-17 RX ORDER — DESVENLAFAXINE 25 MG/1
25 TABLET, EXTENDED RELEASE ORAL DAILY
Qty: 30 TABLET | Refills: 2 | Status: SHIPPED | OUTPATIENT
Start: 2025-03-17

## 2025-03-17 NOTE — PROGRESS NOTES
Patient Name: Panfilo Feliz  MRN: [id number]   :  1940     This provider is located at her home office through the [facility] (through [facility]), [address], [address] using a secure [employer] Video Visit through [employer]. Patient is being seen remotely via telehealth at their home address in [place], and stated they are in a secure environment for this session. The patient's condition being diagnosed/treated is appropriate for telemedicine. The provider identified herself as well as her credentials.   The patient, and/or patients guardian, consent to be seen remotely, and when consent is given they understand that the consent allows for patient identifiable information to be sent to a third party as needed.   They may refuse to be seen remotely at any time. The electronic data is encrypted and password protected, and the patient and/or guardian has been advised of the potential risks to privacy not withstanding such measures.    You have chosen to receive care through a telehealth visit.  Do you consent to use a video/audio connection for your medical care today? Yes    Chief Complaint:      ICD-10-CM ICD-9-CM   1. Moderate episode of recurrent major depressive disorder  F33.1 296.32       History of Present Illness: Panfilo Feliz is a 84 y.o. male is here today for medication management follow up.  Patient did not like Viibryd at all.  Caused too much GI upset.  Mood is still up and down.  Struggling with sleep and feeling groggy.    The following portions of the patient's history were reviewed and updated as appropriate: allergies, current medications, past family history, past medical history, past social history, past surgical history, and problem list.    Review of Systems;;  Review of Systems   Constitutional:  Negative for activity change, appetite change, fatigue, unexpected weight gain and unexpected weight loss.   Respiratory:  Negative for  shortness of breath and wheezing.    Gastrointestinal:  Negative for constipation, diarrhea, nausea and vomiting.   Musculoskeletal:  Negative for gait problem.   Skin:  Negative for dry skin and rash.   Neurological:  Negative for dizziness, speech difficulty, weakness, light-headedness, headache, memory problem and confusion.   Psychiatric/Behavioral:  Positive for dysphoric mood, sleep disturbance, depressed mood and stress. Negative for agitation, behavioral problems, decreased concentration, hallucinations, self-injury, suicidal ideas and negative for hyperactivity. The patient is not nervous/anxious.        Physical Exam;;  Physical Exam  Constitutional:       General: He is not in acute distress.     Appearance: He is well-developed. He is not diaphoretic.   HENT:      Head: Normocephalic and atraumatic.   Eyes:      Conjunctiva/sclera: Conjunctivae normal.   Pulmonary:      Effort: Pulmonary effort is normal. No respiratory distress.   Musculoskeletal:         General: Normal range of motion.      Cervical back: Full passive range of motion without pain and normal range of motion.   Neurological:      Mental Status: He is alert and oriented to person, place, and time.   Psychiatric:         Mood and Affect: Mood is depressed. Mood is not anxious. Affect is not labile, blunt, angry or inappropriate.         Speech: Speech is not rapid and pressured or tangential.         Behavior: Behavior normal. Behavior is not agitated, slowed, aggressive, withdrawn, hyperactive or combative. Behavior is cooperative.         Thought Content: Thought content normal. Thought content is not paranoid or delusional. Thought content does not include homicidal or suicidal ideation. Thought content does not include homicidal or suicidal plan.         Judgment: Judgment normal.       There were no vitals taken for this visit.  There is no height or weight on file to calculate BMI. Video appt unable to obtain.    Current  Medications;;    Current Outpatient Medications:     acetaminophen (TYLENOL) 325 MG tablet, Take 2 tablets by mouth Every 4 (Four) Hours As Needed for Mild Pain., Disp: , Rfl:     aspirin 81 MG EC tablet, Take 1 tablet by mouth Every Night. Indications: Carotid Artery Stenting, Disp: , Rfl:     Cyanocobalamin (B-12) 1000 MCG sublingual tablet, Place 1 tablet under the tongue Daily. (Patient taking differently: Take 1 tablet by mouth Daily.), Disp: 30 each, Rfl: 6    Desvenlafaxine Succinate ER (Pristiq) 25 MG tablet sustained-release 24 hour, Take 1 tablet by mouth Daily., Disp: 30 tablet, Rfl: 2    escitalopram (LEXAPRO) 10 MG tablet, Take 1 tablet by mouth Daily. Take combined with 20mg tablets for a total of 30mg daily  Indications: Major Depressive Disorder (Patient taking differently: Take 1 tablet by mouth Daily. 10 mg only  Indications: Major Depressive Disorder), Disp: 30 tablet, Rfl: 3    fludrocortisone 0.1 MG tablet, TAKE THREE TABLETS BY MOUTH EVERY MORNING AND TAKE TWO TABLETS BY MOUTH EVERY EVENING, Disp: 150 tablet, Rfl: 1    levothyroxine (SYNTHROID, LEVOTHROID) 100 MCG tablet, Take 1 tablet by mouth Daily. Indications: Underactive Thyroid, Disp: , Rfl:     magnesium oxide (MAG-OX) 400 MG tablet, Take 1 tablet by mouth Daily., Disp: , Rfl:     Menthol, Topical Analgesic, (BIOFREEZE EX), Apply 1 Application topically Daily As Needed (pain)., Disp: , Rfl:     metoprolol tartrate (LOPRESSOR) 25 MG tablet, Take 0.5 tablets by mouth 2 (Two) Times a Day. Indications: High Blood Pressure Disorder, Disp: 90 tablet, Rfl: 3    midodrine (PROAMATINE) 5 MG tablet, TAKE THREE TABLETS BY MOUTH THREE TIMES A DAY BEFORE MEALS, Disp: 270 tablet, Rfl: 6    pantoprazole (PROTONIX) 40 MG EC tablet, Take 1 tablet by mouth 2 (Two) Times a Day. Indications: Gastroesophageal Reflux Disease, Disp: , Rfl:     potassium chloride (KLOR-CON M10) 10 MEQ CR tablet, Take 2 tablets by mouth 2 (Two) Times a Day., Disp: 60 tablet,  Rfl: 1    pregabalin (LYRICA) 50 MG capsule, Take 1 capsule by mouth 2 (Two) Times a Day. Indications: Neuropathic Pain, Disp: , Rfl:     pyridostigmine (MESTINON) 60 MG tablet, Take 1 tablet by mouth Every 12 (Twelve) Hours., Disp: 60 tablet, Rfl: 1    vitamin D3 125 MCG (5000 UT) capsule capsule, Take 250 Units by mouth Daily., Disp: , Rfl:   No current facility-administered medications for this visit.    Facility-Administered Medications Ordered in Other Visits:     Chlorhexidine Gluconate Cloth 2 % pads, , Topical, Q12H PRN, Kayli Rowe, APRN    Lab Results:   Office Visit on 03/13/2025   Component Date Value Ref Range Status    ANTI-MPO ANTIBODIES 03/13/2025 <0.2  0.0 - 0.9 units Final    ANTI-PR3 ANTIBODIES 03/13/2025 <0.2  0.0 - 0.9 units Final    C-ANCA 03/13/2025 <1:20  Neg:<1:20 titer Final    P-ANCA 03/13/2025 <1:20  Neg:<1:20 titer Final    The presence of positive fluorescence exhibiting P-ANCA or C-ANCA  patterns alone is not specific for the diagnosis of Wegener's  Granulomatosis (WG) or microscopic polyangiitis. Decisions about  treatment should not be based solely on ANCA IFA results.  The  International ANCA Group Consensus recommends follow up testing of  positive sera with both AR-3 and MPO-ANCA enzyme immunoassays. As  many as 5% serum samples are positive only by EIA.  Ref. AM J Clin Pathol 1999;111:507-513.    Atypical pANCA 03/13/2025 <1:20  Neg:<1:20 titer Final    The atypical pANCA pattern has been observed in a significant  percentage of patients with ulcerative colitis, primary sclerosing  cholangitis and autoimmune hepatitis.    Sed Rate 03/13/2025 11  0 - 20 mm/hr Final    Anti-DNA (DS) Ab Qn 03/13/2025 5  0 - 9 IU/mL Final                                       Negative      <5                                     Equivocal  5 - 9                                     Positive      >9    RNP Antibodies 03/13/2025 <0.2  0.0 - 0.9 AI Final    Garcia Antibodies 03/13/2025 <0.2   0.0 - 0.9 AI Final    Antiscleroderma-70 Antibodies 2025 <0.2  0.0 - 0.9 AI Final    Sjogren's Anti-SS-A 2025 <0.2  0.0 - 0.9 AI Final    Sjogren's Anti-SS-B 2025 <0.2  0.0 - 0.9 AI Final    Antichromatin Antibodies 2025 <0.2  0.0 - 0.9 AI Final    BRINDA-1 IgG 2025 <0.2  0.0 - 0.9 AI Final    Anti-Centromere B Antibodies 2025 <0.2  0.0 - 0.9 AI Final    See below: 2025 Comment   Final    Comment: Autoantibody                       Disease Association  ------------------------------------------------------------                          Condition                  Frequency  ---------------------   ------------------------   ---------  Antinuclear Antibody,    SLE, mixed connective  Direct (SUNNY-D)           tissue diseases  ---------------------   ------------------------   ---------  dsDNA                    SLE                        40 - 60%  ---------------------   ------------------------   ---------  Chromatin                Drug induced SLE                90%                           SLE                        48 - 97%  ---------------------   ------------------------   ---------  SSA (Ro)                 SLE                        25 - 35%                           Sjogren's Syndrome         40 - 70%                            Lupus                 100%  ---------------------   ------------------------   ---------  SSB (La)                 SLE                                                        10%                           Sjogren's Syndrome              30%  ---------------------   -----------------------    ---------  Sm (anti-Smith)          SLE                        15 - 30%  ---------------------   -----------------------    ---------  RNP                      Mixed Connective Tissue                           Disease                         95%  (U1 nRNP,                SLE                        30 - 50%  anti-ribonucleoprotein)  Polymyositis and/or                            Dermatomyositis                 20%  ---------------------   ------------------------   ---------  Scl-70 (antiDNA          Scleroderma (diffuse)      20 - 35%  topoisomerase)           Crest                           13%  ---------------------   ------------------------   ---------  Edith-1                     Polymyositis and/or                           Dermatomyositis            20 - 40%  ---------------------   ------------------------   ---------  Centromere B             Scleroderma -                            Crest                           variant                         80%    Creatine Kinase 03/13/2025 380 (H)  20 - 200 U/L Final   Lab on 03/13/2025   Component Date Value Ref Range Status    Glucose 03/13/2025 128 (H)  65 - 99 mg/dL Final    BUN 03/13/2025 11  8 - 23 mg/dL Final    Creatinine 03/13/2025 1.28 (H)  0.76 - 1.27 mg/dL Final    Sodium 03/13/2025 144  136 - 145 mmol/L Final    Potassium 03/13/2025 2.0 (C)  3.5 - 5.2 mmol/L Final    Chloride 03/13/2025 91 (L)  98 - 107 mmol/L Final    CO2 03/13/2025 40.7 (H)  22.0 - 29.0 mmol/L Final    Calcium 03/13/2025 9.3  8.6 - 10.5 mg/dL Final    Total Protein 03/13/2025 6.9  6.0 - 8.5 g/dL Final    Albumin 03/13/2025 3.9  3.5 - 5.2 g/dL Final    ALT (SGPT) 03/13/2025 18  1 - 41 U/L Final    AST (SGOT) 03/13/2025 47 (H)  1 - 40 U/L Final    Alkaline Phosphatase 03/13/2025 54  39 - 117 U/L Final    Total Bilirubin 03/13/2025 0.6  0.0 - 1.2 mg/dL Final    Globulin 03/13/2025 3.0  gm/dL Final    A/G Ratio 03/13/2025 1.3  g/dL Final    BUN/Creatinine Ratio 03/13/2025 8.6  7.0 - 25.0 Final    Anion Gap 03/13/2025 12.3  5.0 - 15.0 mmol/L Final    eGFR 03/13/2025 55.5 (L)  >60.0 mL/min/1.73 Final    IgG 03/13/2025 969  603 - 1613 mg/dL Final    IgA 03/13/2025 349  61 - 437 mg/dL Final    IgM 03/13/2025 22  15 - 143 mg/dL Final    Result confirmed on concentration.    Total Protein 03/13/2025 6.7  6.0 - 8.5 g/dL Final    Albumin 03/13/2025  "3.5  2.9 - 4.4 g/dL Final    Alpha-1-Globulin 03/13/2025 0.3  0.0 - 0.4 g/dL Final    Alpha-2-Globulin 03/13/2025 0.7  0.4 - 1.0 g/dL Final    Beta Globulin 03/13/2025 1.2  0.7 - 1.3 g/dL Final    Gamma Globulin 03/13/2025 0.9  0.4 - 1.8 g/dL Final    M-Tony 03/13/2025 0.2 (H)  Not Observed g/dL Final    Globulin 03/13/2025 3.2  2.2 - 3.9 g/dL Final    A/G Ratio 03/13/2025 1.1  0.7 - 1.7 Final    Immunofixation Reflex, Serum 03/13/2025 Comment (A)   Final    Immunofixation shows IgG monoclonal protein with kappa light chain  specificity.   PLEASE NOTE:   Samples from patients receiving DARZALEX(R) (daratumumab) or   S[facility]CL[facility](R[facility]) treatment can appear as an   \"IgG kappa\" and mask a complete response (CR). If this patient   is receiving these therapies, this KARLEE assay interference   can be removed by ordering test number [id number]-\"Immunofixation,   Daratumumab-Specific, Serum\" or [id number]-\"Immunofixation,   Isatuximab-Specific, Serum\" and submitting a new sample for   testing or by calling the lab to add this test to the current   sample.    Please note 03/13/2025 Comment   Final    Protein electrophoresis scan will follow via computer, mail, or   delivery.    Free Light Chain, [place] 03/13/2025 45.7 (H)  3.3 - 19.4 mg/L Final    Free Lambda Light Chains 03/13/2025 20.5  5.7 - 26.3 mg/L Final    Kappa/Lambda Ratio 03/13/2025 2.23 (H)  0.26 - 1.65 Final    WBC 03/13/2025 3.28 (L)  3.40 - 10.80 10*3/mm3 Final    RBC 03/13/2025 4.76  4.14 - 5.80 10*6/mm3 Final    Hemoglobin 03/13/2025 12.8 (L)  13.0 - 17.7 g/dL Final    Hematocrit 03/13/2025 39.5  37.5 - 51.0 % Final    MCV 03/13/2025 83.0  79.0 - 97.0 fL Final    MCH 03/13/2025 26.9  26.6 - 33.0 pg Final    MCHC 03/13/2025 32.4  31.5 - 35.7 g/dL Final    RDW 03/13/2025 15.2  12.3 - 15.4 % Final    RDW-SD 03/13/2025 46.2  37.0 - 54.0 fl Final    MPV 03/13/2025 11.5  6.0 - 12.0 fL Final    Platelets 03/13/2025 180  140 - 450 10*3/mm3 Final    Neutrophil " % 03/13/2025 44.9  42.7 - 76.0 % Final    Lymphocyte % 03/13/2025 37.8  19.6 - 45.3 % Final    Monocyte % 03/13/2025 14.9 (H)  5.0 - 12.0 % Final    Eosinophil % 03/13/2025 1.2  0.3 - 6.2 % Final    Basophil % 03/13/2025 0.6  0.0 - 1.5 % Final    Immature Grans % 03/13/2025 0.6 (H)  0.0 - 0.5 % Final    Neutrophils, Absolute 03/13/2025 1.47 (L)  1.70 - 7.00 10*3/mm3 Final    Lymphocytes, Absolute 03/13/2025 1.24  0.70 - 3.10 10*3/mm3 Final    Monocytes, Absolute 03/13/2025 0.49  0.10 - 0.90 10*3/mm3 Final    Eosinophils, Absolute 03/13/2025 0.04  0.00 - 0.40 10*3/mm3 Final    Basophils, Absolute 03/13/2025 0.02  0.00 - 0.20 10*3/mm3 Final    Immature Grans, Absolute 03/13/2025 0.02  0.00 - 0.05 10*3/mm3 Final    nRBC 03/13/2025 0.0  0.0 - 0.2 /100 WBC Final    Magnesium 03/13/2025 2.1  1.6 - 2.4 mg/dL Final       Mental Status Exam:   Hygiene:   good  Cooperation:  Cooperative  Eye Contact:  Good  Psychomotor Behavior:  Appropriate  Mood:depressed and dysthymic  Affect:  Appropriate  Hopelessness: Denies  Speech:  Normal  Thought Process:  Goal directed  Thought Content:  Normal  Suicidal:  None  Homicidal:  None  Hallucinations:  None  Delusion:  None  Memory:  Intact  Orientation:  Person, Place, Time, and Situation  Reliability:  good  Insight:  Good  Judgement:  Good  Impulse Control:  Good    PHQ-9 Depression Screening  Little interest or pleasure in doing things? (Patient-Rptd) Almost all   Feeling down, depressed, or hopeless? (Patient-Rptd) Almost all   PHQ-2 Total Score (Patient-Rptd) 6   Trouble falling or staying asleep, or sleeping too much? (Patient-Rptd) Almost all   Feeling tired or having little energy? (Patient-Rptd) Almost all   Poor appetite or overeating? (Patient-Rptd) Almost all   Feeling bad about yourself - or that you are a failure or have let yourself or your family down? (Patient-Rptd) Almost all   Trouble concentrating on things, such as reading the newspaper or watching television?  (Patient-Rptd) Almost all   Moving or speaking so slowly that other people could have noticed? Or the opposite - being so fidgety or restless that you have been moving around a lot more than usual? (Patient-Rptd) Almost all   Thoughts that you would be better off dead, or of hurting yourself in some way? (Patient-Rptd) Several days   PHQ-9 Total Score (Patient-Rptd) 25   If you checked off any problems, how difficult have these problems made it for you to do your work, take care of things at home, or get along with other people? (Patient-Rptd) Extremely difficult         Assessment/Plan:  Diagnoses and all orders for this visit:    1. Moderate episode of recurrent major depressive disorder (Primary)  -     Desvenlafaxine Succinate ER (Pristiq) 25 MG tablet sustained-release 24 hour; Take 1 tablet by mouth Daily.  Dispense: 30 tablet; Refill: 2      Patient still struggling with significant depression.  Viibryd caused too severe GI upset.  We will start Pristiq 25 mg daily.  We will follow-up in a month.    A psychological evaluation was conducted in order to assess past and current level of functioning. Areas assessed included, but were not limited to: perception of social support, perception of ability to face and deal with challenges in life (positive functioning), anxiety symptoms, depressive symptoms, perspective on beliefs/belief system, coping skills for stress, intelligence level,  Therapeutic rapport was established. Interventions conducted today were geared towards incorporating medication management along with support for continued therapy. Education was also provided as to the med management with this provider and what to expect in subsequent sessions.    We discussed risks, benefits,goals and side effects of the above medication and the patient was agreeable with the plan.Patient was educated on the importance of compliance with treatment and follow-up appointments. Patient is aware to contact the  [facility] [phone] with any worsening of symptoms. To call for questions or concerns and return early if necessary. Patent is agreeable to go to the Emergency Department or call 911 should they begin SI/HI.     Part of this note may be an electronic transcription/translation of spoken language to printed text using the Dragon Dictation System.    Return in about 4 weeks (around 4/14/2025) for Follow Up 30 min, Video visit.    Lashaun Solis, APRN

## 2025-03-17 NOTE — TELEPHONE ENCOUNTER
Called and made pt wife aware.    Sent pt an HUSSEIN via the [employer] for him to fill out for our office.

## 2025-03-27 ENCOUNTER — TELEPHONE (OUTPATIENT)
Dept: ONCOLOGY | Facility: CLINIC | Age: 85
End: 2025-03-27
Payer: MEDICARE

## 2025-03-27 NOTE — TELEPHONE ENCOUNTER
Returned call to pt's wife. She states pt has labs from neurology and ophthalmology that need to be drawn and wondering if they can be done at our office and if that would interfere with billing. Informed her we would be glad to drawn them, suggested she might want to contact his insurance to make sure it wouldn't effect the billing and she v/u. Labs will be drawn when pt is here on 4/10

## 2025-03-27 NOTE — TELEPHONE ENCOUNTER
Caller: Kelin Feliz    Relationship: Emergency Contact    Best call back number: [phone]    What was the call regarding: PATIENT IS HAVING LABS DRAWN. CAN YOU DRAW LABS FROM SERA GALDAMEZ ([facility] PROVIDER) & DR. WONG (NOT IN [facility])    CALL TO ADVISE

## 2025-04-04 RX ORDER — POTASSIUM CHLORIDE 750 MG/1
20 TABLET, EXTENDED RELEASE ORAL 2 TIMES DAILY
Qty: 60 TABLET | Refills: 1 | Status: SHIPPED | OUTPATIENT
Start: 2025-04-04

## 2025-04-10 ENCOUNTER — OFFICE VISIT (OUTPATIENT)
Dept: ONCOLOGY | Facility: CLINIC | Age: 85
End: 2025-04-10
Payer: MEDICARE

## 2025-04-10 ENCOUNTER — LAB (OUTPATIENT)
Dept: LAB | Facility: HOSPITAL | Age: 85
End: 2025-04-10
Payer: MEDICARE

## 2025-04-10 ENCOUNTER — TELEPHONE (OUTPATIENT)
Dept: NEUROLOGY | Facility: CLINIC | Age: 85
End: 2025-04-10

## 2025-04-10 ENCOUNTER — APPOINTMENT (OUTPATIENT)
Dept: CT IMAGING | Facility: HOSPITAL | Age: 85
DRG: 305 | End: 2025-04-10
Payer: MEDICARE

## 2025-04-10 ENCOUNTER — APPOINTMENT (OUTPATIENT)
Dept: ONCOLOGY | Facility: HOSPITAL | Age: 85
DRG: 305 | End: 2025-04-10
Payer: MEDICARE

## 2025-04-10 ENCOUNTER — HOSPITAL ENCOUNTER (INPATIENT)
Facility: HOSPITAL | Age: 85
LOS: 2 days | Discharge: HOME | DRG: 305 | End: 2025-04-14
Attending: EMERGENCY MEDICINE | Admitting: STUDENT IN AN ORGANIZED HEALTH CARE EDUCATION/TRAINING PROGRAM
Payer: MEDICARE

## 2025-04-10 DIAGNOSIS — I16.0 HYPERTENSIVE URGENCY: Primary | ICD-10-CM

## 2025-04-10 DIAGNOSIS — D47.2 MONOCLONAL GAMMOPATHY OF UNKNOWN SIGNIFICANCE (MGUS): Primary | ICD-10-CM

## 2025-04-10 DIAGNOSIS — I16.9 HYPERTENSIVE CRISIS: ICD-10-CM

## 2025-04-10 DIAGNOSIS — G63 NEUROPATHY ASSOCIATED WITH MGUS: ICD-10-CM

## 2025-04-10 DIAGNOSIS — D47.2 MONOCLONAL GAMMOPATHY OF UNKNOWN SIGNIFICANCE (MGUS): ICD-10-CM

## 2025-04-10 DIAGNOSIS — E87.6 HYPOKALEMIA: ICD-10-CM

## 2025-04-10 DIAGNOSIS — D47.2 NEUROPATHY ASSOCIATED WITH MGUS: ICD-10-CM

## 2025-04-10 LAB
ALBUMIN SERPL-MCNC: 4.1 G/DL (ref 3.5–5.2)
ALBUMIN/GLOB SERPL: 1.3 G/DL
ALP SERPL-CCNC: 57 U/L (ref 39–117)
ALT SERPL W P-5'-P-CCNC: 18 U/L (ref 1–41)
ANION GAP SERPL CALCULATED.3IONS-SCNC: 15.5 MMOL/L (ref 5–15)
AST SERPL-CCNC: 32 U/L (ref 1–40)
BASOPHILS # BLD AUTO: 0.03 10*3/MM3 (ref 0–0.2)
BASOPHILS NFR BLD AUTO: 0.7 % (ref 0–1.5)
BILIRUB SERPL-MCNC: 0.5 MG/DL (ref 0–1.2)
BUN SERPL-MCNC: 14 MG/DL (ref 8–23)
BUN/CREAT SERPL: 12 (ref 7–25)
CALCIUM SPEC-SCNC: 9.7 MG/DL (ref 8.6–10.5)
CHLORIDE SERPL-SCNC: 99 MMOL/L (ref 98–107)
CO2 SERPL-SCNC: 30.5 MMOL/L (ref 22–29)
CREAT SERPL-MCNC: 1.17 MG/DL (ref 0.76–1.27)
DEPRECATED RDW RBC AUTO: 45.9 FL (ref 37–54)
EGFRCR SERPLBLD CKD-EPI 2021: 61.9 ML/MIN/1.73
EOSINOPHIL # BLD AUTO: 0.04 10*3/MM3 (ref 0–0.4)
EOSINOPHIL NFR BLD AUTO: 0.9 % (ref 0.3–6.2)
ERYTHROCYTE [DISTWIDTH] IN BLOOD BY AUTOMATED COUNT: 15.3 % (ref 12.3–15.4)
GLOBULIN UR ELPH-MCNC: 3.1 GM/DL
GLUCOSE SERPL-MCNC: 134 MG/DL (ref 65–99)
HCT VFR BLD AUTO: 40.9 % (ref 37.5–51)
HGB BLD-MCNC: 13.3 G/DL (ref 13–17.7)
IMM GRANULOCYTES # BLD AUTO: 0.01 10*3/MM3 (ref 0–0.05)
IMM GRANULOCYTES NFR BLD AUTO: 0.2 % (ref 0–0.5)
LYMPHOCYTES # BLD AUTO: 1.97 10*3/MM3 (ref 0.7–3.1)
LYMPHOCYTES NFR BLD AUTO: 43.9 % (ref 19.6–45.3)
MAGNESIUM SERPL-MCNC: 2 MG/DL (ref 1.6–2.4)
MCH RBC QN AUTO: 26.8 PG (ref 26.6–33)
MCHC RBC AUTO-ENTMCNC: 32.5 G/DL (ref 31.5–35.7)
MCV RBC AUTO: 82.5 FL (ref 79–97)
MONOCYTES # BLD AUTO: 0.56 10*3/MM3 (ref 0.1–0.9)
MONOCYTES NFR BLD AUTO: 12.5 % (ref 5–12)
NEUTROPHILS NFR BLD AUTO: 1.88 10*3/MM3 (ref 1.7–7)
NEUTROPHILS NFR BLD AUTO: 41.8 % (ref 42.7–76)
NRBC BLD AUTO-RTO: 0 /100 WBC (ref 0–0.2)
PLATELET # BLD AUTO: 205 10*3/MM3 (ref 140–450)
PMV BLD AUTO: 10.9 FL (ref 6–12)
POTASSIUM SERPL-SCNC: 2.9 MMOL/L (ref 3.5–5.2)
PROT SERPL-MCNC: 7.2 G/DL (ref 6–8.5)
QT INTERVAL: 519 MS
QTC INTERVAL: 538 MS
RBC # BLD AUTO: 4.96 10*6/MM3 (ref 4.14–5.8)
SODIUM SERPL-SCNC: 145 MMOL/L (ref 136–145)
WBC NRBC COR # BLD AUTO: 4.49 10*3/MM3 (ref 3.4–10.8)

## 2025-04-10 PROCEDURE — 93005 ELECTROCARDIOGRAM TRACING: CPT | Performed by: EMERGENCY MEDICINE

## 2025-04-10 PROCEDURE — G0378 HOSPITAL OBSERVATION PER HR: HCPCS

## 2025-04-10 PROCEDURE — 93010 ELECTROCARDIOGRAM REPORT: CPT | Performed by: INTERNAL MEDICINE

## 2025-04-10 PROCEDURE — 25010000002 ONDANSETRON PER 1 MG: Performed by: EMERGENCY MEDICINE

## 2025-04-10 PROCEDURE — 70450 CT HEAD/BRAIN W/O DYE: CPT

## 2025-04-10 PROCEDURE — 85025 COMPLETE CBC W/AUTO DIFF WBC: CPT

## 2025-04-10 PROCEDURE — 80053 COMPREHEN METABOLIC PANEL: CPT

## 2025-04-10 PROCEDURE — 25010000002 POTASSIUM CHLORIDE 10 MEQ/100ML SOLUTION: Performed by: EMERGENCY MEDICINE

## 2025-04-10 PROCEDURE — 83735 ASSAY OF MAGNESIUM: CPT | Performed by: EMERGENCY MEDICINE

## 2025-04-10 PROCEDURE — 25010000002 LABETALOL 5 MG/ML SOLUTION: Performed by: STUDENT IN AN ORGANIZED HEALTH CARE EDUCATION/TRAINING PROGRAM

## 2025-04-10 PROCEDURE — 36415 COLL VENOUS BLD VENIPUNCTURE: CPT

## 2025-04-10 PROCEDURE — 99291 CRITICAL CARE FIRST HOUR: CPT

## 2025-04-10 RX ORDER — POTASSIUM CHLORIDE 1500 MG/1
40 TABLET, EXTENDED RELEASE ORAL EVERY 4 HOURS
Status: COMPLETED | OUTPATIENT
Start: 2025-04-10 | End: 2025-04-10

## 2025-04-10 RX ORDER — LABETALOL HYDROCHLORIDE 5 MG/ML
10 INJECTION, SOLUTION INTRAVENOUS EVERY 6 HOURS PRN
Status: DISCONTINUED | OUTPATIENT
Start: 2025-04-10 | End: 2025-04-14 | Stop reason: HOSPADM

## 2025-04-10 RX ORDER — LABETALOL HYDROCHLORIDE 5 MG/ML
10 INJECTION, SOLUTION INTRAVENOUS ONCE
Status: COMPLETED | OUTPATIENT
Start: 2025-04-10 | End: 2025-04-10

## 2025-04-10 RX ORDER — ASPIRIN 81 MG/1
81 TABLET ORAL NIGHTLY
Status: DISCONTINUED | OUTPATIENT
Start: 2025-04-10 | End: 2025-04-14 | Stop reason: HOSPADM

## 2025-04-10 RX ORDER — DESVENLAFAXINE 25 MG/1
25 TABLET, EXTENDED RELEASE ORAL DAILY
Status: DISCONTINUED | OUTPATIENT
Start: 2025-04-11 | End: 2025-04-12

## 2025-04-10 RX ORDER — METOPROLOL TARTRATE 25 MG/1
12.5 TABLET, FILM COATED ORAL 2 TIMES DAILY
Status: DISCONTINUED | OUTPATIENT
Start: 2025-04-10 | End: 2025-04-11

## 2025-04-10 RX ORDER — LEVOTHYROXINE SODIUM 100 UG/1
100 TABLET ORAL
Status: DISCONTINUED | OUTPATIENT
Start: 2025-04-11 | End: 2025-04-14 | Stop reason: HOSPADM

## 2025-04-10 RX ORDER — NITROGLYCERIN 0.4 MG/1
0.4 TABLET SUBLINGUAL
Status: DISCONTINUED | OUTPATIENT
Start: 2025-04-10 | End: 2025-04-14 | Stop reason: HOSPADM

## 2025-04-10 RX ORDER — PYRIDOSTIGMINE BROMIDE 60 MG/1
30 TABLET ORAL 2 TIMES DAILY
Status: DISCONTINUED | OUTPATIENT
Start: 2025-04-10 | End: 2025-04-14 | Stop reason: HOSPADM

## 2025-04-10 RX ORDER — MAGNESIUM OXIDE 400 MG/1
400 TABLET ORAL DAILY
Status: DISCONTINUED | OUTPATIENT
Start: 2025-04-10 | End: 2025-04-14 | Stop reason: HOSPADM

## 2025-04-10 RX ORDER — ACETAMINOPHEN 325 MG/1
650 TABLET ORAL EVERY 4 HOURS PRN
Status: DISCONTINUED | OUTPATIENT
Start: 2025-04-10 | End: 2025-04-14 | Stop reason: HOSPADM

## 2025-04-10 RX ORDER — PREGABALIN 50 MG/1
50 CAPSULE ORAL 2 TIMES DAILY
Status: DISCONTINUED | OUTPATIENT
Start: 2025-04-10 | End: 2025-04-14 | Stop reason: HOSPADM

## 2025-04-10 RX ORDER — ONDANSETRON 2 MG/ML
4 INJECTION INTRAMUSCULAR; INTRAVENOUS ONCE
Status: COMPLETED | OUTPATIENT
Start: 2025-04-10 | End: 2025-04-10

## 2025-04-10 RX ORDER — PANTOPRAZOLE SODIUM 40 MG/1
40 TABLET, DELAYED RELEASE ORAL 2 TIMES DAILY
Status: DISCONTINUED | OUTPATIENT
Start: 2025-04-10 | End: 2025-04-14 | Stop reason: HOSPADM

## 2025-04-10 RX ORDER — POTASSIUM CHLORIDE 7.45 MG/ML
10 INJECTION INTRAVENOUS
Status: COMPLETED | OUTPATIENT
Start: 2025-04-10 | End: 2025-04-10

## 2025-04-10 RX ADMIN — PANTOPRAZOLE SODIUM 40 MG: 40 TABLET, DELAYED RELEASE ORAL at 20:33

## 2025-04-10 RX ADMIN — MAGNESIUM OXIDE TAB 400 MG (240 MG ELEMENTAL MG) 400 MG: 400 (240 MG) TAB at 18:19

## 2025-04-10 RX ADMIN — POTASSIUM CHLORIDE 10 MEQ: 7.46 INJECTION, SOLUTION INTRAVENOUS at 14:00

## 2025-04-10 RX ADMIN — Medication 10 MG: at 17:39

## 2025-04-10 RX ADMIN — METOPROLOL TARTRATE 12.5 MG: 25 TABLET, FILM COATED ORAL at 20:33

## 2025-04-10 RX ADMIN — PYRIDOSTIGMINE BROMIDE 30 MG: 60 TABLET ORAL at 20:33

## 2025-04-10 RX ADMIN — PREGABALIN 50 MG: 50 CAPSULE ORAL at 20:33

## 2025-04-10 RX ADMIN — ONDANSETRON 4 MG: 2 INJECTION, SOLUTION INTRAMUSCULAR; INTRAVENOUS at 13:52

## 2025-04-10 RX ADMIN — Medication 10 MG: at 13:52

## 2025-04-10 RX ADMIN — POTASSIUM CHLORIDE 40 MEQ: 1500 TABLET, EXTENDED RELEASE ORAL at 21:32

## 2025-04-10 RX ADMIN — POTASSIUM CHLORIDE 40 MEQ: 1500 TABLET, EXTENDED RELEASE ORAL at 17:39

## 2025-04-10 RX ADMIN — ASPIRIN 81 MG: 81 TABLET, COATED ORAL at 20:33

## 2025-04-10 RX ADMIN — ACETAMINOPHEN 650 MG: 325 TABLET, FILM COATED ORAL at 21:33

## 2025-04-10 RX ADMIN — POTASSIUM CHLORIDE 10 MEQ: 7.46 INJECTION, SOLUTION INTRAVENOUS at 15:35

## 2025-04-10 NOTE — PROGRESS NOTES
REASON FOR FOLLOWUP:    1.Minimal leukopenia with thrombocytopenia in the background of minimal IgG monoclonal gammopathy. The patient has no splenomegaly, no peripheral adenopathy, and no symptoms that suggest lupus or collagen vascular disease. Bone marrow   a  spirate documented no evidence of myeloma, lymphoma, leukemia, myelodysplasia, or any other abnormality. Bone marrow chromosomal analysis as well as flow cytometry were negative.   2.  Inflammatory polyneuropathy.  He initiated IVIG on 10/3/2019.  He did receive his second IVIG on 11/7/2019.allergic reaction to it stopping infusion all togethere  3.  Approximately 10 days after his second IVIG infusion he did experience serum sickness that included rash, generalized arthralgias, skin peeling of the palms.  Dr. Olvera prescribed 20 mg of prednisone and his symptoms have resolved after 2 days.      HISTORY OF PRESENT ILLNESS:    The patient is 84 y.o. male with the above-mentioned history, who was brought into the office today for labs.  I was asked to see him in the infusion area due to feeling poorly and vomiting.  Upon discussing with the patient and his wife, he was found to be vomiting in hypertensive crisis.  The patient's wife reports that he has been placed on midodrine due to orthostatic hypotension which unfortunately is slowly causing hypertension.  She reports she has reached out to her primary care provider though was instructed to continue on the midodrine.  The patient reports of vomiting just began upon arriving to our office.  He denies abdominal pain.  His bowels fluctuate between diarrhea and the patient.  He has no fevers or chills.  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       Past Medical History:   Diagnosis Date    AAA (abdominal aortic aneurysm)     Abnormal ECG 1980’s    Alcoholism     None since 1991    Aneurysm 2019    aortic arch    Arthritis     Basal cell carcinoma  (BCC) of face     Cholelithiasis 1990’s    Cholecystectomy    Chronic pain disorder     Colon polyp     Colon polyps     Coronary artery disease     Depression     Difficulty walking     Disease of thyroid gland     DJD (degenerative joint disease)     Gastritis     Gastroparesis     GERD (gastroesophageal reflux disease)     GI (gastrointestinal bleed)     Heart valve disease     Hernia     Hiatal    Hiatal hernia     History of bone marrow biopsy     [place] (hard of hearing)     Hyperlipidemia     Hypertension     Hypothyroidism     IgG monoclonal gammopathy     Multiple duodenal ulcers     Neuropathy     Peripheral neuropathy     PONV (postoperative nausea and vomiting)     Prostate cancer     Reflux esophagitis     Ulcer     WBC decreased      Past Surgical History:   Procedure Laterality Date    ARTERIAL ANEURYSM REPAIR      ASCENDING AORTIC ANEURYSM REPAIR W/ MECHANICAL AORTIC VALVE REPLACEMENT N/A 05/10/2024    Procedure: ASCENDING AORTIC ARCH/ possible HEMIARCH REPLACEMENT WITH CIRCULATORY ARREST, neuro monitoring, and intraop JOSE A;  Surgeon: Spencer Puente MD;  Location: [facility] CVOR;  Service: Cardiothoracic;  Laterality: N/A;  Ascending aortic aneurysm repair with 30 mm gelweave graft.    BACK SURGERY      CARDIAC CATHETERIZATION N/A 04/19/2024    Procedure: Right and Left Heart Cath with possible PCI;  Surgeon: Dilan Houston DO;  Location: [facility] CATH INVASIVE LOCATION;  Service: Cardiovascular;  Laterality: N/A;  Local and IV sedation    CHOLECYSTECTOMY  1988    COLON SURGERY  1998    COLONOSCOPY  02/2013    ENDOSCOPY  2012    ESOPHAGOGASTRODUODENOSCOPY WITH DILATION OF SHATZKI'S RING    ENDOSCOPY N/A 10/09/2020    Procedure: ESOPHAGOGASTRODUODENOSCOPY with cold bx;  Surgeon: Jake Perez MD;  Location: [facility] ENDOSCOPY;  Service: Gastroenterology;  Laterality: N/A;  pre - nausea  post - duodenal ulcer, gastrits, gastric ulcer, hiatal hernia    ENDOSCOPY N/A 10/04/2022    Procedure:  ESOPHAGOGASTRODUODENOSCOPY with biopsies with esophageal dilatation with 56 cui;  Surgeon: Jake Perez MD;  Location: [facility] ENDOSCOPY;  Service: Gastroenterology;  Laterality: N/A;  pre-dysphagia  post-normal     ENDOSCOPY  10/04/2022    Chris BUTTERFIELD    EYE SURGERY Bilateral     cataracts    JOINT REPLACEMENT      LAMINECTOMY  2013    decompression L3-4, L4-5    PROSTATECTOMY  2007    SKIN BIOPSY      TONSILLECTOMY AND ADENOIDECTOMY      1940s    UPPER GASTROINTESTINAL ENDOSCOPY      VASCULAR SURGERY      VASECTOMY      1970s     Social History     Socioeconomic History    Marital status:      Spouse name: Meghan    Years of education: College   Tobacco Use    Smoking status: Never     Passive exposure: Never    Smokeless tobacco: Never   Vaping Use    Vaping status: Never Used   Substance and Sexual Activity    Alcohol use: Not Currently     Comment: No ETOH since 1991    Drug use: Never    Sexual activity: Not Currently     Partners: Female     Birth control/protection: None     Family History   Problem Relation Age of Onset    Cancer Mother 85        Breast    COPD Father     Cancer Brother 59        Unknown etiology    Hypertension Daughter            Current Facility-Administered Medications on File Prior to Visit   Medication Dose Route Frequency Provider Last Rate Last Admin    Chlorhexidine Gluconate Cloth 2 % pads   Topical Q12H PRN Kayli Rowe APRN         Current Outpatient Medications on File Prior to Visit   Medication Sig Dispense Refill    acetaminophen (TYLENOL) 325 MG tablet Take 2 tablets by mouth Every 4 (Four) Hours As Needed for Mild Pain.      aspirin 81 MG EC tablet Take 1 tablet by mouth Every Night. Indications: Carotid Artery Stenting      Cyanocobalamin (B-12) 1000 MCG sublingual tablet Place 1 tablet under the tongue Daily. (Patient taking differently: Take 1 tablet by mouth Daily.) 30 each 6    Desvenlafaxine Succinate ER (Pristiq) 25 MG tablet  sustained-release 24 hour Take 1 tablet by mouth Daily. 30 tablet 2    escitalopram (LEXAPRO) 10 MG tablet Take 1 tablet by mouth Daily. Take combined with 20mg tablets for a total of 30mg daily  Indications: Major Depressive Disorder (Patient taking differently: Take 1 tablet by mouth Daily. 10 mg only  Indications: Major Depressive Disorder) 30 tablet 3    fludrocortisone 0.1 MG tablet TAKE THREE TABLETS BY MOUTH EVERY MORNING AND TAKE TWO TABLETS BY MOUTH EVERY EVENING 150 tablet 1    levothyroxine (SYNTHROID, LEVOTHROID) 100 MCG tablet Take 1 tablet by mouth Daily. Indications: Underactive Thyroid      magnesium oxide (MAG-OX) 400 MG tablet Take 1 tablet by mouth Daily.      Menthol, Topical Analgesic, (BIOFREEZE EX) Apply 1 Application topically Daily As Needed (pain).      metoprolol tartrate (LOPRESSOR) 25 MG tablet Take 0.5 tablets by mouth 2 (Two) Times a Day. Indications: High Blood Pressure Disorder 90 tablet 3    midodrine (PROAMATINE) 5 MG tablet TAKE THREE TABLETS BY MOUTH THREE TIMES A DAY BEFORE MEALS 270 tablet 6    pantoprazole (PROTONIX) 40 MG EC tablet Take 1 tablet by mouth 2 (Two) Times a Day. Indications: Gastroesophageal Reflux Disease      potassium chloride (KLOR-CON M10) 10 MEQ CR tablet TAKE 2 TABLETS BY MOUTH 2 TIMES A DAY 60 tablet 1    pregabalin (LYRICA) 50 MG capsule Take 1 capsule by mouth 2 (Two) Times a Day. Indications: Neuropathic Pain      pyridostigmine (MESTINON) 60 MG tablet Take 1 tablet by mouth Every 12 (Twelve) Hours. 60 tablet 1    vitamin D3 125 MCG (5000 UT) capsule capsule Take 250 Units by mouth Daily.       Allergies   Allergen Reactions    Statins Myalgia         ONCOLOGIC/HEMATOLOGIC HISTORY: History from previous dates can be found in the separate document.         There were no vitals filed for this visit.    Vital signs performed in the infusion area with blood pressure 120/120, heart rate 79, temperature 97.0 O2 sats 98%    PHYSICAL EXAM :   GENERAL: Seen  seated in the infusion area, acutely ill, vomiting  SKIN:  Warm, dry without rashes, purpura or petechiae.  HEAD:  Normocephalic.  EYES:  Pupils equal, round.  Right 6th nerve palsy, eye patch in place  EARS:  Hearing intact.  NOSE:  Septum midline.  No excoriations or nasal discharge.  EXTREMITIES:  No clubbing, cyanosis or edema.  NEUROLOGICAL: Patient with clear muscular weakness particularly in proximal thighs, generalized tremor upper extremities.  PSYCHIATRIC:  Normal affect and mood.      LAB RESULTS:  Results from last 7 days   Lab Units 04/10/25  1204   WBC 10*3/mm3 4.49   NEUTROS ABS 10*3/mm3 1.88   HEMOGLOBIN g/dL 13.3   HEMATOCRIT % 40.9   PLATELETS 10*3/mm3 205     Results from last 7 days   Lab Units 04/10/25  1204   SODIUM mmol/L 145   POTASSIUM mmol/L 2.9*   CHLORIDE mmol/L 99   CO2 mmol/L 30.5*   BUN mg/dL 14   CREATININE mg/dL 1.17   CALCIUM mg/dL 9.7   ALBUMIN g/dL 4.1   BILIRUBIN mg/dL 0.5   ALK PHOS U/L 57   ALT (SGPT) U/L 18   AST (SGOT) U/L 32   GLUCOSE mg/dL 134*   MAGNESIUM mg/dL 2.0               ASSESSMENT:      1. MGUS:   undergone bone marrow testing of this on 2 different occasions not being able to document myeloma, lymphoma, myelodysplasia, leukemia or myelofibrosis. Chromosomal analysis has been normal.   He patient has not had any significant difference in his monoclonal protein quantification.  We have also tried prednisone with no benefit as per indication by his Neurologist and we have tried immunoglobulin with no benefit and actually this medicine triggered serum sickness in him that required short course of prednisone therapy.  MRI scan of the brain that shows no major alterations, radiologist calls minimal vascular alterations. Most importantly the MRI of the cervical spine documents significant spinal stenosis at the level of C5. I reviewed the pictures of this in the PAC system [facility] with the patient and his wife. The MRI of the thoracic spine shows  degenerative changes. Most importantly none of the areas on MRI in the cranium, cervical, thoracic spine disclose any lesions that could be consistent with myeloma, lymphoma or tumoral lesions of any nature. All of the changes are related to degenerative changes. 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   1/24/2023 IgG 1001, IgA 375, IgM 22, M spike 0.3, kappa free light chain 41.3, lambda free light chain 18.4, kappa lambda ratio 2.24  1/16/2024 stability of labs with IgG 1097, IgA 364, IgM 21, M spike 0.3, kappa free light chain 43.0, lambda free light chain 18.8, kappa lambda ratio 2.28  Repeat protein studies 9/5/2024 with M spike of 0.3 and kappa/lambda ratio of 2.38  Pending repeat studies pending today.    2. Neuropathy. He has been seen by Dr. Darrick Garcia. EMG was performed. Abnormalities documented in the EMG and the interpretation per Dr. Garcia believing that this does not represent a typical feature of inflammatory neuropathy.   Lyrica 50 mg twice daily   Historically has had some benefit with B12.  We did discuss today resuming oral sublingual B12  Continues to follow with neurology.  When seen 3/13/2025 there is an uncertain indication for Mestinon therapy though we have discussed a cranial nerve  palsy can be seen with myasthenia gravis.    Continues to follow with neurology, significant neuropathy and lower extremity weakness noted today      3.  Thoracic aortic aneurysm: Being followed by cardiology.     Ultimately undergoing elective ascending aortic aneurysm repair with Dr. Puente 5/10/2024    4.  Hypertensive crisis  4/10/2025 I was asked to see the patient in the infusion area due to acute nausea and vomiting.  Upon arriving to the patient, was noted to have a blood pressure of 220/120 (he was not actively vomitting).  He thankfully did not have headaches, double vision at his baseline due to 6th nerve palsy.  He does report he has been taking midodrine 15 mg 3 times daily.  Due to  emergently high hypertension, the patient was immediately transported to the emergency department for further evaluation    PLAN:  Repeat serum paraprotein studies are pending today  Patient was immediately transported to the emergency department for hypertensive crisis with blood pressure elevation.  Follow-up pending hospital course    Yuliana Veloz, JAVY  04/10/2025      Yuliana Veloz, JAVY  04/10/2025

## 2025-04-10 NOTE — TELEPHONE ENCOUNTER
LM, CURRENT LAB ORDERS ARE VISIBLE IN PT'S CHART.  IF THE LAB  CANNOT SEE THE CURRENT ORDERS, PLEASE CALL OUR OFFICE.  CALL BACK PHONE NUMBER PROVIDED WITH THIS MESSAGE.

## 2025-04-10 NOTE — PLAN OF CARE
Problem: Comorbidity Management  Goal: Blood Pressure in Desired Range  Outcome: Progressing  Intervention: Maintain Blood Pressure Management  Recent Flowsheet Documentation  Taken 4/10/2025 1800 by Shaista Hutchinson RN  Medication Review/Management: medications reviewed     Problem: Fall Injury Risk  Goal: Absence of Fall and Fall-Related Injury  Outcome: Progressing  Intervention: Identify and Manage Contributors  Recent Flowsheet Documentation  Taken 4/10/2025 1800 by Shaista Hutchinson RN  Medication Review/Management: medications reviewed  Intervention: Promote Injury-Free Environment  Recent Flowsheet Documentation  Taken 4/10/2025 1800 by Shaista Hutchinson RN  Safety Promotion/Fall Prevention:   activity supervised   safety round/check completed     Problem: Nausea and Vomiting  Goal: Nausea and Vomiting Relief  Outcome: Progressing     Problem: Syncope  Goal: Absence of Syncopal Symptoms  Outcome: Progressing  Intervention: Manage Effect of Syncopal Symptoms  Recent Flowsheet Documentation  Taken 4/10/2025 1800 by Shaista Hutchinson RN  Safety Promotion/Fall Prevention:   activity supervised   safety round/check completed   Goal Outcome Evaluation:  Plan of Care Reviewed With: patient, spouse        Progress: no change

## 2025-04-10 NOTE — ED PROVIDER NOTES
EMERGENCY DEPARTMENT ENCOUNTER    Room Number:  36/36  PCP: Stephan Rubio MD    HPI:  Chief Complaint: High blood pressure  A complete HPI/ROS/PMH/PSH/SH/FH are unobtainable due to: Poor historian  Context: Panfilo Feliz is a 84 y.o. male who presents to the ED c/o acute high blood pressure.  Patient states that he was at the [facility] today and was sent here due to high blood pressure.  He reports having headache primarily at night for the past week.  He also reports nausea for the past 3 to 4 days.  No chest pain, shortness of breath, abdominal pain.        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    Nausea in adult patient 09/04/2020    Slow transit constipation 09/15/2020    Nausea 09/15/2020    DDD (degenerative disc disease), lumbar 10/12/2020    Chronic neck pain 10/12/2020    DJD (degenerative joint disease) 10/03/2012    Extremity pain 03/30/2021    HTN (hypertension) 10/03/2012    Hyperlipidemia 10/03/2012    Hypothyroid 10/03/2012    LBP (low back pain) 03/30/2021    Lumbar canal stenosis 03/30/2021    Prostate cancer 10/03/2012    Ascending aortic aneurysm 07/19/2021    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    Other dysphagia 07/07/2022    Gastroesophageal reflux disease 07/07/2022    Aneurysm of ascending aorta without rupture 05/10/2024    Dizziness 06/26/2024    Orthostatic hypotension 06/26/2024    Syncope 07/14/2024     Resolved Ambulatory Problems     Diagnosis Date Noted    Neuropathy, peripheral 08/08/2016    Ascending aortic aneurysm 07/21/2019     Past Medical History:   Diagnosis Date    AAA (abdominal aortic aneurysm)     Abnormal ECG 1980’s    Alcoholism     Aneurysm 2019    Arthritis     Basal cell carcinoma (BCC) of face     Cholelithiasis 1990’s    Chronic pain disorder      Colon polyp     Colon polyps     Coronary artery disease     Depression     Difficulty walking     Disease of thyroid gland     Gastritis     Gastroparesis     GERD (gastroesophageal reflux disease)     GI (gastrointestinal bleed)     Heart valve disease     Hernia     Hiatal hernia     History of bone marrow biopsy     Tejon (hard of hearing)     Hypertension     Hypothyroidism     IgG monoclonal gammopathy     Multiple duodenal ulcers     Neuropathy     Peripheral neuropathy     PONV (postoperative nausea and vomiting)     Reflux esophagitis     Ulcer     WBC decreased          PAST SURGICAL HISTORY  Past Surgical History:   Procedure Laterality Date    ARTERIAL ANEURYSM REPAIR      ASCENDING AORTIC ANEURYSM REPAIR W/ MECHANICAL AORTIC VALVE REPLACEMENT N/A 05/10/2024    Procedure: ASCENDING AORTIC ARCH/ possible HEMIARCH REPLACEMENT WITH CIRCULATORY ARREST, neuro monitoring, and intraop JOSE A;  Surgeon: Spencer Puente MD;  Location: [facility] CVOR;  Service: Cardiothoracic;  Laterality: N/A;  Ascending aortic aneurysm repair with 30 mm gelweave graft.    BACK SURGERY      CARDIAC CATHETERIZATION N/A 04/19/2024    Procedure: Right and Left Heart Cath with possible PCI;  Surgeon: Dilan Houston DO;  Location: [facility] CATH INVASIVE LOCATION;  Service: Cardiovascular;  Laterality: N/A;  Local and IV sedation    CHOLECYSTECTOMY  1988    COLON SURGERY  1998    COLONOSCOPY  02/2013    ENDOSCOPY  2012    ESOPHAGOGASTRODUODENOSCOPY WITH DILATION OF SHATZKI'S RING    ENDOSCOPY N/A 10/09/2020    Procedure: ESOPHAGOGASTRODUODENOSCOPY with cold bx;  Surgeon: Jake Perez MD;  Location: [facility] ENDOSCOPY;  Service: Gastroenterology;  Laterality: N/A;  pre - nausea  post - duodenal ulcer, gastrits, gastric ulcer, hiatal hernia    ENDOSCOPY N/A 10/04/2022    Procedure: ESOPHAGOGASTRODUODENOSCOPY with biopsies with esophageal dilatation with 56 cui;  Surgeon: Jake Perez MD;  Location: [facility] ENDOSCOPY;   Service: Gastroenterology;  Laterality: N/A;  pre-dysphagia  post-normal     ENDOSCOPY  10/04/2022    Chris BUTTERFIELD    EYE SURGERY Bilateral     cataracts    JOINT REPLACEMENT      LAMINECTOMY  2013    decompression L3-4, L4-5    PROSTATECTOMY  2007    SKIN BIOPSY      TONSILLECTOMY AND ADENOIDECTOMY      1940s    UPPER GASTROINTESTINAL ENDOSCOPY      VASCULAR SURGERY      VASECTOMY      1970s         FAMILY HISTORY  Family History   Problem Relation Age of Onset    Cancer Mother 85        Breast    COPD Father     Cancer Brother 59        Unknown etiology    Hypertension Daughter          SOCIAL HISTORY  Social History     Socioeconomic History    Marital status:      Spouse name: Meghan    Years of education: College   Tobacco Use    Smoking status: Never     Passive exposure: Never    Smokeless tobacco: Never   Vaping Use    Vaping status: Never Used   Substance and Sexual Activity    Alcohol use: Not Currently     Comment: No ETOH since 1991    Drug use: Never    Sexual activity: Not Currently     Partners: Female     Birth control/protection: None         ALLERGIES  Statins        REVIEW OF SYSTEMS  Review of Systems     Included in HPI  All systems reviewed and negative except for those discussed in HPI.       PHYSICAL EXAM  ED Triage Vitals   Temp Heart Rate Resp BP SpO2   04/10/25 1249 04/10/25 1249 04/10/25 1249 04/10/25 1300 04/10/25 1249   97 °F (36.1 °C) 79 18 (!) 220/120 98 %      Temp src Heart Rate Source Patient Position BP Location FiO2 (%)   04/10/25 1249 04/10/25 1249 04/10/25 1300 04/10/25 1300 --   Tympanic Monitor Lying Right arm        Physical Exam      GENERAL: no acute distress  HENT: nares patent  EYES: no scleral icterus  CV: regular rhythm, normal rate  RESPIRATORY: normal effort, clear to auscultation bilaterally  ABDOMEN: soft, nontender  MUSCULOSKELETAL: no deformity  NEURO:   Recent and remote memory functions are normal. The patient is attentive with normal concentration.  Language is fluent. Speech is clear. The speech is non-dysarthric. Fund of knowledge is normal.   Symmetric smile with no facial droop.  Eyes close shut strongly bilaterally.  Symmetric eyebrow raise bilaterally.  EOMI, PERRL  CN II-XII grossly normal otherwise.  5/5 strength to extremities.  No pronator drift.  Intact FNF.  No meningismus.  PSYCH:  calm, cooperative  SKIN: warm, dry    Vital signs and nursing notes reviewed.          LAB RESULTS  Recent Results (from the past 24 hours)   Comprehensive Metabolic Panel    Collection Time: 04/10/25 12:04 PM    Specimen: Blood   Result Value Ref Range    Glucose 134 (H) 65 - 99 mg/dL    BUN 14 8 - 23 mg/dL    Creatinine 1.17 0.76 - 1.27 mg/dL    Sodium 145 136 - 145 mmol/L    Potassium 2.9 (C) 3.5 - 5.2 mmol/L    Chloride 99 98 - 107 mmol/L    CO2 30.5 (H) 22.0 - 29.0 mmol/L    Calcium 9.7 8.6 - 10.5 mg/dL    Total Protein 7.2 6.0 - 8.5 g/dL    Albumin 4.1 3.5 - 5.2 g/dL    ALT (SGPT) 18 1 - 41 U/L    AST (SGOT) 32 1 - 40 U/L    Alkaline Phosphatase 57 39 - 117 U/L    Total Bilirubin 0.5 0.0 - 1.2 mg/dL    Globulin 3.1 gm/dL    A/G Ratio 1.3 g/dL    BUN/Creatinine Ratio 12.0 7.0 - 25.0    Anion Gap 15.5 (H) 5.0 - 15.0 mmol/L    eGFR 61.9 >60.0 mL/min/1.73   CBC Auto Differential    Collection Time: 04/10/25 12:04 PM    Specimen: Blood   Result Value Ref Range    WBC 4.49 3.40 - 10.80 10*3/mm3    RBC 4.96 4.14 - 5.80 10*6/mm3    Hemoglobin 13.3 13.0 - 17.7 g/dL    Hematocrit 40.9 37.5 - 51.0 %    MCV 82.5 79.0 - 97.0 fL    MCH 26.8 26.6 - 33.0 pg    MCHC 32.5 31.5 - 35.7 g/dL    RDW 15.3 12.3 - 15.4 %    RDW-SD 45.9 37.0 - 54.0 fl    MPV 10.9 6.0 - 12.0 fL    Platelets 205 140 - 450 10*3/mm3    Neutrophil % 41.8 (L) 42.7 - 76.0 %    Lymphocyte % 43.9 19.6 - 45.3 %    Monocyte % 12.5 (H) 5.0 - 12.0 %    Eosinophil % 0.9 0.3 - 6.2 %    Basophil % 0.7 0.0 - 1.5 %    Immature Grans % 0.2 0.0 - 0.5 %    Neutrophils, Absolute 1.88 1.70 - 7.00 10*3/mm3    Lymphocytes, Absolute  1.97 0.70 - 3.10 10*3/mm3    Monocytes, Absolute 0.56 0.10 - 0.90 10*3/mm3    Eosinophils, Absolute 0.04 0.00 - 0.40 10*3/mm3    Basophils, Absolute 0.03 0.00 - 0.20 10*3/mm3    Immature Grans, Absolute 0.01 0.00 - 0.05 10*3/mm3    nRBC 0.0 0.0 - 0.2 /100 WBC   Magnesium    Collection Time: 04/10/25 12:04 PM    Specimen: Blood   Result Value Ref Range    Magnesium 2.0 1.6 - 2.4 mg/dL   ECG 12 Lead Electrolyte Imbalance    Collection Time: 04/10/25  1:04 PM   Result Value Ref Range    QT Interval 519 ms    QTC Interval 538 ms       Ordered the above labs and reviewed the results.        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 There is no evidence of intracranial hemorrhage or of a focal area of decreased attenuation to suggest acute infarction. Arachnoid cyst involving the right middle cranial fossa is appreciated measuring approximately 3 x 2.5 cm in size, unchanged. There may also be an arachnoid cyst overlying the left frontal lobe anteriorly measuring 1.4 x 3.1 cm in size, unchanged.      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 An arachnoid cyst involving the right middle cranial fossa is appreciated, unchanged. Prominent CSF anterior to the left frontal lobe is also appreciated suggesting an arachnoid cyst, unchanged. No acute process is identified. Further evaluation could be performed with MRI examination brain as indicated.      Radiation dose reduction techniques were utilized, including automated exposure modulation based on body size.  This report was finalized on 4/10/2025 3:01 PM by Dr. Riki Barbosa M.D on  Workstation: [id number]        Ordered the above noted radiological studies. Reviewed by me in PACS.        MEDICATIONS GIVEN IN ER  Medications   Potassium Replacement - Follow Nurse / BPA Driven Protocol (has no administration in time range)   potassium chloride 10 mEq in 100 mL IVPB (10 mEq Intravenous New Bag 4/10/25 1535)   labetalol (NORMODYNE,TRANDATE) injection 10 mg (10 mg Intravenous Given 4/10/25 1352)   ondansetron (ZOFRAN) injection 4 mg (4 mg Intravenous Given 4/10/25 1352)         ORDERS PLACED DURING THIS VISIT:  Orders Placed This Encounter   Procedures    CT Head Without Contrast    Magnesium    Monitor Blood Pressure    [facility]HA (on-call MD unless specified) Details    ECG 12 Lead Electrolyte Imbalance    Initiate Observation Status         OUTPATIENT MEDICATION MANAGEMENT:  Current Facility-Administered Medications Ordered in Epic   Medication Dose Route Frequency Provider Last Rate Last Admin    Chlorhexidine Gluconate Cloth 2 % pads   Topical Q12H PRN Kayli Rowe APRN        potassium chloride 10 mEq in 100 mL IVPB  10 mEq Intravenous Q1H Kapil Pittman II,  mL/hr at 04/10/25 1535 10 mEq at 04/10/25 1535    Potassium Replacement - Follow Nurse / BPA Driven Protocol   Not Applicable PRN Kapil Pittman II, MD         Current Outpatient Medications Ordered in Epic   Medication Sig Dispense Refill    acetaminophen (TYLENOL) 325 MG tablet Take 2 tablets by mouth Every 4 (Four) Hours As Needed for Mild Pain.      aspirin 81 MG EC tablet Take 1 tablet by mouth Every Night. Indications: Carotid Artery Stenting      Cyanocobalamin (B-12) 1000 MCG sublingual tablet Place 1 tablet under the tongue Daily. (Patient taking differently: Take 1 tablet by mouth Daily.) 30 each 6    Desvenlafaxine Succinate ER (Pristiq) 25 MG tablet sustained-release 24 hour Take 1 tablet by mouth Daily. 30 tablet 2    escitalopram (LEXAPRO) 10 MG tablet Take 1 tablet by mouth Daily. Take combined  with 20mg tablets for a total of 30mg daily  Indications: Major Depressive Disorder (Patient taking differently: Take 1 tablet by mouth Daily. 10 mg only  Indications: Major Depressive Disorder) 30 tablet 3    fludrocortisone 0.1 MG tablet TAKE THREE TABLETS BY MOUTH EVERY MORNING AND TAKE TWO TABLETS BY MOUTH EVERY EVENING 150 tablet 1    levothyroxine (SYNTHROID, LEVOTHROID) 100 MCG tablet Take 1 tablet by mouth Daily. Indications: Underactive Thyroid      magnesium oxide (MAG-OX) 400 MG tablet Take 1 tablet by mouth Daily.      Menthol, Topical Analgesic, (BIOFREEZE EX) Apply 1 Application topically Daily As Needed (pain).      metoprolol tartrate (LOPRESSOR) 25 MG tablet Take 0.5 tablets by mouth 2 (Two) Times a Day. Indications: High Blood Pressure Disorder 90 tablet 3    midodrine (PROAMATINE) 5 MG tablet TAKE THREE TABLETS BY MOUTH THREE TIMES A DAY BEFORE MEALS 270 tablet 6    pantoprazole (PROTONIX) 40 MG EC tablet Take 1 tablet by mouth 2 (Two) Times a Day. Indications: Gastroesophageal Reflux Disease      potassium chloride (KLOR-CON M10) 10 MEQ CR tablet TAKE 2 TABLETS BY MOUTH 2 TIMES A DAY 60 tablet 1    pregabalin (LYRICA) 50 MG capsule Take 1 capsule by mouth 2 (Two) Times a Day. Indications: Neuropathic Pain      pyridostigmine (MESTINON) 60 MG tablet Take 1 tablet by mouth Every 12 (Twelve) Hours. 60 tablet 1    vitamin D3 125 MCG (5000 UT) capsule capsule Take 250 Units by mouth Daily.         PROCEDURES  Critical Care    Performed by: Kapil Pittman II, MD  Authorized by: Kapil Pittman II, MD    Critical care provider statement:     Critical care time (minutes):  33    Critical care was necessary to treat or prevent imminent or life-threatening deterioration of the following conditions:  Circulatory failure    Critical care was time spent personally by me on the following activities:  Ordering and performing treatments and interventions, ordering and review of laboratory studies,  ordering and review of radiographic studies, pulse oximetry, re-evaluation of patient's condition, discussions with consultants, evaluation of patient's response to treatment, examination of patient and obtaining history from patient or surrogate            MEDICAL DECISION MAKING, PROGRESS, and CONSULTS    Discussion below represents my analysis of pertinent findings related to patient's condition, differential diagnosis, treatment plan and final disposition.              Differential diagnosis:    Hypertensive emergency, hypertensive urgency, intracranial hemorrhage, ACS             Independent interpretation of labs, radiology studies, and discussions with consultants:  ED Course as of 04/10/25 1539   Thu Apr 10, 2025   1253 On medical chart review, patient had lab work performed at 12:04 PM today.  Potassium was 2.9.  CBC unremarkable. [TD]   1305 EKG independently interpreted by myself.  Time 1:04 PM.  Sinus rhythm.  Left atrial abnormality.  Heart rate 65.  Right bundle branch block and LAFB.  LVH with strain pattern. [TD]   1317 Magnesium: 2.0 [TD]   1317 Patient takes 50 mg of midodrine 3 times a day.  Additionally, he also takes metoprolol tartrate 12.5 mg twice daily. [TD]   1421 BP: 159/85 [TD]   1538 Discussed the case with Dr. Casanova, hospitalist.  He will admit. [TD]      ED Course User Index  [TD] Kapil Pittman II, MD       Thankfully, CT scan of the head is acutely negative.  We will replace the patient's potassium.  He is on midodrine due to orthostatic hypotension and clearly has very labile blood pressures.  Given the labile blood pressures, and with the very extreme high blood pressures, I think could benefit from admission.    Clinical Scores:                                   DIAGNOSIS  Final diagnoses:   Hypertensive urgency   Hypokalemia         DISPOSITION  Admit      Latest Documented Vital Signs:  As of 15:39 EDT  BP- (!) 182/93 HR- 64 Temp- 97 °F (36.1 °C) (Tympanic) O2 sat-  96%      --    Please note that portions of this were completed with a voice recognition program.       Note Disclaimer: At [facility], we believe that sharing information builds trust and better relationships. You are receiving this note because you are receiving care at [facility] or recently visited. It is possible you will see health information before a provider has talked with you about it. This kind of information can be easy to misunderstand. To help you fully understand what it means for your health, we urge you to discuss this note with your provider.         Kapil Pittman II, MD  04/10/25 6904

## 2025-04-10 NOTE — H&P
Patient Name:  Panfilo Feliz  YOB: 1940  MRN:  [id number]  Admit Date:  4/10/2025  Patient Care Team:  Stephan Rubio MD as PCP - General (Family Medicine)  Ganesh López MD as Consulting Physician (Neurology)  Jake Enriquez MD as Referring Physician (Family Medicine)  Jameel Ruffin MD as Consulting Physician (Hematology and Oncology)  Lashaun Solis APRN as Nurse Practitioner (Nurse Practitioner)      Subjective   History Present Illness     Chief Complaint   Patient presents with    Hypertension           History of Present Illness  Patient is a 84 y.o. male presented with past medical history of AAA repair,, severe orthostatic hypotension with syncope,  hyperlipidemia and CAD, MGUS, presented from [facility] office secondary to elevated blood pressure with systolic blood pressure up to 220  History partially provided by spouse at bedside.  Per report patient with history of significant orthostatic hypotension, with blood pressure dropping to 60s ove over 40s, with syncopal, difficult to manage.  He is on fludrocortisone and midodrine, and midodrine has been increased up to 15 mg 3 times daily.  Patient follows with cardiology in [place].  Spouse reports his blood pressure recently has been high up to 180 systolic when, she contacted cardiologist and no changes were made at that time due to severe symptomatic hypotension at baseline.  Patient was complaining of headache, nauseous.  Denies visual changes.  No chest pain or palpitations.  Also feels extremely weak.  Potassium is low at 2.9.          Review of Systems   GENERAL: No fevers, chills, sweats.    RESPIRATORY: No cough, shortness of breath, hemoptysis or wheezing.   CVS: No chest pain, palpitations, orthopnea, dyspnea on exertion   GI: No melena or hematochezia. No abdominal pain. No nausea, vomiting, constipation, diarrhea    Personal History     Past Medical History:   Diagnosis Date    AAA (abdominal aortic aneurysm)      Abnormal ECG 1980’s    Alcoholism     None since 1991    Aneurysm 2019    aortic arch    Arthritis     Basal cell carcinoma (BCC) of face     Cholelithiasis 1990’s    Cholecystectomy    Chronic pain disorder     Colon polyp     Colon polyps     Coronary artery disease     Depression     Difficulty walking     Disease of thyroid gland     DJD (degenerative joint disease)     Gastritis     Gastroparesis     GERD (gastroesophageal reflux disease)     GI (gastrointestinal bleed)     Heart valve disease     Hernia     Hiatal    Hiatal hernia     History of bone marrow biopsy     [place] (hard of hearing)     Hyperlipidemia     Hypertension     Hypothyroidism     IgG monoclonal gammopathy     Multiple duodenal ulcers     Neuropathy     Peripheral neuropathy     PONV (postoperative nausea and vomiting)     Prostate cancer     Reflux esophagitis     Ulcer     WBC decreased      Past Surgical History:   Procedure Laterality Date    ARTERIAL ANEURYSM REPAIR      ASCENDING AORTIC ANEURYSM REPAIR W/ MECHANICAL AORTIC VALVE REPLACEMENT N/A 05/10/2024    Procedure: ASCENDING AORTIC ARCH/ possible HEMIARCH REPLACEMENT WITH CIRCULATORY ARREST, neuro monitoring, and intraop JOSE A;  Surgeon: Spencer Puente MD;  Location: [facility] CVOR;  Service: Cardiothoracic;  Laterality: N/A;  Ascending aortic aneurysm repair with 30 mm gelweave graft.    BACK SURGERY      CARDIAC CATHETERIZATION N/A 04/19/2024    Procedure: Right and Left Heart Cath with possible PCI;  Surgeon: Dilan Houston DO;  Location: [facility] CATH INVASIVE LOCATION;  Service: Cardiovascular;  Laterality: N/A;  Local and IV sedation    CHOLECYSTECTOMY  1988    COLON SURGERY  1998    COLONOSCOPY  02/2013    ENDOSCOPY  2012    ESOPHAGOGASTRODUODENOSCOPY WITH DILATION OF SHATZKI'S RING    ENDOSCOPY N/A 10/09/2020    Procedure: ESOPHAGOGASTRODUODENOSCOPY with cold bx;  Surgeon: Jake Perez MD;  Location: [facility] ENDOSCOPY;  Service: Gastroenterology;   Laterality: N/A;  pre - nausea  post - duodenal ulcer, gastrits, gastric ulcer, hiatal hernia    ENDOSCOPY N/A 10/04/2022    Procedure: ESOPHAGOGASTRODUODENOSCOPY with biopsies with esophageal dilatation with 56 cui;  Surgeon: Jake Perez MD;  Location: [facility] ENDOSCOPY;  Service: Gastroenterology;  Laterality: N/A;  pre-dysphagia  post-normal     ENDOSCOPY  10/04/2022    Chris BUTTERFIELD    EYE SURGERY Bilateral     cataracts    JOINT REPLACEMENT      LAMINECTOMY  2013    decompression L3-4, L4-5    PROSTATECTOMY  2007    SKIN BIOPSY      TONSILLECTOMY AND ADENOIDECTOMY      1940s    UPPER GASTROINTESTINAL ENDOSCOPY      VASCULAR SURGERY      VASECTOMY      1970s     Family History   Problem Relation Age of Onset    Cancer Mother 85        Breast    COPD Father     Cancer Brother 59        Unknown etiology    Hypertension Daughter      Social History     Tobacco Use    Smoking status: Never     Passive exposure: Never    Smokeless tobacco: Never   Vaping Use    Vaping status: Never Used   Substance Use Topics    Alcohol use: Not Currently     Comment: No ETOH since 1991    Drug use: Never     Current Facility-Administered Medications on File Prior to Encounter   Medication Dose Route Frequency Provider Last Rate Last Admin    Chlorhexidine Gluconate Cloth 2 % pads   Topical Q12H PRN Kayli Rowe APRN         Current Outpatient Medications on File Prior to Encounter   Medication Sig Dispense Refill    acetaminophen (TYLENOL) 325 MG tablet Take 2 tablets by mouth Every 4 (Four) Hours As Needed for Mild Pain.      aspirin 81 MG EC tablet Take 1 tablet by mouth Every Night. Indications: Carotid Artery Stenting      Cyanocobalamin (B-12) 1000 MCG sublingual tablet Place 1 tablet under the tongue Daily. (Patient taking differently: Take 1 tablet by mouth Daily.) 30 each 6    Desvenlafaxine Succinate ER (Pristiq) 25 MG tablet sustained-release 24 hour Take 1 tablet by mouth Daily. 30 tablet 2     fludrocortisone 0.1 MG tablet TAKE THREE TABLETS BY MOUTH EVERY MORNING AND TAKE TWO TABLETS BY MOUTH EVERY EVENING 150 tablet 1    levothyroxine (SYNTHROID, LEVOTHROID) 100 MCG tablet Take 1 tablet by mouth Daily. Indications: Underactive Thyroid      magnesium oxide (MAG-OX) 400 MG tablet Take 1 tablet by mouth Daily.      metoprolol tartrate (LOPRESSOR) 25 MG tablet Take 0.5 tablets by mouth 2 (Two) Times a Day. Indications: High Blood Pressure Disorder 90 tablet 3    midodrine (PROAMATINE) 5 MG tablet TAKE THREE TABLETS BY MOUTH THREE TIMES A DAY BEFORE MEALS 270 tablet 6    pantoprazole (PROTONIX) 40 MG EC tablet Take 1 tablet by mouth 2 (Two) Times a Day. Indications: Gastroesophageal Reflux Disease      potassium chloride (KLOR-CON M10) 10 MEQ CR tablet TAKE 2 TABLETS BY MOUTH 2 TIMES A DAY 60 tablet 1    pregabalin (LYRICA) 50 MG capsule Take 1 capsule by mouth 2 (Two) Times a Day. Indications: Neuropathic Pain      pyridostigmine (MESTINON) 60 MG tablet Take 1 tablet by mouth Every 12 (Twelve) Hours. (Patient taking differently: Take 0.5 tablets by mouth 2 (Two) Times a Day. Indications: Postural Orthostatic Tachycardia) 60 tablet 1    vitamin D3 125 MCG (5000 UT) capsule capsule Take 250 Units by mouth Daily.      escitalopram (LEXAPRO) 10 MG tablet Take 1 tablet by mouth Daily. Take combined with 20mg tablets for a total of 30mg daily  Indications: Major Depressive Disorder (Patient not taking: Reported on 4/10/2025) 30 tablet 3    Menthol, Topical Analgesic, (BIOFREEZE EX) Apply 1 Application topically Daily As Needed (pain).       Allergies   Allergen Reactions    Statins Myalgia       Objective    Objective     Vital Signs  Temp:  [97 °F (36.1 °C)-97.3 °F (36.3 °C)] 97.3 °F (36.3 °C)  Heart Rate:  [61-79] 72  Resp:  [16-18] 16  BP: (159-220)/() 200/100  SpO2:  [89 %-100 %] 100 %  on  Flow (L/min) (Oxygen Therapy):  [2] 2;   Device (Oxygen Therapy): nasal cannula  Body mass index is 20.72  kg/m².    Physical Exam  General: Alert, laying in bed, not in distress, ill-appearing  HEENT: Normocephalic, atraumatic  Eyes: PERRL, EOMI, anicteric sclerae  Lungs: Clear to auscultation bilaterally, no crackles or wheezes  CV: Regular rate and rhythm, no murmurs rubs or gallops  Abdomen: Soft, nontender, nondistended.  Normoactive bowel sounds  Extremities: No significant peripheral edema  Skin: Clean/dry/intact, no rashes  Neuro: Cranial nerves II through XII intact, no gross focal neurological deficits appreciated  Psych: Appropriate mood and affect    Results Review:  I reviewed the patient's new clinical results.  I reviewed the patient's new imaging results and agree with the interpretation.  I reviewed the patient's other test results and agree with the interpretation  I personally viewed and interpreted the patient's EKG/Telemetry data  Discussed with ED provider.    Lab Results (last 24 hours)       Procedure Component Value Units Date/Time    CBC & Differential [[id number]]  (Abnormal) Collected: 04/10/25 1204    Specimen: Blood Updated: 04/10/25 1218    Narrative:      The following orders were created for panel order CBC & Differential.  Procedure                               Abnormality         Status                     ---------                               -----------         ------                     CBC Auto Differential[[id number]]        Abnormal            Final result                 Please view results for these tests on the individual orders.    Comprehensive Metabolic Panel [[id number]]  (Abnormal) Collected: 04/10/25 1204    Specimen: Blood Updated: 04/10/25 1245     Glucose 134 mg/dL      BUN 14 mg/dL      Creatinine 1.17 mg/dL      Sodium 145 mmol/L      Potassium 2.9 mmol/L      Chloride 99 mmol/L      CO2 30.5 mmol/L      Calcium 9.7 mg/dL      Total Protein 7.2 g/dL      Albumin 4.1 g/dL      ALT (SGPT) 18 U/L      AST (SGOT) 32 U/L      Alkaline Phosphatase 57 U/L      Total  Bilirubin 0.5 mg/dL      Globulin 3.1 gm/dL      A/G Ratio 1.3 g/dL      BUN/Creatinine Ratio 12.0     Anion Gap 15.5 mmol/L      eGFR 61.9 mL/min/1.73     Narrative:      GFR Categories in Chronic Kidney Disease (CKD)      GFR Category          GFR (mL/min/1.73)    Interpretation  G1                     90 or greater         Normal or high (1)  G2                      60-89                Mild decrease (1)  G3a                   45-59                Mild to moderate decrease  G3b                   30-44                Moderate to severe decrease  G4                    15-29                Severe decrease  G5                    14 or less           Kidney failure          (1)In the absence of evidence of kidney disease, neither GFR category G1 or G2 fulfill the criteria for CKD.    eGFR calculation 2021 CKD-EPI creatinine equation, which does not include race as a factor    CBC Auto Differential [[id number]]  (Abnormal) Collected: 04/10/25 1204    Specimen: Blood Updated: 04/10/25 1218     WBC 4.49 10*3/mm3      RBC 4.96 10*6/mm3      Hemoglobin 13.3 g/dL      Hematocrit 40.9 %      MCV 82.5 fL      MCH 26.8 pg      MCHC 32.5 g/dL      RDW 15.3 %      RDW-SD 45.9 fl      MPV 10.9 fL      Platelets 205 10*3/mm3      Neutrophil % 41.8 %      Lymphocyte % 43.9 %      Monocyte % 12.5 %      Eosinophil % 0.9 %      Basophil % 0.7 %      Immature Grans % 0.2 %      Neutrophils, Absolute 1.88 10*3/mm3      Lymphocytes, Absolute 1.97 10*3/mm3      Monocytes, Absolute 0.56 10*3/mm3      Eosinophils, Absolute 0.04 10*3/mm3      Basophils, Absolute 0.03 10*3/mm3      Immature Grans, Absolute 0.01 10*3/mm3      nRBC 0.0 /100 WBC     KARLEE, PE & Free LT Chains, Ser [[id number]] Collected: 04/10/25 1204    Specimen: Blood Updated: 04/10/25 1243    Magnesium [[id number]]  (Normal) Collected: 04/10/25 1204    Specimen: Blood Updated: 04/10/25 1316     Magnesium 2.0 mg/dL             Imaging Results (Last 24 Hours)       Procedure  Component Value Units Date/Time    CT Head Without Contrast [[id number]] Collected: 04/10/25 1455     Updated: 04/10/25 1504    Narrative:      CT HEAD WO CONTRAST-     HISTORY:  headache, HTN; I16.0-Hypertensive urgency; E87.6-Hypokalemia     COMPARISON: CT head 7/13/2024 and MRI brain 2/14/2025     FINDINGS: There is small remote infarct involving the right frontal lobe  superolaterally is appreciated measuring approximately 2 cm in  transverse dimension, present previously. There is no evidence of  intracranial hemorrhage or of a focal area of decreased attenuation to  suggest acute infarction. Arachnoid cyst involving the right middle  cranial fossa is appreciated measuring approximately 3 x 2.5 cm in size,  unchanged. There may also be an arachnoid cyst overlying the left  frontal lobe anteriorly measuring 1.4 x 3.1 cm in size, unchanged.       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 An arachnoid cyst involving the right  middle cranial fossa is appreciated, unchanged. Prominent CSF anterior  to the left frontal lobe is also appreciated suggesting an arachnoid  cyst, unchanged. No acute process is identified. Further evaluation  could be performed with MRI examination brain as indicated.                 Radiation dose reduction techniques were utilized, including automated  exposure modulation based on body size.     This report was finalized on 4/10/2025 3:01 PM by Dr. Riki Barbosa M.D  on Workstation: [id number]               Results for orders placed during the hospital encounter of 07/13/24    Adult Transthoracic Echo Limited W/ Cont if Necessary Per Protocol    Interpretation Summary    Left ventricular systolic function is normal. Left ventricular ejection fraction appears to be 56 - 60%.    Left ventricular wall thickness is consistent with mild concentric hypertrophy.    The left atrial cavity is moderately dilated.    Estimated right  ventricular systolic pressure from tricuspid regurgitation is normal (<35 mmHg).    There is a moderate (1-2cm) circumferential pericardial effusion. There is no evidence of cardiac tamponade.    There is a moderate sized left pleural effusion.      ECG 12 Lead Electrolyte Imbalance   Preliminary Result   HEART RATE=65  bpm   RR Aepitnma=304  ms   ID Cshsftog=284  ms   P Horizontal Axis=-30  deg   P Front Axis=78  deg   QRSD Zuoqoeli=393  ms   QT Otbrbznv=526  ms   NLlI=267  ms   QRS Axis=-40  deg   T Wave Axis=  deg   - ABNORMAL ECG -   Atrial-paced complexes   Prolonged ID interval   Probable left atrial enlargement   RBBB and LAFB   LVH with secondary repolarization abnormality   Prolonged QT interval   Date and Time of Study:2025-04-10 13:04:16      Telemetry Scan   Final Result           Assessment/Plan     Active Hospital Problems    Diagnosis  POA    **Hypertensive urgency [I16.0]  Yes    Orthostatic hypotension [I95.1]  Yes    Hypothyroid [E03.9]  Yes    Hyperlipidemia [E78.5]  Yes    HTN (hypertension) [I10]  Yes      Resolved Hospital Problems   No resolved problems to display.     Hypertensive urgency  Severe orthostatic hypotension  Reported SBP up to 220/120 in CBC clinic, associated with nausea and vomiting.  Blood pressure on admission was 209/150.  Patient received IV labetalol, BP improved.  Will hold outpatient midodrine 15 milligrams 3 times daily and fludrocortisone 0.1 mg 3 times daily due to hypertensive urgency  -Initiate as needed V labetalol for SBP above 180  - Resume outpatient metoprolol titrate  -Monitor on telemetry  - Cardiology consult in a.m.      Severe hypokalemia associated with weakness  -Potassium 2.9, IV and p.o. replacement ordered.  Recheck in AM.        I discussed the patient's findings and my recommendations with patient.    VTE Prophylaxis - SCDs.  Code Status - Full code.       Yusra Casanova MD  [facility]  04/10/25  17:49 EDT

## 2025-04-10 NOTE — TELEPHONE ENCOUNTER
Provider: DENICE    Caller: Kelin Feliz    Relationship to Patient: Emergency Contact    Phone Number: [phone]    Reason for Call: PATIENT WAS SUPPOSED TO GET BLOOD DRAWN AND PATIENTS SPOUSE IS REQUESTING FOR THE BLOODWORK ORDER TO BE SENT WHILE HE'S ADMITTED TO THE HOSPITAL OVER NIGHT.       PLEASE REVIEW

## 2025-04-10 NOTE — ED NOTES
..Nursing report ED to floor  Panfilo Feliz  84 y.o.  male    HPI :  HPI  Stated Reason for Visit: htn, n/v    Chief Complaint  Chief Complaint   Patient presents with    Hypertension       Admitting doctor:   Yusra Casanova MD    Admitting diagnosis:   The primary encounter diagnosis was Hypertensive urgency. A diagnosis of Hypokalemia was also pertinent to this visit.    Code status:   Current Code Status       Date Active Code Status Order ID Comments User Context       Prior            Allergies:   Statins    Isolation:   No active isolations    Intake and Output  No intake or output data in the 24 hours ending 04/10/25 1600    Weight:   There were no vitals filed for this visit.    Most recent vitals:   Vitals:    04/10/25 1501 04/10/25 1516 04/10/25 1531 04/10/25 1533   BP: (!) 184/98 (!) 186/96 (!) 182/93    BP Location:       Patient Position:       Pulse: 63 64  64   Resp:       Temp:       TempSrc:       SpO2: (!) 89% 92%  96%       Active LDAs/IV Access:   Lines, Drains & Airways       Active LDAs       Name Placement date Placement time Site Days    Peripheral IV 04/10/25 1313 Anterior;Left Forearm 04/10/25  1313  Forearm  less than 1                    Labs (abnormal labs have a star):   Labs Reviewed   MAGNESIUM - Normal       EKG:   ECG 12 Lead Electrolyte Imbalance   Preliminary Result   HEART RATE=65  bpm   RR Zuzxicny=242  ms   MO Qwxdtpal=405  ms   P Horizontal Axis=-30  deg   P Front Axis=78  deg   QRSD Zpuplyqp=468  ms   QT Bsgtllds=694  ms   QFuH=146  ms   QRS Axis=-40  deg   T Wave Axis=  deg   - ABNORMAL ECG -   Atrial-paced complexes   Prolonged MO interval   Probable left atrial enlargement   RBBB and LAFB   LVH with secondary repolarization abnormality   Prolonged QT interval   Date and Time of Study:2025-04-10 13:04:16          Meds given in ED:   Medications   Potassium Replacement - Follow Nurse / BPA Driven Protocol (has no administration in time range)   potassium chloride 10  mEq in 100 mL IVPB (10 mEq Intravenous New Bag 4/10/25 1535)   labetalol (NORMODYNE,TRANDATE) injection 10 mg (10 mg Intravenous Given 4/10/25 1352)   ondansetron (ZOFRAN) injection 4 mg (4 mg Intravenous Given 4/10/25 1352)       Imaging results:  CT Head Without Contrast  Result Date: 4/10/2025  A small remote infarct involving the right frontal lobe superolaterally is appreciated. An arachnoid cyst involving the right middle cranial fossa is appreciated, unchanged. Prominent CSF anterior to the left frontal lobe is also appreciated suggesting an arachnoid cyst, unchanged. No acute process is identified. Further evaluation could be performed with MRI examination brain as indicated.      Radiation dose reduction techniques were utilized, including automated exposure modulation based on body size.  This report was finalized on 4/10/2025 3:01 PM by Dr. Riki Barbosa M.D on Workstation: [id number]        Ambulatory status:   - up ad santi     Social issues:   Social History     Socioeconomic History    Marital status:      Spouse name: Meghan    Years of education: College   Tobacco Use    Smoking status: Never     Passive exposure: Never    Smokeless tobacco: Never   Vaping Use    Vaping status: Never Used   Substance and Sexual Activity    Alcohol use: Not Currently     Comment: No ETOH since 1991    Drug use: Never    Sexual activity: Not Currently     Partners: Female     Birth control/protection: None       Peripheral Neurovascular  Peripheral Neurovascular (Adult)  Peripheral Neurovascular WDL: WDL    Neuro Cognitive  Neuro Cognitive (Adult)  Cognitive/Neuro/Behavioral WDL: WDL, orientation  Orientation: oriented x 4  Additional Documentation: [place] Coma Scale (Group)  Cole Coma Scale  Best Eye Response: 4-->(E4) spontaneous  Best Motor Response: 6-->(M6) obeys commands  Best Verbal Response: 5-->(V5) oriented  Cole Coma Scale Score: 15    Learning  Learning Assessment  Learning Readiness and  Ability: sensory deficit noted  Education Provided  Person Taught: patient, spouse  Teaching Method: verbal instruction  Teaching Focus: symptom/problem overview, diagnostic test  Education Outcome Evaluation: acceptance expressed    Respiratory  Respiratory  Airway WDL: WDL  Respiratory WDL  Respiratory WDL: .WDL except, cough  Cough Type: no productive sputum    Abdominal Pain       Pain Assessments  Pain (Adult)  (0-10) Pain Rating: Rest: 0    NIH Stroke Scale       Abundio Serna RN  04/10/25 16:00 EDT

## 2025-04-11 ENCOUNTER — APPOINTMENT (OUTPATIENT)
Dept: CT IMAGING | Facility: HOSPITAL | Age: 85
DRG: 305 | End: 2025-04-11
Payer: MEDICARE

## 2025-04-11 LAB
ANION GAP SERPL CALCULATED.3IONS-SCNC: 11.4 MMOL/L (ref 5–15)
BUN SERPL-MCNC: 15 MG/DL (ref 8–23)
BUN/CREAT SERPL: 12.3 (ref 7–25)
CALCIUM SPEC-SCNC: 9.5 MG/DL (ref 8.6–10.5)
CHLORIDE SERPL-SCNC: 101 MMOL/L (ref 98–107)
CO2 SERPL-SCNC: 35.6 MMOL/L (ref 22–29)
CREAT SERPL-MCNC: 1.22 MG/DL (ref 0.76–1.27)
DEPRECATED RDW RBC AUTO: 43.5 FL (ref 37–54)
EGFRCR SERPLBLD CKD-EPI 2021: 58.8 ML/MIN/1.73
ERYTHROCYTE [DISTWIDTH] IN BLOOD BY AUTOMATED COUNT: 14.2 % (ref 12.3–15.4)
GLUCOSE SERPL-MCNC: 97 MG/DL (ref 65–99)
HCT VFR BLD AUTO: 37.9 % (ref 37.5–51)
HGB BLD-MCNC: 12.1 G/DL (ref 13–17.7)
MAGNESIUM SERPL-MCNC: 2.4 MG/DL (ref 1.6–2.4)
MCH RBC QN AUTO: 26.9 PG (ref 26.6–33)
MCHC RBC AUTO-ENTMCNC: 31.9 G/DL (ref 31.5–35.7)
MCV RBC AUTO: 84.4 FL (ref 79–97)
PHOSPHATE SERPL-MCNC: 4.4 MG/DL (ref 2.5–4.5)
PLATELET # BLD AUTO: 169 10*3/MM3 (ref 140–450)
PMV BLD AUTO: 10.9 FL (ref 6–12)
POTASSIUM SERPL-SCNC: 3 MMOL/L (ref 3.5–5.2)
POTASSIUM SERPL-SCNC: 3.5 MMOL/L (ref 3.5–5.2)
RBC # BLD AUTO: 4.49 10*6/MM3 (ref 4.14–5.8)
SODIUM SERPL-SCNC: 148 MMOL/L (ref 136–145)
WBC NRBC COR # BLD AUTO: 2.8 10*3/MM3 (ref 3.4–10.8)

## 2025-04-11 PROCEDURE — G0378 HOSPITAL OBSERVATION PER HR: HCPCS

## 2025-04-11 PROCEDURE — 83735 ASSAY OF MAGNESIUM: CPT | Performed by: STUDENT IN AN ORGANIZED HEALTH CARE EDUCATION/TRAINING PROGRAM

## 2025-04-11 PROCEDURE — 99222 1ST HOSP IP/OBS MODERATE 55: CPT | Performed by: NURSE PRACTITIONER

## 2025-04-11 PROCEDURE — 25510000001 IOPAMIDOL PER 1 ML: Performed by: STUDENT IN AN ORGANIZED HEALTH CARE EDUCATION/TRAINING PROGRAM

## 2025-04-11 PROCEDURE — 84100 ASSAY OF PHOSPHORUS: CPT | Performed by: STUDENT IN AN ORGANIZED HEALTH CARE EDUCATION/TRAINING PROGRAM

## 2025-04-11 PROCEDURE — 84132 ASSAY OF SERUM POTASSIUM: CPT | Performed by: STUDENT IN AN ORGANIZED HEALTH CARE EDUCATION/TRAINING PROGRAM

## 2025-04-11 PROCEDURE — 85027 COMPLETE CBC AUTOMATED: CPT | Performed by: STUDENT IN AN ORGANIZED HEALTH CARE EDUCATION/TRAINING PROGRAM

## 2025-04-11 PROCEDURE — 97161 PT EVAL LOW COMPLEX 20 MIN: CPT

## 2025-04-11 PROCEDURE — 70498 CT ANGIOGRAPHY NECK: CPT

## 2025-04-11 PROCEDURE — 97530 THERAPEUTIC ACTIVITIES: CPT

## 2025-04-11 PROCEDURE — 80048 BASIC METABOLIC PNL TOTAL CA: CPT | Performed by: STUDENT IN AN ORGANIZED HEALTH CARE EDUCATION/TRAINING PROGRAM

## 2025-04-11 RX ORDER — METOPROLOL TARTRATE 25 MG/1
25 TABLET, FILM COATED ORAL 2 TIMES DAILY
Status: DISCONTINUED | OUTPATIENT
Start: 2025-04-11 | End: 2025-04-12

## 2025-04-11 RX ORDER — POTASSIUM CHLORIDE 1500 MG/1
40 TABLET, EXTENDED RELEASE ORAL EVERY 4 HOURS
Status: COMPLETED | OUTPATIENT
Start: 2025-04-11 | End: 2025-04-11

## 2025-04-11 RX ORDER — IOPAMIDOL 755 MG/ML
100 INJECTION, SOLUTION INTRAVASCULAR
Status: COMPLETED | OUTPATIENT
Start: 2025-04-11 | End: 2025-04-11

## 2025-04-11 RX ORDER — POTASSIUM CHLORIDE 1500 MG/1
40 TABLET, EXTENDED RELEASE ORAL EVERY 4 HOURS
Status: COMPLETED | OUTPATIENT
Start: 2025-04-11 | End: 2025-04-12

## 2025-04-11 RX ADMIN — PYRIDOSTIGMINE BROMIDE 30 MG: 60 TABLET ORAL at 20:16

## 2025-04-11 RX ADMIN — POTASSIUM CHLORIDE 40 MEQ: 1500 TABLET, EXTENDED RELEASE ORAL at 23:56

## 2025-04-11 RX ADMIN — PYRIDOSTIGMINE BROMIDE 30 MG: 60 TABLET ORAL at 09:02

## 2025-04-11 RX ADMIN — POTASSIUM CHLORIDE 40 MEQ: 1500 TABLET, EXTENDED RELEASE ORAL at 13:43

## 2025-04-11 RX ADMIN — IOPAMIDOL 95 ML: 755 INJECTION, SOLUTION INTRAVENOUS at 15:36

## 2025-04-11 RX ADMIN — METOPROLOL TARTRATE 25 MG: 25 TABLET, FILM COATED ORAL at 20:17

## 2025-04-11 RX ADMIN — PANTOPRAZOLE SODIUM 40 MG: 40 TABLET, DELAYED RELEASE ORAL at 20:17

## 2025-04-11 RX ADMIN — ASPIRIN 81 MG: 81 TABLET, COATED ORAL at 20:16

## 2025-04-11 RX ADMIN — PANTOPRAZOLE SODIUM 40 MG: 40 TABLET, DELAYED RELEASE ORAL at 09:02

## 2025-04-11 RX ADMIN — DESVENLAFAXINE SUCCINATE 25 MG: 25 TABLET, EXTENDED RELEASE ORAL at 09:02

## 2025-04-11 RX ADMIN — MAGNESIUM OXIDE TAB 400 MG (240 MG ELEMENTAL MG) 400 MG: 400 (240 MG) TAB at 09:01

## 2025-04-11 RX ADMIN — PREGABALIN 50 MG: 50 CAPSULE ORAL at 20:17

## 2025-04-11 RX ADMIN — METOPROLOL TARTRATE 12.5 MG: 25 TABLET, FILM COATED ORAL at 09:02

## 2025-04-11 RX ADMIN — POTASSIUM CHLORIDE 40 MEQ: 1500 TABLET, EXTENDED RELEASE ORAL at 16:53

## 2025-04-11 RX ADMIN — ACETAMINOPHEN 650 MG: 325 TABLET, FILM COATED ORAL at 22:11

## 2025-04-11 RX ADMIN — LEVOTHYROXINE SODIUM 100 MCG: 100 TABLET ORAL at 05:49

## 2025-04-11 RX ADMIN — Medication 10 MG: at 16:57

## 2025-04-11 RX ADMIN — POTASSIUM CHLORIDE 40 MEQ: 1500 TABLET, EXTENDED RELEASE ORAL at 09:06

## 2025-04-11 RX ADMIN — PREGABALIN 50 MG: 50 CAPSULE ORAL at 09:02

## 2025-04-11 NOTE — CASE MANAGEMENT/SOCIAL WORK
Discharge Planning Assessment  [facility]     Patient Name: Panfilo Feliz  MRN: [id number]  Today's Date: 4/11/2025    Admit Date: 4/10/2025    Plan: Home with spouse and possible home health   Discharge Needs Assessment       Row Name 04/11/25 0930       Living Environment    People in Home spouse    Current Living Arrangements home    Potentially Unsafe Housing Conditions none    Primary Care Provided by spouse/significant other    Provides Primary Care For no one, unable/limited ability to care for self    Family Caregiver if Needed spouse    Quality of Family Relationships helpful;supportive    Able to Return to Prior Arrangements yes       Resource/Environmental Concerns    Resource/Environmental Concerns none    Transportation Concerns none       Transportation Needs    In the past 12 months, has lack of transportation kept you from medical appointments or from getting medications? no    In the past 12 months, has lack of transportation kept you from meetings, work, or from getting things needed for daily living? No       Food Insecurity    Within the past 12 months, you worried that your food would run out before you got the money to buy more. Never true    Within the past 12 months, the food you bought just didn't last and you didn't have money to get more. Never true       Transition Planning    Patient/Family Anticipates Transition to home with help/services    Patient/Family Anticipated Services at Transition home health care    Transportation Anticipated family or friend will provide       Discharge Needs Assessment    Readmission Within the Last 30 Days no previous admission in last 30 days    Equipment Currently Used at Home cane, straight;walker, rolling    Concerns to be Addressed discharge planning    Do you want help finding or keeping work or a job? I do not need or want help    Do you want help with school or training? For example, starting or completing job training or getting a high school  diploma, GED or equivalent No    Anticipated Changes Related to Illness none    Equipment Needed After Discharge none    Outpatient/Agency/Support Group Needs homecare agency    Discharge Facility/Level of Care Needs home with home health    Provided Post Acute Provider List? Yes    Post Acute Provider List Home Health    Provided Post Acute Provider Quality & Resource List? Yes    Post Acute Provider Quality and Resource List Home Health    Delivered To Patient    Method of Delivery In person    Offered/Gave Vendor List yes                   Discharge Plan       Row Name 04/11/25 0932       Plan    Plan Home with spouse and possible home health    Plan Comments S/W pt at bedside.  Facesheet info confirmed.  Pt lives in a house w/ his wife Kelin who assists him at home as needed.  Home DME includes a cane and walker.  Kelin provides transport.  Pt has used [facility] in the past and might like to have them again.  Referral sent to [facility] - eval pending.  Pt has been to [facility] for rehab in the past.  PT eval is pending at this time.  Pt states he plans to return home upon DC with possible home health.  CCP will continue to follow and assist as needed. ............Ellen DAMON/ CCP                  Continued Care and Services - Admitted Since 4/10/2025       Home Medical Care       Service Provider Request Status Services Address Phone Fax Patient Preferred    [facility]ending - Request Sent -- [address] [facility] [place] [address] [phone] --                     Demographic Summary       Row Name 04/11/25 0930       General Information    Admission Type observation    Arrived From home    Required Notices Provided Observation Status Notice    Referral Source admission list    Reason for Consult discharge planning    Preferred Language English                   Functional Status       Row Name 04/11/25 0930       Functional Status    Usual Activity Tolerance moderate        Functional Status, IADL    Medications assistive person    Meal Preparation assistive person    Housekeeping assistive person    Laundry assistive person    Shopping assistive person    If for any reason you need help with day-to-day activities such as bathing, preparing meals, shopping, managing finances, etc., do you get the help you need? I get all the help I need       Mental Status    General Appearance WDL WDL       Mental Status Summary    Recent Changes in Mental Status/Cognitive Functioning no changes       Employment/    Employment Status retired                               Ellen Peña RN

## 2025-04-11 NOTE — PLAN OF CARE
Problem: Adult Inpatient Plan of Care  Goal: Patient-Specific Goal (Individualized)  Outcome: Progressing     Problem: Adult Inpatient Plan of Care  Goal: Plan of Care Review  Outcome: Progressing   Goal Outcome Evaluation:

## 2025-04-11 NOTE — SIGNIFICANT NOTE
04/11/25 1057   OTHER   Discipline occupational therapist   Rehab Time/Intention   Session Not Performed other (see comments)  (Per PT, the pt and spouse are not interested in OT at this time. Will s/o.)

## 2025-04-11 NOTE — PLAN OF CARE
Goal Outcome Evaluation:  Plan of Care Reviewed With: patient, spouse           Outcome Evaluation: Patient is an 84 y.o male who presented to [facility] with hypertensive urgency, as well as N/V. Patient with hx of open heart sx 1 year ago. Patient AOx4 supine in bed upon arrival, spouse at bedside. Patient lives at home with his spouse with 4 TORI and a full flight of steps to his bedroom and bathroom. Patient reports ongoing double vision x13 weeks. Patient completed all bed mobility with SV. Patient stood from EOB with CGA/SBA. Patient ambulated around unit with rwx and CGA. Gait slow with narrow KELTON. No overt LOB noted. BP checked following ambulation to be 179/104 (127). Further mobility deferred. Wife reports patient likes to be very active and go to the gym and reports activity currently limited by BP being either too low or too high. Red theraband provided for patient along with reviewing B LE and UE exercises for patient to perform as tolerated throughout the day. Recommend patient ambulate in hallway with nsg 3x/day as appropriate. PT will continue to  monitor peripherally.    Anticipated Discharge Disposition (PT): home with assist, home with home health, home with outpatient therapy services

## 2025-04-11 NOTE — CONSULTS
Patient Name: Panfilo Feliz  :1940  84 y.o.    Date of Admission: 4/10/2025  Date of Consultation:  25  Encounter Provider: JAVY Elias  Place of Service: [facility] CARDIOLOGY  Referring Provider: Yusra Casanova MD  Patient Care Team:  Stephan Rubio MD as PCP - General (Family Medicine)  Ganesh López MD as Consulting Physician (Neurology)  Jake Enriquez MD as Referring Physician (Family Medicine)  Jameel Ruffin MD as Consulting Physician (Hematology and Oncology)  Lashaun Solis APRN as Nurse Practitioner (Nurse Practitioner)      Chief complaint: hypertension     History of Present Illness: Mr. Feliz is an 84 year old gentleman with ascending aortic aneuyrsm status post elective ascending aortic aneurysm repair with a 30 mm Dacron interposition graft/ reductive root aortoplasty of the right and noncoronary sinuses/ left atrial appendage epicardial closure with a 45 mm AtriClip (Pagni) 5/10/24. He had post op atrial fibrillation and was temporarily treated with amiodarone. 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 Ruffin.     Prior to surgery - he didn't really have any blood pressure problems. High or low.     Over the past year - has really struggled. His blood pressure has been all over the place. He has significant supine hypertension and orthostatic hypotension. His medications have been up titrated and he has been on high dose midodrine, fludrocortisone. At one point he was on mestinon and droxidopa.     He has felt terrible. He has no energy and isn't able to do the things he used to do. He can't go to the gym. 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     He had an appt at [facility] yesterday. His systolic BP was over 200.  He was sent to the emergency room.     Echo 7/15/2024       Left ventricular systolic function is normal. Left ventricular ejection fraction appears to be 56 - 60%.    Left ventricular wall thickness is  consistent with mild concentric hypertrophy.    The left atrial cavity is moderately dilated.    Estimated right ventricular systolic pressure from tricuspid regurgitation is normal (<35 mmHg).    There is a moderate (1-2cm) circumferential pericardial effusion. There is no evidence of cardiac tamponade.    There is a moderate sized left pleural effusion.     Holter 8/28/24       A relatively benign monitor study.    Normal sinus rhythm    Sinus tachycardia with maximum heart rate of 118 bpm    Sinus bradycardia with minimum heart rate of 50 bpm    No PACs    Occasional PVCs    No sustained atrial or ventricular arrhythmias noted    First-degree AV block noted throughout the tracing    Symptoms of lightheadedness, heart racing, and symptom other than listed without significant correlation    Clinical correlation suggested     Past Medical History:   Diagnosis Date    AAA (abdominal aortic aneurysm)     Abnormal ECG 1980’s    Alcoholism     None since 1991    Aneurysm 2019    aortic arch    Arthritis     Basal cell carcinoma (BCC) of face     Cholelithiasis 1990’s    Cholecystectomy    Chronic pain disorder     Colon polyp     Colon polyps     Coronary artery disease     Depression     Difficulty walking     Disease of thyroid gland     DJD (degenerative joint disease)     Gastritis     Gastroparesis     GERD (gastroesophageal reflux disease)     GI (gastrointestinal bleed)     Heart valve disease     Hernia     Hiatal    Hiatal hernia     History of bone marrow biopsy     [place] (hard of hearing)     Hyperlipidemia     Hypertension     Hypothyroidism     IgG monoclonal gammopathy     Multiple duodenal ulcers     Neuropathy     Peripheral neuropathy     PONV (postoperative nausea and vomiting)     Prostate cancer     Reflux esophagitis     Ulcer     WBC decreased        Past Surgical History:   Procedure Laterality Date    ARTERIAL ANEURYSM REPAIR      ASCENDING AORTIC ANEURYSM REPAIR W/ MECHANICAL AORTIC VALVE  REPLACEMENT N/A 05/10/2024    Procedure: ASCENDING AORTIC ARCH/ possible HEMIARCH REPLACEMENT WITH CIRCULATORY ARREST, neuro monitoring, and intraop JOSE A;  Surgeon: Spencer Puente MD;  Location: [facility] CVOR;  Service: Cardiothoracic;  Laterality: N/A;  Ascending aortic aneurysm repair with 30 mm gelweave graft.    BACK SURGERY      CARDIAC CATHETERIZATION N/A 04/19/2024    Procedure: Right and Left Heart Cath with possible PCI;  Surgeon: Dilan Houston DO;  Location: [facility] CATH INVASIVE LOCATION;  Service: Cardiovascular;  Laterality: N/A;  Local and IV sedation    CHOLECYSTECTOMY  1988    COLON SURGERY  1998    COLONOSCOPY  02/2013    ENDOSCOPY  2012    ESOPHAGOGASTRODUODENOSCOPY WITH DILATION OF SHATZKI'S RING    ENDOSCOPY N/A 10/09/2020    Procedure: ESOPHAGOGASTRODUODENOSCOPY with cold bx;  Surgeon: Jake Perez MD;  Location:  [facility];  Service: Gastroenterology;  Laterality: N/A;  pre - nausea  post - duodenal ulcer, gastrits, gastric ulcer, hiatal hernia    ENDOSCOPY N/A 10/04/2022    Procedure: ESOPHAGOGASTRODUODENOSCOPY with biopsies with esophageal dilatation with 56 cui;  Surgeon: Jake Perez MD;  Location:  [facility];  Service: Gastroenterology;  Laterality: N/A;  pre-dysphagia  post-normal     ENDOSCOPY  10/04/2022    Chris BUTTERFIELD    EYE SURGERY Bilateral     cataracts    JOINT REPLACEMENT      LAMINECTOMY  2013    decompression L3-4, L4-5    PROSTATECTOMY  2007    SKIN BIOPSY      TONSILLECTOMY AND ADENOIDECTOMY      1940s    UPPER GASTROINTESTINAL ENDOSCOPY      VASCULAR SURGERY      VASECTOMY      1970s         Prior to Admission medications    Medication Sig Start Date End Date Taking? Authorizing Provider   acetaminophen (TYLENOL) 325 MG tablet Take 2 tablets by mouth Every 4 (Four) Hours As Needed for Mild Pain. 5/16/24  Yes Daniela Hodgson PA   aspirin 81 MG EC tablet Take 1 tablet by mouth Every Night. Indications: Carotid Artery Stenting  7/22/24  Yes Karl Quan MD   Cyanocobalamin (B-12) 1000 MCG sublingual tablet Place 1 tablet under the tongue Daily.  Patient taking differently: Take 1 tablet by mouth Daily. 9/5/24  Yes Yuliana Veloz APRN   Desvenlafaxine Succinate ER (Pristiq) 25 MG tablet sustained-release 24 hour Take 1 tablet by mouth Daily. 3/17/25  Yes Lashaun Solis APRN   fludrocortisone 0.1 MG tablet TAKE THREE TABLETS BY MOUTH EVERY MORNING AND TAKE TWO TABLETS BY MOUTH EVERY EVENING 2/25/25  Yes Dilan Houston DO   levothyroxine (SYNTHROID, LEVOTHROID) 100 MCG tablet Take 1 tablet by mouth Daily. Indications: Underactive Thyroid 1/1/24  Yes Karl Quan MD   magnesium oxide (MAG-OX) 400 MG tablet Take 1 tablet by mouth Daily.   Yes Karl Quan MD   metoprolol tartrate (LOPRESSOR) 25 MG tablet Take 0.5 tablets by mouth 2 (Two) Times a Day. Indications: High Blood Pressure Disorder 6/5/24  Yes Dilan Houston DO   midodrine (PROAMATINE) 5 MG tablet TAKE THREE TABLETS BY MOUTH THREE TIMES A DAY BEFORE MEALS 2/28/25  Yes Dilan Houston DO   pantoprazole (PROTONIX) 40 MG EC tablet Take 1 tablet by mouth 2 (Two) Times a Day. Indications: Gastroesophageal Reflux Disease 1/1/24  Yes Karl Quan MD   potassium chloride (KLOR-CON M10) 10 MEQ CR tablet TAKE 2 TABLETS BY MOUTH 2 TIMES A DAY 4/4/25  Yes Jameel Ruffin MD   pregabalin (LYRICA) 50 MG capsule Take 1 capsule by mouth 2 (Two) Times a Day. Indications: Neuropathic Pain 1/1/24  Yes Karl Quan MD   pyridostigmine (MESTINON) 60 MG tablet Take 1 tablet by mouth Every 12 (Twelve) Hours.  Patient taking differently: Take 0.5 tablets by mouth 2 (Two) Times a Day. Indications: Postural Orthostatic Tachycardia 7/20/24  Yes Aneta Crook APRN   vitamin D3 125 MCG (5000 UT) capsule capsule Take 250 Units by mouth Daily.   Yes Karl Quan MD   escitalopram (LEXAPRO) 10 MG  tablet Take 1 tablet by mouth Daily. Take combined with 20mg tablets for a total of 30mg daily  Indications: Major Depressive Disorder  Patient not taking: Reported on 4/10/2025 8/7/24   Jenae Friend MD   Menthol, Topical Analgesic, (BIOFREEZE EX) Apply 1 Application topically Daily As Needed (pain). 1/1/24   Provider, MD Karl       Allergies   Allergen Reactions    Statins Myalgia       Social History     Socioeconomic History    Marital status:      Spouse name: Meghan    Years of education: College   Tobacco Use    Smoking status: Never     Passive exposure: Never    Smokeless tobacco: Never   Vaping Use    Vaping status: Never Used   Substance and Sexual Activity    Alcohol use: Not Currently     Comment: No ETOH since 1991    Drug use: Never    Sexual activity: Not Currently     Partners: Female     Birth control/protection: None       Family History   Problem Relation Age of Onset    Cancer Mother 85        Breast    COPD Father     Cancer Brother 59        Unknown etiology    Hypertension Daughter        REVIEW OF SYSTEMS:   All systems reviewed.  Pertinent positives identified in HPI.  All other systems are negative.      Objective:     Vitals:    04/11/25 0400 04/11/25 0756 04/11/25 1120 04/11/25 1213   BP: 155/81 171/93 (!) 186/96 167/92   BP Location: Right arm Right arm Right arm    Patient Position: Lying Lying Lying    Pulse: 64 72 74 71   Resp: 16 18 18    Temp: 97.3 °F (36.3 °C) 97.3 °F (36.3 °C) 98.1 °F (36.7 °C)    TempSrc: Oral Oral Oral    SpO2: 98% 97% 96%    Weight:       Height:         Body mass index is 20.72 kg/m².    Physical Exam:  Constitutional: She is oriented to person, place, and time. She appears well-developed. She does not appear ill.   HENT:   Head: Normocephalic and atraumatic. Head is without contusion.   Right Ear: Hearing normal. No drainage.   Left Ear: Hearing normal. No drainage.   Nose: No nasal deformity. No epistaxis.   Eyes: Lids are normal. Right eye  exhibits no exudate. Left eye exhibits no exudate.  Neck: No JVD present. Carotid bruit is not present. No tracheal deviation present. No thyroid mass and no thyromegaly present.   Cardiovascular: Normal rate, regular rhythm and normal heart sounds.    Pulses:       Posterior tibial pulses are 2+ on the right side, and 2+ on the left side.   Pulmonary/Chest: Effort normal and breath sounds normal.   Abdominal: Soft. Normal appearance and bowel sounds are normal. There is no tenderness.   Musculoskeletal: Normal range of motion.        Right shoulder: She exhibits no deformity.        Left shoulder: She exhibits no deformity.   Neurological: She is alert and oriented to person, place, and time. She has normal strength.   Skin: Skin is warm, dry and intact. No rash noted.   Psychiatric: She has a normal mood and affect. Her behavior is normal. Thought content normal.   Vitals reviewed      Lab Review:     Results from last 7 days   Lab Units 04/11/25  0755 04/10/25  1204   SODIUM mmol/L 148* 145   POTASSIUM mmol/L 3.0* 2.9*   CHLORIDE mmol/L 101 99   CO2 mmol/L 35.6* 30.5*   BUN mg/dL 15 14   CREATININE mg/dL 1.22 1.17   CALCIUM mg/dL 9.5 9.7   BILIRUBIN mg/dL  --  0.5   ALK PHOS U/L  --  57   ALT (SGPT) U/L  --  18   AST (SGOT) U/L  --  32   GLUCOSE mg/dL 97 134*         Results from last 7 days   Lab Units 04/11/25  0755   WBC 10*3/mm3 2.80*   HEMOGLOBIN g/dL 12.1*   HEMATOCRIT % 37.9   PLATELETS 10*3/mm3 169         Results from last 7 days   Lab Units 04/11/25  0755   MAGNESIUM mg/dL 2.4               Current Facility-Administered Medications:     acetaminophen (TYLENOL) tablet 650 mg, 650 mg, Oral, Q4H PRN, Yusra Casanova MD, 650 mg at 04/10/25 2133    aspirin EC tablet 81 mg, 81 mg, Oral, Nightly, Yusra Casanova MD, 81 mg at 04/10/25 2033    Calcium Replacement - Follow Nurse / BPA Driven Protocol, , Not Applicable, PRN, Yursa Casanova MD    Desvenlafaxine Succinate ER 25 mg, 25 mg, Oral,  Daily, Yusra Casanova MD, 25 mg at 04/11/25 0902    labetalol (NORMODYNE,TRANDATE) injection 10 mg, 10 mg, Intravenous, Q6H PRN, Yusra Casanova MD, 10 mg at 04/10/25 1739    levothyroxine (SYNTHROID, LEVOTHROID) tablet 100 mcg, 100 mcg, Oral, Q AM, Yusra Casanova MD, 100 mcg at 04/11/25 0549    magnesium oxide (MAG-OX) tablet 400 mg, 400 mg, Oral, Daily, Yusra Casanova MD, 400 mg at 04/11/25 0901    Magnesium Standard Dose Replacement - Follow Nurse / BPA Driven Protocol, , Not Applicable, PRN, Yusra Casanova MD    metoprolol tartrate (LOPRESSOR) tablet 12.5 mg, 12.5 mg, Oral, BID, Yusra Casanova MD, 12.5 mg at 04/11/25 0902    nitroglycerin (NITROSTAT) SL tablet 0.4 mg, 0.4 mg, Sublingual, Q5 Min PRN, Yusra Casanova MD    pantoprazole (PROTONIX) EC tablet 40 mg, 40 mg, Oral, BID, Yusra Casanova MD, 40 mg at 04/11/25 0902    Phosphorus Replacement - Follow Nurse / BPA Driven Protocol, , Not Applicable, PRN, Yusra Casanova MD    potassium chloride (KLOR-CON M20) CR tablet 40 mEq, 40 mEq, Oral, Q4H, Renan Gentile MD, 40 mEq at 04/11/25 1343    Potassium Replacement - Follow Nurse / BPA Driven Protocol, , Not Applicable, PRN, Yusra Casanova MD    pregabalin (LYRICA) capsule 50 mg, 50 mg, Oral, BID, Yusra Casanova MD, 50 mg at 04/11/25 0902    pyridostigmine (MESTINON) tablet 30 mg, 30 mg, Oral, BID, Yusra Casanova MD, 30 mg at 04/11/25 0902    Facility-Administered Medications Ordered in Other Encounters:     Chlorhexidine Gluconate Cloth 2 % pads, , Topical, Q12H PRN, Kayli Rowe, APRN    Assessment and Plan:       Active Hospital Problems    Diagnosis  POA    **Hypertensive urgency [I16.0]  Yes    Hypokalemia [E87.6]  Unknown    Orthostatic hypotension [I95.1]  Yes    Hypothyroid [E03.9]  Yes    Hyperlipidemia [E78.5]  Yes    HTN (hypertension) [I10]  Yes      Resolved Hospital Problems   No resolved problems to display.      Severe supine hypertension with orthostatic hypotension - ongoing problem since his aortic surgery . Has been on  midodrine, florinef, mestinon and droxidopa at various times over the past year.   Ascending aortic aneurysm status post ascending aortic aneurysm repair with a 30 mm Dacron interposition graft/ reductive root aortoplasty of the right and noncoronary sinuses/ left atrial appendage epicardial closure with a 45 mm AtriClip (Pagni) 5/10/24. He had post op AF. Temporarily on amiodarone. No documented recurrence.   MGUS  Hypothyroidism   Hyperlipidemia   Normal coronaries by cardiac cath 4/19/2024  Moderate pericardial effusion noted on TTE 7/15/24, repeat limited echo.     He has pretty debilitating orthostasis and supine hypertension. He feels terrible. He really hasn't felt well since surgery in May.     Agree with holding midodrine and fludrocortisone for now. Check orthostatics. Repeat limited echo. Renal artery duplex. Will also check a CTA neck to maker sure nothing is pinched following aorta surgery.     Needs medical grade compression stockings, adequate hydration, elevate HOB at night , abdominal binder, and out of bed as much as possible during day.    Mar Cho, JAVY  04/11/25  14:04 EDT

## 2025-04-11 NOTE — THERAPY EVALUATION
Patient Name: Panfilo Feliz  : 1940    MRN: [id number]                              Today's Date: 2025       Admit Date: 4/10/2025    Visit Dx:     ICD-10-CM ICD-9-CM   1. Hypertensive urgency  I16.0 401.9   2. Hypokalemia  E87.6 276.8     Patient Active Problem List   Diagnosis    Leukopenia    Thrombocytopenia    Monoclonal gammopathy of unknown significance (MGUS)    Neuropathy associated with MGUS    Nausea in adult patient    Slow transit constipation    Nausea    DDD (degenerative disc disease), lumbar    Chronic neck pain    DJD (degenerative joint disease)    Extremity pain    HTN (hypertension)    Hyperlipidemia    Hypothyroid    LBP (low back pain)    Lumbar canal stenosis    Prostate cancer    Ascending aortic aneurysm    Nonrheumatic aortic valve insufficiency    Other dysphagia    Gastroesophageal reflux disease    Aneurysm of ascending aorta without rupture    Dizziness    Orthostatic hypotension    Syncope    Hypertensive urgency    Hypokalemia     Past Medical History:   Diagnosis Date    AAA (abdominal aortic aneurysm)     Abnormal ECG ’s    Alcoholism     None since     Aneurysm 2019    aortic arch    Arthritis     Basal cell carcinoma (BCC) of face     Cholelithiasis ’s    Cholecystectomy    Chronic pain disorder     Colon polyp     Colon polyps     Coronary artery disease     Depression     Difficulty walking     Disease of thyroid gland     DJD (degenerative joint disease)     Gastritis     Gastroparesis     GERD (gastroesophageal reflux disease)     GI (gastrointestinal bleed)     Heart valve disease     Hernia     Hiatal    Hiatal hernia     History of bone marrow biopsy     [place] (hard of hearing)     Hyperlipidemia     Hypertension     Hypothyroidism     IgG monoclonal gammopathy     Multiple duodenal ulcers     Neuropathy     Peripheral neuropathy     PONV (postoperative nausea and vomiting)     Prostate cancer     Reflux esophagitis     Ulcer     WBC decreased       Past Surgical History:   Procedure Laterality Date    ARTERIAL ANEURYSM REPAIR      ASCENDING AORTIC ANEURYSM REPAIR W/ MECHANICAL AORTIC VALVE REPLACEMENT N/A 05/10/2024    Procedure: ASCENDING AORTIC ARCH/ possible HEMIARCH REPLACEMENT WITH CIRCULATORY ARREST, neuro monitoring, and intraop JOSE A;  Surgeon: Spencer Puente MD;  Location: [facility];  Service: Cardiothoracic;  Laterality: N/A;  Ascending aortic aneurysm repair with 30 mm gelweave graft.    BACK SURGERY      CARDIAC CATHETERIZATION N/A 04/19/2024    Procedure: Right and Left Heart Cath with possible PCI;  Surgeon: Dilan Houston DO;  Location: [facility] CATH INVASIVE LOCATION;  Service: Cardiovascular;  Laterality: N/A;  Local and IV sedation    CHOLECYSTECTOMY  1988    COLON SURGERY  1998    COLONOSCOPY  02/2013    ENDOSCOPY  2012    ESOPHAGOGASTRODUODENOSCOPY WITH DILATION OF SHATZKI'S RING    ENDOSCOPY N/A 10/09/2020    Procedure: ESOPHAGOGASTRODUODENOSCOPY with cold bx;  Surgeon: Jake Perez MD;  Location:  [facility];  Service: Gastroenterology;  Laterality: N/A;  pre - nausea  post - duodenal ulcer, gastrits, gastric ulcer, hiatal hernia    ENDOSCOPY N/A 10/04/2022    Procedure: ESOPHAGOGASTRODUODENOSCOPY with biopsies with esophageal dilatation with 56 cui;  Surgeon: Jake Perez MD;  Location:  [facility];  Service: Gastroenterology;  Laterality: N/A;  pre-dysphagia  post-normal     ENDOSCOPY  10/04/2022    Chris BUTTERFIELD    EYE SURGERY Bilateral     cataracts    JOINT REPLACEMENT      LAMINECTOMY  2013    decompression L3-4, L4-5    PROSTATECTOMY  2007    SKIN BIOPSY      TONSILLECTOMY AND ADENOIDECTOMY      1940s    UPPER GASTROINTESTINAL ENDOSCOPY      VASCULAR SURGERY      VASECTOMY      1970s      General Information       Row Name 04/11/25 1352          Physical Therapy Time and Intention    Document Type evaluation  -SM     Mode of Treatment individual therapy;physical therapy  -SM       Row Name  04/11/25 1352          General Information    Patient Profile Reviewed yes  -     Prior Level of Function independent:  -     Existing Precautions/Restrictions fall  -     Barriers to Rehab medically complex  -       Row Name 04/11/25 1352          Living Environment    Current Living Arrangements home  -     People in Home spouse  -       Row Name 04/11/25 1352          Home Main Entrance    Number of Stairs, Main Entrance three  -       Row Name 04/11/25 1352          Stairs Within Home, Primary    Stairs, Within Home, Primary Full flight to bedroom and bathroom  -     Number of Stairs, Within Home, Primary twelve  -       Row Name 04/11/25 1352          Cognition    Orientation Status (Cognition) oriented x 4  -SM       Row Name 04/11/25 1352          Safety Issues/Impairments Affecting Functional Mobility    Impairments Affecting Function (Mobility) endurance/activity tolerance;strength;balance  -               User Key  (r) = Recorded By, (t) = Taken By, (c) = Cosigned By      Initials Name Provider Type     Sunshine Go PT Physical Therapist                   Mobility       Row Name 04/11/25 1353          Bed Mobility    Bed Mobility supine-sit;sit-supine  -     Supine-Sit [place] (Bed Mobility) supervision  -     Sit-Supine [place] (Bed Mobility) supervision  -     Assistive Device (Bed Mobility) head of bed elevated  -       Row Name 04/11/25 1353          Sit-Stand Transfer    Sit-Stand [place] (Transfers) standby assist  -     Assistive Device (Sit-Stand Transfers) walker, 4-wheeled  -       Row Name 04/11/25 1353          Gait/Stairs (Locomotion)    [place] Level (Gait) contact guard  -     Assistive Device (Gait) walker, 4-wheeled  -     Distance in Feet (Gait) 150  -     Deviations/Abnormal Patterns (Gait) gait speed decreased;base of support, narrow;marco a decreased  -     Bilateral Gait Deviations forward flexed posture  -      Comment, (Gait/Stairs) Gait slow with narrow KELTON. No overt LOB noted. BP checked following ambulation to be 179/104 (127). Further mobility deferred.  -               User Key  (r) = Recorded By, (t) = Taken By, (c) = Cosigned By      Initials Name Provider Type    Sunshine Colon PT Physical Therapist                   Obj/Interventions       Row Name 04/11/25 135          Range of Motion Comprehensive    General Range of Motion bilateral lower extremity ROM WFL  -       Row Name 04/11/25 135          Strength Comprehensive (MMT)    Comment, General Manual Muscle Testing (MMT) Assessment Generalized B LE weakness  -       Row Name 04/11/25 1355          Balance    Balance Assessment sitting static balance;sitting dynamic balance;standing static balance;standing dynamic balance  -     Static Sitting Balance supervision  -     Dynamic Sitting Balance supervision  -     Position, Sitting Balance sitting edge of bed  -     Static Standing Balance contact guard  -     Dynamic Standing Balance contact guard  -     Position/Device Used, Standing Balance supported;walker, 4-wheeled  -     Balance Interventions sitting;standing;sit to stand;supported;static;dynamic  -               User Key  (r) = Recorded By, (t) = Taken By, (c) = Cosigned By      Initials Name Provider Type     Sunshine Go PT Physical Therapist                   Goals/Plan       Row Name 04/11/25 2620          Bed Mobility Goal 1 (PT)    Activity/Assistive Device (Bed Mobility Goal 1, PT) bed mobility activities, all  -SM     [place] Level/Cues Needed (Bed Mobility Goal 1, PT) independent  -SM     Time Frame (Bed Mobility Goal 1, PT) 1 week  -       Row Name 04/11/25 3366          Transfer Goal 1 (PT)    Activity/Assistive Device (Transfer Goal 1, PT) sit-to-stand/stand-to-sit;bed-to-chair/chair-to-bed  -SM     [place] Level/Cues Needed (Transfer Goal 1, PT) modified independence  -SM     Time Frame  (Transfer Goal 1, PT) 1 week  -SM       Row Name 04/11/25 4376          Gait Training Goal 1 (PT)    Activity/Assistive Device (Gait Training Goal 1, PT) gait (walking locomotion)  -SM     [place] Level (Gait Training Goal 1, PT) modified independence  -SM     Distance (Gait Training Goal 1, PT) 300  -SM     Time Frame (Gait Training Goal 1, PT) 1 week  -SM               User Key  (r) = Recorded By, (t) = Taken By, (c) = Cosigned By      Initials Name Provider Type     Sunshine Go, PT Physical Therapist                   Clinical Impression       Row Name 04/11/25 7825          Pain    Pretreatment Pain Rating 0/10 - no pain  -SM     Posttreatment Pain Rating 0/10 - no pain  -SM       Row Name 04/11/25 8808          Plan of Care Review    Plan of Care Reviewed With patient;spouse  -     Outcome Evaluation Patient is an 84 y.o male who presented to [facility] with hypertensive urgency, as well as N/V. Patient with hx of open heart sx 1 year ago. Patient AOx4 supine in bed upon arrival, spouse at bedside. Patient lives at home with his spouse with 4 TORI and a full flight of steps to his bedroom and bathroom. Patient reports ongoing double vision x13 weeks. Patient completed all bed mobility with SV. Patient stood from EOB with CGA/SBA. Patient ambulated around unit with rwx and CGA. Gait slow with narrow KELTON. No overt LOB noted. BP checked following ambulation to be 179/104 (127). Further mobility deferred. Wife reports patient likes to be very active and go to the gym and reports activity currently limited by BP being either too low or too high. Red theraband provided for patient along with reviewing B LE and UE exercises for patient to perform as tolerated throughout the day. Recommend patient ambulate in hallway with nsg 3x/day as appropriate. PT will continue to  monitor peripherally.  -       Row Name 04/11/25 9443          Therapy Assessment/Plan (PT)    Rehab Potential (PT) good  -     Criteria for  Skilled Interventions Met (PT) yes  -     Therapy Frequency (PT) 3 times/wk  -       Row Name 04/11/25 1354          Vital Signs    Pre Patient Position Supine  -SM     Intra Patient Position Standing  -SM     Post Patient Position Supine  -       Row Name 04/11/25 1354          Positioning and Restraints    Pre-Treatment Position in bed  -SM     Post Treatment Position bed  -SM     In Bed notified nsg;call light within reach;encouraged to call for assist;exit alarm on;fowlers;with family/caregiver  -               User Key  (r) = Recorded By, (t) = Taken By, (c) = Cosigned By      Initials Name Provider Type     Sunshine Go PT Physical Therapist                   Outcome Measures       Row Name 04/11/25 1356          How much help from another person do you currently need...    Turning from your back to your side while in flat bed without using bedrails? 4  -SM     Moving from lying on back to sitting on the side of a flat bed without bedrails? 4  -SM     Moving to and from a bed to a chair (including a wheelchair)? 3  -SM     Standing up from a chair using your arms (e.g., wheelchair, bedside chair)? 4  -SM     Climbing 3-5 steps with a railing? 2  -SM     To walk in hospital room? 3  -SM     AM-PAC 6 Clicks Score (PT) 20  -     Highest Level of Mobility Goal 6 --> Walk 10 steps or more  -       Row Name 04/11/25 2122          Functional Assessment    Outcome Measure Options AM-PAC 6 Clicks Basic Mobility (PT)  -               User Key  (r) = Recorded By, (t) = Taken By, (c) = Cosigned By      Initials Name Provider Type     Sunshine Go PT Physical Therapist                                 Physical Therapy Education       Title: PT OT SLP Therapies (In Progress)       Topic: Physical Therapy (Done)       Point: Mobility training (Done)       Learning Progress Summary            Patient Acceptance, E, VU by  at 4/11/2025 1401                      Point: Home exercise program (Done)        Learning Progress Summary            Patient Acceptance, E, VU by  at 4/11/2025 1401                      Point: Body mechanics (Done)       Learning Progress Summary            Patient Acceptance, E, VU by  at 4/11/2025 1401                      Point: Precautions (Done)       Learning Progress Summary            Patient Acceptance, E, VU by  at 4/11/2025 1401                                      User Key       Initials Effective Dates Name Provider Type Discipline     05/02/22 -  Sunshine Go, PT Physical Therapist PT                  PT Recommendation and Plan     Outcome Evaluation: Patient is an 84 y.o male who presented to [facility] with hypertensive urgency, as well as N/V. Patient with hx of open heart sx 1 year ago. Patient AOx4 supine in bed upon arrival, spouse at bedside. Patient lives at home with his spouse with 4 TORI and a full flight of steps to his bedroom and bathroom. Patient reports ongoing double vision x13 weeks. Patient completed all bed mobility with SV. Patient stood from EOB with CGA/SBA. Patient ambulated around unit with rwx and CGA. Gait slow with narrow KELTON. No overt LOB noted. BP checked following ambulation to be 179/104 (127). Further mobility deferred. Wife reports patient likes to be very active and go to the gym and reports activity currently limited by BP being either too low or too high. Red theraband provided for patient along with reviewing B LE and UE exercises for patient to perform as tolerated throughout the day. Recommend patient ambulate in hallway with nsg 3x/day as appropriate. PT will continue to  monitor peripherally.     Time Calculation:         PT Charges       Row Name 04/11/25 1402             Time Calculation    Start Time 0923  -      Stop Time 0943  -      Time Calculation (min) 20 min  -      PT Received On 04/11/25  -      PT - Next Appointment 04/14/25  -      PT Goal Re-Cert Due Date 04/18/25  -         Time Calculation- PT     Total Timed Code Minutes- PT 12 minute(s)  -SM         Timed Charges    20200 - PT Therapeutic Activity Minutes 12  -SM         Total Minutes    Timed Charges Total Minutes 12  -SM       Total Minutes 12  -SM                User Key  (r) = Recorded By, (t) = Taken By, (c) = Cosigned By      Initials Name Provider Type     Sunshine Go PT Physical Therapist                  Therapy Charges for Today       Code Description Service Date Service Provider Modifiers Qty    75481065056 HC PT THERAPEUTIC ACT EA 15 MIN 4/11/2025 Sunshine Go, PT GP 1    57521907536 HC PT EVAL LOW COMPLEXITY 3 4/11/2025 Sunshine Go, PT GP 1            PT G-Codes  Outcome Measure Options: AM-PAC 6 Clicks Basic Mobility (PT)  AM-PAC 6 Clicks Score (PT): 20  PT Discharge Summary  Anticipated Discharge Disposition (PT): home with assist, home with home health, home with outpatient therapy services    Sunshine Go PT  4/11/2025

## 2025-04-11 NOTE — PROGRESS NOTES
Name: Panfilo Feliz ADMIT: 4/10/2025   : 1940  PCP: Stephan Rubio MD    MRN: [id number] LOS: 0 days   AGE/SEX: 84 y.o. male  ROOM: [facility]     Subjective   Subjective   Tray Vann is up walking with NA to the bathroom he offers no new complaints at this time. He does report he is very frustrated that no one can figure out what is causing his symptoms.,       Objective   Objective   Vital Signs  Temp:  [97.3 °F (36.3 °C)-98.1 °F (36.7 °C)] 98.1 °F (36.7 °C)  Heart Rate:  [64-74] 71  Resp:  [16-18] 18  BP: (155-202)/() 167/92  SpO2:  [92 %-100 %] 96 %  on  Flow (L/min) (Oxygen Therapy):  [2] 2;   Device (Oxygen Therapy): room air  Body mass index is 20.72 kg/m².  Physical Exam  Vitals reviewed.   Constitutional:       General: He is not in acute distress.     Appearance: He is well-developed.   HENT:      Head: Normocephalic and atraumatic.      Mouth/Throat:      Pharynx: No oropharyngeal exudate.   Eyes:      General: No scleral icterus.     Pupils: Pupils are equal, round, and reactive to light.   Neck:      Vascular: No JVD.   Cardiovascular:      Rate and Rhythm: Regular rhythm. Tachycardia present.      Pulses:           Radial pulses are 2+ on the right side and 2+ on the left side.      Heart sounds: No murmur heard.  Pulmonary:      Effort: Pulmonary effort is normal. No respiratory distress.      Breath sounds: Normal breath sounds. No wheezing.   Abdominal:      General: Bowel sounds are normal. There is no distension.      Palpations: Abdomen is soft.      Tenderness: There is no abdominal tenderness.   Musculoskeletal:      Right lower leg: No edema.      Left lower leg: No edema.   Skin:     General: Skin is warm and dry.      Findings: No rash.   Neurological:      Mental Status: He is alert and oriented to person, place, and time.       Results Review     I reviewed the patient's new clinical results.  Results from last 7 days   Lab Units 25  0755 04/10/25  1204   WBC 10*3/mm3  "2.80* 4.49   HEMOGLOBIN g/dL 12.1* 13.3   PLATELETS 10*3/mm3 169 205     Results from last 7 days   Lab Units 04/11/25  0755 04/10/25  1204   SODIUM mmol/L 148* 145   POTASSIUM mmol/L 3.0* 2.9*   CHLORIDE mmol/L 101 99   CO2 mmol/L 35.6* 30.5*   BUN mg/dL 15 14   CREATININE mg/dL 1.22 1.17   GLUCOSE mg/dL 97 134*   EGFR mL/min/1.73 58.8* 61.9     Results from last 7 days   Lab Units 04/10/25  1204   ALBUMIN g/dL 4.1   BILIRUBIN mg/dL 0.5   ALK PHOS U/L 57   AST (SGOT) U/L 32   ALT (SGPT) U/L 18     Results from last 7 days   Lab Units 04/11/25  0755 04/10/25  1204   CALCIUM mg/dL 9.5 9.7   ALBUMIN g/dL  --  4.1   MAGNESIUM mg/dL 2.4 2.0   PHOSPHORUS mg/dL 4.4  --        No results found for: \"HGBA1C\", \"POCGLU\"    CT Head Without Contrast  Result Date: 4/10/2025  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 An arachnoid cyst involving the right middle cranial fossa is appreciated, unchanged. Prominent CSF anterior to the left frontal lobe is also appreciated suggesting an arachnoid cyst, unchanged. No acute process is identified. Further evaluation could be performed with MRI examination brain as indicated.      Radiation dose reduction techniques were utilized, including automated exposure modulation based on body size.  This report was finalized on 4/10/2025 3:01 PM by Dr. Riki Barbosa M.D on Workstation: [id number]        I have personally reviewed all medications:  Scheduled Medications  aspirin, 81 mg, Oral, Nightly  Desvenlafaxine Succinate ER, 25 mg, Oral, Daily  levothyroxine, 100 mcg, Oral, Q AM  magnesium oxide, 400 mg, Oral, Daily  metoprolol tartrate, 12.5 mg, Oral, BID  pantoprazole, 40 mg, Oral, BID  potassium chloride ER, 40 mEq, Oral, Q4H  pregabalin, 50 mg, Oral, BID  pyridostigmine, 30 mg, Oral, BID    Infusions   Diet  Diet: Regular/House; Fluid Consistency: Thin (IDDSI 0)    I have personally reviewed:  [x]  Laboratory   [x]  Microbiology   [x]  " Radiology   [x]  EKG/Telemetry  [x]  Cardiology/Vascular   []  Pathology    []  Records       Assessment/Plan     Active Hospital Problems    Diagnosis  POA    **Hypertensive urgency [I16.0]  Yes    Hypokalemia [E87.6]  Unknown    Orthostatic hypotension [I95.1]  Yes    Hypothyroid [E03.9]  Yes    Hyperlipidemia [E78.5]  Yes    HTN (hypertension) [I10]  Yes      Resolved Hospital Problems   No resolved problems to display.       84 y.o. male admitted with     Hypertensive urgency  Essential hypertension at baseline  Orthostatic hypotension  Orthostatic tachycardia  Supine hypertension  He reports ongoing difficulty with controlling his blood pressure with significant orthostatic hypotension,  has been treated with midodrine and fludrocortisone, and trails of mestinon and droxidopa with no real solution to his orthostatic  hypotension and supine hypertension.   He is followed by Celso with cardiology,Ganesh with neurology, and Dr Cook with hematology   Is blood pressure is slowly improving  Cardiology has been consulted appreciate their assistance and recommendations  Will check orthostatic blood pressures  As needed labetalol, and Lopressor  He does have a history of aortic aneurysm with repair in May 2024 with Dr. Puente  Hold home midodrine    Hypokalemia  Potassium replaced per protocol    Hyperlipidemia  Chronic   He is not on home statin  Continue home ASA    Hypothyroid  Check TSH  Continue home levothyroxine      SCDs for DVT prophylaxis.  Full code.  Discussed with patient, family, nursing staff, and care team on multidisciplinary rounds.  Anticipate discharge home with family in 1-2 days.  Expected Discharge Date: 4/12/2025; Expected Discharge Time:       JAVY Laureano  [facility]  04/11/25  15:03 EDT

## 2025-04-11 NOTE — PLAN OF CARE
Goal Outcome Evaluation:  Plan of Care Reviewed With: patient        Progress: no change  Outcome Evaluation: Pt is A&OX4, calm and cooperative. Wife visited this AM. Meds whole with water. K protocol initiated. Con of B&B, uses urinal. Assist X1. Pt BP elevated, cardiology following. Orthostatic BP completed. NPO after MN for renal artery duplex. Pt able to make needs known, call light within reach.

## 2025-04-11 NOTE — PROGRESS NOTES
"Nutrition Services    Patient Name: Panfilo Feliz  YOB: 1940  MRN: [id number]  Admission date: 4/10/2025    Assessment Date:  04/11/25    NUTRITION EVALUATION    Patient meets ASPEN/AND criteria for nutrition diagnosis of moderate malnutrition of chronic illness based on:   Weight loss of 13% in past 6 months r/t lack of appetite.  Moderate loss of Muscle and Subcutaneous Fat on NFPE exam.          Reason for Encounter MST score 2+   Diagnosis/Problem Admission Diagnosis:  Hypokalemia [E87.6]  Hypertensive urgency [I16.0]    Problem List:    Hypertensive urgency    HTN (hypertension)    Hyperlipidemia    Hypothyroid    Orthostatic hypotension    Hypokalemia     Narrative 85 yo male adm with hypertensive urgency       PO Diet Diet: Regular/House; Fluid Consistency: Thin (IDDSI 0)  NPO Diet NPO Type: Sips with Meds   Allergies NKFA   Supplements n/a   PO Intake % %       Chewing/Swallowing Difficulty no issues identified at this time       Medications Mag Ox, Protonix, KCl   Labs  Na 148, K 3.0       Physical Findings alert, oriented     Edema no edema    GI Function last bowel movement: 4/9   Skin Status skin intact    Lines/Drains none   I/O reviewed        Height  Weight  BMI  Weight Trend     Height: 190.5 cm (75\")  Weight: 75.2 kg (165 lb 12.6 oz) (04/10/25 1706)  Body mass index is 20.72 kg/m².  Loss    Weight change: weight loss of 23 lbs (13%) over 6 month(s)    Significant?  Yes       NFPE See Malnutrition Severity Assessment       Nutrition Problem (PES) Problem: Malnutrition (moderate)  Etiology: Medical Diagnosis - AAA surgery & recovery    Signs/Symptoms: PO intake, NFPE Results, and Unintended Weight Change       Intervention/Plan Patient eating well, encouraged continued intake.  Patient agrees he would rather eat than have Boost supplement.  Prefers Premier Protein.    RD to follow up per protocol.     Malnutrition Severity Assessment      Patient meets criteria for : Moderate " (non-severe) Malnutrition  Malnutrition Type (Last 8 Hours)       Malnutrition Severity Assessment       Row Name 04/11/25 1619       Malnutrition Severity Assessment    Malnutrition Type Chronic Disease - Related Malnutrition      Row Name 04/11/25 1619       Insufficient Energy Intake     Insufficient Energy Intake Findings Moderate    Insufficient Energy Intake  <75% of est. energy requirement for > or equal to 3 months      Row Name 04/11/25 1619       Unintentional Weight Loss     Unintentional Weight Loss Findings Moderate    Unintentional Weight Loss  Weight loss greater than 10% in six months  weight loss of 13% in past 6 months      Row Name 04/11/25 1619       Muscle Loss    Loss of Muscle Mass Findings Moderate    [place] Region Moderate - slight depression    Clavicle Bone Region Moderate - some protrusion in females, visible in males    Acromion Bone Region Moderate - acromion may slightly protrude    Dorsal Hand Region Moderate - slight depression    Patellar Region Moderate - patella more prominent, less muscle definition around patella    Posterior Calf Region Moderate - some roundness, slight firmness      Row Name 04/11/25 1619       Fat Loss    Subcutaneous Fat Loss Findings Moderate    Orbital Region  Moderate -  somewhat hollowness, slightly dark circles      Row Name 04/11/25 1619       Criteria Met (Must meet criteria for severity in at least 2 of these categories: M Wasting, Fat Loss, Fluid, Secondary Signs, Wt. Status, Intake)    Patient meets criteria for  Moderate (non-severe) Malnutrition                       Results from last 7 days   Lab Units 04/11/25  0755 04/10/25  1204   SODIUM mmol/L 148* 145   POTASSIUM mmol/L 3.0* 2.9*   CHLORIDE mmol/L 101 99   CO2 mmol/L 35.6* 30.5*   BUN mg/dL 15 14   CREATININE mg/dL 1.22 1.17   CALCIUM mg/dL 9.5 9.7   BILIRUBIN mg/dL  --  0.5   ALK PHOS U/L  --  57   ALT (SGPT) U/L  --  18   AST (SGOT) U/L  --  32   GLUCOSE mg/dL 97 134*     Results from  last 7 days   Lab Units 04/11/25  0755 04/10/25  1204   MAGNESIUM mg/dL 2.4 2.0   PHOSPHORUS mg/dL 4.4  --    HEMOGLOBIN g/dL 12.1* 13.3   HEMATOCRIT % 37.9 40.9     Lab Results   Component Value Date    HGBA1C 5.50 05/09/2024     Wt Readings from Last 10 Encounters:   04/10/25 75.2 kg (165 lb 12.6 oz)   03/13/25 78.7 kg (173 lb 9.6 oz)   03/12/25 78.9 kg (174 lb)   03/06/25 77.1 kg (170 lb)   12/18/24 79.8 kg (176 lb)   11/20/24 82.6 kg (182 lb)   09/11/24 82.6 kg (182 lb)   09/05/24 83.1 kg (183 lb 3.2 oz)   08/21/24 82.6 kg (182 lb)   07/15/24 80.3 kg (177 lb)       Electronically signed by:  Michael Saldana RD  04/11/25 16:24 EDT

## 2025-04-12 ENCOUNTER — APPOINTMENT (OUTPATIENT)
Dept: CARDIOLOGY | Facility: HOSPITAL | Age: 85
DRG: 305 | End: 2025-04-12
Payer: MEDICARE

## 2025-04-12 PROBLEM — E44.0 MODERATE PROTEIN-CALORIE MALNUTRITION: Status: ACTIVE | Noted: 2025-04-12

## 2025-04-12 PROBLEM — I16.1 HYPERTENSIVE EMERGENCY: Status: ACTIVE | Noted: 2025-04-12

## 2025-04-12 LAB
ANION GAP SERPL CALCULATED.3IONS-SCNC: 9.3 MMOL/L (ref 5–15)
AORTIC ARCH: 3 CM
AORTIC DIMENSIONLESS INDEX: 0.86 (DI)
ASCENDING AORTA: 3.6 CM
AV MEAN PRESS GRAD SYS DOP V1V2: 3 MMHG
AV VMAX SYS DOP: 110 CM/SEC
BH CV ECHO MEAS - ACS: 1.95 CM
BH CV ECHO MEAS - AO MAX PG: 4.8 MMHG
BH CV ECHO MEAS - AO ROOT DIAM: 4.4 CM
BH CV ECHO MEAS - AO V2 VTI: 21 CM
BH CV ECHO MEAS - AVA(I,D): 3.1 CM2
BH CV ECHO MEAS - DIST REN A EDV LEFT: 11.3 CM/S
BH CV ECHO MEAS - DIST REN A PSV LEFT: 33.2 CM/S
BH CV ECHO MEAS - EDV(CUBED): 172.1 ML
BH CV ECHO MEAS - EDV(MOD-SP2): 103 ML
BH CV ECHO MEAS - EDV(MOD-SP4): 100 ML
BH CV ECHO MEAS - EF(MOD-SP2): 48.5 %
BH CV ECHO MEAS - EF(MOD-SP4): 44 %
BH CV ECHO MEAS - ESV(CUBED): 72.4 ML
BH CV ECHO MEAS - ESV(MOD-SP2): 53 ML
BH CV ECHO MEAS - ESV(MOD-SP4): 56 ML
BH CV ECHO MEAS - FS: 25.1 %
BH CV ECHO MEAS - IVS/LVPW: 1.05 CM
BH CV ECHO MEAS - IVSD: 1.17 CM
BH CV ECHO MEAS - LAT PEAK E' VEL: 8.1 CM/SEC
BH CV ECHO MEAS - LV DIASTOLIC VOL/BSA (35-75): 49.5 CM2
BH CV ECHO MEAS - LV MASS(C)D: 260.3 GRAMS
BH CV ECHO MEAS - LV MAX PG: 4 MMHG
BH CV ECHO MEAS - LV MEAN PG: 2 MMHG
BH CV ECHO MEAS - LV SYSTOLIC VOL/BSA (12-30): 27.7 CM2
BH CV ECHO MEAS - LV V1 MAX: 100 CM/SEC
BH CV ECHO MEAS - LV V1 VTI: 18.1 CM
BH CV ECHO MEAS - LVIDD: 5.6 CM
BH CV ECHO MEAS - LVIDS: 4.2 CM
BH CV ECHO MEAS - LVOT AREA: 3.6 CM2
BH CV ECHO MEAS - LVOT DIAM: 2.14 CM
BH CV ECHO MEAS - LVPWD: 1.12 CM
BH CV ECHO MEAS - MED PEAK E' VEL: 4.9 CM/SEC
BH CV ECHO MEAS - MID REN A EDV LEFT: 11.3 CM/S
BH CV ECHO MEAS - MID REN A PSV LEFT: 43 CM/S
BH CV ECHO MEAS - MV A DUR: 0.11 SEC
BH CV ECHO MEAS - MV A MAX VEL: 66 CM/SEC
BH CV ECHO MEAS - MV DEC SLOPE: 550.7 CM/SEC2
BH CV ECHO MEAS - MV DEC TIME: 0.16 SEC
BH CV ECHO MEAS - MV E MAX VEL: 80.1 CM/SEC
BH CV ECHO MEAS - MV E/A: 1.21
BH CV ECHO MEAS - MV MAX PG: 3.4 MMHG
BH CV ECHO MEAS - MV MEAN PG: 1.26 MMHG
BH CV ECHO MEAS - MV P1/2T: 49.1 MSEC
BH CV ECHO MEAS - MV V2 VTI: 21.1 CM
BH CV ECHO MEAS - MVA(P1/2T): 4.5 CM2
BH CV ECHO MEAS - MVA(VTI): 3.1 CM2
BH CV ECHO MEAS - PA ACC TIME: 0.1 SEC
BH CV ECHO MEAS - PA V2 MAX: 82.2 CM/SEC
BH CV ECHO MEAS - PROX REN A EDV LEFT: 8.6 CM/S
BH CV ECHO MEAS - PROX REN A PSV LEFT: 39.3 CM/S
BH CV ECHO MEAS - RAP SYSTOLE: 3 MMHG
BH CV ECHO MEAS - RV MAX PG: 1.54 MMHG
BH CV ECHO MEAS - RV V1 MAX: 62.1 CM/SEC
BH CV ECHO MEAS - RV V1 VTI: 11.4 CM
BH CV ECHO MEAS - RVSP: 26 MMHG
BH CV ECHO MEAS - SV(LVOT): 65 ML
BH CV ECHO MEAS - SV(MOD-SP2): 50 ML
BH CV ECHO MEAS - SV(MOD-SP4): 44 ML
BH CV ECHO MEAS - SVI(LVOT): 32.2 ML/M2
BH CV ECHO MEAS - SVI(MOD-SP2): 24.7 ML/M2
BH CV ECHO MEAS - SVI(MOD-SP4): 21.8 ML/M2
BH CV ECHO MEAS - TAPSE (>1.6): 1.83 CM
BH CV ECHO MEAS - TR MAX PG: 22.6 MMHG
BH CV ECHO MEAS - TR MAX VEL: 237.5 CM/SEC
BH CV ECHO MEASUREMENTS AVERAGE E/E' RATIO: 12.32
BH CV VAS BP LEFT ARM: NORMAL MMHG
BH CV VAS BP RIGHT ARM: NORMAL MMHG
BH CV VAS RENAL AORTIC MID EDV: 14 CM/S
BH CV VAS RENAL AORTIC MID PSV: 92 CM/S
BH CV VAS RENAL HILUM LEFT EDV: 3.9 CM/S
BH CV VAS RENAL HILUM LEFT PSV: 15.6 CM/S
BH CV VAS RENAL HILUM RIGHT EDV: 6.1 CM/S
BH CV VAS RENAL HILUM RIGHT PSV: 16.9 CM/S
BH CV XLRA - TDI S': 9.4 CM/SEC
BH CV XLRA MEAS - KID L LEFT: 11 CM
BH CV XLRA MEAS DIST REN A EDV RIGHT: 5.3 CM/S
BH CV XLRA MEAS DIST REN A PSV RIGHT: 22.9 CM/S
BH CV XLRA MEAS KID L RIGHT: 10.6 CM
BH CV XLRA MEAS KID W RIGHT: 4.6 CM
BH CV XLRA MEAS MID REN A EDV RIGHT: 8.9 CM/S
BH CV XLRA MEAS MID REN A PSV RIGHT: 33.1 CM/S
BH CV XLRA MEAS PROX REN A EDV RIGHT: 7.1 CM/S
BH CV XLRA MEAS PROX REN A PSV RIGHT: 27.6 CM/S
BH CV XLRA MEAS RAR LEFT: 0.47
BH CV XLRA MEAS RAR RIGHT: 0.36
BH CV XLRA MEAS RENAL A ORG EDV LEFT: 12.5 CM/S
BH CV XLRA MEAS RENAL A ORG EDV RIGHT: 6.3 CM/S
BH CV XLRA MEAS RENAL A ORG PSV LEFT: 39.1 CM/S
BH CV XLRA MEAS RENAL A ORG PSV RIGHT: 28.8 CM/S
BUN SERPL-MCNC: 13 MG/DL (ref 8–23)
BUN/CREAT SERPL: 13 (ref 7–25)
CALCIUM SPEC-SCNC: 9.1 MG/DL (ref 8.6–10.5)
CHLORIDE SERPL-SCNC: 103 MMOL/L (ref 98–107)
CO2 SERPL-SCNC: 31.7 MMOL/L (ref 22–29)
CREAT SERPL-MCNC: 1 MG/DL (ref 0.76–1.27)
DEPRECATED RDW RBC AUTO: 43.2 FL (ref 37–54)
EGFRCR SERPLBLD CKD-EPI 2021: 74.7 ML/MIN/1.73
ERYTHROCYTE [DISTWIDTH] IN BLOOD BY AUTOMATED COUNT: 14.3 % (ref 12.3–15.4)
GLUCOSE SERPL-MCNC: 94 MG/DL (ref 65–99)
HCT VFR BLD AUTO: 35.4 % (ref 37.5–51)
HGB BLD-MCNC: 11.4 G/DL (ref 13–17.7)
LEFT ATRIUM VOLUME INDEX: 21.9 ML/M2
LEFT KIDNEY WIDTH: 6 CM
LV EF BIPLANE MOD: 44.9 %
MAGNESIUM SERPL-MCNC: 2.2 MG/DL (ref 1.6–2.4)
MCH RBC QN AUTO: 27 PG (ref 26.6–33)
MCHC RBC AUTO-ENTMCNC: 32.2 G/DL (ref 31.5–35.7)
MCV RBC AUTO: 83.7 FL (ref 79–97)
PHOSPHATE SERPL-MCNC: 3.1 MG/DL (ref 2.5–4.5)
PLATELET # BLD AUTO: 160 10*3/MM3 (ref 140–450)
PMV BLD AUTO: 10.7 FL (ref 6–12)
POTASSIUM SERPL-SCNC: 3.6 MMOL/L (ref 3.5–5.2)
POTASSIUM SERPL-SCNC: 4 MMOL/L (ref 3.5–5.2)
RBC # BLD AUTO: 4.23 10*6/MM3 (ref 4.14–5.8)
RIGHT RENAL UPPER PARENCHYMA MAX: 30 CM/S
RIGHT RENAL UPPER PARENCHYMA MIN: 11 CM/S
RIGHT RENAL UPPER PARENCHYMA RI: 0.63
SINUS: 4.2 CM
SODIUM SERPL-SCNC: 144 MMOL/L (ref 136–145)
STJ: 3.7 CM
T4 FREE SERPL-MCNC: 1.63 NG/DL (ref 0.92–1.68)
TSH SERPL DL<=0.05 MIU/L-ACNC: 2.89 UIU/ML (ref 0.27–4.2)
TSH SERPL DL<=0.05 MIU/L-ACNC: 3.61 UIU/ML (ref 0.27–4.2)
WBC NRBC COR # BLD AUTO: 3.79 10*3/MM3 (ref 3.4–10.8)

## 2025-04-12 PROCEDURE — 93005 ELECTROCARDIOGRAM TRACING: CPT | Performed by: STUDENT IN AN ORGANIZED HEALTH CARE EDUCATION/TRAINING PROGRAM

## 2025-04-12 PROCEDURE — 84100 ASSAY OF PHOSPHORUS: CPT | Performed by: STUDENT IN AN ORGANIZED HEALTH CARE EDUCATION/TRAINING PROGRAM

## 2025-04-12 PROCEDURE — 84439 ASSAY OF FREE THYROXINE: CPT | Performed by: INTERNAL MEDICINE

## 2025-04-12 PROCEDURE — 93306 TTE W/DOPPLER COMPLETE: CPT | Performed by: INTERNAL MEDICINE

## 2025-04-12 PROCEDURE — 25510000001 PERFLUTREN 6.52 MG/ML SUSPENSION 2 ML VIAL: Performed by: NURSE PRACTITIONER

## 2025-04-12 PROCEDURE — 25010000002 ONDANSETRON PER 1 MG: Performed by: STUDENT IN AN ORGANIZED HEALTH CARE EDUCATION/TRAINING PROGRAM

## 2025-04-12 PROCEDURE — 93975 VASCULAR STUDY: CPT | Performed by: SURGERY

## 2025-04-12 PROCEDURE — 84443 ASSAY THYROID STIM HORMONE: CPT | Performed by: NURSE PRACTITIONER

## 2025-04-12 PROCEDURE — 85027 COMPLETE CBC AUTOMATED: CPT | Performed by: STUDENT IN AN ORGANIZED HEALTH CARE EDUCATION/TRAINING PROGRAM

## 2025-04-12 PROCEDURE — 84132 ASSAY OF SERUM POTASSIUM: CPT | Performed by: STUDENT IN AN ORGANIZED HEALTH CARE EDUCATION/TRAINING PROGRAM

## 2025-04-12 PROCEDURE — 93975 VASCULAR STUDY: CPT

## 2025-04-12 PROCEDURE — 99232 SBSQ HOSP IP/OBS MODERATE 35: CPT | Performed by: INTERNAL MEDICINE

## 2025-04-12 PROCEDURE — 93010 ELECTROCARDIOGRAM REPORT: CPT | Performed by: INTERNAL MEDICINE

## 2025-04-12 PROCEDURE — 93306 TTE W/DOPPLER COMPLETE: CPT

## 2025-04-12 PROCEDURE — 84443 ASSAY THYROID STIM HORMONE: CPT | Performed by: INTERNAL MEDICINE

## 2025-04-12 PROCEDURE — 80048 BASIC METABOLIC PNL TOTAL CA: CPT | Performed by: STUDENT IN AN ORGANIZED HEALTH CARE EDUCATION/TRAINING PROGRAM

## 2025-04-12 PROCEDURE — 83735 ASSAY OF MAGNESIUM: CPT | Performed by: STUDENT IN AN ORGANIZED HEALTH CARE EDUCATION/TRAINING PROGRAM

## 2025-04-12 RX ORDER — POTASSIUM CHLORIDE 1500 MG/1
40 TABLET, EXTENDED RELEASE ORAL EVERY 4 HOURS
Status: ACTIVE | OUTPATIENT
Start: 2025-04-12 | End: 2025-04-12

## 2025-04-12 RX ORDER — MIRTAZAPINE 15 MG/1
15 TABLET, FILM COATED ORAL NIGHTLY
Status: DISCONTINUED | OUTPATIENT
Start: 2025-04-12 | End: 2025-04-12

## 2025-04-12 RX ORDER — ONDANSETRON 2 MG/ML
4 INJECTION INTRAMUSCULAR; INTRAVENOUS EVERY 6 HOURS PRN
Status: DISCONTINUED | OUTPATIENT
Start: 2025-04-12 | End: 2025-04-14 | Stop reason: HOSPADM

## 2025-04-12 RX ORDER — HYDRALAZINE HYDROCHLORIDE 25 MG/1
25 TABLET, FILM COATED ORAL EVERY EVENING
Status: DISCONTINUED | OUTPATIENT
Start: 2025-04-12 | End: 2025-04-13

## 2025-04-12 RX ORDER — MIRTAZAPINE 15 MG/1
7.5 TABLET, FILM COATED ORAL NIGHTLY
Status: DISCONTINUED | OUTPATIENT
Start: 2025-04-12 | End: 2025-04-14 | Stop reason: HOSPADM

## 2025-04-12 RX ORDER — METOPROLOL SUCCINATE 25 MG/1
25 TABLET, EXTENDED RELEASE ORAL EVERY EVENING
Status: DISCONTINUED | OUTPATIENT
Start: 2025-04-12 | End: 2025-04-14 | Stop reason: HOSPADM

## 2025-04-12 RX ORDER — MIDODRINE HYDROCHLORIDE 5 MG/1
5 TABLET ORAL
Status: DISCONTINUED | OUTPATIENT
Start: 2025-04-12 | End: 2025-04-14 | Stop reason: HOSPADM

## 2025-04-12 RX ADMIN — PREGABALIN 50 MG: 50 CAPSULE ORAL at 08:14

## 2025-04-12 RX ADMIN — HYDRALAZINE HYDROCHLORIDE 25 MG: 25 TABLET ORAL at 17:12

## 2025-04-12 RX ADMIN — MIRTAZAPINE 7.5 MG: 15 TABLET, FILM COATED ORAL at 20:16

## 2025-04-12 RX ADMIN — MAGNESIUM OXIDE TAB 400 MG (240 MG ELEMENTAL MG) 400 MG: 400 (240 MG) TAB at 08:14

## 2025-04-12 RX ADMIN — ASPIRIN 81 MG: 81 TABLET, COATED ORAL at 20:16

## 2025-04-12 RX ADMIN — ACETAMINOPHEN 650 MG: 325 TABLET, FILM COATED ORAL at 11:37

## 2025-04-12 RX ADMIN — Medication 10 MG: at 11:38

## 2025-04-12 RX ADMIN — PANTOPRAZOLE SODIUM 40 MG: 40 TABLET, DELAYED RELEASE ORAL at 20:16

## 2025-04-12 RX ADMIN — ONDANSETRON 4 MG: 2 INJECTION, SOLUTION INTRAMUSCULAR; INTRAVENOUS at 17:57

## 2025-04-12 RX ADMIN — METOPROLOL TARTRATE 25 MG: 25 TABLET, FILM COATED ORAL at 08:14

## 2025-04-12 RX ADMIN — PREGABALIN 50 MG: 50 CAPSULE ORAL at 20:16

## 2025-04-12 RX ADMIN — PYRIDOSTIGMINE BROMIDE 30 MG: 60 TABLET ORAL at 20:16

## 2025-04-12 RX ADMIN — POTASSIUM CHLORIDE 40 MEQ: 1500 TABLET, EXTENDED RELEASE ORAL at 03:54

## 2025-04-12 RX ADMIN — ACETAMINOPHEN 650 MG: 325 TABLET, FILM COATED ORAL at 20:15

## 2025-04-12 RX ADMIN — PYRIDOSTIGMINE BROMIDE 30 MG: 60 TABLET ORAL at 08:14

## 2025-04-12 RX ADMIN — PANTOPRAZOLE SODIUM 40 MG: 40 TABLET, DELAYED RELEASE ORAL at 08:14

## 2025-04-12 RX ADMIN — PERFLUTREN 2 ML: 6.52 INJECTION, SUSPENSION INTRAVENOUS at 10:55

## 2025-04-12 RX ADMIN — LEVOTHYROXINE SODIUM 100 MCG: 100 TABLET ORAL at 05:12

## 2025-04-12 RX ADMIN — DESVENLAFAXINE SUCCINATE 25 MG: 25 TABLET, EXTENDED RELEASE ORAL at 08:13

## 2025-04-12 RX ADMIN — METOPROLOL SUCCINATE 25 MG: 25 TABLET, EXTENDED RELEASE ORAL at 17:13

## 2025-04-12 NOTE — PROGRESS NOTES
"CC: Orthostatic hypotension    Interval History: No new acute events overnight      Vital Signs  Temp:  [97.2 °F (36.2 °C)-98.4 °F (36.9 °C)] 97.2 °F (36.2 °C)  Heart Rate:  [71-86] 73  Resp:  [16-18] 18  BP: (123-201)/() 193/110    Intake/Output Summary (Last 24 hours) at 4/12/2025 1157  Last data filed at 4/12/2025 1113  Gross per 24 hour   Intake 1290 ml   Output 1450 ml   Net -160 ml     Flowsheet Rows      Flowsheet Row First Filed Value   Admission Height 190.5 cm (75\") Documented at 04/10/2025 1706   Admission Weight 75.2 kg (165 lb 12.6 oz) Documented at 04/10/2025 1706            PHYSICAL EXAM:  General: No acute distress  Resp:NL Rate, symmetric chest expansion,unlabored, clear  CV:NL rate and rhythm, NL PMI, NL S1 and S2, no Murmur, no gallop, no rub, No JVD.   ABD:Nl sounds, no masses or tenderness, nondistended, no guarding or rebound  Neuro: alert,cooperative and oriented  Extr:Normal pedal pulses, No edema or cyanosis, moves all extremities      Results Review:    Results from last 7 days   Lab Units 04/12/25  0823 04/12/25  0511   SODIUM mmol/L  --  144   POTASSIUM mmol/L 4.0 3.6   CHLORIDE mmol/L  --  103   CO2 mmol/L  --  31.7*   BUN mg/dL  --  13   CREATININE mg/dL  --  1.00   GLUCOSE mg/dL  --  94   CALCIUM mg/dL  --  9.1         Results from last 7 days   Lab Units 04/12/25  0511   WBC 10*3/mm3 3.79   HEMOGLOBIN g/dL 11.4*   HEMATOCRIT % 35.4*   PLATELETS 10*3/mm3 160             Results from last 7 days   Lab Units 04/12/25  0511   MAGNESIUM mg/dL 2.2         I reviewed the patient's new clinical results.  I personally viewed and interpreted the patient's EKG/Telemetry data        Medication Review:   Meds reviewed         Assessment/Plan    Ascending aortic aneurysm status post ascending aortic aneurysm repair with a 30 mm Dacron interposition graft/ reductive root aortoplasty of the right and noncoronary sinuses/ left atrial appendage epicardial closure with a 45 mm AtriClip (Cindi) " 5/10/24. He had post op AF. Temporarily on amiodarone.   Admitted for further evaluation of disabling orthostatic hypotension with severe supine hypertension-tried on several different medications the past including midodrine, fludrocortisone, Mestinon and droxidopa to no avail.  He  Has lost more than 30 pounds unintentionally from decreased appetite and food tasting bad.  No postprandial abdominal pain.  Still will focus on supportive care with at least thigh-high medical compression stocking, adequate oral and as needed IV fluid hydration.  He would benefit from leg strength exercise with a  and needs to build up muscle mass/gain some weight.  He has history of depression treated with desvenlafaxine.  May benefit from treating with mirtazapine that will stimulate appetite and weight gain.  Stay up during daytime and nighttime sleep with head of bed elevated 30-45 degrees.  Short-acting BP meds at nighttime only.      Wallace Chase MD  04/12/25  11:57 EDT

## 2025-04-12 NOTE — PROGRESS NOTES
Name: Panfilo Feliz ADMIT: 4/10/2025   : 1940  PCP: Stephan Rubio MD    MRN: [id number] LOS: 0 days   AGE/SEX: 84 y.o. male  ROOM: [facility]     Subjective   Subjective     No events overnight. No complaints.        Objective   Objective   Vital Signs  Temp:  [97.2 °F (36.2 °C)-98.2 °F (36.8 °C)] 97.3 °F (36.3 °C)  Heart Rate:  [71-80] 78  Resp:  [16-18] 18  BP: (111-193)/() 111/74  SpO2:  [93 %-97 %] 97 %  on   ;   Device (Oxygen Therapy): room air  Body mass index is 20.62 kg/m².  Physical Exam  Constitutional:       General: He is not in acute distress.     Appearance: He is not toxic-appearing.   Cardiovascular:      Rate and Rhythm: Normal rate and regular rhythm.      Heart sounds: Normal heart sounds.   Pulmonary:      Effort: Pulmonary effort is normal.      Breath sounds: Normal breath sounds.   Abdominal:      General: Bowel sounds are normal.      Palpations: Abdomen is soft.   Musculoskeletal:         General: No tenderness.      Right lower leg: No edema.      Left lower leg: No edema.   Neurological:      Mental Status: He is alert.   Psychiatric:         Mood and Affect: Mood normal.         Behavior: Behavior normal.         Results Review     I reviewed the patient's new clinical results.  Results from last 7 days   Lab Units 25  0511 25  0755 04/10/25  1204   WBC 10*3/mm3 3.79 2.80* 4.49   HEMOGLOBIN g/dL 11.4* 12.1* 13.3   PLATELETS 10*3/mm3 160 169 205     Results from last 7 days   Lab Units 25  0823 25  0511 25  2208 25  0755 04/10/25  1204   SODIUM mmol/L  --  144  --  148* 145   POTASSIUM mmol/L 4.0 3.6 3.5 3.0* 2.9*   CHLORIDE mmol/L  --  103  --  101 99   CO2 mmol/L  --  31.7*  --  35.6* 30.5*   BUN mg/dL  --  13  --  15 14   CREATININE mg/dL  --  1.00  --  1.22 1.17   GLUCOSE mg/dL  --  94  --  97 134*   Estimated Creatinine Clearance: 59.2 mL/min (by C-G formula based on SCr of 1 mg/dL).  Results from last 7 days   Lab Units  "04/10/25  1204   ALBUMIN g/dL 4.1   BILIRUBIN mg/dL 0.5   ALK PHOS U/L 57   AST (SGOT) U/L 32   ALT (SGPT) U/L 18     Results from last 7 days   Lab Units 04/12/25  0511 04/11/25  0755 04/10/25  1204   CALCIUM mg/dL 9.1 9.5 9.7   ALBUMIN g/dL  --   --  4.1   MAGNESIUM mg/dL 2.2 2.4 2.0   PHOSPHORUS mg/dL 3.1 4.4  --        COVID19   Date Value Ref Range Status   07/15/2024 Not Detected Not Detected - Ref. Range Final   05/09/2024 Not Detected Not Detected - Ref. Range Final   04/17/2024 Not Detected Not Detected - Ref. Range Final   01/11/2023 Not Detected Not Detected - Ref. Range Final     SARS-CoV-2 PCR   Date Value Ref Range Status   02/03/2021 Not Detected Not Detected Final     Comment:     Nucleic acid specific to SARS-CoV-2 (COVID-19) virus was not detected in  this sample by the TaqPath (TM) COVID-19 Combo Kit.          SARS-CoV-2 (COVID-19) nucleic acid testing performed using [employer] Aptima (R) SARS-CoV-2 Assay or Future Simple TaqPath (TM)  COVID-19 Combo Kit as indicated above under Emergency Use Authorization (EUA) from the FDA. Aptima (R) and TaqPath (TM) test performance  characteristics verified by [facility] in accordance with the FDAs Guidance Document (Policy for Diagnostic Tests for Coronavirus Disease-2019  during the Public Health Emergency) issued on March 16, 2020. The laboratory is regulated under CLIA as qualified to perform high-complexity testing  Unless otherwise noted, all testing was performed at [facility], CLIA No. [id number], [place] State Licensee No. [id number]     No results found for: \"HGBA1C\", \"POCGLU\"    Duplex Renal Artery - Bilateral Complete CAR    Normal right renal artery.    Normal left renal artery.    Scheduled Medications  aspirin, 81 mg, Oral, Nightly  hydrALAZINE, 25 mg, Oral, Q PM  levothyroxine, 100 mcg, Oral, Q AM  magnesium oxide, 400 mg, Oral, Daily  metoprolol succinate XL, 25 mg, Oral, Q PM  midodrine, 5 mg, Oral, BID AC  mirtazapine, 7.5 mg, Oral, " Nightly  pantoprazole, 40 mg, Oral, BID  potassium chloride ER, 40 mEq, Oral, Q4H  pregabalin, 50 mg, Oral, BID  pyridostigmine, 30 mg, Oral, BID    Infusions   Diet  Diet: Cardiac; Healthy Heart (2-3 Na+); Fluid Consistency: Thin (IDDSI 0)       Assessment/Plan     Active Hospital Problems    Diagnosis  POA    **Hypertensive urgency [I16.0]  Yes    Hypokalemia [E87.6]  Yes    Orthostatic hypotension [I95.1]  Yes    Hypothyroid [E03.9]  Yes    Hyperlipidemia [E78.5]  Yes    HTN (hypertension) [I10]  Yes      Resolved Hospital Problems   No resolved problems to display.       84 y.o. male admitted with Hypertensive urgency.    Hypertensive urgency-renal artery duplex was negative. CTA neck did not show any hemodynamically significant stenosis. Echo is pending. This is likely related to midodrine, fludrocortisone which are being used to treat his severe orthostatic hypotension. Better now that the fludrocortisone has been held. Unfortunately, I think he will likely need to tolerate hypertension in order to avoid significant symptoms related to orthostasis to at least some degree  Orthostatic hypotension-midodrine dose has been reduced and fludrocortisone held as above. He's lost about 30 lbs unintentionally due to dysgeusia and cardiology is concerned that this may be provoking his hypotension and so have started mirtazapine. We'll have to be careful with this medication as it is often associated with orthostasis. Will request an abdominal binder and thigh high compression stockings to see if that will help at all.   Hypokalemia-likely secondary to his fludrocortisone. Improved with replacement  Long qtc-possibly related to hypokalemia. Repeat EKG   [id number]  Hypothyroidism-synthroid  Thoracic aortic aneurysm-s/p ascending aortic aneurysm repair in May 2024  MGUS  SCDs for DVT prophylaxis.  Full code.  Discussed with patient, nursing staff, and consulting provider.  Anticipate discharge home with home health when  cleared by consultants.      Renan Gentile MD  [facility]  04/12/25  17:01 EDT

## 2025-04-12 NOTE — PLAN OF CARE
Pt. is A&O X 4. Complained of headache and eyes pain. Pain pills PRN given, and then relieved well. [place]. Double vision to both eyes. RA.  K+ is 3.5. Potassium protocol released. Two doses completed and will draw blood to check potassium at 0805. Urinal at bedside. Call light within reach.     Goal Outcome Evaluation:  Plan of Care Reviewed With: patient  Progress: improving     Problem: Adult Inpatient Plan of Care  Goal: Plan of Care Review  Outcome: Progressing  Flowsheets (Taken 4/12/2025 0418)  Progress: improving  Plan of Care Reviewed With: patient  Goal: Patient-Specific Goal (Individualized)  Outcome: Progressing  Goal: Absence of Hospital-Acquired Illness or Injury  Outcome: Progressing  Intervention: Identify and Manage Fall Risk  Recent Flowsheet Documentation  Taken 4/12/2025 0354 by Christiano Ellsworth RN  Safety Promotion/Fall Prevention:   safety round/check completed   room organization consistent   lighting adjusted   fall prevention program maintained   clutter free environment maintained   activity supervised  Taken 4/12/2025 0202 by Christiano Ellsworth RN  Safety Promotion/Fall Prevention:   safety round/check completed   room organization consistent   lighting adjusted   fall prevention program maintained   clutter free environment maintained   activity supervised  Taken 4/11/2025 2356 by Christiano Ellsworth RN  Safety Promotion/Fall Prevention:   safety round/check completed   room organization consistent   lighting adjusted   fall prevention program maintained   clutter free environment maintained   activity supervised  Taken 4/11/2025 2211 by Christiano Ellsworth RN  Safety Promotion/Fall Prevention:   safety round/check completed   room organization consistent   lighting adjusted   fall prevention program maintained   clutter free environment maintained   activity supervised  Taken 4/11/2025 2016 by Christiano Ellsworth RN  Safety Promotion/Fall Prevention:   safety round/check completed   room organization consistent   lighting  adjusted   fall prevention program maintained   clutter free environment maintained   activity supervised  Intervention: Prevent Skin Injury  Recent Flowsheet Documentation  Taken 4/12/2025 0354 by Christiano Ellsworth RN  Body Position: position changed independently  Taken 4/12/2025 0202 by Christiano Ellsworth RN  Body Position: position changed independently  Taken 4/11/2025 2356 by Christiano Ellsworth RN  Body Position: position changed independently  Taken 4/11/2025 2211 by Christiano Ellsworth RN  Body Position: position changed independently  Taken 4/11/2025 2016 by Christiano Ellsworth RN  Body Position: position changed independently  Skin Protection: transparent dressing maintained  Intervention: Prevent and Manage VTE (Venous Thromboembolism) Risk  Recent Flowsheet Documentation  Taken 4/12/2025 0354 by Christiano Ellsworth RN  VTE Prevention/Management: SCDs (sequential compression devices) on  Taken 4/11/2025 2356 by Christiano Ellsworth RN  VTE Prevention/Management:   bilateral   SCDs (sequential compression devices) on  Taken 4/11/2025 2016 by Christiano Ellsworth RN  VTE Prevention/Management:   bilateral   SCDs (sequential compression devices) on  Intervention: Prevent Infection  Recent Flowsheet Documentation  Taken 4/12/2025 0354 by Christiano Ellsworth RN  Infection Prevention:   rest/sleep promoted   single patient room provided   hand hygiene promoted  Taken 4/12/2025 0202 by Christiano Ellsworth RN  Infection Prevention:   rest/sleep promoted   single patient room provided   hand hygiene promoted  Taken 4/11/2025 2356 by Christiano Ellsworth RN  Infection Prevention:   rest/sleep promoted   single patient room provided   hand hygiene promoted  Taken 4/11/2025 2211 by Christiano Ellsworth RN  Infection Prevention:   rest/sleep promoted   single patient room provided   hand hygiene promoted  Taken 4/11/2025 2016 by Christiano Ellsworth RN  Infection Prevention:   rest/sleep promoted   single patient room provided   hand hygiene promoted  Goal: Optimal Comfort and Wellbeing  Outcome:  Progressing  Intervention: Monitor Pain and Promote Comfort  Recent Flowsheet Documentation  Taken 4/11/2025 2211 by Christiano Ellsworth RN  Pain Management Interventions:   pain medication given   pillow support provided   position adjusted   relaxation techniques promoted   quiet environment facilitated  Intervention: Provide Person-Centered Care  Recent Flowsheet Documentation  Taken 4/11/2025 2016 by Christiano Ellsworth RN  Trust Relationship/Rapport:   care explained   choices provided   emotional support provided   empathic listening provided   questions answered   questions encouraged  Goal: Readiness for Transition of Care  Outcome: Progressing     Problem: Comorbidity Management  Goal: Blood Pressure in Desired Range  Outcome: Progressing  Intervention: Maintain Blood Pressure Management  Recent Flowsheet Documentation  Taken 4/12/2025 0354 by Christiano Ellsworth RN  Medication Review/Management: medications reviewed  Taken 4/11/2025 2356 by Christiano Ellsworth RN  Medication Review/Management: medications reviewed  Taken 4/11/2025 2211 by Christiano Ellsworth RN  Medication Review/Management: medications reviewed  Taken 4/11/2025 2016 by Christiano Ellsworth RN  Medication Review/Management: medications reviewed     Problem: Fall Injury Risk  Goal: Absence of Fall and Fall-Related Injury  Outcome: Progressing  Intervention: Identify and Manage Contributors  Recent Flowsheet Documentation  Taken 4/12/2025 0354 by Christiano Ellsworth RN  Medication Review/Management: medications reviewed  Taken 4/11/2025 2356 by Christiano Ellsworth RN  Medication Review/Management: medications reviewed  Taken 4/11/2025 2211 by Christiano Ellsworth RN  Medication Review/Management: medications reviewed  Taken 4/11/2025 2016 by Christiano Ellsworth RN  Medication Review/Management: medications reviewed  Intervention: Promote Injury-Free Environment  Recent Flowsheet Documentation  Taken 4/12/2025 0354 by Christiano Ellsworth RN  Safety Promotion/Fall Prevention:   safety round/check completed   room organization  consistent   lighting adjusted   fall prevention program maintained   clutter free environment maintained   activity supervised  Taken 4/12/2025 0202 by Christiano Ellsworth RN  Safety Promotion/Fall Prevention:   safety round/check completed   room organization consistent   lighting adjusted   fall prevention program maintained   clutter free environment maintained   activity supervised  Taken 4/11/2025 2356 by Christiano Ellsworth RN  Safety Promotion/Fall Prevention:   safety round/check completed   room organization consistent   lighting adjusted   fall prevention program maintained   clutter free environment maintained   activity supervised  Taken 4/11/2025 2211 by Christiano Ellsworth RN  Safety Promotion/Fall Prevention:   safety round/check completed   room organization consistent   lighting adjusted   fall prevention program maintained   clutter free environment maintained   activity supervised  Taken 4/11/2025 2016 by Christiano Ellsworth RN  Safety Promotion/Fall Prevention:   safety round/check completed   room organization consistent   lighting adjusted   fall prevention program maintained   clutter free environment maintained   activity supervised  Goal: Absence of Fall and Fall-Related Injury  Outcome: Progressing  Intervention: Identify and Manage Contributors  Recent Flowsheet Documentation  Taken 4/12/2025 0354 by Christiano Ellsworth RN  Medication Review/Management: medications reviewed  Taken 4/11/2025 2356 by Christiano Ellsworth RN  Medication Review/Management: medications reviewed  Taken 4/11/2025 2211 by Christiano Ellsworth RN  Medication Review/Management: medications reviewed  Taken 4/11/2025 2016 by Christiano Ellsworth RN  Medication Review/Management: medications reviewed  Intervention: Promote Injury-Free Environment  Recent Flowsheet Documentation  Taken 4/12/2025 0354 by Christiano Ellsworth RN  Safety Promotion/Fall Prevention:   safety round/check completed   room organization consistent   lighting adjusted   fall prevention program maintained   clutter free  environment maintained   activity supervised  Taken 4/12/2025 0202 by Christiano Ellsworth RN  Safety Promotion/Fall Prevention:   safety round/check completed   room organization consistent   lighting adjusted   fall prevention program maintained   clutter free environment maintained   activity supervised  Taken 4/11/2025 2356 by Christiano Ellsworth RN  Safety Promotion/Fall Prevention:   safety round/check completed   room organization consistent   lighting adjusted   fall prevention program maintained   clutter free environment maintained   activity supervised  Taken 4/11/2025 2211 by Christiano Ellsworth RN  Safety Promotion/Fall Prevention:   safety round/check completed   room organization consistent   lighting adjusted   fall prevention program maintained   clutter free environment maintained   activity supervised  Taken 4/11/2025 2016 by Christiano Ellsworth RN  Safety Promotion/Fall Prevention:   safety round/check completed   room organization consistent   lighting adjusted   fall prevention program maintained   clutter free environment maintained   activity supervised     Problem: Electrolyte Imbalance  Goal: Electrolyte Balance  Outcome: Progressing     Problem: Nausea and Vomiting  Goal: Nausea and Vomiting Relief  Outcome: Progressing     Problem: Syncope  Goal: Absence of Syncopal Symptoms  Outcome: Progressing  Intervention: Manage Effect of Syncopal Symptoms  Recent Flowsheet Documentation  Taken 4/12/2025 0354 by Christiano Ellsworth RN  Safety Promotion/Fall Prevention:   safety round/check completed   room organization consistent   lighting adjusted   fall prevention program maintained   clutter free environment maintained   activity supervised  Taken 4/12/2025 0202 by Christiano Ellsworth RN  Safety Promotion/Fall Prevention:   safety round/check completed   room organization consistent   lighting adjusted   fall prevention program maintained   clutter free environment maintained   activity supervised  Taken 4/11/2025 2356 by Christiano Ellsworth  RN  Safety Promotion/Fall Prevention:   safety round/check completed   room organization consistent   lighting adjusted   fall prevention program maintained   clutter free environment maintained   activity supervised  Taken 4/11/2025 2211 by Christiano Ellsworth RN  Safety Promotion/Fall Prevention:   safety round/check completed   room organization consistent   lighting adjusted   fall prevention program maintained   clutter free environment maintained   activity supervised  Taken 4/11/2025 2016 by Christiano Ellsworth, RN  Supportive Measures:   active listening utilized   self-care encouraged  Safety Promotion/Fall Prevention:   safety round/check completed   room organization consistent   lighting adjusted   fall prevention program maintained   clutter free environment maintained   activity supervised     Problem: Malnutrition  Goal: Improved Nutritional Intake  Outcome: Progressing

## 2025-04-13 LAB
ANION GAP SERPL CALCULATED.3IONS-SCNC: 7 MMOL/L (ref 5–15)
BUN SERPL-MCNC: 16 MG/DL (ref 8–23)
BUN/CREAT SERPL: 14 (ref 7–25)
CALCIUM SPEC-SCNC: 9.3 MG/DL (ref 8.6–10.5)
CHLORIDE SERPL-SCNC: 103 MMOL/L (ref 98–107)
CO2 SERPL-SCNC: 31 MMOL/L (ref 22–29)
CREAT SERPL-MCNC: 1.14 MG/DL (ref 0.76–1.27)
DEPRECATED RDW RBC AUTO: 44 FL (ref 37–54)
EGFRCR SERPLBLD CKD-EPI 2021: 63.8 ML/MIN/1.73
ERYTHROCYTE [DISTWIDTH] IN BLOOD BY AUTOMATED COUNT: 14.5 % (ref 12.3–15.4)
FERRITIN SERPL-MCNC: 38.2 NG/ML (ref 30–400)
GLUCOSE SERPL-MCNC: 94 MG/DL (ref 65–99)
HCT VFR BLD AUTO: 38.4 % (ref 37.5–51)
HGB BLD-MCNC: 12.4 G/DL (ref 13–17.7)
HIV 1+2 AB+HIV1 P24 AG SERPL QL IA: NORMAL
MAGNESIUM SERPL-MCNC: 2.2 MG/DL (ref 1.6–2.4)
MCH RBC QN AUTO: 27.1 PG (ref 26.6–33)
MCHC RBC AUTO-ENTMCNC: 32.3 G/DL (ref 31.5–35.7)
MCV RBC AUTO: 84 FL (ref 79–97)
PHOSPHATE SERPL-MCNC: 4.3 MG/DL (ref 2.5–4.5)
PLATELET # BLD AUTO: 161 10*3/MM3 (ref 140–450)
PMV BLD AUTO: 10.6 FL (ref 6–12)
POTASSIUM SERPL-SCNC: 3.7 MMOL/L (ref 3.5–5.2)
QT INTERVAL: 419 MS
QTC INTERVAL: 472 MS
RBC # BLD AUTO: 4.57 10*6/MM3 (ref 4.14–5.8)
SODIUM SERPL-SCNC: 141 MMOL/L (ref 136–145)
WBC NRBC COR # BLD AUTO: 3.22 10*3/MM3 (ref 3.4–10.8)

## 2025-04-13 PROCEDURE — 99232 SBSQ HOSP IP/OBS MODERATE 35: CPT | Performed by: INTERNAL MEDICINE

## 2025-04-13 PROCEDURE — 82728 ASSAY OF FERRITIN: CPT | Performed by: STUDENT IN AN ORGANIZED HEALTH CARE EDUCATION/TRAINING PROGRAM

## 2025-04-13 PROCEDURE — 84425 ASSAY OF VITAMIN B-1: CPT | Performed by: STUDENT IN AN ORGANIZED HEALTH CARE EDUCATION/TRAINING PROGRAM

## 2025-04-13 PROCEDURE — 84100 ASSAY OF PHOSPHORUS: CPT | Performed by: STUDENT IN AN ORGANIZED HEALTH CARE EDUCATION/TRAINING PROGRAM

## 2025-04-13 PROCEDURE — 83735 ASSAY OF MAGNESIUM: CPT | Performed by: STUDENT IN AN ORGANIZED HEALTH CARE EDUCATION/TRAINING PROGRAM

## 2025-04-13 PROCEDURE — 80048 BASIC METABOLIC PNL TOTAL CA: CPT | Performed by: STUDENT IN AN ORGANIZED HEALTH CARE EDUCATION/TRAINING PROGRAM

## 2025-04-13 PROCEDURE — 85027 COMPLETE CBC AUTOMATED: CPT | Performed by: STUDENT IN AN ORGANIZED HEALTH CARE EDUCATION/TRAINING PROGRAM

## 2025-04-13 PROCEDURE — G0432 EIA HIV-1/HIV-2 SCREEN: HCPCS | Performed by: STUDENT IN AN ORGANIZED HEALTH CARE EDUCATION/TRAINING PROGRAM

## 2025-04-13 RX ORDER — HYDRALAZINE HYDROCHLORIDE 50 MG/1
50 TABLET, FILM COATED ORAL EVERY EVENING
Status: DISCONTINUED | OUTPATIENT
Start: 2025-04-13 | End: 2025-04-14 | Stop reason: HOSPADM

## 2025-04-13 RX ORDER — BISACODYL 10 MG
10 SUPPOSITORY, RECTAL RECTAL DAILY PRN
Status: DISCONTINUED | OUTPATIENT
Start: 2025-04-13 | End: 2025-04-14 | Stop reason: HOSPADM

## 2025-04-13 RX ORDER — AMOXICILLIN 250 MG
2 CAPSULE ORAL 2 TIMES DAILY PRN
Status: DISCONTINUED | OUTPATIENT
Start: 2025-04-13 | End: 2025-04-14 | Stop reason: HOSPADM

## 2025-04-13 RX ORDER — BISACODYL 5 MG/1
5 TABLET, DELAYED RELEASE ORAL DAILY PRN
Status: DISCONTINUED | OUTPATIENT
Start: 2025-04-13 | End: 2025-04-14 | Stop reason: HOSPADM

## 2025-04-13 RX ORDER — POLYETHYLENE GLYCOL 3350 17 G/17G
17 POWDER, FOR SOLUTION ORAL DAILY PRN
Status: DISCONTINUED | OUTPATIENT
Start: 2025-04-13 | End: 2025-04-14 | Stop reason: HOSPADM

## 2025-04-13 RX ADMIN — PREGABALIN 50 MG: 50 CAPSULE ORAL at 08:44

## 2025-04-13 RX ADMIN — MIRTAZAPINE 7.5 MG: 15 TABLET, FILM COATED ORAL at 20:39

## 2025-04-13 RX ADMIN — PYRIDOSTIGMINE BROMIDE 30 MG: 60 TABLET ORAL at 20:40

## 2025-04-13 RX ADMIN — PANTOPRAZOLE SODIUM 40 MG: 40 TABLET, DELAYED RELEASE ORAL at 08:44

## 2025-04-13 RX ADMIN — LEVOTHYROXINE SODIUM 100 MCG: 100 TABLET ORAL at 05:41

## 2025-04-13 RX ADMIN — ASPIRIN 81 MG: 81 TABLET, COATED ORAL at 20:40

## 2025-04-13 RX ADMIN — METOPROLOL SUCCINATE 25 MG: 25 TABLET, EXTENDED RELEASE ORAL at 17:25

## 2025-04-13 RX ADMIN — SENNOSIDES AND DOCUSATE SODIUM 2 TABLET: 50; 8.6 TABLET ORAL at 15:46

## 2025-04-13 RX ADMIN — MAGNESIUM OXIDE TAB 400 MG (240 MG ELEMENTAL MG) 400 MG: 400 (240 MG) TAB at 08:44

## 2025-04-13 RX ADMIN — PANTOPRAZOLE SODIUM 40 MG: 40 TABLET, DELAYED RELEASE ORAL at 20:40

## 2025-04-13 RX ADMIN — HYDRALAZINE HYDROCHLORIDE 50 MG: 50 TABLET ORAL at 17:25

## 2025-04-13 RX ADMIN — PYRIDOSTIGMINE BROMIDE 30 MG: 60 TABLET ORAL at 08:44

## 2025-04-13 RX ADMIN — MIDODRINE HYDROCHLORIDE 5 MG: 5 TABLET ORAL at 17:25

## 2025-04-13 RX ADMIN — Medication 100 MG: at 12:06

## 2025-04-13 RX ADMIN — PREGABALIN 50 MG: 50 CAPSULE ORAL at 20:40

## 2025-04-13 RX ADMIN — MIDODRINE HYDROCHLORIDE 5 MG: 5 TABLET ORAL at 08:45

## 2025-04-13 NOTE — PLAN OF CARE
Pt. is A&O X 4. Spouse at bedside by bedtime. Complained of headache and sore to left shoulder. Pain pills PRN given, and then relieved well. Oxygen applied at 2 L by NC since SpO2 is less than 90%. Orthostatic BP completed. NSR on Tele. Call light within reach.     Goal Outcome Evaluation:  Plan of Care Reviewed With: patient  Progress: improving     Problem: Adult Inpatient Plan of Care  Goal: Plan of Care Review  Outcome: Progressing  Flowsheets (Taken 4/13/2025 0410)  Progress: improving  Plan of Care Reviewed With: patient  Goal: Patient-Specific Goal (Individualized)  Outcome: Progressing  Goal: Absence of Hospital-Acquired Illness or Injury  Outcome: Progressing  Intervention: Identify and Manage Fall Risk  Recent Flowsheet Documentation  Taken 4/13/2025 0403 by Christiano Ellsworth RN  Safety Promotion/Fall Prevention:   safety round/check completed   room organization consistent   lighting adjusted   fall prevention program maintained   clutter free environment maintained   activity supervised  Taken 4/13/2025 0202 by Christiano Ellsworth RN  Safety Promotion/Fall Prevention:   safety round/check completed   room organization consistent   lighting adjusted   fall prevention program maintained   clutter free environment maintained   activity supervised  Taken 4/13/2025 0000 by Christiano Ellsworth RN  Safety Promotion/Fall Prevention:   safety round/check completed   room organization consistent   lighting adjusted   fall prevention program maintained   clutter free environment maintained   activity supervised  Taken 4/12/2025 2202 by Christiano Ellsworth RN  Safety Promotion/Fall Prevention:   safety round/check completed   room organization consistent   lighting adjusted   fall prevention program maintained   clutter free environment maintained   activity supervised  Taken 4/12/2025 2015 by Christiano Ellsworth RN  Safety Promotion/Fall Prevention:   safety round/check completed   room organization consistent   lighting adjusted   fall prevention  program maintained   clutter free environment maintained   activity supervised  Intervention: Prevent Skin Injury  Recent Flowsheet Documentation  Taken 4/13/2025 0403 by Christiano Ellsworth RN  Body Position: position changed independently  Taken 4/13/2025 0202 by Christiano Ellsworth RN  Body Position: position changed independently  Taken 4/13/2025 0000 by Christiano Ellsworth RN  Body Position: position changed independently  Skin Protection: transparent dressing maintained  Taken 4/12/2025 2202 by Christiano Ellsworth RN  Body Position: position changed independently  Taken 4/12/2025 2015 by Christiano Ellsworth RN  Body Position: position changed independently  Skin Protection: transparent dressing maintained  Intervention: Prevent and Manage VTE (Venous Thromboembolism) Risk  Recent Flowsheet Documentation  Taken 4/13/2025 0000 by Christiano Ellsworth RN  VTE Prevention/Management:   bilateral   SCDs (sequential compression devices) on  Taken 4/12/2025 2015 by Christiano Ellsworth RN  VTE Prevention/Management:   bilateral   SCDs (sequential compression devices) off  Intervention: Prevent Infection  Recent Flowsheet Documentation  Taken 4/13/2025 0403 by Christiano Ellsworth RN  Infection Prevention:   rest/sleep promoted   single patient room provided   hand hygiene promoted  Taken 4/13/2025 0202 by Christiano Ellsworth RN  Infection Prevention:   rest/sleep promoted   single patient room provided   hand hygiene promoted  Taken 4/13/2025 0000 by Christiano Ellswotrh RN  Infection Prevention:   rest/sleep promoted   single patient room provided   hand hygiene promoted  Taken 4/12/2025 2202 by Christiano Ellsworth RN  Infection Prevention:   rest/sleep promoted   single patient room provided   hand hygiene promoted  Taken 4/12/2025 2015 by Christiano Ellsworth RN  Infection Prevention:   single patient room provided   visitors restricted/screened   personal protective equipment utilized  Goal: Optimal Comfort and Wellbeing  Outcome: Progressing  Intervention: Monitor Pain and Promote Comfort  Recent Flowsheet  Documentation  Taken 4/12/2025 2015 by Christiano Ellsworth RN  Pain Management Interventions:   pain medication given   position adjusted   pillow support provided   quiet environment facilitated   relaxation techniques promoted  Intervention: Provide Person-Centered Care  Recent Flowsheet Documentation  Taken 4/13/2025 0000 by Christiano Ellsworth RN  Trust Relationship/Rapport: emotional support provided  Taken 4/12/2025 2015 by Christiano Ellsworth RN  Trust Relationship/Rapport:   care explained   choices provided   emotional support provided   empathic listening provided   questions answered   questions encouraged  Goal: Readiness for Transition of Care  Outcome: Progressing     Problem: Comorbidity Management  Goal: Blood Pressure in Desired Range  Outcome: Progressing  Intervention: Maintain Blood Pressure Management  Recent Flowsheet Documentation  Taken 4/12/2025 2015 by Christiano Ellsworth RN  Medication Review/Management: medications reviewed     Problem: Fall Injury Risk  Goal: Absence of Fall and Fall-Related Injury  Outcome: Progressing  Intervention: Identify and Manage Contributors  Recent Flowsheet Documentation  Taken 4/12/2025 2015 by Christiano Ellsworth RN  Medication Review/Management: medications reviewed  Intervention: Promote Injury-Free Environment  Recent Flowsheet Documentation  Taken 4/13/2025 0403 by Christiano Ellsworth RN  Safety Promotion/Fall Prevention:   safety round/check completed   room organization consistent   lighting adjusted   fall prevention program maintained   clutter free environment maintained   activity supervised  Taken 4/13/2025 0202 by Christiano Ellsworth RN  Safety Promotion/Fall Prevention:   safety round/check completed   room organization consistent   lighting adjusted   fall prevention program maintained   clutter free environment maintained   activity supervised  Taken 4/13/2025 0000 by Christiano Ellsworth RN  Safety Promotion/Fall Prevention:   safety round/check completed   room organization consistent   lighting  adjusted   fall prevention program maintained   clutter free environment maintained   activity supervised  Taken 4/12/2025 2202 by Christiano Ellsworth RN  Safety Promotion/Fall Prevention:   safety round/check completed   room organization consistent   lighting adjusted   fall prevention program maintained   clutter free environment maintained   activity supervised  Taken 4/12/2025 2015 by Christiano Ellsworth RN  Safety Promotion/Fall Prevention:   safety round/check completed   room organization consistent   lighting adjusted   fall prevention program maintained   clutter free environment maintained   activity supervised  Goal: Absence of Fall and Fall-Related Injury  Outcome: Progressing  Intervention: Identify and Manage Contributors  Recent Flowsheet Documentation  Taken 4/12/2025 2015 by Christiano Ellsworth RN  Medication Review/Management: medications reviewed  Intervention: Promote Injury-Free Environment  Recent Flowsheet Documentation  Taken 4/13/2025 0403 by Christiano Ellsworth RN  Safety Promotion/Fall Prevention:   safety round/check completed   room organization consistent   lighting adjusted   fall prevention program maintained   clutter free environment maintained   activity supervised  Taken 4/13/2025 0202 by Christiano Ellsworth RN  Safety Promotion/Fall Prevention:   safety round/check completed   room organization consistent   lighting adjusted   fall prevention program maintained   clutter free environment maintained   activity supervised  Taken 4/13/2025 0000 by Christiano Ellsworth RN  Safety Promotion/Fall Prevention:   safety round/check completed   room organization consistent   lighting adjusted   fall prevention program maintained   clutter free environment maintained   activity supervised  Taken 4/12/2025 2202 by Christiano Ellsworth RN  Safety Promotion/Fall Prevention:   safety round/check completed   room organization consistent   lighting adjusted   fall prevention program maintained   clutter free environment maintained   activity  supervised  Taken 4/12/2025 2015 by Christiano Ellsworth RN  Safety Promotion/Fall Prevention:   safety round/check completed   room organization consistent   lighting adjusted   fall prevention program maintained   clutter free environment maintained   activity supervised     Problem: Electrolyte Imbalance  Goal: Electrolyte Balance  Outcome: Progressing     Problem: Nausea and Vomiting  Goal: Nausea and Vomiting Relief  Outcome: Progressing     Problem: Syncope  Goal: Absence of Syncopal Symptoms  Outcome: Progressing  Intervention: Manage Effect of Syncopal Symptoms  Recent Flowsheet Documentation  Taken 4/13/2025 0403 by Christiano Ellsworth RN  Safety Promotion/Fall Prevention:   safety round/check completed   room organization consistent   lighting adjusted   fall prevention program maintained   clutter free environment maintained   activity supervised  Taken 4/13/2025 0202 by Christiano Ellsworth RN  Safety Promotion/Fall Prevention:   safety round/check completed   room organization consistent   lighting adjusted   fall prevention program maintained   clutter free environment maintained   activity supervised  Taken 4/13/2025 0000 by Christiano Ellsworth RN  Safety Promotion/Fall Prevention:   safety round/check completed   room organization consistent   lighting adjusted   fall prevention program maintained   clutter free environment maintained   activity supervised  Taken 4/12/2025 2202 by Christiano Ellsworth RN  Safety Promotion/Fall Prevention:   safety round/check completed   room organization consistent   lighting adjusted   fall prevention program maintained   clutter free environment maintained   activity supervised  Taken 4/12/2025 2015 by Christiano Ellsworth RN  Supportive Measures: active listening utilized  Safety Promotion/Fall Prevention:   safety round/check completed   room organization consistent   lighting adjusted   fall prevention program maintained   clutter free environment maintained   activity supervised     Problem: Malnutrition  Goal:  Improved Nutritional Intake  Outcome: Progressing

## 2025-04-13 NOTE — PROGRESS NOTES
"CC: Orthostatic hypotension    Interval History: No acute events overnight      Vital Signs  Temp:  [97.3 °F (36.3 °C)-97.7 °F (36.5 °C)] 97.7 °F (36.5 °C)  Heart Rate:  [70-88] 72  Resp:  [16-18] 18  BP: ()/() 169/96    Intake/Output Summary (Last 24 hours) at 4/13/2025 1241  Last data filed at 4/13/2025 1136  Gross per 24 hour   Intake 290 ml   Output 750 ml   Net -460 ml     Flowsheet Rows      Flowsheet Row First Filed Value   Admission Height 190.5 cm (75\") Documented at 04/10/2025 1706   Admission Weight 75.2 kg (165 lb 12.6 oz) Documented at 04/10/2025 1706            PHYSICAL EXAM:  General: No acute distress  Resp:NL Rate, symmetric chest expansion,unlabored, clear  CV:NL rate and rhythm, NL PMI, NL S1 and S2, no Murmur, no gallop, no rub, No JVD.   ABD:Nl sounds, no masses or tenderness, nondistended, no guarding or rebound  Neuro: alert,cooperative and oriented  Extr:Normal pedal pulses, No edema or cyanosis, moves all extremities      Results Review:    Results from last 7 days   Lab Units 04/13/25  0541   SODIUM mmol/L 141   POTASSIUM mmol/L 3.7   CHLORIDE mmol/L 103   CO2 mmol/L 31.0*   BUN mg/dL 16   CREATININE mg/dL 1.14   GLUCOSE mg/dL 94   CALCIUM mg/dL 9.3         Results from last 7 days   Lab Units 04/13/25  0541   WBC 10*3/mm3 3.22*   HEMOGLOBIN g/dL 12.4*   HEMATOCRIT % 38.4   PLATELETS 10*3/mm3 161             Results from last 7 days   Lab Units 04/13/25  0541   MAGNESIUM mg/dL 2.2         I reviewed the patient's new clinical results.  I personally viewed and interpreted the patient's EKG/Telemetry data        Medication Review:   Meds reviewed         Assessment/Plan    Ascending aortic aneurysm status post ascending aortic aneurysm repair with a 30 mm Dacron interposition graft/ reductive root aortoplasty of the right and noncoronary sinuses/ left atrial appendage epicardial closure with a 45 mm AtriClip (Pagni) 5/10/24. He had post op AF. Temporarily on amiodarone. "   Disabling orthostatic hypotension since after surgery.  He has unintentional weight loss more than 30 pounds from decreased appetite.  No evidence for mesenteric ischemia.  Tried on different medications to no avail.  He has significant supine hypertension.  Medication adjustment made during current admission: Nighttime hydralazine/metoprolol, daytime midodrine.  He needs to stay out of bed either sitting or standing during daytime.  Head elevation at night diet 30-45 degrees.  Daytime thigh-high compression stocking with pressure of 30-40 mmHg.  Abdominal binders during daytime.  Increase fluid intake during daytime.  He would benefit from leg strength exercises and some weight gain-switch to mirtazapine-could exacerbate orthostatic symptoms but limited options.  Aggressive PT/OT  I have discussed patient with his nurse  Cardiology will sign off but  call with any questions    Wallace Chase MD  04/13/25  12:41 EDT

## 2025-04-13 NOTE — PROGRESS NOTES
Name: Panfilo Feliz ADMIT: 4/10/2025   : 1940  PCP: Stephan Rubio MD    MRN: [id number] LOS: 1 days   AGE/SEX: 84 y.o. male  ROOM: [facility]     Subjective   Subjective     No events overnight. No complaints. Still orthostatic last night. We reviewed his echocardiogram       Objective   Objective   Vital Signs  Temp:  [97.2 °F (36.2 °C)-97.5 °F (36.4 °C)] 97.3 °F (36.3 °C)  Heart Rate:  [70-88] 72  Resp:  [16-18] 18  BP: ()/() 174/95  SpO2:  [83 %-100 %] 100 %  on  Flow (L/min) (Oxygen Therapy):  [2] 2;   Device (Oxygen Therapy): nasal cannula  Body mass index is 20.62 kg/m².  Physical Exam  Constitutional:       General: He is not in acute distress.     Appearance: He is not toxic-appearing.   Cardiovascular:      Rate and Rhythm: Normal rate and regular rhythm.      Heart sounds: Normal heart sounds.   Pulmonary:      Effort: Pulmonary effort is normal.      Breath sounds: Normal breath sounds.   Abdominal:      General: Bowel sounds are normal.      Palpations: Abdomen is soft.   Musculoskeletal:         General: No tenderness.      Right lower leg: No edema.      Left lower leg: No edema.   Neurological:      Mental Status: He is alert.   Psychiatric:         Mood and Affect: Mood normal.         Behavior: Behavior normal.         Results Review     I reviewed the patient's new clinical results.  Results from last 7 days   Lab Units 25  0541 25  0511 25  0755 04/10/25  1204   WBC 10*3/mm3 3.22* 3.79 2.80* 4.49   HEMOGLOBIN g/dL 12.4* 11.4* 12.1* 13.3   PLATELETS 10*3/mm3 161 160 169 205     Results from last 7 days   Lab Units 25  0541 25  0823 25  0511 258 25  0755 04/10/25  1204   SODIUM mmol/L 141  --  144  --  148* 145   POTASSIUM mmol/L 3.7 4.0 3.6 3.5 3.0* 2.9*   CHLORIDE mmol/L 103  --  103  --  101 99   CO2 mmol/L 31.0*  --  31.7*  --  35.6* 30.5*   BUN mg/dL 16  --  13  --  15 14   CREATININE mg/dL 1.14  --  1.00  --  1.22 1.17  "  GLUCOSE mg/dL 94  --  94  --  97 134*   Estimated Creatinine Clearance: 51.9 mL/min (by C-G formula based on SCr of 1.14 mg/dL).  Results from last 7 days   Lab Units 04/10/25  1204   ALBUMIN g/dL 4.1   BILIRUBIN mg/dL 0.5   ALK PHOS U/L 57   AST (SGOT) U/L 32   ALT (SGPT) U/L 18     Results from last 7 days   Lab Units 04/13/25  0541 04/12/25  0511 04/11/25  0755 04/10/25  1204   CALCIUM mg/dL 9.3 9.1 9.5 9.7   ALBUMIN g/dL  --   --   --  4.1   MAGNESIUM mg/dL 2.2 2.2 2.4 2.0   PHOSPHORUS mg/dL 4.3 3.1 4.4  --        COVID19   Date Value Ref Range Status   07/15/2024 Not Detected Not Detected - Ref. Range Final   05/09/2024 Not Detected Not Detected - Ref. Range Final   04/17/2024 Not Detected Not Detected - Ref. Range Final   01/11/2023 Not Detected Not Detected - Ref. Range Final     SARS-CoV-2 PCR   Date Value Ref Range Status   02/03/2021 Not Detected Not Detected Final     Comment:     Nucleic acid specific to SARS-CoV-2 (COVID-19) virus was not detected in  this sample by the TaqPath (TM) COVID-19 Combo Kit.          SARS-CoV-2 (COVID-19) nucleic acid testing performed using [employer] Aptima (R) SARS-CoV-2 Assay or [email or web address] TaqPath (TM)  COVID-19 Combo Kit as indicated above under Emergency Use Authorization (EUA) from the FDA. Aptima (R) and TaqPath (TM) test performance  characteristics verified by [facility] in accordance with the FDAs Guidance Document (Policy for Diagnostic Tests for Coronavirus Disease-2019  during the Public Health Emergency) issued on March 16, 2020. The laboratory is regulated under CLIA as qualified to perform high-complexity testing  Unless otherwise noted, all testing was performed at [facility], CLIA No. [id number], [place] State Licensee No. [id number]     No results found for: \"HGBA1C\", \"POCGLU\"    Adult Transthoracic Echo Complete W/ Cont if Necessary Per Protocol    Left ventricular systolic function is mildly decreased. Calculated left   ventricular EF = " 44.9%    Left ventricular wall thickness is consistent with mild concentric   hypertrophy. Sigmoid-shaped ventricular septum is present.    The following left ventricular wall segments are hypokinetic: mid   anterior, apical anterior, basal anterolateral, mid anterolateral, apical   lateral, basal inferolateral, mid inferolateral, apical inferior, mid   inferior, apical septal, basal inferoseptal, mid inferoseptal, apex   hypokinetic, mid anteroseptal, basal anterior, basal inferior and basal   inferoseptal.    Left ventricular diastolic function is consistent with (grade II w/high   LAP) pseudonormalization.    There is mild calcification of the aortic valve.    Estimated right ventricular systolic pressure from tricuspid   regurgitation is normal (<35 mmHg).    Mild dilation of the aortic root is present. The aortic root measures   4.4 cm. Mild dilation of the sinuses of Valsalva is present.  Duplex Renal Artery - Bilateral Complete CAR    Normal right renal artery.    Normal left renal artery.    Scheduled Medications  aspirin, 81 mg, Oral, Nightly  hydrALAZINE, 50 mg, Oral, Q PM  levothyroxine, 100 mcg, Oral, Q AM  magnesium oxide, 400 mg, Oral, Daily  metoprolol succinate XL, 25 mg, Oral, Q PM  midodrine, 5 mg, Oral, BID AC  mirtazapine, 7.5 mg, Oral, Nightly  pantoprazole, 40 mg, Oral, BID  pregabalin, 50 mg, Oral, BID  pyridostigmine, 30 mg, Oral, BID    Infusions   Diet  Diet: Cardiac; Healthy Heart (2-3 Na+); Fluid Consistency: Thin (IDDSI 0)       Assessment/Plan     Active Hospital Problems    Diagnosis  POA    **Hypertensive urgency [I16.0]  Yes    Moderate protein-calorie malnutrition [E44.0]  Yes    Hypertensive emergency [I16.1]  Yes    Hypokalemia [E87.6]  Yes    Orthostatic hypotension [I95.1]  Yes    Hypothyroid [E03.9]  Yes    Hyperlipidemia [E78.5]  Yes    HTN (hypertension) [I10]  Yes      Resolved Hospital Problems   No resolved problems to display.       84 y.o. male admitted with Hypertensive  urgency.    Hypertensive urgency-renal artery duplex was negative. CTA neck did not show any hemodynamically significant stenosis. This is likely related to midodrine, fludrocortisone which are being used to treat his severe orthostatic hypotension. Better now that the fludrocortisone has been held. Unfortunately, I think he will likely need to tolerate hypertension in order to avoid significant symptoms related to orthostasis to at least some degree  Orthostatic hypotension-midodrine dose has been reduced and fludrocortisone held as above. He's lost about 30 lbs unintentionally due to dysgeusia and cardiology is concerned that this may be provoking his hypotension and so have started mirtazapine. We'll have to be careful with this medication as it is often associated with orthostasis. I have requested an abdominal binder and thigh high compression stockings to see if that will help at all.   New heart failure with moderately reduced EF-echocardiogram this admission shows that his EF has dropped to 45%. It was normal in July 2024. There are multiple hypokinetic segments as well as LVDD grade 2. He had a cardiac catheterization in April 2024 which showed no significant disease. Unfortunately, I think this likely precludes him from using fludrocortisone going forward. Check a HIV, ferritin, and thiamine level. Start some thiamine given the weight loss.  Follow up cardiology thoughts/recommendations.  Hypokalemia-likely secondary to his fludrocortisone. Improved with replacement  Long qtc-possibly related to hypokalemia. Repeat EKG   GERD-ppi  Hypothyroidism-synthroid  Thoracic aortic aneurysm-s/p ascending aortic aneurysm repair in May 2024  MGUS  SCDs for DVT prophylaxis.  Full code.  Discussed with patient and nursing staff.  Anticipate discharge home with home health when cleared by consultants.      Renan Gentile MD  [facility]  04/13/25  10:43 EDT

## 2025-04-14 ENCOUNTER — READMISSION MANAGEMENT (OUTPATIENT)
Dept: CALL CENTER | Facility: HOSPITAL | Age: 85
End: 2025-04-14
Payer: MEDICARE

## 2025-04-14 VITALS
HEART RATE: 87 BPM | WEIGHT: 165 LBS | SYSTOLIC BLOOD PRESSURE: 143 MMHG | OXYGEN SATURATION: 100 % | HEIGHT: 75 IN | BODY MASS INDEX: 20.51 KG/M2 | RESPIRATION RATE: 16 BRPM | DIASTOLIC BLOOD PRESSURE: 93 MMHG | TEMPERATURE: 98.2 F

## 2025-04-14 LAB
ALBUMIN SERPL ELPH-MCNC: 3.7 G/DL (ref 2.9–4.4)
ALBUMIN/GLOB SERPL: 1.1 {RATIO} (ref 0.7–1.7)
ALPHA1 GLOB SERPL ELPH-MCNC: 0.3 G/DL (ref 0–0.4)
ALPHA2 GLOB SERPL ELPH-MCNC: 0.9 G/DL (ref 0.4–1)
ANION GAP SERPL CALCULATED.3IONS-SCNC: 10 MMOL/L (ref 5–15)
B-GLOBULIN SERPL ELPH-MCNC: 1.6 G/DL (ref 0.7–1.3)
BUN SERPL-MCNC: 20 MG/DL (ref 8–23)
BUN/CREAT SERPL: 17.7 (ref 7–25)
CALCIUM SPEC-SCNC: 9.1 MG/DL (ref 8.6–10.5)
CHLORIDE SERPL-SCNC: 102 MMOL/L (ref 98–107)
CO2 SERPL-SCNC: 28 MMOL/L (ref 22–29)
CREAT SERPL-MCNC: 1.13 MG/DL (ref 0.76–1.27)
DEPRECATED RDW RBC AUTO: 45.5 FL (ref 37–54)
EGFRCR SERPLBLD CKD-EPI 2021: 64.5 ML/MIN/1.73
ERYTHROCYTE [DISTWIDTH] IN BLOOD BY AUTOMATED COUNT: 14.5 % (ref 12.3–15.4)
GAMMA GLOB SERPL ELPH-MCNC: 0.9 G/DL (ref 0.4–1.8)
GLOBULIN SER-MCNC: 3.6 G/DL (ref 2.2–3.9)
GLUCOSE SERPL-MCNC: 95 MG/DL (ref 65–99)
HCT VFR BLD AUTO: 39 % (ref 37.5–51)
HGB BLD-MCNC: 12.2 G/DL (ref 13–17.7)
IGA SERPL-MCNC: 410 MG/DL (ref 61–437)
IGG SERPL-MCNC: 1114 MG/DL (ref 603–1613)
IGM SERPL-MCNC: 22 MG/DL (ref 15–143)
INTERPRETATION SERPL IEP-IMP: ABNORMAL
KAPPA LC FREE SER-MCNC: 46.8 MG/L (ref 3.3–19.4)
KAPPA LC FREE/LAMBDA FREE SER: 2.53 {RATIO} (ref 0.26–1.65)
LABORATORY COMMENT REPORT: ABNORMAL
LAMBDA LC FREE SERPL-MCNC: 18.5 MG/L (ref 5.7–26.3)
M PROTEIN SERPL ELPH-MCNC: 0.3 G/DL
MAGNESIUM SERPL-MCNC: 2.2 MG/DL (ref 1.6–2.4)
MCH RBC QN AUTO: 26.8 PG (ref 26.6–33)
MCHC RBC AUTO-ENTMCNC: 31.3 G/DL (ref 31.5–35.7)
MCV RBC AUTO: 85.5 FL (ref 79–97)
PHOSPHATE SERPL-MCNC: 3.8 MG/DL (ref 2.5–4.5)
PLATELET # BLD AUTO: 183 10*3/MM3 (ref 140–450)
PMV BLD AUTO: 11 FL (ref 6–12)
POTASSIUM SERPL-SCNC: 4 MMOL/L (ref 3.5–5.2)
PROT SERPL-MCNC: 7.3 G/DL (ref 6–8.5)
RBC # BLD AUTO: 4.56 10*6/MM3 (ref 4.14–5.8)
SODIUM SERPL-SCNC: 140 MMOL/L (ref 136–145)
WBC NRBC COR # BLD AUTO: 3.81 10*3/MM3 (ref 3.4–10.8)

## 2025-04-14 PROCEDURE — 80048 BASIC METABOLIC PNL TOTAL CA: CPT | Performed by: STUDENT IN AN ORGANIZED HEALTH CARE EDUCATION/TRAINING PROGRAM

## 2025-04-14 PROCEDURE — 85027 COMPLETE CBC AUTOMATED: CPT | Performed by: STUDENT IN AN ORGANIZED HEALTH CARE EDUCATION/TRAINING PROGRAM

## 2025-04-14 PROCEDURE — 84100 ASSAY OF PHOSPHORUS: CPT | Performed by: STUDENT IN AN ORGANIZED HEALTH CARE EDUCATION/TRAINING PROGRAM

## 2025-04-14 PROCEDURE — 83735 ASSAY OF MAGNESIUM: CPT | Performed by: STUDENT IN AN ORGANIZED HEALTH CARE EDUCATION/TRAINING PROGRAM

## 2025-04-14 RX ORDER — MIDODRINE HYDROCHLORIDE 5 MG/1
5 TABLET ORAL
Start: 2025-04-14

## 2025-04-14 RX ORDER — HYDRALAZINE HYDROCHLORIDE 50 MG/1
50 TABLET, FILM COATED ORAL EVERY EVENING
Qty: 30 TABLET | Refills: 0 | Status: SHIPPED | OUTPATIENT
Start: 2025-04-14

## 2025-04-14 RX ORDER — MIRTAZAPINE 7.5 MG/1
7.5 TABLET, FILM COATED ORAL NIGHTLY
Qty: 30 TABLET | Refills: 0 | Status: SHIPPED | OUTPATIENT
Start: 2025-04-14 | End: 2025-04-21 | Stop reason: SINTOL

## 2025-04-14 RX ORDER — LANOLIN ALCOHOL/MO/W.PET/CERES
100 CREAM (GRAM) TOPICAL DAILY
Qty: 30 TABLET | Refills: 0 | Status: SHIPPED | OUTPATIENT
Start: 2025-04-15

## 2025-04-14 RX ORDER — PYRIDOSTIGMINE BROMIDE 60 MG/1
30 TABLET ORAL 2 TIMES DAILY
Start: 2025-04-14

## 2025-04-14 RX ORDER — METOPROLOL SUCCINATE 25 MG/1
25 TABLET, EXTENDED RELEASE ORAL EVERY EVENING
Qty: 30 TABLET | Refills: 0 | Status: SHIPPED | OUTPATIENT
Start: 2025-04-14

## 2025-04-14 RX ADMIN — PANTOPRAZOLE SODIUM 40 MG: 40 TABLET, DELAYED RELEASE ORAL at 08:07

## 2025-04-14 RX ADMIN — PYRIDOSTIGMINE BROMIDE 30 MG: 60 TABLET ORAL at 08:07

## 2025-04-14 RX ADMIN — SENNOSIDES AND DOCUSATE SODIUM 2 TABLET: 50; 8.6 TABLET ORAL at 08:11

## 2025-04-14 RX ADMIN — LEVOTHYROXINE SODIUM 100 MCG: 100 TABLET ORAL at 05:26

## 2025-04-14 RX ADMIN — MIDODRINE HYDROCHLORIDE 5 MG: 5 TABLET ORAL at 07:52

## 2025-04-14 RX ADMIN — MAGNESIUM OXIDE TAB 400 MG (240 MG ELEMENTAL MG) 400 MG: 400 (240 MG) TAB at 08:07

## 2025-04-14 RX ADMIN — Medication 100 MG: at 08:07

## 2025-04-14 RX ADMIN — PREGABALIN 50 MG: 50 CAPSULE ORAL at 08:07

## 2025-04-14 NOTE — DISCHARGE INSTRUCTIONS
Nighttime hydralazine/metoprolol, daytime midodrine.  He needs to stay out of bed either sitting or standing during daytime.  Head elevation at night diet 30-45 degrees.  Daytime thigh-high compression stocking with pressure of 30-40 mmHg.  Abdominal binders during daytime.  Increase fluid intake during daytime.

## 2025-04-14 NOTE — CASE MANAGEMENT/SOCIAL WORK
Continued Stay Note  [facility]     Patient Name: Panfilo Feliz  MRN: [id number]  Today's Date: 4/14/2025    Admit Date: 4/10/2025    Plan: Home with spouse   Discharge Plan       Row Name 04/14/25 1200       Plan    Plan Home with spouse    Plan Comments DC orders noted.  S/W pt and his spouse Kelin at bedside.  They decline home health at this time.  Kelin is aware they can discuss w/ pt's PCP at his hospital followup visit if he changes his mind.    Final Note DC home with spouse. ...........Ellen DAMON/ CCP                   Discharge Codes    No documentation.                 Expected Discharge Date and Time       Expected Discharge Date Expected Discharge Time    Apr 14, 2025               Ellen Peña RN

## 2025-04-14 NOTE — OUTREACH NOTE
Prep Survey      Flowsheet Row Responses   [facility] facility patient discharged from? [facility]   Is LACE score < 7 ? No   Eligibility Readm Mgmt   Discharge diagnosis Hypertensive urgency   Does the patient have one of the following disease processes/diagnoses(primary or secondary)? Other   Prep survey completed? Yes            [facility] - Registered Nurse

## 2025-04-14 NOTE — PLAN OF CARE
Problem: Adult Inpatient Plan of Care  Goal: Plan of Care Review  Outcome: Progressing  Flowsheets (Taken 4/14/2025 0539)  Progress: improving  Plan of Care Reviewed With: patient  Goal: Patient-Specific Goal (Individualized)  Outcome: Progressing  Goal: Absence of Hospital-Acquired Illness or Injury  Outcome: Progressing  Intervention: Identify and Manage Fall Risk  Recent Flowsheet Documentation  Taken 4/14/2025 0407 by Esthela Short, RN  Safety Promotion/Fall Prevention:   activity supervised   clutter free environment maintained   fall prevention program maintained   nonskid shoes/slippers when out of bed   room organization consistent   safety round/check completed  Taken 4/14/2025 0230 by Esthela Short, RN  Safety Promotion/Fall Prevention:   activity supervised   clutter free environment maintained   fall prevention program maintained   nonskid shoes/slippers when out of bed   room organization consistent   safety round/check completed  Taken 4/14/2025 0011 by Esthela Short, RN  Safety Promotion/Fall Prevention:   activity supervised   assistive device/personal items within reach   clutter free environment maintained   fall prevention program maintained   nonskid shoes/slippers when out of bed   room organization consistent   safety round/check completed  Taken 4/13/2025 2214 by Esthela Short, RN  Safety Promotion/Fall Prevention:   activity supervised   clutter free environment maintained   fall prevention program maintained   nonskid shoes/slippers when out of bed   room organization consistent   safety round/check completed  Taken 4/13/2025 2040 by Esthela Short, RN  Safety Promotion/Fall Prevention:   activity supervised   clutter free environment maintained   assistive device/personal items within reach   fall prevention program maintained   nonskid shoes/slippers when out of bed   safety round/check completed  Intervention: Prevent Skin Injury  Recent Flowsheet  Documentation  Taken 4/14/2025 0011 by Esthela Short RN  Body Position: position changed independently  Taken 4/13/2025 2040 by Esthela Short RN  Body Position: position changed independently  Intervention: Prevent and Manage VTE (Venous Thromboembolism) Risk  Recent Flowsheet Documentation  Taken 4/14/2025 0011 by Esthela Short RN  VTE Prevention/Management:   bilateral   SCDs (sequential compression devices) off  Taken 4/13/2025 2040 by Esthela Short RN  VTE Prevention/Management:   bilateral   SCDs (sequential compression devices) off  Intervention: Prevent Infection  Recent Flowsheet Documentation  Taken 4/14/2025 0407 by Esthela Short RN  Infection Prevention:   rest/sleep promoted   hand hygiene promoted   single patient room provided  Taken 4/14/2025 0230 by Esthela Short RN  Infection Prevention:   rest/sleep promoted   hand hygiene promoted   single patient room provided  Taken 4/14/2025 0011 by Esthela Short RN  Infection Prevention:   rest/sleep promoted   hand hygiene promoted   single patient room provided  Taken 4/13/2025 2214 by Esthela Short RN  Infection Prevention:   rest/sleep promoted   hand hygiene promoted   single patient room provided  Taken 4/13/2025 2040 by Esthela Short RN  Infection Prevention:   rest/sleep promoted   hand hygiene promoted   single patient room provided  Goal: Optimal Comfort and Wellbeing  Outcome: Progressing  Intervention: Monitor Pain and Promote Comfort  Recent Flowsheet Documentation  Taken 4/13/2025 2040 by Esthela Short RN  Pain Management Interventions: pain management plan reviewed with patient/caregiver  Intervention: Provide Person-Centered Care  Recent Flowsheet Documentation  Taken 4/14/2025 0011 by Esthela Short RN  Trust Relationship/Rapport: care explained  Taken 4/13/2025 2040 by Esthela Short RN  Trust Relationship/Rapport: care explained  Goal: Readiness for Transition  of Care  Outcome: Progressing     Problem: Comorbidity Management  Goal: Blood Pressure in Desired Range  Outcome: Progressing  Intervention: Maintain Blood Pressure Management  Recent Flowsheet Documentation  Taken 4/14/2025 0011 by Esthela Short, RN  Medication Review/Management: medications reviewed  Taken 4/13/2025 2040 by Esthela Short, RN  Medication Review/Management: medications reviewed     Problem: Fall Injury Risk  Goal: Absence of Fall and Fall-Related Injury  Outcome: Progressing  Intervention: Identify and Manage Contributors  Recent Flowsheet Documentation  Taken 4/14/2025 0011 by Esthela Short, RN  Medication Review/Management: medications reviewed  Taken 4/13/2025 2040 by Esthela Short, RN  Medication Review/Management: medications reviewed  Self-Care Promotion: independence encouraged  Intervention: Promote Injury-Free Environment  Recent Flowsheet Documentation  Taken 4/14/2025 0407 by Esthela Short, RN  Safety Promotion/Fall Prevention:   activity supervised   clutter free environment maintained   fall prevention program maintained   nonskid shoes/slippers when out of bed   room organization consistent   safety round/check completed  Taken 4/14/2025 0230 by Esthela Short, RN  Safety Promotion/Fall Prevention:   activity supervised   clutter free environment maintained   fall prevention program maintained   nonskid shoes/slippers when out of bed   room organization consistent   safety round/check completed  Taken 4/14/2025 0011 by Esthela Short, RN  Safety Promotion/Fall Prevention:   activity supervised   assistive device/personal items within reach   clutter free environment maintained   fall prevention program maintained   nonskid shoes/slippers when out of bed   room organization consistent   safety round/check completed  Taken 4/13/2025 2214 by Esthela Short, RN  Safety Promotion/Fall Prevention:   activity supervised   clutter free environment  maintained   fall prevention program maintained   nonskid shoes/slippers when out of bed   room organization consistent   safety round/check completed  Taken 4/13/2025 2040 by Esthela Short, RN  Safety Promotion/Fall Prevention:   activity supervised   clutter free environment maintained   assistive device/personal items within reach   fall prevention program maintained   nonskid shoes/slippers when out of bed   safety round/check completed  Goal: Absence of Fall and Fall-Related Injury  Outcome: Progressing  Intervention: Identify and Manage Contributors  Recent Flowsheet Documentation  Taken 4/14/2025 0011 by Esthela Short, RN  Medication Review/Management: medications reviewed  Taken 4/13/2025 2040 by Esthela Short, RN  Medication Review/Management: medications reviewed  Self-Care Promotion: independence encouraged  Intervention: Promote Injury-Free Environment  Recent Flowsheet Documentation  Taken 4/14/2025 0407 by Esthela Short, RN  Safety Promotion/Fall Prevention:   activity supervised   clutter free environment maintained   fall prevention program maintained   nonskid shoes/slippers when out of bed   room organization consistent   safety round/check completed  Taken 4/14/2025 0230 by Esthela Short, RN  Safety Promotion/Fall Prevention:   activity supervised   clutter free environment maintained   fall prevention program maintained   nonskid shoes/slippers when out of bed   room organization consistent   safety round/check completed  Taken 4/14/2025 0011 by Esthela Short, RN  Safety Promotion/Fall Prevention:   activity supervised   assistive device/personal items within reach   clutter free environment maintained   fall prevention program maintained   nonskid shoes/slippers when out of bed   room organization consistent   safety round/check completed  Taken 4/13/2025 2214 by Esthela Short, RN  Safety Promotion/Fall Prevention:   activity supervised   clutter free  environment maintained   fall prevention program maintained   nonskid shoes/slippers when out of bed   room organization consistent   safety round/check completed  Taken 4/13/2025 2040 by Esthela Short, RN  Safety Promotion/Fall Prevention:   activity supervised   clutter free environment maintained   assistive device/personal items within reach   fall prevention program maintained   nonskid shoes/slippers when out of bed   safety round/check completed     Problem: Electrolyte Imbalance  Goal: Electrolyte Balance  Outcome: Progressing     Problem: Nausea and Vomiting  Goal: Nausea and Vomiting Relief  Outcome: Progressing     Problem: Syncope  Goal: Absence of Syncopal Symptoms  Outcome: Progressing  Intervention: Manage Effect of Syncopal Symptoms  Recent Flowsheet Documentation  Taken 4/14/2025 0407 by Esthela Short, RN  Safety Promotion/Fall Prevention:   activity supervised   clutter free environment maintained   fall prevention program maintained   nonskid shoes/slippers when out of bed   room organization consistent   safety round/check completed  Taken 4/14/2025 0230 by Esthela Short, RN  Safety Promotion/Fall Prevention:   activity supervised   clutter free environment maintained   fall prevention program maintained   nonskid shoes/slippers when out of bed   room organization consistent   safety round/check completed  Taken 4/14/2025 0011 by Esthela Short, RN  Supportive Measures: active listening utilized  Safety Promotion/Fall Prevention:   activity supervised   assistive device/personal items within reach   clutter free environment maintained   fall prevention program maintained   nonskid shoes/slippers when out of bed   room organization consistent   safety round/check completed  Taken 4/13/2025 2214 by Esthela Short, RN  Safety Promotion/Fall Prevention:   activity supervised   clutter free environment maintained   fall prevention program maintained   nonskid shoes/slippers  when out of bed   room organization consistent   safety round/check completed  Taken 4/13/2025 2040 by Esthela Short RN  Supportive Measures: active listening utilized  Safety Promotion/Fall Prevention:   activity supervised   clutter free environment maintained   assistive device/personal items within reach   fall prevention program maintained   nonskid shoes/slippers when out of bed   safety round/check completed     Problem: Malnutrition  Goal: Improved Nutritional Intake  Outcome: Progressing   Goal Outcome Evaluation:  Plan of Care Reviewed With: patient        Progress: improving

## 2025-04-14 NOTE — DISCHARGE SUMMARY
Patient Name: Panfilo Feliz  : 1940  MRN: [id number]    Date of Admission: 4/10/2025  Date of Discharge:  2025  Primary Care Physician: Stephan Rubio MD      Chief Complaint:   Hypertension      Discharge Diagnoses     Active Hospital Problems    Diagnosis  POA    **Hypertensive urgency [I16.0]  Yes    Moderate protein-calorie malnutrition [E44.0]  Yes    Hypertensive emergency [I16.1]  Yes    Hypokalemia [E87.6]  Yes    Orthostatic hypotension [I95.1]  Yes    Hypothyroid [E03.9]  Yes    Hyperlipidemia [E78.5]  Yes    HTN (hypertension) [I10]  Yes      Resolved Hospital Problems   No resolved problems to display.        Hospital Course     Mr. Feliz is a 84 y.o. male with a history of AAA repair, severe orthostatic hypotension, with associated syncope,, supine hypertension, hypothyroidism, hypertension, hyperlipidemia, neuropathy, prostate cancer, and GERD presented to [facility] initially complaining of elevated blood pressures, with systolic pressures reaching 220.  Please see the admitting history and physical for further details.  He was found to have hypokalemia, and to be in hypertensive urgency and was admitted to the hospital for further evaluation and treatment.   He is followed by cardiology in [place], who has been managing his orthostasis, supine hypertension.  He has been on chronic midodrine, as well as fludrocortisone.  During his hospitalization his fludrocortisone has been discontinued after echocardiogram showed newly reduced ejection fraction and multiple wall abnormalities.  His midodrine has also been reduced from 3 times a day to twice daily.  Thigh-high compression stockings, and abdominal binder has been added.  He has been advised to take prescribed hydralazine, metoprolol at nighttime, and midodrine during the day.  He has been ordered thigh-high compression stockings, as well as abdominal binder fat heart improving his orthostasis.   He has been  advised at at bedtime he should maintain the head of bed at night from 30 to 45 degrees.    He has been advised he should wear thigh-high compression stockings as well as abdominal binder during the day, they can be removed at bedtime.  He is encouraged to stay up as much as he can during the day in the seated, or standing position, as well as increase his oral intake during the day.  He is medically stable for discharge home today with close follow-up with his primary cardiologist in 1 to 2 weeks, his primary care provider in 1 week for posthospitalization follow-up, and hematology oncology, neurology as previously scheduled.  He has been advised to take all of his medications as they have been prescribed to him, and to attend all follow-up appointments.    Day of Discharge     Subjective:  Mr. Feliz is very to be discharged home today.  He has found his wife who has agreed to provide transportation.  He currently offers no complaints.  Verbalizes understanding of discharge instructions.    Physical Exam:  Temp:  [97.5 °F (36.4 °C)-98.2 °F (36.8 °C)] 98.2 °F (36.8 °C)  Heart Rate:  [72-92] 75  Resp:  [16-18] 16  BP: (115-169)/(80-96) 151/91  Body mass index is 20.62 kg/m².  Physical Exam  Vitals and nursing note reviewed.   Constitutional:       General: He is not in acute distress.     Appearance: He is well-developed. He is ill-appearing.   HENT:      Head: Normocephalic and atraumatic.      Mouth/Throat:      Mouth: Mucous membranes are dry.      Pharynx: No oropharyngeal exudate.   Eyes:      General: No scleral icterus.     Pupils: Pupils are equal, round, and reactive to light.   Neck:      Vascular: No JVD.   Cardiovascular:      Rate and Rhythm: Normal rate and regular rhythm.      Pulses: Normal pulses.           Radial pulses are 1+ on the right side and 2+ on the left side.      Heart sounds: No murmur heard.  Pulmonary:      Effort: Pulmonary effort is normal. No respiratory distress.      Breath  sounds: Normal breath sounds. No wheezing.   Abdominal:      General: Bowel sounds are normal. There is no distension.      Palpations: Abdomen is soft.      Tenderness: There is no abdominal tenderness.   Musculoskeletal:         General: Normal range of motion.      Right lower leg: No edema.      Left lower leg: No edema.   Skin:     General: Skin is warm and dry.      Capillary Refill: Capillary refill takes less than 2 seconds.      Findings: No rash.   Neurological:      General: No focal deficit present.      Mental Status: He is alert. Mental status is at baseline.   Psychiatric:         Mood and Affect: Mood normal.         Consultants     Consult Orders (all) (From admission, onward)       Start     Ordered    04/11/25 0702  Inpatient Cardiology Consult  IN AM        Specialty:  Cardiology  Provider:  Lei Davis MD    04/10/25 1753    04/10/25 1714  Inpatient Case Management  Consult  Once        Provider:  (Not yet assigned)    04/10/25 1714    04/10/25 1506  [facility] (on-call MD unless specified) Details  Once        Specialty:  Hospitalist  Provider:  Yusra Casanova MD    04/10/25 1505    04/10/25 1319  [facility] (on-call MD unless specified) Details  Once,   Status:  Canceled        Specialty:  Hospitalist  Provider:  (Not yet assigned)    04/10/25 1318                  Procedures     * Surgery not found *    Imaging Results (All)       Procedure Component Value Units Date/Time    CT Angiogram Neck [[id number]] Collected: 04/11/25 1759     Updated: 04/11/25 1812    Narrative:      CT ANGIOGRAM NECK-     DATE OF EXAM: 4/11/2025 3:26 PM     INDICATION: Labile blood pressure.     COMPARISON: Noncontrast head CT 4/10/2025 and 7/13/2024. MRI brain  2/14/2025. CT cervical spine 7/13/2024. MRI cervical spine 9/12/2020.  CTA chest 4/4/2024.     TECHNIQUE: Multiple contiguous axial images were acquired through the  neck following the intravenous administration of 95 mL of Isovue-370.  Reformatted  coronal, sagittal, and 3D reconstruction images were also  reviewed. Radiation dose reduction techniques were utilized, including  automated exposure control and exposure modulation based on body size.     FINDINGS:  Normal arch anatomy. Stable mild ectasia of the aortic arch measuring  3.6 cm in diameter. The bilateral common and internal carotid arteries  demonstrate normal course and caliber without evidence of  hemodynamically significant stenosis or injury. The bilateral vertebral  arteries demonstrate normal course and caliber without evidence of  hemodynamically significant stenosis or injury. The left vertebral  artery is dominant.     The neck soft tissues are unremarkable. No cystic or solid soft tissue  mass. The parotid glands and submandibular glands are unremarkable. The  thyroid gland is not definitively identified. The central airways are  widely patent. The vocal cords are symmetric and abducted. The included  lung apices are clear. Multilevel cervical spondylosis with stenoses.  Partially imaged sternal and manubrial wires. No acute osseous  abnormality or concerning osseous lesion. Partial ossification of the  nuchal ligament. Partially imaged base of the brain is unremarkable.  Evidence of bilateral cataract surgery.       Impression:      No evidence of hemodynamically significant stenosis or injury of the  great vessels of the neck.     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 There is no  evidence of  intracranial hemorrhage or of a focal area of decreased attenuation to  suggest acute infarction. Arachnoid cyst involving the right middle  cranial fossa is appreciated measuring approximately 3 x 2.5 cm in size,  unchanged. There may also be an arachnoid cyst overlying the left  frontal lobe anteriorly measuring 1.4 x 3.1 cm in size, unchanged.       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 An arachnoid cyst involving the right  middle cranial fossa is appreciated, unchanged. Prominent CSF anterior  to the left frontal lobe is also appreciated suggesting an arachnoid  cyst, unchanged. No acute process is identified. Further evaluation  could be performed with MRI examination brain as indicated.                 Radiation dose reduction techniques were utilized, including automated  exposure modulation based on body size.     This report was finalized on 4/10/2025 3:01 PM by Dr. Riki Barbosa M.D  on Workstation: [id number]             Results for orders placed during the hospital encounter of 04/10/25    Duplex Renal Artery - Bilateral Complete CAR    Interpretation Summary    Normal right renal artery.    Normal left renal artery.    Results for orders placed during the hospital encounter of 04/10/25    Adult Transthoracic Echo Complete W/ Cont if Necessary Per Protocol    Interpretation Summary    Left ventricular systolic function is mildly decreased. Calculated left ventricular EF = 44.9%    Left ventricular wall thickness is consistent with mild concentric hypertrophy. Sigmoid-shaped ventricular septum is present.    The following left ventricular wall segments are hypokinetic: mid anterior, apical anterior, basal anterolateral, mid anterolateral, apical lateral, basal inferolateral, mid inferolateral, apical inferior, mid inferior, apical septal, basal inferoseptal, mid inferoseptal, apex hypokinetic, mid anteroseptal, basal  "anterior, basal inferior and basal inferoseptal.    Left ventricular diastolic function is consistent with (grade II w/high LAP) pseudonormalization.    There is mild calcification of the aortic valve.    Estimated right ventricular systolic pressure from tricuspid regurgitation is normal (<35 mmHg).    Mild dilation of the aortic root is present. The aortic root measures 4.4 cm. Mild dilation of the sinuses of Valsalva is present.    Pertinent Labs     Results from last 7 days   Lab Units 04/14/25 0525 04/13/25 0541 04/12/25 0511 04/11/25 0755   WBC 10*3/mm3 3.81 3.22* 3.79 2.80*   HEMOGLOBIN g/dL 12.2* 12.4* 11.4* 12.1*   PLATELETS 10*3/mm3 183 161 160 169     Results from last 7 days   Lab Units 04/14/25 0525 04/13/25 0541 04/12/25 0823 04/12/25 0511 04/11/25 2208 04/11/25  0755   SODIUM mmol/L 140 141  --  144  --  148*   POTASSIUM mmol/L 4.0 3.7 4.0 3.6   < > 3.0*   CHLORIDE mmol/L 102 103  --  103  --  101   CO2 mmol/L 28.0 31.0*  --  31.7*  --  35.6*   BUN mg/dL 20 16  --  13  --  15   CREATININE mg/dL 1.13 1.14  --  1.00  --  1.22   GLUCOSE mg/dL 95 94  --  94  --  97   EGFR mL/min/1.73 64.5 63.8  --  74.7  --  58.8*    < > = values in this interval not displayed.     Results from last 7 days   Lab Units 04/10/25  1204   ALBUMIN g/dL 4.1   BILIRUBIN mg/dL 0.5   ALK PHOS U/L 57   AST (SGOT) U/L 32   ALT (SGPT) U/L 18     Results from last 7 days   Lab Units 04/14/25  0525 04/13/25  0541 04/12/25  0511 04/11/25  0755 04/10/25  1204   CALCIUM mg/dL 9.1 9.3 9.1 9.5 9.7   ALBUMIN g/dL  --   --   --   --  4.1   MAGNESIUM mg/dL 2.2 2.2 2.2 2.4 2.0   PHOSPHORUS mg/dL 3.8 4.3 3.1 4.4  --                Invalid input(s): \"LDLCALC\"          Test Results Pending at Discharge     Pending Results       Procedure [Order ID] Specimen - Date/Time    Vitamin B1, Whole Blood [[id number]] Collected: 04/13/25 1530    Specimen: Blood from Arm, Left Updated: 04/13/25 1536              Discharge Details        Discharge " Medications        New Medications        Instructions Start Date   hydrALAZINE 50 MG tablet  Commonly known as: APRESOLINE   50 mg, Oral, Every Evening      metoprolol succinate XL 25 MG 24 hr tablet  Commonly known as: TOPROL-XL   25 mg, Oral, Every Evening      thiamine 100 MG tablet  Commonly known as: VITAMIN B1   100 mg, Oral, Daily   Start Date: April 15, 2025            Changes to Medications        Instructions Start Date   B-12 1000 MCG sublingual tablet  What changed: how to take this   1,000 mcg, Sublingual, Daily      midodrine 5 MG tablet  Commonly known as: PROAMATINE  What changed:   how much to take  how to take this  when to take this  additional instructions   5 mg, Oral, 2 Times Daily Before Meals             Continue These Medications        Instructions Start Date   acetaminophen 325 MG tablet  Commonly known as: TYLENOL   650 mg, Oral, Every 4 Hours PRN      aspirin 81 MG EC tablet   1 tablet, Nightly      BIOFREEZE EX   1 Application, Daily PRN      Desvenlafaxine Succinate ER 25 MG tablet sustained-release 24 hour  Commonly known as: Pristiq   25 mg, Oral, Daily      levothyroxine 100 MCG tablet  Commonly known as: SYNTHROID, LEVOTHROID   1 tablet, Daily      magnesium oxide 400 MG tablet  Commonly known as: MAG-OX   400 mg, Daily      pantoprazole 40 MG EC tablet  Commonly known as: PROTONIX   1 tablet, 2 Times Daily      pregabalin 50 MG capsule  Commonly known as: LYRICA   1 capsule, 2 Times Daily      pyridostigmine 60 MG tablet  Commonly known as: MESTINON   30 mg, Oral, 2 Times Daily      vitamin D3 125 MCG (5000 UT) capsule capsule   250 Units, Daily             Stop These Medications      escitalopram 10 MG tablet  Commonly known as: LEXAPRO     fludrocortisone 0.1 MG tablet     metoprolol tartrate 25 MG tablet  Commonly known as: LOPRESSOR     potassium chloride 10 MEQ CR tablet  Commonly known as: KLOR-CON M10              Allergies   Allergen Reactions    Statins Myalgia        Discharge Disposition:  Home or Self Care      Discharge Diet:  Diet Order   Procedures    Diet: Cardiac; Healthy Heart (2-3 Na+); Fluid Consistency: Thin (IDDSI 0)       Discharge Activity: As tolerated      CODE STATUS:    Code Status and Medical Interventions: CPR (Attempt to Resuscitate); Full Support   Ordered at: 04/11/25 0003     Code Status (Patient has no pulse and is not breathing):    CPR (Attempt to Resuscitate)     Medical Interventions (Patient has pulse or is breathing):    Full Support     Level Of Support Discussed With:    Patient       Future Appointments   Date Time Provider Department Center   4/15/2025  1:00 PM Lashaun Solis APRN [facility]   5/1/2025  1:30 PM [facility] CC CT [facility] PET [facility]   5/27/2025  2:00 PM Alexa Webber PA [facility]   6/11/2025  2:30 PM Kayli Rowe APRN [facility]   6/19/2025  1:30 PM Dilan Houston DO [facility]   8/14/2025 12:00 PM LAB CHAIR 1 [facility] [place]   8/28/2025  1:00 PM VITALS ONLY [facility] [place]   8/28/2025  1:20 PM Jameel Ruffin MD [facility] [place]      Follow-up Information       Stephan Rubio MD Follow up in 1 week(s).    Specialty: Family Medicine  Why: Post Hospitlization follow up  Contact information:  [address] [place] [address] [place] [id number] [phone]               Dilan Houston DO. Go to.    Specialty: Cardiology  Contact information:  [address] [facility] [place] [id number] [phone]                             Time Spent on Discharge:  Greater than 30 minutes      JAVY Laureano  [facility]  04/14/25  11:07 EDT

## 2025-04-15 ENCOUNTER — TELEMEDICINE (OUTPATIENT)
Dept: PSYCHIATRY | Facility: CLINIC | Age: 85
End: 2025-04-15
Payer: MEDICARE

## 2025-04-15 DIAGNOSIS — F33.1 MODERATE EPISODE OF RECURRENT MAJOR DEPRESSIVE DISORDER: Primary | ICD-10-CM

## 2025-04-15 PROCEDURE — 1160F RVW MEDS BY RX/DR IN RCRD: CPT | Performed by: NURSE PRACTITIONER

## 2025-04-15 PROCEDURE — 1159F MED LIST DOCD IN RCRD: CPT | Performed by: NURSE PRACTITIONER

## 2025-04-15 PROCEDURE — 99214 OFFICE O/P EST MOD 30 MIN: CPT | Performed by: NURSE PRACTITIONER

## 2025-04-15 RX ORDER — SERTRALINE HYDROCHLORIDE 25 MG/1
25 TABLET, FILM COATED ORAL DAILY
Qty: 30 TABLET | Refills: 2 | Status: SHIPPED | OUTPATIENT
Start: 2025-04-15 | End: 2026-04-15

## 2025-04-15 RX ORDER — SERTRALINE HYDROCHLORIDE 25 MG/1
25 TABLET, FILM COATED ORAL DAILY
Qty: 30 TABLET | Refills: 2 | Status: SHIPPED | OUTPATIENT
Start: 2025-04-15 | End: 2025-04-15 | Stop reason: SDUPTHER

## 2025-04-15 NOTE — PROGRESS NOTES
Patient Name: Panfilo Feliz  MRN: [id number]   :  1940     This provider is located at her home office through the [facility] (through [facility]), [address] [facility] [facility], [address] using a secure [employer] Video Visit through [employer]. Patient is being seen remotely via telehealth at their home address in [place], and stated they are in a secure environment for this session. The patient's condition being diagnosed/treated is appropriate for telemedicine. The provider identified herself as well as her credentials.   The patient, and/or patients guardian, consent to be seen remotely, and when consent is given they understand that the consent allows for patient identifiable information to be sent to a third party as needed.   They may refuse to be seen remotely at any time. The electronic data is encrypted and password protected, and the patient and/or guardian has been advised of the potential risks to privacy not withstanding such measures.    You have chosen to receive care through a telehealth visit.  Do you consent to use a video/audio connection for your medical care today? Yes    Chief Complaint:      ICD-10-CM ICD-9-CM   1. Moderate episode of recurrent major depressive disorder  F33.1 296.32       History of Present Illness: Panfilo Feliz is a 84 y.o. male is here today for medication management follow up.  Patient had to go to the hospital for medical reasons.  First trial with Viibryd led the patient to have diarrhea.  Next trial was Pristiq and patient has been on this 3 weeks with no improvement.  While patient was in the hospital he was put on Remeron.  Patient and wife states this was horrible.  He felt like a zombie and could not walk.    The following portions of the patient's history were reviewed and updated as appropriate: allergies, current medications, past family history, past medical history, past social history, past surgical history, and  problem list.    Review of Systems;;  Review of Systems   Constitutional:  Negative for activity change, appetite change, fatigue, unexpected weight gain and unexpected weight loss.   HENT:  Positive for hearing loss.    Respiratory:  Negative for shortness of breath and wheezing.    Gastrointestinal:  Negative for constipation, diarrhea, nausea and vomiting.   Musculoskeletal:  Negative for gait problem.   Skin:  Negative for dry skin and rash.   Neurological:  Positive for weakness and memory problem. Negative for dizziness, speech difficulty, light-headedness, headache and confusion.   Psychiatric/Behavioral:  Positive for dysphoric mood, depressed mood and stress. Negative for agitation, behavioral problems, decreased concentration, hallucinations, self-injury, sleep disturbance, suicidal ideas and negative for hyperactivity. The patient is not nervous/anxious.        Physical Exam;;  Physical Exam  Constitutional:       General: He is not in acute distress.     Appearance: He is well-developed. He is not diaphoretic.   HENT:      Head: Normocephalic and atraumatic.   Eyes:      Conjunctiva/sclera: Conjunctivae normal.   Pulmonary:      Effort: Pulmonary effort is normal. No respiratory distress.   Musculoskeletal:         General: Normal range of motion.      Cervical back: Full passive range of motion without pain and normal range of motion.   Neurological:      Mental Status: He is alert and oriented to person, place, and time.   Psychiatric:         Attention and Perception: Attention and perception normal.         Mood and Affect: Mood is depressed. Mood is not anxious. Affect is not labile, blunt, angry or inappropriate.         Speech: Speech normal. Speech is not rapid and pressured or tangential.         Behavior: Behavior normal. Behavior is not agitated, slowed, aggressive, withdrawn, hyperactive or combative. Behavior is cooperative.         Thought Content: Thought content normal. Thought content is  not paranoid or delusional. Thought content does not include homicidal or suicidal ideation. Thought content does not include homicidal or suicidal plan.         Cognition and Memory: Cognition and memory normal.         Judgment: Judgment normal.       There were no vitals taken for this visit.  There is no height or weight on file to calculate BMI. Video appt unable to obtain.     Current Medications;;    Current Outpatient Medications:     sertraline (Zoloft) 25 MG tablet, Take 1 tablet by mouth Daily., Disp: 30 tablet, Rfl: 2    acetaminophen (TYLENOL) 325 MG tablet, Take 2 tablets by mouth Every 4 (Four) Hours As Needed for Mild Pain., Disp: , Rfl:     aspirin 81 MG EC tablet, Take 1 tablet by mouth Every Night. Indications: Carotid Artery Stenting, Disp: , Rfl:     Cyanocobalamin (B-12) 1000 MCG sublingual tablet, Place 1 tablet under the tongue Daily., Disp: 30 each, Rfl: 6    hydrALAZINE (APRESOLINE) 50 MG tablet, Take 1 tablet by mouth Every Evening., Disp: 30 tablet, Rfl: 0    levothyroxine (SYNTHROID, LEVOTHROID) 100 MCG tablet, Take 1 tablet by mouth Daily. Indications: Underactive Thyroid, Disp: , Rfl:     magnesium oxide (MAG-OX) 400 MG tablet, Take 1 tablet by mouth Daily., Disp: , Rfl:     Menthol, Topical Analgesic, (BIOFREEZE EX), Apply 1 Application topically Daily As Needed (pain)., Disp: , Rfl:     metoprolol succinate XL (TOPROL-XL) 25 MG 24 hr tablet, Take 1 tablet by mouth Every Evening., Disp: 30 tablet, Rfl: 0    midodrine (PROAMATINE) 5 MG tablet, Take 1 tablet by mouth 2 (Two) Times a Day Before Meals., Disp: , Rfl:     mirtazapine (REMERON) 7.5 MG tablet, Take 1 tablet by mouth Every Night., Disp: 30 tablet, Rfl: 0    pantoprazole (PROTONIX) 40 MG EC tablet, Take 1 tablet by mouth 2 (Two) Times a Day. Indications: Gastroesophageal Reflux Disease, Disp: , Rfl:     pregabalin (LYRICA) 50 MG capsule, Take 1 capsule by mouth 2 (Two) Times a Day. Indications: Neuropathic Pain, Disp: , Rfl:      pyridostigmine (MESTINON) 60 MG tablet, Take 0.5 tablets by mouth 2 (Two) Times a Day. Indications: Postural Orthostatic Tachycardia, Disp: , Rfl:     thiamine (VITAMIN B1) 100 MG tablet, Take 1 tablet by mouth Daily., Disp: 30 tablet, Rfl: 0    vitamin D3 125 MCG (5000 UT) capsule capsule, Take 250 Units by mouth Daily., Disp: , Rfl:   No current facility-administered medications for this visit.    Facility-Administered Medications Ordered in Other Visits:     Chlorhexidine Gluconate Cloth 2 % pads, , Topical, Q12H PRN, Kayli Rowe, APRN    Lab Results:   No results displayed because visit has over 200 results.      Lab on 04/10/2025   Component Date Value Ref Range Status    Glucose 04/10/2025 134 (H)  65 - 99 mg/dL Final    BUN 04/10/2025 14  8 - 23 mg/dL Final    Creatinine 04/10/2025 1.17  0.76 - 1.27 mg/dL Final    Sodium 04/10/2025 145  136 - 145 mmol/L Final    Potassium 04/10/2025 2.9 (C)  3.5 - 5.2 mmol/L Final    Chloride 04/10/2025 99  98 - 107 mmol/L Final    CO2 04/10/2025 30.5 (H)  22.0 - 29.0 mmol/L Final    Calcium 04/10/2025 9.7  8.6 - 10.5 mg/dL Final    Total Protein 04/10/2025 7.2  6.0 - 8.5 g/dL Final    Albumin 04/10/2025 4.1  3.5 - 5.2 g/dL Final    ALT (SGPT) 04/10/2025 18  1 - 41 U/L Final    AST (SGOT) 04/10/2025 32  1 - 40 U/L Final    Alkaline Phosphatase 04/10/2025 57  39 - 117 U/L Final    Total Bilirubin 04/10/2025 0.5  0.0 - 1.2 mg/dL Final    Globulin 04/10/2025 3.1  gm/dL Final    A/G Ratio 04/10/2025 1.3  g/dL Final    BUN/Creatinine Ratio 04/10/2025 12.0  7.0 - 25.0 Final    Anion Gap 04/10/2025 15.5 (H)  5.0 - 15.0 mmol/L Final    eGFR 04/10/2025 61.9  >60.0 mL/min/1.73 Final    WBC 04/10/2025 4.49  3.40 - 10.80 10*3/mm3 Final    RBC 04/10/2025 4.96  4.14 - 5.80 10*6/mm3 Final    Hemoglobin 04/10/2025 13.3  13.0 - 17.7 g/dL Final    Hematocrit 04/10/2025 40.9  37.5 - 51.0 % Final    MCV 04/10/2025 82.5  79.0 - 97.0 fL Final    MCH 04/10/2025 26.8  26.6 - 33.0 pg  Final    MCHC 04/10/2025 32.5  31.5 - 35.7 g/dL Final    RDW 04/10/2025 15.3  12.3 - 15.4 % Final    RDW-SD 04/10/2025 45.9  37.0 - 54.0 fl Final    MPV 04/10/2025 10.9  6.0 - 12.0 fL Final    Platelets 04/10/2025 205  140 - 450 10*3/mm3 Final    Neutrophil % 04/10/2025 41.8 (L)  42.7 - 76.0 % Final    Lymphocyte % 04/10/2025 43.9  19.6 - 45.3 % Final    Monocyte % 04/10/2025 12.5 (H)  5.0 - 12.0 % Final    Eosinophil % 04/10/2025 0.9  0.3 - 6.2 % Final    Basophil % 04/10/2025 0.7  0.0 - 1.5 % Final    Immature Grans % 04/10/2025 0.2  0.0 - 0.5 % Final    Neutrophils, Absolute 04/10/2025 1.88  1.70 - 7.00 10*3/mm3 Final    Lymphocytes, Absolute 04/10/2025 1.97  0.70 - 3.10 10*3/mm3 Final    Monocytes, Absolute 04/10/2025 0.56  0.10 - 0.90 10*3/mm3 Final    Eosinophils, Absolute 04/10/2025 0.04  0.00 - 0.40 10*3/mm3 Final    Basophils, Absolute 04/10/2025 0.03  0.00 - 0.20 10*3/mm3 Final    Immature Grans, Absolute 04/10/2025 0.01  0.00 - 0.05 10*3/mm3 Final    nRBC 04/10/2025 0.0  0.0 - 0.2 /100 WBC Final    IgG 04/10/2025 1114  603 - 1613 mg/dL Final    IgA 04/10/2025 410  61 - 437 mg/dL Final    IgM 04/10/2025 22  15 - 143 mg/dL Final    Result confirmed on concentration.    Total Protein 04/10/2025 7.3  6.0 - 8.5 g/dL Final    Albumin 04/10/2025 3.7  2.9 - 4.4 g/dL Final    Alpha-1-Globulin 04/10/2025 0.3  0.0 - 0.4 g/dL Final    Alpha-2-Globulin 04/10/2025 0.9  0.4 - 1.0 g/dL Final    Beta Globulin 04/10/2025 1.6 (H)  0.7 - 1.3 g/dL Final    Gamma Globulin 04/10/2025 0.9  0.4 - 1.8 g/dL Final    M-Tony 04/10/2025 0.3 (H)  Not Observed g/dL Final    Globulin 04/10/2025 3.6  2.2 - 3.9 g/dL Final    A/G Ratio 04/10/2025 1.1  0.7 - 1.7 Final    Immunofixation Reflex, Serum 04/10/2025 Comment (A)   Final    Immunofixation shows IgG monoclonal protein with kappa light chain  specificity.   PLEASE NOTE:   Samples from patients receiving DARZALEX(R) (daratumumab) or   SARCLISA(R)(isatuximab-[facility]) treatment can appear  "as an   \"IgG kappa\" and mask a complete response (CR). If this patient   is receiving these therapies, this KARLEE assay interference   can be removed by ordering test number [id number]-\"Immunofixation,   Daratumumab-Specific, Serum\" or [id number]-\"Immunofixation,   Isatuximab-Specific, Serum\" and submitting a new sample for   testing or by calling the lab to add this test to the current   sample.    Please note 04/10/2025 Comment   Final    Protein electrophoresis scan will follow via computer, mail, or   delivery.    Free Light Chain, [place] 04/10/2025 46.8 (H)  3.3 - 19.4 mg/L Final    Free Lambda Light Chains 04/10/2025 18.5  5.7 - 26.3 mg/L Final    Kappa/Lambda Ratio 04/10/2025 2.53 (H)  0.26 - 1.65 Final   Office Visit on 03/13/2025   Component Date Value Ref Range Status    ANTI-MPO ANTIBODIES 03/13/2025 <0.2  0.0 - 0.9 units Final    ANTI-PR3 ANTIBODIES 03/13/2025 <0.2  0.0 - 0.9 units Final    C-ANCA 03/13/2025 <1:20  Neg:<1:20 titer Final    P-ANCA 03/13/2025 <1:20  Neg:<1:20 titer Final    The presence of positive fluorescence exhibiting P-ANCA or C-ANCA  patterns alone is not specific for the diagnosis of Wegener's  Granulomatosis (WG) or microscopic polyangiitis. Decisions about  treatment should not be based solely on ANCA IFA results.  The  International ANCA Group Consensus recommends follow up testing of  positive sera with both MN-3 and MPO-ANCA enzyme immunoassays. As  many as 5% serum samples are positive only by EIA.  Ref. AM J Clin Pathol 1999;111:507-513.    Atypical pANCA 03/13/2025 <1:20  Neg:<1:20 titer Final    The atypical pANCA pattern has been observed in a significant  percentage of patients with ulcerative colitis, primary sclerosing  cholangitis and autoimmune hepatitis.    Sed Rate 03/13/2025 11  0 - 20 mm/hr Final    Anti-DNA (DS) Ab Qn 03/13/2025 5  0 - 9 IU/mL Final                                       Negative      <5                                     Equivocal  5 - 9             "                         Positive      >9    RNP Antibodies 2025 <0.2  0.0 - 0.9 AI Final    Garcia Antibodies 2025 <0.2  0.0 - 0.9 AI Final    Antiscleroderma-70 Antibodies 2025 <0.2  0.0 - 0.9 AI Final    Sjogren's Anti-SS-A 2025 <0.2  0.0 - 0.9 AI Final    Sjogren's Anti-SS-B 2025 <0.2  0.0 - 0.9 AI Final    Antichromatin Antibodies 2025 <0.2  0.0 - 0.9 AI Final    BRINDA-1 IgG 2025 <0.2  0.0 - 0.9 AI Final    Anti-Centromere B Antibodies 2025 <0.2  0.0 - 0.9 AI Final    See below: 2025 Comment   Final    Comment: Autoantibody                       Disease Association  ------------------------------------------------------------                          Condition                  Frequency  ---------------------   ------------------------   ---------  Antinuclear Antibody,    SLE, mixed connective  Direct (SUNNY-D)           tissue diseases  ---------------------   ------------------------   ---------  dsDNA                    SLE                        40 - 60%  ---------------------   ------------------------   ---------  Chromatin                Drug induced SLE                90%                           SLE                        48 - 97%  ---------------------   ------------------------   ---------  SSA (Ro)                 SLE                        25 - 35%                           Sjogren's Syndrome         40 - 70%                            Lupus                 100%  ---------------------   ------------------------   ---------  SSB (La)                 SLE                                                        10%                           Sjogren's Syndrome              30%  ---------------------   -----------------------    ---------  Sm (anti-Smith)          SLE                        15 - 30%  ---------------------   -----------------------    ---------  RNP                      Mixed Connective Tissue                           Disease                          95%  (U1 nRNP,                SLE                        30 - 50%  anti-ribonucleoprotein)  Polymyositis and/or                           Dermatomyositis                 20%  ---------------------   ------------------------   ---------  Scl-70 (antiDNA          Scleroderma (diffuse)      20 - 35%  topoisomerase)           Crest                           13%  ---------------------   ------------------------   ---------  Edith-1                     Polymyositis and/or                           Dermatomyositis            20 - 40%  ---------------------   ------------------------   ---------  Centromere B             Scleroderma -                            Crest                           variant                         80%    Creatine Kinase 03/13/2025 380 (H)  20 - 200 U/L Final   Lab on 03/13/2025   Component Date Value Ref Range Status    Glucose 03/13/2025 128 (H)  65 - 99 mg/dL Final    BUN 03/13/2025 11  8 - 23 mg/dL Final    Creatinine 03/13/2025 1.28 (H)  0.76 - 1.27 mg/dL Final    Sodium 03/13/2025 144  136 - 145 mmol/L Final    Potassium 03/13/2025 2.0 (C)  3.5 - 5.2 mmol/L Final    Chloride 03/13/2025 91 (L)  98 - 107 mmol/L Final    CO2 03/13/2025 40.7 (H)  22.0 - 29.0 mmol/L Final    Calcium 03/13/2025 9.3  8.6 - 10.5 mg/dL Final    Total Protein 03/13/2025 6.9  6.0 - 8.5 g/dL Final    Albumin 03/13/2025 3.9  3.5 - 5.2 g/dL Final    ALT (SGPT) 03/13/2025 18  1 - 41 U/L Final    AST (SGOT) 03/13/2025 47 (H)  1 - 40 U/L Final    Alkaline Phosphatase 03/13/2025 54  39 - 117 U/L Final    Total Bilirubin 03/13/2025 0.6  0.0 - 1.2 mg/dL Final    Globulin 03/13/2025 3.0  gm/dL Final    A/G Ratio 03/13/2025 1.3  g/dL Final    BUN/Creatinine Ratio 03/13/2025 8.6  7.0 - 25.0 Final    Anion Gap 03/13/2025 12.3  5.0 - 15.0 mmol/L Final    eGFR 03/13/2025 55.5 (L)  >60.0 mL/min/1.73 Final    IgG 03/13/2025 969  603 - 1613 mg/dL Final    IgA 03/13/2025 349  61 - 437 mg/dL Final    IgM 03/13/2025 22  15 -  "143 mg/dL Final    Result confirmed on concentration.    Total Protein 03/13/2025 6.7  6.0 - 8.5 g/dL Final    Albumin 03/13/2025 3.5  2.9 - 4.4 g/dL Final    Alpha-1-Globulin 03/13/2025 0.3  0.0 - 0.4 g/dL Final    Alpha-2-Globulin 03/13/2025 0.7  0.4 - 1.0 g/dL Final    Beta Globulin 03/13/2025 1.2  0.7 - 1.3 g/dL Final    Gamma Globulin 03/13/2025 0.9  0.4 - 1.8 g/dL Final    M-Tony 03/13/2025 0.2 (H)  Not Observed g/dL Final    Globulin 03/13/2025 3.2  2.2 - 3.9 g/dL Final    A/G Ratio 03/13/2025 1.1  0.7 - 1.7 Final    Immunofixation Reflex, Serum 03/13/2025 Comment (A)   Final    Immunofixation shows IgG monoclonal protein with kappa light chain  specificity.   PLEASE NOTE:   Samples from patients receiving DARZALEX(R) (daratumumab) or   SARCLISA(R)(isatuximab-[facility]) treatment can appear as an   \"IgG kappa\" and mask a complete response (CR). If this patient   is receiving these therapies, this KARLEE assay interference   can be removed by ordering test number [id number]-\"Immunofixation,   Daratumumab-Specific, Serum\" or [id number]-\"Immunofixation,   Isatuximab-Specific, Serum\" and submitting a new sample for   testing or by calling the lab to add this test to the current   sample.    Please note 03/13/2025 Comment   Final    Protein electrophoresis scan will follow via computer, mail, or   delivery.    Free Light Chain, [place] 03/13/2025 45.7 (H)  3.3 - 19.4 mg/L Final    Free Lambda Light Chains 03/13/2025 20.5  5.7 - 26.3 mg/L Final    Kappa/Lambda Ratio 03/13/2025 2.23 (H)  0.26 - 1.65 Final    WBC 03/13/2025 3.28 (L)  3.40 - 10.80 10*3/mm3 Final    RBC 03/13/2025 4.76  4.14 - 5.80 10*6/mm3 Final    Hemoglobin 03/13/2025 12.8 (L)  13.0 - 17.7 g/dL Final    Hematocrit 03/13/2025 39.5  37.5 - 51.0 % Final    MCV 03/13/2025 83.0  79.0 - 97.0 fL Final    MCH 03/13/2025 26.9  26.6 - 33.0 pg Final    MCHC 03/13/2025 32.4  31.5 - 35.7 g/dL Final    RDW 03/13/2025 15.2  12.3 - 15.4 % Final    RDW-SD 03/13/2025 46.2  " 37.0 - 54.0 fl Final    MPV 03/13/2025 11.5  6.0 - 12.0 fL Final    Platelets 03/13/2025 180  140 - 450 10*3/mm3 Final    Neutrophil % 03/13/2025 44.9  42.7 - 76.0 % Final    Lymphocyte % 03/13/2025 37.8  19.6 - 45.3 % Final    Monocyte % 03/13/2025 14.9 (H)  5.0 - 12.0 % Final    Eosinophil % 03/13/2025 1.2  0.3 - 6.2 % Final    Basophil % 03/13/2025 0.6  0.0 - 1.5 % Final    Immature Grans % 03/13/2025 0.6 (H)  0.0 - 0.5 % Final    Neutrophils, Absolute 03/13/2025 1.47 (L)  1.70 - 7.00 10*3/mm3 Final    Lymphocytes, Absolute 03/13/2025 1.24  0.70 - 3.10 10*3/mm3 Final    Monocytes, Absolute 03/13/2025 0.49  0.10 - 0.90 10*3/mm3 Final    Eosinophils, Absolute 03/13/2025 0.04  0.00 - 0.40 10*3/mm3 Final    Basophils, Absolute 03/13/2025 0.02  0.00 - 0.20 10*3/mm3 Final    Immature Grans, Absolute 03/13/2025 0.02  0.00 - 0.05 10*3/mm3 Final    nRBC 03/13/2025 0.0  0.0 - 0.2 /100 WBC Final    Magnesium 03/13/2025 2.1  1.6 - 2.4 mg/dL Final       Mental Status Exam:   Hygiene:   good  Cooperation:  Cooperative  Eye Contact:  Good  Psychomotor Behavior:  Restless  Mood:depressed, dysthymic, and sad  Affect:  Appropriate  Hopelessness: 2  Speech:  Normal  Thought Process:  Goal directed  Thought Content:  Normal  Suicidal:  None  Homicidal:  None  Hallucinations:  None  Delusion:  None  Memory:  Intact  Orientation:  Person, Place, Time, and Situation  Reliability:  good  Insight:  Good  Judgement:  Good  Impulse Control:  Good    PHQ-9 Depression Screening  Little interest or pleasure in doing things?     Feeling down, depressed, or hopeless?     PHQ-2 Total Score     Trouble falling or staying asleep, or sleeping too much?     Feeling tired or having little energy?     Poor appetite or overeating?     Feeling bad about yourself - or that you are a failure or have let yourself or your family down?     Trouble concentrating on things, such as reading the newspaper or watching television?     Moving or speaking so slowly  that other people could have noticed? Or the opposite - being so fidgety or restless that you have been moving around a lot more than usual?     Thoughts that you would be better off dead, or of hurting yourself in some way?     PHQ-9 Total Score     If you checked off any problems, how difficult have these problems made it for you to do your work, take care of things at home, or get along with other people?           Assessment/Plan:  Diagnoses and all orders for this visit:    1. Moderate episode of recurrent major depressive disorder (Primary)  -     Discontinue: sertraline (Zoloft) 25 MG tablet; Take 1 tablet by mouth Daily.  Dispense: 30 tablet; Refill: 2  -     sertraline (Zoloft) 25 MG tablet; Take 1 tablet by mouth Daily.  Dispense: 30 tablet; Refill: 2      Patient has not shown any improvement with the newer antidepressants.  Patient's wife states he was on Lexapro for quite some time.  We will trial Zoloft 25 mg daily as it is closer to Lexapro.  We will follow up in a month.      We discussed risks, benefits,goals and side effects of the above medication and the patient was agreeable with the plan.Patient was educated on the importance of compliance with treatment and follow-up appointments. Patient is aware to contact the [facility] [phone] with any worsening of symptoms. To call for questions or concerns and return early if necessary. Patent is agreeable to go to the Emergency Department or call 911 should they begin SI/HI.     Part of this note may be an electronic transcription/translation of spoken language to printed text using the Dragon Dictation System.    Return in about 4 weeks (around 5/13/2025) for Follow Up 30 min, Video visit.    JAVY Heredia

## 2025-04-17 ENCOUNTER — READMISSION MANAGEMENT (OUTPATIENT)
Dept: CALL CENTER | Facility: HOSPITAL | Age: 85
End: 2025-04-17
Payer: MEDICARE

## 2025-04-17 NOTE — OUTREACH NOTE
Medical Week 1 Survey      Flowsheet Row Responses   [facility] patient discharged from? [place]   Does the patient have one of the following disease processes/diagnoses(primary or secondary)? Other   Week 1 attempt successful? Yes   Call start time 1613   Call end time 1624   Discharge diagnosis Hypertensive urgency   Person spoke with today (if not patient) and relationship Spouse-Kelin Wallss reviewed with patient/caregiver? Yes   Is the patient having any side effects they believe may be caused by any medication additions or changes? No   Does the patient have all medications ordered at discharge? Yes   Is the patient taking all medications as directed (includes completed medication regime)? Yes   Medication comments pt is not taking the remeron d/t feeling too lethargic   Comments regarding appointments Cards appt on 4/22/25   Does the patient have a primary care provider?  Yes   Does the patient have an appointment with their PCP within 7 days of discharge? No   Has the patient kept scheduled appointments due by today? N/A   Has home health visited the patient within 72 hours of discharge? N/A   DME comments pt uses a walker for balance, has a wheelchair, bsc to use prn   Psychosocial issues? No   Comments Pt monitors b/p, has been as high as 200/'s   Did the patient receive a copy of their discharge instructions? Yes   Nursing interventions Reviewed instructions with patient   What is the patient's perception of their health status since discharge? Same   Is the patient/caregiver able to teach back the hierarchy of who to call/visit for symptoms/problems? PCP, Specialist, Home health nurse, Urgent Care, ED, 911 Yes   Week 1 call completed? Yes   Would this patient benefit from a Referral to Amb Social Work? No   Is the patient interested in additional calls from an ambulatory ? No   Call end time 1624            Honey WOOD - Registered Nurse

## 2025-04-18 LAB — VIT B1 BLD-SCNC: 95.4 NMOL/L (ref 66.5–200)

## 2025-04-22 ENCOUNTER — OFFICE VISIT (OUTPATIENT)
Dept: CARDIOLOGY | Facility: CLINIC | Age: 85
End: 2025-04-22
Payer: MEDICARE

## 2025-04-22 VITALS
HEIGHT: 75 IN | BODY MASS INDEX: 21.26 KG/M2 | OXYGEN SATURATION: 99 % | WEIGHT: 171 LBS | SYSTOLIC BLOOD PRESSURE: 180 MMHG | DIASTOLIC BLOOD PRESSURE: 100 MMHG | HEART RATE: 95 BPM

## 2025-04-22 DIAGNOSIS — I10 PRIMARY HYPERTENSION: Primary | ICD-10-CM

## 2025-04-22 DIAGNOSIS — I35.1 NONRHEUMATIC AORTIC VALVE INSUFFICIENCY: ICD-10-CM

## 2025-04-22 DIAGNOSIS — E78.2 MIXED HYPERLIPIDEMIA: ICD-10-CM

## 2025-04-22 DIAGNOSIS — I71.21 ANEURYSM OF ASCENDING AORTA WITHOUT RUPTURE: ICD-10-CM

## 2025-04-22 PROCEDURE — 3080F DIAST BP >= 90 MM HG: CPT | Performed by: INTERNAL MEDICINE

## 2025-04-22 PROCEDURE — 3077F SYST BP >= 140 MM HG: CPT | Performed by: INTERNAL MEDICINE

## 2025-04-22 PROCEDURE — 99214 OFFICE O/P EST MOD 30 MIN: CPT | Performed by: INTERNAL MEDICINE

## 2025-04-22 PROCEDURE — 1159F MED LIST DOCD IN RCRD: CPT | Performed by: INTERNAL MEDICINE

## 2025-04-22 PROCEDURE — G2211 COMPLEX E/M VISIT ADD ON: HCPCS | Performed by: INTERNAL MEDICINE

## 2025-04-22 PROCEDURE — 1160F RVW MEDS BY RX/DR IN RCRD: CPT | Performed by: INTERNAL MEDICINE

## 2025-04-22 NOTE — PROGRESS NOTES
Cardiology Office Visit      Encounter Date:  2025    PATIENT IDENTIFICATION    Name: Panfilo Feliz  Age: 84 y.o. Sex: male : 1940  MRN: [id number]    Reason For Followup:  Valvular heart disease  Ascending aortic aneurysm    Brief Clinical History:  Dear Stephan Torres MD    I had the pleasure of seeing Panfilo Feliz today. As you are well aware, this is a 84 y.o. male with no established history of ischemic heart disease.  He does have a history of aortic insufficiency, ascending aortic aneurysm, hypothyroidism, neuropathy, and acid reflux.  He presents today for follow-up on the above conditions.    Interval History:  2024                                           The patient presents today for an acute visit.  He reports that he saw his cardiologist last month but has deteriorated since then.  He is specifically citing issues with lightheadedness and a cold sensation all over.  He reports that over the past month this has significantly deteriorated.  Interestingly, the lightheadedness appears to occur mostly in the evenings around 630 or so.  He reports that he had been going to the gym a couple of times a week but now has not been able to do so because of his symptoms.  His neuropathy continues to deteriorate.  He reports that they have found nothing to help out with this.    He has started some medications to help with his neuropathy but none have been successful.  It is possible that some of the side effects from his medicines could be lending assistance to his symptoms.  We discussed options and we will have him back off on his atenolol to see if this helps.    In the bigger picture, the patient has been seen by cardiothoracic surgery.  Per the patient and his wife, surgery was offered at the last visit with Dr. Puente.  The patient was reluctant to move forward.    We had a lengthy discussion today.  I discussed that with the patient's deteriorating neuropathy and now endurance  issues, it may be worthwhile to consider moving forward with surgery before his neuropathy and endurance worsens to a point that would preclude him from being a candidate for surgery.  He has a CT scan scheduled for later this year as well as an echocardiogram.  We will move those up.  He will need a cardiac catheterization as well.  I have encouraged him to reach out to Dr. Puente in order to schedule a follow-up and discuss moving forward with surgery.    04/09/2024        His blood pressures have been on the low side (80s) and was was getting orthostatic. His BP today is a little on the higher side.  He is having some issues with shortness of breath with exertion.  He underwent a CT scan that demonstrated an ascending aortic aneurysm of 5.1 cm.  We again discussed the options.  He is ready to move forward with having these items addressed.  As such we will schedule him for a cardiac catheterization as well as a transesophageal echocardiogram.  He is scheduled to see Dr. Puente next week.  His blood pressure is elevated today however he has not tolerated titrations of his antihypertensives.  He reports becoming dizzy and lightheaded and nonfunctional.    06/05/2024        The patient underwent ascending aortic aneurysm repair with reductive root aortoplasty of the right and noncoronary sinuses with left atrial appendage closure (atrial clip).  And he is home and now doing home PT. He is still having some orthostasis. He is on midodrine and metoprolol. Hold parameters on metoprolol are for less than 110. Will start with cardiac rehab.    07/05/2024        He is still having problmes with blood pressure. He has no stamina. Significant orthostasis. He has fallen once and brusised elbow. He is on metoprolol but his pressure is only good enough to take 2 doses.     Discussed echo he does have a small to moderate-sized pericardial effusion with no evidence of tamponade.  We discussed options.  He will continue on his  current regimen and we will add Florinef to see if this will help with his pressures.    11/20/2024        He is on midodrine 15 mg 3 times a day.  He is also taking Florinef 0.1 mg daily.  He is continuing to have problems with orthostasis.  He fell yesterday and has a contusion to the right side of his face.  He is becoming increasingly frustrated with his condition.  He reports that he feels depressed.  We discussed getting him in to see a psychiatrist to help with this.    Moving forward with medical therapy for his orthostasis, we will increase his Florinef to 0.1 mg twice daily and continue to do so every other week by 0.1 mg until his pressure is adequate and he is no longer orthostatic.    12/18/2024        His blood pressure is elevated but he has a profound orthostatic hyoptension. His supine pressure is 192/102, sitting 140/80, and 118/72. Unfortunately we have to allow for permissive hypertension in order to keep him from syncopal events and injury.     He is currently taking florinef 0.2, midodrine, mestinon and metoprolol 7 AM. Then midodrine and florinef 0.1 around 2. Then will take midodrine, mestinon and florinef 0.2 at 8 PM    Pressures at home are in the 160s laying, 120s sitting and 80s-100 standing.    Would like to start cardiac rehab. Will place order.     He has made improvements. He is able to bath himself and he has not fallen since last visit.    He had orthostatics performed today in the office.  Lying 192/102, sitting 140/180, standing 118/72.  Discussed options.  Would prefer not to further increase patient's Florinef and midodrine due to significant supine hypertension.  Patient will continue on his current regimen and follow-up.  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 He had double vision watching ball game. He had just had a skin cancer taken off over on that side of his face the day before. He had an MRI and there was no  "stroke or mass. He has been told the movement will come back in a few weeks.     He reports that he feels \"doped out\" and he is worried it is some medications. He was started on vyvance 3-4 weeks.     He has loose stools and fecal urgency.  He is concerned this may be from the Florinef or midodrine.  This would be unusual.    We discussed options.  We will allow him a few more weeks for his cranial nerve paralysis to resolve.  This may be affecting his gait and exacerbating his orthostatic symptoms.  Although he is orthostatic today in the office, his pressure only drops to 134/80 upon standing.  Supine pressure was 178/88.  If he continues to have symptoms, we have discussed potentially referring him to a center that specializes in severe orthostatic hypotension.    04/22/2025        He was hospitalized and had some medication changes. He is still having some dizziness. Her had a fall the other night. Antidepressant was changed and the diarrhea is better. Orthostasis may be a little better. His eye is better. Orthostatic pressures are better. Getting down to 110s. He is eating better. His potassium was really good. Fall recently was mechanical.     Assessment & Plan    Impressions:  Ascending aortic aneurysm status post repair May 2024  Aortic insufficiency  Depression/anxiety  Hypothyroidism  Peripheral neuropathy  Acid reflux    Recommendations:  Continuation of his current cardiovascular regimen at the present time.     This includes hydralazine, metoprolol, midodrine, and Mestinon.  Consider referral to tertiary center if symptoms are still persistent and severe at next visit  Permissive supine hypertension secondary to profound orthostasis.  Follow-up in 2 months as scheduled      Diagnoses and all orders for this visit:    1. Primary hypertension (Primary)    2. Mixed hyperlipidemia    3. Aneurysm of ascending aorta without rupture    4. Nonrheumatic aortic valve " "insufficiency                        Objective:    Vitals:  Vitals:    04/22/25 1320   BP: 180/100   BP Location: Left arm   Patient Position: Sitting   Cuff Size: Adult   Pulse: 95   SpO2: 99%   Weight: 77.6 kg (171 lb)   Height: 190.5 cm (75\")             Body mass index is 21.37 kg/m².      Physical Exam:    General: Alert, cooperative, no distress, appears stated age.  Frail  Head:  Normocephalic, atraumatic, mucous membranes moist  Eyes:  Conjunctiva/corneas clear, EOM's intact     Neck:  Supple,  no bruit    Lungs: Coarse and diminished bilaterally  Chest wall: Tender at incision  Heart::  Regular rate and rhythm, S1 and S2 normal, 1/6 diastolic murmur.  No rub or gallop  Abdomen: Soft, non-tender, nondistended bowel sounds active  Extremities: No cyanosis, clubbing, or edema  Pulses: Diminished pedal pulses  Skin:  No rashes or lesions.  Some ecchymoses over right side of face  Neuro/psych: A&O x3. CN II through XII are grossly intact with appropriate affect      Allergies:  Allergies   Allergen Reactions    Statins Myalgia       Medication Review:     Current Outpatient Medications:     acetaminophen (TYLENOL) 325 MG tablet, Take 2 tablets by mouth Every 4 (Four) Hours As Needed for Mild Pain., Disp: , Rfl:     aspirin 81 MG EC tablet, Take 1 tablet by mouth Every Night. Indications: Carotid Artery Stenting, Disp: , Rfl:     Cyanocobalamin (B-12) 1000 MCG sublingual tablet, Place 1 tablet under the tongue Daily., Disp: 30 each, Rfl: 6    hydrALAZINE (APRESOLINE) 50 MG tablet, Take 1 tablet by mouth Every Evening., Disp: 30 tablet, Rfl: 0    levothyroxine (SYNTHROID, LEVOTHROID) 100 MCG tablet, Take 1 tablet by mouth Daily. Indications: Underactive Thyroid, Disp: , Rfl:     magnesium oxide (MAG-OX) 400 MG tablet, Take 1 tablet by mouth Daily., Disp: , Rfl:     Menthol, Topical Analgesic, (BIOFREEZE EX), Apply 1 Application topically Daily As Needed (pain)., Disp: , Rfl:     metoprolol succinate XL (TOPROL-XL) " 25 MG 24 hr tablet, Take 1 tablet by mouth Every Evening., Disp: 30 tablet, Rfl: 0    midodrine (PROAMATINE) 5 MG tablet, Take 1 tablet by mouth 2 (Two) Times a Day Before Meals., Disp: , Rfl:     pantoprazole (PROTONIX) 40 MG EC tablet, Take 1 tablet by mouth 2 (Two) Times a Day. Indications: Gastroesophageal Reflux Disease, Disp: , Rfl:     pregabalin (LYRICA) 50 MG capsule, Take 1 capsule by mouth 2 (Two) Times a Day. Indications: Neuropathic Pain, Disp: , Rfl:     pyridostigmine (MESTINON) 60 MG tablet, Take 0.5 tablets by mouth 2 (Two) Times a Day. Indications: Postural Orthostatic Tachycardia, Disp: , Rfl:     sertraline (Zoloft) 25 MG tablet, Take 1 tablet by mouth Daily., Disp: 30 tablet, Rfl: 2    thiamine (VITAMIN B1) 100 MG tablet, Take 1 tablet by mouth Daily., Disp: 30 tablet, Rfl: 0    vitamin D3 125 MCG (5000 UT) capsule capsule, Take 250 Units by mouth Daily., Disp: , Rfl:   No current facility-administered medications for this visit.    Facility-Administered Medications Ordered in Other Visits:     Chlorhexidine Gluconate Cloth 2 % pads, , Topical, Q12H PRN, Kayli Rowe, APRN    Family History:  Family History   Problem Relation Age of Onset    Cancer Mother 85        Breast    COPD Father     Cancer Brother 59        Unknown etiology    Hypertension Daughter        Past Medical History:  Past Medical History:   Diagnosis Date    AAA (abdominal aortic aneurysm)     Abnormal ECG 1980’s    Alcohol abuse     Alcoholism     None since 1991    Aneurysm 2019    aortic arch    Arthritis     Basal cell carcinoma (BCC) of face     Cholelithiasis 1990’s    Cholecystectomy    Chronic pain disorder     Colon polyp     Colon polyps     Coronary artery disease     Depression     Difficulty walking     Disease of thyroid gland     DJD (degenerative joint disease)     Gastritis     Gastroparesis     GERD (gastroesophageal reflux disease)     GI (gastrointestinal bleed)     Heart valve disease      Hernia     Hiatal    Hiatal hernia     History of bone marrow biopsy     [place] (hard of hearing)     Hyperlipidemia     Hypertension     Hypothyroidism     IgG monoclonal gammopathy     Multiple duodenal ulcers     Neuropathy     Peripheral neuropathy     PONV (postoperative nausea and vomiting)     Prostate cancer     Reflux esophagitis     Ulcer     WBC decreased        Past Surgical History:  Past Surgical History:   Procedure Laterality Date    ARTERIAL ANEURYSM REPAIR      ASCENDING AORTIC ANEURYSM REPAIR W/ MECHANICAL AORTIC VALVE REPLACEMENT N/A 05/10/2024    Procedure: ASCENDING AORTIC ARCH/ possible HEMIARCH REPLACEMENT WITH CIRCULATORY ARREST, neuro monitoring, and intraop JOSE A;  Surgeon: Spencer Puente MD;  Location: [facility] CV[facility];  Service: Cardiothoracic;  Laterality: N/A;  Ascending aortic aneurysm repair with 30 mm gelweave graft.    BACK SURGERY      CARDIAC CATHETERIZATION N/A 04/19/2024    Procedure: Right and Left Heart Cath with possible PCI;  Surgeon: Dilan Houston DO;  Location: [facility] CATH INVASIVE LOCATION;  Service: Cardiovascular;  Laterality: N/A;  Local and IV sedation    CARDIAC SURGERY  5-10-24    CHOLECYSTECTOMY  1988    COLON SURGERY  1998    COLONOSCOPY  02/2013    ENDOSCOPY  2012    ESOPHAGOGASTRODUODENOSCOPY WITH DILATION OF SHATZKI'S RING    ENDOSCOPY N/A 10/09/2020    Procedure: ESOPHAGOGASTRODUODENOSCOPY with cold bx;  Surgeon: Jake Perez MD;  Location:  [facility];  Service: Gastroenterology;  Laterality: N/A;  pre - nausea  post - duodenal ulcer, gastrits, gastric ulcer, hiatal hernia    ENDOSCOPY N/A 10/04/2022    Procedure: ESOPHAGOGASTRODUODENOSCOPY with biopsies with esophageal dilatation with 56 cui;  Surgeon: Jake Perez MD;  Location:  [facility];  Service: Gastroenterology;  Laterality: N/A;  pre-dysphagia  post-normal     ENDOSCOPY  10/04/2022    Chris [facility]TERFIELD    EYE SURGERY Bilateral     cataracts    JOINT REPLACEMENT       LAMINECTOMY  2013    decompression L3-4, L4-5    PROSTATECTOMY  2007    SKIN BIOPSY      TONSILLECTOMY AND ADENOIDECTOMY      1940s    UPPER GASTROINTESTINAL ENDOSCOPY      VASCULAR SURGERY      VASECTOMY      1970s       Social History:  Social History     Socioeconomic History    Marital status:      Spouse name: Meghan    Years of education: College   Tobacco Use    Smoking status: Never     Passive exposure: Never    Smokeless tobacco: Never   Vaping Use    Vaping status: Never Used   Substance and Sexual Activity    Alcohol use: Not Currently     Comment: No ETOH since 1991    Drug use: Never    Sexual activity: Not Currently     Partners: Female     Birth control/protection: None       Review of Systems:  The following systems were reviewed as they relate to the cardiovascular system: Constitutional, Eyes, ENT, Cardiovascular, Respiratory, Gastrointestinal, Integumentary, Neurological, Psychiatric, Hematologic, Endocrine, Musculoskeletal, and Genitourinary. The pertinent cardiovascular findings are reported above with all other cardiovascular points within those systems being negative.    Diagnostic Study Review:     Current Electrocardiogram:  Procedures no new EKG.  EKG dated 12 April 2025 demonstrates sinus rhythm with a ventricular rate of 76 bpm.    Laboratory Data:  Lab Results   Component Value Date    GLUCOSE 95 04/14/2025    BUN 20 04/14/2025    CREATININE 1.13 04/14/2025    EGFRIFNONA 58 (L) 02/09/2022    BCR 17.7 04/14/2025    K 4.0 04/14/2025    CO2 28.0 04/14/2025    CALCIUM 9.1 04/14/2025    ALBUMIN 4.1 04/10/2025    ALBUMIN 3.7 04/10/2025    AST 32 04/10/2025    ALT 18 04/10/2025     Lab Results   Component Value Date    GLUCOSE 95 04/14/2025    CALCIUM 9.1 04/14/2025     04/14/2025    K 4.0 04/14/2025    CO2 28.0 04/14/2025     04/14/2025    BUN 20 04/14/2025    CREATININE 1.13 04/14/2025    EGFRIFNONA 58 (L) 02/09/2022    BCR 17.7 04/14/2025    ANIONGAP 10.0 04/14/2025      Lab Results   Component Value Date    WBC 3.81 04/14/2025    HGB 12.2 (L) 04/14/2025    HCT 39.0 04/14/2025    MCV 85.5 04/14/2025     04/14/2025     Lab Results   Component Value Date    CHOL 195 04/17/2024    TRIG 101 04/17/2024    HDL 54 04/17/2024     (H) 04/17/2024     Lab Results   Component Value Date    HGBA1C 5.50 05/09/2024     Lab Results   Component Value Date    INR 1.10 05/11/2024    INR 1.12 (H) 05/10/2024    INR 1.38 (H) 05/10/2024    PROTIME 11.9 (H) 05/11/2024    PROTIME 12.1 (H) 05/10/2024    PROTIME 14.7 (H) 05/10/2024       Most Recent Echo:  Results for orders placed during the hospital encounter of 04/10/25    Adult Transthoracic Echo Complete W/ Cont if Necessary Per Protocol    Interpretation Summary    Left ventricular systolic function is mildly decreased. Calculated left ventricular EF = 44.9%    Left ventricular wall thickness is consistent with mild concentric hypertrophy. Sigmoid-shaped ventricular septum is present.    The following left ventricular wall segments are hypokinetic: mid anterior, apical anterior, basal anterolateral, mid anterolateral, apical lateral, basal inferolateral, mid inferolateral, apical inferior, mid inferior, apical septal, basal inferoseptal, mid inferoseptal, apex hypokinetic, mid anteroseptal, basal anterior, basal inferior and basal inferoseptal.    Left ventricular diastolic function is consistent with (grade II w/high LAP) pseudonormalization.    There is mild calcification of the aortic valve.    Estimated right ventricular systolic pressure from tricuspid regurgitation is normal (<35 mmHg).    Mild dilation of the aortic root is present. The aortic root measures 4.4 cm. Mild dilation of the sinuses of Valsalva is present.       Most Recent Stress Test:  Results for orders placed during the hospital encounter of 06/17/24    Treadmill Stress Test    Interpretation Summary    Patient exercised only 1 minute 2 seconds achieving 4.6  "METS    No significant cardiac arrhythmia no significant acute ischemic EKG changes    Patient is stable to start the cardiac rehab program       Most Recent Cardiac Catheterization:   No results found for this or any previous visit.       NOTE: The following portions of the patient's note were reviewed, confirmed and/or updated this visit as appropriate: History of present illness/Interval history, physical examination, assessment & plan, allergies, current medications, past family history, past medical history, past social history, past surgical history and problem list.    Labs pertinent to today's visit on 04/22/2025 (including but not limited to CBC, CMP, and lipid profiles) were requested from the patient's primary care provider/hospital/clinical laboratory.  If the labs were available for the visit, they were reviewed with the patient.  If they were not available, when received, special interest will be made to the labs pertinent to this visit.  The patient's most recent \"in-house\" labs are noted below and have been reviewed.  Outside labs pertinent to this visit are scanned into the record and have been reviewed.    Discussions held today, 04/22/2025,regarding procedures included risk, benefits, and options including but not limited to: Death, MI, stroke, pain, bleeding, infection, and possible need for vascular/thoracic/cardiothoracic surgery.    Copied information within this note was reviewed and is current as of 04/22/2025.    Assessment and plan noted herein represents the current plan of care as of 04/22/2025.    Significant resources from our office and staff are inherent in engaging this patient in a continuous and active collaborative plan of care related to their chronic cardiovascular conditions outlined below.  The management of these conditions requires the direction of our service with specialized clinical knowledge, skills, and experience.  This collaborative care includes but is not limited " to patient education, expectations and responsibilities, shared decision making around therapeutic goals, and shared commitments to achieve those goals.

## 2025-04-29 ENCOUNTER — TELEPHONE (OUTPATIENT)
Dept: PSYCHIATRY | Facility: CLINIC | Age: 85
End: 2025-04-29
Payer: MEDICARE

## 2025-04-29 NOTE — TELEPHONE ENCOUNTER
Pt wife wanted to make provider aware that the pt feels like he needs to increase the dosage of the Zoloft.  Pt is not having any side effects from the medication but he is not feeling any better as far as the depression goes.

## 2025-04-30 ENCOUNTER — READMISSION MANAGEMENT (OUTPATIENT)
Dept: CALL CENTER | Facility: HOSPITAL | Age: 85
End: 2025-04-30
Payer: MEDICARE

## 2025-04-30 DIAGNOSIS — F33.1 MODERATE EPISODE OF RECURRENT MAJOR DEPRESSIVE DISORDER: Primary | ICD-10-CM

## 2025-04-30 NOTE — OUTREACH NOTE
Medical Week 2 Survey      Flowsheet Row Responses   [facility] patient discharged from? [place]   Does the patient have one of the following disease processes/diagnoses(primary or secondary)? Other   Week 2 attempt successful? Yes   Call start time 1255   Discharge diagnosis Hypertensive urgency, orthostatic hypotension   Call end time 1319   Is patient permission given to speak with other caregiver? Yes   Person spoke with today (if not patient) and relationship Spouse-Kelin   Meds reviewed with patient/caregiver? Yes   Does the patient have all medications ordered at discharge? Yes   Is the patient taking all medications as directed (includes completed medication regime)? Yes   Comments regarding appointments Patient has had follow up with Behavioral health, cardiology,   primary care.   Does the patient have a primary care provider?  Yes   Has the patient kept scheduled appointments due by today? Yes   Comments CT of chest tomorrow 5/1, Neurology 5/27, and Kayli with CT surgery 6/11   Has home health visited the patient within 72 hours of discharge? N/A   DME comments Patient continues to use walker for ambulation   Psychosocial issues? No   Psychosocial comments Wife is a nurse, very educated, informed.   Comments Wife reports patient still with fluctuating B/P's   Did the patient receive a copy of their discharge instructions? Yes   What is the patient's perception of their health status since discharge? Same   Is the patient/caregiver able to teach back signs and symptoms related to disease process for when to call PCP? Yes   Is the patient/caregiver able to teach back signs and symptoms related to disease process for when to call 911? Yes   Is the patient/caregiver able to teach back the hierarchy of who to call/visit for symptoms/problems? PCP, Specialist, Home health nurse, Urgent Care, ED, 911 Yes   If the patient is a current smoker, are they able to teach back resources for cessation? Not a  smoker   Week 2 Call Completed? Yes   Is the patient interested in additional calls from an ambulatory ? No   Would this patient benefit from a Referral to [facility] Social Work? No   Wrap up additional comments Patient following specialists advice and recommendations. He is still having fluctuating blood pressures and feeling poorly. Will continue to follow up with specialists.   Call end time 1319            LORAINE FERNANDEZ - Registered Nurse

## 2025-05-05 RX ORDER — HYDRALAZINE HYDROCHLORIDE 50 MG/1
50 TABLET, FILM COATED ORAL EVERY EVENING
Qty: 30 TABLET | Refills: 3 | Status: SHIPPED | OUTPATIENT
Start: 2025-05-05

## 2025-05-05 RX ORDER — METOPROLOL SUCCINATE 25 MG/1
25 TABLET, EXTENDED RELEASE ORAL EVERY EVENING
Qty: 30 TABLET | Refills: 3 | Status: SHIPPED | OUTPATIENT
Start: 2025-05-05

## 2025-05-14 ENCOUNTER — TELEMEDICINE (OUTPATIENT)
Dept: PSYCHIATRY | Facility: CLINIC | Age: 85
End: 2025-05-14
Payer: MEDICARE

## 2025-05-14 ENCOUNTER — READMISSION MANAGEMENT (OUTPATIENT)
Dept: CALL CENTER | Facility: HOSPITAL | Age: 85
End: 2025-05-14
Payer: MEDICARE

## 2025-05-14 DIAGNOSIS — I95.1 ORTHOSTATIC HYPOTENSION: Primary | ICD-10-CM

## 2025-05-14 DIAGNOSIS — F33.1 MODERATE EPISODE OF RECURRENT MAJOR DEPRESSIVE DISORDER: Primary | ICD-10-CM

## 2025-05-14 RX ORDER — SERTRALINE HYDROCHLORIDE 100 MG/1
100 TABLET, FILM COATED ORAL DAILY
Qty: 30 TABLET | Refills: 2 | Status: SHIPPED | OUTPATIENT
Start: 2025-05-14 | End: 2026-05-14

## 2025-05-14 NOTE — OUTREACH NOTE
Medical Week 3 Survey      Flowsheet Row Responses   [facility] patient discharged from? [place]   Does the patient have one of the following disease processes/diagnoses(primary or secondary)? Other   Week 3 attempt successful? Yes   Call start time 1601   Call end time 1607   Discharge diagnosis Hypertensive urgency, orthostatic hypotension   Person spoke with today (if not patient) and relationship Spouse-Kelin Wallss reviewed with patient/caregiver? Yes   Is the patient having any side effects they believe may be caused by any medication additions or changes? No   Does the patient have all medications ordered at discharge? Yes   Is the patient taking all medications as directed (includes completed medication regime)? Yes   Does the patient have a primary care provider?  Yes   Does the patient have an appointment with their PCP within 7 days of discharge? Yes   Has the patient kept scheduled appointments due by today? Yes   Psychosocial issues? No   Psychosocial comments Wife is a nurse, very educated, informed.   Did the patient receive a copy of their discharge instructions? Yes   Nursing interventions Reviewed instructions with patient   What is the patient's perception of their health status since discharge? Same   Is the patient/caregiver able to teach back signs and symptoms related to disease process for when to call PCP? Yes   Is the patient/caregiver able to teach back signs and symptoms related to disease process for when to call 911? Yes   Is the patient/caregiver able to teach back the hierarchy of who to call/visit for symptoms/problems? PCP, Specialist, Home health nurse, Urgent Care, ED, 911 Yes   If the patient is a current smoker, are they able to teach back resources for cessation? Not a smoker   Week 3 Call Completed? Yes   Graduated Yes   Is the patient interested in additional calls from an ambulatory ? No   Would this patient benefit from a Referral to [facility]? No    Wrap up additional comments going to [facility]   Call end time 0220            JC MONTOYA - Registered Nurse

## 2025-05-14 NOTE — PROGRESS NOTES
Patient Name: Panfilo Feliz  MRN: [id number]   :  1940     This provider is located at her home office through the [facility] (through [facility]), [address], [address] using a secure [employer] Video Visit through [employer]. Patient is being seen remotely via telehealth at their home address in [place], and stated they are in a secure environment for this session. The patient's condition being diagnosed/treated is appropriate for telemedicine. The provider identified herself as well as her credentials.   The patient, and/or patients guardian, consent to be seen remotely, and when consent is given they understand that the consent allows for patient identifiable information to be sent to a third party as needed.   They may refuse to be seen remotely at any time. The electronic data is encrypted and password protected, and the patient and/or guardian has been advised of the potential risks to privacy not withstanding such measures.    You have chosen to receive care through a telehealth visit.  Do you consent to use a video/audio connection for your medical care today? Yes    Chief Complaint:      ICD-10-CM ICD-9-CM   1. Moderate episode of recurrent major depressive disorder  F33.1 296.32       History of Present Illness: Panfilo Feliz is a 84 y.o. male is here today for medication management follow up.  Patient states he feels no better so far.  Is getting some sleep but he has to get up frequently to urinate so consistent sleep is difficult.    The following portions of the patient's history were reviewed and updated as appropriate: allergies, current medications, past family history, past medical history, past social history, past surgical history, and problem list.    Review of Systems;;  Review of Systems   Constitutional:  Positive for fatigue. Negative for activity change, appetite change, unexpected weight gain and unexpected weight loss.   HENT:   Positive for hearing loss.    Respiratory:  Negative for shortness of breath and wheezing.    Gastrointestinal:  Negative for constipation, diarrhea, nausea and vomiting.   Musculoskeletal:  Negative for gait problem.   Skin:  Negative for dry skin and rash.   Neurological:  Positive for weakness. Negative for dizziness, speech difficulty, light-headedness, headache, memory problem and confusion.   Psychiatric/Behavioral:  Positive for dysphoric mood. Negative for agitation, behavioral problems, decreased concentration, hallucinations, self-injury, sleep disturbance, suicidal ideas, negative for hyperactivity, depressed mood and stress. The patient is nervous/anxious.        Physical Exam;;  Physical Exam  Constitutional:       General: He is not in acute distress.     Appearance: He is well-developed. He is not diaphoretic.   HENT:      Head: Normocephalic and atraumatic.   Eyes:      Conjunctiva/sclera: Conjunctivae normal.   Pulmonary:      Effort: Pulmonary effort is normal. No respiratory distress.   Musculoskeletal:         General: Normal range of motion.      Cervical back: Full passive range of motion without pain and normal range of motion.   Neurological:      Mental Status: He is alert and oriented to person, place, and time.   Psychiatric:         Attention and Perception: Attention and perception normal.         Mood and Affect: Mood is anxious and depressed. Affect is not labile, blunt, angry or inappropriate.         Speech: Speech normal. Speech is not rapid and pressured or tangential.         Behavior: Behavior normal. Behavior is not agitated, slowed, aggressive, withdrawn, hyperactive or combative. Behavior is cooperative.         Thought Content: Thought content normal. Thought content is not paranoid or delusional. Thought content does not include homicidal or suicidal ideation. Thought content does not include homicidal or suicidal plan.         Cognition and Memory: Cognition and memory  normal.         Judgment: Judgment normal.       There were no vitals taken for this visit.  There is no height or weight on file to calculate BMI. Video appt unable to obtain.      Current Medications;;    Current Outpatient Medications:     acetaminophen (TYLENOL) 325 MG tablet, Take 2 tablets by mouth Every 4 (Four) Hours As Needed for Mild Pain., Disp: , Rfl:     aspirin 81 MG EC tablet, Take 1 tablet by mouth Every Night. Indications: Carotid Artery Stenting, Disp: , Rfl:     Cyanocobalamin (B-12) 1000 MCG sublingual tablet, Place 1 tablet under the tongue Daily., Disp: 30 each, Rfl: 6    hydrALAZINE (APRESOLINE) 50 MG tablet, Take 0.5 tablets by mouth Every Evening., Disp: , Rfl:     levothyroxine (SYNTHROID, LEVOTHROID) 100 MCG tablet, Take 1 tablet by mouth Daily. Indications: Underactive Thyroid, Disp: , Rfl:     magnesium oxide (MAG-OX) 400 MG tablet, Take 1 tablet by mouth Daily., Disp: , Rfl:     Menthol, Topical Analgesic, (BIOFREEZE EX), Apply 1 Application topically Daily As Needed (pain)., Disp: , Rfl:     metoprolol succinate XL (TOPROL-XL) 25 MG 24 hr tablet, Take 1 tablet by mouth Every Evening., Disp: 30 tablet, Rfl: 3    midodrine (PROAMATINE) 5 MG tablet, Take 1 tablet by mouth 2 (Two) Times a Day Before Meals., Disp: , Rfl:     pantoprazole (PROTONIX) 40 MG EC tablet, Take 1 tablet by mouth 2 (Two) Times a Day. Indications: Gastroesophageal Reflux Disease, Disp: , Rfl:     pregabalin (LYRICA) 50 MG capsule, Take 1 capsule by mouth 2 (Two) Times a Day. Indications: Neuropathic Pain, Disp: , Rfl:     pyridostigmine (MESTINON) 60 MG tablet, Take 0.5 tablets by mouth 2 (Two) Times a Day. Indications: Postural Orthostatic Tachycardia, Disp: , Rfl:     sertraline (Zoloft) 100 MG tablet, Take 1 tablet by mouth Daily., Disp: 30 tablet, Rfl: 2    thiamine (VITAMIN B1) 100 MG tablet, Take 1 tablet by mouth Daily., Disp: 30 tablet, Rfl: 0    vitamin D3 125 MCG (5000 UT) capsule capsule, Take 250 Units by  mouth Daily., Disp: , Rfl:   No current facility-administered medications for this visit.    Facility-Administered Medications Ordered in Other Visits:     Chlorhexidine Gluconate Cloth 2 % pads, , Topical, Q12H PRN, Kayli Rowe, APRN    Lab Results:   No results displayed because visit has over 200 results.      Lab on 04/10/2025   Component Date Value Ref Range Status    Glucose 04/10/2025 134 (H)  65 - 99 mg/dL Final    BUN 04/10/2025 14  8 - 23 mg/dL Final    Creatinine 04/10/2025 1.17  0.76 - 1.27 mg/dL Final    Sodium 04/10/2025 145  136 - 145 mmol/L Final    Potassium 04/10/2025 2.9 (C)  3.5 - 5.2 mmol/L Final    Chloride 04/10/2025 99  98 - 107 mmol/L Final    CO2 04/10/2025 30.5 (H)  22.0 - 29.0 mmol/L Final    Calcium 04/10/2025 9.7  8.6 - 10.5 mg/dL Final    Total Protein 04/10/2025 7.2  6.0 - 8.5 g/dL Final    Albumin 04/10/2025 4.1  3.5 - 5.2 g/dL Final    ALT (SGPT) 04/10/2025 18  1 - 41 U/L Final    AST (SGOT) 04/10/2025 32  1 - 40 U/L Final    Alkaline Phosphatase 04/10/2025 57  39 - 117 U/L Final    Total Bilirubin 04/10/2025 0.5  0.0 - 1.2 mg/dL Final    Globulin 04/10/2025 3.1  gm/dL Final    A/G Ratio 04/10/2025 1.3  g/dL Final    BUN/Creatinine Ratio 04/10/2025 12.0  7.0 - 25.0 Final    Anion Gap 04/10/2025 15.5 (H)  5.0 - 15.0 mmol/L Final    eGFR 04/10/2025 61.9  >60.0 mL/min/1.73 Final    WBC 04/10/2025 4.49  3.40 - 10.80 10*3/mm3 Final    RBC 04/10/2025 4.96  4.14 - 5.80 10*6/mm3 Final    Hemoglobin 04/10/2025 13.3  13.0 - 17.7 g/dL Final    Hematocrit 04/10/2025 40.9  37.5 - 51.0 % Final    MCV 04/10/2025 82.5  79.0 - 97.0 fL Final    MCH 04/10/2025 26.8  26.6 - 33.0 pg Final    MCHC 04/10/2025 32.5  31.5 - 35.7 g/dL Final    RDW 04/10/2025 15.3  12.3 - 15.4 % Final    RDW-SD 04/10/2025 45.9  37.0 - 54.0 fl Final    MPV 04/10/2025 10.9  6.0 - 12.0 fL Final    Platelets 04/10/2025 205  140 - 450 10*3/mm3 Final    Neutrophil % 04/10/2025 41.8 (L)  42.7 - 76.0 % Final     "Lymphocyte % 04/10/2025 43.9  19.6 - 45.3 % Final    Monocyte % 04/10/2025 12.5 (H)  5.0 - 12.0 % Final    Eosinophil % 04/10/2025 0.9  0.3 - 6.2 % Final    Basophil % 04/10/2025 0.7  0.0 - 1.5 % Final    Immature Grans % 04/10/2025 0.2  0.0 - 0.5 % Final    Neutrophils, Absolute 04/10/2025 1.88  1.70 - 7.00 10*3/mm3 Final    Lymphocytes, Absolute 04/10/2025 1.97  0.70 - 3.10 10*3/mm3 Final    Monocytes, Absolute 04/10/2025 0.56  0.10 - 0.90 10*3/mm3 Final    Eosinophils, Absolute 04/10/2025 0.04  0.00 - 0.40 10*3/mm3 Final    Basophils, Absolute 04/10/2025 0.03  0.00 - 0.20 10*3/mm3 Final    Immature Grans, Absolute 04/10/2025 0.01  0.00 - 0.05 10*3/mm3 Final    nRBC 04/10/2025 0.0  0.0 - 0.2 /100 WBC Final    IgG 04/10/2025 1114  603 - 1613 mg/dL Final    IgA 04/10/2025 410  61 - 437 mg/dL Final    IgM 04/10/2025 22  15 - 143 mg/dL Final    Result confirmed on concentration.    Total Protein 04/10/2025 7.3  6.0 - 8.5 g/dL Final    Albumin 04/10/2025 3.7  2.9 - 4.4 g/dL Final    Alpha-1-Globulin 04/10/2025 0.3  0.0 - 0.4 g/dL Final    Alpha-2-Globulin 04/10/2025 0.9  0.4 - 1.0 g/dL Final    Beta Globulin 04/10/2025 1.6 (H)  0.7 - 1.3 g/dL Final    Gamma Globulin 04/10/2025 0.9  0.4 - 1.8 g/dL Final    M-Tony 04/10/2025 0.3 (H)  Not Observed g/dL Final    Globulin 04/10/2025 3.6  2.2 - 3.9 g/dL Final    A/G Ratio 04/10/2025 1.1  0.7 - 1.7 Final    Immunofixation Reflex, Serum 04/10/2025 Comment (A)   Final    Immunofixation shows IgG monoclonal protein with kappa light chain  specificity.   PLEASE NOTE:   Samples from patients receiving DARZALEX(R) (daratumumab) or   SARCLISA(R)(isatuximab-irfc) treatment can appear as an   \"IgG kappa\" and mask a complete response (CR). If this patient   is receiving these therapies, this KARLEE assay interference   can be removed by ordering test number [id number]-\"Immunofixation,   Daratumumab-Specific, Serum\" or [id number]-\"Immunofixation,   Isatuximab-Specific, Serum\" and submitting a new " sample for   testing or by calling the lab to add this test to the current   sample.    Please note 04/10/2025 Comment   Final    Protein electrophoresis scan will follow via computer, mail, or   delivery.    Free Light Chain, [place] 04/10/2025 46.8 (H)  3.3 - 19.4 mg/L Final    Free Lambda Light Chains 04/10/2025 18.5  5.7 - 26.3 mg/L Final    Kappa/Lambda Ratio 04/10/2025 2.53 (H)  0.26 - 1.65 Final   Office Visit on 03/13/2025   Component Date Value Ref Range Status    ANTI-MPO ANTIBODIES 03/13/2025 <0.2  0.0 - 0.9 units Final    ANTI-PR3 ANTIBODIES 03/13/2025 <0.2  0.0 - 0.9 units Final    C-ANCA 03/13/2025 <1:20  Neg:<1:20 titer Final    P-ANCA 03/13/2025 <1:20  Neg:<1:20 titer Final    The presence of positive fluorescence exhibiting P-ANCA or C-ANCA  patterns alone is not specific for the diagnosis of Wegener's  Granulomatosis (WG) or microscopic polyangiitis. Decisions about  treatment should not be based solely on ANCA IFA results.  The  International ANCA Group Consensus recommends follow up testing of  positive sera with both WV-3 and MPO-ANCA enzyme immunoassays. As  many as 5% serum samples are positive only by EIA.  Ref. AM J Clin Pathol 1999;111:507-513.    Atypical pANCA 03/13/2025 <1:20  Neg:<1:20 titer Final    The atypical pANCA pattern has been observed in a significant  percentage of patients with ulcerative colitis, primary sclerosing  cholangitis and autoimmune hepatitis.    Sed Rate 03/13/2025 11  0 - 20 mm/hr Final    Anti-DNA (DS) Ab Qn 03/13/2025 5  0 - 9 IU/mL Final                                       Negative      <5                                     Equivocal  5 - 9                                     Positive      >9    RNP Antibodies 03/13/2025 <0.2  0.0 - 0.9 AI Final    Garcia Antibodies 03/13/2025 <0.2  0.0 - 0.9 AI Final    Antiscleroderma-70 Antibodies 03/13/2025 <0.2  0.0 - 0.9 AI Final    Sjogren's Anti-SS-A 03/13/2025 <0.2  0.0 - 0.9 AI Final    Sjogren's Anti-SS-B  2025 <0.2  0.0 - 0.9 AI Final    Antichromatin Antibodies 2025 <0.2  0.0 - 0.9 AI Final    BRINDA-1 IgG 2025 <0.2  0.0 - 0.9 AI Final    Anti-Centromere B Antibodies 2025 <0.2  0.0 - 0.9 AI Final    See below: 2025 Comment   Final    Comment: Autoantibody                       Disease Association  ------------------------------------------------------------                          Condition                  Frequency  ---------------------   ------------------------   ---------  Antinuclear Antibody,    SLE, mixed connective  Direct (SUNNY-D)           tissue diseases  ---------------------   ------------------------   ---------  dsDNA                    SLE                        40 - 60%  ---------------------   ------------------------   ---------  Chromatin                Drug induced SLE                90%                           SLE                        48 - 97%  ---------------------   ------------------------   ---------  SSA (Ro)                 SLE                        25 - 35%                           Sjogren's Syndrome         40 - 70%                            Lupus                 100%  ---------------------   ------------------------   ---------  SSB (La)                 SLE                                                        10%                           Sjogren's Syndrome              30%  ---------------------   -----------------------    ---------  Sm (anti-Smith)          SLE                        15 - 30%  ---------------------   -----------------------    ---------  RNP                      Mixed Connective Tissue                           Disease                         95%  (U1 nRNP,                SLE                        30 - 50%  anti-ribonucleoprotein)  Polymyositis and/or                           Dermatomyositis                 20%  ---------------------   ------------------------   ---------  Scl-70 (antiDNA          Scleroderma (diffuse)       20 - 35%  topoisomerase)           Crest                           13%  ---------------------   ------------------------   ---------  Edith-1                     Polymyositis and/or                           Dermatomyositis            20 - 40%  ---------------------   ------------------------   ---------  Centromere B             Scleroderma -                            Crest                           variant                         80%    Creatine Kinase 03/13/2025 380 (H)  20 - 200 U/L Final   Lab on 03/13/2025   Component Date Value Ref Range Status    Glucose 03/13/2025 128 (H)  65 - 99 mg/dL Final    BUN 03/13/2025 11  8 - 23 mg/dL Final    Creatinine 03/13/2025 1.28 (H)  0.76 - 1.27 mg/dL Final    Sodium 03/13/2025 144  136 - 145 mmol/L Final    Potassium 03/13/2025 2.0 (C)  3.5 - 5.2 mmol/L Final    Chloride 03/13/2025 91 (L)  98 - 107 mmol/L Final    CO2 03/13/2025 40.7 (H)  22.0 - 29.0 mmol/L Final    Calcium 03/13/2025 9.3  8.6 - 10.5 mg/dL Final    Total Protein 03/13/2025 6.9  6.0 - 8.5 g/dL Final    Albumin 03/13/2025 3.9  3.5 - 5.2 g/dL Final    ALT (SGPT) 03/13/2025 18  1 - 41 U/L Final    AST (SGOT) 03/13/2025 47 (H)  1 - 40 U/L Final    Alkaline Phosphatase 03/13/2025 54  39 - 117 U/L Final    Total Bilirubin 03/13/2025 0.6  0.0 - 1.2 mg/dL Final    Globulin 03/13/2025 3.0  gm/dL Final    A/G Ratio 03/13/2025 1.3  g/dL Final    BUN/Creatinine Ratio 03/13/2025 8.6  7.0 - 25.0 Final    Anion Gap 03/13/2025 12.3  5.0 - 15.0 mmol/L Final    eGFR 03/13/2025 55.5 (L)  >60.0 mL/min/1.73 Final    IgG 03/13/2025 969  603 - 1613 mg/dL Final    IgA 03/13/2025 349  61 - 437 mg/dL Final    IgM 03/13/2025 22  15 - 143 mg/dL Final    Result confirmed on concentration.    Total Protein 03/13/2025 6.7  6.0 - 8.5 g/dL Final    Albumin 03/13/2025 3.5  2.9 - 4.4 g/dL Final    Alpha-1-Globulin 03/13/2025 0.3  0.0 - 0.4 g/dL Final    Alpha-2-Globulin 03/13/2025 0.7  0.4 - 1.0 g/dL Final    Beta Globulin 03/13/2025 1.2   "0.7 - 1.3 g/dL Final    Gamma Globulin 03/13/2025 0.9  0.4 - 1.8 g/dL Final    M-Tony 03/13/2025 0.2 (H)  Not Observed g/dL Final    Globulin 03/13/2025 3.2  2.2 - 3.9 g/dL Final    A/G Ratio 03/13/2025 1.1  0.7 - 1.7 Final    Immunofixation Reflex, Serum 03/13/2025 Comment (A)   Final    Immunofixation shows IgG monoclonal protein with kappa light chain  specificity.   PLEASE NOTE:   Samples from patients receiving DARZALEX(R) (daratumumab) or   SARCLISA(R)(isatuximab-irfc) treatment can appear as an   \"IgG kappa\" and mask a complete response (CR). If this patient   is receiving these therapies, this KARLEE assay interference   can be removed by ordering test number [id number]-\"Immunofixation,   Daratumumab-Specific, Serum\" or [id number]-\"Immunofixation,   Isatuximab-Specific, Serum\" and submitting a new sample for   testing or by calling the lab to add this test to the current   sample.    Please note 03/13/2025 Comment   Final    Protein electrophoresis scan will follow via computer, mail, or   delivery.    Free Light Chain, [place] 03/13/2025 45.7 (H)  3.3 - 19.4 mg/L Final    Free Lambda Light Chains 03/13/2025 20.5  5.7 - 26.3 mg/L Final    Kappa/Lambda Ratio 03/13/2025 2.23 (H)  0.26 - 1.65 Final    WBC 03/13/2025 3.28 (L)  3.40 - 10.80 10*3/mm3 Final    RBC 03/13/2025 4.76  4.14 - 5.80 10*6/mm3 Final    Hemoglobin 03/13/2025 12.8 (L)  13.0 - 17.7 g/dL Final    Hematocrit 03/13/2025 39.5  37.5 - 51.0 % Final    MCV 03/13/2025 83.0  79.0 - 97.0 fL Final    MCH 03/13/2025 26.9  26.6 - 33.0 pg Final    MCHC 03/13/2025 32.4  31.5 - 35.7 g/dL Final    RDW 03/13/2025 15.2  12.3 - 15.4 % Final    RDW-SD 03/13/2025 46.2  37.0 - 54.0 fl Final    MPV 03/13/2025 11.5  6.0 - 12.0 fL Final    Platelets 03/13/2025 180  140 - 450 10*3/mm3 Final    Neutrophil % 03/13/2025 44.9  42.7 - 76.0 % Final    Lymphocyte % 03/13/2025 37.8  19.6 - 45.3 % Final    Monocyte % 03/13/2025 14.9 (H)  5.0 - 12.0 % Final    Eosinophil % 03/13/2025 " 1.2  0.3 - 6.2 % Final    Basophil % 03/13/2025 0.6  0.0 - 1.5 % Final    Immature Grans % 03/13/2025 0.6 (H)  0.0 - 0.5 % Final    Neutrophils, Absolute 03/13/2025 1.47 (L)  1.70 - 7.00 10*3/mm3 Final    Lymphocytes, Absolute 03/13/2025 1.24  0.70 - 3.10 10*3/mm3 Final    Monocytes, Absolute 03/13/2025 0.49  0.10 - 0.90 10*3/mm3 Final    Eosinophils, Absolute 03/13/2025 0.04  0.00 - 0.40 10*3/mm3 Final    Basophils, Absolute 03/13/2025 0.02  0.00 - 0.20 10*3/mm3 Final    Immature Grans, Absolute 03/13/2025 0.02  0.00 - 0.05 10*3/mm3 Final    nRBC 03/13/2025 0.0  0.0 - 0.2 /100 WBC Final    Magnesium 03/13/2025 2.1  1.6 - 2.4 mg/dL Final       Mental Status Exam:   Hygiene:   good  Cooperation:  Cooperative  Eye Contact:  Good  Psychomotor Behavior:  Appropriate  Mood:dysthymic  Affect:  Appropriate  Hopelessness: 1  Speech:  Normal  Thought Process:  Goal directed  Thought Content:  Normal  Suicidal:  None  Homicidal:  None  Hallucinations:  None  Delusion:  None  Memory:  Intact  Orientation:  Person, Place, Time, and Situation  Reliability:  good  Insight:  Good  Judgement:  Good  Impulse Control:  Good    EDDIE-7 Total Score: 19     PHQ-9 Depression Screening  Little interest or pleasure in doing things? (Patient-Rptd) Almost all   Feeling down, depressed, or hopeless? (Patient-Rptd) Almost all   PHQ-2 Total Score (Patient-Rptd) 6   Trouble falling or staying asleep, or sleeping too much? (Patient-Rptd) Almost all   Feeling tired or having little energy? (Patient-Rptd) Almost all   Poor appetite or overeating? (Patient-Rptd) Almost all   Feeling bad about yourself - or that you are a failure or have let yourself or your family down? (Patient-Rptd) Almost all   Trouble concentrating on things, such as reading the newspaper or watching television? (Patient-Rptd) Almost all   Moving or speaking so slowly that other people could have noticed? Or the opposite - being so fidgety or restless that you have been moving  around a lot more than usual? (Patient-Rptd) Almost all   Thoughts that you would be better off dead, or of hurting yourself in some way? (Patient-Rptd) Not at all   PHQ-9 Total Score (Patient-Rptd) 24   If you checked off any problems, how difficult have these problems made it for you to do your work, take care of things at home, or get along with other people? (Patient-Rptd) Extremely difficult           Assessment/Plan:  Diagnoses and all orders for this visit:    1. Moderate episode of recurrent major depressive disorder (Primary)  -     sertraline (Zoloft) 100 MG tablet; Take 1 tablet by mouth Daily.  Dispense: 30 tablet; Refill: 2      Patient has not seen any improvement however no negatives.  We will increase Zoloft to 100 mg daily.  We will follow up in a month.      We discussed risks, benefits,goals and side effects of the above medication and the patient was agreeable with the plan.Patient was educated on the importance of compliance with treatment and follow-up appointments. Patient is aware to contact the [facility] [phone] with any worsening of symptoms. To call for questions or concerns and return early if necessary. Patent is agreeable to go to the Emergency Department or call 911 should they begin SI/HI.     Part of this note may be an electronic transcription/translation of spoken language to printed text using the Dragon Dictation System.    Return in about 4 weeks (around 6/11/2025) for Follow Up 30 min, Video visit.    JAVY Heredia

## 2025-05-19 ENCOUNTER — TELEPHONE (OUTPATIENT)
Dept: CARDIOLOGY | Facility: CLINIC | Age: 85
End: 2025-05-19
Payer: MEDICARE

## 2025-05-19 RX ORDER — HYDRALAZINE HYDROCHLORIDE 50 MG/1
25 TABLET, FILM COATED ORAL EVERY EVENING
Start: 2025-05-19

## 2025-05-19 NOTE — TELEPHONE ENCOUNTER
[facility] called to let Dr Houston know that the [facility] dept for [facility]OT[facility] is not accepting out of state referrals at this time. They recommend referring to the [facility] for EP work up so that they can refer to the [facility] clinic. If agreeable, we can call [phone] to schedule.    Will send information to Vinicio to get scheduled

## 2025-05-21 ENCOUNTER — TELEPHONE (OUTPATIENT)
Dept: CARDIOLOGY | Facility: CLINIC | Age: 85
End: 2025-05-21
Payer: MEDICARE

## 2025-05-21 NOTE — TELEPHONE ENCOUNTER
Spoke with Kelin after I spoke with Celso and they are agreeable to go to [place] instead of [place]

## 2025-05-27 ENCOUNTER — TELEPHONE (OUTPATIENT)
Dept: NEUROLOGY | Facility: CLINIC | Age: 85
End: 2025-05-27

## 2025-05-27 NOTE — TELEPHONE ENCOUNTER
Caller: Kelin Feliz    Relationship to patient: Emergency Contact    Best call back number: [phone]    Chief complaint: PERIPHERAL NEUROPATHY    Type of visit: FOLLOW UP / VIDEO VISIT    Requested date: ANY     If rescheduling, when is the original appointment: 5/27/25     Additional notes: STATES SHE CALLED THIS MORNING TO REQUEST TO MAKE APPT A VIRTUAL VISIT. WAS ADVISED THAT A NURSE WOULD CHECK WITH PROVIDER TO SEE ABOUT CHANGING THE APPT & SHE WOULD BE CALLED BACK SHORTLY. DID NOT GET A CALL BACK. WOULD LIKE TO SEE ABOUT GETTING RESCHEDULED, POSSIBLY AS VIRTUAL IF ABLE. WOULD ALSO LIKE TO GET NO-SHOW REMOVED FROM CHART AS SHE HAD CALLED & WAS TOLD TO WAIT FOR CALL BACK. PLEASE ADVISE, THANK YOU.

## 2025-05-28 ENCOUNTER — TELEPHONE (OUTPATIENT)
Dept: CARDIOLOGY | Facility: CLINIC | Age: 85
End: 2025-05-28

## 2025-05-28 ENCOUNTER — TELEPHONE (OUTPATIENT)
Dept: NEUROLOGY | Facility: CLINIC | Age: 85
End: 2025-05-28
Payer: MEDICARE

## 2025-05-28 NOTE — TELEPHONE ENCOUNTER
Advised to increase Midodrine to 5mg TID for now, check BP tonight and tomorrow and let us know how he is doing. Message has been sent to Janelle Roth. Will schedule to see Cristina on Friday.

## 2025-05-28 NOTE — TELEPHONE ENCOUNTER
JUAN LUIS, APPT SCHEDULED FOR A VIDEO VISIT ON 9/16/25 @ 9:00.  PT HAS BEEN ADDED TO THE APPT WAIT LIST FOR A SOONER APPT IF ONE BECOMES AVAILABLE.

## 2025-05-28 NOTE — TELEPHONE ENCOUNTER
Caller: Kelin Feliz    Relationship: Emergency Contact    Best call back number: [phone]    What is the best time to reach you: ANYTIME    Who are you requesting to speak with (clinical staff, provider,  specific staff member): CLINICAL-MADELAINE        What was the call regarding: PT BP IS RUNNING LOW 60'-50'S OVER 30'S-40's   CAN HE INCREASE MIDODRINE 5 MG?  DOES HE NEED TO COME IN OFFICE?   PLEASE CALL TO ADVISE

## 2025-05-29 ENCOUNTER — HOSPITAL ENCOUNTER (OUTPATIENT)
Dept: CT IMAGING | Facility: HOSPITAL | Age: 85
Discharge: HOME | End: 2025-05-29
Admitting: NURSE PRACTITIONER
Payer: MEDICARE

## 2025-05-29 DIAGNOSIS — Z86.79 S/P ASCENDING AORTIC ANEURYSM REPAIR: ICD-10-CM

## 2025-05-29 DIAGNOSIS — Z98.890 S/P ASCENDING AORTIC ANEURYSM REPAIR: ICD-10-CM

## 2025-05-29 PROCEDURE — 71250 CT THORAX DX C-: CPT

## 2025-06-05 RX ORDER — FLUDROCORTISONE ACETATE 0.1 MG/1
0.1 TABLET ORAL 2 TIMES DAILY
Qty: 60 TABLET | Refills: 1 | Status: SHIPPED | OUTPATIENT
Start: 2025-06-05

## 2025-06-06 ENCOUNTER — TELEPHONE (OUTPATIENT)
Dept: CARDIOLOGY | Facility: CLINIC | Age: 85
End: 2025-06-06
Payer: MEDICARE

## 2025-06-06 RX ORDER — HYDRALAZINE HYDROCHLORIDE 25 MG/1
25 TABLET, FILM COATED ORAL EVERY EVENING
Qty: 30 TABLET | Refills: 3 | Status: SHIPPED | OUTPATIENT
Start: 2025-06-06

## 2025-06-06 RX ORDER — PYRIDOSTIGMINE BROMIDE 60 MG/1
30 TABLET ORAL 2 TIMES DAILY
Qty: 60 TABLET | Refills: 2 | Status: SHIPPED | OUTPATIENT
Start: 2025-06-06

## 2025-06-06 RX ORDER — MIDODRINE HYDROCHLORIDE 5 MG/1
10 TABLET ORAL
Qty: 180 TABLET | Refills: 6 | Status: SHIPPED | OUTPATIENT
Start: 2025-06-06

## 2025-06-06 NOTE — TELEPHONE ENCOUNTER
I'll send a prescription for fludrocortisone for this patient.  He needs an abdominal binder to increase his core pressure.  This can be purchased through [employer].      6/5/25  3:31 PM  Josefina Fang MA routed this conversation to Nancy Roth APRN [place] [facility] to [facility] Clinical Pool (supporting Josefina Fang MA)        6/5/25  3:19 PM  Pressures are still low, sometimes even when lying. But the standing numbers remain in the 50’s & 60’s over 40’s . At least we are mostly over the 30’s! He has been taking  10 10 and 5. He still gets extremely light headed and faint after standing a few seconds for b/p or to urinate. Frequently, he is unable to complete without having to sit. As for fludrocortisone, Zurdo says he needs a new prescription because the latest one has no refills. I think he needs it. Thanks

## 2025-06-06 NOTE — TELEPHONE ENCOUNTER
Since pt is taking 10, 10 and 5,  Janelle said it is okay to do 10, 10 and 10 as an increase.  Pt wife verbalized understanding.

## 2025-06-06 NOTE — TELEPHONE ENCOUNTER
Patients wife calling for refills, needed hydralazine sent in as 25mg- 50mg cut in half to too hard to cut

## 2025-06-09 ENCOUNTER — TELEPHONE (OUTPATIENT)
Dept: CARDIAC SURGERY | Facility: CLINIC | Age: 85
End: 2025-06-09
Payer: MEDICARE

## 2025-06-09 NOTE — TELEPHONE ENCOUNTER
Pt scheduled for [facility] appt in follow-up of elective ascending aortic aneurysm repair with a 30 mm Dacron interposition graft/ reductive root aortoplasty of the right and noncoronary sinuses/ left atrial appendage epicardial closure with a 45 mm AtriClip by Dr. Puente 5/10/2024.    He cannot make his appointment and his wife asked if we could go over his results on the telephone.    CT chest without contrast on 5/29/2025 showed aortic root 4.6 cm, ascending aorta 3.6 cm, and DTA 3.1 cm.  Incidental findings include cardiomegaly, coronary artery calcifications, several left renal cysts, nonobstructing left kidney stone, and severe arthritis of bilateral shoulders.    Echo in April 2025 showed EF 45%, mild LVH, grade 2 diastolic dysfunction, and some calcification of aortic valve without AI or AS.    This information was relayed to him/wife.  He will be seen in [facility] in 1 year with CT chest without.

## 2025-06-18 ENCOUNTER — TELEMEDICINE (OUTPATIENT)
Dept: PSYCHIATRY | Facility: CLINIC | Age: 85
End: 2025-06-18
Payer: MEDICARE

## 2025-06-18 DIAGNOSIS — F33.1 MODERATE EPISODE OF RECURRENT MAJOR DEPRESSIVE DISORDER: Primary | ICD-10-CM

## 2025-06-18 NOTE — PROGRESS NOTES
Patient Name: Panfilo Feliz  MRN: [id number]   :  1940     This provider is located at her home office through the [facility] (through [facility]), [address], [address] using a secure [employer]hart Video Visit through [employer]. Patient is being seen remotely via telehealth at their home address in [place], and stated they are in a secure environment for this session. The patient's condition being diagnosed/treated is appropriate for telemedicine. The provider identified herself as well as her credentials.   The patient, and/or patients guardian, consent to be seen remotely, and when consent is given they understand that the consent allows for patient identifiable information to be sent to a third party as needed.   They may refuse to be seen remotely at any time. The electronic data is encrypted and password protected, and the patient and/or guardian has been advised of the potential risks to privacy not withstanding such measures.    You have chosen to receive care through a telehealth visit.  Do you consent to use a video/audio connection for your medical care today? Yes    Chief Complaint:      ICD-10-CM ICD-9-CM   1. Moderate episode of recurrent major depressive disorder  F33.1 296.32       History of Present Illness: Panfilo Feliz is a 84 y.o. male is here today for medication management follow up.  Patient states he has been moving more.  Has been having a headache.  Mood is a lot better.  Anxiety and sleep are still the same.  Having some stress about proof problems.    The following portions of the patient's history were reviewed and updated as appropriate: allergies, current medications, past family history, past medical history, past social history, past surgical history, and problem list.    Review of Systems;;  Review of Systems   Constitutional:  Negative for activity change, appetite change, fatigue, unexpected weight gain and unexpected  weight loss.   HENT:  Positive for hearing loss.    Respiratory:  Negative for shortness of breath and wheezing.    Gastrointestinal:  Negative for constipation, diarrhea, nausea and vomiting.   Musculoskeletal:  Negative for gait problem.   Skin:  Negative for dry skin and rash.   Neurological:  Negative for dizziness, speech difficulty, weakness, light-headedness, headache, memory problem and confusion.   Psychiatric/Behavioral:  Positive for sleep disturbance and stress. Negative for agitation, behavioral problems, decreased concentration, dysphoric mood, hallucinations, self-injury, suicidal ideas, negative for hyperactivity and depressed mood. The patient is nervous/anxious.        Physical Exam;;  Physical Exam  Constitutional:       General: He is not in acute distress.     Appearance: He is well-developed. He is not diaphoretic.   HENT:      Head: Normocephalic and atraumatic.   Eyes:      Conjunctiva/sclera: Conjunctivae normal.   Pulmonary:      Effort: Pulmonary effort is normal. No respiratory distress.   Musculoskeletal:         General: Normal range of motion.      Cervical back: Full passive range of motion without pain and normal range of motion.   Neurological:      Mental Status: He is alert and oriented to person, place, and time.   Psychiatric:         Mood and Affect: Mood is not anxious or depressed. Affect is not labile, blunt, angry or inappropriate.         Speech: Speech is not rapid and pressured or tangential.         Behavior: Behavior normal. Behavior is not agitated, slowed, aggressive, withdrawn, hyperactive or combative. Behavior is cooperative.         Thought Content: Thought content normal. Thought content is not paranoid or delusional. Thought content does not include homicidal or suicidal ideation. Thought content does not include homicidal or suicidal plan.         Judgment: Judgment normal.       There were no vitals taken for this visit.  There is no height or weight on file  to calculate BMI. Video appt unable to obtain.      Current Medications;;    Current Outpatient Medications:     acetaminophen (TYLENOL) 325 MG tablet, Take 2 tablets by mouth Every 4 (Four) Hours As Needed for Mild Pain., Disp: , Rfl:     aspirin 81 MG EC tablet, Take 1 tablet by mouth Every Night. Indications: Carotid Artery Stenting, Disp: , Rfl:     Cyanocobalamin (B-12) 1000 MCG sublingual tablet, Place 1 tablet under the tongue Daily., Disp: 30 each, Rfl: 6    fludrocortisone 0.1 MG tablet, Take 1 tablet by mouth 2 (Two) Times a Day., Disp: 60 tablet, Rfl: 1    hydrALAZINE (APRESOLINE) 25 MG tablet, Take 1 tablet by mouth Every Evening., Disp: 30 tablet, Rfl: 3    levothyroxine (SYNTHROID, LEVOTHROID) 100 MCG tablet, Take 1 tablet by mouth Daily. Indications: Underactive Thyroid, Disp: , Rfl:     magnesium oxide (MAG-OX) 400 MG tablet, Take 1 tablet by mouth Daily., Disp: , Rfl:     Menthol, Topical Analgesic, (BIOFREEZE EX), Apply 1 Application topically Daily As Needed (pain)., Disp: , Rfl:     metoprolol succinate XL (TOPROL-XL) 25 MG 24 hr tablet, Take 1 tablet by mouth Every Evening., Disp: 30 tablet, Rfl: 3    midodrine (PROAMATINE) 5 MG tablet, Take 2 tablets by mouth 3 (Three) Times a Day Before Meals., Disp: 180 tablet, Rfl: 6    pantoprazole (PROTONIX) 40 MG EC tablet, Take 1 tablet by mouth 2 (Two) Times a Day. Indications: Gastroesophageal Reflux Disease, Disp: , Rfl:     pregabalin (LYRICA) 50 MG capsule, Take 1 capsule by mouth 2 (Two) Times a Day. Indications: Neuropathic Pain, Disp: , Rfl:     pyridostigmine (MESTINON) 60 MG tablet, Take 0.5 tablets by mouth 2 (Two) Times a Day. Indications: Postural Orthostatic Tachycardia, Disp: 60 tablet, Rfl: 2    sertraline (Zoloft) 100 MG tablet, Take 1 tablet by mouth Daily., Disp: 30 tablet, Rfl: 2    thiamine (VITAMIN B1) 100 MG tablet, Take 1 tablet by mouth Daily., Disp: 30 tablet, Rfl: 0    vitamin D3 125 MCG (5000 UT) capsule capsule, Take 250  Units by mouth Daily., Disp: , Rfl:   No current facility-administered medications for this visit.    Facility-Administered Medications Ordered in Other Visits:     Chlorhexidine Gluconate Cloth 2 % pads, , Topical, Q12H PRN, Kayli Rowe, APRN    Lab Results:   No results displayed because visit has over 200 results.      Lab on 04/10/2025   Component Date Value Ref Range Status    Glucose 04/10/2025 134 (H)  65 - 99 mg/dL Final    BUN 04/10/2025 14  8 - 23 mg/dL Final    Creatinine 04/10/2025 1.17  0.76 - 1.27 mg/dL Final    Sodium 04/10/2025 145  136 - 145 mmol/L Final    Potassium 04/10/2025 2.9 (C)  3.5 - 5.2 mmol/L Final    Chloride 04/10/2025 99  98 - 107 mmol/L Final    CO2 04/10/2025 30.5 (H)  22.0 - 29.0 mmol/L Final    Calcium 04/10/2025 9.7  8.6 - 10.5 mg/dL Final    Total Protein 04/10/2025 7.2  6.0 - 8.5 g/dL Final    Albumin 04/10/2025 4.1  3.5 - 5.2 g/dL Final    ALT (SGPT) 04/10/2025 18  1 - 41 U/L Final    AST (SGOT) 04/10/2025 32  1 - 40 U/L Final    Alkaline Phosphatase 04/10/2025 57  39 - 117 U/L Final    Total Bilirubin 04/10/2025 0.5  0.0 - 1.2 mg/dL Final    Globulin 04/10/2025 3.1  gm/dL Final    A/G Ratio 04/10/2025 1.3  g/dL Final    BUN/Creatinine Ratio 04/10/2025 12.0  7.0 - 25.0 Final    Anion Gap 04/10/2025 15.5 (H)  5.0 - 15.0 mmol/L Final    eGFR 04/10/2025 61.9  >60.0 mL/min/1.73 Final    WBC 04/10/2025 4.49  3.40 - 10.80 10*3/mm3 Final    RBC 04/10/2025 4.96  4.14 - 5.80 10*6/mm3 Final    Hemoglobin 04/10/2025 13.3  13.0 - 17.7 g/dL Final    Hematocrit 04/10/2025 40.9  37.5 - 51.0 % Final    MCV 04/10/2025 82.5  79.0 - 97.0 fL Final    MCH 04/10/2025 26.8  26.6 - 33.0 pg Final    MCHC 04/10/2025 32.5  31.5 - 35.7 g/dL Final    RDW 04/10/2025 15.3  12.3 - 15.4 % Final    RDW-SD 04/10/2025 45.9  37.0 - 54.0 fl Final    MPV 04/10/2025 10.9  6.0 - 12.0 fL Final    Platelets 04/10/2025 205  140 - 450 10*3/mm3 Final    Neutrophil % 04/10/2025 41.8 (L)  42.7 - 76.0 % Final  "   Lymphocyte % 04/10/2025 43.9  19.6 - 45.3 % Final    Monocyte % 04/10/2025 12.5 (H)  5.0 - 12.0 % Final    Eosinophil % 04/10/2025 0.9  0.3 - 6.2 % Final    Basophil % 04/10/2025 0.7  0.0 - 1.5 % Final    Immature Grans % 04/10/2025 0.2  0.0 - 0.5 % Final    Neutrophils, Absolute 04/10/2025 1.88  1.70 - 7.00 10*3/mm3 Final    Lymphocytes, Absolute 04/10/2025 1.97  0.70 - 3.10 10*3/mm3 Final    Monocytes, Absolute 04/10/2025 0.56  0.10 - 0.90 10*3/mm3 Final    Eosinophils, Absolute 04/10/2025 0.04  0.00 - 0.40 10*3/mm3 Final    Basophils, Absolute 04/10/2025 0.03  0.00 - 0.20 10*3/mm3 Final    Immature Grans, Absolute 04/10/2025 0.01  0.00 - 0.05 10*3/mm3 Final    nRBC 04/10/2025 0.0  0.0 - 0.2 /100 WBC Final    IgG 04/10/2025 1114  603 - 1613 mg/dL Final    IgA 04/10/2025 410  61 - 437 mg/dL Final    IgM 04/10/2025 22  15 - 143 mg/dL Final    Result confirmed on concentration.    Total Protein 04/10/2025 7.3  6.0 - 8.5 g/dL Final    Albumin 04/10/2025 3.7  2.9 - 4.4 g/dL Final    Alpha-1-Globulin 04/10/2025 0.3  0.0 - 0.4 g/dL Final    Alpha-2-Globulin 04/10/2025 0.9  0.4 - 1.0 g/dL Final    Beta Globulin 04/10/2025 1.6 (H)  0.7 - 1.3 g/dL Final    Gamma Globulin 04/10/2025 0.9  0.4 - 1.8 g/dL Final    M-Tony 04/10/2025 0.3 (H)  Not Observed g/dL Final    Globulin 04/10/2025 3.6  2.2 - 3.9 g/dL Final    A/G Ratio 04/10/2025 1.1  0.7 - 1.7 Final    Immunofixation Reflex, Serum 04/10/2025 Comment (A)   Final    Immunofixation shows IgG monoclonal protein with kappa light chain  specificity.   PLEASE NOTE:   Samples from patients receiving DARZALEX(R) (daratumumab) or   SARCLISA(R)(isatuximab-irfc) treatment can appear as an   \"IgG kappa\" and mask a complete response (CR). If this patient   is receiving these therapies, this KARLEE assay interference   can be removed by ordering test number [id number]-\"Immunofixation,   Daratumumab-Specific, Serum\" or [id number]-\"Immunofixation,   Isatuximab-Specific, Serum\" and submitting a " new sample for   testing or by calling the lab to add this test to the current   sample.    Please note 04/10/2025 Comment   Final    Protein electrophoresis scan will follow via computer, mail, or   delivery.    Free Light Chain, [place] 04/10/2025 46.8 (H)  3.3 - 19.4 mg/L Final    Free Lambda Light Chains 04/10/2025 18.5  5.7 - 26.3 mg/L Final    Kappa/Lambda Ratio 04/10/2025 2.53 (H)  0.26 - 1.65 Final       Mental Status Exam:   Hygiene:   good  Cooperation:  Cooperative  Eye Contact:  Good  Psychomotor Behavior:  Appropriate  Mood:dysthymic which has improved anxiety is the same   Affect:  Appropriate  Hopelessness: Denies  Speech:  Normal  Thought Process:  Goal directed  Thought Content:  Normal  Suicidal:  None  Homicidal:  None  Hallucinations:  None  Delusion:  None  Memory:  Intact  Orientation:  Person, Place, Time, and Situation  Reliability:  good  Insight:  Good  Judgement:  Good  Impulse Control:  Good      [facility] Tate-7 Anxiety Behav Health    Question 6/18/2025  1:02 PM EDT - Filed by Patient   In the last 2 weeks, how often have you been bothered by the following problems?    Feeling nervous, anxious, or on edge? More than half the days   Not being able to sleep or control worrying? More than half the days   Worrying too much about different things? More than half the days   Trouble relaxing? More than half the days   Being so restless that it is hard to sit still Not at all   Becoming easily annoyed or irritable? Several days   Feeling afraid, as if something awful might happen? Several days   If you checked any problems, how difficult have they made it for you to do your work, take care of things at home, or get along with other people? Somewhat difficult   Over the last 2 weeks, how often have you been bothered by the following problems? (range: 0 - 28) 10       PHQ-9 Depression Screening  Little interest or pleasure in doing things? (Patient-Rptd) Several days   Feeling down,  depressed, or hopeless? (Patient-Rptd) Several days   PHQ-2 Total Score (Patient-Rptd) 2   Trouble falling or staying asleep, or sleeping too much? (Patient-Rptd) Over half   Feeling tired or having little energy? (Patient-Rptd) Over half   Poor appetite or overeating? (Patient-Rptd) Over half   Feeling bad about yourself - or that you are a failure or have let yourself or your family down? (Patient-Rptd) Several days   Trouble concentrating on things, such as reading the newspaper or watching television? (Patient-Rptd) Several days   Moving or speaking so slowly that other people could have noticed? Or the opposite - being so fidgety or restless that you have been moving around a lot more than usual? (Patient-Rptd) Several days   Thoughts that you would be better off dead, or of hurting yourself in some way? (Patient-Rptd) Not at all   PHQ-9 Total Score (Patient-Rptd) 11   If you checked off any problems, how difficult have these problems made it for you to do your work, take care of things at home, or get along with other people? (Patient-Rptd) Somewhat difficult           Assessment/Plan:  Diagnoses and all orders for this visit:    1. Moderate episode of recurrent major depressive disorder (Primary)      For now patient would like to leave medication as it is.  Would like to follow up in a month.    This patient will not be seen for the in person visits due to the following exception(s): the patient does not have access to another provider in his/her area. and It would be a hardship for this patient to see a provider in person.    It is my professional opinion that the patient is clinically stable and an in person visit will risk worsening the patient's condition creating undue hardship.          We discussed risks, benefits,goals and side effects of the above medication and the patient was agreeable with the plan.Patient was educated on the importance of compliance with treatment and follow-up appointments.  Patient is aware to contact the [facility] [phone] with any worsening of symptoms. To call for questions or concerns and return early if necessary. Patent is agreeable to go to the Emergency Department or call 911 should they begin SI/HI.     Part of this note may be an electronic transcription/translation of spoken language to printed text using the Dragon Dictation System.    Return in about 4 weeks (around 7/16/2025) for Follow Up 30 min, Video visit.    Lashaun Solis, APRN

## 2025-06-19 ENCOUNTER — OFFICE VISIT (OUTPATIENT)
Dept: CARDIOLOGY | Facility: CLINIC | Age: 85
End: 2025-06-19
Payer: MEDICARE

## 2025-06-19 VITALS
OXYGEN SATURATION: 97 % | SYSTOLIC BLOOD PRESSURE: 132 MMHG | HEART RATE: 75 BPM | BODY MASS INDEX: 21.64 KG/M2 | DIASTOLIC BLOOD PRESSURE: 76 MMHG | WEIGHT: 174 LBS | HEIGHT: 75 IN

## 2025-06-19 DIAGNOSIS — I95.1 ORTHOSTATIC HYPOTENSION: ICD-10-CM

## 2025-06-19 DIAGNOSIS — E78.2 MIXED HYPERLIPIDEMIA: ICD-10-CM

## 2025-06-19 DIAGNOSIS — I10 PRIMARY HYPERTENSION: Primary | ICD-10-CM

## 2025-06-19 NOTE — PROGRESS NOTES
Cardiology Office Visit      Encounter Date:  2025    PATIENT IDENTIFICATION    Name: Panfilo Feliz  Age: 84 y.o. Sex: male : 1940  MRN: [id number]    Reason For Followup:  Valvular heart disease  Ascending aortic aneurysm    Brief Clinical History:  Dear Stephan Torres MD    I had the pleasure of seeing Panfilo Feliz today. As you are well aware, this is a 84 y.o. male with no established history of ischemic heart disease.  He does have a history of aortic insufficiency, ascending aortic aneurysm, hypothyroidism, neuropathy, and acid reflux.  He presents today for follow-up on the above conditions.    Interval History:  2024                                           The patient presents today for an acute visit.  He reports that he saw his cardiologist last month but has deteriorated since then.  He is specifically citing issues with lightheadedness and a cold sensation all over.  He reports that over the past month this has significantly deteriorated.  Interestingly, the lightheadedness appears to occur mostly in the evenings around 630 or so.  He reports that he had been going to the gym a couple of times a week but now has not been able to do so because of his symptoms.  His neuropathy continues to deteriorate.  He reports that they have found nothing to help out with this.    He has started some medications to help with his neuropathy but none have been successful.  It is possible that some of the side effects from his medicines could be lending assistance to his symptoms.  We discussed options and we will have him back off on his atenolol to see if this helps.    In the bigger picture, the patient has been seen by cardiothoracic surgery.  Per the patient and his wife, surgery was offered at the last visit with Dr. Puente.  The patient was reluctant to move forward.    We had a lengthy discussion today.  I discussed that with the patient's deteriorating neuropathy and now endurance  issues, it may be worthwhile to consider moving forward with surgery before his neuropathy and endurance worsens to a point that would preclude him from being a candidate for surgery.  He has a CT scan scheduled for later this year as well as an echocardiogram.  We will move those up.  He will need a cardiac catheterization as well.  I have encouraged him to reach out to Dr. Puente in order to schedule a follow-up and discuss moving forward with surgery.    04/09/2024        His blood pressures have been on the low side (80s) and was was getting orthostatic. His BP today is a little on the higher side.  He is having some issues with shortness of breath with exertion.  He underwent a CT scan that demonstrated an ascending aortic aneurysm of 5.1 cm.  We again discussed the options.  He is ready to move forward with having these items addressed.  As such we will schedule him for a cardiac catheterization as well as a transesophageal echocardiogram.  He is scheduled to see Dr. Puente next week.  His blood pressure is elevated today however he has not tolerated titrations of his antihypertensives.  He reports becoming dizzy and lightheaded and nonfunctional.    06/05/2024        The patient underwent ascending aortic aneurysm repair with reductive root aortoplasty of the right and noncoronary sinuses with left atrial appendage closure (atrial clip).  And he is home and now doing home PT. He is still having some orthostasis. He is on midodrine and metoprolol. Hold parameters on metoprolol are for less than 110. Will start with cardiac rehab.    07/05/2024        He is still having problmes with blood pressure. He has no stamina. Significant orthostasis. He has fallen once and brusised elbow. He is on metoprolol but his pressure is only good enough to take 2 doses.     Discussed echo he does have a small to moderate-sized pericardial effusion with no evidence of tamponade.  We discussed options.  He will continue on his  current regimen and we will add Florinef to see if this will help with his pressures.    11/20/2024        He is on midodrine 15 mg 3 times a day.  He is also taking Florinef 0.1 mg daily.  He is continuing to have problems with orthostasis.  He fell yesterday and has a contusion to the right side of his face.  He is becoming increasingly frustrated with his condition.  He reports that he feels depressed.  We discussed getting him in to see a psychiatrist to help with this.    Moving forward with medical therapy for his orthostasis, we will increase his Florinef to 0.1 mg twice daily and continue to do so every other week by 0.1 mg until his pressure is adequate and he is no longer orthostatic.    12/18/2024        His blood pressure is elevated but he has a profound orthostatic hyoptension. His supine pressure is 192/102, sitting 140/80, and 118/72. Unfortunately we have to allow for permissive hypertension in order to keep him from syncopal events and injury.     He is currently taking florinef 0.2, midodrine, mestinon and metoprolol 7 AM. Then midodrine and florinef 0.1 around 2. Then will take midodrine, mestinon and florinef 0.2 at 8 PM    Pressures at home are in the 160s laying, 120s sitting and 80s-100 standing.    Would like to start cardiac rehab. Will place order.     He has made improvements. He is able to bath himself and he has not fallen since last visit.    He had orthostatics performed today in the office.  Lying 192/102, sitting 140/180, standing 118/72.  Discussed options.  Would prefer not to further increase patient's Florinef and midodrine due to significant supine hypertension.  Patient will continue on his current regimen and follow-up.  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 He had double vision watching ball game. He had just had a skin cancer taken off over on that side of his face the day before. He had an MRI and there was no  "stroke or mass. He has been told the movement will come back in a few weeks.     He reports that he feels \"doped out\" and he is worried it is some medications. He was started on vyvance 3-4 weeks.     He has loose stools and fecal urgency.  He is concerned this may be from the Florinef or midodrine.  This would be unusual.    We discussed options.  We will allow him a few more weeks for his cranial nerve paralysis to resolve.  This may be affecting his gait and exacerbating his orthostatic symptoms.  Although he is orthostatic today in the office, his pressure only drops to 134/80 upon standing.  Supine pressure was 178/88.  If he continues to have symptoms, we have discussed potentially referring him to a center that specializes in severe orthostatic hypotension.    04/22/2025        He was hospitalized and had some medication changes. He is still having some dizziness. Her had a fall the other night. Antidepressant was changed and the diarrhea is better. Orthostasis may be a little better. His eye is better. Orthostatic pressures are better. Getting down to 110s. He is eating better. His potassium was really good. Fall recently was mechanical.     06/19/2025        He is taking    Midodrine 10 TID  Metoprolol succinate 25 mg evening  Hydralazine 25 mg daily  Florinef 0.1 BID    He has not been back to the hospital and has not fallen since he was hospitalized.  Although his current regimen is somewhat unorthodox, it appears to be tolerable and providing him with some functionality.  As such, we will keep him on the same regimen.    We do have a strong suspicion that he has some degree of dysautonomia.  We placed a referral for him to go to [facility] for evaluation however there is an 8-month wait.  We also attempted to place a referral to [facility] however there neurology department was not accepting direct referrals from out of state providers.  They requested us to refer to their cardiologist who would " "then refer to them.  Unfortunately, the cardiology office insisted that this patient see 1 cardiologist in particular who does not have any openings until 2026.  As such, we will place a referral to the [facility] and Dr. Healy to see if there is an opening sooner.    Assessment & Plan    Impressions:  Ascending aortic aneurysm status post repair May 2024     Ascending aortic aneurysm repair/reductive root aortoplasty and left atrial appendage closure.  Severe orthostatic hypotension     Suspect some degree of dysautonomia  Aortic insufficiency  Depression/anxiety  Hypothyroidism  Peripheral neuropathy  Acid reflux    Recommendations:  Continuation of his current cardiovascular regimen at the present time.     This includes hydralazine, metoprolol, midodrine, and Mestinon.  Consider referral to tertiary center if symptoms are still persistent and severe at next visit  Permissive supine hypertension secondary to profound orthostasis.  Follow-up in 3 months      Diagnoses and all orders for this visit:    1. Primary hypertension (Primary)    2. Mixed hyperlipidemia    3. Orthostatic hypotension                          Objective:    Vitals:  Vitals:    06/19/25 1331   BP: 132/76   BP Location: Left arm   Patient Position: Sitting   Cuff Size: Adult   Pulse: 75   SpO2: 97%   Weight: 78.9 kg (174 lb)   Height: 190.5 cm (75\")               Body mass index is 21.75 kg/m².      Physical Exam:    General: Alert, cooperative, no distress, appears stated age.  Frail  Head:  Normocephalic, atraumatic, mucous membranes moist  Eyes:  Conjunctiva/corneas clear, EOM's intact     Neck:  Supple,  no bruit    Lungs: Coarse and diminished bilaterally  Chest wall: Tender at incision  Heart::  Regular rate and rhythm, S1 and S2 normal, 1/6 diastolic murmur.  No rub or gallop  Abdomen: Soft, non-tender, nondistended bowel sounds active  Extremities: No cyanosis, clubbing, or edema  Pulses: Diminished pedal pulses  Skin:  No " rashes or lesions.  Some ecchymoses over right side of face  Neuro/psych: A&O x3. CN II through XII are grossly intact with appropriate affect      Allergies:  Allergies   Allergen Reactions    Statins Myalgia       Medication Review:     Current Outpatient Medications:     acetaminophen (TYLENOL) 325 MG tablet, Take 2 tablets by mouth Every 4 (Four) Hours As Needed for Mild Pain., Disp: , Rfl:     aspirin 81 MG EC tablet, Take 1 tablet by mouth Every Night. Indications: Carotid Artery Stenting, Disp: , Rfl:     Cyanocobalamin (B-12) 1000 MCG sublingual tablet, Place 1 tablet under the tongue Daily., Disp: 30 each, Rfl: 6    fludrocortisone 0.1 MG tablet, Take 1 tablet by mouth 2 (Two) Times a Day., Disp: 60 tablet, Rfl: 1    hydrALAZINE (APRESOLINE) 25 MG tablet, Take 1 tablet by mouth Every Evening., Disp: 30 tablet, Rfl: 3    levothyroxine (SYNTHROID, LEVOTHROID) 100 MCG tablet, Take 1 tablet by mouth Daily. Indications: Underactive Thyroid, Disp: , Rfl:     magnesium oxide (MAG-OX) 400 MG tablet, Take 1 tablet by mouth Daily., Disp: , Rfl:     Menthol, Topical Analgesic, (BIOFREEZE EX), Apply 1 Application topically Daily As Needed (pain)., Disp: , Rfl:     metoprolol succinate XL (TOPROL-XL) 25 MG 24 hr tablet, Take 1 tablet by mouth Every Evening., Disp: 30 tablet, Rfl: 3    midodrine (PROAMATINE) 5 MG tablet, Take 2 tablets by mouth 3 (Three) Times a Day Before Meals., Disp: 180 tablet, Rfl: 6    pantoprazole (PROTONIX) 40 MG EC tablet, Take 1 tablet by mouth 2 (Two) Times a Day. Indications: Gastroesophageal Reflux Disease, Disp: , Rfl:     pregabalin (LYRICA) 50 MG capsule, Take 1 capsule by mouth 2 (Two) Times a Day. Indications: Neuropathic Pain, Disp: , Rfl:     pyridostigmine (MESTINON) 60 MG tablet, Take 0.5 tablets by mouth 2 (Two) Times a Day. Indications: Postural Orthostatic Tachycardia, Disp: 60 tablet, Rfl: 2    sertraline (Zoloft) 100 MG tablet, Take 1 tablet by mouth Daily., Disp: 30 tablet,  Rfl: 2    thiamine (VITAMIN B1) 100 MG tablet, Take 1 tablet by mouth Daily., Disp: 30 tablet, Rfl: 0    vitamin D3 125 MCG (5000 UT) capsule capsule, Take 250 Units by mouth Daily., Disp: , Rfl:   No current facility-administered medications for this visit.    Facility-Administered Medications Ordered in Other Visits:     Chlorhexidine Gluconate Cloth 2 % pads, , Topical, Q12H PRN, Kayli Rwoe, APRN    Family History:  Family History   Problem Relation Age of Onset    Cancer Mother 85        Breast    Anxiety disorder Mother     COPD Father     Cancer Brother 59        Unknown etiology    Alcohol abuse Brother     Hypertension Daughter        Past Medical History:  Past Medical History:   Diagnosis Date    AAA (abdominal aortic aneurysm)     Abnormal ECG 1980’s    Alcohol abuse     Alcoholism     None since 1991    Aneurysm 2019    aortic arch    Arthritis     Basal cell carcinoma (BCC) of face     Cholelithiasis 1990’s    Cholecystectomy    Chronic pain disorder     Colon polyp     Colon polyps     Coronary artery disease     Depression     Difficulty walking     Disease of thyroid gland     DJD (degenerative joint disease)     Gastritis     Gastroparesis     GERD (gastroesophageal reflux disease)     GI (gastrointestinal bleed)     Heart valve disease     Hernia     Hiatal    Hiatal hernia     History of bone marrow biopsy     Torres Martinez (hard of hearing)     Hyperlipidemia     Hypertension     Hypothyroidism     IgG monoclonal gammopathy     Multiple duodenal ulcers     Neuropathy     Obsessive-compulsive disorder     ?    Peripheral neuropathy     PONV (postoperative nausea and vomiting)     Prostate cancer     Reflux esophagitis     Ulcer     WBC decreased        Past Surgical History:  Past Surgical History:   Procedure Laterality Date    ARTERIAL ANEURYSM REPAIR      ASCENDING AORTIC ANEURYSM REPAIR W/ MECHANICAL AORTIC VALVE REPLACEMENT N/A 05/10/2024    Procedure: ASCENDING AORTIC ARCH/ possible  HEMIARCH REPLACEMENT WITH CIRCULATORY ARREST, neuro monitoring, and intraop JOSE A;  Surgeon: Spencer Puente MD;  Location: [facility] CVOR;  Service: Cardiothoracic;  Laterality: N/A;  Ascending aortic aneurysm repair with 30 mm gelweave graft.    BACK SURGERY      CARDIAC CATHETERIZATION N/A 04/19/2024    Procedure: Right and Left Heart Cath with possible PCI;  Surgeon: Dilan Houston DO;  Location: [facility] CATH INVASIVE LOCATION;  Service: Cardiovascular;  Laterality: N/A;  Local and IV sedation    CARDIAC SURGERY  5-10-24    CHOLECYSTECTOMY  1988    COLON SURGERY  1998    COLONOSCOPY  02/2013    ENDOSCOPY  2012    ESOPHAGOGASTRODUODENOSCOPY WITH DILATION OF SHATZKI'S RING    ENDOSCOPY N/A 10/09/2020    Procedure: ESOPHAGOGASTRODUODENOSCOPY with cold bx;  Surgeon: Jake Perez MD;  Location:  [facility];  Service: Gastroenterology;  Laterality: N/A;  pre - nausea  post - duodenal ulcer, gastrits, gastric ulcer, hiatal hernia    ENDOSCOPY N/A 10/04/2022    Procedure: ESOPHAGOGASTRODUODENOSCOPY with biopsies with esophageal dilatation with 56 cui;  Surgeon: Jake Perez MD;  Location:  [facility];  Service: Gastroenterology;  Laterality: N/A;  pre-dysphagia  post-normal     ENDOSCOPY  10/04/2022    [facility]    EYE SURGERY Bilateral     cataracts    JOINT REPLACEMENT      LAMINECTOMY  2013    decompression L3-4, L4-5    PROSTATECTOMY  2007    SKIN BIOPSY      TONSILLECTOMY AND ADENOIDECTOMY      1940s    UPPER GASTROINTESTINAL ENDOSCOPY      VASCULAR SURGERY      VASECTOMY      1970s       Social History:  Social History     Socioeconomic History    Marital status:      Spouse name: Meghan    Years of education: College   Tobacco Use    Smoking status: Never     Passive exposure: Never    Smokeless tobacco: Never   Vaping Use    Vaping status: Never Used   Substance and Sexual Activity    Alcohol use: Not Currently     Comment: No ETOH since 1991    Drug use: Never    Sexual  activity: Not Currently     Partners: Female     Birth control/protection: None       Review of Systems:  The following systems were reviewed as they relate to the cardiovascular system: Constitutional, Eyes, ENT, Cardiovascular, Respiratory, Gastrointestinal, Integumentary, Neurological, Psychiatric, Hematologic, Endocrine, Musculoskeletal, and Genitourinary. The pertinent cardiovascular findings are reported above with all other cardiovascular points within those systems being negative.    Diagnostic Study Review:     Current Electrocardiogram:  Procedures normal sinus rhythm with a ventricular rate of 75 bpm.  Left atrial enlargement.  Prolonged QT and QTc intervals of 475 and 500 31 ms respectively.  Right bundle branch block.    Laboratory Data:  Lab Results   Component Value Date    GLUCOSE 95 04/14/2025    BUN 20 04/14/2025    CREATININE 1.13 04/14/2025    EGFRIFNONA 58 (L) 02/09/2022    BCR 17.7 04/14/2025    K 4.0 04/14/2025    CO2 28.0 04/14/2025    CALCIUM 9.1 04/14/2025    ALBUMIN 4.1 04/10/2025    ALBUMIN 3.7 04/10/2025    AST 32 04/10/2025    ALT 18 04/10/2025     Lab Results   Component Value Date    GLUCOSE 95 04/14/2025    CALCIUM 9.1 04/14/2025     04/14/2025    K 4.0 04/14/2025    CO2 28.0 04/14/2025     04/14/2025    BUN 20 04/14/2025    CREATININE 1.13 04/14/2025    EGFRIFNONA 58 (L) 02/09/2022    BCR 17.7 04/14/2025    ANIONGAP 10.0 04/14/2025     Lab Results   Component Value Date    WBC 3.81 04/14/2025    HGB 12.2 (L) 04/14/2025    HCT 39.0 04/14/2025    MCV 85.5 04/14/2025     04/14/2025     Lab Results   Component Value Date    CHOL 195 04/17/2024    TRIG 101 04/17/2024    HDL 54 04/17/2024     (H) 04/17/2024     Lab Results   Component Value Date    HGBA1C 5.50 05/09/2024     Lab Results   Component Value Date    INR 1.10 05/11/2024    INR 1.12 (H) 05/10/2024    INR 1.38 (H) 05/10/2024    PROTIME 11.9 (H) 05/11/2024    PROTIME 12.1 (H) 05/10/2024    PROTIME 14.7  (H) 05/10/2024       Most Recent Echo:  Results for orders placed during the hospital encounter of 04/10/25    Adult Transthoracic Echo Complete W/ Cont if Necessary Per Protocol    Interpretation Summary    Left ventricular systolic function is mildly decreased. Calculated left ventricular EF = 44.9%    Left ventricular wall thickness is consistent with mild concentric hypertrophy. Sigmoid-shaped ventricular septum is present.    The following left ventricular wall segments are hypokinetic: mid anterior, apical anterior, basal anterolateral, mid anterolateral, apical lateral, basal inferolateral, mid inferolateral, apical inferior, mid inferior, apical septal, basal inferoseptal, mid inferoseptal, apex hypokinetic, mid anteroseptal, basal anterior, basal inferior and basal inferoseptal.    Left ventricular diastolic function is consistent with (grade II w/high LAP) pseudonormalization.    There is mild calcification of the aortic valve.    Estimated right ventricular systolic pressure from tricuspid regurgitation is normal (<35 mmHg).    Mild dilation of the aortic root is present. The aortic root measures 4.4 cm. Mild dilation of the sinuses of Valsalva is present.       Most Recent Stress Test:  Results for orders placed during the hospital encounter of 06/17/24    Treadmill Stress Test    Interpretation Summary    Patient exercised only 1 minute 2 seconds achieving 4.6 METS    No significant cardiac arrhythmia no significant acute ischemic EKG changes    Patient is stable to start the cardiac rehab program       Most Recent Cardiac Catheterization:   No results found for this or any previous visit.       NOTE: The following portions of the patient's note were reviewed, confirmed and/or updated this visit as appropriate: History of present illness/Interval history, physical examination, assessment & plan, allergies, current medications, past family history, past medical history, past social history, past surgical  "history and problem list.    Labs pertinent to today's visit on 06/19/2025 (including but not limited to CBC, CMP, and lipid profiles) were requested from the patient's primary care provider/hospital/clinical laboratory.  If the labs were available for the visit, they were reviewed with the patient.  If they were not available, when received, special interest will be made to the labs pertinent to this visit.  The patient's most recent \"in-house\" labs are noted below and have been reviewed.  Outside labs pertinent to this visit are scanned into the record and have been reviewed.    Discussions held today, 06/19/2025,regarding procedures included risk, benefits, and options including but not limited to: Death, MI, stroke, pain, bleeding, infection, and possible need for vascular/thoracic/cardiothoracic surgery.    Copied information within this note was reviewed and is current as of 06/19/2025.    Assessment and plan noted herein represents the current plan of care as of 06/19/2025.    Significant resources from our office and staff are inherent in engaging this patient in a continuous and active collaborative plan of care related to their chronic cardiovascular conditions outlined below.  The management of these conditions requires the direction of our service with specialized clinical knowledge, skills, and experience.  This collaborative care includes but is not limited to patient education, expectations and responsibilities, shared decision making around therapeutic goals, and shared commitments to achieve those goals.  "

## 2025-07-23 ENCOUNTER — TELEMEDICINE (OUTPATIENT)
Dept: PSYCHIATRY | Facility: CLINIC | Age: 85
End: 2025-07-23
Payer: MEDICARE

## 2025-07-23 DIAGNOSIS — F33.1 MODERATE EPISODE OF RECURRENT MAJOR DEPRESSIVE DISORDER: Primary | ICD-10-CM

## 2025-07-23 RX ORDER — SERTRALINE HYDROCHLORIDE 100 MG/1
150 TABLET, FILM COATED ORAL DAILY
Qty: 45 TABLET | Refills: 2 | Status: SHIPPED | OUTPATIENT
Start: 2025-07-23 | End: 2026-07-23

## 2025-07-23 NOTE — PROGRESS NOTES
Patient Name: Panfilo Feliz  MRN: [id number]   :  1940     This provider is located at her home office through the [facility] (through [facility]), [address], [address] using a secure [employer] Video Visit through [employer]. Patient is being seen remotely via telehealth in [place], and stated they are in a secure environment for this session. The patient's condition being diagnosed/treated is appropriate for telemedicine. The provider identified herself as well as her credentials.   The patient, and/or patients guardian, consent to be seen remotely, and when consent is given they understand that the consent allows for patient identifiable information to be sent to a third party as needed.   They may refuse to be seen remotely at any time. The electronic data is encrypted and password protected, and the patient and/or guardian has been advised of the potential risks to privacy not withstanding such measures.    You have chosen to receive care through a telehealth visit.  Do you consent to use a video/audio connection for your medical care today? Yes    Chief Complaint:      ICD-10-CM ICD-9-CM   1. Moderate episode of recurrent major depressive disorder  F33.1 296.32       History of Present Illness  The patient is an 85-year-old male who presents via telehealth for medication management. He is accompanied by his wife.    He reports a slight improvement in his condition following a recent adjustment to his medication regimen. However, he experiences persistent headaches upon waking and before sleep, which he suspects may be a side effect of his medications. His blood pressure was recorded at 160/90 while lying down. He avoids taking Advil due to past issues with stomach ulcers and instead uses Tylenol for pain management.    He continues to struggle with stress management and experiences fluctuating moods. His wife notes that he has been irritable and quick  to anger. He also reports poor sleep quality, which he attributes to their dog's barking at night. He is currently on sertraline for mood and anxiety.    Interim History: He reports a slight improvement in his condition following a recent adjustment to his medication regimen. However, he experiences persistent headaches upon waking and before sleep. He continues to struggle with stress management and experiences fluctuating moods. His wife notes that he has been irritable and quick to anger. He also reports poor sleep quality, which he attributes to their dog's barking at night.    Social History:  - , accompanied by his wife  - Recently lost his grandson to suicide  - Plans to attend a birthday dinner with his youngest daughter  - Has not driven recently despite obtaining 's glasses    Pertinent Negatives: No use of Advil due to past issues with stomach ulcers.    The following portions of the patient's history were reviewed and updated as appropriate: allergies, current medications, past family history, past medical history, past social history, past surgical history, and problem list.    Review of Systems;;  Review of Systems   Constitutional:  Negative for activity change, appetite change, fatigue, unexpected weight gain and unexpected weight loss.   HENT:  Positive for hearing loss.    Respiratory:  Negative for shortness of breath and wheezing.    Gastrointestinal:  Negative for constipation, diarrhea, nausea and vomiting.   Musculoskeletal:  Negative for gait problem.   Skin:  Negative for dry skin and rash.   Neurological:  Negative for dizziness, speech difficulty, weakness, light-headedness, headache, memory problem and confusion.   Psychiatric/Behavioral:  Positive for sleep disturbance and stress. Negative for agitation, behavioral problems, decreased concentration, dysphoric mood, hallucinations, self-injury, suicidal ideas, negative for hyperactivity and depressed mood. The patient is  nervous/anxious.        Physical Exam;;  Physical Exam  Constitutional:       General: He is not in acute distress.     Appearance: He is well-developed. He is not diaphoretic.   HENT:      Head: Normocephalic and atraumatic.   Eyes:      Conjunctiva/sclera: Conjunctivae normal.   Pulmonary:      Effort: Pulmonary effort is normal. No respiratory distress.   Musculoskeletal:         General: Normal range of motion.      Cervical back: Full passive range of motion without pain and normal range of motion.   Neurological:      Mental Status: He is alert and oriented to person, place, and time.   Psychiatric:         Attention and Perception: Attention and perception normal.         Mood and Affect: Affect normal. Mood is anxious. Mood is not depressed. Affect is not labile, blunt, angry or inappropriate.         Speech: Speech normal. Speech is not rapid and pressured or tangential.         Behavior: Behavior normal. Behavior is not agitated, slowed, aggressive, withdrawn, hyperactive or combative. Behavior is cooperative.         Thought Content: Thought content normal. Thought content is not paranoid or delusional. Thought content does not include homicidal or suicidal ideation. Thought content does not include homicidal or suicidal plan.         Cognition and Memory: Cognition and memory normal.         Judgment: Judgment normal.       There were no vitals taken for this visit.  There is no height or weight on file to calculate BMI. Video appt unable to obtain.      Current Medications;;    Current Outpatient Medications:     acetaminophen (TYLENOL) 325 MG tablet, Take 2 tablets by mouth Every 4 (Four) Hours As Needed for Mild Pain., Disp: , Rfl:     aspirin 81 MG EC tablet, Take 1 tablet by mouth Every Night. Indications: Carotid Artery Stenting, Disp: , Rfl:     Cyanocobalamin (B-12) 1000 MCG sublingual tablet, Place 1 tablet under the tongue Daily., Disp: 30 each, Rfl: 6    fludrocortisone 0.1 MG tablet, Take 1  tablet by mouth 2 (Two) Times a Day., Disp: 60 tablet, Rfl: 1    hydrALAZINE (APRESOLINE) 25 MG tablet, Take 1 tablet by mouth Every Evening., Disp: 30 tablet, Rfl: 3    levothyroxine (SYNTHROID, LEVOTHROID) 100 MCG tablet, Take 1 tablet by mouth Daily. Indications: Underactive Thyroid, Disp: , Rfl:     magnesium oxide (MAG-OX) 400 MG tablet, Take 1 tablet by mouth Daily., Disp: , Rfl:     Menthol, Topical Analgesic, (BIOFREEZE EX), Apply 1 Application topically Daily As Needed (pain)., Disp: , Rfl:     metoprolol succinate XL (TOPROL-XL) 25 MG 24 hr tablet, Take 1 tablet by mouth Every Evening., Disp: 30 tablet, Rfl: 3    midodrine (PROAMATINE) 5 MG tablet, Take 2 tablets by mouth 3 (Three) Times a Day Before Meals., Disp: 180 tablet, Rfl: 6    pantoprazole (PROTONIX) 40 MG EC tablet, Take 1 tablet by mouth 2 (Two) Times a Day. Indications: Gastroesophageal Reflux Disease, Disp: , Rfl:     pregabalin (LYRICA) 50 MG capsule, Take 1 capsule by mouth 2 (Two) Times a Day. Indications: Neuropathic Pain, Disp: , Rfl:     pyridostigmine (MESTINON) 60 MG tablet, Take 0.5 tablets by mouth 2 (Two) Times a Day. Indications: Postural Orthostatic Tachycardia, Disp: 60 tablet, Rfl: 2    sertraline (Zoloft) 100 MG tablet, Take 1 tablet by mouth Daily., Disp: 30 tablet, Rfl: 2    thiamine (VITAMIN B1) 100 MG tablet, Take 1 tablet by mouth Daily., Disp: 30 tablet, Rfl: 0    vitamin D3 125 MCG (5000 UT) capsule capsule, Take 250 Units by mouth Daily., Disp: , Rfl:   No current facility-administered medications for this visit.    Facility-Administered Medications Ordered in Other Visits:     Chlorhexidine Gluconate Cloth 2 % pads, , Topical, Q12H PRN, Kayli Rowe, APRN    Lab Results:   No visits with results within 3 Month(s) from this visit.   Latest known visit with results is:   No results displayed because visit has over 200 results.          Mental Status Exam:   Hygiene:   good  Cooperation:  Cooperative  Eye  Contact:  Good  Psychomotor Behavior:  Appropriate  Mood:anxious and irritable  Affect:  Appropriate  Hopelessness: Denies  Speech:  Normal  Thought Process:  Goal directed  Thought Content:  Normal  Suicidal:  None  Homicidal:  None  Hallucinations:  None  Delusion:  None  Memory:  Intact  Orientation:  Person, Place, Time, and Situation  Reliability:  good  Insight:  Good  Judgement:  Good  Impulse Control:  Good      [facility]-7 Anxiety Behav Health    Question 7/23/2025  2:24 PM EDT - Filed by Patient   In the last 2 weeks, how often have you been bothered by the following problems?    Feeling nervous, anxious, or on edge? Nearly every day   Not being able to sleep or control worrying? Nearly every day   Worrying too much about different things? Nearly every day   Trouble relaxing? Nearly every day   Being so restless that it is hard to sit still Nearly every day   Becoming easily annoyed or irritable? Nearly every day   Feeling afraid, as if something awful might happen? Not at all   If you checked any problems, how difficult have they made it for you to do your work, take care of things at home, or get along with other people? Somewhat difficult   Over the last 2 weeks, how often have you been bothered by the following problems? (range: 0 - 28) 0         PHQ-9 Depression Screening  Little interest or pleasure in doing things? (Patient-Rptd) Not at all   Feeling down, depressed, or hopeless? (Patient-Rptd) Several days   PHQ-2 Total Score (Patient-Rptd) 1   Trouble falling or staying asleep, or sleeping too much? (Patient-Rptd) Almost all   Feeling tired or having little energy? (Patient-Rptd) Almost all   Poor appetite or overeating? (Patient-Rptd) Not at all   Feeling bad about yourself - or that you are a failure or have let yourself or your family down? (Patient-Rptd) Several days   Trouble concentrating on things, such as reading the newspaper or watching television? (Patient-Rptd) Several days  "  Moving or speaking so slowly that other people could have noticed? Or the opposite - being so fidgety or restless that you have been moving around a lot more than usual? (Patient-Rptd) Several days   Thoughts that you would be better off dead, or of hurting yourself in some way? (Patient-Rptd) Not at all   PHQ-9 Total Score (Patient-Rptd) 10   If you checked off any problems, how difficult have these problems made it for you to do your work, take care of things at home, or get along with other people? (Patient-Rptd) Not difficult at all          Diagnoses and all orders for this visit:    1. Moderate episode of recurrent major depressive disorder (Primary)         Assessment & Plan  Problems:  - Hypertension  - Anxiety    Content of Therapy:  During the session, the patient discussed his current health status, including his blood pressure reading of 160/90 and persistent headaches. He expressed concerns about the potential side effects of his medications, particularly Tylenol, due to past issues with Advil causing stomach ulcers. The patient also shared the emotional impact of his grandson's recent suicide, which has exacerbated his anxiety and mood fluctuations. He mentioned difficulties in managing stress and sleep disturbances, partly due to his elderly dog's behavior.    Clinical Impression:  The patient exhibits symptoms of anxiety and mood instability, likely intensified by the recent traumatic loss of his grandson. He reports persistent headaches and sleep disturbances, which may be related to his hypertension and medication side effects. His anxiety appears to be poorly controlled, as evidenced by his tendency to \"fly off the handle\" quickly. The patient is receptive to adjusting his medication to better manage his symptoms.    Therapeutic Intervention:  The session included exploring feelings of grief and loss. The clinician suggested increasing the dosage of sertraline from 1 tablet to 1.5 tablets daily " to better manage his anxiety. The patient was encouraged to practice driving around the block to regain confidence and was advised to monitor his blood pressure regularly.    Plan:  - Increase sertraline dosage to 1.5 tablets daily.  - Monitor blood pressure regularly and report significant changes.  - Practice driving around the block to regain confidence.  - Continue taking sertraline at night, ensuring it does not interfere with sleep or cause unusual dreams.    Follow-up:  The next appointment is scheduled for 09/03/2025 at 2:30 PM.    Notes & Risk Factors:  The patient is experiencing significant grief due to the recent loss of his grandson, which is a risk factor for exacerbated anxiety and mood instability. Protective factors include his supportive wife and the planned follow-up appointment.      We discussed risks, benefits,goals and side effects of the above medication and the patient was agreeable with the plan.Patient was educated on the importance of compliance with treatment and follow-up appointments. Patient is aware to contact the [facility] [phone] with any worsening of symptoms. To call for questions or concerns and return early if necessary. Patent is agreeable to go to the Emergency Department or call 911 should they begin SI/HI.     Patient or patient representative verbalized consent for the use of Ambient Listening during the visit with  JAVY Bangura for chart documentation. 7/23/2025  15:00 EDT    Part of this note may be an electronic transcription/translation of spoken language to printed text using the Dragon Dictation System.        JAVY Heredia

## 2025-08-14 ENCOUNTER — LAB (OUTPATIENT)
Dept: LAB | Facility: HOSPITAL | Age: 85
End: 2025-08-14
Payer: MEDICARE

## 2025-08-14 DIAGNOSIS — D47.2 NEUROPATHY ASSOCIATED WITH MGUS: ICD-10-CM

## 2025-08-14 DIAGNOSIS — D47.2 MONOCLONAL GAMMOPATHY OF UNKNOWN SIGNIFICANCE (MGUS): ICD-10-CM

## 2025-08-14 DIAGNOSIS — G63 NEUROPATHY ASSOCIATED WITH MGUS: ICD-10-CM

## 2025-08-14 LAB
ALBUMIN SERPL-MCNC: 4.5 G/DL (ref 3.5–5.2)
ALBUMIN/GLOB SERPL: 1.7 G/DL
ALP SERPL-CCNC: 70 U/L (ref 39–117)
ALT SERPL W P-5'-P-CCNC: 15 U/L (ref 1–41)
ANION GAP SERPL CALCULATED.3IONS-SCNC: 9.5 MMOL/L (ref 5–15)
AST SERPL-CCNC: 24 U/L (ref 1–40)
BASOPHILS # BLD AUTO: 0.03 10*3/MM3 (ref 0–0.2)
BASOPHILS NFR BLD AUTO: 0.5 % (ref 0–1.5)
BILIRUB SERPL-MCNC: 0.3 MG/DL (ref 0–1.2)
BUN SERPL-MCNC: 18.8 MG/DL (ref 8–23)
BUN/CREAT SERPL: 16.9 (ref 7–25)
CALCIUM SPEC-SCNC: 9.6 MG/DL (ref 8.6–10.5)
CHLORIDE SERPL-SCNC: 101 MMOL/L (ref 98–107)
CO2 SERPL-SCNC: 27.5 MMOL/L (ref 22–29)
CREAT SERPL-MCNC: 1.11 MG/DL (ref 0.76–1.27)
DEPRECATED RDW RBC AUTO: 46.6 FL (ref 37–54)
EGFRCR SERPLBLD CKD-EPI 2021: 65.5 ML/MIN/1.73
EOSINOPHIL # BLD AUTO: 0.1 10*3/MM3 (ref 0–0.4)
EOSINOPHIL NFR BLD AUTO: 1.7 % (ref 0.3–6.2)
ERYTHROCYTE [DISTWIDTH] IN BLOOD BY AUTOMATED COUNT: 14.6 % (ref 12.3–15.4)
ERYTHROCYTE [SEDIMENTATION RATE] IN BLOOD: 4 MM/HR (ref 0–20)
GLOBULIN UR ELPH-MCNC: 2.7 GM/DL
GLUCOSE SERPL-MCNC: 100 MG/DL (ref 65–99)
HCT VFR BLD AUTO: 39.7 % (ref 37.5–51)
HGB BLD-MCNC: 12.7 G/DL (ref 13–17.7)
IMM GRANULOCYTES # BLD AUTO: 0.01 10*3/MM3 (ref 0–0.05)
IMM GRANULOCYTES NFR BLD AUTO: 0.2 % (ref 0–0.5)
LYMPHOCYTES # BLD AUTO: 2.02 10*3/MM3 (ref 0.7–3.1)
LYMPHOCYTES NFR BLD AUTO: 34.6 % (ref 19.6–45.3)
MCH RBC QN AUTO: 28.3 PG (ref 26.6–33)
MCHC RBC AUTO-ENTMCNC: 32 G/DL (ref 31.5–35.7)
MCV RBC AUTO: 88.4 FL (ref 79–97)
MONOCYTES # BLD AUTO: 0.63 10*3/MM3 (ref 0.1–0.9)
MONOCYTES NFR BLD AUTO: 10.8 % (ref 5–12)
NEUTROPHILS NFR BLD AUTO: 3.04 10*3/MM3 (ref 1.7–7)
NEUTROPHILS NFR BLD AUTO: 52.2 % (ref 42.7–76)
NRBC BLD AUTO-RTO: 0 /100 WBC (ref 0–0.2)
PLATELET # BLD AUTO: 192 10*3/MM3 (ref 140–450)
PMV BLD AUTO: 10.4 FL (ref 6–12)
POTASSIUM SERPL-SCNC: 4.1 MMOL/L (ref 3.5–5.2)
PROT SERPL-MCNC: 7.2 G/DL (ref 6–8.5)
RBC # BLD AUTO: 4.49 10*6/MM3 (ref 4.14–5.8)
SODIUM SERPL-SCNC: 138 MMOL/L (ref 136–145)
WBC NRBC COR # BLD AUTO: 5.83 10*3/MM3 (ref 3.4–10.8)

## 2025-08-14 PROCEDURE — 36415 COLL VENOUS BLD VENIPUNCTURE: CPT

## 2025-08-14 PROCEDURE — 85025 COMPLETE CBC W/AUTO DIFF WBC: CPT

## 2025-08-14 PROCEDURE — 80053 COMPREHEN METABOLIC PANEL: CPT

## 2025-08-14 PROCEDURE — 85652 RBC SED RATE AUTOMATED: CPT | Performed by: INTERNAL MEDICINE

## 2025-08-18 LAB
ALBUMIN SERPL ELPH-MCNC: 3.8 G/DL (ref 2.9–4.4)
ALBUMIN/GLOB SERPL: 1.2 {RATIO} (ref 0.7–1.7)
ALPHA1 GLOB SERPL ELPH-MCNC: 0.3 G/DL (ref 0–0.4)
ALPHA2 GLOB SERPL ELPH-MCNC: 0.8 G/DL (ref 0.4–1)
B-GLOBULIN SERPL ELPH-MCNC: 1.3 G/DL (ref 0.7–1.3)
GAMMA GLOB SERPL ELPH-MCNC: 0.9 G/DL (ref 0.4–1.8)
GLOBULIN SER-MCNC: 3.3 G/DL (ref 2.2–3.9)
IGA SERPL-MCNC: 359 MG/DL (ref 61–437)
IGG SERPL-MCNC: 1083 MG/DL (ref 603–1613)
IGM SERPL-MCNC: 19 MG/DL (ref 15–143)
INTERPRETATION SERPL IEP-IMP: ABNORMAL
KAPPA LC FREE SER-MCNC: 39.7 MG/L (ref 3.3–19.4)
KAPPA LC FREE/LAMBDA FREE SER: 2.24 {RATIO} (ref 0.26–1.65)
LABORATORY COMMENT REPORT: ABNORMAL
LAMBDA LC FREE SERPL-MCNC: 17.7 MG/L (ref 5.7–26.3)
M PROTEIN SERPL ELPH-MCNC: 0.3 G/DL
PROT SERPL-MCNC: 7.1 G/DL (ref 6–8.5)

## 2025-08-26 RX ORDER — METOPROLOL SUCCINATE 25 MG/1
25 TABLET, EXTENDED RELEASE ORAL EVERY EVENING
Qty: 90 TABLET | Refills: 3 | Status: SHIPPED | OUTPATIENT
Start: 2025-08-26

## 2025-08-28 ENCOUNTER — OFFICE VISIT (OUTPATIENT)
Dept: ONCOLOGY | Facility: CLINIC | Age: 85
End: 2025-08-28
Payer: MEDICARE

## 2025-08-28 VITALS
OXYGEN SATURATION: 94 % | BODY MASS INDEX: 22.75 KG/M2 | DIASTOLIC BLOOD PRESSURE: 84 MMHG | HEART RATE: 77 BPM | TEMPERATURE: 97.8 F | HEIGHT: 75 IN | SYSTOLIC BLOOD PRESSURE: 134 MMHG | WEIGHT: 183 LBS

## 2025-08-28 DIAGNOSIS — D47.2 MONOCLONAL GAMMOPATHY OF UNKNOWN SIGNIFICANCE (MGUS): Primary | ICD-10-CM

## 2025-08-28 PROCEDURE — 3079F DIAST BP 80-89 MM HG: CPT | Performed by: INTERNAL MEDICINE

## 2025-08-28 PROCEDURE — 3075F SYST BP GE 130 - 139MM HG: CPT | Performed by: INTERNAL MEDICINE

## 2025-08-28 PROCEDURE — 99213 OFFICE O/P EST LOW 20 MIN: CPT | Performed by: INTERNAL MEDICINE

## 2025-08-28 PROCEDURE — 1125F AMNT PAIN NOTED PAIN PRSNT: CPT | Performed by: INTERNAL MEDICINE

## (undated) DEVICE — SOL IRR NACL 0.9PCT BT 1000ML

## (undated) DEVICE — CONNECT Y INTERSEPT W/LL 3/8 X 3/8 X 3/8IN

## (undated) DEVICE — FRCP BX RADJAW4 NDL 2.8 240CM LG OG BX40

## (undated) DEVICE — ST TOURNI COMPL A/ 7IN

## (undated) DEVICE — CANN VESL CORNRY STR W/6MM BALN

## (undated) DEVICE — STARCLOSE SE VASCULAR CLOSURE SYSTEM: Brand: STARCLOSE SE

## (undated) DEVICE — Device

## (undated) DEVICE — SWAN-GANZ TRUE SIZE THERMODILUTION "S" TIP: Brand: SWAN-GANZ TRUE SIZE

## (undated) DEVICE — BOOT,SUTURE,STANDARD,YELLOW-IN-BLUE: Brand: MEDLINE

## (undated) DEVICE — ST IV BLD W/HANDPUMP YSITE 125IN

## (undated) DEVICE — SOL IRR H2O BTL 1000ML STRL

## (undated) DEVICE — KT ORCA ORCAPOD DISP STRL

## (undated) DEVICE — 3M™ BAIR HUGGER® UNDERBODY BLANKET, FULL ACCESS, 10 PER CASE 63500: Brand: BAIR HUGGER™

## (undated) DEVICE — ELECTRD BLD EZ CLN STD 6.5IN

## (undated) DEVICE — TOWEL,OR,DSP,ST,WHITE,DLX,4/PK,20PK/CS: Brand: MEDLINE

## (undated) DEVICE — ADAPT CLN BIOGUARD AIR/H2O DISP

## (undated) DEVICE — PRESSURE TUBING: Brand: TRUWAVE

## (undated) DEVICE — BIOPATCH™ ANTIMICROBIAL DRESSING WITH CHLORHEXIDINE GLUCONATE IS A HYDROPHILLIC POLYURETHANE ABSORPTIVE FOAM WITH CHLORHEXIDINE GLUCONATE (CHG) WHICH INHIBITS BACTERIAL GROWTH UNDER THE DRESSING. THE DRESSING IS INTENDED TO BE USED TO ABSORB EXUDATE, COVER A WOUND CAUSED BY VASCULAR AND NONVASCULAR PERCUTANEOUS MEDICAL DEVICES DURING SURGERY, AS WELL AS REDUCE LOCAL INFECTION AND COLONIZATION OF MICROORGANISMS.: Brand: BIOPATCH

## (undated) DEVICE — SUT PROLN 4/0 V5 36IN 8935H

## (undated) DEVICE — SOL NACL INJ .9PCT 500ML

## (undated) DEVICE — BITEBLOCK OMNI BLOC

## (undated) DEVICE — PK PERFUS CUST W/CARDIOPLEGIA

## (undated) DEVICE — CANN AORT ROOT DLP VNT/8IN 14G 7F

## (undated) DEVICE — 3M™ TEGADERM™ IV TRANSPARENT FILM DRESSING WITH BORDER 100 BAGS/CARTON 4 CARTONS/CASE 1633: Brand: 3M™ TEGADERM™

## (undated) DEVICE — PRESSURE MONITORING SET: Brand: TRUWAVE, VAMP PLUS

## (undated) DEVICE — DGW .035 FC J3MM 150CM TEF HEP: Brand: EMERALD

## (undated) DEVICE — PK ATS CUST W CARDIOTOMY RESEVOIR

## (undated) DEVICE — CANN O2 ETCO2 FITS ALL CONN CO2 SMPL A/ 7IN DISP LF

## (undated) DEVICE — CANN ART EOPA 3D NV W/CONN 20F

## (undated) DEVICE — CANN VESL CORNRY STR W/4MM BALN

## (undated) DEVICE — ELECTRD DEFIB M/FUNC PROPADZ STRL 2PK

## (undated) DEVICE — SUT PROLN 4/0 V7 36IN 8975H

## (undated) DEVICE — TUBING, SUCTION, 1/4" X 10', STRAIGHT: Brand: MEDLINE

## (undated) DEVICE — IRRIGATOR BULB ASEPTO 60CC STRL

## (undated) DEVICE — 3M™ TEGADERM™ I.V. ADVANCED SECUREMENT DRESSING, 1685, 3-1/2 IN X 4-1/2 IN (8.5 CM X 11.5 CM), 50/CT 4CT/CASE: Brand: 3M™ TEGADERM™

## (undated) DEVICE — SENSR O2 OXIMAX FNGR A/ 18IN NONSTR

## (undated) DEVICE — SOL NACL 0.9PCT 1000ML

## (undated) DEVICE — SPONGE,DISSECTOR,ROUND CHERRY,XR,ST,5/PK: Brand: MEDLINE

## (undated) DEVICE — SUP ARMBRD HANDAID ART/LINE 9IN

## (undated) DEVICE — CATH DIAG IMPULSE FL5 6F 100CM

## (undated) DEVICE — SUT SILK 4/0 TIES 18IN A183H

## (undated) DEVICE — MSK PROC CURAPLEX O2 2/ADAPT 7FT

## (undated) DEVICE — BLOOD TRANSFUSION FILTER: Brand: HAEMONETICS

## (undated) DEVICE — ST ACC MICROPUNCTURE STFF/CANN PLAT/TP 4F 21G 40CM

## (undated) DEVICE — PINNACLE INTRODUCER SHEATH: Brand: PINNACLE

## (undated) DEVICE — CATH ART RADL 20GA 1 3/4IN LF

## (undated) DEVICE — SYS PERFUS SEP PLATLT W TIPS CUST

## (undated) DEVICE — CATHETER,URETHRAL,REDRUBBER,STERILE,20FR: Brand: MEDLINE

## (undated) DEVICE — ANTIBACTERIAL UNDYED BRAIDED (POLYGLACTIN 910), SYNTHETIC ABSORBABLE SUTURE: Brand: COATED VICRYL

## (undated) DEVICE — DRSNG SLVR/ANTIBAC PRIMASEAL POST/OP ADHS 3.5X10IN

## (undated) DEVICE — BG BLD SYS

## (undated) DEVICE — SUT SILK 2/0 TIES 18IN A185H

## (undated) DEVICE — SUT PROLN 3/0 V7 D/A 36IN 8976H

## (undated) DEVICE — ELECTRD DEFIB M/FUNC PROPADZ RADIOL 2PK

## (undated) DEVICE — HEMOCONCENTRATOR PERFUS LPS06

## (undated) DEVICE — GAUZE,SPONGE,4"X4",32PLY,XRAY,STRL,LF: Brand: MEDLINE

## (undated) DEVICE — 500ML,PRESSURE INFUSER W/STOPCOCK: Brand: MEDLINE

## (undated) DEVICE — LN SMPL CO2 SHTRM SD STREAM W/M LUER

## (undated) DEVICE — SUT SILK 2 SUTUPAK TIE 60IN SA8H 2STRAND

## (undated) DEVICE — GLV SURG BIOGEL LTX PF 7 1/2

## (undated) DEVICE — CABL BIPOL W/ALLGTR CLIP/SM 12FT

## (undated) DEVICE — ST PERFUS M/

## (undated) DEVICE — TUBING, SUCTION, 1/4" X 12', STRAIGHT: Brand: MEDLINE

## (undated) DEVICE — SUT PROLN 6/0 RB2 D/A 30IN 8711H

## (undated) DEVICE — INTENDED FOR TISSUE SEPARATION, AND OTHER PROCEDURES THAT REQUIRE A SHARP SURGICAL BLADE TO PUNCTURE OR CUT.: Brand: BARD-PARKER ® CARBON RIB-BACK BLADES

## (undated) DEVICE — CONTAINER,SPECIMEN,OR STERILE,4OZ: Brand: MEDLINE

## (undated) DEVICE — SUT PROLN 5/0 RB2 D/A 30IN 8710H

## (undated) DEVICE — SENSR CERBRL O2 PK/2

## (undated) DEVICE — DRN WND CH RND FUL/FLUT NO/TROC 3/8IN 28F

## (undated) DEVICE — DRAPE SHEET ULTRAGARD: Brand: MEDLINE

## (undated) DEVICE — SUT PDS2 0 CT1 27IN Z340H MF VIL

## (undated) DEVICE — SUT SILK 0 CT1 CR8 18IN C021D

## (undated) DEVICE — CATH TDILUT SWANGANZ VIP 7.5F 110CM

## (undated) DEVICE — SAFELINER OUTER SHELL SUCTION CANISTER: Brand: DEROYAL

## (undated) DEVICE — SUT PROLN 7/0 BV1 D/A 30IN 8703H

## (undated) DEVICE — KT CATH CV ACC MAC 2L SFTY 9F 4 1/2IN

## (undated) DEVICE — PK TRY HEART CATH 50

## (undated) DEVICE — SUT PROLN 5/0 V5 36IN 8934H

## (undated) DEVICE — CATH MPAC STP 6F: Brand: SUPER TORQUE

## (undated) DEVICE — SUT SILK 2/0 SH CR8 18IN CR8 C012D

## (undated) DEVICE — TPE UMB COTN 1/8X36IN STRL

## (undated) DEVICE — 28 FR STRAIGHT – SOFT PVC CATHETER: Brand: PVC THORACIC CATHETERS

## (undated) DEVICE — COUNT NDL MAG XLG

## (undated) DEVICE — PK OPN HEART WHT WRP 50